# Patient Record
Sex: MALE | Race: WHITE | NOT HISPANIC OR LATINO | Employment: OTHER | URBAN - METROPOLITAN AREA
[De-identification: names, ages, dates, MRNs, and addresses within clinical notes are randomized per-mention and may not be internally consistent; named-entity substitution may affect disease eponyms.]

---

## 2017-01-12 ENCOUNTER — CLINICAL SUPPORT (OUTPATIENT)
Dept: INTERNAL MEDICINE | Facility: CLINIC | Age: 77
End: 2017-01-12
Payer: MEDICARE

## 2017-01-12 DIAGNOSIS — E29.1 HYPOGONADISM IN MALE: Primary | ICD-10-CM

## 2017-01-12 PROCEDURE — 96372 THER/PROPH/DIAG INJ SC/IM: CPT | Mod: S$GLB,,, | Performed by: INTERNAL MEDICINE

## 2017-01-12 PROCEDURE — 99999 PR PBB SHADOW E&M-EST. PATIENT-LVL II: CPT | Mod: PBBFAC,,,

## 2017-01-12 RX ADMIN — TESTOSTERONE CYPIONATE 200 MG: 200 INJECTION, SOLUTION INTRAMUSCULAR at 08:01

## 2017-01-12 NOTE — PROGRESS NOTES
After obtaining consent, and per orders of Dr. Peter Teixeira, injection of Depo Testosterone 200mg given by Guadalupe Lane. Patient instructed to remain in clinic for 20 minutes afterwards, and to report any adverse reaction to me immediately.    Guadalupe Lane, LPN

## 2017-01-18 ENCOUNTER — OFFICE VISIT (OUTPATIENT)
Dept: HEMATOLOGY/ONCOLOGY | Facility: CLINIC | Age: 77
End: 2017-01-18
Payer: MEDICARE

## 2017-01-18 ENCOUNTER — LAB VISIT (OUTPATIENT)
Dept: LAB | Facility: HOSPITAL | Age: 77
End: 2017-01-18
Attending: INTERNAL MEDICINE
Payer: MEDICARE

## 2017-01-18 VITALS
RESPIRATION RATE: 18 BRPM | SYSTOLIC BLOOD PRESSURE: 140 MMHG | OXYGEN SATURATION: 97 % | TEMPERATURE: 98 F | HEART RATE: 61 BPM | HEIGHT: 69 IN | WEIGHT: 177.69 LBS | BODY MASS INDEX: 26.32 KG/M2 | DIASTOLIC BLOOD PRESSURE: 72 MMHG

## 2017-01-18 DIAGNOSIS — C34.12 MALIGNANT NEOPLASM OF UPPER LOBE OF LEFT LUNG: ICD-10-CM

## 2017-01-18 DIAGNOSIS — Z85.118 HISTORY OF CANCER OF UPPER LOBE BRONCHUS OR LUNG: Primary | ICD-10-CM

## 2017-01-18 DIAGNOSIS — C34.10 MALIGNANT NEOPLASM OF UPPER LOBE OF LUNG, UNSPECIFIED LATERALITY: ICD-10-CM

## 2017-01-18 DIAGNOSIS — F51.01 PRIMARY INSOMNIA: ICD-10-CM

## 2017-01-18 LAB
ALBUMIN SERPL BCP-MCNC: 3.4 G/DL
ALP SERPL-CCNC: 62 U/L
ALT SERPL W/O P-5'-P-CCNC: 9 U/L
ANION GAP SERPL CALC-SCNC: 8 MMOL/L
AST SERPL-CCNC: 19 U/L
BASOPHILS # BLD AUTO: 0.05 K/UL
BASOPHILS NFR BLD: 0.9 %
BILIRUB SERPL-MCNC: 0.3 MG/DL
BUN SERPL-MCNC: 14 MG/DL
CALCIUM SERPL-MCNC: 9.3 MG/DL
CHLORIDE SERPL-SCNC: 109 MMOL/L
CO2 SERPL-SCNC: 26 MMOL/L
CREAT SERPL-MCNC: 0.9 MG/DL
DIFFERENTIAL METHOD: ABNORMAL
EOSINOPHIL # BLD AUTO: 0.8 K/UL
EOSINOPHIL NFR BLD: 14.2 %
ERYTHROCYTE [DISTWIDTH] IN BLOOD BY AUTOMATED COUNT: 13.5 %
EST. GFR  (AFRICAN AMERICAN): >60 ML/MIN/1.73 M^2
EST. GFR  (NON AFRICAN AMERICAN): >60 ML/MIN/1.73 M^2
GLUCOSE SERPL-MCNC: 93 MG/DL
HCT VFR BLD AUTO: 38.6 %
HGB BLD-MCNC: 12.5 G/DL
LYMPHOCYTES # BLD AUTO: 0.8 K/UL
LYMPHOCYTES NFR BLD: 15.1 %
MCH RBC QN AUTO: 30.6 PG
MCHC RBC AUTO-ENTMCNC: 32.4 %
MCV RBC AUTO: 94 FL
MONOCYTES # BLD AUTO: 0.5 K/UL
MONOCYTES NFR BLD: 9.9 %
NEUTROPHILS # BLD AUTO: 3.3 K/UL
NEUTROPHILS NFR BLD: 59.9 %
PLATELET # BLD AUTO: 155 K/UL
PMV BLD AUTO: 10.4 FL
POTASSIUM SERPL-SCNC: 4.3 MMOL/L
PROT SERPL-MCNC: 6.3 G/DL
RBC # BLD AUTO: 4.09 M/UL
SODIUM SERPL-SCNC: 143 MMOL/L
WBC # BLD AUTO: 5.48 K/UL

## 2017-01-18 PROCEDURE — 99214 OFFICE O/P EST MOD 30 MIN: CPT | Mod: S$GLB,,, | Performed by: INTERNAL MEDICINE

## 2017-01-18 PROCEDURE — 99999 PR PBB SHADOW E&M-EST. PATIENT-LVL IV: CPT | Mod: PBBFAC,,, | Performed by: INTERNAL MEDICINE

## 2017-01-18 RX ORDER — TEMAZEPAM 15 MG/1
CAPSULE ORAL
Qty: 30 CAPSULE | Refills: 1 | Status: SHIPPED | OUTPATIENT
Start: 2017-01-18 | End: 2017-02-23
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
## 2017-01-23 ENCOUNTER — OFFICE VISIT (OUTPATIENT)
Dept: URGENT CARE | Facility: CLINIC | Age: 77
End: 2017-01-23
Payer: MEDICARE

## 2017-01-23 VITALS
HEART RATE: 72 BPM | WEIGHT: 174.63 LBS | DIASTOLIC BLOOD PRESSURE: 70 MMHG | TEMPERATURE: 99 F | SYSTOLIC BLOOD PRESSURE: 136 MMHG | HEIGHT: 70 IN | BODY MASS INDEX: 25 KG/M2 | OXYGEN SATURATION: 97 %

## 2017-01-23 DIAGNOSIS — T23.202A BURN OF LEFT HAND, SECOND DEGREE, INITIAL ENCOUNTER: Primary | ICD-10-CM

## 2017-01-23 DIAGNOSIS — L03.119 CELLULITIS OF HAND: ICD-10-CM

## 2017-01-23 PROCEDURE — 99999 PR PBB SHADOW E&M-EST. PATIENT-LVL IV: CPT | Mod: PBBFAC,,, | Performed by: NURSE PRACTITIONER

## 2017-01-23 PROCEDURE — 90715 TDAP VACCINE 7 YRS/> IM: CPT | Mod: S$GLB,,, | Performed by: NURSE PRACTITIONER

## 2017-01-23 PROCEDURE — 90471 IMMUNIZATION ADMIN: CPT | Mod: S$GLB,,, | Performed by: NURSE PRACTITIONER

## 2017-01-23 PROCEDURE — 99214 OFFICE O/P EST MOD 30 MIN: CPT | Mod: 25,S$GLB,, | Performed by: NURSE PRACTITIONER

## 2017-01-23 RX ORDER — MUPIROCIN 20 MG/G
OINTMENT TOPICAL 3 TIMES DAILY
Qty: 30 G | Refills: 0 | Status: SHIPPED | OUTPATIENT
Start: 2017-01-23 | End: 2017-02-02

## 2017-01-23 RX ORDER — CEPHALEXIN 500 MG/1
500 CAPSULE ORAL 2 TIMES DAILY
Qty: 20 CAPSULE | Refills: 0 | Status: SHIPPED | OUTPATIENT
Start: 2017-01-23 | End: 2017-02-02
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
## 2017-01-24 NOTE — PATIENT INSTRUCTIONS
Second-Degree Burn  A burn occurs when skin is exposed to too much heat, sun, or harsh chemicals. A second-degree burn (partial-thickness burn) is deeper than a first-degree burn (superficial burn). It usually causes a blister to form. The blister may remain intact and gradually go away on its own. Or it may break open. The goal of treatment is to relieve pain and stop infection while the burn heals.   Home care  Use pain medicine as directed. If no pain medicine was prescribed, you may use acetaminophen or ibuprofen to control pain. If you have chronic liver or kidney disease, talk with your health care provider before using these medicine. Also talk with your provider if you've had a stomach ulcer or GI bleeding.  General care  · On the first day, you may put a cool compress on the wound to ease pain. A cool compress is a small towel soaked in cool water.  · If you were sent home with the blister intact, don't break the blister. The risk for infection is greater if the blister breaks. If a bandage was applied, change it once a day, unless told otherwise. If the bandage becomes wet or soiled, change it as soon as you can.  · Sometimes an infection may occur even with proper treatment. Check the burn daily for the signs of infection listed below.  · Eat more calories and protein until your wound is healed.  · Wear a hat, sunscreen, and long sleeves while in the sun to protect the skin.  · Don't pick or scratch at the wound. Use over-the-counter medicines like diphenhydramine for itching.  · Avoid tight-fitting clothes.  To change a bandage:  · Wash your hands.  · Take off the old bandage. If the bandage sticks, soak it off under warm running water.  · Once the bandage is off, gently wash the burn area with mild soap and warm water to remove any cream, ointment, ooze, or scab. You may do this in a sink, under a tub faucet, or in the shower. Rinse off the soap and gently pat dry with a clean towel.  · Check for  signs of infection listed below.  · Put any prescribed antibiotic cream or ointment on the wound.  · Cover the burn with nonstick gauze. Then wrap it with the bandage material.  Follow-up care  Follow up with your health care provider, or as advised.  When to seek medical advice  Call your health care provider right away if you have any of these signs of infection:  · Fever over 100.4°F (38°C)  · Pain that gets worse  · Redness or swelling that gets worse  · Pus comes from the burn  · Red streaks in your skin coming from the burn  · Wound doesn't appear to be healing  · Nausea or vomiting   © 0178-3700 [employer]. [address] [place] [address], [place] [address]. All rights reserved. This information is not intended as a substitute for professional medical care. Always follow your healthcare professional's instructions.
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
## 2017-02-02 ENCOUNTER — CLINICAL SUPPORT (OUTPATIENT)
Dept: INTERNAL MEDICINE | Facility: CLINIC | Age: 77
End: 2017-02-02
Payer: MEDICARE

## 2017-02-02 DIAGNOSIS — E29.1 HYPOGONADISM IN MALE: Primary | ICD-10-CM

## 2017-02-02 PROCEDURE — 96372 THER/PROPH/DIAG INJ SC/IM: CPT | Mod: S$GLB,,, | Performed by: INTERNAL MEDICINE

## 2017-02-02 PROCEDURE — 99999 PR PBB SHADOW E&M-EST. PATIENT-LVL II: CPT | Mod: PBBFAC,,,

## 2017-02-02 RX ADMIN — TESTOSTERONE CYPIONATE 200 MG: 200 INJECTION, SOLUTION INTRAMUSCULAR at 08:02

## 2017-02-06 ENCOUNTER — PROCEDURE VISIT (OUTPATIENT)
Dept: PULMONOLOGY | Facility: CLINIC | Age: 77
End: 2017-02-06
Payer: MEDICARE

## 2017-02-06 ENCOUNTER — HOSPITAL ENCOUNTER (OUTPATIENT)
Dept: RADIOLOGY | Facility: HOSPITAL | Age: 77
Discharge: HOME | End: 2017-02-06
Attending: INTERNAL MEDICINE
Payer: MEDICARE

## 2017-02-06 ENCOUNTER — OFFICE VISIT (OUTPATIENT)
Dept: PULMONOLOGY | Facility: CLINIC | Age: 77
End: 2017-02-06
Payer: MEDICARE

## 2017-02-06 VITALS
HEART RATE: 67 BPM | SYSTOLIC BLOOD PRESSURE: 110 MMHG | OXYGEN SATURATION: 99 % | HEIGHT: 70 IN | RESPIRATION RATE: 18 BRPM | WEIGHT: 173 LBS | DIASTOLIC BLOOD PRESSURE: 70 MMHG | BODY MASS INDEX: 24.77 KG/M2

## 2017-02-06 DIAGNOSIS — Z90.2 STATUS POST PNEUMONECTOMY: ICD-10-CM

## 2017-02-06 DIAGNOSIS — Z85.118 HX OF CANCER OF LUNG: ICD-10-CM

## 2017-02-06 DIAGNOSIS — J44.89 ASTHMA WITH COPD: Primary | ICD-10-CM

## 2017-02-06 DIAGNOSIS — J92.0 ASBESTOS-INDUCED PLEURAL PLAQUE: ICD-10-CM

## 2017-02-06 DIAGNOSIS — C34.10 MALIGNANT NEOPLASM OF UPPER LOBE OF LUNG, UNSPECIFIED LATERALITY: ICD-10-CM

## 2017-02-06 DIAGNOSIS — J43.1 PANLOBULAR EMPHYSEMA: ICD-10-CM

## 2017-02-06 PROCEDURE — 94726 PLETHYSMOGRAPHY LUNG VOLUMES: CPT | Mod: S$GLB,,, | Performed by: INTERNAL MEDICINE

## 2017-02-06 PROCEDURE — 99214 OFFICE O/P EST MOD 30 MIN: CPT | Mod: 25,S$GLB,, | Performed by: INTERNAL MEDICINE

## 2017-02-06 PROCEDURE — 71030 XR CHEST 4 OR MORE VIEW: CPT | Mod: 26,,, | Performed by: RADIOLOGY

## 2017-02-06 PROCEDURE — 94729 DIFFUSING CAPACITY: CPT | Mod: S$GLB,,, | Performed by: INTERNAL MEDICINE

## 2017-02-06 PROCEDURE — 99999 PR PBB SHADOW E&M-EST. PATIENT-LVL IV: CPT | Mod: PBBFAC,,, | Performed by: INTERNAL MEDICINE

## 2017-02-06 PROCEDURE — 94060 EVALUATION OF WHEEZING: CPT | Mod: S$GLB,,, | Performed by: INTERNAL MEDICINE

## 2017-02-06 RX ORDER — TETANUS TOXOID, REDUCED DIPHTHERIA TOXOID AND ACELLULAR PERTUSSIS VACCINE, ADSORBED 5; 2.5; 8; 8; 2.5 [IU]/.5ML; [IU]/.5ML; UG/.5ML; UG/.5ML; UG/.5ML
SUSPENSION INTRAMUSCULAR
COMMUNITY
Start: 2017-01-23 | End: 2017-03-02 | Stop reason: ALTCHOICE

## 2017-02-06 RX ORDER — LISINOPRIL AND HYDROCHLOROTHIAZIDE 20; 25 MG/1; MG/1
TABLET ORAL
COMMUNITY
Start: 2013-04-01 | End: 2017-03-02

## 2017-02-06 NOTE — PROGRESS NOTES
Subjective:       Patient ID: Chai Murry is a 76 y.o. male.    Chief Complaint: He       COPD (rev cxr pft)    HPI   Dyspnea  Patient complains of shortness of breath. Symptoms occur with more than one block walking. Symptoms began 1 year ago, gradually worsening since. Associated symptoms include  dry cough and dyspnea on exertion. He denies chest pain, located left chest. He does not have had recent travel. Weight has been stable. Symptoms are exacerbated by moderate activity. Symptoms are alleviated by rest.     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 [[id number]] The primary measured 3.8 cm extending to the bronchial resection margin. AP window node was positive for metastatic squamous cell carcinoma. 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  First exposure to asbestos: 1957  Last exposure to asbestos: 1983     [employer]: maintence for 10 years at [facility]  Sandblasting: very little  Toxic Fume Exposure: chlorine cells and mercury cells      Past Medical History   Diagnosis Date    Anemia     Arthritis     Atrial fibrillation     CAD (coronary artery disease)     Cancer      lung cancer-Left    Cataract     ED (erectile dysfunction)     HTN (hypertension)     Hyperlipidemia     Myocardial infarction      Past Surgical History   Procedure Laterality Date    Appendectomy      Cholecystectomy      Cardiac stents      Infected stomach gland excision      Cataract extraction w/  intraocular lens implant Right 9-3-14    Skin biopsy Left      arm    Lung removal, total Left 04/2016     lung cancer left upper lobe     Social History      Social History    Marital status:      Spouse name: N/A    Number of children: 3    Years of education: N/A     Occupational History    retired      Social History Main Topics    Smoking status: Former Smoker     Packs/day: 1.00     Years: 50.00     Quit date: 8/12/2005    Smokeless tobacco: Never Used    Alcohol use No    Drug use: No    Sexual activity: Not Currently     Other Topics Concern    Not on file     Social History Narrative     Review of Systems    Objective:      Physical Exam  Personal Diagnostic Review  Chest x-ray: post op changes and asbestos plerual changes    No flowsheet data found.      Assessment:       1. Asthma with COPD    2. Asbestos-induced pleural plaque    3. Panlobular emphysema    4. Hx of cancer of lung    5. s/p left pnuemonectomy for KAYLI Lunc Ca        Outpatient Encounter Prescriptions as of 2/6/2017   Medication Sig Dispense Refill    albuterol-ipratropium 2.5mg-0.5mg/3mL (DUO-NEB) 0.5 mg-3 mg(2.5 mg base)/3 mL nebulizer solution Take 3 mLs by nebulization every 6 (six) hours as needed for Shortness of Breath. 120 vial 11    amlodipine (NORVASC) 5 MG tablet Take 5 mg by mouth every evening.       aspirin (ECOTRIN) 81 MG EC tablet Take 81 mg by mouth once daily.      BOOSTRIX TDAP 2.5-8-5 Lf-mcg-Lf/0.5mL Syrg injection       fenofibric acid (FIBRICOR) 135 mg CpDR TAKE 1 CAPSULE BY MOUTH EVERY DAY 30 capsule 11    hydrochlorothiazide (HYDRODIURIL) 25 MG tablet Take 25 mg by mouth daily as needed.      lisinopril (PRINIVIL,ZESTRIL) 40 MG tablet Take 40 mg by mouth Daily.       lisinopril-hydrochlorothiazide (PRINZIDE,ZESTORETIC) 20-25 mg Tab       potassium 99 mg Tab Take 99 mg by mouth. Pt takes when he takes his Lasix      simvastatin (ZOCOR) 40 MG tablet TAKE 1 TABLET (40 MG TOTAL) BY MOUTH EVERY EVENING. 30 tablet 11    SOTALOL AF 80 mg tablet TAKE 1 TABLET BY MOUTH TWICE A DAY 60 tablet 11    temazepam (RESTORIL) 15 mg Cap TAKE 1 CAPSULE BY  MOUTH NIGHTLY AS NEEDED 30 capsule 1    WELCHOL 625 mg tablet TAKE 2 TABLETS BY MOUTH TWICE A  tablet 9    cetirizine (ZYRTEC) 10 MG tablet Take 1 tablet (10 mg total) by mouth once daily. 30 tablet 0    ipratropium-albuterol (COMBIVENT RESPIMAT)  mcg/actuation inhaler Inhale 1 puff into the lungs every 6 (six) hours as needed for Shortness of Breath. 4 g 11     Facility-Administered Encounter Medications as of 2/6/2017   Medication Dose Route Frequency Provider Last Rate Last Dose    testosterone cypionate injection 200 mg  200 mg Intramuscular Q21 Days Peter Teixeira MD   200 mg at 02/02/17 0811     Orders Placed This Encounter   Procedures    X-Ray Chest PA And Lateral     Standing Status:   Future     Standing Expiration Date:   2/6/2019     Order Specific Question:   Reason for Exam:     Answer:   SOB    Spirometry with/without bronchodilator     Standing Status:   Future     Standing Expiration Date:   2/6/2018     Plan:       Requested Prescriptions     Signed Prescriptions Disp Refills    ipratropium-albuterol (COMBIVENT RESPIMAT)  mcg/actuation inhaler 4 g 11     Sig: Inhale 1 puff into the lungs every 6 (six) hours as needed for Shortness of Breath.     Asthma with COPD  -     ipratropium-albuterol (COMBIVENT RESPIMAT)  mcg/actuation inhaler; Inhale 1 puff into the lungs every 6 (six) hours as needed for Shortness of Breath.  Dispense: 4 g; Refill: 11  -     Spirometry with/without bronchodilator; Future; Expected date: 8/9/17  -     X-Ray Chest PA And Lateral; Future    Asbestos-induced pleural plaque    Panlobular emphysema    Hx of cancer of lung    s/p left pnuemonectomy for KAYLI [place] Ca           Return in about 1 year (around 2/6/2018) for lisa and cxr.    MEDICAL DECISION MAKING: Moderate to high complexity.  Overall, the multiple problems listed are of moderate to high severity that may impact quality of life and activities of daily living. Side effects of  medications, treatment plan as well as options and alternatives reviewed and discussed with patient. There was counseling of patient concerning these issues.    Total time spent in face to face counseling and coordination of care - 25  minutes over 50% of time was used in discussion of prognosis, risks, benefits of treatment, instructions and compliance with regimen . Discussion with other physicians or health care providers (DME, NP, pharmacy, respiratory therapy) occurred.

## 2017-02-06 NOTE — PATIENT INSTRUCTIONS
Albuterol; Ipratropium respiratory inhalation spray (Combivent Respimat)  What is this medicine?  ALBUTEROL; IPRATROPIUM (al BYOO ter ole; i pra REBEKAH drake um) has two bronchodilators. It helps open up the airways in your lungs to make it easier to breathe. This medicine is used to treat chronic obstructive pulmonary disease (COPD).  How should I use this medicine?  This medicine is for inhalation only. Follow the instructions on your prescription label. Do not use more often than directed. Make sure that you are using your inhaler correctly. Ask you doctor or health care provider if you have any questions.  Talk to your pediatrician regarding the use of this medicine in children. Special care may be needed.  What side effects may I notice from receiving this medicine?  Side effects that you should report to your doctor or health care professional as soon as possible:  · allergic reactions like skin rash, itching or hives, swelling of the face, lips, or tongue  · breathing problems  · feeling faint or lightheaded, falls  · fever  · high blood pressure  · irregular heartbeat or chest pain  · muscle cramps or weakness  · pain, tingling, numbness in the hands or feet  · vomiting  Side effects that usually do not require medical attention (Report these to your doctor or health care professional if they continue or are bothersome.):  · blurred vision  · cough  · difficulty passing urine  · difficulty sleeping  · headache  · nervousness or trembling  · stuffy or runny nose  · unusual taste  · upset stomach  What may interact with this medicine?  Do not take this medicine with any of the following medications:  · MAOIs like Carbex, Eldepryl, Marplan, Nardil, and Parnate  This medicine may also interact with the following medications:  · diuretics  · medicines for depression, anxiety, or psychotic disturbances  · medicines for irregular heartbeat  · medicines for weight loss including some herbal  products  · methadone  · pimozide  · sertindole  · some medicines for blood pressure or the heart  What if I miss a dose?  If you miss a dose, use it as soon as you can. If it is almost time for your next dose, use only that dose. Do not use double or extra doses.  Where should I keep my medicine?  Keep out of the reach of children.  Store at a room temperature between 15 and 30 degrees C (59 and 86 degrees F). Do not freeze. After assembly, the inhaler should be discarded at the latest 3 months after first use or after the inhaler is locked, whichever comes first.  What should I tell my health care provider before I take this medicine?  They need to know if you have any of these conditions:  · heart disease  · high blood pressure  · irregular heartbeat  · an unusual or allergic reaction to albuterol, ipratropium, atropine, other medicines, foods, dyes, or preservatives  · pregnant or trying to get pregnant  · breast-feeding  What should I watch for while using this medicine?  Tell your doctor or health care professional if your symptoms do not improve. If your breathing gets worse while you are using this medicine, call your doctor right away. Do not stop using your medicine unless your doctor tells you to.  Your mouth may get dry. Chewing sugarless gum or sucking hard candy, and drinking plenty of water may help. Contact your doctor if the problem does not go away or is severe.  You may get dizzy or have blurred vision. Do not drive, use machinery, or do anything that needs mental alertness until you know how this medicine affects you. Do not stand or sit up quickly, especially if you are an older patient. This reduces the risk of dizzy or fainting spells.  Date Last Reviewed:   NOTE:This sheet is a summary. It may not cover all possible information. If you have questions about this medicine, talk to your doctor, pharmacist, or health care provider. Copyright© 2016 [employer] Standard

## 2017-02-06 NOTE — MR AVS SNAPSHOT
[facility]  [address] [place] [address]  Phone: [phone]  Fax: [phone]                  Chai Murry   2017 1:40 PM   Office Visit    Description:  Male : 1940   Provider:  Roel Ernandez MD   Department:  [facility]           Reason for Visit     COPD           Diagnoses this Visit        Comments    Asthma with COPD    -  Primary     Asbestos-induced pleural plaque         Panlobular emphysema                To Do List           Future Appointments        Provider Department Dept Phone    2017 9:00 AM [phone]    3/15/2017 8:20 AM LABORATORY, [place] [facility] [place] [phone]    3/21/2017 8:40 AM [facility] [phone]    2017 9:00 AM LABORAT[facility], [facility] [phone]    2017 10:30 AM [facility] CT1 LIMIT [address] [facility] [phone]      Goals (5 Years of Data)     None      Follow-Up and Disposition     Return in about 1 year (around 2018) for lisa and cxr.       These Medications        Disp Refills Start End    ipratropium-albuterol (COMBIVENT RESPIMAT)  mcg/actuation inhaler 4 g 11 2017     Inhale 1 puff into the lungs every 6 (six) hours as needed for Shortness of Breath. - Inhalation    Pharmacy: [facility]/pharmacy #[id number] - [place] - [address] AT [facility] Ph #: [phone]         [facility]     [facility] -  Assistance  Registered nurses in the [facility] provide clinical advisement, health education, appointment booking, and other advisory services.  Call for this free service at [phone].             Medications           Message regarding Medications     Verify the changes and/or additions to your medication regime listed below are the same as discussed with your  clinician today.  If any of these changes or additions are incorrect, please notify your healthcare provider.        START taking these NEW medications        Refills    ipratropium-albuterol (COMBIVENT RESPIMAT)  mcg/actuation inhaler 11    Sig: Inhale 1 puff into the lungs every 6 (six) hours as needed for Shortness of Breath.    Class: Normal    Route: Inhalation           Verify that the below list of medications is an accurate representation of the medications you are currently taking.  If none reported, the list may be blank. If incorrect, please contact your healthcare provider. Carry this list with you in case of emergency.           Current Medications     albuterol-ipratropium 2.5mg-0.5mg/3mL (DUO-NEB) 0.5 mg-3 mg(2.5 mg base)/3 mL nebulizer solution Take 3 mLs by nebulization every 6 (six) hours as needed for Shortness of Breath.    amlodipine (NORVASC) 5 MG tablet Take 5 mg by mouth every evening.     aspirin (ECOTRIN) 81 MG EC tablet Take 81 mg by mouth once daily.    BOOSTRIX TDAP 2.5-8-5 Lf-mcg-Lf/0.5mL Syrg injection     cetirizine (ZYRTEC) 10 MG tablet Take 1 tablet (10 mg total) by mouth once daily.    fenofibric acid (FIBRICOR) 135 mg CpDR TAKE 1 CAPSULE BY MOUTH EVERY DAY    hydrochlorothiazide (HYDRODIURIL) 25 MG tablet Take 25 mg by mouth daily as needed.    ipratropium-albuterol (COMBIVENT RESPIMAT)  mcg/actuation inhaler Inhale 1 puff into the lungs every 6 (six) hours as needed for Shortness of Breath.    lisinopril (PRINIVIL,ZESTRIL) 40 MG tablet Take 40 mg by mouth Daily.     lisinopril-hydrochlorothiazide (PRINZIDE,ZESTORETIC) 20-25 mg Tab     potassium 99 mg Tab Take 99 mg by mouth. Pt takes when he takes his Lasix    simvastatin (ZOCOR) 40 MG tablet TAKE 1 TABLET (40 MG TOTAL) BY MOUTH EVERY EVENING.    SOTALOL AF 80 mg tablet TAKE 1 TABLET BY MOUTH TWICE A DAY    temazepam (RESTORIL) 15 mg Cap TAKE 1 CAPSULE BY MOUTH NIGHTLY AS NEEDED    WELCHOL 625 mg tablet TAKE 2 TABLETS  "BY MOUTH TWICE A DAY           Clinical Reference Information           Your Vitals Were     BP Pulse Resp Height Weight SpO2    110/70 (BP Location: Right arm, Patient Position: Sitting, BP Method: Manual) 67 18 5' 10" (1.778 m) 78.5 kg (173 lb) 99%    BMI                24.82 kg/m2          Blood Pressure          Most Recent Value    BP  110/70      Allergies as of 2/6/2017     Atorvastatin      Immunizations Administered on Date of Encounter - 2/6/2017     None      Orders Placed During Today's Visit     Future Labs/Procedures Expected by Expires    Spirometry with/without bronchodilator  8/9/2017 (Approximate) 2/6/2018    X-Ray Chest PA And Lateral  As directed 2/6/2019      Instructions      Albuterol; Ipratropium respiratory inhalation spray (Combivent Respimat)  What is this medicine?  ALBUTEROL; IPRATROPIUM (al BYOO ter ole; i pra TROE pee um) has two bronchodilators. It helps open up the airways in your lungs to make it easier to breathe. This medicine is used to treat chronic obstructive pulmonary disease (COPD).  How should I use this medicine?  This medicine is for inhalation only. Follow the instructions on your prescription label. Do not use more often than directed. Make sure that you are using your inhaler correctly. Ask you doctor or health care provider if you have any questions.  Talk to your pediatrician regarding the use of this medicine in children. Special care may be needed.  What side effects may I notice from receiving this medicine?  Side effects that you should report to your doctor or health care professional as soon as possible:  · allergic reactions like skin rash, itching or hives, swelling of the face, lips, or tongue  · breathing problems  · feeling faint or lightheaded, falls  · fever  · high blood pressure  · irregular heartbeat or chest pain  · muscle cramps or weakness  · pain, tingling, numbness in the hands or feet  · vomiting  Side effects that usually do not require medical " attention (Report these to your doctor or health care professional if they continue or are bothersome.):  · blurred vision  · cough  · difficulty passing urine  · difficulty sleeping  · headache  · nervousness or trembling  · stuffy or runny nose  · unusual taste  · upset stomach  What may interact with this medicine?  Do not take this medicine with any of the following medications:  · MAOIs like Carbex, Eldepryl, Marplan, Nardil, and Parnate  This medicine may also interact with the following medications:  · diuretics  · medicines for depression, anxiety, or psychotic disturbances  · medicines for irregular heartbeat  · medicines for weight loss including some herbal products  · methadone  · pimozide  · sertindole  · some medicines for blood pressure or the heart  What if I miss a dose?  If you miss a dose, use it as soon as you can. If it is almost time for your next dose, use only that dose. Do not use double or extra doses.  Where should I keep my medicine?  Keep out of the reach of children.  Store at a room temperature between 15 and 30 degrees C (59 and 86 degrees F). Do not freeze. After assembly, the inhaler should be discarded at the latest 3 months after first use or after the inhaler is locked, whichever comes first.  What should I tell my health care provider before I take this medicine?  They need to know if you have any of these conditions:  · heart disease  · high blood pressure  · irregular heartbeat  · an unusual or allergic reaction to albuterol, ipratropium, atropine, other medicines, foods, dyes, or preservatives  · pregnant or trying to get pregnant  · breast-feeding  What should I watch for while using this medicine?  Tell your doctor or health care professional if your symptoms do not improve. If your breathing gets worse while you are using this medicine, call your doctor right away. Do not stop using your medicine unless your doctor tells you to.  Your mouth may get dry. Chewing sugarless  gum or sucking hard candy, and drinking plenty of water may help. Contact your doctor if the problem does not go away or is severe.  You may get dizzy or have blurred vision. Do not drive, use machinery, or do anything that needs mental alertness until you know how this medicine affects you. Do not stand or sit up quickly, especially if you are an older patient. This reduces the risk of dizzy or fainting spells.  Date Last Reviewed:   NOTE:This sheet is a summary. It may not cover all possible information. If you have questions about this medicine, talk to your doctor, pharmacist, or health care provider. Copyright© 2016 [employer] Standard             Language Assistance Services     ATTENTION: Language assistance services are available, free of charge. Please call [phone].      ATENCIÓN: Si michelle jermain, tiene a frost disposición servicios gratuitos de asistencia lingüística. Llame al [phone].     CHÚ Ý: N?u b?n nói Ti?ng Vi?t, có các d?ch v? h? tr? ngôn ng? mi?n phí dành cho b?n. G?i s? [phone].         [facility] complies with applicable Federal civil rights laws and does not discriminate on the basis of race, color, national origin, age, disability, or sex.

## 2017-02-07 LAB
POST FEF 25 75: 0.85 L/S (ref 1.43–3.03)
POST FET 100: 12.09 S
POST FEV1 FVC: 64 %
POST FEV1: 1.78 L (ref 2.68–3.47)
POST FIF 50: 1.58 L/S
POST FVC: 2.76 L (ref 3.78–4.71)
POST PEF: 4.92 L/S (ref 6.69–9.01)
PRE DLCO: 14.69 ML/MMHG/MIN (ref 15.25–23.54)
PRE ERV: 0.46 L
PRE FEF 25 75: 0.64 L/S (ref 1.43–3.03)
PRE FET 100: 12.17 S
PRE FEV1 FVC: 61 %
PRE FEV1: 1.65 L (ref 2.68–3.47)
PRE FIF 50: 1.68 L/S
PRE FRC PL: 3.04 L (ref 3.19–4.4)
PRE FVC: 2.72 L (ref 3.78–4.71)
PRE KROGHS K: 3.39 1/MIN
PRE PEF: 5.39 L/S (ref 6.69–9.01)
PRE RV: 2.58 L (ref 2.23–3.02)
PRE SVC: 2.72 L
PRE TLC: 5.3 L (ref 5.94–6.93)
PREDICTED DLCO: 19.39 ML/MMHG/MIN (ref 15.25–23.54)
PREDICTED FEV1 FVC: 72.57 % (ref 67.73–77.41)
PREDICTED FEV1: 3.08 L (ref 2.68–3.47)
PREDICTED FRC N2: 3.79 L (ref 3.19–4.4)
PREDICTED FRC PL: 3.79 L (ref 3.19–4.4)
PREDICTED FVC: 4.25 L (ref 3.78–4.71)
PREDICTED RV: 2.63 L (ref 2.23–3.02)
PREDICTED SVC: 4.01 L
PREDICTED TLC: 6.43 L (ref 5.94–6.93)
PROVOCATION PROTOCOL: ABNORMAL

## 2017-02-09 PROBLEM — Z85.118 HX OF CANCER OF LUNG: Status: ACTIVE | Noted: 2017-02-09

## 2017-02-23 ENCOUNTER — CLINICAL SUPPORT (OUTPATIENT)
Dept: INTERNAL MEDICINE | Facility: CLINIC | Age: 77
End: 2017-02-23
Payer: MEDICARE

## 2017-02-23 ENCOUNTER — PATIENT MESSAGE (OUTPATIENT)
Dept: HEMATOLOGY/ONCOLOGY | Facility: CLINIC | Age: 77
End: 2017-02-23

## 2017-02-23 ENCOUNTER — TELEPHONE (OUTPATIENT)
Dept: HEMATOLOGY/ONCOLOGY | Facility: CLINIC | Age: 77
End: 2017-02-23

## 2017-02-23 DIAGNOSIS — E29.1 HYPOGONADISM IN MALE: Primary | ICD-10-CM

## 2017-02-23 DIAGNOSIS — F51.01 PRIMARY INSOMNIA: Primary | ICD-10-CM

## 2017-02-23 PROBLEM — G47.00 INSOMNIA: Status: ACTIVE | Noted: 2017-02-23

## 2017-02-23 PROCEDURE — 96372 THER/PROPH/DIAG INJ SC/IM: CPT | Mod: S$GLB,,, | Performed by: INTERNAL MEDICINE

## 2017-02-23 PROCEDURE — 99999 PR PBB SHADOW E&M-EST. PATIENT-LVL II: CPT | Mod: PBBFAC,,,

## 2017-02-23 RX ORDER — CLONAZEPAM 0.5 MG/1
0.5 TABLET ORAL 2 TIMES DAILY
Qty: 60 TABLET | Refills: 0 | Status: SHIPPED | OUTPATIENT
Start: 2017-02-23 | End: 2017-03-02 | Stop reason: SDUPTHER

## 2017-02-23 RX ADMIN — TESTOSTERONE CYPIONATE 200 MG: 200 INJECTION, SOLUTION INTRAMUSCULAR at 08:02

## 2017-02-23 NOTE — TELEPHONE ENCOUNTER
----- Message from Jorge L Patel MD sent at 2/23/2017 10:35 AM CST -----  I will give him a month supply of klonopin but this is an issue he will need to address going forward with his PCP

## 2017-02-23 NOTE — TELEPHONE ENCOUNTER
Patient has a  Insomnia He tried benadryl, restoril, xanax and some over the counter medications. He still wakes after a couple hours.  Benadryl causes restless legs.  One of his relative's gave him Klonopin 1 mg(he thinks) last night and he slept all night.  He is requesting a prescription for Klonopin.

## 2017-03-02 ENCOUNTER — OFFICE VISIT (OUTPATIENT)
Dept: INTERNAL MEDICINE | Facility: CLINIC | Age: 77
End: 2017-03-02
Payer: MEDICARE

## 2017-03-02 VITALS
DIASTOLIC BLOOD PRESSURE: 74 MMHG | HEIGHT: 70 IN | SYSTOLIC BLOOD PRESSURE: 136 MMHG | WEIGHT: 175.25 LBS | HEART RATE: 82 BPM | TEMPERATURE: 97 F | BODY MASS INDEX: 25.09 KG/M2

## 2017-03-02 DIAGNOSIS — F51.01 PRIMARY INSOMNIA: ICD-10-CM

## 2017-03-02 DIAGNOSIS — I10 ESSENTIAL HYPERTENSION: ICD-10-CM

## 2017-03-02 DIAGNOSIS — I25.10 CORONARY ARTERY DISEASE INVOLVING NATIVE CORONARY ARTERY OF NATIVE HEART WITHOUT ANGINA PECTORIS: ICD-10-CM

## 2017-03-02 DIAGNOSIS — I48.20 CHRONIC ATRIAL FIBRILLATION: ICD-10-CM

## 2017-03-02 DIAGNOSIS — C34.12 MALIGNANT NEOPLASM OF UPPER LOBE OF LEFT LUNG: ICD-10-CM

## 2017-03-02 DIAGNOSIS — E78.2 MIXED HYPERLIPIDEMIA: ICD-10-CM

## 2017-03-02 PROCEDURE — 99214 OFFICE O/P EST MOD 30 MIN: CPT | Mod: S$GLB,,, | Performed by: INTERNAL MEDICINE

## 2017-03-02 PROCEDURE — 99999 PR PBB SHADOW E&M-EST. PATIENT-LVL III: CPT | Mod: PBBFAC,,, | Performed by: INTERNAL MEDICINE

## 2017-03-02 RX ORDER — CLONAZEPAM 0.5 MG/1
0.5 TABLET ORAL NIGHTLY
Qty: 30 TABLET | Refills: 0
Start: 2017-03-02 | End: 2017-04-24 | Stop reason: SDUPTHER
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
## 2017-03-02 NOTE — MR AVS SNAPSHOT
[facility]  [address] [place] [address]  Phone: [phone]  Fax: [phone]                  Chai Murry   3/2/2017 10:20 AM   Office Visit    Description:  Male : 1940   Provider:  Peter Teixeira MD   [facility]           Reason for Visit     Follow-up           Diagnoses this Visit        Comments    Coronary artery disease involving native coronary artery of native heart without angina pectoris         Chronic atrial fibrillation         Essential hypertension         Mixed hyperlipidemia         Primary insomnia         Malignant neoplasm of upper lobe of left lung                To Do List           Future Appointments        Provider Department Dept Phone    3/15/2017 8:20 AM LABORATORY, [place] [facility] [phone]    3/16/2017 9:00 AM INTERNAL MEDICINE NURSE, [facility] [phone]    2017 9:00 AM LABORATORY, [place] [facility] [phone]    2017 10:30 AM [facility] CT1 LIMIT 500 LBS [facility] [phone]    2017 11:10 AM [facility] CT1 LIMIT 500 LBS [facility] [phone]      Goals (5 Years of Data)     None      Follow-Up and Disposition     Return in about 6 months (around 2017).       These Medications        Disp Refills Start End    clonazePAM (KLONOPIN) 0.5 MG tablet 30 tablet 0 3/2/2017 3/2/2018    Take 1 tablet (0.5 mg total) by mouth every evening. - Oral    Pharmacy: [facility]/pharmacy #[id number] - [place] - [address] AT [facility] Ph #: [phone]         [facility]     [facility] -  Assistance  Registered nurses in the [facility] provide clinical advisement, health education, appointment booking, and other advisory services.  Call for this free service at [phone].             Medications           Message regarding  Medications     Verify the changes and/or additions to your medication regime listed below are the same as discussed with your clinician today.  If any of these changes or additions are incorrect, please notify your healthcare provider.        CHANGE how you are taking these medications     Start Taking Instead of    clonazePAM (KLONOPIN) 0.5 MG tablet clonazePAM (KLONOPIN) 0.5 MG tablet    Dosage:  Take 1 tablet (0.5 mg total) by mouth every evening. Dosage:  Take 1 tablet (0.5 mg total) by mouth 2 (two) times daily.    Reason for Change:  Reorder       STOP taking these medications     lisinopril-hydrochlorothiazide (PRINZIDE,ZESTORETIC) 20-25 mg Tab     BOOSTRIX TDAP 2.5-8-5 Lf-mcg-Lf/0.5mL Syrg injection            Verify that the below list of medications is an accurate representation of the medications you are currently taking.  If none reported, the list may be blank. If incorrect, please contact your healthcare provider. Carry this list with you in case of emergency.           Current Medications     albuterol-ipratropium 2.5mg-0.5mg/3mL (DUO-NEB) 0.5 mg-3 mg(2.5 mg base)/3 mL nebulizer solution Take 3 mLs by nebulization every 6 (six) hours as needed for Shortness of Breath.    amlodipine (NORVASC) 5 MG tablet Take 5 mg by mouth every evening.     aspirin (ECOTRIN) 81 MG EC tablet Take 81 mg by mouth once daily.    clonazePAM (KLONOPIN) 0.5 MG tablet Take 1 tablet (0.5 mg total) by mouth every evening.    fenofibric acid (FIBRICOR) 135 mg CpDR TAKE 1 CAPSULE BY MOUTH EVERY DAY    ipratropium-albuterol (COMBIVENT RESPIMAT)  mcg/actuation inhaler Inhale 1 puff into the lungs every 6 (six) hours as needed for Shortness of Breath.    lisinopril (PRINIVIL,ZESTRIL) 40 MG tablet Take 40 mg by mouth Daily.     potassium 99 mg Tab Take 99 mg by mouth. Pt takes when he takes his Lasix    simvastatin (ZOCOR) 40 MG tablet TAKE 1 TABLET (40 MG TOTAL) BY MOUTH EVERY EVENING.    SOTALOL AF 80 mg tablet TAKE 1 TABLET  BY MOUTH TWICE A DAY    WELCHOL 625 mg tablet TAKE 2 TABLETS BY MOUTH TWICE A DAY    cetirizine (ZYRTEC) 10 MG tablet Take 1 tablet (10 mg total) by mouth once daily.    hydrochlorothiazide (HYDRODIURIL) 25 MG tablet Take 25 mg by mouth daily as needed.           Clinical Reference Information           Your Vitals Were     BP                   136/74           Blood Pressure          Most Recent Value    BP  136/74      Allergies as of 3/2/2017     Atorvastatin      Immunizations Administered on Date of Encounter - 3/2/2017     None      Orders Placed During Today's Visit     Future Labs/Procedures Expected by Expires    CBC auto differential  8/29/2017 10/31/2017    Comprehensive metabolic panel  8/29/2017 3/2/2018    Lipid panel  8/29/2017 3/2/2018    PSA, Screening  8/29/2017 3/2/2018      Language Assistance Services     ATTENTION: Language assistance services are available, free of charge. Please call [phone].      ATENCIÓN: Si habla español, tiene a frost disposición servicios gratuitos de asistencia lingüística. Llame al [phone].     CHÚ Ý: N?u b?n nói Ti?ng Vi?t, có các d?ch v? h? tr? ngôn ng? mi?n phí dành cho b?n. G?i s? [phone].         [facility] complies with applicable Federal civil rights laws and does not discriminate on the basis of race, color, national origin, age, disability, or sex.

## 2017-03-16 ENCOUNTER — TELEPHONE (OUTPATIENT)
Dept: INTERNAL MEDICINE | Facility: CLINIC | Age: 77
End: 2017-03-16

## 2017-03-16 ENCOUNTER — CLINICAL SUPPORT (OUTPATIENT)
Dept: INTERNAL MEDICINE | Facility: CLINIC | Age: 77
End: 2017-03-16
Payer: MEDICARE

## 2017-03-16 PROCEDURE — 96372 THER/PROPH/DIAG INJ SC/IM: CPT | Mod: S$GLB,,, | Performed by: INTERNAL MEDICINE

## 2017-03-16 PROCEDURE — 99999 PR PBB SHADOW E&M-EST. PATIENT-LVL II: CPT | Mod: PBBFAC,,,

## 2017-03-16 RX ORDER — TESTOSTERONE CYPIONATE 200 MG/ML
50 INJECTION, SOLUTION INTRAMUSCULAR
Status: DISCONTINUED | OUTPATIENT
Start: 2017-03-16 | End: 2017-04-05

## 2017-03-16 RX ADMIN — TESTOSTERONE CYPIONATE 50 MG: 200 INJECTION, SOLUTION INTRAMUSCULAR at 03:03

## 2017-03-21 RX ORDER — SIMVASTATIN 40 MG/1
40 TABLET, FILM COATED ORAL NIGHTLY
Qty: 30 TABLET | Refills: 11 | Status: SHIPPED | OUTPATIENT
Start: 2017-03-21 | End: 2017-03-22 | Stop reason: SDUPTHER

## 2017-03-22 RX ORDER — SIMVASTATIN 40 MG/1
40 TABLET, FILM COATED ORAL NIGHTLY
Qty: 30 TABLET | Refills: 11 | Status: SHIPPED | OUTPATIENT
Start: 2017-03-22 | End: 2019-10-24 | Stop reason: SDUPTHER

## 2017-04-05 ENCOUNTER — TELEPHONE (OUTPATIENT)
Dept: INTERNAL MEDICINE | Facility: CLINIC | Age: 77
End: 2017-04-05

## 2017-04-05 ENCOUNTER — TELEPHONE (OUTPATIENT)
Dept: RADIOLOGY | Facility: HOSPITAL | Age: 77
End: 2017-04-05

## 2017-04-05 RX ORDER — TESTOSTERONE CYPIONATE 200 MG/ML
200 INJECTION, SOLUTION INTRAMUSCULAR
Status: COMPLETED | OUTPATIENT
Start: 2017-04-06 | End: 2017-08-11

## 2017-04-05 NOTE — TELEPHONE ENCOUNTER
Current testosterone order entered was removed due to wrong dose ordered.  Please place new orders.  Patient received 200mg IM every 21 days with start date of April 6th.  Thank you.

## 2017-04-06 ENCOUNTER — HOSPITAL ENCOUNTER (OUTPATIENT)
Dept: RADIOLOGY | Facility: HOSPITAL | Age: 77
Discharge: HOME | End: 2017-04-06
Attending: INTERNAL MEDICINE
Payer: MEDICARE

## 2017-04-06 ENCOUNTER — CLINICAL SUPPORT (OUTPATIENT)
Dept: INTERNAL MEDICINE | Facility: CLINIC | Age: 77
End: 2017-04-06
Payer: MEDICARE

## 2017-04-06 DIAGNOSIS — E29.1 HYPOGONADISM IN MALE: Primary | ICD-10-CM

## 2017-04-06 DIAGNOSIS — Z85.118 HISTORY OF CANCER OF UPPER LOBE BRONCHUS OR LUNG: ICD-10-CM

## 2017-04-06 PROCEDURE — 96372 THER/PROPH/DIAG INJ SC/IM: CPT | Mod: S$GLB,,, | Performed by: INTERNAL MEDICINE

## 2017-04-06 PROCEDURE — 71260 CT THORAX DX C+: CPT | Mod: 26,,, | Performed by: RADIOLOGY

## 2017-04-06 PROCEDURE — 74178 CT ABD&PLV WO CNTR FLWD CNTR: CPT | Mod: 26,,, | Performed by: RADIOLOGY

## 2017-04-06 PROCEDURE — 99999 PR PBB SHADOW E&M-EST. PATIENT-LVL II: CPT | Mod: PBBFAC,,,

## 2017-04-06 RX ADMIN — IOHEXOL 75 ML: 350 INJECTION, SOLUTION INTRAVENOUS at 10:04

## 2017-04-06 RX ADMIN — TESTOSTERONE CYPIONATE 200 MG: 200 INJECTION, SOLUTION INTRAMUSCULAR at 01:04

## 2017-04-06 RX ADMIN — IOHEXOL 30 ML: 350 INJECTION, SOLUTION INTRAVENOUS at 09:04

## 2017-04-07 ENCOUNTER — OFFICE VISIT (OUTPATIENT)
Dept: HEMATOLOGY/ONCOLOGY | Facility: CLINIC | Age: 77
End: 2017-04-07
Payer: MEDICARE

## 2017-04-07 VITALS
HEIGHT: 70 IN | HEART RATE: 60 BPM | SYSTOLIC BLOOD PRESSURE: 122 MMHG | WEIGHT: 172 LBS | RESPIRATION RATE: 18 BRPM | TEMPERATURE: 97 F | DIASTOLIC BLOOD PRESSURE: 62 MMHG | OXYGEN SATURATION: 100 % | BODY MASS INDEX: 24.62 KG/M2

## 2017-04-07 DIAGNOSIS — Z85.118 HISTORY OF CANCER OF UPPER LOBE BRONCHUS OR LUNG: Primary | ICD-10-CM

## 2017-04-07 PROCEDURE — 99214 OFFICE O/P EST MOD 30 MIN: CPT | Mod: S$GLB,,, | Performed by: INTERNAL MEDICINE

## 2017-04-07 PROCEDURE — 99999 PR PBB SHADOW E&M-EST. PATIENT-LVL III: CPT | Mod: PBBFAC,,, | Performed by: INTERNAL MEDICINE

## 2017-04-07 RX ORDER — SODIUM CHLORIDE 0.9 % (FLUSH) 0.9 %
10 SYRINGE (ML) INJECTION
Status: CANCELLED | OUTPATIENT
Start: 2017-04-07

## 2017-04-07 RX ORDER — HEPARIN 100 UNIT/ML
500 SYRINGE INTRAVENOUS
Status: CANCELLED | OUTPATIENT
Start: 2017-04-07
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
## 2017-04-19 ENCOUNTER — PROCEDURE VISIT (OUTPATIENT)
Dept: SURGERY | Facility: CLINIC | Age: 77
End: 2017-04-19
Payer: MEDICARE

## 2017-04-19 DIAGNOSIS — C34.02 MALIGNANT NEOPLASM OF HILUS OF LEFT LUNG: Primary | ICD-10-CM

## 2017-04-19 PROCEDURE — 36590 REMOVAL TUNNELED CV CATH: CPT | Mod: S$GLB,,, | Performed by: SURGERY

## 2017-04-19 NOTE — PROCEDURES
"Exc, Foreign Body mediport removal  Date/Time: 4/19/2017 4:30 PM  Performed by: ARLEEN HANCOCK  Authorized by: ARLEEN HANCOCK     Consent Done?:  Yes (Written)  Time out: Immediately prior to procedure a "time out" was called to verify the correct patient, procedure, equipment, support staff and site/side marked as required.      STAFF:  Assistants?: Yes    List of assistants:  Med student  I was present for the entire procedure.  INDICATIONS:: removal of mediport.  LOCATION:  Body area:  Neck / anterior thoraxleft    PREP:  Patient was prepped and draped in the normal sterile fashion.Position:  Supine    ANESTHESIA:  Anesthesia:  Local infiltration  Local anesthetic:  Lidocaine 1% with epinephrine    PROCEDURE DETAILS:  Excision type:  Foreign body removal  Excision size (cm):  3  Scalpel size:  15  Incision type:  Single straight  Specimens?: No      Hemostasis was obtained.  Estimated blood loss (cc):  5  Wound closure:  Intermediate layered  Wound repair size (cm):  3  Sutures:  3-0 Vicryl (4-0 vicryl for skin)  Sterile dressings:  Gauze, Mastisol and Steri-strips  Post-op diagnosis: Same as pre-op diagnosis.  Needle, instrument, and sponge counts were correct.  Patient tolerated the procedure well with no immediate complications.  Post-operative instructions were provided for the patient.      "

## 2017-04-24 DIAGNOSIS — F51.01 PRIMARY INSOMNIA: ICD-10-CM

## 2017-04-24 RX ORDER — CLONAZEPAM 0.5 MG/1
0.5 TABLET ORAL NIGHTLY
Qty: 30 TABLET | Refills: 0 | Status: SHIPPED | OUTPATIENT
Start: 2017-04-24 | End: 2017-05-26 | Stop reason: SDUPTHER

## 2017-04-27 ENCOUNTER — CLINICAL SUPPORT (OUTPATIENT)
Dept: INTERNAL MEDICINE | Facility: CLINIC | Age: 77
End: 2017-04-27
Payer: MEDICARE

## 2017-04-27 DIAGNOSIS — E29.1 HYPOGONADISM IN MALE: Primary | ICD-10-CM

## 2017-04-27 PROCEDURE — 99999 PR PBB SHADOW E&M-EST. PATIENT-LVL II: CPT | Mod: PBBFAC,,,

## 2017-04-27 PROCEDURE — 96372 THER/PROPH/DIAG INJ SC/IM: CPT | Mod: S$GLB,,, | Performed by: INTERNAL MEDICINE

## 2017-04-27 RX ADMIN — TESTOSTERONE CYPIONATE 200 MG: 200 INJECTION, SOLUTION INTRAMUSCULAR at 09:04

## 2017-05-15 ENCOUNTER — OFFICE VISIT (OUTPATIENT)
Dept: URGENT CARE | Facility: CLINIC | Age: 77
End: 2017-05-15
Payer: MEDICARE

## 2017-05-15 VITALS
SYSTOLIC BLOOD PRESSURE: 122 MMHG | RESPIRATION RATE: 14 BRPM | WEIGHT: 173.75 LBS | DIASTOLIC BLOOD PRESSURE: 70 MMHG | BODY MASS INDEX: 24.87 KG/M2 | TEMPERATURE: 97 F | HEART RATE: 63 BPM | HEIGHT: 70 IN | OXYGEN SATURATION: 96 %

## 2017-05-15 DIAGNOSIS — J01.40 ACUTE NON-RECURRENT PANSINUSITIS: Primary | ICD-10-CM

## 2017-05-15 PROCEDURE — 1159F MED LIST DOCD IN RCRD: CPT | Mod: S$GLB,,, | Performed by: PHYSICIAN ASSISTANT

## 2017-05-15 PROCEDURE — 99214 OFFICE O/P EST MOD 30 MIN: CPT | Mod: S$GLB,,, | Performed by: PHYSICIAN ASSISTANT

## 2017-05-15 PROCEDURE — 99999 PR PBB SHADOW E&M-EST. PATIENT-LVL III: CPT | Mod: PBBFAC,,, | Performed by: PHYSICIAN ASSISTANT

## 2017-05-15 RX ORDER — DOXYCYCLINE 100 MG/1
100 CAPSULE ORAL EVERY 12 HOURS
Qty: 14 CAPSULE | Refills: 0 | Status: SHIPPED | OUTPATIENT
Start: 2017-05-15 | End: 2017-05-22

## 2017-05-15 RX ORDER — FLUTICASONE PROPIONATE 50 MCG
2 SPRAY, SUSPENSION (ML) NASAL DAILY
Qty: 16 G | Refills: 0 | COMMUNITY
Start: 2017-05-15 | End: 2017-05-29

## 2017-05-15 RX ORDER — LEVOCETIRIZINE DIHYDROCHLORIDE 5 MG/1
5 TABLET, FILM COATED ORAL
COMMUNITY

## 2017-05-15 RX ORDER — MONTELUKAST SODIUM 10 MG/1
10 TABLET ORAL NIGHTLY
Qty: 30 TABLET | Refills: 0 | Status: SHIPPED | OUTPATIENT
Start: 2017-05-15 | End: 2017-06-14

## 2017-05-15 NOTE — MR AVS SNAPSHOT
[facility]  [address] [place] [address]  Phone: [phone]  Fax: [phone]                  Chai Murry   5/15/2017 8:20 AM   Office Visit    Description:  Male : 1940   Provider:  Michelle Mayfield PA-C   Department:  [facility]           Reason for Visit     Sinusitis           Diagnoses this Visit        Comments    Acute non-recurrent pansinusitis    -  Primary            To Do List           Future Appointments        Provider Department Dept Phone    2017 9:00 AM [phone]    2017 9:00 AM LABORATORY, [place] [facility] [phone]    2017 9:20 AM Jorge L Patel MD [facility] Hemot[facility] Oncology [phone]    8/3/2017 8:00 AM [place]ATORY, [place] [facility] [place] [phone]    2018 9:45 AM [facility] X[facility]2 [facility] [phone]      Goals (5 Years of Data)     None      Follow-Up and Disposition     Return if symptoms worsen or fail to improve.       These Medications        Disp Refills Start End    doxycycline (VIBRAMYCIN) 100 MG Cap 14 capsule 0 5/15/2017 2017    Take 1 capsule (100 mg total) by mouth every 12 (twelve) hours. Note to Pharmacy: Can substitute for Monodox if needed - Oral    Pharmacy: [facility]/pharmacy #[id number] - [place] - [address] [facility] Ph #: [phone]       montelukast (SINGULAIR) 10 mg tablet 30 tablet 0 5/15/2017 2017    Take 1 tablet (10 mg total) by mouth every evening. - Oral    Pharmacy: [facility]/pharmacy #[id number] [place] - [address] [facility] Ph #: [phone]         PURCHASE these Medications (No prescription required)        Start End    fluticasone (FLONASE) 50 mcg/actuation nasal spray 5/15/2017 2017    Sig - Route: 2 sprays by Each Nare route once daily. - Each Nare    Class: OTC      [facility]     [facility] Nurse Care Line - 24 Assistance  Unless otherwise directed by your provider, please contact [facility], our nurse care line that is available for  assistance.     Registered nurses in the [facility] provide: appointment scheduling, clinical advisement, health education, and other advisory services.  Call: [phone] (toll free)               Medications           Message regarding Medications     Verify the changes and/or additions to your medication regime listed below are the same as discussed with your clinician today.  If any of these changes or additions are incorrect, please notify your healthcare provider.        START taking these NEW medications        Refills    doxycycline (VIBRAMYCIN) 100 MG Cap 0    Sig: Take 1 capsule (100 mg total) by mouth every 12 (twelve) hours. Note to Pharmacy: Can substitute for Monodox if needed    Class: Normal    Route: Oral    fluticasone (FLONASE) 50 mcg/actuation nasal spray 0    Si sprays by Each Nare route once daily.    Class: OTC    Route: Each Nare    montelukast (SINGULAIR) 10 mg tablet 0    Sig: Take 1 tablet (10 mg total) by mouth every evening.    Class: Normal    Route: Oral           Verify that the below list of medications is an accurate representation of the medications you are currently taking.  If none reported, the list may be blank. If incorrect, please contact your healthcare provider. Carry this list with you in case of emergency.           Current Medications     amlodipine (NORVASC) 5 MG tablet Take 5 mg by mouth every evening.     aspirin (ECOTRIN) 81 MG EC tablet Take 81 mg by mouth once daily.    clonazePAM (KLONOPIN) 0.5 MG tablet Take 1 tablet (0.5 mg total) by mouth every evening.    dextromethorphan-guaifenesin  mg (MUCINEX DM)  mg per 12 hr tablet Take 1 tablet by mouth every 12 (twelve) hours.    ipratropium-albuterol (COMBIVENT RESPIMAT)  mcg/actuation inhaler Inhale 1 puff into the lungs  "every 6 (six) hours as needed for Shortness of Breath.    levocetirizine (XYZAL) 5 MG tablet Take 5 mg by mouth every evening.    lisinopril (PRINIVIL,ZESTRIL) 40 MG tablet Take 40 mg by mouth Daily.     potassium 99 mg Tab Take 99 mg by mouth. Pt takes when he takes his Lasix    simvastatin (ZOCOR) 40 MG tablet Take 1 tablet (40 mg total) by mouth every evening.    SOTALOL AF 80 mg tablet TAKE 1 TABLET BY MOUTH TWICE A DAY    WELCHOL 625 mg tablet TAKE 2 TABLETS BY MOUTH TWICE A DAY    doxycycline (VIBRAMYCIN) 100 MG Cap Take 1 capsule (100 mg total) by mouth every 12 (twelve) hours. Note to Pharmacy: Can substitute for Monodox if needed    fenofibric acid (FIBRICOR) 135 mg CpDR TAKE 1 CAPSULE BY MOUTH EVERY DAY    fluticasone (FLONASE) 50 mcg/actuation nasal spray 2 sprays by Each Nare route once daily.    hydrochlorothiazide (HYDRODIURIL) 25 MG tablet Take 25 mg by mouth daily as needed.    montelukast (SINGULAIR) 10 mg tablet Take 1 tablet (10 mg total) by mouth every evening.           Clinical Reference Information           Your Vitals Were     BP Pulse Temp Resp Height Weight    122/70 (BP Location: Left arm, Patient Position: Sitting) 63 97.1 °F (36.2 °C) (Tympanic) 14 5' 10" (1.778 m) 78.8 kg (173 lb 11.6 oz)    SpO2 BMI             96% 24.93 kg/m2         Blood Pressure          Most Recent Value    BP  122/70      Allergies as of 5/15/2017     Atorvastatin      Immunizations Administered on Date of Encounter - 5/15/2017     None      Instructions      Sinusitis (Antibiotic Treatment)    The sinuses are air-filled spaces within the bones of the face. They connect to the inside of the nose. Sinusitis is an inflammation of the tissue lining the sinus cavity. Sinus inflammation can occur during a cold. It can also be due to allergies to pollens and other particles in the air. Sinusitis can cause symptoms of sinus congestion and fullness. A sinus infection causes fever, headache and facial pain. There is " often green or yellow drainage from the nose or into the back of the throat (post-nasal drip). You have been given antibiotics to treat this condition.  Home care:  · Take the full course of antibiotics as instructed. Do not stop taking them, even if you feel better.  · Drink plenty of water, hot tea, and other liquids. This may help thin mucus. It also may promote sinus drainage.  · Heat may help soothe painful areas of the face. Use a towel soaked in hot water. Or,  the shower and direct the hot spray onto your face. Using a vaporizer along with a menthol rub at night may also help.   · An expectorant containing guaifenesin may help thin the mucus and promote drainage from the sinuses.  · Over-the-counter decongestants may be used unless a similar medicine was prescribed. Nasal sprays work the fastest. Use one that contains phenylephrine or oxymetazoline. First blow the nose gently. Then use the spray. Do not use these medicines more often than directed on the label or symptoms may get worse. You may also use tablets containing pseudoephedrine. Avoid products that combine ingredients, because side effects may be increased. Read labels. You can also ask the pharmacist for help. (NOTE: Persons with high blood pressure should not use decongestants. They can raise blood pressure.)  · Over-the-counter antihistamines may help if allergies contributed to your sinusitis.    · Do not use nasal rinses or irrigation during an acute sinus infection, unless told to by your health care provider. Rinsing may spread the infection to other sinuses.  · Use acetaminophen or ibuprofen to control pain, unless another pain medicine was prescribed. (If you have chronic liver or kidney disease or ever had a stomach ulcer, talk with your doctor before using these medicines. Aspirin should never be used in anyone under 18 years of age who is ill with a fever. It may cause severe liver damage.)  · Don't smoke. This can worsen  symptoms.  Follow-up care  Follow up with your healthcare provider or our staff if you are not improving within the next week.  When to seek medical advice  Call your healthcare provider if any of these occur:  · Facial pain or headache becoming more severe  · Stiff neck  · Unusual drowsiness or confusion  · Swelling of the forehead or eyelids  · Vision problems, including blurred or double vision  · Fever of 100.4ºF (38ºC) or higher, or as directed by your healthcare provider  · Seizure  · Breathing problems  · Symptoms not resolving within 10 days  Date Last Reviewed: 4/13/2015  © 1352-5862 [employer]. [address] [place] [address], [place] [address]. All rights reserved. This information is not intended as a substitute for professional medical care. Always follow your healthcare professional's instructions.    Singulair daily for allergy type symptoms.  Take all antibiotics even if you feel better before completing the entire regimen.   -Use normal saline nasal spray during the day every 3 hours for sinus irrigation and congestion.    -Avoid exposure to cigarette smoke.    -Practice good handwashing.  -Use warm compresses to sinuses for relief several times a day  -Increase fluid intake to at least 64 ounces of water per day to keep secretions thin and loose  -Use a humidifier in home to help relieve congestion or steam inhalation three times a day for 20-30 minutes.  -Sleep with head of bed elevated   -Use warm salt water gargles for throat discomfort.  -Avoid caffeine and alcohol    Follow up with PCP in 1 week if no improvement or sooner if worsening.    Go to ER if you develop fever of 103 or higher, chest pain, shortness of breath, upper back pain, stiff neck or severe headache.           Language Assistance Services     ATTENTION: Language assistance services are available, free of charge. Please call [phone].      ATENCIÓN: Si habla español, tiene a frost disposición servicios gratuitos de  asistencia lingüística. Iván al [phone].     LORENZA Ý: N?u b?n nói Ti?ng Vi?t, có các d?ch v? h? tr? ngôn ng? mi?n phí dành cho b?n. G?i s? [phone].         [facility] complies with applicable Federal civil rights laws and does not discriminate on the basis of race, color, national origin, age, disability, or sex.

## 2017-05-15 NOTE — PATIENT INSTRUCTIONS
Sinusitis (Antibiotic Treatment)    The sinuses are air-filled spaces within the bones of the face. They connect to the inside of the nose. Sinusitis is an inflammation of the tissue lining the sinus cavity. Sinus inflammation can occur during a cold. It can also be due to allergies to pollens and other particles in the air. Sinusitis can cause symptoms of sinus congestion and fullness. A sinus infection causes fever, headache and facial pain. There is often green or yellow drainage from the nose or into the back of the throat (post-nasal drip). You have been given antibiotics to treat this condition.  Home care:  · Take the full course of antibiotics as instructed. Do not stop taking them, even if you feel better.  · Drink plenty of water, hot tea, and other liquids. This may help thin mucus. It also may promote sinus drainage.  · Heat may help soothe painful areas of the face. Use a towel soaked in hot water. Or,  the shower and direct the hot spray onto your face. Using a vaporizer along with a menthol rub at night may also help.   · An expectorant containing guaifenesin may help thin the mucus and promote drainage from the sinuses.  · Over-the-counter decongestants may be used unless a similar medicine was prescribed. Nasal sprays work the fastest. Use one that contains phenylephrine or oxymetazoline. First blow the nose gently. Then use the spray. Do not use these medicines more often than directed on the label or symptoms may get worse. You may also use tablets containing pseudoephedrine. Avoid products that combine ingredients, because side effects may be increased. Read labels. You can also ask the pharmacist for help. (NOTE: Persons with high blood pressure should not use decongestants. They can raise blood pressure.)  · Over-the-counter antihistamines may help if allergies contributed to your sinusitis.    · Do not use nasal rinses or irrigation during an acute sinus infection, unless told to by  your health care provider. Rinsing may spread the infection to other sinuses.  · Use acetaminophen or ibuprofen to control pain, unless another pain medicine was prescribed. (If you have chronic liver or kidney disease or ever had a stomach ulcer, talk with your doctor before using these medicines. Aspirin should never be used in anyone under 18 years of age who is ill with a fever. It may cause severe liver damage.)  · Don't smoke. This can worsen symptoms.  Follow-up care  Follow up with your healthcare provider or our staff if you are not improving within the next week.  When to seek medical advice  Call your healthcare provider if any of these occur:  · Facial pain or headache becoming more severe  · Stiff neck  · Unusual drowsiness or confusion  · Swelling of the forehead or eyelids  · Vision problems, including blurred or double vision  · Fever of 100.4ºF (38ºC) or higher, or as directed by your healthcare provider  · Seizure  · Breathing problems  · Symptoms not resolving within 10 days  Date Last Reviewed: 4/13/2015  © 4030-3105 [employer]. [address] [place] [address]. All rights reserved. This information is not intended as a substitute for professional medical care. Always follow your healthcare professional's instructions.    Singulair daily for allergy type symptoms.  Take all antibiotics even if you feel better before completing the entire regimen.   -Use normal saline nasal spray during the day every 3 hours for sinus irrigation and congestion.    -Avoid exposure to cigarette smoke.    -Practice good handwashing.  -Use warm compresses to sinuses for relief several times a day  -Increase fluid intake to at least 64 ounces of water per day to keep secretions thin and loose  -Use a humidifier in home to help relieve congestion or steam inhalation three times a day for 20-30 minutes.  -Sleep with head of bed elevated   -Use warm salt water gargles for throat discomfort.  -Avoid  caffeine and alcohol    Follow up with PCP in 1 week if no improvement or sooner if worsening.    Go to ER if you develop fever of 103 or higher, chest pain, shortness of breath, upper back pain, stiff neck or severe headache.

## 2017-05-15 NOTE — PROGRESS NOTES
"Subjective:       Patient ID: Chai Murry is a 77 y.o. male.    Chief Complaint: Sinusitis (x five weeks)    Sinusitis   This is a new problem. Episode onset: 5-6 weeks ago. The problem is unchanged. There has been no fever. The pain is mild. Associated symptoms include congestion, coughing (states the drainage runs down the back of his throat and stays there, he then has to cough it up (thick yellow nonbloody)), sneezing and a sore throat (just raw from drainage). Pertinent negatives include no chills, ear pain, headaches, neck pain, shortness of breath, sinus pressure or swollen glands. Treatments tried: "everything" over the counter, flonase, mucinex, sudafed. The treatment provided no relief.     Review of Systems   Constitutional: Negative for chills, fatigue, fever and unexpected weight change.   HENT: Positive for congestion, postnasal drip, rhinorrhea, sneezing and sore throat (just raw from drainage). Negative for ear discharge, ear pain, sinus pressure, trouble swallowing and voice change.    Eyes: Negative for pain and discharge.   Respiratory: Positive for cough (states the drainage runs down the back of his throat and stays there, he then has to cough it up (thick yellow nonbloody)). Negative for shortness of breath and wheezing.    Cardiovascular: Negative for chest pain and leg swelling.   Gastrointestinal: Negative for abdominal pain, nausea and vomiting.   Musculoskeletal: Negative for neck pain.   Neurological: Negative for headaches.       Objective:      /70 (BP Location: Left arm, Patient Position: Sitting)  Pulse 63  Temp 97.1 °F (36.2 °C) (Tympanic)   Resp 14  Ht 5' 10" (1.778 m)  Wt 78.8 kg (173 lb 11.6 oz)  SpO2 96%  BMI 24.93 kg/m2  Physical Exam   Constitutional: He is oriented to person, place, and time. He appears well-developed and well-nourished. No distress.   HENT:   Head: Normocephalic and atraumatic.   Right Ear: Tympanic membrane, external ear and ear canal " normal.   Left Ear: Tympanic membrane, external ear and ear canal normal.   Nose: Mucosal edema present. Right sinus exhibits no maxillary sinus tenderness and no frontal sinus tenderness. Left sinus exhibits no maxillary sinus tenderness and no frontal sinus tenderness.   Mouth/Throat: Oropharynx is clear and moist. No oropharyngeal exudate or posterior oropharyngeal erythema. No tonsillar exudate.   Eyes: Conjunctivae and EOM are normal. Pupils are equal, round, and reactive to light.   Neck: Normal range of motion. Neck supple.   Cardiovascular: Normal rate, regular rhythm, normal heart sounds and intact distal pulses.  Exam reveals no gallop and no friction rub.    No murmur heard.  Pulmonary/Chest: Effort normal and breath sounds normal. No stridor. No respiratory distress. He has no wheezes. He has no rales. He exhibits no tenderness.   Absent breath sounds to entire left hemithorax, clear to auscultation on right   Lymphadenopathy:     He has no cervical adenopathy.   Neurological: He is alert and oriented to person, place, and time.   Skin: Skin is warm and dry. No rash noted. He is not diaphoretic.   Nursing note and vitals reviewed.      Assessment:       1. Acute non-recurrent pansinusitis        Plan:       Acute non-recurrent pansinusitis  -     doxycycline (VIBRAMYCIN) 100 MG Cap; Take 1 capsule (100 mg total) by mouth every 12 (twelve) hours. Note to Pharmacy: Can substitute for Monodox if needed  Dispense: 14 capsule; Refill: 0  -     fluticasone (FLONASE) 50 mcg/actuation nasal spray; 2 sprays by Each Nare route once daily.  Dispense: 16 g; Refill: 0  -     montelukast (SINGULAIR) 10 mg tablet; Take 1 tablet (10 mg total) by mouth every evening.  Dispense: 30 tablet; Refill: 0    Appears to be mostly sinus symptoms, has post nasal drip and likely this is causing his productive cough. No dyspnea and R lung exam is normal. Do not suspect COPD exacerbation at this time. Will treat for bacterial  sinusitis and add Singulair as seasonal allergies are contributing to symptoms.      Singulair daily for allergy type symptoms.  Take all antibiotics even if you feel better before completing the entire regimen.   -Use normal saline nasal spray during the day every 3 hours for sinus irrigation and congestion.    -Avoid exposure to cigarette smoke.    -Practice good handwashing.  -Use warm compresses to sinuses for relief several times a day  -Increase fluid intake to at least 64 ounces of water per day to keep secretions thin and loose  -Use a humidifier in home to help relieve congestion or steam inhalation three times a day for 20-30 minutes.  -Sleep with head of bed elevated   -Use warm salt water gargles for throat discomfort.  -Avoid caffeine and alcohol    Follow up with PCP in 1 week if no improvement or sooner if worsening.    Go to ER if you develop fever of 103 or higher, chest pain, shortness of breath, upper back pain, stiff neck or severe headache.        Heather Trant PA-C [facility]

## 2017-05-18 ENCOUNTER — CLINICAL SUPPORT (OUTPATIENT)
Dept: INTERNAL MEDICINE | Facility: CLINIC | Age: 77
End: 2017-05-18
Payer: MEDICARE

## 2017-05-18 PROCEDURE — 99999 PR PBB SHADOW E&M-EST. PATIENT-LVL II: CPT | Mod: PBBFAC,,,

## 2017-05-18 PROCEDURE — 96372 THER/PROPH/DIAG INJ SC/IM: CPT | Mod: S$GLB,,, | Performed by: INTERNAL MEDICINE

## 2017-05-18 RX ADMIN — TESTOSTERONE CYPIONATE 200 MG: 200 INJECTION, SOLUTION INTRAMUSCULAR at 08:05

## 2017-05-18 NOTE — PROGRESS NOTES
Administered Depo Testosterone 200mg per Dr. Kebede orders. Also made an appointment for patients next Depo and printed a letter. Patient waited 20 minutes in the lobby with no reaction.

## 2017-05-26 ENCOUNTER — PATIENT MESSAGE (OUTPATIENT)
Dept: INTERNAL MEDICINE | Facility: CLINIC | Age: 77
End: 2017-05-26

## 2017-05-26 DIAGNOSIS — F51.01 PRIMARY INSOMNIA: ICD-10-CM

## 2017-05-26 RX ORDER — CLONAZEPAM 0.5 MG/1
0.5 TABLET ORAL NIGHTLY
Qty: 30 TABLET | Refills: 2 | Status: SHIPPED | OUTPATIENT
Start: 2017-05-26 | End: 2017-09-08 | Stop reason: SDUPTHER

## 2017-06-08 ENCOUNTER — CLINICAL SUPPORT (OUTPATIENT)
Dept: INTERNAL MEDICINE | Facility: CLINIC | Age: 77
End: 2017-06-08
Payer: MEDICARE

## 2017-06-08 PROCEDURE — 96372 THER/PROPH/DIAG INJ SC/IM: CPT | Mod: S$GLB,,, | Performed by: INTERNAL MEDICINE

## 2017-06-08 PROCEDURE — 99999 PR PBB SHADOW E&M-EST. PATIENT-LVL II: CPT | Mod: PBBFAC,,,

## 2017-06-08 RX ADMIN — TESTOSTERONE CYPIONATE 200 MG: 200 INJECTION, SOLUTION INTRAMUSCULAR at 08:06

## 2017-06-08 NOTE — PROGRESS NOTES
Pt came in today for Depo Testosterone Injection per MD orders. After receiving consent,  injection given. Pt informed to wait in clinic 20 minutes after receiving injection and to report any adverse reaction to me immediately. Pt verbalized understanding.     RADHAMES Valera LPN

## 2017-06-14 ENCOUNTER — TELEPHONE (OUTPATIENT)
Dept: HEMATOLOGY/ONCOLOGY | Facility: CLINIC | Age: 77
End: 2017-06-14

## 2017-06-26 ENCOUNTER — TELEPHONE (OUTPATIENT)
Dept: INTERNAL MEDICINE | Facility: CLINIC | Age: 77
End: 2017-06-26

## 2017-06-26 NOTE — TELEPHONE ENCOUNTER
----- Message from Lluvia Brizuela sent at 6/26/2017  8:26 AM CDT -----  Contact: self [phone]  States that he needs to schedule a nurse visit appt for a testosterone injection on 06/30/17. Please call back at [phone]//thank you acc

## 2017-06-30 ENCOUNTER — CLINICAL SUPPORT (OUTPATIENT)
Dept: INTERNAL MEDICINE | Facility: CLINIC | Age: 77
End: 2017-06-30
Payer: MEDICARE

## 2017-06-30 PROCEDURE — 99999 PR PBB SHADOW E&M-EST. PATIENT-LVL II: CPT | Mod: PBBFAC,,,

## 2017-06-30 PROCEDURE — 96372 THER/PROPH/DIAG INJ SC/IM: CPT | Mod: S$GLB,,, | Performed by: INTERNAL MEDICINE

## 2017-06-30 RX ADMIN — TESTOSTERONE CYPIONATE 200 MG: 200 INJECTION, SOLUTION INTRAMUSCULAR at 08:06

## 2017-07-10 ENCOUNTER — OFFICE VISIT (OUTPATIENT)
Dept: HEMATOLOGY/ONCOLOGY | Facility: CLINIC | Age: 77
End: 2017-07-10
Payer: MEDICARE

## 2017-07-10 ENCOUNTER — LAB VISIT (OUTPATIENT)
Dept: LAB | Facility: HOSPITAL | Age: 77
End: 2017-07-10
Attending: INTERNAL MEDICINE
Payer: MEDICARE

## 2017-07-10 VITALS
WEIGHT: 184.06 LBS | OXYGEN SATURATION: 100 % | RESPIRATION RATE: 18 BRPM | HEIGHT: 68 IN | BODY MASS INDEX: 27.9 KG/M2 | HEART RATE: 55 BPM | TEMPERATURE: 98 F | DIASTOLIC BLOOD PRESSURE: 62 MMHG | SYSTOLIC BLOOD PRESSURE: 130 MMHG

## 2017-07-10 DIAGNOSIS — Z85.118 HISTORY OF CANCER OF UPPER LOBE BRONCHUS OR LUNG: ICD-10-CM

## 2017-07-10 DIAGNOSIS — C34.12 MALIGNANT NEOPLASM OF UPPER LOBE OF LEFT LUNG: Primary | ICD-10-CM

## 2017-07-10 LAB
ALBUMIN SERPL BCP-MCNC: 3.4 G/DL
ALP SERPL-CCNC: 72 U/L
ALT SERPL W/O P-5'-P-CCNC: 9 U/L
ANION GAP SERPL CALC-SCNC: 5 MMOL/L
AST SERPL-CCNC: 14 U/L
BASOPHILS # BLD AUTO: 0.04 K/UL
BASOPHILS NFR BLD: 0.6 %
BILIRUB SERPL-MCNC: 0.3 MG/DL
BUN SERPL-MCNC: 17 MG/DL
CALCIUM SERPL-MCNC: 9.4 MG/DL
CHLORIDE SERPL-SCNC: 106 MMOL/L
CO2 SERPL-SCNC: 30 MMOL/L
CREAT SERPL-MCNC: 1 MG/DL
DIFFERENTIAL METHOD: ABNORMAL
EOSINOPHIL # BLD AUTO: 1 K/UL
EOSINOPHIL NFR BLD: 15.6 %
ERYTHROCYTE [DISTWIDTH] IN BLOOD BY AUTOMATED COUNT: 14.2 %
EST. GFR  (AFRICAN AMERICAN): >60 ML/MIN/1.73 M^2
EST. GFR  (NON AFRICAN AMERICAN): >60 ML/MIN/1.73 M^2
GLUCOSE SERPL-MCNC: 99 MG/DL
HCT VFR BLD AUTO: 41.8 %
HGB BLD-MCNC: 13.4 G/DL
LYMPHOCYTES # BLD AUTO: 0.9 K/UL
LYMPHOCYTES NFR BLD: 13 %
MCH RBC QN AUTO: 30 PG
MCHC RBC AUTO-ENTMCNC: 32.1 %
MCV RBC AUTO: 94 FL
MONOCYTES # BLD AUTO: 0.8 K/UL
MONOCYTES NFR BLD: 11.2 %
NEUTROPHILS # BLD AUTO: 4 K/UL
NEUTROPHILS NFR BLD: 59.6 %
PLATELET # BLD AUTO: 185 K/UL
PMV BLD AUTO: 10.6 FL
POTASSIUM SERPL-SCNC: 4.1 MMOL/L
PROT SERPL-MCNC: 7 G/DL
RBC # BLD AUTO: 4.46 M/UL
SODIUM SERPL-SCNC: 141 MMOL/L
WBC # BLD AUTO: 6.67 K/UL

## 2017-07-10 PROCEDURE — 99999 PR PBB SHADOW E&M-EST. PATIENT-LVL IV: CPT | Mod: PBBFAC,,, | Performed by: INTERNAL MEDICINE

## 2017-07-10 PROCEDURE — 1126F AMNT PAIN NOTED NONE PRSNT: CPT | Mod: S$GLB,,, | Performed by: INTERNAL MEDICINE

## 2017-07-10 PROCEDURE — 99214 OFFICE O/P EST MOD 30 MIN: CPT | Mod: S$GLB,,, | Performed by: INTERNAL MEDICINE

## 2017-07-10 PROCEDURE — 1159F MED LIST DOCD IN RCRD: CPT | Mod: S$GLB,,, | Performed by: INTERNAL MEDICINE
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
## 2017-07-21 ENCOUNTER — CLINICAL SUPPORT (OUTPATIENT)
Dept: INTERNAL MEDICINE | Facility: CLINIC | Age: 77
End: 2017-07-21
Payer: MEDICARE

## 2017-07-21 PROCEDURE — 96372 THER/PROPH/DIAG INJ SC/IM: CPT | Mod: S$GLB,,, | Performed by: FAMILY MEDICINE

## 2017-07-21 RX ADMIN — TESTOSTERONE CYPIONATE 200 MG: 200 INJECTION, SOLUTION INTRAMUSCULAR at 08:07

## 2017-08-03 ENCOUNTER — LAB VISIT (OUTPATIENT)
Dept: LAB | Facility: HOSPITAL | Age: 77
End: 2017-08-03
Attending: INTERNAL MEDICINE
Payer: MEDICARE

## 2017-08-03 DIAGNOSIS — I25.10 CORONARY ARTERY DISEASE INVOLVING NATIVE CORONARY ARTERY OF NATIVE HEART WITHOUT ANGINA PECTORIS: ICD-10-CM

## 2017-08-03 LAB
ALBUMIN SERPL BCP-MCNC: 3.4 G/DL
ALP SERPL-CCNC: 68 U/L
ALT SERPL W/O P-5'-P-CCNC: 9 U/L
ANION GAP SERPL CALC-SCNC: 11 MMOL/L
AST SERPL-CCNC: 16 U/L
BASOPHILS # BLD AUTO: 0.04 K/UL
BASOPHILS NFR BLD: 0.6 %
BILIRUB SERPL-MCNC: 0.4 MG/DL
BUN SERPL-MCNC: 16 MG/DL
CALCIUM SERPL-MCNC: 9.3 MG/DL
CHLORIDE SERPL-SCNC: 106 MMOL/L
CHOLEST/HDLC SERPL: 3.8 {RATIO}
CO2 SERPL-SCNC: 27 MMOL/L
COMPLEXED PSA SERPL-MCNC: 0.34 NG/ML
CREAT SERPL-MCNC: 1.1 MG/DL
DIFFERENTIAL METHOD: ABNORMAL
EOSINOPHIL # BLD AUTO: 1.2 K/UL
EOSINOPHIL NFR BLD: 18.1 %
ERYTHROCYTE [DISTWIDTH] IN BLOOD BY AUTOMATED COUNT: 14.6 %
EST. GFR  (AFRICAN AMERICAN): >60 ML/MIN/1.73 M^2
EST. GFR  (NON AFRICAN AMERICAN): >60 ML/MIN/1.73 M^2
GLUCOSE SERPL-MCNC: 58 MG/DL
HCT VFR BLD AUTO: 42.9 %
HDL/CHOLESTEROL RATIO: 26.6 %
HDLC SERPL-MCNC: 154 MG/DL
HDLC SERPL-MCNC: 41 MG/DL
HGB BLD-MCNC: 13.3 G/DL
LDLC SERPL CALC-MCNC: 92.8 MG/DL
LYMPHOCYTES # BLD AUTO: 0.9 K/UL
LYMPHOCYTES NFR BLD: 14 %
MCH RBC QN AUTO: 29.3 PG
MCHC RBC AUTO-ENTMCNC: 31 G/DL
MCV RBC AUTO: 95 FL
MONOCYTES # BLD AUTO: 0.7 K/UL
MONOCYTES NFR BLD: 10.1 %
NEUTROPHILS # BLD AUTO: 3.6 K/UL
NEUTROPHILS NFR BLD: 56.7 %
NONHDLC SERPL-MCNC: 113 MG/DL
PLATELET # BLD AUTO: 232 K/UL
PMV BLD AUTO: 10.6 FL
POTASSIUM SERPL-SCNC: 4.6 MMOL/L
PROT SERPL-MCNC: 7 G/DL
RBC # BLD AUTO: 4.54 M/UL
SODIUM SERPL-SCNC: 144 MMOL/L
TRIGL SERPL-MCNC: 101 MG/DL
WBC # BLD AUTO: 6.41 K/UL

## 2017-08-03 PROCEDURE — 85025 COMPLETE CBC W/AUTO DIFF WBC: CPT

## 2017-08-03 PROCEDURE — 80053 COMPREHEN METABOLIC PANEL: CPT

## 2017-08-03 PROCEDURE — 80061 LIPID PANEL: CPT

## 2017-08-03 PROCEDURE — 84153 ASSAY OF PSA TOTAL: CPT

## 2017-08-03 PROCEDURE — 36415 COLL VENOUS BLD VENIPUNCTURE: CPT | Mod: PO

## 2017-08-06 ENCOUNTER — OFFICE VISIT (OUTPATIENT)
Dept: URGENT CARE | Facility: CLINIC | Age: 77
End: 2017-08-06
Payer: MEDICARE

## 2017-08-06 VITALS
HEIGHT: 70 IN | SYSTOLIC BLOOD PRESSURE: 130 MMHG | DIASTOLIC BLOOD PRESSURE: 82 MMHG | TEMPERATURE: 98 F | OXYGEN SATURATION: 97 % | HEART RATE: 72 BPM | BODY MASS INDEX: 25.91 KG/M2 | WEIGHT: 181 LBS

## 2017-08-06 DIAGNOSIS — T63.461A YELLOW JACKET STING, ACCIDENTAL OR UNINTENTIONAL, INITIAL ENCOUNTER: Primary | ICD-10-CM

## 2017-08-06 PROCEDURE — 3008F BODY MASS INDEX DOCD: CPT | Mod: S$GLB,,, | Performed by: NURSE PRACTITIONER

## 2017-08-06 PROCEDURE — 1159F MED LIST DOCD IN RCRD: CPT | Mod: S$GLB,,, | Performed by: NURSE PRACTITIONER

## 2017-08-06 PROCEDURE — 99213 OFFICE O/P EST LOW 20 MIN: CPT | Mod: S$GLB,,, | Performed by: NURSE PRACTITIONER

## 2017-08-06 PROCEDURE — 1125F AMNT PAIN NOTED PAIN PRSNT: CPT | Mod: S$GLB,,, | Performed by: NURSE PRACTITIONER

## 2017-08-06 PROCEDURE — 99999 PR PBB SHADOW E&M-EST. PATIENT-LVL V: CPT | Mod: PBBFAC,,, | Performed by: NURSE PRACTITIONER

## 2017-08-06 RX ORDER — PREDNISONE 20 MG/1
20 TABLET ORAL DAILY
Qty: 5 TABLET | Refills: 0 | Status: SHIPPED | OUTPATIENT
Start: 2017-08-06 | End: 2017-08-11

## 2017-08-06 NOTE — PROGRESS NOTES
"Subjective:       Patient ID: Chai Murry is a 77 y.o. male.    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 He presents to Urgent Care with intermittent intense pain to these sites. He tried 2 benadryl with no improvement. He denies shortness of breath, airway involvement. Denies rash/hives.        /82 (BP Location: Left arm, Patient Position: Sitting, BP Method: Manual)   Pulse 72   Temp 98.1 °F (36.7 °C) (Tympanic)   Ht 5' 10" (1.778 m)   Wt 82.1 kg (181 lb)   SpO2 97%   BMI 25.97 kg/m²     Review of Systems   Constitutional: Positive for activity change. Negative for appetite change, chills, diaphoresis, fatigue, fever and unexpected weight change.   HENT: Negative.    Eyes: Negative.    Respiratory: Negative for apnea, chest tightness, shortness of breath and stridor.    Cardiovascular: Negative for chest pain, palpitations and leg swelling.   Gastrointestinal: Negative.    Endocrine: Negative.    Genitourinary: Negative.    Musculoskeletal: Negative for arthralgias and myalgias.   Skin: Positive for wound. Negative for color change, pallor and rash.        + multiple stings   Allergic/Immunologic: Negative.    Neurological: Negative for dizziness, facial asymmetry, light-headedness and headaches.   Hematological: Negative for adenopathy.   Psychiatric/Behavioral: Negative for agitation and behavioral problems.       Objective:      Physical Exam   Constitutional: He is oriented to person, place, and time. He appears well-developed and well-nourished. No distress.   HENT:   Head: Normocephalic and atraumatic.   Nose: No mucosal edema or rhinorrhea.   Mouth/Throat: Uvula is midline, oropharynx is clear and moist and mucous membranes are normal. No oropharyngeal exudate, posterior oropharyngeal edema or posterior oropharyngeal erythema. "   Cardiovascular: Normal rate.    Pulmonary/Chest: Effort normal and breath sounds normal. No respiratory distress.   Neurological: He is alert and oriented to person, place, and time.   Skin: Skin is warm and dry. No rash noted. He is not diaphoretic.   Stings noted to left dorsal foot, left calf, nose, left upper lip, right abdomen, left wrist. No retained stingers noted.   Psychiatric: He has a normal mood and affect. His behavior is normal. Judgment and thought content normal.   Nursing note and vitals reviewed.      Assessment:       1. Yellow jacket sting, accidental or unintentional, initial encounter        Plan:       Chai was seen today for insect bite.    Diagnoses and all orders for this visit:    Yellow jacket sting, accidental or unintentional, initial encounter  -     predniSONE (DELTASONE) 20 MG tablet; Take 1 tablet (20 mg total) by mouth once daily.  -     diphenhydrAMINE (BENADRYL) 2 % cream; Apply topically 3 (three) times daily as needed for Itching.    continue oral benadryl, cool compresses  If symptoms worsen or fail to improve with treatment, see your Primary Care Provider or go to the nearest Emergency Room.

## 2017-08-06 NOTE — PATIENT INSTRUCTIONS
Insect Sting Allergy,Generalized  You are having an allergic reaction to an insect sting. This may occur after a sting by a wasp, honeybee, yellow jacket ,or other insect. This may cause an itchy rash and swelling in the face or other parts of the body. A more severe reaction may cause you to feel dizzy, faint, or have trouble breathing or swallowing. Insect stings may also become infected 1 to 3 days later, so watch for the warning signs below.  Symptoms can include:  · Rash, hives, redness, welts, blisters  · Itching, burning, stinging, pain  · Dry, flaky, cracking, scaly skin  · Swelling of the face, lips or other parts of the body  More severe symptoms include:  · Trouble swallowing, feeling like your throat is closing  · Trouble breathing, wheezing  · Hoarse voice or trouble speaking  · Nausea, vomiting, diarrhea, stomach cramps  · Feeling faint or lightheaded, rapid heart rate  Home care  Medicine:  The doctor may prescribe medicines to relieve swelling, itching, and pain. Follow the doctors instructions when taking these medicines.  · If you had a severe reaction, the doctor may prescribe an injectable epinephrine kit. Epinephrine will stop the progression of an allergic reaction. Before you leave the hospital, be sure that you understand when and how to use this medicine.  · Oral Benadryl (diphenhydramine) is an antihistamine available at drug and grocery stores. Unless a prescription antihistamine was given, Benadryl may be used to reduce itching if large areas of the skin are involved. It may make you sleepy, so be careful using it in the daytime or when going to school, working, or driving. [Note: Do not use Benadryl if you have glaucoma or if you are a man with trouble urinating due to an enlarged prostate.] There are other antihistamine that causes less drowsiness and are good alternatives for daytime use. Ask your pharmacist for suggestions.  · Do not use Benadryl cream on your skin, because in some  people it can cause a further reaction, and make you allergic to Benadryl.  · Calamine lotion or oatmeal baths sometimes help with itching  · You may use acetaminophen or ibuprofen to control pain, unless another pain medicine was prescribed. [Note: If you have chronic liver or kidney disease or ever had a stomach ulcer or gastrointestinal bleeding, talk with your doctor before using these medicines.]    General care:  · Avoid tight clothing and things that heat up your skin (such as hot showers or baths, or direct sunlight). Heat makes the itching worse.  · An ice pack (ice cubes in a plastic bag, wrapped in a thin towel or a bag of frozen peas) will relieve local areas of intense itching and redness.  · Ticks- if you try to remove a tick, ideally use a set of fine tweezers and  the tick as close to the skin as is possible. Pull backwards gently but firmly, using an even, steady pressure. Do not jerk or twist. Do not squeeze, crush, or puncture the body of the tick, since its bodily fluids may contain infection-causing organisms. Do not use a smoldering match or cigarette, nail polish, petroleum jelly, liquid soap, or kerosene because they may irritate the tick. If any mouthparts of the tick remain in the skin, these should be left alone; they will be expelled on their own. Attempts to remove these parts may result in significant skin injury unless they can be removed very easily.  After the tick is removed, wash the sting area with rubbing alcohol, iodine or soap and water.  · If a honeybee stings you, a stinger may remain in your skin. Wasps, yellow jackets, hornets do not leave a stinger behind. The stinger of a honeybee releases a substance that will attract other bees to you, so try to move away from the nest immediately.  · After you are safely away from the nest, remove the stinger as quickly as possible, by scraping it out with the edge of a dull knife or plastic card (credit card). Do not use a tweezer  or your fingers, since that may squeeze more toxin from the stinger. Wash the affected area with soap and warm water 2 to 3 times a day. Then apply a baking soda and water paste. This will neutralize the venom and relieve the pain. Next apply ice (wrapped in a thin towel) for 5 to 10 minutes.  · Try not to scratch any affected areas to prevent causing an infection.  Preventing future reactions:  · Future reactions could be worse than this one, so try to avoid situations where you might be stung again.  · Be aware that honeybees nest in trees. Wasps and yellow jackets nest in the ground, trees or roof eaves.  · If you are at high risk for another sting due to where you work or play, or if your reaction included dizziness, fainting or trouble breathing or swallowing, an Insect Allergy Kit or injectable epinephrine may be prescribed. If not, ask your doctor for one and carry it with you when you are in a risk area. Learn how to use the device. If you begin to feel the symptoms of another reaction in the future, use the injectable epinephrine to inject yourself, and then call 911. Don't wait until symptoms become severe.   Follow-up care  Follow up with your healthcare provider, or as advised if your symptoms do not continue to improve.  Call 911  Call 911 if any of these occur:  · Trouble breathing or swallowing, wheezing  · Hoarse voice or trouble speaking  · Confused  · Very drowsy or trouble awakening  · Fainting or loss of consciousness  · Rapid heart rate  · Low blood pressure  · Feeling of doom  · Nausea, vomiting, abdominal pain, diarrhea  · Vomiting blood, or large amounts of blood in stool  · Seizure  When to seek medical advice  Call your healthcare provider right away if any of the following occur:  · Spreading areas of itching, redness or swelling  · New or worse swelling in the face, eyelids, lips, mouth, throat or tongue  · Dizziness, weakness  · Headache, fever, chills, muscle or joint  aching  · Increased pain or swelling  · Signs of infection (spreading redness, increased pain or swelling)  Date Last Reviewed: 7/30/2015  © 9000-5846 [employer], [facility]. [address] [place] [address], [place] [address]. All rights reserved. This information is not intended as a substitute for professional medical care. Always follow your healthcare professional's instructions.

## 2017-08-11 ENCOUNTER — CLINICAL SUPPORT (OUTPATIENT)
Dept: INTERNAL MEDICINE | Facility: CLINIC | Age: 77
End: 2017-08-11
Payer: MEDICARE

## 2017-08-11 PROCEDURE — 96372 THER/PROPH/DIAG INJ SC/IM: CPT | Mod: S$GLB,,, | Performed by: INTERNAL MEDICINE

## 2017-08-11 PROCEDURE — 99999 PR PBB SHADOW E&M-EST. PATIENT-LVL II: CPT | Mod: PBBFAC,,,

## 2017-08-11 RX ADMIN — TESTOSTERONE CYPIONATE 200 MG: 200 INJECTION, SOLUTION INTRAMUSCULAR at 08:08

## 2017-08-24 RX ORDER — COLESEVELAM HYDROCHLORIDE 625 MG/1
TABLET, FILM COATED ORAL
Qty: 120 TABLET | Refills: 9 | Status: SHIPPED | OUTPATIENT
Start: 2017-08-24 | End: 2018-05-10 | Stop reason: ALTCHOICE

## 2017-09-01 ENCOUNTER — CLINICAL SUPPORT (OUTPATIENT)
Dept: INTERNAL MEDICINE | Facility: CLINIC | Age: 77
End: 2017-09-01
Payer: MEDICARE

## 2017-09-01 ENCOUNTER — TELEPHONE (OUTPATIENT)
Dept: INTERNAL MEDICINE | Facility: CLINIC | Age: 77
End: 2017-09-01

## 2017-09-01 DIAGNOSIS — E29.1 HYPOGONADISM IN MALE: Primary | ICD-10-CM

## 2017-09-01 PROCEDURE — 99999 PR PBB SHADOW E&M-EST. PATIENT-LVL II: CPT | Mod: PBBFAC,,,

## 2017-09-01 PROCEDURE — 96372 THER/PROPH/DIAG INJ SC/IM: CPT | Mod: S$GLB,,, | Performed by: INTERNAL MEDICINE

## 2017-09-01 RX ORDER — TESTOSTERONE CYPIONATE 200 MG/ML
200 INJECTION, SOLUTION INTRAMUSCULAR
Status: COMPLETED | OUTPATIENT
Start: 2017-09-01 | End: 2018-02-16

## 2017-09-01 RX ADMIN — TESTOSTERONE CYPIONATE 200 MG: 200 INJECTION, SOLUTION INTRAMUSCULAR at 08:09

## 2017-09-01 NOTE — TELEPHONE ENCOUNTER
Patient comes in for depo injection today.  Needs new orders.    Depo Testosterone 200mg every 21 days.      Please place new orders.  Thank you.

## 2017-09-08 DIAGNOSIS — F51.01 PRIMARY INSOMNIA: ICD-10-CM

## 2017-09-08 RX ORDER — CLONAZEPAM 0.5 MG/1
0.5 TABLET ORAL NIGHTLY
Qty: 30 TABLET | Refills: 2 | Status: SHIPPED | OUTPATIENT
Start: 2017-09-08 | End: 2017-11-02

## 2017-09-08 NOTE — TELEPHONE ENCOUNTER
----- Message from Hermelinda Alfaro sent at 9/8/2017  8:36 AM CDT -----  Contact: Patient   1. What is the name of the medication you are requesting? Rx Clonazepam  2. What is the dose? .5 mg  3. How do you take the medication? Orally, topically, etc? oral  4. How often do you take this medication? Once at night  5. Do you need a 30 day or 90 day supply? 90 day  6. How many refills are you requesting? 3  7. What is your preferred pharmacy and location of the pharmacy?   [facility]/pharmacy #[id number] - [place] - [address] AT [place] [address] [address]  Phone: [phone] Fax: [phone]  8. Who can we contact with further questions? Patient/[phone]

## 2017-09-22 ENCOUNTER — CLINICAL SUPPORT (OUTPATIENT)
Dept: INTERNAL MEDICINE | Facility: CLINIC | Age: 77
End: 2017-09-22
Payer: MEDICARE

## 2017-09-22 DIAGNOSIS — E29.1 HYPOGONADISM IN MALE: Primary | ICD-10-CM

## 2017-09-22 PROCEDURE — 99999 PR PBB SHADOW E&M-EST. PATIENT-LVL II: CPT | Mod: PBBFAC,,,

## 2017-09-22 PROCEDURE — 96372 THER/PROPH/DIAG INJ SC/IM: CPT | Mod: S$GLB,,, | Performed by: INTERNAL MEDICINE

## 2017-09-22 RX ADMIN — TESTOSTERONE CYPIONATE 200 MG: 200 INJECTION, SOLUTION INTRAMUSCULAR at 08:09

## 2017-10-05 ENCOUNTER — OFFICE VISIT (OUTPATIENT)
Dept: URGENT CARE | Facility: CLINIC | Age: 77
End: 2017-10-05
Payer: MEDICARE

## 2017-10-05 VITALS
TEMPERATURE: 96 F | DIASTOLIC BLOOD PRESSURE: 70 MMHG | HEIGHT: 70 IN | WEIGHT: 177.69 LBS | OXYGEN SATURATION: 98 % | SYSTOLIC BLOOD PRESSURE: 140 MMHG | BODY MASS INDEX: 25.44 KG/M2 | HEART RATE: 74 BPM

## 2017-10-05 DIAGNOSIS — J40 BRONCHITIS: Primary | ICD-10-CM

## 2017-10-05 PROCEDURE — 99213 OFFICE O/P EST LOW 20 MIN: CPT | Mod: S$GLB,,, | Performed by: FAMILY MEDICINE

## 2017-10-05 PROCEDURE — 99999 PR PBB SHADOW E&M-EST. PATIENT-LVL III: CPT | Mod: PBBFAC,,, | Performed by: FAMILY MEDICINE

## 2017-10-05 RX ORDER — AZITHROMYCIN 250 MG/1
TABLET, FILM COATED ORAL
Qty: 6 TABLET | Refills: 0 | Status: SHIPPED | OUTPATIENT
Start: 2017-10-05 | End: 2017-11-02 | Stop reason: ALTCHOICE

## 2017-10-05 RX ORDER — BENZONATATE 100 MG/1
100 CAPSULE ORAL 3 TIMES DAILY PRN
Qty: 30 CAPSULE | Refills: 0 | Status: SHIPPED | OUTPATIENT
Start: 2017-10-05 | End: 2017-10-15

## 2017-10-05 NOTE — PROGRESS NOTES
"Subjective:       Patient ID: Chai Murry is a 77 y.o. male.    Chief Complaint: Cough    HPI     Presented with cough, happened 2-3 days ago, preceded by nasal congestion. (+) wheezing, no fever/chills. S/p left lobectomy one yr ago secondary to lung ca. S/p chemo/XRT. Currently in remission.  Pt is using combivent inhaler prn for wheezes  BP (!) 140/70 (BP Location: Right arm, Patient Position: Sitting, BP Method: Medium (Automatic))   Pulse 74   Temp 96.3 °F (35.7 °C) (Tympanic)   Ht 5' 10" (1.778 m)   Wt 80.6 kg (177 lb 11.1 oz)   SpO2 98%   BMI 25.50 kg/m²     Review of Systems   Constitutional: Negative for activity change, appetite change, chills and fever.   HENT: Positive for congestion, postnasal drip and rhinorrhea. Negative for sinus pain.    Respiratory: Positive for cough and wheezing. Negative for apnea, choking, chest tightness and shortness of breath.    Cardiovascular: Negative for chest pain and palpitations.   Neurological: Positive for light-headedness.   Hematological: Bruises/bleeds easily.       Objective:      Physical Exam   Constitutional: He is oriented to person, place, and time. He appears well-developed and well-nourished. No distress.   HENT:   Head: Normocephalic and atraumatic.   Nose: Nose normal.   Mouth/Throat: Oropharynx is clear and moist. No oropharyngeal exudate.   Tms clear bilateral   Eyes: EOM are normal. Pupils are equal, round, and reactive to light.   Neck: Normal range of motion.   Cardiovascular: Normal rate, regular rhythm and normal heart sounds.    No murmur heard.  Pulmonary/Chest: Effort normal. No respiratory distress. He has decreased breath sounds in the left upper field, the left middle field and the left lower field. He has no wheezes. He has no rales.   Musculoskeletal: Normal range of motion.   Neurological: He is alert and oriented to person, place, and time.   Skin: Skin is warm and dry. He is not diaphoretic.   Nursing note and vitals reviewed.   "    Assessment:       1. Bronchitis        Plan:     Chai was seen today for cough.    Diagnoses and all orders for this visit:    Bronchitis  -     azithromycin (ZITHROMAX Z-PAM) 250 MG tablet; Take 2 tablets today and then 1 tablet daily day 2-5  -     benzonatate (TESSALON) 100 MG capsule; Take 1 capsule (100 mg total) by mouth 3 (three) times daily as needed for Cough.

## 2017-10-05 NOTE — PATIENT INSTRUCTIONS
Bronchitis  -     azithromycin (ZITHROMAX Z-PAM) 250 MG tablet; Take 2 tablets today and then 1 tablet daily day 2-5  -     benzonatate (TESSALON) 100 MG capsule; Take 1 capsule (100 mg total) by mouth 3 (three) times daily as needed for Cough.        What Is Acute Bronchitis?  Acute bronchitis is when the airways in your lungs (bronchial tubes) become red and swollen (inflamed). It is usually caused by a viral infection. But it can also occur because of a bacteria or allergen. Symptoms include a cough that produces yellow or greenish mucus and can last for days or sometimes weeks.  Inside healthy lungs    Air travels in and out of the lungs through the airways. The linings of these airways produce sticky mucus. This mucus traps particles that enter the lungs. Tiny structures called cilia then sweep the particles out of the airways.     Healthy airway: Airways are normally open. Air moves in and out easily.      Healthy cilia: Tiny, hairlike cilia sweep mucus and particles up and out of the airways.   Lungs with bronchitis  Bronchitis often occurs with a cold or the flu virus. The airways become inflamed (red and swollen). There is a deep hacking cough from the extra mucus. Other symptoms may include:  · Wheezing  · Chest discomfort  · Shortness of breath  · Mild fever  A second infection, this time due to bacteria, may then occur. And airways irritated by allergens or smoke are more likely to get infected.        Inflamed airway: Inflammation and extra mucus narrow the airway, causing shortness of breath.      Impaired cilia: Extra mucus impairs cilia, causing congestion and wheezing. Smoking makes the problem worse.   Making a diagnosis  A physical exam, health history, and certain tests help your healthcare provider make the diagnosis.  Health history  Your healthcare provider will ask you about your symptoms.  The exam  Your provider listens to your chest for signs of congestion. He or she may also  check your ears, nose, and throat.  Possible tests  · A sputum test for bacteria. This requires a sample of mucus from your lungs.  · A nasal or throat swab. This tests to see if you have a bacterial infection.  · A chest X-ray. This is done if your healthcare provider thinks you have pneumonia.  · Tests to check for an underlying condition. Other tests may be done to check for things such as allergies, asthma, or COPD (chronic obstructive pulmonary disease). You may need to see a specialist for more lung function testing.  Treating a cough  The main treatment for bronchitis is easing symptoms. Avoiding smoke, allergens, and other things that trigger coughing can often help. If the infection is bacterial, you may be given antibiotics. During the illness, it's important to get plenty of sleep. To ease symptoms:  · Dont smoke. Also avoid secondhand smoke.  · Use a humidifier. Or try breathing in steam from a hot shower. This may help loosen mucus.  · Drink a lot of water and juice. They can soothe the throat and may help thin mucus.  · Sit up or use extra pillows when in bed. This helps to lessen coughing and congestion.  · Ask your provider about using medicine. Ask about using cough medicine, pain and fever medicine, or a decongestant.  Antibiotics  Most cases of bronchitis are caused by cold or flu viruses. They dont need antibiotics to treat them, even if your mucus is thick and green or yellow. Antibiotics dont treat viral illness and antibiotics have not been shown to have any benefit in cases of acute bronchitis. Taking antibiotics when they are not needed increases your risk of getting an infection later that is antibiotic-resistant. Antibiotics can also cause severe cases of diarrhea that require other antibiotics to treat.  It is important that you accept your healthcare provider's opinion to not use antibiotics. Your provider will prescribe antibiotics if the infection is caused by bacteria. If they are  prescribed:  · Take all of the medicine. Take the medicine until it is used up, even if symptoms have improved. If you dont, the bronchitis may come back.  · Take the medicines as directed. For instance, some medicines should be taken with food.  · Ask about side effects. Ask your provider or pharmacist what side effects are common, and what to do about them.  Follow-up care  You should see your provider again in 2 to 3 weeks. By this time, symptoms should have improved. An infection that lasts longer may mean you have a more serious problem.  Prevention  · Avoid tobacco smoke. If you smoke, quit. Stay away from smoky places. Ask friends and family not to smoke around you, or in your home or car.  · Get checked for allergies.  · Ask your provider about getting a yearly flu shot. Also ask about pneumococcal or pneumonia shots.  · Wash your hands often. This helps reduce the chance of picking up viruses that cause colds and flu.  Call your healthcare provider if:  · Symptoms worsen, or you have new symptoms  · Breathing problems worsen or  become severe  · Symptoms dont get better within a week, or within 3 days of taking antibiotics   Date Last Reviewed: 2/1/2017  © 2698-7591 [employer]. [address] [place] [address], [place] [address]. All rights reserved. This information is not intended as a substitute for professional medical care. Always follow your healthcare professional's instructions.

## 2017-10-09 DIAGNOSIS — J44.9 CHRONIC OBSTRUCTIVE PULMONARY DISEASE, UNSPECIFIED COPD TYPE: ICD-10-CM

## 2017-10-09 DIAGNOSIS — J44.89 ASTHMA WITH COPD: ICD-10-CM

## 2017-10-10 ENCOUNTER — PATIENT MESSAGE (OUTPATIENT)
Dept: PULMONOLOGY | Facility: CLINIC | Age: 77
End: 2017-10-10

## 2017-10-10 RX ORDER — IPRATROPIUM BROMIDE AND ALBUTEROL SULFATE 2.5; .5 MG/3ML; MG/3ML
3 SOLUTION RESPIRATORY (INHALATION) EVERY 6 HOURS PRN
Qty: 180 ML | Refills: 0 | OUTPATIENT
Start: 2017-10-10 | End: 2018-10-10

## 2017-10-11 RX ORDER — IPRATROPIUM BROMIDE AND ALBUTEROL SULFATE 2.5; .5 MG/3ML; MG/3ML
3 SOLUTION RESPIRATORY (INHALATION) EVERY 6 HOURS PRN
Qty: 120 VIAL | Refills: 11 | Status: SHIPPED | OUTPATIENT
Start: 2017-10-11 | End: 2018-10-11

## 2017-10-12 ENCOUNTER — PATIENT MESSAGE (OUTPATIENT)
Dept: PULMONOLOGY | Facility: CLINIC | Age: 77
End: 2017-10-12

## 2017-10-13 ENCOUNTER — CLINICAL SUPPORT (OUTPATIENT)
Dept: INTERNAL MEDICINE | Facility: CLINIC | Age: 77
End: 2017-10-13
Payer: MEDICARE

## 2017-10-13 ENCOUNTER — IMMUNIZATION (OUTPATIENT)
Dept: INTERNAL MEDICINE | Facility: CLINIC | Age: 77
End: 2017-10-13
Payer: MEDICARE

## 2017-10-13 DIAGNOSIS — E29.1 HYPOGONADISM IN MALE: Primary | ICD-10-CM

## 2017-10-13 PROCEDURE — 96372 THER/PROPH/DIAG INJ SC/IM: CPT | Mod: S$GLB,,, | Performed by: INTERNAL MEDICINE

## 2017-10-13 PROCEDURE — 99999 PR PBB SHADOW E&M-EST. PATIENT-LVL II: CPT | Mod: PBBFAC,,,

## 2017-10-13 PROCEDURE — 90662 IIV NO PRSV INCREASED AG IM: CPT | Mod: S$GLB,,, | Performed by: FAMILY MEDICINE

## 2017-10-13 PROCEDURE — G0008 ADMIN INFLUENZA VIRUS VAC: HCPCS | Mod: S$GLB,,, | Performed by: FAMILY MEDICINE

## 2017-10-13 RX ADMIN — TESTOSTERONE CYPIONATE 200 MG: 200 INJECTION, SOLUTION INTRAMUSCULAR at 08:10

## 2017-10-22 RX ORDER — SOTALOL HYDROCHLORIDE 80 MG/1
TABLET ORAL
Qty: 60 TABLET | Refills: 11 | Status: SHIPPED | OUTPATIENT
Start: 2017-10-22 | End: 2018-11-25 | Stop reason: SDUPTHER

## 2017-10-24 RX ORDER — FENOFIBRIC ACID 135 MG/1
CAPSULE, DELAYED RELEASE ORAL
Qty: 30 CAPSULE | Refills: 11 | Status: SHIPPED | OUTPATIENT
Start: 2017-10-24 | End: 2018-10-02 | Stop reason: SDUPTHER

## 2017-10-25 ENCOUNTER — TELEPHONE (OUTPATIENT)
Dept: RADIOLOGY | Facility: HOSPITAL | Age: 77
End: 2017-10-25

## 2017-10-26 ENCOUNTER — HOSPITAL ENCOUNTER (OUTPATIENT)
Dept: RADIOLOGY | Facility: HOSPITAL | Age: 77
Discharge: HOME | End: 2017-10-26
Attending: INTERNAL MEDICINE
Payer: MEDICARE

## 2017-10-26 DIAGNOSIS — C34.12 MALIGNANT NEOPLASM OF UPPER LOBE OF LEFT LUNG: ICD-10-CM

## 2017-10-26 PROCEDURE — 74178 CT ABD&PLV WO CNTR FLWD CNTR: CPT | Mod: TC,PO

## 2017-10-26 PROCEDURE — 71260 CT THORAX DX C+: CPT | Mod: 26,,, | Performed by: RADIOLOGY

## 2017-10-26 PROCEDURE — 25500020 PHARM REV CODE 255: Mod: PO | Performed by: INTERNAL MEDICINE

## 2017-10-26 PROCEDURE — 74178 CT ABD&PLV WO CNTR FLWD CNTR: CPT | Mod: 26,,, | Performed by: RADIOLOGY

## 2017-10-26 PROCEDURE — 71260 CT THORAX DX C+: CPT | Mod: TC,PO

## 2017-10-26 RX ADMIN — IOHEXOL 75 ML: 350 INJECTION, SOLUTION INTRAVENOUS at 10:10

## 2017-10-26 RX ADMIN — IOHEXOL 30 ML: 350 INJECTION, SOLUTION INTRAVENOUS at 08:10

## 2017-10-27 ENCOUNTER — OFFICE VISIT (OUTPATIENT)
Dept: HEMATOLOGY/ONCOLOGY | Facility: CLINIC | Age: 77
End: 2017-10-27
Payer: MEDICARE

## 2017-10-27 VITALS
TEMPERATURE: 98 F | BODY MASS INDEX: 25.47 KG/M2 | WEIGHT: 177.94 LBS | HEART RATE: 61 BPM | HEIGHT: 70 IN | OXYGEN SATURATION: 97 % | SYSTOLIC BLOOD PRESSURE: 138 MMHG | RESPIRATION RATE: 18 BRPM | DIASTOLIC BLOOD PRESSURE: 80 MMHG

## 2017-10-27 DIAGNOSIS — C34.12 MALIGNANT NEOPLASM OF UPPER LOBE OF LEFT LUNG: Primary | ICD-10-CM

## 2017-10-27 PROCEDURE — 99999 PR PBB SHADOW E&M-EST. PATIENT-LVL IV: CPT | Mod: PBBFAC,,, | Performed by: INTERNAL MEDICINE

## 2017-10-27 PROCEDURE — 99214 OFFICE O/P EST MOD 30 MIN: CPT | Mod: S$GLB,,, | Performed by: INTERNAL MEDICINE

## 2017-10-27 NOTE — PROGRESS NOTES
Subjective:       Patient ID: Chai Murry is a 77 y.o. male.    Chief Complaint: Follow-up    HPI This is a 77 year odl  gentleman who comes for follow up of his lung cancer.  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 There seemed to be a left   hilar adenopathy as well.  Radiologist felt that he was unable to discriminate between the mass and the   lymphadenopathy. The patient had had a bronchoscopy by Dr. Roel Ernandez on   03/04/2006 that showed a partially obstructing airway in the anterior segment of  the left upper lobe with an endobronchial lesion in the anterior segment of the  left upper lobe. The specimen from the biopsy at the time of bronchoscopy was   reported as being a squamous cell carcinoma.  At the time of the surgery after the left lung was removed, the pathologist   indicated that this was a squamous cell carcinoma. It measured 3.8 cm. The   pathology indicated that this is extending into the bronchial resection margin of the lobe. This lobe was later on removed to complete the pneumonectomy   There was one lymph node positive for metastatic squamous cell carcinoma at the   AP window.  The patient was told that we would prefer to treat him with post-op simultaneous chemo/radiation.  He had Medi-port. He started chemo/radiation therapy andreceived 6 weekly cycles of carbo/Taxol along with radiatioon.  After a month, he had a Ct scan and it showed stability   He then had 2 additional cyles of carbo/Taxol finishing in 9/27/2016.    He comes for follow up. Says he feels well. No significant SOB     Medi-port was removed MEDICATIONS: See list.  SURGERIES: Appendectomy at age 3. Bilateral cataract surgery. Infected gland   removed from the abdomen, cholecystectomy, left pneumonectomy..Medi-port placed and removed  SOCIAL  HISTORY: He is  with three children. He lives in [facility]. He   used to smoke from age 14 To age 67 or so,, averaging a pack a day. Denies significant   drinking. He worked in a chemical plant for 40 years.  FAMILY HISTORY: Sister had cancer of the throat. Mother had cancer of the   lung. Father had prostate cancer. No diabetes. Paternal grandfather had a   heart attack.  PAST MEDICAL HISTORY:  1. Squamous cell carcinoma of the lung.  2. Tobacco use.  3. High blood pressure.  4. Arteriosclerotic cardiovascular disease, status post previous heart attacks.  5. Arthritis.  Review of Systems   Constitutional: Negative.  Negative for appetite change, fatigue and unexpected weight change.   Eyes: Negative.  Negative for visual disturbance.   Respiratory: Negative for cough and shortness of breath.    Cardiovascular: Negative.  Negative for chest pain.   Gastrointestinal: Negative for abdominal pain, diarrhea and nausea.   Genitourinary: Negative.  Negative for frequency and hematuria.   Musculoskeletal: Negative.  Negative for back pain.   Skin: Negative.  Negative for rash.   Neurological: Negative.  Negative for headaches.   Hematological: Negative for adenopathy.   Psychiatric/Behavioral: Negative.  The patient is not nervous/anxious.        Objective:      Physical Exam   Constitutional: He is oriented to person, place, and time. He appears well-developed and well-nourished.   HENT:   Head: Normocephalic.   Mouth/Throat: No oropharyngeal exudate.   Eyes: Pupils are equal, round, and reactive to light.   Neck: No thyromegaly present.   Cardiovascular: Normal rate, regular rhythm and normal heart sounds.  Exam reveals no gallop.    No murmur heard.  Pulmonary/Chest: No respiratory distress. He has no wheezes. He has no rales.   Abdominal: Soft. Bowel sounds are normal. He exhibits no distension and no mass. There is no rebound and no guarding.   Musculoskeletal: Normal range of motion. He exhibits no edema.    Lymphadenopathy:     He has no cervical adenopathy.   Neurological: He is alert and oriented to person, place, and time.   Skin: Skin is warm and dry.   Psychiatric: He has a normal mood and affect. His behavior is normal.     .  Wt Readings from Last 3 Encounters:   10/27/17 80.7 kg (177 lb 14.6 oz)   10/05/17 80.6 kg (177 lb 11.1 oz)   08/06/17 82.1 kg (181 lb)     Temp Readings from Last 3 Encounters:   10/27/17 98.1 °F (36.7 °C) (Oral)   10/05/17 96.3 °F (35.7 °C) (Tympanic)   08/06/17 98.1 °F (36.7 °C) (Tympanic)     BP Readings from Last 3 Encounters:   10/27/17 138/80   10/05/17 (!) 140/70   08/06/17 130/82     Pulse Readings from Last 3 Encounters:   10/27/17 61   10/05/17 74   08/06/17 72         Assessment:       1. Malignant neoplasm of upper lobe of left lung        Lab Results   Component Value Date    WBC 7.09 10/26/2017    HGB 13.4 (L) 10/26/2017    HCT 42.0 10/26/2017    MCV 92 10/26/2017     10/26/2017       Plan:       Lab Results   Component Value Date    WBC 7.09 10/26/2017    HGB 13.4 (L) 10/26/2017    HCT 42.0 10/26/2017    MCV 92 10/26/2017     10/26/2017     Lab Results   Component Value Date    CREATININE 0.9 10/26/2017      Ct scans show some post-op changes and    no obvious evidence of recurrence  There some non specific areas of nodularity  He hunter ee me in 4 months with a cbc and a cmp

## 2017-11-02 ENCOUNTER — OFFICE VISIT (OUTPATIENT)
Dept: INTERNAL MEDICINE | Facility: CLINIC | Age: 77
End: 2017-11-02
Payer: MEDICARE

## 2017-11-02 VITALS
WEIGHT: 177.69 LBS | SYSTOLIC BLOOD PRESSURE: 120 MMHG | HEART RATE: 60 BPM | HEIGHT: 70 IN | DIASTOLIC BLOOD PRESSURE: 68 MMHG | BODY MASS INDEX: 25.44 KG/M2 | TEMPERATURE: 97 F

## 2017-11-02 DIAGNOSIS — Z12.11 COLON CANCER SCREENING: ICD-10-CM

## 2017-11-02 DIAGNOSIS — J43.1 PANLOBULAR EMPHYSEMA: ICD-10-CM

## 2017-11-02 DIAGNOSIS — F51.01 PRIMARY INSOMNIA: ICD-10-CM

## 2017-11-02 DIAGNOSIS — C34.12 MALIGNANT NEOPLASM OF UPPER LOBE OF LEFT LUNG: Primary | ICD-10-CM

## 2017-11-02 DIAGNOSIS — E29.1 HYPOGONADISM IN MALE: ICD-10-CM

## 2017-11-02 PROCEDURE — 99999 PR PBB SHADOW E&M-EST. PATIENT-LVL III: CPT | Mod: PBBFAC,,, | Performed by: INTERNAL MEDICINE

## 2017-11-02 PROCEDURE — 99214 OFFICE O/P EST MOD 30 MIN: CPT | Mod: 25,S$GLB,, | Performed by: INTERNAL MEDICINE

## 2017-11-02 PROCEDURE — 96372 THER/PROPH/DIAG INJ SC/IM: CPT | Mod: S$GLB,,, | Performed by: INTERNAL MEDICINE

## 2017-11-02 RX ADMIN — TESTOSTERONE CYPIONATE 200 MG: 200 INJECTION, SOLUTION INTRAMUSCULAR at 11:11

## 2017-11-02 NOTE — PROGRESS NOTES
"Subjective:       Patient ID: Chai Murry is a 77 y.o. male.    Chief Complaint: Dizziness    HPI  Patient is a 77-year-old male coming in following up on his lung cancer, insomnia, COPD.  He indicates he's been doing well overall.  In regards to his lung cancer he has completed his chemotherapy and has had his Mediport removed.  He had a lobectomy which she tolerated very well.  He continues to follow with hematology oncology.  There is been no evidence of any significant problems associated with cancer at this time.  He is on a routine monitoring program at this point.  He indicates his breathing has improved but is still not perfect lower back to baseline.  He is not having major symptoms however.    His insomnia he is treating with over-the-counter medication at this time.  He states he did not tolerate the clonazepam.  He indicates it made him quite dizzy so he stopped taking that.  He is using an over-the-counter sleep supplement and that seems to be working well form.    In regards to his emphysema evident his peers stable at this time.  He is not reporting any significant wheezing or any significant shortness of breath.  He does note a little bit different breathing situation than prior to the lobectomy but overall he states he's not limited.  He has a little bit of a productive cough in the mornings of some whitish phlegm.        Review of Systems   Constitutional: Negative for fever and unexpected weight change.   Respiratory: Negative for cough, shortness of breath and wheezing.    Cardiovascular: Negative for chest pain and palpitations.   Gastrointestinal: Negative for constipation, diarrhea, nausea and vomiting.   Genitourinary: Negative for dysuria and hematuria.       Objective:   /68   Pulse 60   Temp 97.2 °F (36.2 °C) (Tympanic)   Ht 5' 10" (1.778 m)   Wt 80.6 kg (177 lb 11.1 oz)   BMI 25.50 kg/m²      Physical Exam   Constitutional: He appears well-developed and well-nourished.   HENT: "   Head: Normocephalic and atraumatic.   Eyes: Pupils are equal, round, and reactive to light.   Neck: Neck supple. No thyromegaly present.   Cardiovascular: Normal rate, regular rhythm and normal heart sounds.  Exam reveals no gallop and no friction rub.    No murmur heard.  Pulmonary/Chest: Breath sounds normal. He has no wheezes. He has no rales.   Abdominal: Soft. Bowel sounds are normal. He exhibits no distension. There is no tenderness.   Vitals reviewed.      Lab Visit on 10/26/2017   Component Date Value    Sodium 10/26/2017 142     Potassium 10/26/2017 4.5     Chloride 10/26/2017 105     CO2 10/26/2017 29     Glucose 10/26/2017 96     BUN, Bld 10/26/2017 12     Creatinine 10/26/2017 0.9     Calcium 10/26/2017 9.4     Total Protein 10/26/2017 7.1     Albumin 10/26/2017 3.3*    Total Bilirubin 10/26/2017 0.5     Alkaline Phosphatase 10/26/2017 74     AST 10/26/2017 14     ALT 10/26/2017 11     Anion Gap 10/26/2017 8     eGFR if African American 10/26/2017 >60     eGFR if non African Amer* 10/26/2017 >60     WBC 10/26/2017 7.09     RBC 10/26/2017 4.55*    Hemoglobin 10/26/2017 13.4*    Hematocrit 10/26/2017 42.0     MCV 10/26/2017 92     MCH 10/26/2017 29.5     MCHC 10/26/2017 31.9*    RDW 10/26/2017 14.2     Platelets 10/26/2017 206     MPV 10/26/2017 10.4     Gran # 10/26/2017 4.7     Lymph # 10/26/2017 1.0     Mono # 10/26/2017 0.6     Eos # 10/26/2017 0.8*    Baso # 10/26/2017 0.05     Gran% 10/26/2017 65.7     Lymph% 10/26/2017 14.1*    Mono% 10/26/2017 7.8     Eosinophil% 10/26/2017 11.7*    Basophil% 10/26/2017 0.7     Differential Method 10/26/2017 Automated        Assessment:       1. Malignant neoplasm of upper lobe of left lung    2. Primary insomnia    3. Panlobular emphysema    4. Colon cancer screening    5. Hypogonadism in male        Plan:   Insomnia  Did not tolerate the clonazepam.  Taking otc unisom with excellent results.    Chronic obstructive pulmonary  disease  Stable.  No significant breathing issues.      Chai was seen today for dizziness.    Diagnoses and all orders for this visit:    Malignant neoplasm of upper lobe of left lung  Comments:  Completed surgery and chemo.  Films stable.  Continue as is, follow up with DR. Patel as planned.  Orders:  -     CBC auto differential; Future  -     Comprehensive metabolic panel; Future  -     Lipid panel; Future  -     PSA, Screening; Future    Primary insomnia    Panlobular emphysema  -     CBC auto differential; Future  -     Comprehensive metabolic panel; Future  -     Lipid panel; Future  -     PSA, Screening; Future    Colon cancer screening  -     Case request GI: COLONOSCOPY    Hypogonadism in male        Return in about 6 months (around 5/2/2018).

## 2017-11-24 ENCOUNTER — CLINICAL SUPPORT (OUTPATIENT)
Dept: INTERNAL MEDICINE | Facility: CLINIC | Age: 77
End: 2017-11-24
Payer: MEDICARE

## 2017-11-24 PROCEDURE — 99999 PR PBB SHADOW E&M-EST. PATIENT-LVL II: CPT | Mod: PBBFAC,,,

## 2017-11-24 PROCEDURE — 96372 THER/PROPH/DIAG INJ SC/IM: CPT | Mod: S$GLB,,, | Performed by: INTERNAL MEDICINE

## 2017-11-24 RX ADMIN — TESTOSTERONE CYPIONATE 200 MG: 200 INJECTION, SOLUTION INTRAMUSCULAR at 08:11

## 2017-12-06 DIAGNOSIS — Z90.2 STATUS POST PNEUMONECTOMY: Primary | ICD-10-CM

## 2017-12-15 ENCOUNTER — CLINICAL SUPPORT (OUTPATIENT)
Dept: INTERNAL MEDICINE | Facility: CLINIC | Age: 77
End: 2017-12-15
Payer: MEDICARE

## 2017-12-15 DIAGNOSIS — E29.1 HYPOGONADISM IN MALE: Primary | ICD-10-CM

## 2017-12-15 PROCEDURE — 99999 PR PBB SHADOW E&M-EST. PATIENT-LVL II: CPT | Mod: PBBFAC,,,

## 2017-12-15 PROCEDURE — 96372 THER/PROPH/DIAG INJ SC/IM: CPT | Mod: S$GLB,,, | Performed by: INTERNAL MEDICINE

## 2017-12-15 RX ADMIN — TESTOSTERONE CYPIONATE 200 MG: 200 INJECTION, SOLUTION INTRAMUSCULAR at 08:12

## 2018-01-05 ENCOUNTER — CLINICAL SUPPORT (OUTPATIENT)
Dept: INTERNAL MEDICINE | Facility: CLINIC | Age: 78
End: 2018-01-05
Payer: MEDICARE

## 2018-01-05 DIAGNOSIS — E29.1 HYPOGONADISM IN MALE: Primary | ICD-10-CM

## 2018-01-05 PROCEDURE — 96372 THER/PROPH/DIAG INJ SC/IM: CPT | Mod: S$GLB,,, | Performed by: INTERNAL MEDICINE

## 2018-01-05 RX ADMIN — TESTOSTERONE CYPIONATE 200 MG: 200 INJECTION, SOLUTION INTRAMUSCULAR at 09:01

## 2018-01-10 RX ORDER — TEMAZEPAM 15 MG/1
CAPSULE ORAL
Qty: 30 CAPSULE | OUTPATIENT
Start: 2018-01-10

## 2018-01-10 NOTE — TELEPHONE ENCOUNTER
----- Message from Pantera Milan MD sent at 1/10/2018 10:55 AM CST -----  Got a refill request for temazepam. Denies it. Needs to get it from PCP. Looks like he has finished treatment for his cancer. He is due to see Dr. Weaver for follow up in Feb. thanks

## 2018-01-26 ENCOUNTER — CLINICAL SUPPORT (OUTPATIENT)
Dept: INTERNAL MEDICINE | Facility: CLINIC | Age: 78
End: 2018-01-26
Payer: MEDICARE

## 2018-01-26 DIAGNOSIS — E29.1 HYPOGONADISM IN MALE: Primary | ICD-10-CM

## 2018-01-26 PROCEDURE — 96372 THER/PROPH/DIAG INJ SC/IM: CPT | Mod: S$GLB,,, | Performed by: INTERNAL MEDICINE

## 2018-01-26 PROCEDURE — 99999 PR PBB SHADOW E&M-EST. PATIENT-LVL II: CPT | Mod: PBBFAC,,,

## 2018-01-26 RX ADMIN — TESTOSTERONE CYPIONATE 200 MG: 200 INJECTION, SOLUTION INTRAMUSCULAR at 08:01

## 2018-01-31 ENCOUNTER — OFFICE VISIT (OUTPATIENT)
Dept: PULMONOLOGY | Facility: CLINIC | Age: 78
End: 2018-01-31
Payer: MEDICARE

## 2018-01-31 ENCOUNTER — HOSPITAL ENCOUNTER (OUTPATIENT)
Dept: RADIOLOGY | Facility: HOSPITAL | Age: 78
Discharge: HOME | End: 2018-01-31
Attending: INTERNAL MEDICINE
Payer: MEDICARE

## 2018-01-31 ENCOUNTER — TELEPHONE (OUTPATIENT)
Dept: PULMONOLOGY | Facility: CLINIC | Age: 78
End: 2018-01-31

## 2018-01-31 ENCOUNTER — PROCEDURE VISIT (OUTPATIENT)
Dept: PULMONOLOGY | Facility: CLINIC | Age: 78
End: 2018-01-31
Payer: MEDICARE

## 2018-01-31 VITALS
SYSTOLIC BLOOD PRESSURE: 128 MMHG | BODY MASS INDEX: 26.05 KG/M2 | RESPIRATION RATE: 16 BRPM | OXYGEN SATURATION: 95 % | DIASTOLIC BLOOD PRESSURE: 62 MMHG | HEIGHT: 70 IN | WEIGHT: 182 LBS | HEART RATE: 59 BPM

## 2018-01-31 DIAGNOSIS — J44.89 ASTHMA WITH COPD: ICD-10-CM

## 2018-01-31 DIAGNOSIS — J44.9 CHRONIC OBSTRUCTIVE PULMONARY DISEASE, UNSPECIFIED COPD TYPE: ICD-10-CM

## 2018-01-31 DIAGNOSIS — J30.0 VASOMOTOR RHINITIS, UNSPECIFIED CHRONICITY: ICD-10-CM

## 2018-01-31 DIAGNOSIS — J44.1 COPD EXACERBATION: Primary | ICD-10-CM

## 2018-01-31 DIAGNOSIS — G47.00 INSOMNIA, UNSPECIFIED TYPE: ICD-10-CM

## 2018-01-31 LAB
POST FEF 25 75: 0.86 L/S (ref 1.38–2.98)
POST FET 100: 13.88 S
POST FEV1 FVC: 65 %
POST FEV1: 1.77 L (ref 2.64–3.43)
POST FIF 50: 2.7 L/S
POST FVC: 2.71 L (ref 3.74–4.67)
POST PEF: 4.07 L/S (ref 6.57–8.89)
PRE FEF 25 75: 0.67 L/S (ref 1.38–2.98)
PRE FET 100: 14.37 S
PRE FEV1 FVC: 62 %
PRE FEV1: 1.68 L (ref 2.64–3.43)
PRE FIF 50: 3.32 L/S
PRE FVC: 2.7 L (ref 3.74–4.67)
PRE PEF: 5.26 L/S (ref 6.57–8.89)
PREDICTED FEV1 FVC: 72.36 % (ref 67.53–77.2)
PREDICTED FEV1: 3.04 L (ref 2.64–3.43)
PREDICTED FVC: 4.21 L (ref 3.74–4.67)
PROVOCATION PROTOCOL: ABNORMAL

## 2018-01-31 PROCEDURE — 94060 EVALUATION OF WHEEZING: CPT | Mod: S$GLB,,, | Performed by: INTERNAL MEDICINE

## 2018-01-31 PROCEDURE — 99215 OFFICE O/P EST HI 40 MIN: CPT | Mod: 25,S$GLB,, | Performed by: INTERNAL MEDICINE

## 2018-01-31 PROCEDURE — 71046 X-RAY EXAM CHEST 2 VIEWS: CPT | Mod: TC,FY,PO

## 2018-01-31 PROCEDURE — 71046 X-RAY EXAM CHEST 2 VIEWS: CPT | Mod: 26,,, | Performed by: RADIOLOGY

## 2018-01-31 PROCEDURE — 3008F BODY MASS INDEX DOCD: CPT | Mod: S$GLB,,, | Performed by: INTERNAL MEDICINE

## 2018-01-31 PROCEDURE — 1159F MED LIST DOCD IN RCRD: CPT | Mod: S$GLB,,, | Performed by: INTERNAL MEDICINE

## 2018-01-31 PROCEDURE — 99999 PR PBB SHADOW E&M-EST. PATIENT-LVL IV: CPT | Mod: PBBFAC,,, | Performed by: INTERNAL MEDICINE

## 2018-01-31 RX ORDER — IPRATROPIUM BROMIDE 42 UG/1
2 SPRAY, METERED NASAL 4 TIMES DAILY
Qty: 15 ML | Refills: 11 | Status: SHIPPED | OUTPATIENT
Start: 2018-01-31 | End: 2018-05-24

## 2018-01-31 RX ORDER — METHYLPREDNISOLONE 4 MG/1
TABLET ORAL
Qty: 1 PACKAGE | Refills: 1 | Status: SHIPPED | OUTPATIENT
Start: 2018-01-31 | End: 2018-02-21

## 2018-01-31 RX ORDER — AZITHROMYCIN 250 MG/1
250 TABLET, FILM COATED ORAL DAILY
Qty: 6 TABLET | Refills: 1 | Status: SHIPPED | OUTPATIENT
Start: 2018-01-31 | End: 2018-05-10

## 2018-01-31 RX ORDER — DOXEPIN HYDROCHLORIDE 10 MG/1
10 CAPSULE ORAL NIGHTLY
Qty: 30 CAPSULE | Refills: 11 | Status: SHIPPED | OUTPATIENT
Start: 2018-01-31 | End: 2019-02-27 | Stop reason: SDUPTHER

## 2018-01-31 NOTE — PATIENT INSTRUCTIONS
Doxepin capsules  What is this medicine?  DOXEPIN (DOX e pin) is used to treat depression and anxiety.  How should I use this medicine?  Take this medicine by mouth with a glass of water. Follow the directions on the prescription label. Take your doses at regular intervals. Do not take your medicine more often than directed. Do not stop taking this medicine suddenly except upon the advice of your doctor. Stopping this medicine too quickly may cause serious side effects or your condition may worsen.  A special MedGuide will be given to you by the pharmacist with each prescription and refill. Be sure to read this information carefully each time.  Talk to your pediatrician regarding the use of this medicine in children. While this drug may be prescribed for children as young as 12 years for selected conditions, precautions do apply.  What side effects may I notice from receiving this medicine?  Side effects that you should report to your doctor or health care professional as soon as possible:  · abnormal production of milk in females  · allergic reactions like skin rash, itching or hives, swelling of the face, lips, or tongue  · breast enlargement in both males and females  · breathing problems  · confusion, hallucinations  · excessive thirst and/or hunger  · fast, irregular or pounding heartbeat  · fever with sweating  · muscle stiffness, or spasms  · passing urine more times in a day  · seizures  · suicidal thoughts or other mood changes  · swelling of the testicles  · tingling, pain, or numbness in the feet or hands  · trouble passing urine or change in the amount of urine  · yellowing of the eyes or skin  Side effects that usually do not require medical attention (report to your doctor or health care professional if they continue or are bothersome):  · change in sex drive or performance  · constipation, or diarrhea  · nausea, vomiting  · weight gain or loss  What may interact with this medicine?  Do not take this  medicine with any of the following medications:  · arsenic trioxide  · certain medicines used to regulate abnormal heartbeat or to treat other heart conditions  · cisapride  · halofantrine  · levomethadyl  · linezolid  · MAOIs like Carbex, Eldepryl, Marplan, Nardil, and Parnate  · methylene blue  · other medicines for mental depression  · phenothiazines like perphenazine, thioridazine and chlorpromazine  · pimozide  · procarbazine  · sparfloxacin  · [place]'s Wort  · ziprasidone  This medicine may also interact with the following medications:  · cimetidine  · tolazamide  What if I miss a dose?  If you miss a dose, take it as soon as you can. If it is almost time for your next dose, take only that dose. Do not take double or extra doses.  Where should I keep my medicine?  Keep out of the reach of children.  Store at room temperature between 15 and 30 degrees C (59 and 86 degrees F). Throw away any unused medicine after the expiration date.  What should I tell my health care provider before I take this medicine?  They need to know if you have any of these conditions:  · bipolar disorder  · difficulty passing urine  · glaucoma  · heart disease  · if you frequently drink alcohol containing drinks  · liver disease  · lung or breathing disease, like asthma or sleep apnea  · prostate trouble  · schizophrenia  · seizures  · suicidal thoughts, plans, or attempt; a previous suicide attempt by you or a family member  · an unusual or allergic reaction to doxepin, other medicines, foods, dyes, or preservatives  · pregnant or trying to get pregnant  · breast-feeding  What should I watch for while using this medicine?  Visit your doctor or health care professional for regular checks on your progress. It can take several days before you feel the full effect of this medicine. If you have been taking this medicine regularly for some time, do not suddenly stop taking it. You must gradually reduce the dose or you may get severe side  effects. Ask your doctor or health care professional for advice. Even after you stop taking this medicine it can still affect your body for several days.  Patients and their families should watch out for new or worsening thoughts of suicide or depression. Also watch out for sudden changes in feelings such as feeling anxious, agitated, panicky, irritable, hostile, aggressive, impulsive, severely restless, overly excited and hyperactive, or not being able to sleep. If this happens, especially at the beginning of treatment or after a change in dose, call your health care professional.  You may get drowsy or dizzy. Do not drive, use machinery, or do anything that needs mental alertness until you know how this medicine affects you. Do not stand or sit up quickly, especially if you are an older patient. This reduces the risk of dizzy or fainting spells. Alcohol may increase dizziness and drowsiness. Avoid alcoholic drinks.  Do not treat yourself for coughs, colds, or allergies without asking your doctor or health care professional for advice. Some ingredients can increase possible side effects.  Your mouth may get dry. Chewing sugarless gum or sucking hard candy, and drinking plenty of water may help. Contact your doctor if the problem does not go away or is severe.  This medicine may cause dry eyes and blurred vision. If you wear contact lenses you may feel some discomfort. Lubricating drops may help. See your eye doctor if the problem does not go away or is severe.  This medicine can make you more sensitive to the sun. Keep out of the sun. If you cannot avoid being in the sun, wear protective clothing and use sunscreen. Do not use sun lamps or tanning beds/booths.  NOTE:This sheet is a summary. It may not cover all possible information. If you have questions about this medicine, talk to your doctor, pharmacist, or health care provider. Copyright© 2017 [employer]        Azithromycin tablets  What is this  medicine?  AZITHROMYCIN (az ith laura MYE sin) is a macrolide antibiotic. It is used to treat or prevent certain kinds of bacterial infections. It will not work for colds, flu, or other viral infections.  How should I use this medicine?  Take this medicine by mouth with a full glass of water. Follow the directions on the prescription label. The tablets can be taken with food or on an empty stomach. If the medicine upsets your stomach, take it with food. Take your medicine at regular intervals. Do not take your medicine more often than directed. Take all of your medicine as directed even if you think your are better. Do not skip doses or stop your medicine early. Talk to your pediatrician regarding the use of this medicine in children. Special care may be needed.  What side effects may I notice from receiving this medicine?  Side effects that you should report to your doctor or health care professional as soon as possible:  · allergic reactions like skin rash, itching or hives, swelling of the face, lips, or tongue  · confusion, nightmares or hallucinations  · dark urine  · difficulty breathing  · hearing loss  · irregular heartbeat or chest pain  · pain or difficulty passing urine  · redness, blistering, peeling or loosening of the skin, including inside the mouth  · white patches or sores in the mouth  · yellowing of the eyes or skin  Side effects that usually do not require medical attention (report to your doctor or health care professional if they continue or are bothersome):  · diarrhea  · dizziness, drowsiness  · headache  · stomach upset or vomiting  · tooth discoloration  · vaginal irritation  What may interact with this medicine?  Do not take this medicine with any of the following medications:  · lincomycin  This medicine may also interact with the following medications:  · amiodarone  · antacids  · birth control pills  · cyclosporine  · digoxin  · magnesium  · nelfinavir  · phenytoin  · warfarin  What if I  miss a dose?  If you miss a dose, take it as soon as you can. If it is almost time for your next dose, take only that dose. Do not take double or extra doses.  Where should I keep my medicine?  Keep out of the reach of children.  Store at room temperature between 15 and 30 degrees C (59 and 86 degrees F). Throw away any unused medicine after the expiration date.  What should I tell my health care provider before I take this medicine?  They need to know if you have any of these conditions:  · kidney disease  · liver disease  · irregular heartbeat or heart disease  · an unusual or allergic reaction to azithromycin, erythromycin, other macrolide antibiotics, foods, dyes, or preservatives  · pregnant or trying to get pregnant  · breast-feeding  What should I watch for while using this medicine?  Tell your doctor or health care professional if your symptoms do not improve.  Do not treat diarrhea with over the counter products. Contact your doctor if you have diarrhea that lasts more than 2 days or if it is severe and watery.  This medicine can make you more sensitive to the sun. Keep out of the sun. If you cannot avoid being in the sun, wear protective clothing and use sunscreen. Do not use sun lamps or tanning beds/booths.  NOTE:This sheet is a summary. It may not cover all possible information. If you have questions about this medicine, talk to your doctor, pharmacist, or health care provider. Copyright© 2017 [employer]        Methylprednisolone tablets  What is this medicine?  METHYLPREDNISOLONE (meth ill pred NISS oh lone) is a corticosteroid. It is commonly used to treat inflammation of the skin, joints, lungs, and other organs. Common conditions treated include asthma, allergies, and arthritis. It is also used for other conditions, such as blood disorders and diseases of the adrenal glands.  How should I use this medicine?  Take this medicine by mouth with a drink of water. Follow the directions on the  prescription label. Take it with food or milk to avoid stomach upset. If you are taking this medicine once a day, take it in the morning. Do not take more medicine than you are told to take. Do not suddenly stop taking your medicine because you may develop a severe reaction. Your doctor will tell you how much medicine to take. If your doctor wants you to stop the medicine, the dose may be slowly lowered over time to avoid any side effects.  Talk to your pediatrician regarding the use of this medicine in children. Special care may be needed.  What side effects may I notice from receiving this medicine?  Side effects that you should report to your doctor or health care professional as soon as possible:  · allergic reactions like skin rash, itching or hives, swelling of the face, lips, or tongue  · eye pain, decreased or blurred vision, or bulging eyes  · fever, sore throat, sneezing, cough, or other signs of infection, wounds that will not heal  · increased thirst  · mental depression, mood swings, mistaken feelings of self importance or of being mistreated  · pain in hips, back, ribs, arms, shoulders, or legs  · swelling of the ankles, feet, hands  · trouble passing urine or change in the amount of urine  Side effects that usually do not require medical attention (report to your doctor or health care professional if they continue or are bothersome):  · confusion, excitement, restlessness  · headache  · nausea, vomiting  · skin problems, acne, thin and shiny skin  · weight gain  What may interact with this medicine?  Do not take this medicine with any of the following medications:  · mifepristone  This medicine may also interact with the following medications:  · tacrolimus  · vaccines  · warfarin  What if I miss a dose?  If you miss a dose, take it as soon as you can. If it is almost time for your next dose, talk to your doctor or health care professional. You may need to miss a dose or take an extra dose. Do not  take double or extra doses without advice.  Where should I keep my medicine?  Keep out of the reach of children.  Store at room temperature between 20 and 25 degrees C (68 and 77 degrees F). Throw away any unused medicine after the expiration date.  What should I tell my health care provider before I take this medicine?  They need to know if you have any of these conditions:  · Cushing's syndrome  · diabetes  · glaucoma  · heart problems or disease  · high blood pressure  · infection such as herpes, measles, tuberculosis, or chickenpox  · kidney disease  · liver disease  · mental problems  · myasthenia gravis  · osteoporosis  · seizures  · stomach ulcer or intestine disease including colitis and diverticulitis  · thyroid problem  · an unusual or allergic reaction to lactose, methylprednisolone, other medicines, foods, dyes, or preservatives  · pregnant or trying to get pregnant  · breast-feeding  What should I watch for while using this medicine?  Visit your doctor or health care professional for regular checks on your progress. If you are taking this medicine for a long time, carry an identification card with your name and address, the type and dose of your medicine, and your doctor's name and address.  The medicine may increase your risk of getting an infection. Stay away from people who are sick. Tell your doctor or health care professional if you are around anyone with measles or chickenpox.  If you are going to have surgery, tell your doctor or health care professional that you have taken this medicine within the last twelve months.  Ask your doctor or health care professional about your diet. You may need to lower the amount of salt you eat.  The medicine can increase your blood sugar. If you are a diabetic check with your doctor if you need help adjusting the dose of your diabetic medicine.  NOTE:This sheet is a summary. It may not cover all possible information. If you have questions about this medicine,  talk to your doctor, pharmacist, or health care provider. Copyright© 2017 [employer]

## 2018-01-31 NOTE — PROGRESS NOTES
Subjective:       Patient ID: Chai Murry is a 77 y.o. male.    Chief Complaint: COPD and Asthma    HPI COPD  He presents for evaluation and treatment of COPD. The patient is currently having symptoms / an exacerbation. Current symptoms include acute dyspnea, chronic dyspnea, cough productive of yellow sputum in small amounts and wheezing. Symptoms have been present since several weeks ago and have been unchanged. He denies chills and fever. Associated symptoms include poor exercise tolerance.  This episode appears to have been triggered by pollens. Treatments tried for the current exacerbation: albuterol nebulizer. The patient has been having similar episodes for approximately 10 years. He uses 1 pillows at night. Patient currently is not on home oxygen therapy.. The patient is having no constitutional symptoms, denying fever, chills, anorexia, or weight loss. The patient has been hospitalized for this condition before. He quit smoking approximately 12 years ago. The patient is experiencing exercise intolerance (difficulty climbing 3 flights of stairs).    Insomnia:  Difficulty maintaining sleep > Irregular sleep pattern    Past Medical History:   Diagnosis Date    Anemia     Arthritis     Atrial fibrillation     CAD (coronary artery disease)     Cancer     lung cancer-Left    Cataract     ED (erectile dysfunction)     HTN (hypertension)     Hyperlipidemia     Myocardial infarction      Past Surgical History:   Procedure Laterality Date    APPENDECTOMY      cardiac stents      CATARACT EXTRACTION W/  INTRAOCULAR LENS IMPLANT Right 9-3-14    CHOLECYSTECTOMY      infected stomach gland excision      LUNG REMOVAL, TOTAL Left 04/2016    lung cancer left upper lobe    SKIN BIOPSY Left     arm     Social History     Social History    Marital status:      Spouse name: N/A    Number of children: 3    Years of education: N/A     Occupational History    retired      Social History Main Topics     Smoking status: Former Smoker     Packs/day: 1.00     Years: 50.00     Quit date: 8/12/2005    Smokeless tobacco: Never Used    Alcohol use No    Drug use: No    Sexual activity: Not Currently     Other Topics Concern    Not on file     Social History Narrative    No narrative on file     Review of Systems   Constitutional: Positive for fatigue. Negative for fever.   HENT: Positive for postnasal drip, rhinorrhea and congestion.    Eyes: Negative for redness and itching.   Respiratory: Positive for cough, sputum production, shortness of breath, dyspnea on extertion, use of rescue inhaler and Paroxysmal Nocturnal Dyspnea.    Cardiovascular: Negative for chest pain, palpitations and leg swelling.   Genitourinary: Negative for difficulty urinating and hematuria.   Endocrine: Negative for cold intolerance and heat intolerance.    Skin: Negative for rash.   Gastrointestinal: Negative for nausea and abdominal pain.   Neurological: Negative for dizziness, syncope, weakness and light-headedness.   Hematological: Negative for adenopathy. Does not bruise/bleed easily.   Psychiatric/Behavioral: Negative for sleep disturbance. The patient is not nervous/anxious.        Objective:      Physical Exam   Constitutional: He is oriented to person, place, and time. He appears well-developed and well-nourished.   HENT:   Head: Normocephalic and atraumatic.   Mouth/Throat: Oropharyngeal exudate present.   Eyes: Conjunctivae are normal. Pupils are equal, round, and reactive to light.   Neck: Neck supple. No JVD present. No tracheal deviation present. No thyromegaly present.   Cardiovascular: Normal rate, regular rhythm and normal heart sounds.    No murmur heard.  Pulmonary/Chest: Effort normal. He has decreased breath sounds. He has wheezes in the right lower field and the left lower field. He has no rhonchi. He has no rales.   Abdominal: Soft. Bowel sounds are normal.   Musculoskeletal: Normal range of motion. He exhibits no edema  or tenderness.   Lymphadenopathy:     He has no cervical adenopathy.   Neurological: He is alert and oriented to person, place, and time.   Skin: Skin is warm and dry.   Nursing note and vitals reviewed.    Personal Diagnostic Review  Chest X-Ray: I personally reviewed the films and findings are:, air trapping/emphysema  Pulmonary function tests:  obstrruction    No flowsheet data found.      Assessment:       1. COPD exacerbation    2. Vasomotor rhinitis, unspecified chronicity    3. Insomnia, unspecified type    4. Chronic obstructive pulmonary disease, unspecified COPD type        Outpatient Encounter Prescriptions as of 1/31/2018   Medication Sig Dispense Refill    albuterol-ipratropium 2.5mg-0.5mg/3mL (DUO-NEB) 0.5 mg-3 mg(2.5 mg base)/3 mL nebulizer solution Take 3 mLs by nebulization every 6 (six) hours as needed for Shortness of Breath. 120 vial 11    amlodipine (NORVASC) 5 MG tablet Take 5 mg by mouth every evening.       aspirin (ECOTRIN) 81 MG EC tablet Take 81 mg by mouth once daily.      dextromethorphan-guaifenesin  mg (MUCINEX DM)  mg per 12 hr tablet Take 1 tablet by mouth every 12 (twelve) hours.      fenofibric acid (FIBRICOR) 135 mg CpDR TAKE 1 CAPSULE BY MOUTH EVERY DAY 30 capsule 11    hydrochlorothiazide (HYDRODIURIL) 25 MG tablet Take 25 mg by mouth daily as needed.      ipratropium-albuterol (COMBIVENT RESPIMAT)  mcg/actuation inhaler Inhale 1 puff into the lungs every 6 (six) hours as needed for Shortness of Breath. 4 g 11    levocetirizine (XYZAL) 5 MG tablet Take 5 mg by mouth every evening.      lisinopril (PRINIVIL,ZESTRIL) 40 MG tablet Take 40 mg by mouth Daily.       potassium 99 mg Tab Take 99 mg by mouth. Pt takes when he takes his Lasix      simvastatin (ZOCOR) 40 MG tablet Take 1 tablet (40 mg total) by mouth every evening. 30 tablet 11    SOTALOL AF 80 mg tablet TAKE 1 TABLET BY MOUTH TWICE A DAY 60 tablet 11    WELCHOL 625 mg tablet TAKE 2 TABLETS  BY MOUTH TWICE A  tablet 9    azithromycin (ZITHROMAX Z-PAM) 250 MG tablet Take 1 tablet (250 mg total) by mouth once daily. 500 mg on day 1 (two tablets) followed by 250 mg once daily on days 2-5 6 tablet 1    doxepin (SINEQUAN) 10 MG capsule Take 1 capsule (10 mg total) by mouth every evening. 30 capsule 11    ipratropium (ATROVENT) 42 mcg (0.06 %) nasal spray 2 sprays by Nasal route 4 (four) times daily. As needed for rhinitis 15 mL 11    methylPREDNISolone (MEDROL DOSEPACK) 4 mg tablet use as directed 1 Package 1    [DISCONTINUED] diphenhydrAMINE (BENADRYL) 2 % cream Apply topically 3 (three) times daily as needed for Itching. 30 g 0     Facility-Administered Encounter Medications as of 2018   Medication Dose Route Frequency Provider Last Rate Last Dose    testosterone cypionate injection 200 mg  200 mg Intramuscular Q21 Days Peter Teixeira MD   200 mg at 18 0834     Orders Placed This Encounter   Procedures    X-Ray Chest PA And Lateral     Standing Status:   Future     Standing Expiration Date:   2019     Order Specific Question:   Reason for Exam:     Answer:   SOB    Spirometry with/without bronchodilator     Standing Status:   Future     Standing Expiration Date:   2019     Plan:       Requested Prescriptions     Signed Prescriptions Disp Refills    ipratropium (ATROVENT) 42 mcg (0.06 %) nasal spray 15 mL 11     Si sprays by Nasal route 4 (four) times daily. As needed for rhinitis    azithromycin (ZITHROMAX Z-PAM) 250 MG tablet 6 tablet 1     Sig: Take 1 tablet (250 mg total) by mouth once daily. 500 mg on day 1 (two tablets) followed by 250 mg once daily on days 2-5    methylPREDNISolone (MEDROL DOSEPACK) 4 mg tablet 1 Package 1     Sig: use as directed    doxepin (SINEQUAN) 10 MG capsule 30 capsule 11     Sig: Take 1 capsule (10 mg total) by mouth every evening.     COPD exacerbation  -     azithromycin (ZITHROMAX Z-PAM) 250 MG tablet; Take 1 tablet (250 mg  total) by mouth once daily. 500 mg on day 1 (two tablets) followed by 250 mg once daily on days 2-5  Dispense: 6 tablet; Refill: 1  -     methylPREDNISolone (MEDROL DOSEPACK) 4 mg tablet; use as directed  Dispense: 1 Package; Refill: 1    Vasomotor rhinitis, unspecified chronicity  -     ipratropium (ATROVENT) 42 mcg (0.06 %) nasal spray; 2 sprays by Nasal route 4 (four) times daily. As needed for rhinitis  Dispense: 15 mL; Refill: 11    Insomnia, unspecified type  -     doxepin (SINEQUAN) 10 MG capsule; Take 1 capsule (10 mg total) by mouth every evening.  Dispense: 30 capsule; Refill: 11    Chronic obstructive pulmonary disease, unspecified COPD type  -     X-Ray Chest PA And Lateral; Future; Expected date: 01/31/2018  -     Spirometry with/without bronchodilator; Future; Expected date: 08/03/2018      Follow-up for cxr and lisa.  MEDICAL DECISION MAKING: Moderate to high complexity.  Overall, the multiple problems listed are of moderate to high severity that may impact quality of life and activities of daily living. Side effects of medications, treatment plan as well as options and alternatives reviewed and discussed with patient. There was counseling of patient concerning these issues.    Total time spent in face to face counseling and coordination of care - 40 minutes over 50% of time was used in discussion of prognosis, risks, benefits of treatment, instructions and compliance with regimen . Discussion with other physicians or health care providers (homehealth, durable medical equipment providers).

## 2018-01-31 NOTE — TELEPHONE ENCOUNTER
----- Message from Ebonie Saldaña sent at 1/31/2018 12:35 PM CST -----  Contact: [facility] pharmacy  Would like to consult with nurse regarding drug interaction. Please call back at       Pt uses:  [facility]/pharmacy #[id number] - [place] - [address] [place] [address] [address] [place] [address]  Phone: [phone] Fax: [phone]

## 2018-02-15 ENCOUNTER — OFFICE VISIT (OUTPATIENT)
Dept: HEMATOLOGY/ONCOLOGY | Facility: CLINIC | Age: 78
End: 2018-02-15
Payer: MEDICARE

## 2018-02-15 VITALS
TEMPERATURE: 99 F | HEART RATE: 70 BPM | SYSTOLIC BLOOD PRESSURE: 120 MMHG | BODY MASS INDEX: 26.2 KG/M2 | HEIGHT: 70 IN | OXYGEN SATURATION: 94 % | RESPIRATION RATE: 18 BRPM | WEIGHT: 183 LBS | DIASTOLIC BLOOD PRESSURE: 60 MMHG

## 2018-02-15 DIAGNOSIS — Z85.118 HISTORY OF CANCER OF UPPER LOBE BRONCHUS OR LUNG: ICD-10-CM

## 2018-02-15 DIAGNOSIS — Z90.2 STATUS POST PNEUMONECTOMY: Primary | ICD-10-CM

## 2018-02-15 DIAGNOSIS — C34.12 MALIGNANT NEOPLASM OF UPPER LOBE OF LEFT LUNG: ICD-10-CM

## 2018-02-15 PROCEDURE — 99215 OFFICE O/P EST HI 40 MIN: CPT | Mod: S$GLB,,, | Performed by: INTERNAL MEDICINE

## 2018-02-15 PROCEDURE — 1126F AMNT PAIN NOTED NONE PRSNT: CPT | Mod: S$GLB,,, | Performed by: INTERNAL MEDICINE

## 2018-02-15 PROCEDURE — 99999 PR PBB SHADOW E&M-EST. PATIENT-LVL IV: CPT | Mod: PBBFAC,,, | Performed by: INTERNAL MEDICINE

## 2018-02-15 PROCEDURE — 3008F BODY MASS INDEX DOCD: CPT | Mod: S$GLB,,, | Performed by: INTERNAL MEDICINE

## 2018-02-15 PROCEDURE — 1159F MED LIST DOCD IN RCRD: CPT | Mod: S$GLB,,, | Performed by: INTERNAL MEDICINE

## 2018-02-15 NOTE — PROGRESS NOTES
Hematology/Oncology Office Note    HPI This is a 77 year odl  gentleman who comes for follow up of his lung cancer.  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 There seemed to be a left   hilar adenopathy as well.  Radiologist felt that he was unable to discriminate between the mass and the   lymphadenopathy. The patient had had a bronchoscopy by Dr. Roel Ernandez on   03/04/2006 that showed a partially obstructing airway in the anterior segment of  the left upper lobe with an endobronchial lesion in the anterior segment of the  left upper lobe. The specimen from the biopsy at the time of bronchoscopy was   reported as being a squamous cell carcinoma.  At the time of the surgery after the left lung was removed, the pathologist   indicated that this was a squamous cell carcinoma. It measured 3.8 cm. The   pathology indicated that this is extending into the bronchial resection margin of the lobe. This lobe was later on removed to complete the pneumonectomy   There was one lymph node positive for metastatic squamous cell carcinoma at the   AP window.  The patient was told that we would prefer to treat him with post-op simultaneous chemo/radiation.  He had [employer]i-[employer]. He started chemo/radiation therapy andreceived 6 weekly cycles of carbo/Taxol along with radiatioon.  After a month, he had a Ct scan and it showed stability   He then had 2 additional cyles of carbo/Taxol finishing in 9/27/2016.    He comes for follow up. Says he feels well. No significant SOB       Today's visit:  77-year-old gentleman followed by Dr. Eugenio Patel in the hematology/oncology clinic for squamous cell carcinoma the lung status post pneumonectomy followed by adjuvant chemotherapy and radiation.  Chemotherapy was completed on 9/27/2016 and he has no complaints today  and specifically denies shortness of breath, hemoptysis, weight loss, or chest pain.        Past Medical History:   Diagnosis Date    Anemia     Arthritis     Atrial fibrillation     CAD (coronary artery disease)     Cancer     lung cancer-Left    Cataract     ED (erectile dysfunction)     HTN (hypertension)     Hyperlipidemia     Myocardial infarction          Social History:      Family History: family history includes Cancer in his brother, mother, and sister; Cataracts in his father and mother; Esophageal cancer in his sister; Hypertension in his brother, father, and mother; Lung cancer in his mother; Pneumonia in his father.          HPI  Review of Systems   Constitutional: Negative for activity change, appetite change, fatigue, fever and unexpected weight change.   HENT: Negative for congestion, ear discharge, facial swelling, nosebleeds, rhinorrhea, sinus pressure, sneezing, sore throat, trouble swallowing and voice change.    Eyes: Negative for photophobia, pain, discharge, itching and visual disturbance.   Respiratory: Negative for apnea, cough and shortness of breath.    Cardiovascular: Negative for chest pain, palpitations and leg swelling.   Gastrointestinal: Negative for abdominal distention, abdominal pain, anal bleeding, blood in stool, constipation, diarrhea, nausea and vomiting.   Endocrine: Negative for cold intolerance, polyphagia and polyuria.   Genitourinary: Negative for decreased urine volume, difficulty urinating, dysuria, enuresis, frequency, genital sores, hematuria, testicular pain and urgency.   Musculoskeletal: Negative for arthralgias, back pain, gait problem, joint swelling, myalgias, neck pain and neck stiffness.   Skin: Negative for color change, pallor, rash and wound.   Allergic/Immunologic: Negative for environmental allergies.   Neurological: Negative for dizziness, syncope, facial asymmetry, speech difficulty, weakness, light-headedness, numbness and headaches.  "  Hematological: Negative for adenopathy. Does not bruise/bleed easily.   Psychiatric/Behavioral: Negative for agitation, confusion and hallucinations. The patient is not nervous/anxious and is not hyperactive.        Objective:       Vitals:    02/15/18 1338   BP: 120/60   BP Location: Right arm   Patient Position: Sitting   BP Method: Large (Manual)   Pulse: 70   Resp: 18   Temp: 98.5 °F (36.9 °C)   TempSrc: Oral   SpO2: (!) 94%   Weight: 83 kg (182 lb 15.7 oz)   Height: 5' 10" (1.778 m)     Physical Exam   Constitutional: He is oriented to person, place, and time. He appears well-developed and well-nourished. No distress.   HENT:   Head: Normocephalic and atraumatic.   Nose: Nose normal.   Mouth/Throat: Oropharynx is clear and moist. No oropharyngeal exudate.   Eyes: Conjunctivae and EOM are normal. Pupils are equal, round, and reactive to light. No scleral icterus.   Neck: Normal range of motion. Neck supple. No JVD present. No tracheal deviation present. No thyromegaly present.   Cardiovascular: Normal rate and regular rhythm.  Exam reveals no gallop and no friction rub.    No murmur heard.  Pulmonary/Chest: Effort normal and breath sounds normal. No stridor. No respiratory distress. He has no wheezes. He has no rales. He exhibits no tenderness.   Abdominal: Soft. Bowel sounds are normal. He exhibits no distension and no mass. There is no rebound.   Musculoskeletal: Normal range of motion. He exhibits no edema.   Lymphadenopathy:     He has no cervical adenopathy.   Neurological: He is alert and oriented to person, place, and time. No cranial nerve deficit. Coordination normal.   Skin: Skin is warm, dry and intact. Capillary refill takes less than 2 seconds. No abrasion, no bruising and no rash noted. He is not diaphoretic. No cyanosis or erythema. No pallor. Nails show no clubbing.   Psychiatric: He has a normal mood and affect. Thought content normal.   Vitals reviewed.      Lab Results   Component Value Date "    WBC 7.09 10/26/2017    HGB 13.4 (L) 10/26/2017    HCT 42.0 10/26/2017    MCV 92 10/26/2017     10/26/2017     Lab Results   Component Value Date    CREATININE 0.9 10/26/2017    BUN 12 10/26/2017     10/26/2017    K 4.5 10/26/2017     10/26/2017    CO2 29 10/26/2017     Lab Results   Component Value Date    ALT 11 10/26/2017    AST 14 10/26/2017    ALKPHOS 74 10/26/2017    BILITOT 0.5 10/26/2017           Assessment:       77-year-old gentleman followed by Dr. Eugenio Patel in the hematology/oncology clinic for squamous cell carcinoma the lung status post pneumonectomy followed by adjuvant chemotherapy and radiation.  The patient presents today for routine follow-up and has no complaints.  We will arrange for CT scans of chest abdomen pelvis to be performed in 3 months and he will follow-up with Dr. Patel thereafter to discuss results we will not make any changes to his medications today.    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

## 2018-02-16 ENCOUNTER — CLINICAL SUPPORT (OUTPATIENT)
Dept: INTERNAL MEDICINE | Facility: CLINIC | Age: 78
End: 2018-02-16
Payer: MEDICARE

## 2018-02-16 DIAGNOSIS — E29.1 HYPOGONADISM IN MALE: Primary | ICD-10-CM

## 2018-02-16 PROCEDURE — 99999 PR PBB SHADOW E&M-EST. PATIENT-LVL II: CPT | Mod: PBBFAC,,,

## 2018-02-16 PROCEDURE — 96372 THER/PROPH/DIAG INJ SC/IM: CPT | Mod: S$GLB,,, | Performed by: INTERNAL MEDICINE

## 2018-02-16 RX ADMIN — TESTOSTERONE CYPIONATE 200 MG: 200 INJECTION, SOLUTION INTRAMUSCULAR at 08:02

## 2018-02-16 NOTE — PROGRESS NOTES
After obtaining consent, and per orders of Dr. Peter Teixeira, injection of Depo Testosterone 200mg given by Guadalupe Lane. Patient instructed to remain in clinic for 20 minutes afterwards, and to report any adverse reaction to me immediately.    Guadalupe Lnae, LPN

## 2018-03-02 ENCOUNTER — OFFICE VISIT (OUTPATIENT)
Dept: URGENT CARE | Facility: CLINIC | Age: 78
End: 2018-03-02
Payer: MEDICARE

## 2018-03-02 VITALS
TEMPERATURE: 98 F | BODY MASS INDEX: 26.51 KG/M2 | WEIGHT: 185.19 LBS | OXYGEN SATURATION: 96 % | HEIGHT: 70 IN | DIASTOLIC BLOOD PRESSURE: 80 MMHG | SYSTOLIC BLOOD PRESSURE: 140 MMHG | HEART RATE: 76 BPM | RESPIRATION RATE: 18 BRPM

## 2018-03-02 DIAGNOSIS — S61.412A LACERATION OF LEFT HAND WITHOUT FOREIGN BODY, INITIAL ENCOUNTER: Primary | ICD-10-CM

## 2018-03-02 PROCEDURE — 99999 PR PBB SHADOW E&M-EST. PATIENT-LVL IV: CPT | Mod: PBBFAC,,, | Performed by: NURSE PRACTITIONER

## 2018-03-02 PROCEDURE — 3079F DIAST BP 80-89 MM HG: CPT | Mod: S$GLB,,, | Performed by: NURSE PRACTITIONER

## 2018-03-02 PROCEDURE — 12041 INTMD RPR N-HF/GENIT 2.5CM/<: CPT | Mod: S$GLB,,, | Performed by: NURSE PRACTITIONER

## 2018-03-02 PROCEDURE — 3077F SYST BP >= 140 MM HG: CPT | Mod: S$GLB,,, | Performed by: NURSE PRACTITIONER

## 2018-03-02 PROCEDURE — 99213 OFFICE O/P EST LOW 20 MIN: CPT | Mod: 25,S$GLB,, | Performed by: NURSE PRACTITIONER

## 2018-03-02 RX ORDER — LIDOCAINE HYDROCHLORIDE 10 MG/ML
1 INJECTION, SOLUTION EPIDURAL; INFILTRATION; INTRACAUDAL; PERINEURAL
Status: DISCONTINUED | OUTPATIENT
Start: 2018-03-02 | End: 2018-03-02

## 2018-03-02 RX ORDER — LIDOCAINE HYDROCHLORIDE 10 MG/ML
5 INJECTION, SOLUTION EPIDURAL; INFILTRATION; INTRACAUDAL; PERINEURAL ONCE
Status: DISCONTINUED | OUTPATIENT
Start: 2018-03-02 | End: 2018-03-02

## 2018-03-02 RX ORDER — LIDOCAINE HYDROCHLORIDE 10 MG/ML
5 INJECTION INFILTRATION; PERINEURAL
Status: COMPLETED | OUTPATIENT
Start: 2018-03-02 | End: 2018-03-02

## 2018-03-02 RX ADMIN — LIDOCAINE HYDROCHLORIDE 5 ML: 10 INJECTION INFILTRATION; PERINEURAL at 05:03

## 2018-03-02 NOTE — PATIENT INSTRUCTIONS
Extremity Laceration: Sutures, Staples, or Tape  A laceration is a cut through the skin. If it is deep, it may require stitches (sutures) or staples to close so it can heal. Minor cuts may be treated with surgical tape closures.   X-rays may be done if something may have entered the skin through the cut. You may also need a tetanus shot if you are not up to date on this vaccination.  Home care  · Follow the health care providers instructions on how to care for the cut.  · Wash your hands with soap and warm water before and after caring for your wound. This is to help prevent infection.  · Keep the wound clean and dry. If a bandage was applied and it becomes wet or dirty, replace it. Otherwise, leave it in place for the first 24 hours, then change it once a day or as directed.  · If sutures or staples were used, clean the wound daily:  · After removing the bandage, wash the area with soap and water. Use a wet cotton swab to loosen and remove any blood or crust that forms.  · After cleaning, keep the wound clean and dry. Talk with your doctor before applying any antibiotic ointment to the wound. Reapply the bandage.  · You may remove the bandage to shower as usual after the first 24 hours, but do not soak the area in water (no swimming) until the stitches or staples are removed.  · If surgical tape closures were used, keep the area clean and dry. If it becomes wet, blot it dry with a towel.  · The doctor may prescribe an antibiotic cream or ointment to prevent infection. Do not stop taking this medication until you have finished the prescribed course or the doctor tells you to stop. The doctor may also prescribe medications for pain. Follow the doctors instructions for taking these medications.  · Avoid activities that may reopen your wound.  Follow-up care  Follow up with your health care provider. Most skin wounds heal within ten days. However, an infection may sometimes occur despite proper treatment.  Therefore, check the wound daily for the signs of infection listed below. Stitches and staples should be removed within 7-14 days. If surgical tape closures were used, you may remove them after 10 days if they have not fallen off by then.   When to seek medical advice  Call your health care provider right away if any of these occur:  · Wound bleeding not controlled by direct pressure  · Signs of infection, including increasing pain in the wound, increasing wound redness or swelling, or pus or bad odor coming from the wound  · Fever of 100.4°F (38ºC) or higher or as directed by your healthcare provider  · Stitches or staples come apart or fall out or surgical tape falls off before 7 days  · Wound edges re-open  · Wound changes colors  · Numbness around the wound   · Decreased movement around the injured area  Date Last Reviewed: 6/14/2015  © 8717-7397 [employer]. [address] [place] [address], [place] [address]. All rights reserved. This information is not intended as a substitute for professional medical care. Always follow your healthcare professional's instructions.

## 2018-03-02 NOTE — PROGRESS NOTES
"Subjective:       Patient ID: Chai Murry is a 77 y.o. male.    Chief Complaint: Laceration    Patient presents to Urgent Care with concern of laceration to the left hand. The laceration occurred when attempting to cut a cabbage. A kitchen knife was used. Location is between the 1st and 2nd interdigital web. Pain is moderate. Site is actively bleeding in clinic. Last tetanus 2017      Laceration    The incident occurred less than 1 hour ago. The laceration is located on the left hand. The laceration is 3 cm in size. The laceration mechanism was a clean knife. The pain is moderate. The pain has been constant since onset. He reports no foreign bodies present. His tetanus status is UTD.       BP (!) 140/80   Pulse 76   Temp 98.1 °F (36.7 °C) (Tympanic)   Resp 18   Ht 5' 10" (1.778 m)   Wt 84 kg (185 lb 3 oz)   SpO2 96%   BMI 26.57 kg/m²     Review of Systems   Constitutional: Positive for activity change. Negative for appetite change, chills, diaphoresis, fatigue, fever and unexpected weight change.   HENT: Negative.    Eyes: Negative.    Respiratory: Negative for apnea, chest tightness, shortness of breath and stridor.    Cardiovascular: Negative for chest pain, palpitations and leg swelling.   Gastrointestinal: Negative.    Endocrine: Negative.    Genitourinary: Negative.    Musculoskeletal: Negative for arthralgias and myalgias.   Skin: Positive for color change and wound. Negative for pallor and rash.   Allergic/Immunologic: Negative.    Neurological: Negative for dizziness, facial asymmetry, light-headedness and headaches.   Hematological: Negative for adenopathy.   Psychiatric/Behavioral: Negative for agitation and behavioral problems.       Objective:      Physical Exam   Constitutional: He is oriented to person, place, and time. He appears well-developed and well-nourished. No distress.   HENT:   Head: Normocephalic and atraumatic.   Cardiovascular: Normal rate.    Pulmonary/Chest: Effort normal. No " respiratory distress.   Musculoskeletal: He exhibits tenderness.        Left hand: He exhibits tenderness and laceration.        Hands:  Linear laceration to left hand between 1 and 2nd interdigital web. Approximately 3cm   Neurological: He is alert and oriented to person, place, and time.   Skin: Skin is warm and dry. No rash noted. He is not diaphoretic.   Psychiatric: He has a normal mood and affect. His behavior is normal. Judgment and thought content normal.   Nursing note and vitals reviewed.      Assessment:       1. Laceration of left hand without foreign body, initial encounter        Plan:       Chai was seen today for laceration.    Diagnoses and all orders for this visit:    Laceration of left hand without foreign body, initial encounter  Other orders  -     lidocaine HCL 10 mg/ml (1%) injection 5 mL; Inject 5 mLs into the skin one time.  5 sutures placed, no complications, see procedure note  Follow up in 7 days for removal  Ok for neosporin at home  Home care guidelines given, keep wound clean.  If symptoms worsen or fail to improve with treatment, see your Primary Care Provider or go to the nearest Emergency Room.

## 2018-03-02 NOTE — PROCEDURES
"Laceration Repair  Date/Time: 3/2/2018 5:28 PM  Performed by: ELMIRA YANCEY  Authorized by: ELMIRA YANCEY   Consent Done: Yes  Consent: Verbal consent obtained. Written consent obtained.  Risks and benefits: risks, benefits and alternatives were discussed  Consent given by: patient  Patient understanding: patient states understanding of the procedure being performed  Patient consent: the patient's understanding of the procedure matches consent given  Procedure consent: procedure consent matches procedure scheduled  Relevant documents: relevant documents present and verified  Patient identity confirmed: , name and verbally with patient  Time out: Immediately prior to procedure a "time out" was called to verify the correct patient, procedure, equipment, support staff and site/side marked as required.  Body area: upper extremity  Location details: left hand  Laceration length: 2.5 cm  Foreign bodies: no foreign bodies  Tendon involvement: none  Nerve involvement: none  Vascular damage: no  Anesthesia: local infiltration    Anesthesia:  Local Anesthetic: lidocaine 1% without epinephrine  Anesthetic total: 5 mL  Patient sedated: no  Preparation: Patient was prepped and draped in the usual sterile fashion (wound cleaned w betadine and irrigated).  Irrigation solution: saline  Irrigation method: syringe  Amount of cleaning: extensive  Debridement: none  Degree of undermining: none  Skin closure: 5-0 nylon  Technique: simple  Approximation: close  Approximation difficulty: simple  Dressing: dressing applied and non-stick sterile dressing  Patient tolerance: Patient tolerated the procedure well with no immediate complications        "

## 2018-03-09 ENCOUNTER — CLINICAL SUPPORT (OUTPATIENT)
Dept: INTERNAL MEDICINE | Facility: CLINIC | Age: 78
End: 2018-03-09
Payer: MEDICARE

## 2018-03-09 ENCOUNTER — TELEPHONE (OUTPATIENT)
Dept: INTERNAL MEDICINE | Facility: CLINIC | Age: 78
End: 2018-03-09

## 2018-03-09 DIAGNOSIS — E29.1 HYPOGONADISM IN MALE: Primary | ICD-10-CM

## 2018-03-09 PROCEDURE — 99999 PR PBB SHADOW E&M-EST. PATIENT-LVL II: CPT | Mod: PBBFAC,,,

## 2018-03-09 PROCEDURE — 96372 THER/PROPH/DIAG INJ SC/IM: CPT | Mod: S$GLB,,, | Performed by: INTERNAL MEDICINE

## 2018-03-09 RX ORDER — TESTOSTERONE CYPIONATE 200 MG/ML
200 INJECTION, SOLUTION INTRAMUSCULAR
Status: COMPLETED | OUTPATIENT
Start: 2018-03-09 | End: 2018-08-23

## 2018-03-09 RX ADMIN — TESTOSTERONE CYPIONATE 200 MG: 200 INJECTION, SOLUTION INTRAMUSCULAR at 08:03

## 2018-03-21 ENCOUNTER — PATIENT MESSAGE (OUTPATIENT)
Dept: INTERNAL MEDICINE | Facility: CLINIC | Age: 78
End: 2018-03-21

## 2018-03-21 DIAGNOSIS — Z12.11 COLON CANCER SCREENING: Primary | ICD-10-CM

## 2018-03-23 RX ORDER — SODIUM, POTASSIUM,MAG SULFATES 17.5-3.13G
SOLUTION, RECONSTITUTED, ORAL ORAL
Qty: 354 ML | Refills: 0 | Status: ON HOLD | OUTPATIENT
Start: 2018-03-23 | End: 2018-04-17 | Stop reason: HOSPADM

## 2018-03-29 ENCOUNTER — CLINICAL SUPPORT (OUTPATIENT)
Dept: INTERNAL MEDICINE | Facility: CLINIC | Age: 78
End: 2018-03-29
Payer: MEDICARE

## 2018-03-29 DIAGNOSIS — E29.1 HYPOGONADISM IN MALE: Primary | ICD-10-CM

## 2018-03-29 PROCEDURE — 96372 THER/PROPH/DIAG INJ SC/IM: CPT | Mod: S$GLB,,, | Performed by: INTERNAL MEDICINE

## 2018-03-29 PROCEDURE — 99999 PR PBB SHADOW E&M-EST. PATIENT-LVL III: CPT | Mod: PBBFAC,,,

## 2018-03-29 RX ADMIN — TESTOSTERONE CYPIONATE 200 MG: 200 INJECTION, SOLUTION INTRAMUSCULAR at 08:03

## 2018-04-06 ENCOUNTER — PATIENT OUTREACH (OUTPATIENT)
Dept: ADMINISTRATIVE | Facility: HOSPITAL | Age: 78
End: 2018-04-06

## 2018-04-17 ENCOUNTER — ANESTHESIA EVENT (OUTPATIENT)
Dept: ENDOSCOPY | Facility: HOSPITAL | Age: 78
End: 2018-04-17
Payer: MEDICARE

## 2018-04-17 ENCOUNTER — SURGERY (OUTPATIENT)
Age: 78
End: 2018-04-17

## 2018-04-17 ENCOUNTER — ANESTHESIA (OUTPATIENT)
Dept: ENDOSCOPY | Facility: HOSPITAL | Age: 78
End: 2018-04-17
Payer: MEDICARE

## 2018-04-17 ENCOUNTER — HOSPITAL ENCOUNTER (OUTPATIENT)
Facility: HOSPITAL | Age: 78
Discharge: HOME | End: 2018-04-17
Attending: INTERNAL MEDICINE | Admitting: INTERNAL MEDICINE
Payer: MEDICARE

## 2018-04-17 VITALS
HEIGHT: 70 IN | OXYGEN SATURATION: 100 % | TEMPERATURE: 98 F | BODY MASS INDEX: 25.62 KG/M2 | DIASTOLIC BLOOD PRESSURE: 80 MMHG | RESPIRATION RATE: 17 BRPM | WEIGHT: 179 LBS | HEART RATE: 66 BPM | SYSTOLIC BLOOD PRESSURE: 127 MMHG

## 2018-04-17 DIAGNOSIS — Z12.11 SCREEN FOR COLON CANCER: ICD-10-CM

## 2018-04-17 PROCEDURE — G0105 COLORECTAL SCRN; HI RISK IND: HCPCS | Performed by: INTERNAL MEDICINE

## 2018-04-17 PROCEDURE — 37000009 HC ANESTHESIA EA ADD 15 MINS: Performed by: INTERNAL MEDICINE

## 2018-04-17 PROCEDURE — 25000003 PHARM REV CODE 250: Performed by: INTERNAL MEDICINE

## 2018-04-17 PROCEDURE — 63600175 PHARM REV CODE 636 W HCPCS: Performed by: NURSE ANESTHETIST, CERTIFIED REGISTERED

## 2018-04-17 PROCEDURE — G0105 COLORECTAL SCRN; HI RISK IND: HCPCS | Mod: ,,, | Performed by: INTERNAL MEDICINE

## 2018-04-17 PROCEDURE — 25000003 PHARM REV CODE 250: Performed by: NURSE ANESTHETIST, CERTIFIED REGISTERED

## 2018-04-17 PROCEDURE — 37000008 HC ANESTHESIA 1ST 15 MINUTES: Performed by: INTERNAL MEDICINE

## 2018-04-17 RX ORDER — SODIUM CHLORIDE, SODIUM LACTATE, POTASSIUM CHLORIDE, CALCIUM CHLORIDE 600; 310; 30; 20 MG/100ML; MG/100ML; MG/100ML; MG/100ML
INJECTION, SOLUTION INTRAVENOUS CONTINUOUS PRN
Status: DISCONTINUED | OUTPATIENT
Start: 2018-04-17 | End: 2018-04-17

## 2018-04-17 RX ORDER — PROPOFOL 10 MG/ML
INJECTION, EMULSION INTRAVENOUS
Status: DISCONTINUED | OUTPATIENT
Start: 2018-04-17 | End: 2018-04-17

## 2018-04-17 RX ORDER — SODIUM CHLORIDE, SODIUM LACTATE, POTASSIUM CHLORIDE, CALCIUM CHLORIDE 600; 310; 30; 20 MG/100ML; MG/100ML; MG/100ML; MG/100ML
INJECTION, SOLUTION INTRAVENOUS CONTINUOUS
Status: DISCONTINUED | OUTPATIENT
Start: 2018-04-17 | End: 2018-04-17 | Stop reason: HOSPADM

## 2018-04-17 RX ORDER — LIDOCAINE HCL/PF 100 MG/5ML
SYRINGE (ML) INTRAVENOUS
Status: DISCONTINUED | OUTPATIENT
Start: 2018-04-17 | End: 2018-04-17

## 2018-04-17 RX ADMIN — PROPOFOL 140 MG: 10 INJECTION, EMULSION INTRAVENOUS at 12:04

## 2018-04-17 RX ADMIN — PROPOFOL 30 MG: 10 INJECTION, EMULSION INTRAVENOUS at 12:04

## 2018-04-17 RX ADMIN — SODIUM CHLORIDE, SODIUM LACTATE, POTASSIUM CHLORIDE, AND CALCIUM CHLORIDE: 600; 310; 30; 20 INJECTION, SOLUTION INTRAVENOUS at 12:04

## 2018-04-17 RX ADMIN — SODIUM CHLORIDE, SODIUM LACTATE, POTASSIUM CHLORIDE, AND CALCIUM CHLORIDE: .6; .31; .03; .02 INJECTION, SOLUTION INTRAVENOUS at 10:04

## 2018-04-17 RX ADMIN — LIDOCAINE HYDROCHLORIDE 100 MG: 20 INJECTION, SOLUTION INTRAVENOUS at 12:04

## 2018-04-17 NOTE — ANESTHESIA RELEASE NOTE
"Anesthesia Release from PACU Note    Patient: Chai Murry    Procedure(s) Performed: Procedure(s) (LRB):  COLONOSCOPY (N/A)    Anesthesia type: MAC    Post pain: Adequate analgesia    Post assessment: no apparent anesthetic complications, tolerated procedure well and no evidence of recall    Last Vitals:   Visit Vitals  /65 (BP Location: Left arm, Patient Position: Lying)   Pulse (!) 9   Temp 36.6 °C (97.9 °F) (Oral)   Resp 18   Ht 5' 10" (1.778 m)   Wt 81.2 kg (179 lb)   SpO2 97%   BMI 25.68 kg/m²       Post vital signs: stable    Level of consciousness: awake and alert     Nausea/Vomiting: no nausea/no vomiting    Complications: none    Airway Patency: patent    Respiratory: unassisted, spontaneous ventilation, room air    Cardiovascular: stable    Hydration: euvolemic  "

## 2018-04-17 NOTE — TRANSFER OF CARE
"Anesthesia Transfer of Care Note    Patient: Chai Murry    Procedure(s) Performed: Procedure(s) (LRB):  COLONOSCOPY (N/A)    Patient location: PACU    Anesthesia Type: MAC    Transport from OR: Transported from OR on room air with adequate spontaneous ventilation    Post pain: adequate analgesia    Post assessment: no apparent anesthetic complications    Post vital signs: stable    Level of consciousness: sedated    Nausea/Vomiting: no nausea/vomiting    Complications: none    Transfer of care protocol was followed      Last vitals:   Visit Vitals  /65 (BP Location: Left arm, Patient Position: Lying)   Pulse (!) 9   Temp 36.6 °C (97.9 °F) (Oral)   Resp 18   Ht 5' 10" (1.778 m)   Wt 81.2 kg (179 lb)   SpO2 97%   BMI 25.68 kg/m²     "

## 2018-04-17 NOTE — ANESTHESIA PREPROCEDURE EVALUATION
04/17/2018  Chai Murry is a 78 y.o., male.    Anesthesia Evaluation    I have reviewed the Patient Summary Reports.    I have reviewed the Nursing Notes.   I have reviewed the Medications.     Review of Systems  Anesthesia Hx:  No problems with previous Anesthesia    Social:  Former Smoker, Social Alcohol Use    Hematology/Oncology:         -- Anemia: --  Cancer in past history:  left Oncology Comments: 2016 lung cancer removed.     EENT/Dental:EENT/Dental Normal   Cardiovascular:   Hypertension, well controlled Past MI CAD asymptomatic CABG/stent  hyperlipidemia 2007 MI  x3 stents 2007  Normal sinus rhythm  Normal ECG  No previous ECGs available  Confirmed by MICHELLE MURILLO MD ([id number]) on 3/2/2016 4:25:50 PM   Pulmonary:   COPD Left lung removed.   Hepatic/GI:   Bowel Prep. 0600 last drink of fluid.   Neurological:  Neurology Normal    Endocrine:  Endocrine Normal    Dermatological:  Skin Normal    Psych:   Psychiatric History          Physical Exam  General:  Well nourished    Airway/Jaw/Neck:  Airway Findings: Mallampati: III                Anesthesia Plan  Type of Anesthesia, risks & benefits discussed:  Anesthesia Type:  MAC  Patient's Preference:   Intra-op Monitoring Plan:   Intra-op Monitoring Plan Comments:   Post Op Pain Control Plan:   Post Op Pain Control Plan Comments:   Induction:   IV  Beta Blocker:  Patient is not currently on a Beta-Blocker (No further documentation required).       Informed Consent: Patient understands risks and agrees with Anesthesia plan.  Questions answered. Anesthesia consent signed with patient.  ASA Score: 2     Day of Surgery Review of History & Physical: I have interviewed and examined the patient. I have reviewed the patient's H&P dated: 04/17/18. There are no significant changes.  H&P update referred to the provider.         Ready For Surgery From Anesthesia  Perspective.

## 2018-04-17 NOTE — H&P
Short Stay Endoscopy History and Physical    PCP - Peter Teixeira MD    Procedure - Colonoscopy  ASA - II  Mallampati - per anesthesia  History of Anesthesia problems - no  Family history Anesthesia problems -  no     HPI:  This is a 78 y.o. male here for evaluation of :  Screening for colon cancer    Average Risk Screening:no  Family history of colon cancer: No  History of polyps: yes  Anemia: No  Blood in stools: No  Diarrhea: No  Abdominal Pain: No    Review of Systems:  CONSTITUTIONAL: Denies weight change,  fatigue, fevers, chills, night sweats.  CARDIOVASCULAR: Denies chest pain, shortness of breath, orthopnea and edema.  RESPIRATORY: Denies cough, hemoptysis, dyspnea, and wheezing.  GI: See HPI.    Medical History:  Past Medical History:   Diagnosis Date    Anemia     Arthritis     Atrial fibrillation     CAD (coronary artery disease)     Cancer     lung cancer-Left    Cataract     ED (erectile dysfunction)     HTN (hypertension)     Hyperlipidemia     Myocardial infarction        Surgical History:   Past Surgical History:   Procedure Laterality Date    APPENDECTOMY      cardiac stents      CATARACT EXTRACTION W/  INTRAOCULAR LENS IMPLANT Right 9-3-14    CHOLECYSTECTOMY      infected stomach gland excision      LUNG REMOVAL, TOTAL Left 04/2016    lung cancer left upper lobe    SKIN BIOPSY Left     arm       Family History:   Family History   Problem Relation Age of Onset    Esophageal cancer Sister     Cancer Sister     Lung cancer Mother     Cancer Mother     Cataracts Mother     Hypertension Mother     Pneumonia Father     Cataracts Father     Hypertension Father     Cancer Brother     Hypertension Brother     Blindness Neg Hx     Diabetes Neg Hx     Glaucoma Neg Hx     Macular degeneration Neg Hx     Retinal detachment Neg Hx     Strabismus Neg Hx     Stroke Neg Hx     Thyroid disease Neg Hx        Social History:   Social History   Substance Use Topics    Smoking  status: Former Smoker     Packs/day: 1.00     Years: 50.00     Quit date: 8/12/2005    Smokeless tobacco: Never Used    Alcohol use No       Allergies: Reviewed.    Medications:  No current facility-administered medications on file prior to encounter.      Current Outpatient Prescriptions on File Prior to Encounter   Medication Sig Dispense Refill    amlodipine (NORVASC) 5 MG tablet Take 5 mg by mouth every evening.       aspirin (ECOTRIN) 81 MG EC tablet Take 81 mg by mouth once daily.      fenofibric acid (FIBRICOR) 135 mg CpDR TAKE 1 CAPSULE BY MOUTH EVERY DAY 30 capsule 11    levocetirizine (XYZAL) 5 MG tablet Take 5 mg by mouth every evening.      lisinopril (PRINIVIL,ZESTRIL) 40 MG tablet Take 40 mg by mouth Daily.       simvastatin (ZOCOR) 40 MG tablet Take 1 tablet (40 mg total) by mouth every evening. 30 tablet 11    SOTALOL AF 80 mg tablet TAKE 1 TABLET BY MOUTH TWICE A DAY 60 tablet 11    WELCHOL 625 mg tablet TAKE 2 TABLETS BY MOUTH TWICE A  tablet 9    albuterol-ipratropium 2.5mg-0.5mg/3mL (DUO-NEB) 0.5 mg-3 mg(2.5 mg base)/3 mL nebulizer solution Take 3 mLs by nebulization every 6 (six) hours as needed for Shortness of Breath. 120 vial 11    hydrochlorothiazide (HYDRODIURIL) 25 MG tablet Take 25 mg by mouth daily as needed.      ipratropium-albuterol (COMBIVENT RESPIMAT)  mcg/actuation inhaler Inhale 1 puff into the lungs every 6 (six) hours as needed for Shortness of Breath. 4 g 11    potassium 99 mg Tab Take 99 mg by mouth. Pt takes when he takes his Lasix         Physical Exam:  Vital Signs:   Vitals:    04/17/18 1036   BP: (!) 174/71   Pulse: 78   Resp: 18   Temp: 97.9 °F (36.6 °C)     General Appearance: Well appearing in no acute distress  ENT: OP clear  Chest: CTA B  CV: RRR, no m/r/g  Abd: s/nt/nd/nabs  Ext: no edema    Labs:  Lab Results   Component Value Date    WBC 7.09 10/26/2017    HGB 13.4 (L) 10/26/2017    HCT 42.0 10/26/2017    MCV 92 10/26/2017      10/26/2017     Lab Results   Component Value Date    INR 1.1 03/04/2016     No results found for: IRON, TIBC, FERRITIN, SATURATEDIRO      IMPRESSION:  Patient Active Problem List   Diagnosis    CAD (coronary artery disease)    Hyperlipidemia    Essential hypertension    Anemia    ED (erectile dysfunction)    Cataract    Amblyopia    History of cancer of upper lobe bronchus or lung    Chronic obstructive pulmonary disease    Malignant neoplasm of upper lobe of left lung    s/p left pnuemonectomy for KAYLI [facility]    Asbestos-induced pleural plaque    Reactive depression    Hx of cancer of lung    Insomnia    Chronic atrial fibrillation    Screen for colon cancer       Colon  polyps  Plan:  I have explained the risks and benefits of colonoscopy to the patient including but not limited to bleeding, perforation, infection, and death. The patient wishes to proceed with colonoscopy.

## 2018-04-17 NOTE — ANESTHESIA POSTPROCEDURE EVALUATION
"Anesthesia Post Evaluation    Patient: Chai Murry    Procedure(s) Performed: Procedure(s) (LRB):  COLONOSCOPY (N/A)    Final Anesthesia Type: MAC  Patient location during evaluation: PACU  Patient participation: Yes- Able to Participate  Level of consciousness: awake and alert and oriented  Post-procedure vital signs: reviewed and stable  Pain management: adequate  Airway patency: patent  PONV status at discharge: No PONV  Anesthetic complications: no      Cardiovascular status: blood pressure returned to baseline  Respiratory status: unassisted, spontaneous ventilation and room air  Hydration status: euvolemic  Follow-up not needed.        Visit Vitals  /65 (BP Location: Left arm, Patient Position: Lying)   Pulse (!) 9   Temp 36.6 °C (97.9 °F) (Oral)   Resp 18   Ht 5' 10" (1.778 m)   Wt 81.2 kg (179 lb)   SpO2 97%   BMI 25.68 kg/m²       Pain/Brendan Score: Pain Assessment Performed: Yes (4/17/2018 10:35 AM)  Presence of Pain: denies (4/17/2018 10:35 AM)      "

## 2018-04-17 NOTE — PROVATION PATIENT INSTRUCTIONS
Discharge Summary/Instructions after an Endoscopic Procedure  Patient Name: Chai Murry  Patient MRN: [id number]  Patient YOB: 1940 Tuesday, April 17, 2018 Dionne Doan MD  RESTRICTIONS:  During your procedure today, you received medications for sedation.  These   medications may affect your judgment, balance and coordination.  Therefore,   for 24 hours, you have the following restrictions:   - DO NOT drive a car, operate machinery, make legal/financial decisions,   sign important papers or drink alcohol.    ACTIVITY:  The following day: return to full activity including work, except no heavy   lifting, straining or running for 3 days if polyps were removed.  DIET:  Eat and drink normally unless instructed otherwise.     TREATMENT FOR COMMON SIDE EFFECTS:  - Mild abdominal pain, nausea, belching, bloating or excessive gas:  rest,   eat lightly and use a heating pad.  - Sore Throat: treat with throat lozenges and/or gargle with warm salt   water.  - Because air was used during the procedure, expelling large amounts of air   from your rectum or belching is normal.  - If a bowel prep was taken, you may not have a bowel movement for 1-3 days.    This is normal.  SYMPTOMS TO WATCH FOR AND REPORT TO YOUR PHYSICIAN:  1. Abdominal pain or bloating, other than gas cramps.  2. Chest pain.  3. Back pain.  4. Signs of infection such as: chills or fever occurring within 24 hours   after the procedure.  5. Rectal bleeding, which would show as bright red, maroon, or black stools.   (A tablespoon of blood from the rectum is not serious, especially if   hemorrhoids are present.)  6. Vomiting.  7. Weakness or dizziness.  GO DIRECTLY TO THE NEAREST EMERGENCY ROOM IF YOU HAVE ANY OF THE FOLLOWING:      Difficulty breathing              Chills and/or fever over 101 F   Persistent vomiting and/or vomiting blood   Severe abdominal pain   Severe chest pain   Black, tarry stools   Bleeding- more than one tablespoon   Any  other symptom or condition that you feel may need urgent attention  Your doctor recommends these additional instructions:  If any biopsies were taken, your doctors clinic will contact you in 1 to 2   weeks with any results.  - Patient has a contact number available for emergencies.  The signs and   symptoms of potential delayed complications were discussed with the   patient.  Return to normal activities tomorrow.  Written discharge   instructions were provided to the patient.   - Resume previous diet.   - Continue present medications.   - Repeat colonoscopy in 1 year because the bowel preparation was suboptimal.     - Return to primary care physician as previously scheduled.   - Discharge patient to home (with escort).  For questions, problems or results please call your physician Dionne Doan MD at Work:  [phone]  If you have any questions about the above instructions, call the GI   department at [phone] or call the endoscopy unit at [phone]   from 7am until 3 pm.  [facility] [place], EMERGENCY ROOM PHONE NUMBER:   [phone]  IF A COMPLICATION OR EMERGENCY SITUATION ARISES AND YOU ARE UNABLE TO REACH   YOUR PHYSICIAN - GO DIRECTLY TO THE EMERGENCY ROOM.  I have read or have had read to me these discharge instructions for my   procedure and have received a written copy.  I understand these   instructions and will follow-up with my physician if I have any questions.     __________________________________       _____________________________________  Nurse Signature                                          Patient/Designated   Responsible Party Signature  Dionne Doan MD  4/17/2018 12:33:10 PM  This report has been verified and signed electronically.

## 2018-04-17 NOTE — DISCHARGE INSTRUCTIONS

## 2018-04-19 ENCOUNTER — CLINICAL SUPPORT (OUTPATIENT)
Dept: INTERNAL MEDICINE | Facility: CLINIC | Age: 78
End: 2018-04-19
Payer: MEDICARE

## 2018-04-19 DIAGNOSIS — E29.1 HYPOGONADISM IN MALE: Primary | ICD-10-CM

## 2018-04-19 PROCEDURE — 96372 THER/PROPH/DIAG INJ SC/IM: CPT | Mod: S$GLB,,, | Performed by: INTERNAL MEDICINE

## 2018-04-19 PROCEDURE — 99999 PR PBB SHADOW E&M-EST. PATIENT-LVL II: CPT | Mod: PBBFAC,,,

## 2018-04-19 RX ADMIN — TESTOSTERONE CYPIONATE 200 MG: 200 INJECTION, SOLUTION INTRAMUSCULAR at 08:04

## 2018-04-24 ENCOUNTER — PATIENT OUTREACH (OUTPATIENT)
Dept: ADMINISTRATIVE | Facility: HOSPITAL | Age: 78
End: 2018-04-24

## 2018-05-02 ENCOUNTER — TELEPHONE (OUTPATIENT)
Dept: HEMATOLOGY/ONCOLOGY | Facility: CLINIC | Age: 78
End: 2018-05-02

## 2018-05-02 DIAGNOSIS — Z12.5 SCREENING FOR PROSTATE CANCER: ICD-10-CM

## 2018-05-02 DIAGNOSIS — Z90.2 STATUS POST PNEUMONECTOMY: Primary | ICD-10-CM

## 2018-05-02 DIAGNOSIS — E78.49 OTHER HYPERLIPIDEMIA: ICD-10-CM

## 2018-05-02 NOTE — TELEPHONE ENCOUNTER
----- Message from Beatrice Galaviz sent at 5/2/2018  2:00 PM CDT -----  Patient missed his lab appt on yesterday and is requesting a new appt for tomorrow. Please adv/call [phone].[email or web address]

## 2018-05-02 NOTE — TELEPHONE ENCOUNTER
I spoke with patient.  Patient has lab booked for the day of his CT Scan no need to reschedule lab for Dr Patel. I advised the patient that some of the lab was for Dr Teixeira.  I told him I would send a message for his office to get orders and reschedule.

## 2018-05-03 ENCOUNTER — LAB VISIT (OUTPATIENT)
Dept: LAB | Facility: HOSPITAL | Age: 78
End: 2018-05-03
Attending: INTERNAL MEDICINE
Payer: MEDICARE

## 2018-05-03 DIAGNOSIS — C34.12 MALIGNANT NEOPLASM OF UPPER LOBE OF LEFT LUNG: ICD-10-CM

## 2018-05-03 DIAGNOSIS — J43.1 PANLOBULAR EMPHYSEMA: ICD-10-CM

## 2018-05-03 LAB
ALBUMIN SERPL BCP-MCNC: 3.5 G/DL
ALP SERPL-CCNC: 65 U/L
ALT SERPL W/O P-5'-P-CCNC: 11 U/L
ANION GAP SERPL CALC-SCNC: 8 MMOL/L
AST SERPL-CCNC: 17 U/L
BASOPHILS # BLD AUTO: 0.07 K/UL
BASOPHILS NFR BLD: 1.1 %
BILIRUB SERPL-MCNC: 0.4 MG/DL
BUN SERPL-MCNC: 21 MG/DL
CALCIUM SERPL-MCNC: 9.7 MG/DL
CHLORIDE SERPL-SCNC: 106 MMOL/L
CHOLEST SERPL-MCNC: 169 MG/DL
CHOLEST/HDLC SERPL: 3.9 {RATIO}
CO2 SERPL-SCNC: 28 MMOL/L
COMPLEXED PSA SERPL-MCNC: 0.27 NG/ML
CREAT SERPL-MCNC: 1.1 MG/DL
DIFFERENTIAL METHOD: ABNORMAL
EOSINOPHIL # BLD AUTO: 0.7 K/UL
EOSINOPHIL NFR BLD: 11.1 %
ERYTHROCYTE [DISTWIDTH] IN BLOOD BY AUTOMATED COUNT: 15.5 %
EST. GFR  (AFRICAN AMERICAN): >60 ML/MIN/1.73 M^2
EST. GFR  (NON AFRICAN AMERICAN): >60 ML/MIN/1.73 M^2
GLUCOSE SERPL-MCNC: 90 MG/DL
HCT VFR BLD AUTO: 45.2 %
HDLC SERPL-MCNC: 43 MG/DL
HDLC SERPL: 25.4 %
HGB BLD-MCNC: 13 G/DL
IMM GRANULOCYTES # BLD AUTO: 0.02 K/UL
IMM GRANULOCYTES NFR BLD AUTO: 0.3 %
LDLC SERPL CALC-MCNC: 107.2 MG/DL
LYMPHOCYTES # BLD AUTO: 1 K/UL
LYMPHOCYTES NFR BLD: 15.1 %
MCH RBC QN AUTO: 28.3 PG
MCHC RBC AUTO-ENTMCNC: 28.8 G/DL
MCV RBC AUTO: 99 FL
MONOCYTES # BLD AUTO: 0.6 K/UL
MONOCYTES NFR BLD: 8.9 %
NEUTROPHILS # BLD AUTO: 4.2 K/UL
NEUTROPHILS NFR BLD: 63.5 %
NONHDLC SERPL-MCNC: 126 MG/DL
NRBC BLD-RTO: 0 /100 WBC
PLATELET # BLD AUTO: 223 K/UL
PMV BLD AUTO: 11.4 FL
POTASSIUM SERPL-SCNC: 4.8 MMOL/L
PROT SERPL-MCNC: 7.1 G/DL
RBC # BLD AUTO: 4.59 M/UL
SODIUM SERPL-SCNC: 142 MMOL/L
TRIGL SERPL-MCNC: 94 MG/DL
WBC # BLD AUTO: 6.55 K/UL

## 2018-05-03 PROCEDURE — 36415 COLL VENOUS BLD VENIPUNCTURE: CPT | Mod: PO

## 2018-05-03 PROCEDURE — 84153 ASSAY OF PSA TOTAL: CPT

## 2018-05-03 PROCEDURE — 80061 LIPID PANEL: CPT

## 2018-05-03 PROCEDURE — 85025 COMPLETE CBC W/AUTO DIFF WBC: CPT

## 2018-05-03 PROCEDURE — 80053 COMPREHEN METABOLIC PANEL: CPT

## 2018-05-04 NOTE — PROGRESS NOTES
Here are the results of your recent labs.  We will review them in detail at your follow up visit.    Sincerely,    Peter Teixeira M.D.        If you would like to review your experience with Dr. Teixeira or [facility], please follow the link below:    [email or web address]

## 2018-05-10 ENCOUNTER — CLINICAL SUPPORT (OUTPATIENT)
Dept: INTERNAL MEDICINE | Facility: CLINIC | Age: 78
End: 2018-05-10
Payer: MEDICARE

## 2018-05-10 ENCOUNTER — OFFICE VISIT (OUTPATIENT)
Dept: INTERNAL MEDICINE | Facility: CLINIC | Age: 78
End: 2018-05-10
Payer: MEDICARE

## 2018-05-10 VITALS
BODY MASS INDEX: 26.35 KG/M2 | WEIGHT: 184.06 LBS | HEIGHT: 70 IN | SYSTOLIC BLOOD PRESSURE: 122 MMHG | HEART RATE: 72 BPM | DIASTOLIC BLOOD PRESSURE: 74 MMHG | TEMPERATURE: 98 F

## 2018-05-10 DIAGNOSIS — I10 ESSENTIAL HYPERTENSION: ICD-10-CM

## 2018-05-10 DIAGNOSIS — E29.1 HYPOGONADISM IN MALE: Primary | ICD-10-CM

## 2018-05-10 DIAGNOSIS — E78.49 OTHER HYPERLIPIDEMIA: ICD-10-CM

## 2018-05-10 DIAGNOSIS — I25.10 CORONARY ARTERY DISEASE INVOLVING NATIVE CORONARY ARTERY OF NATIVE HEART WITHOUT ANGINA PECTORIS: ICD-10-CM

## 2018-05-10 DIAGNOSIS — Z00.00 ROUTINE GENERAL MEDICAL EXAMINATION AT HEALTH CARE FACILITY: Primary | ICD-10-CM

## 2018-05-10 DIAGNOSIS — C34.12 MALIGNANT NEOPLASM OF UPPER LOBE OF LEFT LUNG: ICD-10-CM

## 2018-05-10 DIAGNOSIS — J43.1 PANLOBULAR EMPHYSEMA: ICD-10-CM

## 2018-05-10 PROBLEM — Z12.11 SCREEN FOR COLON CANCER: Status: RESOLVED | Noted: 2018-04-17 | Resolved: 2018-05-10

## 2018-05-10 PROCEDURE — 3074F SYST BP LT 130 MM HG: CPT | Mod: CPTII,S$GLB,, | Performed by: INTERNAL MEDICINE

## 2018-05-10 PROCEDURE — 99999 PR PBB SHADOW E&M-EST. PATIENT-LVL III: CPT | Mod: PBBFAC,,, | Performed by: INTERNAL MEDICINE

## 2018-05-10 PROCEDURE — 96372 THER/PROPH/DIAG INJ SC/IM: CPT | Mod: S$GLB,,, | Performed by: INTERNAL MEDICINE

## 2018-05-10 PROCEDURE — 3078F DIAST BP <80 MM HG: CPT | Mod: CPTII,S$GLB,, | Performed by: INTERNAL MEDICINE

## 2018-05-10 PROCEDURE — 99999 PR PBB SHADOW E&M-EST. PATIENT-LVL II: CPT | Mod: PBBFAC,,,

## 2018-05-10 PROCEDURE — 99397 PER PM REEVAL EST PAT 65+ YR: CPT | Mod: S$GLB,,, | Performed by: INTERNAL MEDICINE

## 2018-05-10 RX ADMIN — TESTOSTERONE CYPIONATE 200 MG: 200 INJECTION, SOLUTION INTRAMUSCULAR at 09:05

## 2018-05-10 NOTE — PROGRESS NOTES
"Subjective:       Patient ID: Chai Murry is a 78 y.o. male.    Chief Complaint: Follow-up    HPI  Patient is a 78-year-old male presented for updated physical exam review chronic health issues.  History of CAD, hypertension, hyperlipidemia, emphysema and a malignancy in his left upper lobe.  He indicates he's been doing very well.  He is taking his medications as prescribed.  We discussed his WelChol today.  He was wondering if he needs to continue on the WelChol having been on the simvastatin.  We talked about our options there and we will will probably discontinue the WelChol at this time.  Otherwise everything else seems be going well.  He is had his pneumonectomy and is continuing to follow with hematology oncology for his lung cancer.  Having seems be going well.  He has a CT scan scheduled for later this week and follow-up with oncology in about a week or so.  Hopefully we'll continue seeing good response and no further treatment be necessary on that.  Labs were reviewed.  Health maintenance needs were reviewed.    Review of Systems   Constitutional: Negative for fever and unexpected weight change.   HENT: Negative for hearing loss, postnasal drip and rhinorrhea.    Eyes: Negative for pain and visual disturbance.   Respiratory: Negative for cough, shortness of breath and wheezing.    Cardiovascular: Negative for chest pain and palpitations.   Gastrointestinal: Negative for constipation, diarrhea, nausea and vomiting.   Genitourinary: Negative for dysuria and hematuria.   Musculoskeletal: Negative for arthralgias, back pain, myalgias and neck stiffness.   Skin: Negative for pallor and rash.   Neurological: Negative for seizures, syncope and headaches.   Hematological: Negative for adenopathy.   Psychiatric/Behavioral: Negative for dysphoric mood. The patient is not nervous/anxious.        Objective:   /74   Pulse 72   Temp 97.8 °F (36.6 °C) (Tympanic)   Ht 5' 10" (1.778 m)   Wt 83.5 kg (184 lb 1.4 " oz)   BMI 26.41 kg/m²      Physical Exam   Constitutional: He is oriented to person, place, and time. He appears well-developed and well-nourished. No distress.   HENT:   Head: Normocephalic and atraumatic.   Mouth/Throat: Oropharynx is clear and moist.   Eyes: EOM are normal. Pupils are equal, round, and reactive to light.   Neck: Normal range of motion. Neck supple. No JVD present. No thyromegaly present.   Cardiovascular: Normal rate, regular rhythm, normal heart sounds and intact distal pulses.  Exam reveals no gallop and no friction rub.    No murmur heard.  Pulmonary/Chest: Effort normal and breath sounds normal. He has no wheezes. He has no rales.   Abdominal: Soft. Bowel sounds are normal. He exhibits no distension. There is no tenderness. There is no rebound and no guarding.   Musculoskeletal: Normal range of motion. He exhibits no edema.   Lymphadenopathy:     He has no cervical adenopathy.   Neurological: He is alert and oriented to person, place, and time. He has normal reflexes. No cranial nerve deficit.   Skin: Skin is warm and dry. No rash noted.   Psychiatric: He has a normal mood and affect. Judgment normal.   Vitals reviewed.      Lab Visit on 05/03/2018   Component Date Value    Sodium 05/03/2018 142     Potassium 05/03/2018 4.8     Chloride 05/03/2018 106     CO2 05/03/2018 28     Glucose 05/03/2018 90     BUN, Bld 05/03/2018 21     Creatinine 05/03/2018 1.1     Calcium 05/03/2018 9.7     Total Protein 05/03/2018 7.1     Albumin 05/03/2018 3.5     Total Bilirubin 05/03/2018 0.4     Alkaline Phosphatase 05/03/2018 65     AST 05/03/2018 17     ALT 05/03/2018 11     Anion Gap 05/03/2018 8     eGFR if African American 05/03/2018 >60.0     eGFR if non African Amer* 05/03/2018 >60.0     WBC 05/03/2018 6.55     RBC 05/03/2018 4.59*    Hemoglobin 05/03/2018 13.0*    Hematocrit 05/03/2018 45.2     MCV 05/03/2018 99*    MCH 05/03/2018 28.3     MCHC 05/03/2018 28.8*    RDW  05/03/2018 15.5*    Platelets 05/03/2018 223     MPV 05/03/2018 11.4     Immature Granulocytes 05/03/2018 0.3     Gran # (ANC) 05/03/2018 4.2     Immature Grans (Abs) 05/03/2018 0.02     Lymph # 05/03/2018 1.0     Mono # 05/03/2018 0.6     Eos # 05/03/2018 0.7*    Baso # 05/03/2018 0.07     nRBC 05/03/2018 0     Gran% 05/03/2018 63.5     Lymph% 05/03/2018 15.1*    Mono% 05/03/2018 8.9     Eosinophil% 05/03/2018 11.1*    Basophil% 05/03/2018 1.1     Differential Method 05/03/2018 Automated     Cholesterol 05/03/2018 169     Triglycerides 05/03/2018 94     HDL 05/03/2018 43     LDL Cholesterol 05/03/2018 107.2     HDL/Chol Ratio 05/03/2018 25.4     Total Cholesterol/HDL Ra* 05/03/2018 3.9     Non-HDL Cholesterol 05/03/2018 126     PSA, SCREEN 05/03/2018 0.27        Assessment:       1. Routine general medical examination at health care facility    2. Coronary artery disease involving native coronary artery of native heart without angina pectoris    3. Essential hypertension    4. Other hyperlipidemia    5. Panlobular emphysema    6. Malignant neoplasm of upper lobe of left lung        Plan:   Malignant neoplasm of upper lobe of left lung  Doing well at this time. Has follow up with Dr. Patel this month.  No changes at this time.    Essential hypertension  Blood pressure is under good control.  We will continue the current regimen.  Will work on regular aerobic exercise and a low salt diet.        Chronic obstructive pulmonary disease  Breathing is doing better. No issues at this time.  Stable.    CAD (coronary artery disease)  No chest pain, no limited exercise.  Stable.    Chai was seen today for follow-up.    Diagnoses and all orders for this visit:    Routine general medical examination at health care facility  Comments:  Focus on good health habits, low fat diet, regular exercise, seatbelt use, sunscreen use    Coronary artery disease involving native coronary artery of native heart  without angina pectoris  -     Lipid panel; Future    Essential hypertension    Other hyperlipidemia  -     Lipid panel; Future    Panlobular emphysema    Malignant neoplasm of upper lobe of left lung        Follow-up in about 4 months (around 9/10/2018).

## 2018-05-21 ENCOUNTER — TELEPHONE (OUTPATIENT)
Dept: RADIOLOGY | Facility: HOSPITAL | Age: 78
End: 2018-05-21

## 2018-05-23 ENCOUNTER — HOSPITAL ENCOUNTER (OUTPATIENT)
Dept: RADIOLOGY | Facility: HOSPITAL | Age: 78
Discharge: HOME | End: 2018-05-23
Attending: INTERNAL MEDICINE
Payer: MEDICARE

## 2018-05-23 DIAGNOSIS — C34.12 MALIGNANT NEOPLASM OF UPPER LOBE OF LEFT LUNG: ICD-10-CM

## 2018-05-23 DIAGNOSIS — Z90.2 STATUS POST PNEUMONECTOMY: ICD-10-CM

## 2018-05-23 DIAGNOSIS — Z85.118 HISTORY OF CANCER OF UPPER LOBE BRONCHUS OR LUNG: ICD-10-CM

## 2018-05-23 PROCEDURE — 74178 CT ABD&PLV WO CNTR FLWD CNTR: CPT | Mod: TC,PO

## 2018-05-23 PROCEDURE — 71260 CT THORAX DX C+: CPT | Mod: 26,,, | Performed by: RADIOLOGY

## 2018-05-23 PROCEDURE — 74178 CT ABD&PLV WO CNTR FLWD CNTR: CPT | Mod: 26,,, | Performed by: RADIOLOGY

## 2018-05-23 PROCEDURE — 25500020 PHARM REV CODE 255: Mod: PO | Performed by: INTERNAL MEDICINE

## 2018-05-23 PROCEDURE — 71260 CT THORAX DX C+: CPT | Mod: TC,PO

## 2018-05-23 RX ADMIN — IOHEXOL 100 ML: 350 INJECTION, SOLUTION INTRAVENOUS at 11:05

## 2018-05-23 RX ADMIN — IOHEXOL 30 ML: 350 INJECTION, SOLUTION INTRAVENOUS at 09:05

## 2018-05-24 ENCOUNTER — OFFICE VISIT (OUTPATIENT)
Dept: HEMATOLOGY/ONCOLOGY | Facility: CLINIC | Age: 78
End: 2018-05-24
Payer: MEDICARE

## 2018-05-24 VITALS
SYSTOLIC BLOOD PRESSURE: 140 MMHG | WEIGHT: 186.5 LBS | TEMPERATURE: 98 F | BODY MASS INDEX: 26.7 KG/M2 | OXYGEN SATURATION: 97 % | HEIGHT: 70 IN | DIASTOLIC BLOOD PRESSURE: 70 MMHG | HEART RATE: 69 BPM

## 2018-05-24 DIAGNOSIS — Z85.118 HISTORY OF CANCER OF UPPER LOBE BRONCHUS OR LUNG: Primary | ICD-10-CM

## 2018-05-24 DIAGNOSIS — J40 BRONCHITIS: ICD-10-CM

## 2018-05-24 PROCEDURE — 99999 PR PBB SHADOW E&M-EST. PATIENT-LVL III: CPT | Mod: PBBFAC,,, | Performed by: INTERNAL MEDICINE

## 2018-05-24 PROCEDURE — 3078F DIAST BP <80 MM HG: CPT | Mod: CPTII,S$GLB,, | Performed by: INTERNAL MEDICINE

## 2018-05-24 PROCEDURE — 99214 OFFICE O/P EST MOD 30 MIN: CPT | Mod: S$GLB,,, | Performed by: INTERNAL MEDICINE

## 2018-05-24 PROCEDURE — 3077F SYST BP >= 140 MM HG: CPT | Mod: CPTII,S$GLB,, | Performed by: INTERNAL MEDICINE

## 2018-05-24 RX ORDER — DOXYCYCLINE 100 MG/1
100 TABLET ORAL 2 TIMES DAILY
Qty: 20 TABLET | Refills: 0 | Status: ON HOLD | OUTPATIENT
Start: 2018-05-24 | End: 2018-06-14 | Stop reason: HOSPADM

## 2018-05-24 RX ORDER — FLUTICASONE PROPIONATE 50 MCG
1 SPRAY, SUSPENSION (ML) NASAL DAILY
COMMUNITY
End: 2019-01-24

## 2018-05-24 NOTE — PROGRESS NOTES
Subjective:       Patient ID: Chai Murry is a 78 y.o. male.    Chief Complaint: Follow-up    HPI This is a 78 year odl  gentleman who comes for follow up of his lung cancer.  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 There seemed to be a left   hilar adenopathy as well.  Radiologist felt that he was unable to discriminate between the mass and the   lymphadenopathy. The patient had had a bronchoscopy by Dr. Roel Ernandze on   03/04/2006 that showed a partially obstructing airway in the anterior segment of  the left upper lobe with an endobronchial lesion in the anterior segment of the  left upper lobe. The specimen from the biopsy at the time of bronchoscopy was   reported as being a squamous cell carcinoma.  At the time of the surgery after the left lung was removed, the pathologist   indicated that this was a squamous cell carcinoma. It measured 3.8 cm. The   pathology indicated that this is extending into the bronchial resection margin of the lobe. This lobe was later on removed to complete the pneumonectomy   There was one lymph node positive for metastatic squamous cell carcinoma at the   AP window.  The patient was told that we would prefer to treat him with post-op simultaneous chemo/radiation.  He had Medi-port. He started chemo/radiation therapy andreceived 6 weekly cycles of carbo/Taxol along with radiatioon.  After a month, he had a Ct scan and it showed stability   He then had 2 additional cyles of carbo/Taxol finishing in 9/27/2016.    He comes for follow up. Says he feels well except for the fact he has chronic production of greenish phleg   Medi-port was removed MEDICATIONS: See list.  SURGERIES: Appendectomy at age 3. Bilateral cataract surgery. Infected gland   removed from the abdomen, cholecystectomy, left  pneumonectomy..Medi-port placed and removed  SOCIAL HISTORY: He is  with three children. He lives in [facility]. He   used to smoke from age 14 To age 67 or so,, averaging a pack a day. Denies significant   drinking. He worked in a chemical plant for 40 years.  FAMILY HISTORY: Sister had cancer of the throat. Mother had cancer of the   lung. Father had prostate cancer. No diabetes. Paternal grandfather had a   heart attack.  PAST MEDICAL HISTORY:  1. Squamous cell carcinoma of the lung.  2. Tobacco use.  3. High blood pressure.  4. Arteriosclerotic cardiovascular disease, status post previous heart attacks.  5. Arthritis.  Review of Systems   Constitutional: Negative.  Negative for appetite change, fatigue and unexpected weight change.   Eyes: Negative.  Negative for visual disturbance.   Respiratory: Positive for cough. Negative for shortness of breath.    Cardiovascular: Negative.  Negative for chest pain.   Gastrointestinal: Negative for abdominal pain, diarrhea and nausea.   Genitourinary: Positive for frequency. Negative for hematuria.   Musculoskeletal: Negative.  Negative for back pain.   Skin: Negative.  Negative for rash.   Neurological: Negative.  Negative for headaches.   Hematological: Negative for adenopathy.   Psychiatric/Behavioral: Negative.  The patient is not nervous/anxious.        Objective:      Physical Exam   Constitutional: He is oriented to person, place, and time. He appears well-developed and well-nourished.   HENT:   Head: Normocephalic.   Mouth/Throat: No oropharyngeal exudate.   Eyes: Pupils are equal, round, and reactive to light.   Neck: No thyromegaly present.   Cardiovascular: Normal rate, regular rhythm and normal heart sounds.  Exam reveals no gallop.    No murmur heard.  Pulmonary/Chest: No respiratory distress. He has no wheezes. He has no rales.   Abdominal: Soft. Bowel sounds are normal. He exhibits no distension and no mass. There is no rebound and no guarding.    Musculoskeletal: Normal range of motion. He exhibits no edema.   Lymphadenopathy:     He has no cervical adenopathy.   Neurological: He is alert and oriented to person, place, and time.   Skin: Skin is warm and dry.   Psychiatric: He has a normal mood and affect. His behavior is normal.       Wt Readings from Last 3 Encounters:   05/24/18 84.6 kg (186 lb 8.2 oz)   05/10/18 83.5 kg (184 lb 1.4 oz)   04/17/18 81.2 kg (179 lb)     Temp Readings from Last 3 Encounters:   05/24/18 98.4 °F (36.9 °C) (Oral)   05/10/18 97.8 °F (36.6 °C) (Tympanic)   04/17/18 97.9 °F (36.6 °C) (Oral)     BP Readings from Last 3 Encounters:   05/24/18 (!) 140/70   05/10/18 122/74   04/17/18 127/80     Pulse Readings from Last 3 Encounters:   05/24/18 69   05/10/18 72   04/17/18 66       Assessment:       1. History of cancer of upper lobe bronchus or lung    2. Bronchitis        Plan:       Lab Results   Component Value Date    WBC 7.94 05/23/2018    HGB 13.5 (L) 05/23/2018    HCT 43.1 05/23/2018    MCV 92 05/23/2018     05/23/2018       Lab Results   Component Value Date    CREATININE 1.0 05/23/2018       CT scans shows no obvious evidence of recurrence  Will have him take Mucinex and doxycycline  100 mg po bid x 10 days  Let me know if no better, otherwise see me in six months with a cbc and a cmp

## 2018-05-31 ENCOUNTER — CLINICAL SUPPORT (OUTPATIENT)
Dept: INTERNAL MEDICINE | Facility: CLINIC | Age: 78
End: 2018-05-31
Payer: MEDICARE

## 2018-05-31 DIAGNOSIS — E29.1 HYPOGONADISM IN MALE: Primary | ICD-10-CM

## 2018-05-31 PROCEDURE — 99999 PR PBB SHADOW E&M-EST. PATIENT-LVL II: CPT | Mod: PBBFAC,,,

## 2018-05-31 PROCEDURE — 96372 THER/PROPH/DIAG INJ SC/IM: CPT | Mod: S$GLB,,, | Performed by: INTERNAL MEDICINE

## 2018-05-31 RX ADMIN — TESTOSTERONE CYPIONATE 200 MG: 200 INJECTION, SOLUTION INTRAMUSCULAR at 08:05

## 2018-06-13 ENCOUNTER — OFFICE VISIT (OUTPATIENT)
Dept: URGENT CARE | Facility: CLINIC | Age: 78
End: 2018-06-13
Payer: MEDICARE

## 2018-06-13 ENCOUNTER — HOSPITAL ENCOUNTER (OUTPATIENT)
Facility: HOSPITAL | Age: 78
Discharge: HOME | End: 2018-06-14
Attending: EMERGENCY MEDICINE | Admitting: INTERNAL MEDICINE
Payer: MEDICARE

## 2018-06-13 VITALS
HEART RATE: 64 BPM | WEIGHT: 182.56 LBS | SYSTOLIC BLOOD PRESSURE: 118 MMHG | DIASTOLIC BLOOD PRESSURE: 64 MMHG | BODY MASS INDEX: 26.14 KG/M2 | TEMPERATURE: 99 F | HEIGHT: 70 IN | RESPIRATION RATE: 18 BRPM | OXYGEN SATURATION: 100 %

## 2018-06-13 DIAGNOSIS — K52.9 ACUTE GASTROENTERITIS: ICD-10-CM

## 2018-06-13 DIAGNOSIS — N17.9 ARF (ACUTE RENAL FAILURE): ICD-10-CM

## 2018-06-13 DIAGNOSIS — R10.10 PAIN OF UPPER ABDOMEN: Primary | ICD-10-CM

## 2018-06-13 DIAGNOSIS — R11.2 NON-INTRACTABLE VOMITING WITH NAUSEA, UNSPECIFIED VOMITING TYPE: ICD-10-CM

## 2018-06-13 DIAGNOSIS — N17.9 ACUTE KIDNEY INJURY (NONTRAUMATIC): ICD-10-CM

## 2018-06-13 DIAGNOSIS — R10.13 EPIGASTRIC PAIN: Primary | ICD-10-CM

## 2018-06-13 LAB
ALBUMIN SERPL BCP-MCNC: 4.2 G/DL
ALP SERPL-CCNC: 67 U/L
ALT SERPL W/O P-5'-P-CCNC: 14 U/L
AMORPH CRY URNS QL MICRO: ABNORMAL
AMYLASE SERPL-CCNC: 62 U/L
ANION GAP SERPL CALC-SCNC: 12 MMOL/L
AST SERPL-CCNC: 24 U/L
BACTERIA #/AREA URNS HPF: ABNORMAL /HPF
BASOPHILS # BLD AUTO: 0.02 K/UL
BASOPHILS NFR BLD: 0.2 %
BILIRUB SERPL-MCNC: 0.6 MG/DL
BILIRUB UR QL STRIP: ABNORMAL
BUN SERPL-MCNC: 32 MG/DL
CALCIUM SERPL-MCNC: 10.2 MG/DL
CHLORIDE SERPL-SCNC: 103 MMOL/L
CLARITY UR: CLEAR
CO2 SERPL-SCNC: 28 MMOL/L
COLOR UR: YELLOW
CREAT SERPL-MCNC: 2.8 MG/DL
DIFFERENTIAL METHOD: ABNORMAL
EOSINOPHIL # BLD AUTO: 0.5 K/UL
EOSINOPHIL NFR BLD: 5 %
ERYTHROCYTE [DISTWIDTH] IN BLOOD BY AUTOMATED COUNT: 15.9 %
EST. GFR  (AFRICAN AMERICAN): 24 ML/MIN/1.73 M^2
EST. GFR  (NON AFRICAN AMERICAN): 21 ML/MIN/1.73 M^2
GLUCOSE SERPL-MCNC: 97 MG/DL
GLUCOSE UR QL STRIP: NEGATIVE
HCT VFR BLD AUTO: 46.5 %
HGB BLD-MCNC: 15.1 G/DL
HGB UR QL STRIP: ABNORMAL
HYALINE CASTS #/AREA URNS LPF: 7 /LPF
KETONES UR QL STRIP: ABNORMAL
LEUKOCYTE ESTERASE UR QL STRIP: NEGATIVE
LIPASE SERPL-CCNC: 8 U/L
LYMPHOCYTES # BLD AUTO: 1 K/UL
LYMPHOCYTES NFR BLD: 9.7 %
MCH RBC QN AUTO: 29.7 PG
MCHC RBC AUTO-ENTMCNC: 32.5 G/DL
MCV RBC AUTO: 91 FL
MICROSCOPIC COMMENT: ABNORMAL
MONOCYTES # BLD AUTO: 0.9 K/UL
MONOCYTES NFR BLD: 9.3 %
NEUTROPHILS # BLD AUTO: 7.4 K/UL
NEUTROPHILS NFR BLD: 75.8 %
NITRITE UR QL STRIP: NEGATIVE
PH UR STRIP: 6 [PH] (ref 5–8)
PLATELET # BLD AUTO: 237 K/UL
PMV BLD AUTO: 10.6 FL
POTASSIUM SERPL-SCNC: 4.8 MMOL/L
PROT SERPL-MCNC: 8 G/DL
PROT UR QL STRIP: ABNORMAL
RBC # BLD AUTO: 5.09 M/UL
RBC #/AREA URNS HPF: 5 /HPF (ref 0–4)
SODIUM SERPL-SCNC: 143 MMOL/L
SP GR UR STRIP: >=1.03 (ref 1–1.03)
SQUAMOUS #/AREA URNS HPF: 0 /HPF
URN SPEC COLLECT METH UR: ABNORMAL
UROBILINOGEN UR STRIP-ACNC: NEGATIVE EU/DL
WBC # BLD AUTO: 9.76 K/UL
WBC #/AREA URNS HPF: 3 /HPF (ref 0–5)

## 2018-06-13 PROCEDURE — 3078F DIAST BP <80 MM HG: CPT | Mod: CPTII,S$GLB,, | Performed by: NURSE PRACTITIONER

## 2018-06-13 PROCEDURE — 25000003 PHARM REV CODE 250: Performed by: EMERGENCY MEDICINE

## 2018-06-13 PROCEDURE — 82150 ASSAY OF AMYLASE: CPT

## 2018-06-13 PROCEDURE — 3074F SYST BP LT 130 MM HG: CPT | Mod: CPTII,S$GLB,, | Performed by: NURSE PRACTITIONER

## 2018-06-13 PROCEDURE — 36415 COLL VENOUS BLD VENIPUNCTURE: CPT

## 2018-06-13 PROCEDURE — 81000 URINALYSIS NONAUTO W/SCOPE: CPT

## 2018-06-13 PROCEDURE — 99213 OFFICE O/P EST LOW 20 MIN: CPT | Mod: S$GLB,,, | Performed by: NURSE PRACTITIONER

## 2018-06-13 PROCEDURE — 96361 HYDRATE IV INFUSION ADD-ON: CPT

## 2018-06-13 PROCEDURE — 80053 COMPREHEN METABOLIC PANEL: CPT

## 2018-06-13 PROCEDURE — G0378 HOSPITAL OBSERVATION PER HR: HCPCS

## 2018-06-13 PROCEDURE — C9113 INJ PANTOPRAZOLE SODIUM, VIA: HCPCS | Performed by: EMERGENCY MEDICINE

## 2018-06-13 PROCEDURE — 83690 ASSAY OF LIPASE: CPT

## 2018-06-13 PROCEDURE — 96375 TX/PRO/DX INJ NEW DRUG ADDON: CPT

## 2018-06-13 PROCEDURE — 96374 THER/PROPH/DIAG INJ IV PUSH: CPT

## 2018-06-13 PROCEDURE — 63600175 PHARM REV CODE 636 W HCPCS: Performed by: EMERGENCY MEDICINE

## 2018-06-13 PROCEDURE — 85025 COMPLETE CBC W/AUTO DIFF WBC: CPT

## 2018-06-13 PROCEDURE — 99285 EMERGENCY DEPT VISIT HI MDM: CPT | Mod: 25

## 2018-06-13 PROCEDURE — 99999 PR PBB SHADOW E&M-EST. PATIENT-LVL IV: CPT | Mod: PBBFAC,,, | Performed by: NURSE PRACTITIONER

## 2018-06-13 RX ORDER — MORPHINE SULFATE 4 MG/ML
4 INJECTION, SOLUTION INTRAMUSCULAR; INTRAVENOUS EVERY 4 HOURS PRN
Status: DISCONTINUED | OUTPATIENT
Start: 2018-06-14 | End: 2018-06-14 | Stop reason: HOSPADM

## 2018-06-13 RX ORDER — ONDANSETRON 2 MG/ML
4 INJECTION INTRAMUSCULAR; INTRAVENOUS EVERY 8 HOURS PRN
Status: DISCONTINUED | OUTPATIENT
Start: 2018-06-14 | End: 2018-06-14 | Stop reason: HOSPADM

## 2018-06-13 RX ORDER — SOTALOL HYDROCHLORIDE 80 MG/1
80 TABLET ORAL 2 TIMES DAILY
Status: DISCONTINUED | OUTPATIENT
Start: 2018-06-14 | End: 2018-06-14 | Stop reason: HOSPADM

## 2018-06-13 RX ORDER — ONDANSETRON 2 MG/ML
4 INJECTION INTRAMUSCULAR; INTRAVENOUS
Status: COMPLETED | OUTPATIENT
Start: 2018-06-13 | End: 2018-06-13

## 2018-06-13 RX ORDER — MORPHINE SULFATE 4 MG/ML
4 INJECTION, SOLUTION INTRAMUSCULAR; INTRAVENOUS
Status: COMPLETED | OUTPATIENT
Start: 2018-06-13 | End: 2018-06-13

## 2018-06-13 RX ORDER — SODIUM CHLORIDE 9 MG/ML
1000 INJECTION, SOLUTION INTRAVENOUS
Status: DISCONTINUED | OUTPATIENT
Start: 2018-06-13 | End: 2018-06-14

## 2018-06-13 RX ORDER — HYDRALAZINE HYDROCHLORIDE 20 MG/ML
10 INJECTION INTRAMUSCULAR; INTRAVENOUS EVERY 8 HOURS PRN
Status: DISCONTINUED | OUTPATIENT
Start: 2018-06-14 | End: 2018-06-14 | Stop reason: HOSPADM

## 2018-06-13 RX ORDER — IPRATROPIUM BROMIDE AND ALBUTEROL SULFATE 2.5; .5 MG/3ML; MG/3ML
3 SOLUTION RESPIRATORY (INHALATION) EVERY 4 HOURS PRN
Status: DISCONTINUED | OUTPATIENT
Start: 2018-06-14 | End: 2018-06-14 | Stop reason: HOSPADM

## 2018-06-13 RX ORDER — AMLODIPINE BESYLATE 5 MG/1
5 TABLET ORAL DAILY
Status: DISCONTINUED | OUTPATIENT
Start: 2018-06-14 | End: 2018-06-14

## 2018-06-13 RX ORDER — ACETAMINOPHEN 325 MG/1
650 TABLET ORAL EVERY 6 HOURS PRN
Status: DISCONTINUED | OUTPATIENT
Start: 2018-06-14 | End: 2018-06-14 | Stop reason: HOSPADM

## 2018-06-13 RX ORDER — MAG HYDROX/ALUMINUM HYD/SIMETH 200-200-20
30 SUSPENSION, ORAL (FINAL DOSE FORM) ORAL EVERY 6 HOURS PRN
Status: DISCONTINUED | OUTPATIENT
Start: 2018-06-14 | End: 2018-06-14 | Stop reason: HOSPADM

## 2018-06-13 RX ORDER — DIPHENHYDRAMINE HCL 25 MG
25 CAPSULE ORAL EVERY 6 HOURS PRN
Status: DISCONTINUED | OUTPATIENT
Start: 2018-06-14 | End: 2018-06-14 | Stop reason: HOSPADM

## 2018-06-13 RX ORDER — FLUTICASONE PROPIONATE 50 MCG
1 SPRAY, SUSPENSION (ML) NASAL DAILY
Status: DISCONTINUED | OUTPATIENT
Start: 2018-06-14 | End: 2018-06-14 | Stop reason: HOSPADM

## 2018-06-13 RX ORDER — MORPHINE SULFATE 4 MG/ML
2 INJECTION, SOLUTION INTRAMUSCULAR; INTRAVENOUS EVERY 4 HOURS PRN
Status: DISCONTINUED | OUTPATIENT
Start: 2018-06-14 | End: 2018-06-14 | Stop reason: HOSPADM

## 2018-06-13 RX ORDER — FAMOTIDINE 20 MG/1
20 TABLET, FILM COATED ORAL 2 TIMES DAILY
Status: DISCONTINUED | OUTPATIENT
Start: 2018-06-14 | End: 2018-06-14

## 2018-06-13 RX ORDER — SODIUM CHLORIDE 9 MG/ML
INJECTION, SOLUTION INTRAVENOUS CONTINUOUS
Status: DISCONTINUED | OUTPATIENT
Start: 2018-06-14 | End: 2018-06-14 | Stop reason: HOSPADM

## 2018-06-13 RX ADMIN — SODIUM CHLORIDE 1000 ML: 0.9 INJECTION, SOLUTION INTRAVENOUS at 10:06

## 2018-06-13 RX ADMIN — ONDANSETRON 4 MG: 2 INJECTION INTRAMUSCULAR; INTRAVENOUS at 10:06

## 2018-06-13 RX ADMIN — PANTOPRAZOLE SODIUM 40 MG: 40 INJECTION, POWDER, LYOPHILIZED, FOR SOLUTION INTRAVENOUS at 10:06

## 2018-06-13 RX ADMIN — MORPHINE SULFATE 4 MG: 4 INJECTION INTRAVENOUS at 10:06

## 2018-06-13 NOTE — PROGRESS NOTES
"Subjective:       Patient ID: Chai Murry is a 78 y.o. male.    Chief Complaint: Abdominal Pain and Emesis (x1)    Patient is presenting with severe abdominal pain , vomiting and feeling weak for 24 hours. No fever. Patient reports that he has had vomiting x 1 and sever upper abdominal pain for one day.       Review of Systems   Constitutional: Positive for activity change, appetite change, chills and fever.   HENT: Negative.    Respiratory: Negative.    Cardiovascular: Negative.    Gastrointestinal: Positive for abdominal distention, abdominal pain, nausea and vomiting.   Genitourinary: Negative.    Psychiatric/Behavioral: Negative.        Objective:      /64 (BP Location: Right arm, Patient Position: Sitting, BP Method: Large (Manual))   Pulse 64   Temp 98.6 °F (37 °C) (Tympanic)   Resp 18   Ht 5' 10" (1.778 m)   Wt 82.8 kg (182 lb 8.7 oz)   SpO2 100%   BMI 26.19 kg/m²   Physical Exam   Constitutional: He appears distressed.   Cardiovascular: Normal rate, regular rhythm, normal heart sounds and intact distal pulses.    Pulmonary/Chest: Effort normal and breath sounds normal.   Abdominal: He exhibits distension. There is tenderness. There is rebound and guarding.   Nursing note and vitals reviewed.      Assessment:     Upper Abd pain     Plan:     Patient was sent to ED for additional evaluation and possible  CT scan  "

## 2018-06-14 VITALS
TEMPERATURE: 97 F | WEIGHT: 182.31 LBS | RESPIRATION RATE: 18 BRPM | OXYGEN SATURATION: 97 % | BODY MASS INDEX: 26.1 KG/M2 | SYSTOLIC BLOOD PRESSURE: 141 MMHG | DIASTOLIC BLOOD PRESSURE: 68 MMHG | HEIGHT: 70 IN | HEART RATE: 62 BPM

## 2018-06-14 LAB
ANION GAP SERPL CALC-SCNC: 7 MMOL/L
ANION GAP SERPL CALC-SCNC: 8 MMOL/L
BASOPHILS # BLD AUTO: 0.03 K/UL
BASOPHILS NFR BLD: 0.5 %
BUN SERPL-MCNC: 31 MG/DL
BUN SERPL-MCNC: 31 MG/DL
CALCIUM SERPL-MCNC: 8.1 MG/DL
CALCIUM SERPL-MCNC: 8.3 MG/DL
CHLORIDE SERPL-SCNC: 108 MMOL/L
CHLORIDE SERPL-SCNC: 110 MMOL/L
CK SERPL-CCNC: 138 U/L
CO2 SERPL-SCNC: 23 MMOL/L
CO2 SERPL-SCNC: 24 MMOL/L
CREAT SERPL-MCNC: 1.9 MG/DL
CREAT SERPL-MCNC: 2.4 MG/DL
DIFFERENTIAL METHOD: ABNORMAL
EOSINOPHIL # BLD AUTO: 0.3 K/UL
EOSINOPHIL NFR BLD: 6 %
ERYTHROCYTE [DISTWIDTH] IN BLOOD BY AUTOMATED COUNT: 16.2 %
EST. GFR  (AFRICAN AMERICAN): 29 ML/MIN/1.73 M^2
EST. GFR  (AFRICAN AMERICAN): 38 ML/MIN/1.73 M^2
EST. GFR  (NON AFRICAN AMERICAN): 25 ML/MIN/1.73 M^2
EST. GFR  (NON AFRICAN AMERICAN): 33 ML/MIN/1.73 M^2
GLUCOSE SERPL-MCNC: 109 MG/DL
GLUCOSE SERPL-MCNC: 117 MG/DL
HCT VFR BLD AUTO: 39.3 %
HGB BLD-MCNC: 12 G/DL
LYMPHOCYTES # BLD AUTO: 0.9 K/UL
LYMPHOCYTES NFR BLD: 16.1 %
MAGNESIUM SERPL-MCNC: 1.9 MG/DL
MCH RBC QN AUTO: 28.3 PG
MCHC RBC AUTO-ENTMCNC: 30.5 G/DL
MCV RBC AUTO: 93 FL
MONOCYTES # BLD AUTO: 0.7 K/UL
MONOCYTES NFR BLD: 13 %
NEUTROPHILS # BLD AUTO: 3.7 K/UL
NEUTROPHILS NFR BLD: 64.4 %
PHOSPHATE SERPL-MCNC: 4.2 MG/DL
PLATELET # BLD AUTO: 195 K/UL
PMV BLD AUTO: 10.8 FL
POTASSIUM SERPL-SCNC: 4.4 MMOL/L
POTASSIUM SERPL-SCNC: 4.5 MMOL/L
RBC # BLD AUTO: 4.24 M/UL
SODIUM SERPL-SCNC: 139 MMOL/L
SODIUM SERPL-SCNC: 141 MMOL/L
TROPONIN I SERPL DL<=0.01 NG/ML-MCNC: <0.006 NG/ML
WBC # BLD AUTO: 5.7 K/UL

## 2018-06-14 PROCEDURE — 36415 COLL VENOUS BLD VENIPUNCTURE: CPT

## 2018-06-14 PROCEDURE — 82550 ASSAY OF CK (CPK): CPT

## 2018-06-14 PROCEDURE — 84484 ASSAY OF TROPONIN QUANT: CPT

## 2018-06-14 PROCEDURE — 25000242 PHARM REV CODE 250 ALT 637 W/ HCPCS: Performed by: INTERNAL MEDICINE

## 2018-06-14 PROCEDURE — 25000003 PHARM REV CODE 250: Performed by: NURSE PRACTITIONER

## 2018-06-14 PROCEDURE — 80048 BASIC METABOLIC PNL TOTAL CA: CPT

## 2018-06-14 PROCEDURE — 84100 ASSAY OF PHOSPHORUS: CPT

## 2018-06-14 PROCEDURE — 83735 ASSAY OF MAGNESIUM: CPT

## 2018-06-14 PROCEDURE — 25000003 PHARM REV CODE 250: Performed by: INTERNAL MEDICINE

## 2018-06-14 PROCEDURE — 93005 ELECTROCARDIOGRAM TRACING: CPT

## 2018-06-14 PROCEDURE — 93010 ELECTROCARDIOGRAM REPORT: CPT | Mod: ,,, | Performed by: INTERNAL MEDICINE

## 2018-06-14 PROCEDURE — 80048 BASIC METABOLIC PNL TOTAL CA: CPT | Mod: 91

## 2018-06-14 PROCEDURE — 25000003 PHARM REV CODE 250: Performed by: EMERGENCY MEDICINE

## 2018-06-14 PROCEDURE — G0378 HOSPITAL OBSERVATION PER HR: HCPCS

## 2018-06-14 PROCEDURE — 85025 COMPLETE CBC W/AUTO DIFF WBC: CPT

## 2018-06-14 RX ORDER — AMLODIPINE BESYLATE 5 MG/1
10 TABLET ORAL NIGHTLY
Qty: 60 TABLET | Refills: 0 | Status: ON HOLD | OUTPATIENT
Start: 2018-06-14 | End: 2019-08-24 | Stop reason: SDUPTHER

## 2018-06-14 RX ORDER — AMLODIPINE BESYLATE 5 MG/1
5 TABLET ORAL DAILY
Status: DISCONTINUED | OUTPATIENT
Start: 2018-06-14 | End: 2018-06-14 | Stop reason: HOSPADM

## 2018-06-14 RX ORDER — FAMOTIDINE 20 MG/1
20 TABLET, FILM COATED ORAL DAILY
Status: DISCONTINUED | OUTPATIENT
Start: 2018-06-15 | End: 2018-06-14 | Stop reason: HOSPADM

## 2018-06-14 RX ADMIN — FAMOTIDINE 20 MG: 20 TABLET ORAL at 09:06

## 2018-06-14 RX ADMIN — FAMOTIDINE 20 MG: 20 TABLET ORAL at 12:06

## 2018-06-14 RX ADMIN — FLUTICASONE PROPIONATE 50 MCG: 50 SPRAY, METERED NASAL at 09:06

## 2018-06-14 RX ADMIN — SODIUM CHLORIDE 1000 ML: 0.9 INJECTION, SOLUTION INTRAVENOUS at 12:06

## 2018-06-14 RX ADMIN — SODIUM CHLORIDE: 0.9 INJECTION, SOLUTION INTRAVENOUS at 11:06

## 2018-06-14 RX ADMIN — SODIUM CHLORIDE: 0.9 INJECTION, SOLUTION INTRAVENOUS at 01:06

## 2018-06-14 RX ADMIN — SOTALOL HYDROCHLORIDE 80 MG: 80 TABLET ORAL at 09:06

## 2018-06-14 RX ADMIN — AMLODIPINE BESYLATE 5 MG: 5 TABLET ORAL at 01:06

## 2018-06-14 NOTE — H&P
"[facility] Hospital Medicine  History & Physical    Patient Name: Chai Murry  MRN: [id number]  Admission Date: 6/13/2018  Attending Physician: Ramiro Barrios MD  Primary Care Provider: Peter Teixeira MD         Patient information was obtained from patient, spouse/SO, past medical records and ER records.     Subjective:     Principal Problem:Acute gastroenteritis    Chief Complaint:   Chief Complaint   Patient presents with    Abdominal Pain     sent from [facility] for further eval.  States lower abdominal pain.        HPI: Mr. Murry is a 77 y/o  male with h/o HTN, HLP, A.Fib on aspirin, presents to the ED c/o 1-2 days of abdominal pain associated with nausa/vomiting, without diarrhea. He feels "cramping" in the abdomen. CT abdomen reveals mildly distended central small bowel loops with the air-fluid levels and mesenteric edema.  Primary differential is a low-grade enteritis.  No evidence of abscess or free air. He denies any sick contacts or travle or outside food recently. Labs revela JOHANNA with creatinine 2.8 (baseline 1.0). Received 2 L NS in ED. Admitted for JOHANNA and suspected acute viral gastroenteritis.    Past Medical History:   Diagnosis Date    Anemia     Arthritis     Atrial fibrillation     CAD (coronary artery disease)     Cancer     lung cancer-Left    Cataract     Diverticulosis     ED (erectile dysfunction)     HTN (hypertension)     Hyperlipidemia     Myocardial infarction        Past Surgical History:   Procedure Laterality Date    APPENDECTOMY      cardiac stents      CATARACT EXTRACTION W/  INTRAOCULAR LENS IMPLANT Right 9-3-14    CHOLECYSTECTOMY      COLONOSCOPY N/A 4/17/2018    Procedure: COLONOSCOPY;  Surgeon: Dionne Doan MD;  Location: [facility];  Service: Endoscopy;  Laterality: N/A;    infected stomach gland excision      LUNG REMOVAL, TOTAL Left 04/2016    lung cancer left upper lobe    SKIN BIOPSY Left     arm       Review of " patient's allergies indicates:   Allergen Reactions    Atorvastatin      Other reaction(s): Muscle pain       Current Facility-Administered Medications on File Prior to Encounter   Medication    testosterone cypionate injection 200 mg     Current Outpatient Prescriptions on File Prior to Encounter   Medication Sig    albuterol-ipratropium 2.5mg-0.5mg/3mL (DUO-NEB) 0.5 mg-3 mg(2.5 mg base)/3 mL nebulizer solution Take 3 mLs by nebulization every 6 (six) hours as needed for Shortness of Breath.    amlodipine (NORVASC) 5 MG tablet Take 5 mg by mouth every evening.     aspirin (ECOTRIN) 81 MG EC tablet Take 81 mg by mouth once daily.    doxepin (SINEQUAN) 10 MG capsule Take 1 capsule (10 mg total) by mouth every evening.    doxycycline monohydrate 100 mg Tab Take 1 tablet (100 mg total) by mouth 2 (two) times daily.    fenofibric acid (FIBRICOR) 135 mg CpDR TAKE 1 CAPSULE BY MOUTH EVERY DAY    fluticasone (FLONASE) 50 mcg/actuation nasal spray 1 spray by Each Nare route once daily.    hydrochlorothiazide (HYDRODIURIL) 25 MG tablet Take 25 mg by mouth daily as needed.    ipratropium-albuterol (COMBIVENT RESPIMAT)  mcg/actuation inhaler Inhale 1 puff into the lungs every 6 (six) hours as needed for Shortness of Breath.    levocetirizine (XYZAL) 5 MG tablet Take 5 mg by mouth every evening.    lisinopril (PRINIVIL,ZESTRIL) 40 MG tablet Take 40 mg by mouth Daily.     potassium 99 mg Tab Take 99 mg by mouth. Pt takes when he takes his Lasix    simvastatin (ZOCOR) 40 MG tablet Take 1 tablet (40 mg total) by mouth every evening.    SOTALOL AF 80 mg tablet TAKE 1 TABLET BY MOUTH TWICE A DAY     Family History     Problem Relation (Age of Onset)    Cancer Sister, Mother, Brother    Cataracts Mother, Father    Esophageal cancer Sister    Hypertension Mother, Father, Brother    Lung cancer Mother    Pneumonia Father        Social History Main Topics    Smoking status: Former Smoker     Packs/day: 1.00      Years: 50.00     Quit date: 8/12/2005    Smokeless tobacco: Never Used    Alcohol use No    Drug use: No    Sexual activity: Not Currently     Review of Systems   Constitutional: Positive for fatigue. Negative for appetite change and fever.   HENT: Negative.  Negative for congestion, nosebleeds and sore throat.    Eyes: Negative.  Negative for photophobia, redness and visual disturbance.   Respiratory: Negative.  Negative for cough, shortness of breath and wheezing.    Cardiovascular: Negative.  Negative for chest pain, palpitations and leg swelling.   Gastrointestinal: Positive for abdominal pain, nausea and vomiting. Negative for constipation and diarrhea.   Endocrine: Negative.  Negative for polydipsia, polyphagia and polyuria.   Genitourinary: Negative.  Negative for dysuria, flank pain, frequency and urgency.   Musculoskeletal: Negative.  Negative for arthralgias, back pain and joint swelling.   Skin: Negative.  Negative for color change, pallor and rash.   Allergic/Immunologic: Negative.  Negative for environmental allergies, food allergies and immunocompromised state.   Neurological: Negative.  Negative for dizziness, syncope, weakness, light-headedness, numbness and headaches.   Hematological: Negative.    Psychiatric/Behavioral: Negative.  Negative for confusion and hallucinations. The patient is not nervous/anxious.    All other systems reviewed and are negative.    Objective:     Vital Signs (Most Recent):  Temp: 98.1 °F (36.7 °C) (06/13/18 1810)  Pulse: 83 (06/13/18 2301)  Resp: 18 (06/13/18 1810)  BP: 121/69 (06/13/18 2301)  SpO2: 97 % (06/13/18 2301) Vital Signs (24h Range):  Temp:  [98.1 °F (36.7 °C)-98.6 °F (37 °C)] 98.1 °F (36.7 °C)  Pulse:  [64-83] 83  Resp:  [18] 18  SpO2:  [97 %-100 %] 97 %  BP: (118-159)/(64-70) 121/69     Weight: 82.7 kg (182 lb 5.1 oz)  Body mass index is 26.16 kg/m².    Physical Exam   Constitutional: He is oriented to person, place, and time. He appears well-developed  and well-nourished. No distress.   HENT:   Head: Normocephalic and atraumatic.   Eyes: Conjunctivae and EOM are normal. No scleral icterus.   Neck: Normal range of motion. Neck supple. No thyromegaly present.   Cardiovascular: Normal rate, normal heart sounds and intact distal pulses.  An irregularly irregular rhythm present.   No murmur heard.  Pulmonary/Chest: Effort normal and breath sounds normal. No respiratory distress. He has no wheezes. He exhibits no tenderness.   Abdominal: Soft. He exhibits distension. Bowel sounds are increased. There is no tenderness. There is no rigidity and no guarding. No hernia.   Musculoskeletal: Normal range of motion. He exhibits no edema or tenderness.   Lymphadenopathy:     He has no cervical adenopathy.   Neurological: He is alert and oriented to person, place, and time. No cranial nerve deficit. He exhibits normal muscle tone. Coordination normal.   Skin: Skin is warm and dry. Ecchymosis noted. He is not diaphoretic.   Psychiatric: He has a normal mood and affect. His behavior is normal. Thought content normal.   Nursing note and vitals reviewed.        CRANIAL NERVES     CN III, IV, VI   Extraocular motions are normal.        Significant Labs:   Blood Culture: No results for input(s): LABBLOO in the last 48 hours.  BMP:   Recent Labs  Lab 06/13/18  2141   GLU 97      K 4.8      CO2 28   BUN 32*   CREATININE 2.8*   CALCIUM 10.2     CBC:   Recent Labs  Lab 06/13/18  2141   WBC 9.76   HGB 15.1   HCT 46.5        CMP:   Recent Labs  Lab 06/13/18  2141      K 4.8      CO2 28   GLU 97   BUN 32*   CREATININE 2.8*   CALCIUM 10.2   PROT 8.0   ALBUMIN 4.2   BILITOT 0.6   ALKPHOS 67   AST 24   ALT 14   ANIONGAP 12   EGFRNONAA 21*     Lactic Acid: No results for input(s): LACTATE in the last 48 hours.  Lipase:   Recent Labs  Lab 06/13/18  2141   LIPASE 8     Troponin: No results for input(s): TROPONINI in the last 48 hours.  Urine Studies:   Recent  Labs  Lab 06/13/18  2141   COLORU Yellow   APPEARANCEUA Clear   PHUR 6.0   SPECGRAV >=1.030*   PROTEINUA 2+*   GLUCUA Negative   KETONESU Trace*   BILIRUBINUA 1+*   OCCULTUA 2+*   NITRITE Negative   UROBILINOGEN Negative   LEUKOCYTESUR Negative   RBCUA 5*   WBCUA 3   BACTERIA Rare   SQUAMEPITHEL 0   HYALINECASTS 7*     All pertinent labs within the past 24 hours have been reviewed.    Significant Imaging: I have reviewed and interpreted all pertinent imaging results/findings within the past 24 hours.     Imaging Results          CT Abdomen Pelvis  Without Contrast (Final result)  Result time 06/13/18 22:48:59   Procedure changed from CT Abdomen Pelvis With Contrast     Final result by Dhruv Villar MD (06/13/18 22:48:59)                 Impression:      Mildly distended central small bowel loops with the air-fluid levels and mesenteric edema.  Primary differential is a low-grade enteritis.  No evidence of abscess or free air.    Colonic diverticulosis.    All CT scans at this facility use dose modulation, iterative reconstruction and/or weight based dosing when appropriate to reduce radiation dose to as low as reasonably achievable.      Electronically signed by: Dhruv Villar MD  Date:    06/13/2018  Time:    22:48             Narrative:    EXAMINATION:  CT ABDOMEN PELVIS WITHOUT CONTRAST    CLINICAL HISTORY:  Abdominal distension; history of lung cancer status post pneumonectomy.    TECHNIQUE:  Axial CT images performed through the abdomen and pelvis without intravenous contrast. Multiplanar reformats were performed and interpreted.    COMPARISON:  05/23/2018    FINDINGS:  Left pneumonectomy with associated chronic left lower lobe pleural fluid and pleural thickening.  Volume loss results in shift of the heart and mediastinum into the left hemithorax.  The visualized portions of the right lung are clear.    No hydronephrosis.  There is a 2 mm nonobstructive calculus at the interpolar region of the left kidney.   Bilateral benign renal cysts measuring up to 1.9 cm at the upper pole of the left kidney and 4.7 cm at the lower pole of the right kidney.  No ureteral calculi identified.    The liver, spleen, adrenal glands, and pancreas have a normal noncontrasted appearance.    The gallbladder has been removed.    Mild distention of multiple loops of central small bowel measuring up to 3.4 cm with mild adjacent mesenteric edema.  There is gas seen throughout the distal small bowel and colon.  No transition point identified.  Colonic diverticulosis.  No abscess or evidence of perforation.  No ascites.    Aortic atherosclerosis without evidence of aneurysm.    The urinary bladder is unremarkable. The prostate is within normal limits.    No significant osseous abnormality is identified.                                I have independently reviewed and interpreted the EKG.     I have independently reviewed all pertinent labs within the past 24 hours.    I have independently reviewed, visualized and interpreted all pertinent imaging results within the past 24 hours and discussed the findings with the ED physician, Dr. Nicholas.            Assessment/Plan:     * Acute gastroenteritis    CT abdomen - mildly distended central small bowel loops with the air-fluid levels and mesenteric edema.  Primary differential is a low-grade enteritis.  No evidence of abscess or free air.  Continue IV fluids.  Supportive care.  Clear liquid diet.  Monitor closely.          JOHANNA (acute kidney injury)    Creatinine 2.8, baseline 1.0.  Likely prerenal.  NS 2 liter sin ED, continue NS at 125 cc/hr for next 24 hours.  Labs in AM.          Chronic atrial fibrillation    Rate controlled.  Continue home dose sotalol.   Aspirin for secondary stroke prophylaxis.          Essential hypertension    Continue amlodipine.  Hold ACEI and HCTZ.            VTE Risk Mitigation         Ordered     Place sequential compression device  Until discontinued      06/13/18 8940              Ramiro Barrios MD  Department of Hospital Medicine   [facility]

## 2018-06-14 NOTE — SUBJECTIVE & OBJECTIVE
Review of Systems   Constitutional: Positive for fatigue. Negative for appetite change, chills, diaphoresis and fever.   HENT: Negative for congestion, nosebleeds, sore throat and trouble swallowing.    Eyes: Negative for pain, discharge and visual disturbance.   Respiratory: Negative for apnea, cough, chest tightness, shortness of breath, wheezing and stridor.    Cardiovascular: Negative for chest pain, palpitations and leg swelling.   Gastrointestinal: Negative for abdominal distention, abdominal pain, blood in stool, constipation, diarrhea, nausea and vomiting.   Endocrine: Negative for cold intolerance and heat intolerance.   Genitourinary: Negative for difficulty urinating, dysuria, flank pain, frequency and urgency.   Musculoskeletal: Negative for arthralgias, back pain, joint swelling, myalgias, neck pain and neck stiffness.   Skin: Negative for rash and wound.   Allergic/Immunologic: Negative for food allergies and immunocompromised state.   Neurological: Negative for dizziness, seizures, syncope, facial asymmetry, weakness, light-headedness and headaches.   Hematological: Negative for adenopathy.   Psychiatric/Behavioral: Negative for agitation, behavioral problems and confusion. The patient is not nervous/anxious.      Objective:     Vital Signs (Most Recent):  Temp: 97.6 °F (36.4 °C) (06/14/18 1144)  Pulse: 71 (06/14/18 1144)  Resp: 18 (06/14/18 1144)  BP: (!) 140/64 (06/14/18 1144)  SpO2: 98 % (06/14/18 1144) Vital Signs (24h Range):  Temp:  [96.8 °F (36 °C)-98.6 °F (37 °C)] 97.6 °F (36.4 °C)  Pulse:  [64-83] 71  Resp:  [18] 18  SpO2:  [93 %-100 %] 98 %  BP: (100-188)/(50-83) 140/64     Weight: 82.7 kg (182 lb 5.1 oz)  Body mass index is 26.16 kg/m².    Intake/Output Summary (Last 24 hours) at 06/14/18 1234  Last data filed at 06/14/18 1105   Gross per 24 hour   Intake             2320 ml   Output              650 ml   Net             1670 ml      Physical Exam   Constitutional: He is oriented to  person, place, and time. He appears well-developed and well-nourished. No distress.   HENT:   Head: Normocephalic and atraumatic.   Nose: Nose normal.   Mouth/Throat: Oropharynx is clear and moist.   Eyes: Conjunctivae and EOM are normal. No scleral icterus.   Neck: Normal range of motion. Neck supple.   Cardiovascular: Normal rate, regular rhythm, normal heart sounds and intact distal pulses.  Exam reveals no gallop and no friction rub.    No murmur heard.  Pulmonary/Chest: Effort normal and breath sounds normal. No stridor. No respiratory distress. He has no wheezes. He has no rales. He exhibits no tenderness.   Abdominal: Soft. Bowel sounds are normal. He exhibits no distension. There is tenderness (mild TTP across lower abd ). There is no rebound and no guarding.   Musculoskeletal: Normal range of motion. He exhibits no edema, tenderness or deformity.   Neurological: He is alert and oriented to person, place, and time. He has normal reflexes. No cranial nerve deficit. He exhibits normal muscle tone. Coordination normal.   Skin: Skin is warm and dry. No rash noted. He is not diaphoretic. No erythema. No pallor.   Psychiatric: He has a normal mood and affect. His behavior is normal. Thought content normal.       Significant Labs:   BMP:   Recent Labs  Lab 06/14/18  0438   *      K 4.5      CO2 23   BUN 31*   CREATININE 2.4*   CALCIUM 8.3*   MG 1.9     CBC:   Recent Labs  Lab 06/13/18 2141 06/14/18  0438   WBC 9.76 5.70   HGB 15.1 12.0*   HCT 46.5 39.3*    195     CMP:   Recent Labs  Lab 06/13/18 2141 06/14/18  0438    139   K 4.8 4.5    108   CO2 28 23   GLU 97 117*   BUN 32* 31*   CREATININE 2.8* 2.4*   CALCIUM 10.2 8.3*   PROT 8.0  --    ALBUMIN 4.2  --    BILITOT 0.6  --    ALKPHOS 67  --    AST 24  --    ALT 14  --    ANIONGAP 12 8   EGFRNONAA 21* 25*     All pertinent labs within the past 24 hours have been reviewed.    Significant Imaging:   Imaging Results          CT  Abdomen Pelvis  Without Contrast (Final result)  Result time 06/13/18 22:48:59   Procedure changed from CT Abdomen Pelvis With Contrast     Final result by Dhruv Villar MD (06/13/18 22:48:59)                 Impression:      Mildly distended central small bowel loops with the air-fluid levels and mesenteric edema.  Primary differential is a low-grade enteritis.  No evidence of abscess or free air.    Colonic diverticulosis.    All CT scans at this facility use dose modulation, iterative reconstruction and/or weight based dosing when appropriate to reduce radiation dose to as low as reasonably achievable.      Electronically signed by: Dhruv Villar MD  Date:    06/13/2018  Time:    22:48             Narrative:    EXAMINATION:  CT ABDOMEN PELVIS WITHOUT CONTRAST    CLINICAL HISTORY:  Abdominal distension; history of lung cancer status post pneumonectomy.    TECHNIQUE:  Axial CT images performed through the abdomen and pelvis without intravenous contrast. Multiplanar reformats were performed and interpreted.    COMPARISON:  05/23/2018    FINDINGS:  Left pneumonectomy with associated chronic left lower lobe pleural fluid and pleural thickening.  Volume loss results in shift of the heart and mediastinum into the left hemithorax.  The visualized portions of the right lung are clear.    No hydronephrosis.  There is a 2 mm nonobstructive calculus at the interpolar region of the left kidney.  Bilateral benign renal cysts measuring up to 1.9 cm at the upper pole of the left kidney and 4.7 cm at the lower pole of the right kidney.  No ureteral calculi identified.    The liver, spleen, adrenal glands, and pancreas have a normal noncontrasted appearance.    The gallbladder has been removed.    Mild distention of multiple loops of central small bowel measuring up to 3.4 cm with mild adjacent mesenteric edema.  There is gas seen throughout the distal small bowel and colon.  No transition point identified.  Colonic diverticulosis.   No abscess or evidence of perforation.  No ascites.    Aortic atherosclerosis without evidence of aneurysm.    The urinary bladder is unremarkable. The prostate is within normal limits.    No significant osseous abnormality is identified.

## 2018-06-14 NOTE — CLINICAL REVIEW
Pharmacist Renal Dose Adjustment Note    Chai Murry is a 78 y.o. male being treated with the medication famotidine    Patient Data:    Vital Signs (Most Recent):  Temp: 97.6 °F (36.4 °C) (06/14/18 1144)  Pulse: 71 (06/14/18 1144)  Resp: 18 (06/14/18 1144)  BP: (!) 140/64 (06/14/18 1144)  SpO2: 98 % (06/14/18 1144)   Vital Signs (72h Range):  Temp:  [96.8 °F (36 °C)-98.6 °F (37 °C)]   Pulse:  [64-83]   Resp:  [18]   BP: (100-188)/(50-83)   SpO2:  [93 %-100 %]        Recent Labs     Lab 06/13/18 2141 06/14/18  0438   CREATININE 2.8* 2.4*       Serum creatinine: 2.4 mg/dL (H) 06/14/18 0438  Estimated creatinine clearance: 26.6 mL/min (A)    Medication: Famotidine 20 mg BID will be changed to Famotidine 20 mg QD, due to CrCl of 26 ml/min    Pharmacist's Name: Manjula Tejada

## 2018-06-14 NOTE — PROGRESS NOTES
"[facility] Hospital Medicine  Progress Note    Patient Name: Chai Murry  MRN: [id number]  Patient Class: OP- Observation   Admission Date: 6/13/2018  Length of Stay: 0 days  Attending Physician: Reuben Moscoso MD  Primary Care Provider: Peter Teixeira MD        Subjective:     Principal Problem:Acute gastroenteritis    HPI:  Mr. Murry is a 77 y/o  male with h/o HTN, HLP, A.Fib on aspirin, presents to the ED c/o 1-2 days of abdominal pain associated with nausa/vomiting, without diarrhea. He feels "cramping" in the abdomen. CT abdomen reveals mildly distended central small bowel loops with the air-fluid levels and mesenteric edema.  Primary differential is a low-grade enteritis.  No evidence of abscess or free air. He denies any sick contacts or travle or outside food recently. Labs revela JOHANNA with creatinine 2.8 (baseline 1.0). Received 2 L NS in ED. Admitted for JOHANNA and suspected acute viral gastroenteritis.    Hospital Course:  The pt was placed in observation with Acute gastroenteritis and ARF on IVFs. Pt reported he had several episodes of diaphoresis while working outside for the last 2 days. He then had acute onset of lower abdominal pian. He made himself vomit once. No diarrhea. Abdominal pain prompted him to go to ED. In the ED, Serum, Cr 2.8. wbc normal. Afebrile. CT abd showed low-grade enteritis  Today, abdominal pain and nausea resolved. EKG showed no acute changes. Troponin normal. Serum Cr improved to 2.4. Will continue IV hydration         Review of Systems   Constitutional: Positive for fatigue. Negative for appetite change, chills, diaphoresis and fever.   HENT: Negative for congestion, nosebleeds, sore throat and trouble swallowing.    Eyes: Negative for pain, discharge and visual disturbance.   Respiratory: Negative for apnea, cough, chest tightness, shortness of breath, wheezing and stridor.    Cardiovascular: Negative for chest pain, palpitations and leg swelling. "   Gastrointestinal: Negative for abdominal distention, abdominal pain, blood in stool, constipation, diarrhea, nausea and vomiting.   Endocrine: Negative for cold intolerance and heat intolerance.   Genitourinary: Negative for difficulty urinating, dysuria, flank pain, frequency and urgency.   Musculoskeletal: Negative for arthralgias, back pain, joint swelling, myalgias, neck pain and neck stiffness.   Skin: Negative for rash and wound.   Allergic/Immunologic: Negative for food allergies and immunocompromised state.   Neurological: Negative for dizziness, seizures, syncope, facial asymmetry, weakness, light-headedness and headaches.   Hematological: Negative for adenopathy.   Psychiatric/Behavioral: Negative for agitation, behavioral problems and confusion. The patient is not nervous/anxious.      Objective:     Vital Signs (Most Recent):  Temp: 97.6 °F (36.4 °C) (06/14/18 1144)  Pulse: 71 (06/14/18 1144)  Resp: 18 (06/14/18 1144)  BP: (!) 140/64 (06/14/18 1144)  SpO2: 98 % (06/14/18 1144) Vital Signs (24h Range):  Temp:  [96.8 °F (36 °C)-98.6 °F (37 °C)] 97.6 °F (36.4 °C)  Pulse:  [64-83] 71  Resp:  [18] 18  SpO2:  [93 %-100 %] 98 %  BP: (100-188)/(50-83) 140/64     Weight: 82.7 kg (182 lb 5.1 oz)  Body mass index is 26.16 kg/m².    Intake/Output Summary (Last 24 hours) at 06/14/18 1234  Last data filed at 06/14/18 1105   Gross per 24 hour   Intake             2320 ml   Output              650 ml   Net             1670 ml      Physical Exam   Constitutional: He is oriented to person, place, and time. He appears well-developed and well-nourished. No distress.   HENT:   Head: Normocephalic and atraumatic.   Nose: Nose normal.   Mouth/Throat: Oropharynx is clear and moist.   Eyes: Conjunctivae and EOM are normal. No scleral icterus.   Neck: Normal range of motion. Neck supple.   Cardiovascular: Normal rate, regular rhythm, normal heart sounds and intact distal pulses.  Exam reveals no gallop and no friction rub.     No murmur heard.  Pulmonary/Chest: Effort normal and breath sounds normal. No stridor. No respiratory distress. He has no wheezes. He has no rales. He exhibits no tenderness.   Abdominal: Soft. Bowel sounds are normal. He exhibits no distension. There is tenderness (mild TTP across lower abd ). There is no rebound and no guarding.   Musculoskeletal: Normal range of motion. He exhibits no edema, tenderness or deformity.   Neurological: He is alert and oriented to person, place, and time. He has normal reflexes. No cranial nerve deficit. He exhibits normal muscle tone. Coordination normal.   Skin: Skin is warm and dry. No rash noted. He is not diaphoretic. No erythema. No pallor.   Psychiatric: He has a normal mood and affect. His behavior is normal. Thought content normal.       Significant Labs:   BMP:   Recent Labs  Lab 06/14/18 0438   *      K 4.5      CO2 23   BUN 31*   CREATININE 2.4*   CALCIUM 8.3*   MG 1.9     CBC:   Recent Labs  Lab 06/13/18 2141 06/14/18 0438   WBC 9.76 5.70   HGB 15.1 12.0*   HCT 46.5 39.3*    195     CMP:   Recent Labs  Lab 06/13/18 2141 06/14/18 0438    139   K 4.8 4.5    108   CO2 28 23   GLU 97 117*   BUN 32* 31*   CREATININE 2.8* 2.4*   CALCIUM 10.2 8.3*   PROT 8.0  --    ALBUMIN 4.2  --    BILITOT 0.6  --    ALKPHOS 67  --    AST 24  --    ALT 14  --    ANIONGAP 12 8   EGFRNONAA 21* 25*     All pertinent labs within the past 24 hours have been reviewed.    Significant Imaging:   Imaging Results          CT Abdomen Pelvis  Without Contrast (Final result)  Result time 06/13/18 22:48:59   Procedure changed from CT Abdomen Pelvis With Contrast     Final result by Dhruv Villar MD (06/13/18 22:48:59)                 Impression:      Mildly distended central small bowel loops with the air-fluid levels and mesenteric edema.  Primary differential is a low-grade enteritis.  No evidence of abscess or free air.    Colonic diverticulosis.    All CT  scans at this facility use dose modulation, iterative reconstruction and/or weight based dosing when appropriate to reduce radiation dose to as low as reasonably achievable.      Electronically signed by: Dhruv Villar MD  Date:    06/13/2018  Time:    22:48             Narrative:    EXAMINATION:  CT ABDOMEN PELVIS WITHOUT CONTRAST    CLINICAL HISTORY:  Abdominal distension; history of lung cancer status post pneumonectomy.    TECHNIQUE:  Axial CT images performed through the abdomen and pelvis without intravenous contrast. Multiplanar reformats were performed and interpreted.    COMPARISON:  05/23/2018    FINDINGS:  Left pneumonectomy with associated chronic left lower lobe pleural fluid and pleural thickening.  Volume loss results in shift of the heart and mediastinum into the left hemithorax.  The visualized portions of the right lung are clear.    No hydronephrosis.  There is a 2 mm nonobstructive calculus at the interpolar region of the left kidney.  Bilateral benign renal cysts measuring up to 1.9 cm at the upper pole of the left kidney and 4.7 cm at the lower pole of the right kidney.  No ureteral calculi identified.    The liver, spleen, adrenal glands, and pancreas have a normal noncontrasted appearance.    The gallbladder has been removed.    Mild distention of multiple loops of central small bowel measuring up to 3.4 cm with mild adjacent mesenteric edema.  There is gas seen throughout the distal small bowel and colon.  No transition point identified.  Colonic diverticulosis.  No abscess or evidence of perforation.  No ascites.    Aortic atherosclerosis without evidence of aneurysm.    The urinary bladder is unremarkable. The prostate is within normal limits.    No significant osseous abnormality is identified.                              Assessment/Plan:      * Acute gastroenteritis    CT abdomen - Primary differential is a low-grade enteritis.   Supportive care.  Advance diet   Monitor closely.           ARF (acute renal failure)    Creatinine remains elevated, baseline 1.0.  Likely prerenal.  Cont IV hydration          Chronic atrial fibrillation    Rate controlled.  Continue home dose sotalol.   Aspirin for secondary stroke prophylaxis.  EKG showed NSR -no acute changes. Troponin normal           Essential hypertension    Continue amlodipine.  Hold ACEI and HCTZ.            VTE Risk Mitigation         Ordered     Place sequential compression device  Until discontinued      06/13/18 2350              Debbie Bunch NP  Department of Hospital Medicine   [facility]

## 2018-06-14 NOTE — HPI
"Mr. Murry is a 77 y/o  male with h/o HTN, HLP, A.Fib on aspirin, presents to the ED c/o 1-2 days of abdominal pain associated with nausa/vomiting, without diarrhea. He feels "cramping" in the abdomen. CT abdomen reveals mildly distended central small bowel loops with the air-fluid levels and mesenteric edema.  Primary differential is a low-grade enteritis.  No evidence of abscess or free air. He denies any sick contacts or travle or outside food recently. Labs revela JOHANNA with creatinine 2.8 (baseline 1.0). Received 2 L NS in ED. Admitted for JOHANNA and suspected acute viral gastroenteritis.  "

## 2018-06-14 NOTE — ASSESSMENT & PLAN NOTE
CT abdomen - mildly distended central small bowel loops with the air-fluid levels and mesenteric edema.  Primary differential is a low-grade enteritis.  No evidence of abscess or free air.  Continue IV fluids.  Supportive care.  Clear liquid diet.  Monitor closely.

## 2018-06-14 NOTE — ASSESSMENT & PLAN NOTE
CT abdomen - Primary differential is a low-grade enteritis.   Supportive care.  Advance diet   Monitor closely.

## 2018-06-14 NOTE — SUBJECTIVE & OBJECTIVE
Past Medical History:   Diagnosis Date    Anemia     Arthritis     Atrial fibrillation     CAD (coronary artery disease)     Cancer     lung cancer-Left    Cataract     Diverticulosis     ED (erectile dysfunction)     HTN (hypertension)     Hyperlipidemia     Myocardial infarction        Past Surgical History:   Procedure Laterality Date    APPENDECTOMY      cardiac stents      CATARACT EXTRACTION W/  INTRAOCULAR LENS IMPLANT Right 9-3-14    CHOLECYSTECTOMY      COLONOSCOPY N/A 4/17/2018    Procedure: COLONOSCOPY;  Surgeon: Dionne Doan MD;  Location: [facility];  Service: Endoscopy;  Laterality: N/A;    infected stomach gland excision      LUNG REMOVAL, TOTAL Left 04/2016    lung cancer left upper lobe    SKIN BIOPSY Left     arm       Review of patient's allergies indicates:   Allergen Reactions    Atorvastatin      Other reaction(s): Muscle pain       Current Facility-Administered Medications on File Prior to Encounter   Medication    testosterone cypionate injection 200 mg     Current Outpatient Prescriptions on File Prior to Encounter   Medication Sig    albuterol-ipratropium 2.5mg-0.5mg/3mL (DUO-NEB) 0.5 mg-3 mg(2.5 mg base)/3 mL nebulizer solution Take 3 mLs by nebulization every 6 (six) hours as needed for Shortness of Breath.    amlodipine (NORVASC) 5 MG tablet Take 5 mg by mouth every evening.     aspirin (ECOTRIN) 81 MG EC tablet Take 81 mg by mouth once daily.    doxepin (SINEQUAN) 10 MG capsule Take 1 capsule (10 mg total) by mouth every evening.    doxycycline monohydrate 100 mg Tab Take 1 tablet (100 mg total) by mouth 2 (two) times daily.    fenofibric acid (FIBRICOR) 135 mg CpDR TAKE 1 CAPSULE BY MOUTH EVERY DAY    fluticasone (FLONASE) 50 mcg/actuation nasal spray 1 spray by Each Nare route once daily.    hydrochlorothiazide (HYDRODIURIL) 25 MG tablet Take 25 mg by mouth daily as needed.    ipratropium-albuterol (COMBIVENT RESPIMAT)  mcg/actuation inhaler  Inhale 1 puff into the lungs every 6 (six) hours as needed for Shortness of Breath.    levocetirizine (XYZAL) 5 MG tablet Take 5 mg by mouth every evening.    lisinopril (PRINIVIL,ZESTRIL) 40 MG tablet Take 40 mg by mouth Daily.     potassium 99 mg Tab Take 99 mg by mouth. Pt takes when he takes his Lasix    simvastatin (ZOCOR) 40 MG tablet Take 1 tablet (40 mg total) by mouth every evening.    SOTALOL AF 80 mg tablet TAKE 1 TABLET BY MOUTH TWICE A DAY     Family History     Problem Relation (Age of Onset)    Cancer Sister, Mother, Brother    Cataracts Mother, Father    Esophageal cancer Sister    Hypertension Mother, Father, Brother    Lung cancer Mother    Pneumonia Father        Social History Main Topics    Smoking status: Former Smoker     Packs/day: 1.00     Years: 50.00     Quit date: 8/12/2005    Smokeless tobacco: Never Used    Alcohol use No    Drug use: No    Sexual activity: Not Currently     Review of Systems   Constitutional: Positive for fatigue. Negative for appetite change and fever.   HENT: Negative.  Negative for congestion, nosebleeds and sore throat.    Eyes: Negative.  Negative for photophobia, redness and visual disturbance.   Respiratory: Negative.  Negative for cough, shortness of breath and wheezing.    Cardiovascular: Negative.  Negative for chest pain, palpitations and leg swelling.   Gastrointestinal: Positive for abdominal pain, nausea and vomiting. Negative for constipation and diarrhea.   Endocrine: Negative.  Negative for polydipsia, polyphagia and polyuria.   Genitourinary: Negative.  Negative for dysuria, flank pain, frequency and urgency.   Musculoskeletal: Negative.  Negative for arthralgias, back pain and joint swelling.   Skin: Negative.  Negative for color change, pallor and rash.   Allergic/Immunologic: Negative.  Negative for environmental allergies, food allergies and immunocompromised state.   Neurological: Negative.  Negative for dizziness, syncope, weakness,  light-headedness, numbness and headaches.   Hematological: Negative.    Psychiatric/Behavioral: Negative.  Negative for confusion and hallucinations. The patient is not nervous/anxious.    All other systems reviewed and are negative.    Objective:     Vital Signs (Most Recent):  Temp: 98.1 °F (36.7 °C) (06/13/18 1810)  Pulse: 83 (06/13/18 2301)  Resp: 18 (06/13/18 1810)  BP: 121/69 (06/13/18 2301)  SpO2: 97 % (06/13/18 2301) Vital Signs (24h Range):  Temp:  [98.1 °F (36.7 °C)-98.6 °F (37 °C)] 98.1 °F (36.7 °C)  Pulse:  [64-83] 83  Resp:  [18] 18  SpO2:  [97 %-100 %] 97 %  BP: (118-159)/(64-70) 121/69     Weight: 82.7 kg (182 lb 5.1 oz)  Body mass index is 26.16 kg/m².    Physical Exam   Constitutional: He is oriented to person, place, and time. He appears well-developed and well-nourished. No distress.   HENT:   Head: Normocephalic and atraumatic.   Eyes: Conjunctivae and EOM are normal. No scleral icterus.   Neck: Normal range of motion. Neck supple. No thyromegaly present.   Cardiovascular: Normal rate, normal heart sounds and intact distal pulses.  An irregularly irregular rhythm present.   No murmur heard.  Pulmonary/Chest: Effort normal and breath sounds normal. No respiratory distress. He has no wheezes. He exhibits no tenderness.   Abdominal: Soft. He exhibits distension. Bowel sounds are increased. There is no tenderness. There is no rigidity and no guarding. No hernia.   Musculoskeletal: Normal range of motion. He exhibits no edema or tenderness.   Lymphadenopathy:     He has no cervical adenopathy.   Neurological: He is alert and oriented to person, place, and time. No cranial nerve deficit. He exhibits normal muscle tone. Coordination normal.   Skin: Skin is warm and dry. Ecchymosis noted. He is not diaphoretic.   Psychiatric: He has a normal mood and affect. His behavior is normal. Thought content normal.   Nursing note and vitals reviewed.        CRANIAL NERVES     CN III, IV, VI   Extraocular motions  are normal.        Significant Labs:   Blood Culture: No results for input(s): LABBLOO in the last 48 hours.  BMP:   Recent Labs  Lab 06/13/18 2141   GLU 97      K 4.8      CO2 28   BUN 32*   CREATININE 2.8*   CALCIUM 10.2     CBC:   Recent Labs  Lab 06/13/18 2141   WBC 9.76   HGB 15.1   HCT 46.5        CMP:   Recent Labs  Lab 06/13/18 2141      K 4.8      CO2 28   GLU 97   BUN 32*   CREATININE 2.8*   CALCIUM 10.2   PROT 8.0   ALBUMIN 4.2   BILITOT 0.6   ALKPHOS 67   AST 24   ALT 14   ANIONGAP 12   EGFRNONAA 21*     Lactic Acid: No results for input(s): LACTATE in the last 48 hours.  Lipase:   Recent Labs  Lab 06/13/18 2141   LIPASE 8     Troponin: No results for input(s): TROPONINI in the last 48 hours.  Urine Studies:   Recent Labs  Lab 06/13/18 2141   COLORU Yellow   APPEARANCEUA Clear   PHUR 6.0   SPECGRAV >=1.030*   PROTEINUA 2+*   GLUCUA Negative   KETONESU Trace*   BILIRUBINUA 1+*   OCCULTUA 2+*   NITRITE Negative   UROBILINOGEN Negative   LEUKOCYTESUR Negative   RBCUA 5*   WBCUA 3   BACTERIA Rare   SQUAMEPITHEL 0   HYALINECASTS 7*     All pertinent labs within the past 24 hours have been reviewed.    Significant Imaging: I have reviewed and interpreted all pertinent imaging results/findings within the past 24 hours.     Imaging Results          CT Abdomen Pelvis  Without Contrast (Final result)  Result time 06/13/18 22:48:59   Procedure changed from CT Abdomen Pelvis With Contrast     Final result by Dhruv Villar MD (06/13/18 22:48:59)                 Impression:      Mildly distended central small bowel loops with the air-fluid levels and mesenteric edema.  Primary differential is a low-grade enteritis.  No evidence of abscess or free air.    Colonic diverticulosis.    All CT scans at this facility use dose modulation, iterative reconstruction and/or weight based dosing when appropriate to reduce radiation dose to as low as reasonably achievable.      Electronically  signed by: Dhruv Villar MD  Date:    06/13/2018  Time:    22:48             Narrative:    EXAMINATION:  CT ABDOMEN PELVIS WITHOUT CONTRAST    CLINICAL HISTORY:  Abdominal distension; history of lung cancer status post pneumonectomy.    TECHNIQUE:  Axial CT images performed through the abdomen and pelvis without intravenous contrast. Multiplanar reformats were performed and interpreted.    COMPARISON:  05/23/2018    FINDINGS:  Left pneumonectomy with associated chronic left lower lobe pleural fluid and pleural thickening.  Volume loss results in shift of the heart and mediastinum into the left hemithorax.  The visualized portions of the right lung are clear.    No hydronephrosis.  There is a 2 mm nonobstructive calculus at the interpolar region of the left kidney.  Bilateral benign renal cysts measuring up to 1.9 cm at the upper pole of the left kidney and 4.7 cm at the lower pole of the right kidney.  No ureteral calculi identified.    The liver, spleen, adrenal glands, and pancreas have a normal noncontrasted appearance.    The gallbladder has been removed.    Mild distention of multiple loops of central small bowel measuring up to 3.4 cm with mild adjacent mesenteric edema.  There is gas seen throughout the distal small bowel and colon.  No transition point identified.  Colonic diverticulosis.  No abscess or evidence of perforation.  No ascites.    Aortic atherosclerosis without evidence of aneurysm.    The urinary bladder is unremarkable. The prostate is within normal limits.    No significant osseous abnormality is identified.                                I have independently reviewed and interpreted the EKG.     I have independently reviewed all pertinent labs within the past 24 hours.    I have independently reviewed, visualized and interpreted all pertinent imaging results within the past 24 hours and discussed the findings with the ED physician, Dr. Nicholas.

## 2018-06-14 NOTE — PROGRESS NOTES
Discharge orders received and reviewed with family and patient. Pt instructed when to take each medication next. Verified pharmacy with patient. IV removed, telemetry monitor removed. Pt assisted with dressing by staff. Pt transported to front lobby via wheelchair by staff or family to transport home.

## 2018-06-14 NOTE — PLAN OF CARE
Patient arrived from ER and ambulated from stretcher to tele bed. Received report from Eugenia. Ns @ 125 infusing into right ac. No tele monitor. Patient oriented to room. Call light within reach. Urinal at bedside. Plan of care reviewed with patient and all questions answered. Will continue to monitor.

## 2018-06-14 NOTE — ASSESSMENT & PLAN NOTE
Rate controlled.  Continue home dose sotalol.   Aspirin for secondary stroke prophylaxis.  EKG showed NSR -no acute changes. Troponin normal

## 2018-06-14 NOTE — HOSPITAL COURSE
The pt was placed in observation with Acute gastroenteritis and ARF on IVFs. Pt reported he had  worked outside for the last 2 days. He had a few episodes of diaphoresis. He then had acute onset of lower abdominal pian. He made himself vomit once. No diarrhea. Abdominal pain prompted him to go to ED. In the ED, Serum, Cr 2.8. wbc normal. Afebrile. CT abd showed low-grade enteritis. Pt had episode of hypotension-100/50 which resolved with IV hydration.   Today, abdominal pain and nausea resolved. EKG showed no acute changes. Troponin normal. Serum Cr improved to 1.9 on IV hydration. -180s. Instructed pt to continue to hold Lisinopril and HCTZ- increase amlodipine to 10mg daily. He was instructed to stay hydrated and f/u with PCP in 4 days for repeat BMP.

## 2018-06-14 NOTE — ED PROVIDER NOTES
SCRIBE #1 NOTE: I, Corinne Mack, am scribing for, and in the presence of, Liban Nicholas DO. I have scribed the entire note.      History      Chief Complaint   Patient presents with    Abdominal Pain     sent from [facility] for further eval.  States lower abdominal pain.       Review of patient's allergies indicates:   Allergen Reactions    Atorvastatin      Other reaction(s): Muscle pain        HPI   HPI    6/13/2018, 9:29 PM   History obtained from the patient      History of Present Illness: Chai Murry is a 78 y.o. male patient with PMHx of Afib, CAD, HTN, and MI who presents to the Emergency Department for abd pain which onset gradually yesterday. Symptoms are episodic and moderate in severity. Pt reports that the first episode onset yesterday and resolved on its own. The second episode onset today. Both episodes occurred after the pt ate. Pt was evaluated at a walk-in clinic and was sent to the ED for further evaluation. No mitigating or exacerbating factors reported. Associated sxs include nausea and emesis x1. Patient denies any fever, chills, diarrhea, back pain, HA, dizziness, and all other sxs at this time. No prior Tx reported. No further complaints or concerns at this time.         Arrival mode: Personal vehicle    PCP: Peter Teixeira MD       Past Medical History:  Past Medical History:   Diagnosis Date    Anemia     Arthritis     Atrial fibrillation     CAD (coronary artery disease)     Cancer     lung cancer-Left    Cataract     Diverticulosis     ED (erectile dysfunction)     HTN (hypertension)     Hyperlipidemia     Myocardial infarction        Past Surgical History:  Past Surgical History:   Procedure Laterality Date    APPENDECTOMY      cardiac stents      CATARACT EXTRACTION W/  INTRAOCULAR LENS IMPLANT Right 9-3-14    CHOLECYSTECTOMY      COLONOSCOPY N/A 4/17/2018    Procedure: COLONOSCOPY;  Surgeon: Dionne Doan MD;  Location: [facility];  Service:  Endoscopy;  Laterality: N/A;    infected stomach gland excision      LUNG REMOVAL, TOTAL Left 04/2016    lung cancer left upper lobe    SKIN BIOPSY Left     arm         Family History:  Family History   Problem Relation Age of Onset    Esophageal cancer Sister     Cancer Sister     Lung cancer Mother     Cancer Mother     Cataracts Mother     Hypertension Mother     Pneumonia Father     Cataracts Father     Hypertension Father     Cancer Brother     Hypertension Brother     Blindness Neg Hx     Diabetes Neg Hx     Glaucoma Neg Hx     Macular degeneration Neg Hx     Retinal detachment Neg Hx     Strabismus Neg Hx     Stroke Neg Hx     Thyroid disease Neg Hx        Social History:  Social History     Social History Main Topics    Smoking status: Former Smoker     Packs/day: 1.00     Years: 50.00     Quit date: 8/12/2005    Smokeless tobacco: Never Used    Alcohol use No    Drug use: No    Sexual activity: Not Currently       ROS   Review of Systems   Constitutional: Negative for chills and fever.   Respiratory: Negative for cough and shortness of breath.    Cardiovascular: Negative for chest pain and leg swelling.   Gastrointestinal: Positive for abdominal pain, nausea and vomiting (x1). Negative for diarrhea.   Musculoskeletal: Negative for back pain, neck pain and neck stiffness.   Skin: Negative for rash and wound.   Neurological: Negative for dizziness, light-headedness, numbness and headaches.   All other systems reviewed and are negative.    Physical Exam      Initial Vitals [06/13/18 1810]   BP Pulse Resp Temp SpO2   (!) 159/70 69 18 98.1 °F (36.7 °C) 97 %      MAP       --          Physical Exam  Nursing Notes and Vital Signs Reviewed.  Constitutional: Patient is in no apparent distress. Well-developed and well-nourished.  Head: Atraumatic. Normocephalic.  Eyes: PERRL. EOM intact. Conjunctivae are not pale. No scleral icterus.  ENT: Mucous membranes are moist. Oropharynx is clear and  "symmetric.    Neck: Supple. Full ROM. No lymphadenopathy.  Cardiovascular: Regular rate. Regular rhythm. No murmurs, rubs, or gallops. Distal pulses are 2+ and symmetric.  Pulmonary/Chest: No respiratory distress. Diminished breath sounds on the L. No wheezing or rales.  Abdominal: Soft and non-distended.  There is epigastric tenderness.  No rebound, guarding, or rigidity.   Musculoskeletal: Moves all extremities. No obvious deformities.   Skin: Warm and dry.  Neurological:  Alert, awake, and appropriate.  Normal speech.  No acute focal neurological deficits are appreciated.  Psychiatric: Normal affect. Good eye contact. Appropriate in content.    ED Course    Procedures  ED Vital Signs:  Vitals:    06/13/18 1810   BP: (!) 159/70   Pulse: 69   Resp: 18   Temp: 98.1 °F (36.7 °C)   TempSrc: Oral   SpO2: 97%   Weight: 82.7 kg (182 lb 5.1 oz)   Height: 5' 10" (1.778 m)       Abnormal Lab Results:  Labs Reviewed   CBC W/ AUTO DIFFERENTIAL - Abnormal; Notable for the following:        Result Value    RDW 15.9 (*)     Gran% 75.8 (*)     Lymph% 9.7 (*)     All other components within normal limits   COMPREHENSIVE METABOLIC PANEL - Abnormal; Notable for the following:     BUN, Bld 32 (*)     Creatinine 2.8 (*)     eGFR if  24 (*)     eGFR if non  21 (*)     All other components within normal limits   URINALYSIS - Abnormal; Notable for the following:     Specific Gravity, UA >=1.030 (*)     Protein, UA 2+ (*)     Ketones, UA Trace (*)     Bilirubin (UA) 1+ (*)     Occult Blood UA 2+ (*)     All other components within normal limits   URINALYSIS MICROSCOPIC - Abnormal; Notable for the following:     RBC, UA 5 (*)     Hyaline Casts, UA 7 (*)     All other components within normal limits   LIPASE   AMYLASE        All Lab Results:  Results for orders placed or performed during the hospital encounter of 06/13/18   CBC auto differential   Result Value Ref Range    WBC 9.76 3.90 - 12.70 K/uL    RBC " 5.09 4.60 - 6.20 M/uL    Hemoglobin 15.1 14.0 - 18.0 g/dL    Hematocrit 46.5 40.0 - 54.0 %    MCV 91 82 - 98 fL    MCH 29.7 27.0 - 31.0 pg    MCHC 32.5 32.0 - 36.0 g/dL    RDW 15.9 (H) 11.5 - 14.5 %    Platelets 237 150 - 350 K/uL    MPV 10.6 9.2 - 12.9 fL    Gran # (ANC) 7.4 1.8 - 7.7 K/uL    Lymph # 1.0 1.0 - 4.8 K/uL    Mono # 0.9 0.3 - 1.0 K/uL    Eos # 0.5 0.0 - 0.5 K/uL    Baso # 0.02 0.00 - 0.20 K/uL    Gran% 75.8 (H) 38.0 - 73.0 %    Lymph% 9.7 (L) 18.0 - 48.0 %    Mono% 9.3 4.0 - 15.0 %    Eosinophil% 5.0 0.0 - 8.0 %    Basophil% 0.2 0.0 - 1.9 %    Differential Method Automated    Comprehensive metabolic panel   Result Value Ref Range    Sodium 143 136 - 145 mmol/L    Potassium 4.8 3.5 - 5.1 mmol/L    Chloride 103 95 - 110 mmol/L    CO2 28 23 - 29 mmol/L    Glucose 97 70 - 110 mg/dL    BUN, Bld 32 (H) 8 - 23 mg/dL    Creatinine 2.8 (H) 0.5 - 1.4 mg/dL    Calcium 10.2 8.7 - 10.5 mg/dL    Total Protein 8.0 6.0 - 8.4 g/dL    Albumin 4.2 3.5 - 5.2 g/dL    Total Bilirubin 0.6 0.1 - 1.0 mg/dL    Alkaline Phosphatase 67 55 - 135 U/L    AST 24 10 - 40 U/L    ALT 14 10 - 44 U/L    Anion Gap 12 8 - 16 mmol/L    eGFR if African American 24 (A) >60 mL/min/1.73 m^2    eGFR if non African American 21 (A) >60 mL/min/1.73 m^2   Lipase   Result Value Ref Range    Lipase 8 4 - 60 U/L   Urinalysis   Result Value Ref Range    Specimen UA Urine, Clean Catch     Color, UA Yellow Yellow, Straw, Karen    Appearance, UA Clear Clear    pH, UA 6.0 5.0 - 8.0    Specific Gravity, UA >=1.030 (A) 1.005 - 1.030    Protein, UA 2+ (A) Negative    Glucose, UA Negative Negative    Ketones, UA Trace (A) Negative    Bilirubin (UA) 1+ (A) Negative    Occult Blood UA 2+ (A) Negative    Nitrite, UA Negative Negative    Urobilinogen, UA Negative <2.0 EU/dL    Leukocytes, UA Negative Negative   Amylase   Result Value Ref Range    Amylase 62 20 - 110 U/L   Urinalysis Microscopic   Result Value Ref Range    RBC, UA 5 (H) 0 - 4 /hpf    WBC, UA 3 0 - 5  /hpf    Bacteria, UA Rare None-Occ /hpf    Squam Epithel, UA 0 /hpf    Hyaline Casts, UA 7 (A) 0-1/lpf /lpf    Amorphous, UA Moderate None-Moderate    Microscopic Comment SEE COMMENT        Imaging Results:  Imaging Results          CT Abdomen Pelvis  Without Contrast (Final result)  Result time 06/13/18 22:48:59   Procedure changed from CT Abdomen Pelvis With Contrast     Final result by Dhruv Villar MD (06/13/18 22:48:59)                 Impression:      Mildly distended central small bowel loops with the air-fluid levels and mesenteric edema.  Primary differential is a low-grade enteritis.  No evidence of abscess or free air.    Colonic diverticulosis.    All CT scans at this facility use dose modulation, iterative reconstruction and/or weight based dosing when appropriate to reduce radiation dose to as low as reasonably achievable.      Electronically signed by: Dhruv Villar MD  Date:    06/13/2018  Time:    22:48             Narrative:    EXAMINATION:  CT ABDOMEN PELVIS WITHOUT CONTRAST    CLINICAL HISTORY:  Abdominal distension; history of lung cancer status post pneumonectomy.    TECHNIQUE:  Axial CT images performed through the abdomen and pelvis without intravenous contrast. Multiplanar reformats were performed and interpreted.    COMPARISON:  05/23/2018    FINDINGS:  Left pneumonectomy with associated chronic left lower lobe pleural fluid and pleural thickening.  Volume loss results in shift of the heart and mediastinum into the left hemithorax.  The visualized portions of the right lung are clear.    No hydronephrosis.  There is a 2 mm nonobstructive calculus at the interpolar region of the left kidney.  Bilateral benign renal cysts measuring up to 1.9 cm at the upper pole of the left kidney and 4.7 cm at the lower pole of the right kidney.  No ureteral calculi identified.    The liver, spleen, adrenal glands, and pancreas have a normal noncontrasted appearance.    The gallbladder has been removed.    Mild  distention of multiple loops of central small bowel measuring up to 3.4 cm with mild adjacent mesenteric edema.  There is gas seen throughout the distal small bowel and colon.  No transition point identified.  Colonic diverticulosis.  No abscess or evidence of perforation.  No ascites.    Aortic atherosclerosis without evidence of aneurysm.    The urinary bladder is unremarkable. The prostate is within normal limits.    No significant osseous abnormality is identified.                                        The Emergency Provider reviewed the vital signs and test results, which are outlined above.    ED Discussion     10:58 PM: Re-evaluated pt. Pt is resting comfortably and in no acute distress. 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 Pt reports that he has noticed a recent drop in his weight, stating that he went from 180 lbs to 173 lbs. I have discussed test results, shared treatment plan, and the need for admission with patient and family at bedside. Pt and family express understanding at this time and agree with all information. All questions answered. Pt and family have no further questions or concerns at this time. Pt is ready for admit.    11:08 PM: Discussed case with Dr. Barrios ([facility]). Dr. Barrios agrees with current care and management of pt and accepts admission.   Admitting Service: Hospital medicine   Admitting Physician: Dr. Barrios  Admit to: Obs Tele        ED Medication(s):  Medications   sodium chloride 0.9% bolus 1,000 mL (not administered)   sodium chloride 0.9% bolus 1,000 mL (1,000 mLs Intravenous New Bag 6/13/18 2218)   ondansetron injection 4 mg (4 mg Intravenous Given 6/13/18 2217)   morphine injection 4 mg (4 mg Intravenous Given 6/13/18 2218)   pantoprazole 40 mg in dextrose 5 % 100 mL infusion (ready to mix system) (0 mg Intravenous Stopped 6/13/18 2227)       New Prescriptions    No medications on file              Medical Decision Making    Medical Decision Making:   Clinical Tests:   Lab Tests: Ordered and Reviewed  Radiological Study: Ordered and Reviewed           Scribe Attestation:   Scribe #1: I performed the above scribed service and the documentation accurately describes the services I performed. I attest to the accuracy of the note.    Attending:   Physician Attestation Statement for Scribe #1: I, Liban Nicholas DO, personally performed the services described in this documentation, as scribed by Corinne Mack, in my presence, and it is both accurate and complete.          Clinical Impression       ICD-10-CM ICD-9-CM   1. Epigastric pain R10.13 789.06   2. Non-intractable vomiting with nausea, unspecified vomiting type R11.2 787.01   3. Acute kidney injury (nontraumatic) N17.9 584.9       Disposition:   Disposition: Placed in Observation  Condition: Fair           Liban Nicholas DO  06/13/18 0493       Liban Nicholas DO  06/13/18 2177

## 2018-06-14 NOTE — ASSESSMENT & PLAN NOTE
Creatinine 2.8, baseline 1.0.  Likely prerenal.  NS 2 liter sin ED, continue NS at 125 cc/hr for next 24 hours.  Labs in AM.

## 2018-06-14 NOTE — ED NOTES
Patient resting in bed in no acute distress.  POC discussed with patient who verbalizes understanding.  Vitals stable.  No further needs voiced at this time.  Bed is low and locked with side rails up x 2 and call light and urinal within reach.  Awaiting placement to hospital floor.

## 2018-06-14 NOTE — DISCHARGE INSTRUCTIONS
Hold lisinopril, Hold HCTZ  Take 2 amlodipine 5mg oral nightly.   Recheck with PCP in 4 days- repeat BMP

## 2018-06-14 NOTE — DISCHARGE SUMMARY
"[facility] Hospital Medicine  Discharge Summary      Patient Name: Chai Murry  MRN: [id number]  Admission Date: 6/13/2018  Hospital Length of Stay: 0 days  Discharge Date and Time:  06/14/2018 6:22 PM  Attending Physician:Dr. Moscoso  Discharging Provider: Debbie Bunch NP  Primary Care Provider: Peter Teixeira MD      HPI:   Mr. Murry is a 77 y/o  male with h/o HTN, HLP, A.Fib on aspirin, presents to the ED c/o 1-2 days of abdominal pain associated with nausa/vomiting, without diarrhea. He feels "cramping" in the abdomen. CT abdomen reveals mildly distended central small bowel loops with the air-fluid levels and mesenteric edema.  Primary differential is a low-grade enteritis.  No evidence of abscess or free air. He denies any sick contacts or travle or outside food recently. Labs revela JOHANNA with creatinine 2.8 (baseline 1.0). Received 2 L NS in ED. Admitted for JOHANNA and suspected acute viral gastroenteritis.    * No surgery found *      Hospital Course:   The pt was placed in observation with Acute gastroenteritis and ARF on IVFs. Pt reported he had  worked outside for the last 2 days. He had a few episodes of diaphoresis. He then had acute onset of lower abdominal pian. He made himself vomit once. No diarrhea. Abdominal pain prompted him to go to ED. In the ED, Serum, Cr 2.8. wbc normal. Afebrile. CT abd showed low-grade enteritis. Pt had episode of hypotension-100/50 which resolved with IV hydration.   Today, abdominal pain and nausea resolved. EKG showed no acute changes. Troponin normal. Serum Cr improved to 1.9 on IV hydration. -180s. Instructed pt to continue to hold Lisinopril and HCTZ- increase amlodipine to 10mg daily. He was instructed to stay hydrated and f/u with PCP in 4 days for repeat BMP.        Final Active Diagnoses:    Diagnosis Date Noted POA    PRINCIPAL PROBLEM:  Acute gastroenteritis [K52.9] 06/13/2018 Yes    ARF (acute renal failure) [N17.9] 06/13/2018 Yes "    Chronic atrial fibrillation [I48.2] 03/02/2017 Yes    Hx of cancer of lung [Z85.118] 02/09/2017 Not Applicable    s/p left pnuemonectomy for KAYLI Lunc Ca [Z90.2] 04/28/2016 Not Applicable    Essential hypertension [I10]  Yes      Problems Resolved During this Admission:    Diagnosis Date Noted Date Resolved POA       Discharged Condition: stable    Disposition: Home or Self Care    Follow Up:  Follow-up Information     Peter Teixeira MD In 4 days.    Specialty:  Internal Medicine  Why:  repeat BMP in 4 days   Contact information:  [id number]  [address] [place] [id number] [phone]                 Patient Instructions:     Diet Cardiac     Activity as tolerated         Significant Diagnostic Studies:   Imaging Results          CT Abdomen Pelvis  Without Contrast (Final result)  Result time 06/13/18 22:48:59   Procedure changed from CT Abdomen Pelvis With Contrast     Final result by Dhruv Villar MD (06/13/18 22:48:59)                 Impression:      Mildly distended central small bowel loops with the air-fluid levels and mesenteric edema.  Primary differential is a low-grade enteritis.  No evidence of abscess or free air.    Colonic diverticulosis.    All CT scans at this facility use dose modulation, iterative reconstruction and/or weight based dosing when appropriate to reduce radiation dose to as low as reasonably achievable.      Electronically signed by: Dhruv Villar MD  Date:    06/13/2018  Time:    22:48             Narrative:    EXAMINATION:  CT ABDOMEN PELVIS WITHOUT CONTRAST    CLINICAL HISTORY:  Abdominal distension; history of lung cancer status post pneumonectomy.    TECHNIQUE:  Axial CT images performed through the abdomen and pelvis without intravenous contrast. Multiplanar reformats were performed and interpreted.    COMPARISON:  05/23/2018    FINDINGS:  Left pneumonectomy with associated chronic left lower lobe pleural fluid and pleural thickening.  Volume loss results in  shift of the heart and mediastinum into the left hemithorax.  The visualized portions of the right lung are clear.    No hydronephrosis.  There is a 2 mm nonobstructive calculus at the interpolar region of the left kidney.  Bilateral benign renal cysts measuring up to 1.9 cm at the upper pole of the left kidney and 4.7 cm at the lower pole of the right kidney.  No ureteral calculi identified.    The liver, spleen, adrenal glands, and pancreas have a normal noncontrasted appearance.    The gallbladder has been removed.    Mild distention of multiple loops of central small bowel measuring up to 3.4 cm with mild adjacent mesenteric edema.  There is gas seen throughout the distal small bowel and colon.  No transition point identified.  Colonic diverticulosis.  No abscess or evidence of perforation.  No ascites.    Aortic atherosclerosis without evidence of aneurysm.    The urinary bladder is unremarkable. The prostate is within normal limits.    No significant osseous abnormality is identified.                                Pending Diagnostic Studies:     None         Medications:  Reconciled Home Medications:      Medication List      CHANGE how you take these medications    amLODIPine 5 MG tablet  Commonly known as:  NORVASC  Take 2 tablets (10 mg total) by mouth every evening.  What changed:  how much to take        CONTINUE taking these medications    aspirin 81 MG EC tablet  Commonly known as:  ECOTRIN  Take 81 mg by mouth once daily.     doxepin 10 MG capsule  Commonly known as:  SINEQUAN  Take 1 capsule (10 mg total) by mouth every evening.     fenofibric acid 135 mg Cpdr  Commonly known as:  FIBRICOR  TAKE 1 CAPSULE BY MOUTH EVERY DAY     fluticasone 50 mcg/actuation nasal spray  Commonly known as:  FLONASE  1 spray by Each Nare route once daily.     * ipratropium-albuterol  mcg/actuation inhaler  Commonly known as:  COMBIVENT RESPIMAT  Inhale 1 puff into the lungs every 6 (six) hours as needed for  Shortness of Breath.     * albuterol-ipratropium 2.5 mg-0.5 mg/3 mL nebulizer solution  Commonly known as:  DUO-NEB  Take 3 mLs by nebulization every 6 (six) hours as needed for Shortness of Breath.     levocetirizine 5 MG tablet  Commonly known as:  XYZAL  Take 5 mg by mouth every evening.     simvastatin 40 MG tablet  Commonly known as:  ZOCOR  Take 1 tablet (40 mg total) by mouth every evening.     SOTALOL AF 80 MG tablet  Generic drug:  sotalol  TAKE 1 TABLET BY MOUTH TWICE A DAY        * This list has 2 medication(s) that are the same as other medications prescribed for you. Read the directions carefully, and ask your doctor or other care provider to review them with you.            STOP taking these medications    doxycycline monohydrate 100 mg Tab     hydroCHLOROthiazide 25 MG tablet  Commonly known as:  HYDRODIURIL     lisinopril 40 MG tablet  Commonly known as:  PRINIVIL,ZESTRIL     potassium 99 mg Tab            Indwelling Lines/Drains at time of discharge:   Lines/Drains/Airways          No matching active lines, drains, or airways          Time spent on the discharge of patient: 42 minutes  Patient was seen and examined on the date of discharge and determined to be suitable for discharge.         Debbie Bunch NP  Department of Hospital Medicine  [facility]

## 2018-06-18 ENCOUNTER — LAB VISIT (OUTPATIENT)
Dept: LAB | Facility: HOSPITAL | Age: 78
End: 2018-06-18
Attending: PHYSICIAN ASSISTANT
Payer: MEDICARE

## 2018-06-18 ENCOUNTER — OFFICE VISIT (OUTPATIENT)
Dept: INTERNAL MEDICINE | Facility: CLINIC | Age: 78
End: 2018-06-18
Payer: MEDICARE

## 2018-06-18 VITALS
TEMPERATURE: 97 F | WEIGHT: 181.44 LBS | DIASTOLIC BLOOD PRESSURE: 74 MMHG | HEIGHT: 70 IN | HEART RATE: 68 BPM | BODY MASS INDEX: 25.98 KG/M2 | SYSTOLIC BLOOD PRESSURE: 142 MMHG

## 2018-06-18 DIAGNOSIS — I10 ESSENTIAL HYPERTENSION: ICD-10-CM

## 2018-06-18 DIAGNOSIS — E86.0 DEHYDRATION: Primary | ICD-10-CM

## 2018-06-18 DIAGNOSIS — E86.0 DEHYDRATION: ICD-10-CM

## 2018-06-18 DIAGNOSIS — K52.9 GASTROENTERITIS: ICD-10-CM

## 2018-06-18 LAB
ANION GAP SERPL CALC-SCNC: 6 MMOL/L
BUN SERPL-MCNC: 18 MG/DL
CALCIUM SERPL-MCNC: 9.5 MG/DL
CHLORIDE SERPL-SCNC: 105 MMOL/L
CO2 SERPL-SCNC: 30 MMOL/L
CREAT SERPL-MCNC: 1.2 MG/DL
EST. GFR  (AFRICAN AMERICAN): >60 ML/MIN/1.73 M^2
EST. GFR  (NON AFRICAN AMERICAN): 58 ML/MIN/1.73 M^2
GLUCOSE SERPL-MCNC: 94 MG/DL
POTASSIUM SERPL-SCNC: 4.6 MMOL/L
SODIUM SERPL-SCNC: 141 MMOL/L

## 2018-06-18 PROCEDURE — 99214 OFFICE O/P EST MOD 30 MIN: CPT | Mod: S$GLB,,, | Performed by: PHYSICIAN ASSISTANT

## 2018-06-18 PROCEDURE — 36415 COLL VENOUS BLD VENIPUNCTURE: CPT | Mod: PO

## 2018-06-18 PROCEDURE — 99999 PR PBB SHADOW E&M-EST. PATIENT-LVL IV: CPT | Mod: PBBFAC,,, | Performed by: PHYSICIAN ASSISTANT

## 2018-06-18 PROCEDURE — 80048 BASIC METABOLIC PNL TOTAL CA: CPT

## 2018-06-18 NOTE — PROGRESS NOTES
Subjective:       Patient ID: Chai Murry is a 78 y.o. male.    Chief Complaint: Hospital Follow Up    HPI  Patient comes in today for follow up Hosp stay  1 night hospital stay due to JOHANNA from viral gastroenteritis.   Was hydrated and discharged.  Needing to follow up on labs due to rise in creatinine.   Patient feels much better. Had some significant abdominal pain that is now resolved.   Normal urination, normal bowel movements.       There are no preventive care reminders to display for this patient.    Past Medical History:   Diagnosis Date    Anemia     Arthritis     Atrial fibrillation     CAD (coronary artery disease)     Cancer     lung cancer-Left    Cataract     Diverticulosis     ED (erectile dysfunction)     HTN (hypertension)     Hyperlipidemia     Myocardial infarction        Current Outpatient Prescriptions   Medication Sig Dispense Refill    albuterol-ipratropium 2.5mg-0.5mg/3mL (DUO-NEB) 0.5 mg-3 mg(2.5 mg base)/3 mL nebulizer solution Take 3 mLs by nebulization every 6 (six) hours as needed for Shortness of Breath. 120 vial 11    amLODIPine (NORVASC) 5 MG tablet Take 2 tablets (10 mg total) by mouth every evening. 60 tablet 0    aspirin (ECOTRIN) 81 MG EC tablet Take 81 mg by mouth once daily.      doxepin (SINEQUAN) 10 MG capsule Take 1 capsule (10 mg total) by mouth every evening. 30 capsule 11    fenofibric acid (FIBRICOR) 135 mg CpDR TAKE 1 CAPSULE BY MOUTH EVERY DAY 30 capsule 11    fluticasone (FLONASE) 50 mcg/actuation nasal spray 1 spray by Each Nare route once daily.      ipratropium-albuterol (COMBIVENT RESPIMAT)  mcg/actuation inhaler Inhale 1 puff into the lungs every 6 (six) hours as needed for Shortness of Breath. 4 g 11    levocetirizine (XYZAL) 5 MG tablet Take 5 mg by mouth every evening.      simvastatin (ZOCOR) 40 MG tablet Take 1 tablet (40 mg total) by mouth every evening. 30 tablet 11    SOTALOL AF 80 mg tablet TAKE 1 TABLET BY MOUTH TWICE A DAY 60  "tablet 11     Current Facility-Administered Medications   Medication Dose Route Frequency Provider Last Rate Last Dose    testosterone cypionate injection 200 mg  200 mg Intramuscular Q21 Days Peter Teixeira MD   200 mg at 05/31/18 0844       Review of Systems   Constitutional: Positive for activity change. Negative for fatigue, fever and unexpected weight change.   HENT: Negative for facial swelling, trouble swallowing and voice change.    Eyes: Negative for visual disturbance.   Respiratory: Negative for cough, chest tightness and shortness of breath.    Cardiovascular: Negative for chest pain and leg swelling.   Gastrointestinal: Negative for abdominal distention, abdominal pain and blood in stool.   Endocrine: Negative for polydipsia and polyuria.   Genitourinary: Negative for difficulty urinating, flank pain and penile pain.   Musculoskeletal: Negative for arthralgias and back pain.   Skin: Negative for color change and rash.   Allergic/Immunologic: Negative.  Negative for immunocompromised state.   Neurological: Negative for dizziness, facial asymmetry and speech difficulty.   Hematological: Negative for adenopathy. Does not bruise/bleed easily.   Psychiatric/Behavioral: Negative for agitation and suicidal ideas.       Objective:   BP (!) 142/74   Pulse 68   Temp 96.5 °F (35.8 °C) (Tympanic)   Ht 5' 10" (1.778 m)   Wt 82.3 kg (181 lb 7 oz)   BMI 26.03 kg/m²      Physical Exam   Constitutional: He is oriented to person, place, and time. He appears well-developed and well-nourished. No distress.   HENT:   Head: Normocephalic and atraumatic.   Right Ear: Hearing, tympanic membrane, external ear and ear canal normal.   Left Ear: Hearing, tympanic membrane, external ear and ear canal normal.   Nose: Nose normal.   Mouth/Throat: Oropharynx is clear and moist.   Eyes: Conjunctivae and EOM are normal. Pupils are equal, round, and reactive to light.   Neck: Normal range of motion. No thyromegaly present. "   Cardiovascular: Normal rate, regular rhythm, normal heart sounds and intact distal pulses.    Pulmonary/Chest: Effort normal and breath sounds normal.   Lymphadenopathy:     He has no cervical adenopathy.   Neurological: He is alert and oriented to person, place, and time.         Lab Results   Component Value Date    WBC 5.70 06/14/2018    HGB 12.0 (L) 06/14/2018    HCT 39.3 (L) 06/14/2018     06/14/2018    CHOL 169 05/03/2018    TRIG 94 05/03/2018    HDL 43 05/03/2018    ALT 14 06/13/2018    AST 24 06/13/2018     06/14/2018    K 4.4 06/14/2018     06/14/2018    CREATININE 1.9 (H) 06/14/2018    BUN 31 (H) 06/14/2018    CO2 24 06/14/2018    TSH 3.422 02/20/2012    PSA 0.27 05/03/2018    INR 1.1 03/04/2016       Assessment:       1. Dehydration    2. Gastroenteritis    3. Essential hypertension        Plan:   Dehydration  -     Basic metabolic panel; Future; Expected date: 06/18/2018  IV fluids in hosp   Feeling much better as long as he is not overdoing it on sugary drinks   Rest, continue hydration and BRAT diet   Gastroenteritis  -     Basic metabolic panel; Future; Expected date: 06/18/2018    Essential  hypertension-ok to resume home medication       Transitional Care Note    Family and/or Caretaker present at visit?  No.  Diagnostic tests reviewed/disposition: No diagnosic tests pending after this hospitalization.  Disease/illness education: yes  Home health/community services discussion/referrals: Patient does not have home health established from hospital visit.  They do not need home health.  If needed, we will set up home health for the patient.   Establishment or re-establishment of referral orders for community resources: No other necessary community resources.   Discussion with other health care providers: waiting on BMP repeat .         No Follow-up on file.

## 2018-06-21 ENCOUNTER — CLINICAL SUPPORT (OUTPATIENT)
Dept: INTERNAL MEDICINE | Facility: CLINIC | Age: 78
End: 2018-06-21
Payer: MEDICARE

## 2018-06-21 DIAGNOSIS — E29.1 HYPOGONADISM IN MALE: Primary | ICD-10-CM

## 2018-06-21 PROCEDURE — 99999 PR PBB SHADOW E&M-EST. PATIENT-LVL II: CPT | Mod: PBBFAC,,,

## 2018-06-21 PROCEDURE — 96372 THER/PROPH/DIAG INJ SC/IM: CPT | Mod: S$GLB,,, | Performed by: INTERNAL MEDICINE

## 2018-06-21 RX ADMIN — TESTOSTERONE CYPIONATE 200 MG: 200 INJECTION, SOLUTION INTRAMUSCULAR at 08:06

## 2018-07-12 ENCOUNTER — CLINICAL SUPPORT (OUTPATIENT)
Dept: INTERNAL MEDICINE | Facility: CLINIC | Age: 78
End: 2018-07-12
Payer: MEDICARE

## 2018-07-12 PROCEDURE — 96372 THER/PROPH/DIAG INJ SC/IM: CPT | Mod: S$GLB,,, | Performed by: INTERNAL MEDICINE

## 2018-07-12 PROCEDURE — 99999 PR PBB SHADOW E&M-EST. PATIENT-LVL II: CPT | Mod: PBBFAC,,,

## 2018-07-12 RX ADMIN — TESTOSTERONE CYPIONATE 200 MG: 200 INJECTION, SOLUTION INTRAMUSCULAR at 03:07

## 2018-07-12 NOTE — PROGRESS NOTES
Pt reported to clinic for depo testosterone injection. Identified pt using two pt identifiers. Allergies and medications verified with pt. Depo Testosterone 200mg given to pt in left ventrogluteal. Pt tolerated well and was advised of 15 minute wait time. Pt scheduled for next injection.

## 2018-08-02 ENCOUNTER — CLINICAL SUPPORT (OUTPATIENT)
Dept: INTERNAL MEDICINE | Facility: CLINIC | Age: 78
End: 2018-08-02
Payer: MEDICARE

## 2018-08-02 DIAGNOSIS — E29.1 HYPOGONADISM IN MALE: Primary | ICD-10-CM

## 2018-08-02 PROCEDURE — 96372 THER/PROPH/DIAG INJ SC/IM: CPT | Mod: S$GLB,,, | Performed by: INTERNAL MEDICINE

## 2018-08-02 PROCEDURE — 99999 PR PBB SHADOW E&M-EST. PATIENT-LVL II: CPT | Mod: PBBFAC,,,

## 2018-08-02 RX ADMIN — TESTOSTERONE CYPIONATE 200 MG: 200 INJECTION, SOLUTION INTRAMUSCULAR at 08:08

## 2018-08-06 ENCOUNTER — OFFICE VISIT (OUTPATIENT)
Dept: URGENT CARE | Facility: CLINIC | Age: 78
End: 2018-08-06
Payer: MEDICARE

## 2018-08-06 VITALS
HEIGHT: 70 IN | WEIGHT: 187.63 LBS | SYSTOLIC BLOOD PRESSURE: 118 MMHG | RESPIRATION RATE: 16 BRPM | TEMPERATURE: 97 F | OXYGEN SATURATION: 99 % | DIASTOLIC BLOOD PRESSURE: 64 MMHG | BODY MASS INDEX: 26.86 KG/M2 | HEART RATE: 64 BPM

## 2018-08-06 DIAGNOSIS — J44.1 COPD WITH ACUTE EXACERBATION: Primary | ICD-10-CM

## 2018-08-06 PROCEDURE — 3078F DIAST BP <80 MM HG: CPT | Mod: CPTII,S$GLB,, | Performed by: PHYSICIAN ASSISTANT

## 2018-08-06 PROCEDURE — 99214 OFFICE O/P EST MOD 30 MIN: CPT | Mod: S$GLB,,, | Performed by: PHYSICIAN ASSISTANT

## 2018-08-06 PROCEDURE — 3074F SYST BP LT 130 MM HG: CPT | Mod: CPTII,S$GLB,, | Performed by: PHYSICIAN ASSISTANT

## 2018-08-06 PROCEDURE — 99999 PR PBB SHADOW E&M-EST. PATIENT-LVL III: CPT | Mod: PBBFAC,,, | Performed by: PHYSICIAN ASSISTANT

## 2018-08-06 RX ORDER — LISINOPRIL 40 MG/1
20 TABLET ORAL DAILY
Refills: 11 | COMMUNITY
Start: 2018-07-09 | End: 2019-06-05 | Stop reason: SDUPTHER

## 2018-08-06 RX ORDER — BENZONATATE 200 MG/1
200 CAPSULE ORAL 3 TIMES DAILY PRN
Qty: 30 CAPSULE | Refills: 0 | Status: SHIPPED | OUTPATIENT
Start: 2018-08-06 | End: 2018-08-16

## 2018-08-06 RX ORDER — DOXYCYCLINE 100 MG/1
100 CAPSULE ORAL EVERY 12 HOURS
Qty: 14 CAPSULE | Refills: 0 | Status: SHIPPED | OUTPATIENT
Start: 2018-08-06 | End: 2018-08-13

## 2018-08-06 NOTE — PATIENT INSTRUCTIONS
COPD Flare    You have had a flare-up of your COPD.  COPD, or chronic obstructive pulmonary disease, is a common lung disease. It causes your airways to become irritated and narrower. This makes it harder for you to breathe. Emphysema and chronic bronchitis are both types of COPD. This is a chronic condition, which means you always have it. Sometimes it gets worse. When this happens, it is called a flare-up.  Symptoms of COPD  People with COPD may have symptoms most of the time. In a flare-up, your symptoms get worse. These symptoms may mean you are having a flare-up:  · Shortness of breath, shallow or rapid breathing, or wheezing that gets worse  · Lung infection  · Cough that gets worse  · More mucus, thicker mucus or mucus of a different color  · Tiredness, decreased energy, or trouble doing your usual activities  · Fever  · Chest tightness  · Your symptoms dont get better even when you use your usual medicines, inhalers, and nebulizer  · Trouble talking  · You feel confused  Causes of flare-ups  Unfortunately, a flare-up can happen even though you did everything right, and you followed your doctors instructions. Some causes of flare-ups are:  · Smoking or secondhand smoke  · Colds, the flu, or respiratory infections  · Air pollution  · Sudden change in the weather  · Dust, irritating chemicals, or strong fumes  · Not taking your medicines as prescribed  Home care  Here are some things you can do at home to treat a flare-up:  · Try not to panic. This makes it harder to breathe, and keeps you from doing the right things.  · Dont smoke or be around others who are smoking.  · Try to drink more fluids than usual during a flare-up, unless your doctor has told you not to because of heart and kidney problems. More fluids can help loosen the mucus.  · Use your inhalers and nebulizer, if you have one, as you have been told to.  · If you were given antibiotics, take them until they are used up or your doctor tells you  to stop. Its important to finish the antibiotics, even though you feel better. This will make sure the infection has cleared.  · If you were given prednisone or another steroid, finish it even if you feel better.  Preventing a flare-up  Even though flare-ups happen, the best way to treat one is to prevent it before it starts. Here are some pointers:  · Dont smoke or be around others who are smoking.  · Take your medicines as you have been told.  · Talk with your doctor about getting a flu shot every year. Also find out if you need a pneumonia shot.  · If there is a weather advisory warning to stay indoors, try to stay inside when possible.  · Try to eat healthy and get plenty of sleep.  · Try to avoid things that usually set you off, like dust, chemical fumes, hairsprays, or strong perfumes.  Follow-up care  Follow up with your healthcare provider, or as advised.  If a culture was done, you will be told if your treatment needs to be changed. You can call as directed for the results.  If X-rays were done, you will be notified of any new findings that may affect your care.  Call 911  Call 911 if any of these occur:  · You have trouble breathing  · You feel confused or its difficult to wake you up  · You faint or lose consciousness  · You have a rapid heart rate  · You have new pain in your chest, arm, shoulder, neck or upper back  When to seek medical advice  Call your healthcare provider right away if any of these occur:  · Wheezing or shortness of breath gets worse  · You need to use your inhalers more often than usual without relief  · Fever of 100.4°F (38ºC) or higher, or as directed by your healthcare provider  · Coughing up lots of dark-colored or bloody mucus (sputum)  · Chest pain with each breath  · You do not start to get better within 24 hours  · Swelling of your ankles gets worse  · Dizziness or weakness  Date Last Reviewed: 9/1/2016  © 3641-2191 [employer]. [address] [facility],  [place] [address]. All rights reserved. This information is not intended as a substitute for professional medical care. Always follow your healthcare professional's instructions.

## 2018-08-23 ENCOUNTER — CLINICAL SUPPORT (OUTPATIENT)
Dept: INTERNAL MEDICINE | Facility: CLINIC | Age: 78
End: 2018-08-23
Payer: MEDICARE

## 2018-08-23 DIAGNOSIS — E29.1 HYPOGONADISM IN MALE: Primary | ICD-10-CM

## 2018-08-23 PROCEDURE — 96372 THER/PROPH/DIAG INJ SC/IM: CPT | Mod: S$GLB,,, | Performed by: INTERNAL MEDICINE

## 2018-08-23 PROCEDURE — 99999 PR PBB SHADOW E&M-EST. PATIENT-LVL II: CPT | Mod: PBBFAC,,,

## 2018-08-23 RX ADMIN — TESTOSTERONE CYPIONATE 200 MG: 200 INJECTION, SOLUTION INTRAMUSCULAR at 08:08

## 2018-08-28 ENCOUNTER — OFFICE VISIT (OUTPATIENT)
Dept: CARDIOLOGY | Facility: CLINIC | Age: 78
End: 2018-08-28
Payer: MEDICARE

## 2018-08-28 ENCOUNTER — CLINICAL SUPPORT (OUTPATIENT)
Dept: CARDIOLOGY | Facility: CLINIC | Age: 78
End: 2018-08-28
Payer: MEDICARE

## 2018-08-28 VITALS
SYSTOLIC BLOOD PRESSURE: 134 MMHG | HEART RATE: 58 BPM | BODY MASS INDEX: 26.57 KG/M2 | WEIGHT: 185.63 LBS | DIASTOLIC BLOOD PRESSURE: 68 MMHG | HEIGHT: 70 IN

## 2018-08-28 DIAGNOSIS — I48.91 ATRIAL FIBRILLATION, UNSPECIFIED TYPE: Primary | ICD-10-CM

## 2018-08-28 DIAGNOSIS — I48.91 ATRIAL FIBRILLATION, UNSPECIFIED TYPE: ICD-10-CM

## 2018-08-28 DIAGNOSIS — I10 ESSENTIAL HYPERTENSION: ICD-10-CM

## 2018-08-28 DIAGNOSIS — Z76.89 ENCOUNTER TO ESTABLISH CARE: Primary | ICD-10-CM

## 2018-08-28 DIAGNOSIS — E78.00 PURE HYPERCHOLESTEROLEMIA: ICD-10-CM

## 2018-08-28 DIAGNOSIS — I25.10 CORONARY ARTERY DISEASE INVOLVING NATIVE CORONARY ARTERY OF NATIVE HEART WITHOUT ANGINA PECTORIS: Primary | ICD-10-CM

## 2018-08-28 DIAGNOSIS — I48.20 CHRONIC ATRIAL FIBRILLATION: ICD-10-CM

## 2018-08-28 PROCEDURE — 93010 ELECTROCARDIOGRAM REPORT: CPT | Mod: ,,, | Performed by: INTERNAL MEDICINE

## 2018-08-28 PROCEDURE — 3078F DIAST BP <80 MM HG: CPT | Mod: CPTII,S$GLB,, | Performed by: INTERNAL MEDICINE

## 2018-08-28 PROCEDURE — 3075F SYST BP GE 130 - 139MM HG: CPT | Mod: CPTII,S$GLB,, | Performed by: INTERNAL MEDICINE

## 2018-08-28 PROCEDURE — 99999 PR PBB SHADOW E&M-EST. PATIENT-LVL III: CPT | Mod: PBBFAC,,, | Performed by: INTERNAL MEDICINE

## 2018-08-28 PROCEDURE — 99204 OFFICE O/P NEW MOD 45 MIN: CPT | Mod: S$GLB,,, | Performed by: INTERNAL MEDICINE

## 2018-08-28 PROCEDURE — 93005 ELECTROCARDIOGRAM TRACING: CPT | Mod: S$GLB,,, | Performed by: INTERNAL MEDICINE

## 2018-08-28 NOTE — PROGRESS NOTES
Subjective:   Patient ID:  Chai Murry is a 78 y.o. male who presents for follow-up to [facility] care.  Pt with CAD hx of PCI,afib- now NSR, HTN,HLP.Patient denies CP, angina or anginal equivalent.  Pt with left lobectomy 2 years ago.  Hypertension   This is a chronic problem. The current episode started more than 1 year ago. The problem has been gradually improving since onset. The problem is controlled. Pertinent negatives include no chest pain, palpitations or shortness of breath. Past treatments include ACE inhibitors and calcium channel blockers. The current treatment provides moderate improvement. There are no compliance problems.    Atrial Fibrillation   Presents for follow-up visit. Symptoms are negative for bradycardia, chest pain, dizziness, hypertension, hypotension, palpitations, shortness of breath, syncope, tachycardia and weakness. The symptoms have been stable. Past medical history includes atrial fibrillation and hyperlipidemia. There are no medication compliance problems.   Hyperlipidemia   This is a chronic problem. The current episode started more than 1 year ago. The problem is controlled. Pertinent negatives include no chest pain or shortness of breath. Current antihyperlipidemic treatment includes fibric acid derivatives and statins. The current treatment provides moderate improvement of lipids. There are no compliance problems.        Review of Systems   Constitution: Negative. Negative for weakness and weight gain.   HENT: Negative.    Eyes: Negative.    Cardiovascular: Negative.  Negative for chest pain, leg swelling, palpitations and syncope.   Respiratory: Negative.  Negative for shortness of breath.    Endocrine: Negative.    Hematologic/Lymphatic: Negative.    Skin: Negative.    Musculoskeletal: Negative for muscle weakness.   Gastrointestinal: Negative.    Genitourinary: Negative.    Neurological: Negative.  Negative for dizziness.   Psychiatric/Behavioral: Negative.     Allergic/Immunologic: Negative.      Family History   Problem Relation Age of Onset    Esophageal cancer Sister     Cancer Sister     Lung cancer Mother     Cancer Mother     Cataracts Mother     Hypertension Mother     Pneumonia Father     Cataracts Father     Hypertension Father     Cancer Brother     Hypertension Brother     Blindness Neg Hx     Diabetes Neg Hx     Glaucoma Neg Hx     Macular degeneration Neg Hx     Retinal detachment Neg Hx     Strabismus Neg Hx     Stroke Neg Hx     Thyroid disease Neg Hx      Past Medical History:   Diagnosis Date    Anemia     Arthritis     Atrial fibrillation     CAD (coronary artery disease)     Cancer     lung cancer-Left    Cataract     Diverticulosis     ED (erectile dysfunction)     HTN (hypertension)     Hyperlipidemia     Myocardial infarction      Current Outpatient Medications on File Prior to Visit   Medication Sig Dispense Refill    albuterol-ipratropium 2.5mg-0.5mg/3mL (DUO-NEB) 0.5 mg-3 mg(2.5 mg base)/3 mL nebulizer solution Take 3 mLs by nebulization every 6 (six) hours as needed for Shortness of Breath. 120 vial 11    amLODIPine (NORVASC) 5 MG tablet Take 2 tablets (10 mg total) by mouth every evening. 60 tablet 0    aspirin (ECOTRIN) 81 MG EC tablet Take 81 mg by mouth once daily.      doxepin (SINEQUAN) 10 MG capsule Take 1 capsule (10 mg total) by mouth every evening. 30 capsule 11    fenofibric acid (FIBRICOR) 135 mg CpDR TAKE 1 CAPSULE BY MOUTH EVERY DAY 30 capsule 11    fluticasone (FLONASE) 50 mcg/actuation nasal spray 1 spray by Each Nare route once daily.      ipratropium-albuterol (COMBIVENT RESPIMAT)  mcg/actuation inhaler Inhale 1 puff into the lungs every 6 (six) hours as needed for Shortness of Breath. 4 g 11    levocetirizine (XYZAL) 5 MG tablet Take 5 mg by mouth every evening.      lisinopril (PRINIVIL,ZESTRIL) 40 MG tablet Take 40 mg by mouth once daily.  11    simvastatin (ZOCOR) 40 MG tablet  Take 1 tablet (40 mg total) by mouth every evening. 30 tablet 11    SOTALOL AF 80 mg tablet TAKE 1 TABLET BY MOUTH TWICE A DAY 60 tablet 11     No current facility-administered medications on file prior to visit.      Review of patient's allergies indicates:   Allergen Reactions    Atorvastatin      Other reaction(s): Muscle pain       Objective:     Physical Exam   Constitutional: He is oriented to person, place, and time. He appears well-developed and well-nourished.   HENT:   Head: Normocephalic and atraumatic.   Eyes: Conjunctivae are normal. Pupils are equal, round, and reactive to light.   Neck: Normal range of motion. Neck supple.   Cardiovascular: Normal rate, regular rhythm, normal heart sounds and intact distal pulses.   Pulmonary/Chest: Effort normal and breath sounds normal.   Abdominal: Soft. Bowel sounds are normal.   Neurological: He is alert and oriented to person, place, and time.   Skin: Skin is warm and dry.   Psychiatric: He has a normal mood and affect.   Nursing note and vitals reviewed.      Assessment:     1. Coronary artery disease involving native coronary artery of native heart without angina pectoris    2. Pure hypercholesterolemia    3. Essential hypertension    4. Chronic atrial fibrillation        Plan:     Coronary artery disease involving native coronary artery of native heart without angina pectoris    Pure hypercholesterolemia    Essential hypertension    Chronic atrial fibrillation      Continue sotatolol,asa-afib now NSR  continue statin-hlp  Continue lisinopril, norvasc-htn

## 2018-09-10 ENCOUNTER — LAB VISIT (OUTPATIENT)
Dept: LAB | Facility: HOSPITAL | Age: 78
End: 2018-09-10
Attending: INTERNAL MEDICINE
Payer: MEDICARE

## 2018-09-10 DIAGNOSIS — I25.10 CORONARY ARTERY DISEASE INVOLVING NATIVE CORONARY ARTERY OF NATIVE HEART WITHOUT ANGINA PECTORIS: ICD-10-CM

## 2018-09-10 DIAGNOSIS — E78.49 OTHER HYPERLIPIDEMIA: ICD-10-CM

## 2018-09-10 PROCEDURE — 36415 COLL VENOUS BLD VENIPUNCTURE: CPT | Mod: PO

## 2018-09-10 PROCEDURE — 80061 LIPID PANEL: CPT

## 2018-09-11 LAB
CHOLEST SERPL-MCNC: 165 MG/DL
CHOLEST/HDLC SERPL: 3.4 {RATIO}
HDLC SERPL-MCNC: 48 MG/DL
HDLC SERPL: 29.1 %
LDLC SERPL CALC-MCNC: 103.2 MG/DL
NONHDLC SERPL-MCNC: 117 MG/DL
TRIGL SERPL-MCNC: 69 MG/DL

## 2018-09-14 ENCOUNTER — TELEPHONE (OUTPATIENT)
Dept: INTERNAL MEDICINE | Facility: CLINIC | Age: 78
End: 2018-09-14

## 2018-09-14 RX ORDER — TESTOSTERONE CYPIONATE 200 MG/ML
200 INJECTION, SOLUTION INTRAMUSCULAR
Status: DISCONTINUED | OUTPATIENT
Start: 2018-09-17 | End: 2018-11-06

## 2018-09-14 NOTE — TELEPHONE ENCOUNTER
Patient needs new depo test inj orders.  Please advise.     Depo test inj 200mg every 21 days.  Will get it Monday.

## 2018-09-14 NOTE — TELEPHONE ENCOUNTER
----- Message from Lauri Hess sent at 9/14/2018  1:00 PM CDT -----  Contact: Pt  Please give pt a call at [phone] regarding if the injection came in.

## 2018-09-17 ENCOUNTER — CLINICAL SUPPORT (OUTPATIENT)
Dept: INTERNAL MEDICINE | Facility: CLINIC | Age: 78
End: 2018-09-17
Payer: MEDICARE

## 2018-09-17 DIAGNOSIS — E29.1 HYPOGONADISM IN MALE: Primary | ICD-10-CM

## 2018-09-17 PROCEDURE — 96372 THER/PROPH/DIAG INJ SC/IM: CPT | Mod: PBBFAC,PO

## 2018-09-17 PROCEDURE — 99999 PR PBB SHADOW E&M-EST. PATIENT-LVL II: CPT | Mod: PBBFAC,,,

## 2018-09-17 PROCEDURE — 99212 OFFICE O/P EST SF 10 MIN: CPT | Mod: PBBFAC,PO

## 2018-09-17 RX ADMIN — TESTOSTERONE CYPIONATE 200 MG: 200 INJECTION, SOLUTION INTRAMUSCULAR at 01:09

## 2018-09-20 ENCOUNTER — PATIENT MESSAGE (OUTPATIENT)
Dept: INTERNAL MEDICINE | Facility: CLINIC | Age: 78
End: 2018-09-20

## 2018-09-20 DIAGNOSIS — Z12.11 COLON CANCER SCREENING: Primary | ICD-10-CM

## 2018-09-21 ENCOUNTER — OFFICE VISIT (OUTPATIENT)
Dept: URGENT CARE | Facility: CLINIC | Age: 78
End: 2018-09-21
Payer: MEDICARE

## 2018-09-21 ENCOUNTER — TELEPHONE (OUTPATIENT)
Dept: INTERNAL MEDICINE | Facility: CLINIC | Age: 78
End: 2018-09-21

## 2018-09-21 VITALS
OXYGEN SATURATION: 96 % | DIASTOLIC BLOOD PRESSURE: 60 MMHG | SYSTOLIC BLOOD PRESSURE: 118 MMHG | RESPIRATION RATE: 17 BRPM | TEMPERATURE: 97 F | BODY MASS INDEX: 26.57 KG/M2 | HEIGHT: 70 IN | HEART RATE: 64 BPM | WEIGHT: 185.63 LBS

## 2018-09-21 DIAGNOSIS — T78.40XA ALLERGIC REACTION TO DRUG, INITIAL ENCOUNTER: ICD-10-CM

## 2018-09-21 DIAGNOSIS — L02.91 ABSCESS: Primary | ICD-10-CM

## 2018-09-21 PROCEDURE — 3077F SYST BP >= 140 MM HG: CPT | Mod: CPTII,,, | Performed by: PHYSICIAN ASSISTANT

## 2018-09-21 PROCEDURE — 99214 OFFICE O/P EST MOD 30 MIN: CPT | Mod: S$PBB,,, | Performed by: PHYSICIAN ASSISTANT

## 2018-09-21 PROCEDURE — 1101F PT FALLS ASSESS-DOCD LE1/YR: CPT | Mod: CPTII,,, | Performed by: PHYSICIAN ASSISTANT

## 2018-09-21 PROCEDURE — 99999 PR PBB SHADOW E&M-EST. PATIENT-LVL III: CPT | Mod: PBBFAC,,, | Performed by: PHYSICIAN ASSISTANT

## 2018-09-21 PROCEDURE — 99213 OFFICE O/P EST LOW 20 MIN: CPT | Mod: PBBFAC,PO,25 | Performed by: PHYSICIAN ASSISTANT

## 2018-09-21 PROCEDURE — 3078F DIAST BP <80 MM HG: CPT | Mod: CPTII,,, | Performed by: PHYSICIAN ASSISTANT

## 2018-09-21 PROCEDURE — 96372 THER/PROPH/DIAG INJ SC/IM: CPT | Mod: PBBFAC,PO

## 2018-09-21 RX ORDER — CLINDAMYCIN HYDROCHLORIDE 300 MG/1
300 CAPSULE ORAL EVERY 8 HOURS
Qty: 21 CAPSULE | Refills: 0 | Status: SHIPPED | OUTPATIENT
Start: 2018-09-21 | End: 2018-09-28

## 2018-09-21 RX ORDER — METHYLPREDNISOLONE ACETATE 40 MG/ML
60 INJECTION, SUSPENSION INTRA-ARTICULAR; INTRALESIONAL; INTRAMUSCULAR; SOFT TISSUE
Status: COMPLETED | OUTPATIENT
Start: 2018-09-21 | End: 2018-09-21

## 2018-09-21 RX ORDER — MUPIROCIN 20 MG/G
OINTMENT TOPICAL 3 TIMES DAILY
Qty: 22 G | Refills: 0 | Status: SHIPPED | OUTPATIENT
Start: 2018-09-21 | End: 2018-11-21

## 2018-09-21 RX ORDER — SULFAMETHOXAZOLE AND TRIMETHOPRIM 800; 160 MG/1; MG/1
1 TABLET ORAL 2 TIMES DAILY
Qty: 20 TABLET | Refills: 0 | Status: SHIPPED | OUTPATIENT
Start: 2018-09-21 | End: 2018-09-21

## 2018-09-21 RX ADMIN — METHYLPREDNISOLONE ACETATE 60 MG: 40 INJECTION, SUSPENSION INTRA-ARTICULAR; INTRALESIONAL; INTRAMUSCULAR; SOFT TISSUE at 11:09

## 2018-09-21 NOTE — PROGRESS NOTES
"Subjective:      Patient ID: Chai Murry is a 78 y.o. male.    Chief Complaint: Abrasion    H/o skin cancer, it looked similar to this lesion      Abscess   Chronicity:  New  Onset:  3-5 days ago    Associated Symptoms: no fever, no chills  Characteristics: draining, painful and redness    Ineffective treatments: otc salve.    Review of Systems   Constitutional: Negative for chills, diaphoresis and fever.   Gastrointestinal: Negative for abdominal pain, diarrhea, nausea and vomiting.   Musculoskeletal: Negative for arthralgias (R elbow pain).   Skin: Positive for color change (erythema) and wound (R arm).   Neurological: Negative for dizziness, light-headedness and headaches.       Objective:   BP (!) 150/86   Pulse 86   Temp 96.6 °F (35.9 °C)   Resp 20   Ht 5' 10" (1.778 m)   Wt 84.2 kg (185 lb 10 oz)   SpO2 98%   BMI 26.63 kg/m²   Physical Exam   Constitutional: He appears well-developed and well-nourished. He does not appear ill. No distress.   HENT:   Head: Normocephalic and atraumatic.   Cardiovascular: Normal rate.   Pulmonary/Chest: Effort normal. No respiratory distress.   Musculoskeletal:        Right elbow: Normal.  Skin: Skin is warm and dry. Lesion noted. There is erythema.        Psychiatric: He has a normal mood and affect. His speech is normal and behavior is normal. Thought content normal.     Assessment:      1. Abscess       Plan:   Abscess  -     sulfamethoxazole-trimethoprim 800-160mg (BACTRIM DS) 800-160 mg Tab; Take 1 tablet by mouth 2 (two) times daily. for 10 days  Dispense: 20 tablet; Refill: 0  -     mupirocin (BACTROBAN) 2 % ointment; Apply topically 3 (three) times daily.  Dispense: 22 g; Refill: 0    Begin antibiotics to clear infection  Recommend follow up with dermatology to ensure no underlying skin lesion (h/o skin cancer with similar appearance per patient)  Lesion bandaged in clinic    Gave handout on abscess.  Printed AVS and reviewed treatment plan in detail.    Discussed " worsening signs/symptoms and when to return to clinic or go to ED.   Patient expresses understanding and agrees with treatment plan.

## 2018-09-21 NOTE — TELEPHONE ENCOUNTER
Spoke to patient. Pt stated that he was currently in route to clinic, about 5 minutes away to get evaluated by UC. No other sxs but swollen lip.

## 2018-09-21 NOTE — TELEPHONE ENCOUNTER
----- Message from Kyleigh Alofnso sent at 9/21/2018 10:55 AM CDT -----  Contact: Pt  Please return call to pt regard a swollen lip, due to medication prescribe by [facility] in [place]. Pt not sure what to do. Please call pt at [phone]

## 2018-09-21 NOTE — PROGRESS NOTES
Patient returns to clinic and states took medication ~45 minutes ago, as soon as he took it (within 5 minutes) he had swelling of his lip, patient denies any respiratory symptoms or oral swelling at this time.  Patient denies known allergy to Bactrim/sulfa but states he had a similar reaction to a medication in the past (unsure of what the medication was) which he attributed to the red dye in the Gatorade he took the medication with at that time.  Patient denies drinking anything red while taking medication today.      Lungs clear to auscultation, no wheezing present, no signs of respiratory distress, no pharyngeal swelling, moderate swelling to L lower lip, patient in teresa condition in clinic under no acute distress    Discussed avoiding medication in the future, discussed concerning signs/symptoms of allergic reaction and when to seek emergent care  Recommend taking anti-histamine to aid in alleviating symptoms  Patient expresses understanding

## 2018-09-21 NOTE — PATIENT INSTRUCTIONS
Local Allergic Reaction, Other  You are having an allergic reaction. Almost anything can cause one. Different people are allergic to different things. It is usually something that you ate or swallowed, came into contact with by getting or putting it on your skin or clothes, or something you breathed in the air. This can be very annoying and sometimes scary.  Symptoms of an allergic reaction can include:  · Rash, hives, redness, welts, blisters  · Itching, burning, stinging, pain  · Dry, flaky, cracking, scaly skin  · Swelling of the face, lips or other parts of the body  Sometimes the cause of an allergic reaction may be obvious. To help identify your allergen, remember:  · When it started  · What you were doing at the time or just before that  · Any activities you were involved in  · Any new products or contacts  Here are some common causes, but remember almost anything can cause a reaction, and you may not even be aware that you came into contact with one of these things.  · Dust, mold, pollen  · Plants such as poison ivy and poison oak are common ones, but there are many others  · Animals  · Foods such as shrimp, shellfish, peanuts, milk products, gluten, eggs; also colorings, flavorings, additives  · Insect bites or stings such as bees, mosquitos, flees, ticks  · Medicines such as penicillin, sulfa drugs, amoxicillin, aspirin, ibuprofen; any medicine can cause a reaction  · Jewelry such as nickel, gold  (new, or something youve worn for a while including zippers, and  buttons)  · Latex such as in gloves, clothes, toys, balloons, or some tapes (some people allergic to latex may also have problems with foods like bananas, avocados, kiwi, papaya, or chestnuts)  · Lotions, perfumes, cosmetics, soaps, shampoos, skincare products, nail products  · Chemicals or dyes in clothing, linen, , hair dyes, soaps, iodine  Home care    The goal of our treatment is to help relieve the symptoms, and get you feeling  better. The rash will usually fade over several days, but can sometimes last a couple of weeks. Over the next couple of days, there may be times when it is gets a little worse, and then better again. Here are some things to do:  · If you know what you are allergic to, avoid it because future reactions could be worse than this one.  · Avoid tight clothing and anything that heats up your skin (hot showers/baths, direct sunlight) since heat will make itching worse.  · An ice pack will relieve local areas of intense itching and redness. Dont put the ice directly on the skin, because it can damage the skin. You can also ice put it in a plastic bag. Wrap it in something like a towel, emmett shirt, or cloth.  · Oral Benadryl (diphenhydramine) is an antihistamine available at drug and grocery stores. Unless a prescription antihistamine was given, Benadryl may be used to reduce itching if large areas of the skin are involved. It may make you sleepy, so be careful using it in the daytime or when going to school, working, or driving. [NOTE: Do not use Benadryl if you have glaucoma or if you are a man with trouble urinating due to an enlarged prostate.] There are antihistamines that causes less drowsiness and is a good alternatives for daytime use. Ask your pharmacist for suggestions.  · Do not use Benadryl cream on your skin, because in some people it can cause a further reaction, and make you allergic to Benadryl.  · Try not to scratch. This can tear the skin and cause an infection.  · Using heat-steam to clean your home, using high-efficiency particulate (HEPA) vacuums and filters, avoiding food and pet triggers, exterminating cockroaches, and frequent house cleaning are a few of the strategies used to decrease allergic reactions.  Follow-up care  Follow up with your healthcare provider, or as advised if your symptoms do not continue to improve or get worse.  Call 911  Call 911 if any of these occur:  · Trouble breathing or  swallowing, wheezing  · New or worsening swelling in the mouth, throat, or tongue  · Hoarse voice or trouble speaking  · Confused   · Very drowsy or trouble awakening  · Fainting or loss of consciousness  · Rapid heart rate  · Low blood pressure  · Feeling of doom  · Nausea, vomiting, abdominal pain, diarrhea  · Vomiting blood, or large amounts of blood in stool  · Seizure  When to seek medical advice  Call your healthcare provider right away if any of the following occur:  · Spreading areas of itching, redness or swelling  · New or worse swelling in the face, eyelids, or  lips  · Dizziness, weakness  · Signs of infection:  ¨ Spreading redness  ¨ Increased pain or swelling  ¨ Fever of 100.4ºF (38ºC) or higher, or as directed by your healthcare provider  ¨ Colored fluid draining from the inflamed areas  Date Last Reviewed: 7/30/2015  © 7963-3770 [employer]. [address] [place] [id number]. All rights reserved. This information is not intended as a substitute for professional medical care. Always follow your healthcare professional's instructions.

## 2018-09-24 ENCOUNTER — TELEPHONE (OUTPATIENT)
Dept: CARDIOLOGY | Facility: CLINIC | Age: 78
End: 2018-09-24

## 2018-09-24 ENCOUNTER — DOCUMENTATION ONLY (OUTPATIENT)
Dept: ENDOSCOPY | Facility: HOSPITAL | Age: 78
End: 2018-09-24

## 2018-09-24 RX ORDER — SODIUM, POTASSIUM,MAG SULFATES 17.5-3.13G
SOLUTION, RECONSTITUTED, ORAL ORAL
Qty: 354 ML | Refills: 0 | Status: ON HOLD | OUTPATIENT
Start: 2018-09-24 | End: 2018-10-25 | Stop reason: HOSPADM

## 2018-09-24 NOTE — TELEPHONE ENCOUNTER
Rec'd records from them and gave to Dr Palencia to review. Send all to scan. Gave to Ginny GONZALEZ to send for scanning. [email or web address]

## 2018-09-24 NOTE — PROGRESS NOTES
9.24.2018 Per [employer], Person pressed touch tone key to speak with an endoscopy .  Scheduled. Instructions via mail per pt request.

## 2018-10-02 RX ORDER — FENOFIBRIC ACID 135 MG/1
CAPSULE, DELAYED RELEASE ORAL
Qty: 30 CAPSULE | Refills: 11 | Status: SHIPPED | OUTPATIENT
Start: 2018-10-02 | End: 2019-12-10 | Stop reason: SDUPTHER

## 2018-10-09 ENCOUNTER — CLINICAL SUPPORT (OUTPATIENT)
Dept: INTERNAL MEDICINE | Facility: CLINIC | Age: 78
End: 2018-10-09
Payer: MEDICARE

## 2018-10-09 DIAGNOSIS — E29.1 HYPOGONADISM IN MALE: Primary | ICD-10-CM

## 2018-10-09 PROCEDURE — 99212 OFFICE O/P EST SF 10 MIN: CPT | Mod: PBBFAC,PO

## 2018-10-09 PROCEDURE — 96372 THER/PROPH/DIAG INJ SC/IM: CPT | Mod: PBBFAC,PO

## 2018-10-09 PROCEDURE — 99999 PR PBB SHADOW E&M-EST. PATIENT-LVL II: CPT | Mod: PBBFAC,,,

## 2018-10-09 RX ADMIN — TESTOSTERONE CYPIONATE 200 MG: 200 INJECTION, SOLUTION INTRAMUSCULAR at 08:10

## 2018-10-21 DIAGNOSIS — G47.00 INSOMNIA, UNSPECIFIED TYPE: ICD-10-CM

## 2018-10-22 RX ORDER — DOXEPIN HYDROCHLORIDE 10 MG/1
10 CAPSULE ORAL NIGHTLY
Qty: 30 CAPSULE | Refills: 7 | OUTPATIENT
Start: 2018-10-22 | End: 2019-10-22

## 2018-10-25 ENCOUNTER — ANESTHESIA (OUTPATIENT)
Dept: ENDOSCOPY | Facility: HOSPITAL | Age: 78
End: 2018-10-25
Payer: MEDICARE

## 2018-10-25 ENCOUNTER — HOSPITAL ENCOUNTER (OUTPATIENT)
Facility: HOSPITAL | Age: 78
Discharge: HOME | End: 2018-10-25
Attending: COLON & RECTAL SURGERY | Admitting: COLON & RECTAL SURGERY
Payer: MEDICARE

## 2018-10-25 ENCOUNTER — ANESTHESIA EVENT (OUTPATIENT)
Dept: ENDOSCOPY | Facility: HOSPITAL | Age: 78
End: 2018-10-25
Payer: MEDICARE

## 2018-10-25 DIAGNOSIS — Z12.11 SCREENING FOR COLON CANCER: ICD-10-CM

## 2018-10-25 PROCEDURE — G0105 COLORECTAL SCRN; HI RISK IND: HCPCS | Performed by: COLON & RECTAL SURGERY

## 2018-10-25 PROCEDURE — 37000008 HC ANESTHESIA 1ST 15 MINUTES: Performed by: COLON & RECTAL SURGERY

## 2018-10-25 PROCEDURE — 25000003 PHARM REV CODE 250: Performed by: COLON & RECTAL SURGERY

## 2018-10-25 PROCEDURE — 63600175 PHARM REV CODE 636 W HCPCS: Performed by: NURSE ANESTHETIST, CERTIFIED REGISTERED

## 2018-10-25 PROCEDURE — G0105 COLORECTAL SCRN; HI RISK IND: HCPCS | Mod: ,,, | Performed by: COLON & RECTAL SURGERY

## 2018-10-25 PROCEDURE — 37000009 HC ANESTHESIA EA ADD 15 MINS: Performed by: COLON & RECTAL SURGERY

## 2018-10-25 RX ORDER — SODIUM CHLORIDE, SODIUM LACTATE, POTASSIUM CHLORIDE, CALCIUM CHLORIDE 600; 310; 30; 20 MG/100ML; MG/100ML; MG/100ML; MG/100ML
INJECTION, SOLUTION INTRAVENOUS CONTINUOUS
Status: DISCONTINUED | OUTPATIENT
Start: 2018-10-25 | End: 2021-06-08

## 2018-10-25 RX ORDER — PROPOFOL 10 MG/ML
INJECTION, EMULSION INTRAVENOUS
Status: DISCONTINUED | OUTPATIENT
Start: 2018-10-25 | End: 2018-10-25

## 2018-10-25 RX ORDER — LIDOCAINE HCL/PF 100 MG/5ML
SYRINGE (ML) INTRAVENOUS
Status: DISCONTINUED | OUTPATIENT
Start: 2018-10-25 | End: 2018-10-25

## 2018-10-25 RX ADMIN — LIDOCAINE HYDROCHLORIDE 80 MG: 20 INJECTION, SOLUTION INTRAVENOUS at 09:10

## 2018-10-25 RX ADMIN — SODIUM CHLORIDE, SODIUM LACTATE, POTASSIUM CHLORIDE, AND CALCIUM CHLORIDE: 600; 310; 30; 20 INJECTION, SOLUTION INTRAVENOUS at 08:10

## 2018-10-25 RX ADMIN — PROPOFOL 30 MG: 10 INJECTION, EMULSION INTRAVENOUS at 09:10

## 2018-10-25 RX ADMIN — PROPOFOL 120 MG: 10 INJECTION, EMULSION INTRAVENOUS at 09:10

## 2018-10-25 RX ADMIN — SODIUM CHLORIDE, SODIUM LACTATE, POTASSIUM CHLORIDE, AND CALCIUM CHLORIDE: 600; 310; 30; 20 INJECTION, SOLUTION INTRAVENOUS at 09:10

## 2018-10-25 NOTE — PLAN OF CARE
Dr Hameed came to bedside and discussed findings. NO N/V,  no abdominal pain, no GI bleeding, and vitals stable.  Pt discharged from unit.

## 2018-10-25 NOTE — TRANSFER OF CARE
"Anesthesia Transfer of Care Note    Patient: Chai Murry    Procedure(s) Performed: Procedure(s) (LRB):  COLONOSCOPY (N/A)    Patient location: PACU    Anesthesia Type: MAC    Transport from OR: Transported from OR on room air with adequate spontaneous ventilation    Post pain: adequate analgesia    Post assessment: no apparent anesthetic complications    Post vital signs: stable    Level of consciousness: sedated    Nausea/Vomiting: no nausea/vomiting    Complications: none    Transfer of care protocol was followed      Last vitals:   Visit Vitals  BP (!) 98/54 (BP Location: Left arm, Patient Position: Lying)   Pulse (!) 57   Temp 36.4 °C (97.5 °F) (Oral)   Resp 17   Ht 5' 10" (1.778 m)   Wt 80.7 kg (178 lb)   SpO2 97%   BMI 25.54 kg/m²     "

## 2018-10-25 NOTE — ANESTHESIA POSTPROCEDURE EVALUATION
"Anesthesia Post Evaluation    Patient: Chai Murry    Procedure(s) Performed: Procedure(s) (LRB):  COLONOSCOPY (N/A)    Final Anesthesia Type: MAC  Patient location during evaluation: PACU  Patient participation: Yes- Able to Participate  Level of consciousness: awake and alert and oriented  Post-procedure vital signs: reviewed and stable  Pain management: adequate  Airway patency: patent  PONV status at discharge: No PONV  Anesthetic complications: no      Cardiovascular status: blood pressure returned to baseline  Respiratory status: unassisted, room air and spontaneous ventilation  Hydration status: euvolemic  Follow-up not needed.        Visit Vitals  BP (!) 98/54 (BP Location: Left arm, Patient Position: Lying)   Pulse (!) 57   Temp 36.4 °C (97.5 °F) (Oral)   Resp 17   Ht 5' 10" (1.778 m)   Wt 80.7 kg (178 lb)   SpO2 97%   BMI 25.54 kg/m²       Pain/Brendan Score: Pain Assessment Performed: Yes (10/25/2018  8:35 AM)  Presence of Pain: denies (10/25/2018  8:35 AM)        "

## 2018-10-25 NOTE — OR NURSING
Family update on patient status. Final time out, anesthesia agrees, patient adequately sedated, view anesthesia for vital signs, medication/fluid documentation

## 2018-10-25 NOTE — ANESTHESIA RELEASE NOTE
"Anesthesia Release from PACU Note    Patient: Chai Murry    Procedure(s) Performed: Procedure(s) (LRB):  COLONOSCOPY (N/A)    Anesthesia type: MAC    Post pain: Adequate analgesia    Post assessment: no apparent anesthetic complications, tolerated procedure well and no evidence of recall    Last Vitals:   Visit Vitals  BP (!) 98/54 (BP Location: Left arm, Patient Position: Lying)   Pulse (!) 57   Temp 36.4 °C (97.5 °F) (Oral)   Resp 17   Ht 5' 10" (1.778 m)   Wt 80.7 kg (178 lb)   SpO2 97%   BMI 25.54 kg/m²       Post vital signs: stable    Level of consciousness: awake and alert     Nausea/Vomiting: no nausea/no vomiting    Complications: none    Airway Patency: patent    Respiratory: unassisted, spontaneous ventilation, room air    Cardiovascular: stable    Hydration: euvolemic  "

## 2018-10-25 NOTE — H&P
[facility]  Colon and Rectal Surgery  History & Physical    Patient Name: Chai Murry  MRN: [id number]  Admission Date: 10/25/2018  Attending Physician: Michael Hameed MD  Primary Care Provider: Peter Teixeira MD    Patient information was obtained from patient and medical records.    Subjective:     Chief Complaint/Reason for Admission: Here for Colonoscopy    History of Present Illness:  Patient is a 78 y.o. male presents for colonoscopy. Patient previously had a colonoscopy in 2014 with TAs removed from sigmoid, transverse and hepatic flexure. Repeat attempted earlier this year but poor prep so was aborted. Denies current BRBPR, hematochezia, melena, change in stool caliber or any other worrisome signs/symptoms. Denies fam hx of CRC, polyps or IBD.    No current facility-administered medications on file prior to encounter.      Current Outpatient Medications on File Prior to Encounter   Medication Sig    amLODIPine (NORVASC) 5 MG tablet Take 2 tablets (10 mg total) by mouth every evening.    aspirin (ECOTRIN) 81 MG EC tablet Take 81 mg by mouth once daily.    doxepin (SINEQUAN) 10 MG capsule Take 1 capsule (10 mg total) by mouth every evening.    fluticasone (FLONASE) 50 mcg/actuation nasal spray 1 spray by Each Nare route once daily.    ipratropium-albuterol (COMBIVENT RESPIMAT)  mcg/actuation inhaler Inhale 1 puff into the lungs every 6 (six) hours as needed for Shortness of Breath.    levocetirizine (XYZAL) 5 MG tablet Take 5 mg by mouth every evening.    lisinopril (PRINIVIL,ZESTRIL) 40 MG tablet Take 40 mg by mouth once daily.    simvastatin (ZOCOR) 40 MG tablet Take 1 tablet (40 mg total) by mouth every evening.    SOTALOL AF 80 mg tablet TAKE 1 TABLET BY MOUTH TWICE A DAY       Review of patient's allergies indicates:   Allergen Reactions    Atorvastatin      Other reaction(s): Muscle pain    Bactrim [sulfamethoxazole-trimethoprim]      Lip swelling       Past Medical  History:   Diagnosis Date    Anemia     Arthritis     Atrial fibrillation     CAD (coronary artery disease)     Cancer     lung cancer-Left    Cataract     COPD (chronic obstructive pulmonary disease)     Diverticulosis     ED (erectile dysfunction)     HTN (hypertension)     Hyperlipidemia     Myocardial infarction      Past Surgical History:   Procedure Laterality Date    APPENDECTOMY      BRONCHOSCOPY- insert lighted tube into airway to obtain biopsy of lung Bilateral 3/4/2016    Performed by [facility]el [facility]andez [facility] at [facility]    cardiac stents      CATARACT EXTRACTION W/  INTRAOCULAR LENS IMPLANT Right 9-3-14    CHOLECYSTECTOMY      COLONOSCOPY N/A 2018    Procedure: COLONOSCOPY;  Surgeon: Dionne Doan MD;  Location: [facility] ENDO;  Service: Endoscopy;  Laterality: N/A;    COLONOSCOPY N/A 2018    Performed by [facility]ne [facility]an MD at [facility]    Colonoscopy N/A 2014    Performed by Eriberto Simpson MD at [facility]    infected stomach gland excision      INSERTION-PORT Left 2016    Performed by Nem[facility]io Wall MD at [facility] OR    LOBECTOMY-LUNG Left 2016    Per[facility] at [facility] OR    LUNG REMOVAL, TOTAL Left 2016    lung cancer left upper lobe    PNEUMONECTOMY Left 2016    Performed [facility] Wall [facility] at [facility] OR    SKIN BIOPSY Left     arm    THORACOTOMY Left 2016    Performed by [facility]io Wall MD at [facility] OR     Family History     Problem Relation (Age of Onset)    Cancer Sister, Mother, Brother    Cataracts Mother, Father    Esophageal cancer Sister    Hypertension Mother, Father, Brother    Lung cancer Mother    Pneumonia Father        Tobacco Use    Smoking status: Former Smoker     Packs/day: 1.00     Years: 50.00     Pack years: 50.00     Last attempt to quit: 2005     Years since quittin.2    Smokeless tobacco: Never Used   Substance and Sexual Activity    Alcohol use: No    Drug use: No     Sexual activity: Not Currently     Review of Systems   Constitutional: Negative for activity change, appetite change, chills, fatigue, fever and unexpected weight change.   HENT: Negative for congestion, ear pain, sore throat and trouble swallowing.    Eyes: Negative for pain, redness and itching.   Respiratory: Negative for cough, shortness of breath and wheezing.    Cardiovascular: Negative for chest pain, palpitations and leg swelling.   Gastrointestinal: Negative for abdominal distention, abdominal pain, anal bleeding, blood in stool, constipation, diarrhea and nausea.   Endocrine: Negative for cold intolerance, heat intolerance and polyuria.   Genitourinary: Negative for dysuria, flank pain, frequency and hematuria.   Musculoskeletal: Negative for gait problem, joint swelling and neck pain.   Skin: Negative for color change, rash and wound.   Allergic/Immunologic: Negative for environmental allergies and immunocompromised state.   Neurological: Negative for dizziness, speech difficulty, weakness and numbness.   Psychiatric/Behavioral: Negative for agitation, confusion and hallucinations.     Objective:     Vital Signs (Most Recent):  Temp: 97.6 °F (36.4 °C) (10/25/18 0837)  Pulse: 77 (10/25/18 0837)  Resp: 18 (10/25/18 0837)  BP: 137/73 (10/25/18 0837)  SpO2: 97 % (10/25/18 0837) Vital Signs (24h Range):  Temp:  [97.6 °F (36.4 °C)] 97.6 °F (36.4 °C)  Pulse:  [77] 77  Resp:  [18] 18  SpO2:  [97 %] 97 %  BP: (137)/(73) 137/73     Weight: 80.7 kg (178 lb)  Body mass index is 25.54 kg/m².    Physical Exam   Constitutional: He is oriented to person, place, and time. He appears well-developed.   HENT:   Head: Normocephalic and atraumatic.   Eyes: Conjunctivae and EOM are normal.   Neck: Normal range of motion. No thyromegaly present.   Cardiovascular: Normal rate and regular rhythm.   Pulmonary/Chest: Effort normal. No respiratory distress.   Abdominal: Soft. He exhibits no distension and no mass. There is no  tenderness.   Musculoskeletal: Normal range of motion. He exhibits no edema or tenderness.   Neurological: He is alert and oriented to person, place, and time.   Skin: Skin is warm and dry. Capillary refill takes less than 2 seconds. No rash noted.   Psychiatric: He has a normal mood and affect.         Assessment/Plan:     Patient is a 78 y.o. male who presents for colonoscopy     - Ok to proceed to endoscopy suite for colonoscopy  - Consent obtained. All risks, benefits and alternatives fully explained to patient, including but not limited to bleeding, infection, perforation, and missed polyps. All questions appropriately answered to patient's satisfaction. Consent signed and placed on chart.    Active Diagnoses:    Diagnosis Date Noted POA    Screening for colon cancer [Z12.11] 10/25/2018 Not Applicable      Problems Resolved During this Admission:     VTE Risk Mitigation (From admission, onward)    None          Michael Hameed MD  Colon and Rectal Surgery  [facility]

## 2018-10-25 NOTE — DISCHARGE INSTRUCTIONS
Colonoscopy     A camera attached to a flexible tube with a viewing lens is used to take video pictures.     Colonoscopy is a test to view the inside of your lower digestive tract (colon and rectum). Sometimes it can show the last part of the small intestine (ileum). During the test, small pieces of tissue may be removed for testing. This is called a biopsy. Small growths, such as polyps, may also be removed.   Why is colonoscopy done?  The test is done to help look for colon cancer. And it can help find the source of abdominal pain, bleeding, and changes in bowel habits. It may be needed once a year, depending on factors such as your:  · Age  · Health history  · Family health history  · Symptoms  · Results from any prior colonoscopy  Risks and possible complications  These include:  · Bleeding               · A puncture or tear in the colon   · Risks of anesthesia  · A cancer lesion not being seen  Getting ready   To prepare for the test:  · Talk with your healthcare provider about the risks of the test (see below). Also ask your healthcare provider about alternatives to the test.  · Tell your healthcare provider about any medicines you take. Also tell him or her about any health conditions you may have.  · Make sure your rectum and colon are empty for the test. Follow the diet and bowel prep instructions exactly. If you dont, the test may need to be rescheduled.  · Plan for a friend or family member to drive you home after the test.     Colonoscopy provides an inside view of the entire colon.     You may discuss the results with your doctor right away or at a future visit.  During the test   The test is usually done in the hospital on an outpatient basis. This means you go home the same day. The procedure takes about 30 minutes. During that time:  · You are given relaxing (sedating) medicine through an IV line. You may be drowsy, or fully asleep.  · The healthcare provider will first give you a physical exam to  check for anal and rectal problems.  · Then the anus is lubricated and the scope inserted.  · If you are awake, you may have a feeling similar to needing to have a bowel movement. You may also feel pressure as air is pumped into the colon. Its OK to pass gas during the procedure.  · Biopsy, polyp removal, or other treatments may be done during the test.  After the test   You may have gas right after the test. It can help to try to pass it to help prevent later bloating. Your healthcare provider may discuss the results with you right away. Or you may need to schedule a follow-up visit to talk about the results. After the test, you can go back to your normal eating and other activities. You may be tired from the sedation and need to rest for a few hours.  Date Last Reviewed: 11/1/2016 © 2000-2017 [employer]. [address] [place] [address]. All rights reserved. This information is not intended as a substitute for professional medical care. Always follow your healthcare professional's instructions.        Diverticulosis    Diverticulosis means that small pouches have formed in the wall of your large intestine (colon). Most often, this problem causes no symptoms and is common as people age. But the pouches in the colon are at risk of becoming infected. When this happens, the condition is called diverticulitis. Although most people with diverticulosis never develop diverticulitis, it is still not uncommon. Rectal bleeding can also occur and in less common situations, a type of colon inflammation called colitis.  While most people do not have symptoms, some people with diverticulosis may have:  · Abdominal cramps and pain  · Bloating  · Constipation  · Change in bowel habits  Causes  The exact cause of diverticulosis (and diverticulitis) has not been proved, but a few things are associated with the condition:  · Low-fiber diet  · Constipation  · Lack of exercise  Your healthcare provider will  talk with you about how to manage your condition. Diet changes may be all that are needed to help control diverticulosis and prevent progression to diverticulitis. If you develop diverticulitis, you will likely need other treatments.  Home care  You may be told to take fiber supplements daily. Fiber adds bulk to the stool so that it passes through the colon more easily. Stool softeners may be recommended. You may also be given medications for pain relief. Be sure to take all medications as directed.  In the past, people were told to avoid corn, nuts, and seeds. This is no longer necessary.  Follow these guidelines when caring for yourself at home:  · Eat unprocessed foods that are high in fiber. Whole grains, fruits, and vegetables are good choices.  · Drink 6 to 8 glasses of water every day unless your healthcare provider has you limit how much fluid you should have.  · Watch for changes in your bowel movements. Tell your provider if you notice any changes.  · Begin an exercise program. Ask your provider how to get started. Generally, walking is the best.  · Get plenty of rest and sleep.  Follow-up care  Follow up with your healthcare provider, or as advised. Regular visits may be needed to check on your health. Sometimes special procedures such as colonoscopy, are needed after an episode of diverticulitis or blooding. Be sure to keep all your appointments.  If a stool sample was taken, or cultures were done, you should be told if they are positive, or if your treatment needs to be changed. You can call as directed for the results.  If X-rays were done, a radiologist will look at them. You will be told if there is a change in your treatment.  If antibiotics were prescribed, be sure to finish them all.  When to seek medical advice  Call your healthcare provider right away if any of these occur:  · Fever of 100.4°F (38°C) or higher, or as directed by your healthcare provider  · Severe cramps in the lower left side of  the abdomen or pain that is getting worse  · Tenderness in the lower left side of the abdomen or worsening pain throughout the abdomen  · Diarrhea or constipation that doesn't get better within 24 hours  · Nausea and vomiting  · Bleeding from the rectum  Call 911  Call emergency services if any of the following occur:  · Trouble breathing  · Confusion  · Very drowsy or trouble awakening  · Fainting or loss of consciousness  · Rapid heart rate  · Chest pain  Date Last Reviewed: 12/30/2015 © 2000-2017 [employer]. [address] [place] [address]. All rights reserved. This information is not intended as a substitute for professional medical care. Always follow your healthcare professional's instructions.

## 2018-10-25 NOTE — BRIEF OP NOTE
[facility] -   Brief Operative Note     SUMMARY     Surgery Date: 10/25/2018     Surgeon(s) and Role:     * Michael Hameed MD - Primary    Assisting Surgeon: None    Pre-op Diagnosis:  Colon cancer screening [Z12.11]    Post-op Diagnosis:  Post-Op Diagnosis Codes:     * Colon cancer screening [Z12.11]    Procedure(s) (LRB):  COLONOSCOPY (N/A)    Anesthesia: Choice    Description of the findings of the procedure: See Op Note    Findings/Key Components: See Op Note    Estimated Blood Loss: * No values recorded between 10/25/2018 12:00 AM and 10/25/2018  9:24 AM *         Specimens:   Specimen (12h ago, onward)    None          Discharge Note    SUMMARY     Admit Date: 10/25/2018    Discharge Date and Time: 10/25/2018 9:30 AM    Hospital Course Patient was seen in the preoperative area by both myself and anesthesia. All consents were verified and all questions appropriately answered. All risks, benefits and alternatives explained to patient. Patient proceeded to endoscopy suite for colonoscopy and was discharged home postoperative once cleared by anesthesia.    Final Diagnosis: Post-Op Diagnosis Codes:     * Colon cancer screening [Z12.11]    Disposition: Home or Self Care    Follow Up/Patient Instructions: See Provation report    Medications:  Reconciled Home Medications:      Medication List      CONTINUE taking these medications    amLODIPine 5 MG tablet  Commonly known as:  NORVASC  Take 2 tablets (10 mg total) by mouth every evening.     aspirin 81 MG EC tablet  Commonly known as:  ECOTRIN  Take 81 mg by mouth once daily.     doxepin 10 MG capsule  Commonly known as:  SINEQUAN  Take 1 capsule (10 mg total) by mouth every evening.     fenofibric acid 135 mg Cpdr  Commonly known as:  FIBRICOR  TAKE 1 CAPSULE BY MOUTH EVERY DAY     fluticasone 50 mcg/actuation nasal spray  Commonly known as:  FLONASE  1 spray by Each Nare route once daily.     ipratropium-albuterol  mcg/actuation  inhaler  Commonly known as:  COMBIVENT RESPIMAT  Inhale 1 puff into the lungs every 6 (six) hours as needed for Shortness of Breath.     levocetirizine 5 MG tablet  Commonly known as:  XYZAL  Take 5 mg by mouth every evening.     lisinopril 40 MG tablet  Commonly known as:  PRINIVIL,ZESTRIL  Take 40 mg by mouth once daily.     mupirocin 2 % ointment  Commonly known as:  BACTROBAN  Apply topically 3 (three) times daily.     simvastatin 40 MG tablet  Commonly known as:  ZOCOR  Take 1 tablet (40 mg total) by mouth every evening.     SOTALOL AF 80 MG tablet  Generic drug:  sotalol  TAKE 1 TABLET BY MOUTH TWICE A DAY        STOP taking these medications    sodium,potassium,mag sulfates 17.5-3.13-1.6 gram Solr  Commonly known as:  SUPREP BOWEL PREP KIT          Discharge Procedure Orders   Diet general     Call MD for:  temperature >100.4     Call MD for:  persistent nausea and vomiting     Call MD for:  severe uncontrolled pain     Call MD for:  difficulty breathing, headache or visual disturbances     Call MD for:  redness, tenderness, or signs of infection (pain, swelling, redness, odor or green/yellow discharge around incision site)     Call MD for:  hives     Call MD for:  persistent dizziness or light-headedness     Call MD for:  extreme fatigue     Activity as tolerated     Follow-up Information     Peter Teixeira MD.    Specialty:  Internal Medicine  Why:  As needed  Contact information:  [address] [facility]  [address] [place] [address] [phone]

## 2018-10-25 NOTE — [employer] PATIENT INSTRUCTIONS
Discharge Summary/Instructions after an Endoscopic Procedure  Patient Name: Chai Murry  Patient MRN: [id number]  Patient YOB: 1940 Thursday, October 25, 2018 Michael Hameed MD  RESTRICTIONS:  During your procedure today, you received medications for sedation.  These   medications may affect your judgment, balance and coordination.  Therefore,   for 24 hours, you have the following restrictions:   - DO NOT drive a car, operate machinery, make legal/financial decisions,   sign important papers or drink alcohol.    ACTIVITY:  Today: no heavy lifting, straining or running due to procedural   sedation/anesthesia.  The following day: return to full activity including work.  DIET:  Eat and drink normally unless instructed otherwise.     TREATMENT FOR COMMON SIDE EFFECTS:  - Mild abdominal pain, nausea, belching, bloating or excessive gas:  rest,   eat lightly and use a heating pad.  - Sore Throat: treat with throat lozenges and/or gargle with warm salt   water.  - Because air was used during the procedure, expelling large amounts of air   from your rectum or belching is normal.  - If a bowel prep was taken, you may not have a bowel movement for 1-3 days.    This is normal.  SYMPTOMS TO WATCH FOR AND REPORT TO YOUR PHYSICIAN:  1. Abdominal pain or bloating, other than gas cramps.  2. Chest pain.  3. Back pain.  4. Signs of infection such as: chills or fever occurring within 24 hours   after the procedure.  5. Rectal bleeding, which would show as bright red, maroon, or black stools.   (A tablespoon of blood from the rectum is not serious, especially if   hemorrhoids are present.)  6. Vomiting.  7. Weakness or dizziness.  GO DIRECTLY TO THE NEAREST EMERGENCY ROOM IF YOU HAVE ANY OF THE FOLLOWING:      Difficulty breathing              Chills and/or fever over 101 F   Persistent vomiting and/or vomiting blood   Severe abdominal pain   Severe chest pain   Black, tarry stools   Bleeding- more than one  tablespoon   Any other symptom or condition that you feel may need urgent attention  Your doctor recommends these additional instructions:  If any biopsies were taken, your doctors clinic will contact you in 1 to 2   weeks with any results.  - Discharge patient to home.   - High fiber diet.   - Continue present medications.   - Repeat colonoscopy in 5 years for surveillance.   - Return to primary care physician as previously scheduled.  For questions, problems or results please call your physician Michael Hameed MD at Work:  [phone]  If you have any questions about the above instructions, call the GI   department at [phone] or call the endoscopy unit at [phone]   from 7am until 3 pm.  [facility], EMERGENCY ROOM PHONE NUMBER:   [phone]  IF A COMPLICATION OR EMERGENCY SITUATION ARISES AND YOU ARE UNABLE TO REACH   YOUR PHYSICIAN - GO DIRECTLY TO THE EMERGENCY ROOM.  I have read or have had read to me these discharge instructions for my   procedure and have received a written copy.  I understand these   instructions and will follow-up with my physician if I have any questions.     __________________________________       _____________________________________  Nurse Signature                                          Patient/Designated   Responsible Party Signature  MD Michael Borjas MD  10/25/2018 9:28:19 AM  This report has been verified and signed electronically.  [employer]

## 2018-10-25 NOTE — ANESTHESIA PREPROCEDURE EVALUATION
10/25/2018  Chai Murry is a 78 y.o., male.    Anesthesia Evaluation    I have reviewed the Patient Summary Reports.    I have reviewed the Nursing Notes.   I have reviewed the Medications.     Review of Systems  Anesthesia Hx:  No problems with previous Anesthesia    Social:  Social Alcohol Use, Former Smoker    Hematology/Oncology:  Hematology Normal       -- Cancer in past history:  surgery  Oncology Comments: Left lung surgery with chemo/radiation  Skin cancer     EENT/Dental:   chronic allergic rhinitis Sinus bradycardia  Voltage criteria for left ventricular hypertrophy  Abnormal ECG  When compared with ECG of 14-JUN-2018 09:49,  No significant change was found   Cardiovascular:   Exercise tolerance: good Hypertension, well controlled Past MI CAD   hyperlipidemia ECG has been reviewed. 2003  Sinus bradycardia  Voltage criteria for left ventricular hypertrophy  Abnormal ECG  When compared with ECG of 14-JUN-2018 09:49,  No significant change was found  Confirmed by VIOLET DE DIOS, SHAYLEE JACOBS ([id number]) on 8/30/2018 8:47:58 PM   Pulmonary:   COPD Shortness of breath Snore   Renal/:   Chronic Renal Disease    Hepatic/GI:   Bowel Prep. 0600 last drink of fluid.   Musculoskeletal:   Arthritis     Neurological:  Neurology Normal    Endocrine:  Endocrine Normal    Dermatological:  Skin Normal    Psych:   Psychiatric History          Physical Exam  General:  Well nourished    Airway/Jaw/Neck:  Airway Findings: Mallampati: II                Anesthesia Plan  Type of Anesthesia, risks & benefits discussed:  Anesthesia Type:  MAC  Patient's Preference:   Intra-op Monitoring Plan:   Intra-op Monitoring Plan Comments:   Post Op Pain Control Plan:   Post Op Pain Control Plan Comments:   Induction:   IV  Beta Blocker:  Patient is not currently on a Beta-Blocker (No further documentation required).       Informed Consent:  Patient understands risks and agrees with Anesthesia plan.  Questions answered. Anesthesia consent signed with patient.  ASA Score: 3     Day of Surgery Review of History & Physical: I have interviewed and examined the patient. I have reviewed the patient's H&P dated: 10/25/18. There are no significant changes.  H&P update referred to the surgeon.         Ready For Surgery From Anesthesia Perspective.

## 2018-10-26 VITALS
SYSTOLIC BLOOD PRESSURE: 118 MMHG | RESPIRATION RATE: 18 BRPM | TEMPERATURE: 98 F | HEIGHT: 70 IN | OXYGEN SATURATION: 99 % | WEIGHT: 178 LBS | HEART RATE: 60 BPM | DIASTOLIC BLOOD PRESSURE: 74 MMHG | BODY MASS INDEX: 25.48 KG/M2

## 2018-11-06 ENCOUNTER — TELEPHONE (OUTPATIENT)
Dept: INTERNAL MEDICINE | Facility: CLINIC | Age: 78
End: 2018-11-06

## 2018-11-06 ENCOUNTER — CLINICAL SUPPORT (OUTPATIENT)
Dept: INTERNAL MEDICINE | Facility: CLINIC | Age: 78
End: 2018-11-06
Payer: MEDICARE

## 2018-11-06 DIAGNOSIS — E29.1 HYPOGONADISM MALE: Primary | ICD-10-CM

## 2018-11-06 PROCEDURE — 99999 PR PBB SHADOW E&M-EST. PATIENT-LVL II: CPT | Mod: PBBFAC,,,

## 2018-11-06 PROCEDURE — 96372 THER/PROPH/DIAG INJ SC/IM: CPT | Mod: S$GLB,,, | Performed by: INTERNAL MEDICINE

## 2018-11-06 RX ORDER — TESTOSTERONE CYPIONATE 200 MG/ML
200 INJECTION, SOLUTION INTRAMUSCULAR
Status: DISCONTINUED | OUTPATIENT
Start: 2018-11-06 | End: 2019-04-08

## 2018-11-06 RX ADMIN — TESTOSTERONE CYPIONATE 200 MG: 200 INJECTION, SOLUTION INTRAMUSCULAR at 08:11

## 2018-11-06 NOTE — TELEPHONE ENCOUNTER
Patient missed his last depo test appt.  NEEDs new orders.  Please place with start date of now today.    Depo testosterone 200mg every 21 days.

## 2018-11-10 ENCOUNTER — OFFICE VISIT (OUTPATIENT)
Dept: URGENT CARE | Facility: CLINIC | Age: 78
End: 2018-11-10
Payer: MEDICARE

## 2018-11-10 VITALS
DIASTOLIC BLOOD PRESSURE: 88 MMHG | HEART RATE: 72 BPM | OXYGEN SATURATION: 95 % | HEIGHT: 70 IN | BODY MASS INDEX: 27.24 KG/M2 | TEMPERATURE: 97 F | SYSTOLIC BLOOD PRESSURE: 138 MMHG | WEIGHT: 190.25 LBS

## 2018-11-10 DIAGNOSIS — J32.9 SINUSITIS, UNSPECIFIED CHRONICITY, UNSPECIFIED LOCATION: Primary | ICD-10-CM

## 2018-11-10 PROCEDURE — 3075F SYST BP GE 130 - 139MM HG: CPT | Mod: CPTII,S$GLB,, | Performed by: NURSE PRACTITIONER

## 2018-11-10 PROCEDURE — 99214 OFFICE O/P EST MOD 30 MIN: CPT | Mod: S$GLB,,, | Performed by: NURSE PRACTITIONER

## 2018-11-10 PROCEDURE — 3079F DIAST BP 80-89 MM HG: CPT | Mod: CPTII,S$GLB,, | Performed by: NURSE PRACTITIONER

## 2018-11-10 PROCEDURE — 1101F PT FALLS ASSESS-DOCD LE1/YR: CPT | Mod: CPTII,S$GLB,, | Performed by: NURSE PRACTITIONER

## 2018-11-10 PROCEDURE — 99999 PR PBB SHADOW E&M-EST. PATIENT-LVL IV: CPT | Mod: PBBFAC,,, | Performed by: NURSE PRACTITIONER

## 2018-11-10 RX ORDER — DOXYCYCLINE 100 MG/1
100 CAPSULE ORAL EVERY 12 HOURS
Qty: 14 CAPSULE | Refills: 0 | Status: SHIPPED | OUTPATIENT
Start: 2018-11-10 | End: 2018-11-17

## 2018-11-10 NOTE — PATIENT INSTRUCTIONS
Sinusitis (Antibiotic Treatment)    The sinuses are air-filled spaces within the bones of the face. They connect to the inside of the nose. Sinusitis is an inflammation of the tissue lining the sinus cavity. Sinus inflammation can occur during a cold. It can also be due to allergies to pollens and other particles in the air. Sinusitis can cause symptoms of sinus congestion and fullness. A sinus infection causes fever, headache and facial pain. There is often green or yellow drainage from the nose or into the back of the throat (post-nasal drip). You have been given antibiotics to treat this condition.  Home care:  · Take the full course of antibiotics as instructed. Do not stop taking them, even if you feel better.  · Drink plenty of water, hot tea, and other liquids. This may help thin mucus. It also may promote sinus drainage.  · Heat may help soothe painful areas of the face. Use a towel soaked in hot water. Or,  the shower and direct the hot spray onto your face. Using a vaporizer along with a menthol rub at night may also help.   · An expectorant containing guaifenesin may help thin the mucus and promote drainage from the sinuses.  · Over-the-counter decongestants may be used unless a similar medicine was prescribed. Nasal sprays work the fastest. Use one that contains phenylephrine or oxymetazoline. First blow the nose gently. Then use the spray. Do not use these medicines more often than directed on the label or symptoms may get worse. You may also use tablets containing pseudoephedrine. Avoid products that combine ingredients, because side effects may be increased. Read labels. You can also ask the pharmacist for help. (NOTE: Persons with high blood pressure should not use decongestants. They can raise blood pressure.)  · Over-the-counter antihistamines may help if allergies contributed to your sinusitis.    · Do not use nasal rinses or irrigation during an acute sinus infection, unless told to by  your health care provider. Rinsing may spread the infection to other sinuses.  · Use acetaminophen or ibuprofen to control pain, unless another pain medicine was prescribed. (If you have chronic liver or kidney disease or ever had a stomach ulcer, talk with your doctor before using these medicines. Aspirin should never be used in anyone under 18 years of age who is ill with a fever. It may cause severe liver damage.)  · Don't smoke. This can worsen symptoms.  Follow-up care  Follow up with your healthcare provider or our staff if you are not improving within the next week.  When to seek medical advice  Call your healthcare provider if any of these occur:  · Facial pain or headache becoming more severe  · Stiff neck  · Unusual drowsiness or confusion  · Swelling of the forehead or eyelids  · Vision problems, including blurred or double vision  · Fever of 100.4ºF (38ºC) or higher, or as directed by your healthcare provider  · Seizure  · Breathing problems  · Symptoms not resolving within 10 days  Date Last Reviewed: 4/13/2015  © 1734-6117 [employer]. [address] [place] [address], [place] [address]. All rights reserved. This information is not intended as a substitute for professional medical care. Always follow your healthcare professional's instructions.

## 2018-11-10 NOTE — PROGRESS NOTES
Subjective:       Patient ID: Chia Murry is a 78 y.o. male.    Chief Complaint: Cough (blood in mucus)    Pt is a 78 year old male to clinic today with complaints of cough (green/red sputum), PND, and rhinorrhea that began an undetermined amount of time ago. Pt states he only has one lung and wants to be checked out.       Sinus Problem   This is a new problem. The current episode started 1 to 4 weeks ago. The problem is unchanged. There has been no fever. He is experiencing no pain. Associated symptoms include coughing and sinus pressure. Pertinent negatives include no chills, congestion, diaphoresis, ear pain, headaches, hoarse voice, neck pain, shortness of breath, sneezing, sore throat or swollen glands. Past treatments include nothing.     Review of Systems   Constitutional: Negative for chills, diaphoresis, fatigue and fever.   HENT: Positive for postnasal drip, rhinorrhea and sinus pressure. Negative for congestion, ear discharge, ear pain, hoarse voice, sinus pain, sneezing, sore throat and trouble swallowing.    Respiratory: Positive for cough. Negative for chest tightness, shortness of breath and wheezing.    Cardiovascular: Negative for chest pain and palpitations.   Gastrointestinal: Negative for abdominal pain, diarrhea, nausea and vomiting.   Musculoskeletal: Negative for back pain, myalgias and neck pain.   Skin: Negative for rash.   Neurological: Negative for dizziness, light-headedness and headaches.       Objective:      Physical Exam   Constitutional: He is oriented to person, place, and time. He appears well-developed and well-nourished. No distress.   HENT:   Head: Normocephalic.   Right Ear: Tympanic membrane, external ear and ear canal normal. No tenderness. Tympanic membrane is not bulging.   Left Ear: Tympanic membrane, external ear and ear canal normal. No tenderness. Tympanic membrane is not bulging.   Nose: Mucosal edema and rhinorrhea present. Right sinus exhibits maxillary sinus  tenderness. Right sinus exhibits no frontal sinus tenderness. Left sinus exhibits maxillary sinus tenderness. Left sinus exhibits no frontal sinus tenderness.   Mouth/Throat: Uvula is midline, oropharynx is clear and moist and mucous membranes are normal. No oropharyngeal exudate, posterior oropharyngeal edema or posterior oropharyngeal erythema.   PND noted     Eyes: Conjunctivae and EOM are normal. Pupils are equal, round, and reactive to light.   Neck: Normal range of motion. Neck supple.   Cardiovascular: Normal rate, regular rhythm and normal heart sounds. Exam reveals no gallop and no friction rub.   No murmur heard.  Pulmonary/Chest: Effort normal and breath sounds normal. No accessory muscle usage or stridor. No apnea, no tachypnea and no bradypnea. No respiratory distress. He has no decreased breath sounds. He has no wheezes. He has no rhonchi. He has no rales.   Left lung removed   Lymphadenopathy:        Head (right side): No submental, no submandibular and no tonsillar adenopathy present.        Head (left side): No submental, no submandibular and no tonsillar adenopathy present.     He has no cervical adenopathy.   Neurological: He is alert and oriented to person, place, and time.   Skin: Skin is warm and dry. No rash noted. He is not diaphoretic.   Psychiatric: He has a normal mood and affect. His speech is normal and behavior is normal. Thought content normal.   Nursing note and vitals reviewed.      Assessment:       1. Sinusitis, unspecified chronicity, unspecified location        Plan:   Sinusitis, unspecified chronicity, unspecified location  -     doxycycline (VIBRAMYCIN) 100 MG Cap; Take 1 capsule (100 mg total) by mouth every 12 (twelve) hours. for 7 days  Dispense: 14 capsule; Refill: 0      · Rest and increase fluids.   · May apply warm compresses as needed.   · Saline nasal spray or saline irrigation (Neti pot) to loosen nasal congestion.  · Flonase or Nasacort to reduce inflammation in the  sinus cavities.  · Take antibiotics exactly as prescribed. Make sure to complete the entire course of antibiotics even if you start feeling better. This will prevent recurrence of your infection and bacterial resistance.   · Do not drive, drink alcohol, or take any other sedating medications or substances while taking cough syrup.   · Follow up with your primary care provider or with ENT if not improved within a few days or sooner for any new or worsening symptoms.   · Go to the ER for any fever that does not improve with Tylenol/Ibuprofen, neck stiffness, rash, severe headache, vision changes, shortness of breath, chest pain, severe facial pain or swelling, or for any other new and concerning symptoms.

## 2018-11-16 ENCOUNTER — LAB VISIT (OUTPATIENT)
Dept: LAB | Facility: HOSPITAL | Age: 78
End: 2018-11-16
Attending: INTERNAL MEDICINE
Payer: MEDICARE

## 2018-11-16 DIAGNOSIS — Z85.118 HISTORY OF CANCER OF UPPER LOBE BRONCHUS OR LUNG: ICD-10-CM

## 2018-11-16 DIAGNOSIS — Z12.5 SCREENING FOR PROSTATE CANCER: ICD-10-CM

## 2018-11-16 LAB
ALBUMIN SERPL BCP-MCNC: 3.3 G/DL
ALP SERPL-CCNC: 65 U/L
ALT SERPL W/O P-5'-P-CCNC: 17 U/L
ANION GAP SERPL CALC-SCNC: 8 MMOL/L
AST SERPL-CCNC: 19 U/L
BASOPHILS # BLD AUTO: 0.1 K/UL
BASOPHILS NFR BLD: 1.2 %
BILIRUB SERPL-MCNC: 0.4 MG/DL
BUN SERPL-MCNC: 14 MG/DL
CALCIUM SERPL-MCNC: 9.4 MG/DL
CHLORIDE SERPL-SCNC: 104 MMOL/L
CO2 SERPL-SCNC: 30 MMOL/L
COMPLEXED PSA SERPL-MCNC: 0.16 NG/ML
CREAT SERPL-MCNC: 1.1 MG/DL
DIFFERENTIAL METHOD: ABNORMAL
EOSINOPHIL # BLD AUTO: 0.9 K/UL
EOSINOPHIL NFR BLD: 11.4 %
ERYTHROCYTE [DISTWIDTH] IN BLOOD BY AUTOMATED COUNT: 14.8 %
EST. GFR  (AFRICAN AMERICAN): >60 ML/MIN/1.73 M^2
EST. GFR  (NON AFRICAN AMERICAN): >60 ML/MIN/1.73 M^2
GLUCOSE SERPL-MCNC: 139 MG/DL
HCT VFR BLD AUTO: 46.2 %
HGB BLD-MCNC: 13.9 G/DL
IMM GRANULOCYTES # BLD AUTO: 0.08 K/UL
IMM GRANULOCYTES NFR BLD AUTO: 1 %
LYMPHOCYTES # BLD AUTO: 1.1 K/UL
LYMPHOCYTES NFR BLD: 13.8 %
MCH RBC QN AUTO: 28.5 PG
MCHC RBC AUTO-ENTMCNC: 30.1 G/DL
MCV RBC AUTO: 95 FL
MONOCYTES # BLD AUTO: 0.6 K/UL
MONOCYTES NFR BLD: 7.1 %
NEUTROPHILS # BLD AUTO: 5.4 K/UL
NEUTROPHILS NFR BLD: 65.5 %
NRBC BLD-RTO: 0 /100 WBC
PLATELET # BLD AUTO: 236 K/UL
PMV BLD AUTO: 11.8 FL
POTASSIUM SERPL-SCNC: 4.4 MMOL/L
PROT SERPL-MCNC: 7.2 G/DL
RBC # BLD AUTO: 4.88 M/UL
SODIUM SERPL-SCNC: 142 MMOL/L
WBC # BLD AUTO: 8.19 K/UL

## 2018-11-16 PROCEDURE — 85025 COMPLETE CBC W/AUTO DIFF WBC: CPT

## 2018-11-16 PROCEDURE — 36415 COLL VENOUS BLD VENIPUNCTURE: CPT | Mod: PO

## 2018-11-16 PROCEDURE — 80053 COMPREHEN METABOLIC PANEL: CPT

## 2018-11-16 PROCEDURE — 84153 ASSAY OF PSA TOTAL: CPT

## 2018-11-21 ENCOUNTER — OFFICE VISIT (OUTPATIENT)
Dept: HEMATOLOGY/ONCOLOGY | Facility: CLINIC | Age: 78
End: 2018-11-21
Payer: MEDICARE

## 2018-11-21 VITALS
HEART RATE: 64 BPM | SYSTOLIC BLOOD PRESSURE: 128 MMHG | DIASTOLIC BLOOD PRESSURE: 72 MMHG | BODY MASS INDEX: 27.4 KG/M2 | TEMPERATURE: 98 F | WEIGHT: 191.38 LBS | RESPIRATION RATE: 16 BRPM | HEIGHT: 70 IN | OXYGEN SATURATION: 98 %

## 2018-11-21 DIAGNOSIS — Z90.2 STATUS POST PNEUMONECTOMY: ICD-10-CM

## 2018-11-21 DIAGNOSIS — Z85.118 HX OF CANCER OF LUNG: Primary | ICD-10-CM

## 2018-11-21 PROCEDURE — 1101F PT FALLS ASSESS-DOCD LE1/YR: CPT | Mod: CPTII,S$GLB,, | Performed by: INTERNAL MEDICINE

## 2018-11-21 PROCEDURE — 99999 PR PBB SHADOW E&M-EST. PATIENT-LVL IV: CPT | Mod: PBBFAC,,, | Performed by: INTERNAL MEDICINE

## 2018-11-21 PROCEDURE — 3074F SYST BP LT 130 MM HG: CPT | Mod: CPTII,S$GLB,, | Performed by: INTERNAL MEDICINE

## 2018-11-21 PROCEDURE — 3078F DIAST BP <80 MM HG: CPT | Mod: CPTII,S$GLB,, | Performed by: INTERNAL MEDICINE

## 2018-11-21 PROCEDURE — 99214 OFFICE O/P EST MOD 30 MIN: CPT | Mod: S$GLB,,, | Performed by: INTERNAL MEDICINE

## 2018-11-21 NOTE — PROGRESS NOTES
Subjective:       Patient ID: Chai Murry is a 78 y.o. male.    Chief Complaint: No chief complaint on file.    HPI This is a 78 year odl  gentleman who comes for follow up of his lung cancer.  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 There seemed to be a left   hilar adenopathy as well.  Radiologist felt that he was unable to discriminate between the mass and the   lymphadenopathy. The patient had had a bronchoscopy by Dr. Roel Ernandez on   03/04/2006 that showed a partially obstructing airway in the anterior segment of  the left upper lobe with an endobronchial lesion in the anterior segment of the  left upper lobe. The specimen from the biopsy at the time of bronchoscopy was   reported as being a squamous cell carcinoma.  At the time of the surgery after the left lung was removed, the pathologist   indicated that this was a squamous cell carcinoma. It measured 3.8 cm. The   pathology indicated that this is extending into the bronchial resection margin of the lobe. This lobe was later on removed to complete the pneumonectomy   There was one lymph node positive for metastatic squamous cell carcinoma at the   AP window.  The patient was told that we would prefer to treat him with post-op simultaneous chemo/radiation.  He had Medi-port. He started chemo/radiation therapy andreceived 6 weekly cycles of carbo/Taxol along with radiatioon.  After a month, he had a Ct scan and it showed stability   He then had 2 additional cyles of carbo/Taxol finishing in 9/27/2016.    He comes for follow up. Says he feels well except for the fact he has chronic production of   phlegm   Medi-port was removed   MEDICATIONS: See list.  SURGERIES: Appendectomy at age 3. Bilateral cataract surgery. Infected gland   removed from the abdomen, cholecystectomy, left  pneumonectomy..Medi-port placed and removed  SOCIAL HISTORY: He is  with three children. He lives in [facility]. He   used to smoke from age 14 To age 67 or so,, averaging a pack a day. Denies significant   drinking. He worked in a chemical plant for 40 years.  FAMILY HISTORY: Sister had cancer of the throat. Mother had cancer of the   lung. Father had prostate cancer. No diabetes. Paternal grandfather had a   heart attack.  PAST MEDICAL HISTORY:  1. Squamous cell carcinoma of the lung.  2. Tobacco use.  3. High blood pressure.  4. Arteriosclerotic cardiovascular disease, status post previous heart attacks.  5. Arthritis.      Review of Systems   Constitutional: Negative.  Negative for appetite change, fatigue and unexpected weight change.   Eyes: Negative.  Negative for visual disturbance.   Respiratory: Negative for cough and shortness of breath.    Cardiovascular: Negative.  Negative for chest pain.   Gastrointestinal: Negative for abdominal pain, diarrhea and nausea.   Genitourinary: Negative.  Negative for frequency and hematuria.   Musculoskeletal: Negative.  Negative for back pain.   Skin: Negative.  Negative for rash.   Neurological: Negative.  Negative for headaches.   Hematological: Negative for adenopathy.   Psychiatric/Behavioral: Negative.  The patient is not nervous/anxious.        Objective:      Physical Exam   Constitutional: He is oriented to person, place, and time. He appears well-developed and well-nourished.   HENT:   Head: Normocephalic.   Mouth/Throat: No oropharyngeal exudate.   Eyes: Pupils are equal, round, and reactive to light.   Neck: No thyromegaly present.   Cardiovascular: Normal rate, regular rhythm and normal heart sounds. Exam reveals no gallop.   No murmur heard.  Pulmonary/Chest: No respiratory distress. He has no wheezes. He has no rales.   Abdominal: Soft. Bowel sounds are normal. He exhibits no distension and no mass. There is no rebound and no guarding.    Musculoskeletal: Normal range of motion. He exhibits no edema.   Lymphadenopathy:     He has no cervical adenopathy.   Neurological: He is alert and oriented to person, place, and time.   Skin: Skin is warm and dry.   Psychiatric: He has a normal mood and affect. His behavior is normal.       Wt Readings from Last 3 Encounters:   11/21/18 86.8 kg (191 lb 5.8 oz)   11/10/18 86.3 kg (190 lb 4.1 oz)   10/25/18 80.7 kg (178 lb)     Temp Readings from Last 3 Encounters:   11/21/18 98.2 °F (36.8 °C) (Oral)   11/10/18 96.8 °F (36 °C) (Tympanic)   10/25/18 97.5 °F (36.4 °C) (Oral)     BP Readings from Last 3 Encounters:   11/21/18 128/72   11/10/18 138/88   10/25/18 118/74     Pulse Readings from Last 3 Encounters:   11/21/18 64   11/10/18 72   10/25/18 60       Assessment:       1. Hx of cancer of lung    2. s/p left pnuemonectomy for KAYLI [place]        Plan:       Lab Results   Component Value Date    WBC 8.19 11/16/2018    HGB 13.9 (L) 11/16/2018    HCT 46.2 11/16/2018    MCV 95 11/16/2018     11/16/2018     Lab Results   Component Value Date    CREATININE 1.1 11/16/2018     Lab Results   Component Value Date    ALT 17 11/16/2018    AST 19 11/16/2018    ALKPHOS 65 11/16/2018    BILITOT 0.4 11/16/2018       He seems stable from the lung cancer point of ierw. We wi;; have him see us back in 6 months with a  cbc/cmp and Ct scans of c/a/p

## 2018-11-25 RX ORDER — SOTALOL HYDROCHLORIDE 80 MG/1
TABLET ORAL
Qty: 60 TABLET | Refills: 10 | Status: SHIPPED | OUTPATIENT
Start: 2018-11-25 | End: 2019-09-17 | Stop reason: SDUPTHER

## 2018-11-28 ENCOUNTER — CLINICAL SUPPORT (OUTPATIENT)
Dept: INTERNAL MEDICINE | Facility: CLINIC | Age: 78
End: 2018-11-28
Payer: MEDICARE

## 2018-11-28 DIAGNOSIS — E29.1 HYPOGONADISM IN MALE: Primary | ICD-10-CM

## 2018-11-28 PROCEDURE — 96372 THER/PROPH/DIAG INJ SC/IM: CPT | Mod: S$GLB,,, | Performed by: INTERNAL MEDICINE

## 2018-11-28 PROCEDURE — 99999 PR PBB SHADOW E&M-EST. PATIENT-LVL II: CPT | Mod: PBBFAC,,,

## 2018-11-28 RX ADMIN — TESTOSTERONE CYPIONATE 200 MG: 200 INJECTION, SOLUTION INTRAMUSCULAR at 08:11

## 2018-12-04 ENCOUNTER — OFFICE VISIT (OUTPATIENT)
Dept: URGENT CARE | Facility: CLINIC | Age: 78
End: 2018-12-04
Payer: MEDICARE

## 2018-12-04 VITALS
OXYGEN SATURATION: 96 % | RESPIRATION RATE: 16 BRPM | DIASTOLIC BLOOD PRESSURE: 72 MMHG | HEART RATE: 79 BPM | TEMPERATURE: 98 F | WEIGHT: 193.13 LBS | HEIGHT: 70 IN | SYSTOLIC BLOOD PRESSURE: 140 MMHG | BODY MASS INDEX: 27.65 KG/M2

## 2018-12-04 DIAGNOSIS — R05.9 COUGH: Primary | ICD-10-CM

## 2018-12-04 PROCEDURE — 1101F PT FALLS ASSESS-DOCD LE1/YR: CPT | Mod: CPTII,S$GLB,, | Performed by: NURSE PRACTITIONER

## 2018-12-04 PROCEDURE — 3077F SYST BP >= 140 MM HG: CPT | Mod: CPTII,S$GLB,, | Performed by: NURSE PRACTITIONER

## 2018-12-04 PROCEDURE — 3078F DIAST BP <80 MM HG: CPT | Mod: CPTII,S$GLB,, | Performed by: NURSE PRACTITIONER

## 2018-12-04 PROCEDURE — 99999 PR PBB SHADOW E&M-EST. PATIENT-LVL IV: CPT | Mod: PBBFAC,,, | Performed by: NURSE PRACTITIONER

## 2018-12-04 PROCEDURE — 99214 OFFICE O/P EST MOD 30 MIN: CPT | Mod: S$GLB,,, | Performed by: NURSE PRACTITIONER

## 2018-12-04 RX ORDER — BENZONATATE 200 MG/1
200 CAPSULE ORAL 3 TIMES DAILY PRN
Qty: 30 CAPSULE | Refills: 1 | Status: SHIPPED | OUTPATIENT
Start: 2018-12-04 | End: 2018-12-14

## 2018-12-04 NOTE — PATIENT INSTRUCTIONS

## 2018-12-14 ENCOUNTER — HOSPITAL ENCOUNTER (OUTPATIENT)
Dept: RADIOLOGY | Facility: HOSPITAL | Age: 78
Discharge: HOME | End: 2018-12-14
Attending: PHYSICIAN ASSISTANT
Payer: MEDICARE

## 2018-12-14 ENCOUNTER — OFFICE VISIT (OUTPATIENT)
Dept: URGENT CARE | Facility: CLINIC | Age: 78
End: 2018-12-14
Payer: MEDICARE

## 2018-12-14 VITALS
HEIGHT: 70 IN | HEART RATE: 73 BPM | SYSTOLIC BLOOD PRESSURE: 140 MMHG | WEIGHT: 194.44 LBS | OXYGEN SATURATION: 97 % | TEMPERATURE: 99 F | BODY MASS INDEX: 27.84 KG/M2 | DIASTOLIC BLOOD PRESSURE: 68 MMHG

## 2018-12-14 DIAGNOSIS — B96.89 BACTERIAL LOWER RESPIRATORY INFECTION: Primary | ICD-10-CM

## 2018-12-14 DIAGNOSIS — J22 BACTERIAL LOWER RESPIRATORY INFECTION: Primary | ICD-10-CM

## 2018-12-14 DIAGNOSIS — R05.9 COUGH: ICD-10-CM

## 2018-12-14 LAB
CTP QC/QA: YES
POC MOLECULAR INFLUENZA A AGN: NEGATIVE
POC MOLECULAR INFLUENZA B AGN: NEGATIVE

## 2018-12-14 PROCEDURE — 3078F DIAST BP <80 MM HG: CPT | Mod: CPTII,S$GLB,, | Performed by: PHYSICIAN ASSISTANT

## 2018-12-14 PROCEDURE — 1101F PT FALLS ASSESS-DOCD LE1/YR: CPT | Mod: CPTII,S$GLB,, | Performed by: PHYSICIAN ASSISTANT

## 2018-12-14 PROCEDURE — 87502 INFLUENZA DNA AMP PROBE: CPT | Mod: QW,S$GLB,, | Performed by: PHYSICIAN ASSISTANT

## 2018-12-14 PROCEDURE — 71046 X-RAY EXAM CHEST 2 VIEWS: CPT | Mod: TC,FY,PO

## 2018-12-14 PROCEDURE — 3077F SYST BP >= 140 MM HG: CPT | Mod: CPTII,S$GLB,, | Performed by: PHYSICIAN ASSISTANT

## 2018-12-14 PROCEDURE — 99214 OFFICE O/P EST MOD 30 MIN: CPT | Mod: S$GLB,,, | Performed by: PHYSICIAN ASSISTANT

## 2018-12-14 PROCEDURE — 99999 PR PBB SHADOW E&M-EST. PATIENT-LVL V: CPT | Mod: PBBFAC,,, | Performed by: PHYSICIAN ASSISTANT

## 2018-12-14 PROCEDURE — 71046 X-RAY EXAM CHEST 2 VIEWS: CPT | Mod: 26,,, | Performed by: RADIOLOGY

## 2018-12-14 RX ORDER — AMOXICILLIN AND CLAVULANATE POTASSIUM 875; 125 MG/1; MG/1
1 TABLET, FILM COATED ORAL 2 TIMES DAILY
Qty: 20 TABLET | Refills: 0 | Status: SHIPPED | OUTPATIENT
Start: 2018-12-14 | End: 2018-12-24

## 2018-12-14 NOTE — LETTER
December 16, 2018        Jorge L Patel MD  [address]  [place] [address]             [facility]  [address] [place] [address]  Phone: [phone]  Fax: [phone]   Patient: Chai Murry   MR Number: [id number]   YOB: 1940   Date of Visit: 12/14/2018     Dear Dr. Patel,     Chai Murry was seen in urgent care 12/14 for a cough for the past 4 weeks with thick blood tinged sputum.  He is followed by your team, and there is a care note to notify your team of any issues in order to coordinate care management.  I wanted to make you aware.  Please let me know if you have any questions or if I can be of any assistance.      Sincerely,      Daphne Nicole PA-C            CC  No Recipients    Enclosure

## 2018-12-14 NOTE — PROGRESS NOTES
"Subjective:      Patient ID: Chai Murry is a 78 y.o. male.    Chief Complaint: Tiara Paula is a 78 year old male who presents to urgent care with a cough x 4 weeks with thick, blood tinged sputum.  He was seen 11/10 for the cough, and was treated with doxycycline at that time.  He reports other upper respiratory symptoms have improved since that visit, but the cough never did go away.  He is concerned, because he is s/p left pneumonectomy.  Per Chai, his sputum is blood tinged, but he is not coughing up any measurable amount of blood.  He denies admits rhinorrhea, but he denies fever, chills, weakness, shortness of breath.        Cough   This is a recurrent problem. Episode onset: over a month  The problem has been waxing and waning. The cough is productive of blood-tinged sputum (green mucous ). Associated symptoms include rhinorrhea. Pertinent negatives include no chills, ear congestion, ear pain, fever, nasal congestion, postnasal drip, sore throat, shortness of breath or wheezing. Treatments tried: completed a course of doxycycline, albuterol inhaler and nebs, xyzal  The treatment provided no relief. hx of lung cancer s/p left lung removal       Review of Systems   Constitutional: Negative for chills and fever.   HENT: Positive for rhinorrhea. Negative for ear pain, postnasal drip and sore throat.    Eyes: Negative for pain and discharge.   Respiratory: Positive for cough (blood tinged thick sputum ). Negative for shortness of breath, wheezing and stridor.    Gastrointestinal: Negative for abdominal pain, nausea and vomiting.   Neurological: Negative for dizziness and weakness.       Objective:   BP (!) 140/68 (BP Location: Right arm, Patient Position: Sitting, BP Method: Small (Manual))   Pulse 73   Temp 99 °F (37.2 °C) (Tympanic)   Ht 5' 10" (1.778 m)   Wt 88.2 kg (194 lb 7.1 oz)   SpO2 97%   BMI 27.90 kg/m²   Physical Exam   Constitutional: He is oriented to person, place, and time. He appears " well-developed and well-nourished. No distress.   HENT:   Head: Normocephalic.   Right Ear: Tympanic membrane, external ear and ear canal normal.   Left Ear: Tympanic membrane, external ear and ear canal normal.   Nose: Nose normal. No mucosal edema or rhinorrhea. Right sinus exhibits no maxillary sinus tenderness and no frontal sinus tenderness. Left sinus exhibits no maxillary sinus tenderness and no frontal sinus tenderness.   Mouth/Throat: Uvula is midline, oropharynx is clear and moist and mucous membranes are normal. No oropharyngeal exudate, posterior oropharyngeal edema or posterior oropharyngeal erythema.   Eyes: Conjunctivae and EOM are normal.   Neck: Normal range of motion. Neck supple.   Cardiovascular: Normal rate, regular rhythm and normal heart sounds.   Pulmonary/Chest: Effort normal and breath sounds normal. No accessory muscle usage. No apnea, no tachypnea and no bradypnea. No respiratory distress. He has no decreased breath sounds. He has no wheezes. He has no rhonchi. He has no rales.   Lymphadenopathy:        Head (right side): No submental, no submandibular and no tonsillar adenopathy present.        Head (left side): No submental, no submandibular and no tonsillar adenopathy present.     He has no cervical adenopathy.   Neurological: He is alert and oriented to person, place, and time.   Skin: Skin is warm and dry. He is not diaphoretic.   Vitals reviewed.    Assessment:      1. Bacterial lower respiratory infection    2. Cough       Plan:   Bacterial lower respiratory infection  -     amoxicillin-clavulanate 875-125mg (AUGMENTIN) 875-125 mg per tablet; Take 1 tablet by mouth 2 (two) times daily. for 10 days  Dispense: 20 tablet; Refill: 0    Cough  -     X-Ray Chest PA And Lateral; Future; Expected date: 12/14/2018  -     POCT Influenza A/B Molecular  -     Ambulatory consult to Pulmonology     Cough x 4 weeks, blood tinged, s/p left pneumonectomy    -  Flu negative    -  cxr - no evidence  of consolidation.  With thick green, blood tinged sputum per patient x 4 weeks, will start Augmentin    -  Referral to pulmonology for further eval     AVS provided and instructions reviewed with patient. Patient was counseled on supportive care and instructed to return or contact primary care provider if condition does not improve or for any new or worsening symptoms.    Daphne Nicole PA-C   Physician Assistant   [facility]

## 2018-12-15 NOTE — PATIENT INSTRUCTIONS
What Is Acute Bronchitis?  Acute bronchitis is when the airways in your lungs (bronchial tubes) become red and swollen (inflamed). It is usually caused by a viral infection. But it can also occur because of a bacteria or allergen. Symptoms include a cough that produces yellow or greenish mucus and can last for days or sometimes weeks.  Inside healthy lungs    Air travels in and out of the lungs through the airways. The linings of these airways produce sticky mucus. This mucus traps particles that enter the lungs. Tiny structures called cilia then sweep the particles out of the airways.     Healthy airway: Airways are normally open. Air moves in and out easily.      Healthy cilia: Tiny, hairlike cilia sweep mucus and particles up and out of the airways.   Lungs with bronchitis  Bronchitis often occurs with a cold or the flu virus. The airways become inflamed (red and swollen). There is a deep hacking cough from the extra mucus. Other symptoms may include:  · Wheezing  · Chest discomfort  · Shortness of breath  · Mild fever  A second infection, this time due to bacteria, may then occur. And airways irritated by allergens or smoke are more likely to get infected.        Inflamed airway: Inflammation and extra mucus narrow the airway, causing shortness of breath.      Impaired cilia: Extra mucus impairs cilia, causing congestion and wheezing. Smoking makes the problem worse.   Making a diagnosis  A physical exam, health history, and certain tests help your healthcare provider make the diagnosis.  Health history  Your healthcare provider will ask you about your symptoms.  The exam  Your provider listens to your chest for signs of congestion. He or she may also check your ears, nose, and throat.  Possible tests  · A sputum test for bacteria. This requires a sample of mucus from your lungs.  · A nasal or throat swab. This tests to see if you have a bacterial infection.  · A chest X-ray. This is done if your healthcare  provider thinks you have pneumonia.  · Tests to check for an underlying condition. Other tests may be done to check for things such as allergies, asthma, or COPD (chronic obstructive pulmonary disease). You may need to see a specialist for more lung function testing.  Treating a cough  The main treatment for bronchitis is easing symptoms. Avoiding smoke, allergens, and other things that trigger coughing can often help. If the infection is bacterial, you may be given antibiotics. During the illness, it's important to get plenty of sleep. To ease symptoms:  · Dont smoke. Also avoid secondhand smoke.  · Use a humidifier. Or try breathing in steam from a hot shower. This may help loosen mucus.  · Drink a lot of water and juice. They can soothe the throat and may help thin mucus.  · Sit up or use extra pillows when in bed. This helps to lessen coughing and congestion.  · Ask your provider about using medicine. Ask about using cough medicine, pain and fever medicine, or a decongestant.  Antibiotics  Most cases of bronchitis are caused by cold or flu viruses. They dont need antibiotics to treat them, even if your mucus is thick and green or yellow. Antibiotics dont treat viral illness and antibiotics have not been shown to have any benefit in cases of acute bronchitis. Taking antibiotics when they are not needed increases your risk of getting an infection later that is antibiotic-resistant. Antibiotics can also cause severe cases of diarrhea that require other antibiotics to treat.  It is important that you accept your healthcare provider's opinion to not use antibiotics. Your provider will prescribe antibiotics if the infection is caused by bacteria. If they are prescribed:  · Take all of the medicine. Take the medicine until it is used up, even if symptoms have improved. If you dont, the bronchitis may come back.  · Take the medicines as directed. For instance, some medicines should be taken with food.  · Ask about  side effects. Ask your provider or pharmacist what side effects are common, and what to do about them.  Follow-up care  You should see your provider again in 2 to 3 weeks. By this time, symptoms should have improved. An infection that lasts longer may mean you have a more serious problem.  Prevention  · Avoid tobacco smoke. If you smoke, quit. Stay away from smoky places. Ask friends and family not to smoke around you, or in your home or car.  · Get checked for allergies.  · Ask your provider about getting a yearly flu shot. Also ask about pneumococcal or pneumonia shots.  · Wash your hands often. This helps reduce the chance of picking up viruses that cause colds and flu.  Call your healthcare provider if:  · Symptoms worsen, or you have new symptoms  · Breathing problems worsen or  become severe  · Symptoms dont get better within a week, or within 3 days of taking antibiotics   Date Last Reviewed: 2/1/2017  © 6992-2360 [employer]. [address] [place] [address], [place] [address]. All rights reserved. This information is not intended as a substitute for professional medical care. Always follow your healthcare professional's instructions.

## 2018-12-18 ENCOUNTER — TELEPHONE (OUTPATIENT)
Dept: INFUSION THERAPY | Facility: HOSPITAL | Age: 78
End: 2018-12-18

## 2018-12-18 NOTE — TELEPHONE ENCOUNTER
----- Message from Jorge L Patel MD sent at 12/18/2018  9:24 AM CST -----  Please let him know that I am aware he was seen because of coughing with blood in the sputum. I agree with the Pulmonary referral and I need to make sure he keeps that appointment  DR Patel

## 2018-12-19 ENCOUNTER — OFFICE VISIT (OUTPATIENT)
Dept: PULMONOLOGY | Facility: CLINIC | Age: 78
End: 2018-12-19
Payer: MEDICARE

## 2018-12-19 ENCOUNTER — CLINICAL SUPPORT (OUTPATIENT)
Dept: INTERNAL MEDICINE | Facility: CLINIC | Age: 78
End: 2018-12-19
Payer: MEDICARE

## 2018-12-19 VITALS
OXYGEN SATURATION: 98 % | BODY MASS INDEX: 27.84 KG/M2 | HEART RATE: 69 BPM | SYSTOLIC BLOOD PRESSURE: 126 MMHG | WEIGHT: 194.44 LBS | DIASTOLIC BLOOD PRESSURE: 76 MMHG | HEIGHT: 70 IN | RESPIRATION RATE: 16 BRPM

## 2018-12-19 DIAGNOSIS — E29.1 HYPOGONADISM IN MALE: Primary | ICD-10-CM

## 2018-12-19 DIAGNOSIS — Z90.2 STATUS POST PNEUMONECTOMY: ICD-10-CM

## 2018-12-19 DIAGNOSIS — J44.1 COPD EXACERBATION: Primary | ICD-10-CM

## 2018-12-19 DIAGNOSIS — J44.9 COPD, MODERATE: ICD-10-CM

## 2018-12-19 PROCEDURE — 99999 PR PBB SHADOW E&M-EST. PATIENT-LVL II: CPT | Mod: PBBFAC,,,

## 2018-12-19 PROCEDURE — 3078F DIAST BP <80 MM HG: CPT | Mod: CPTII,S$GLB,, | Performed by: INTERNAL MEDICINE

## 2018-12-19 PROCEDURE — 1101F PT FALLS ASSESS-DOCD LE1/YR: CPT | Mod: CPTII,S$GLB,, | Performed by: INTERNAL MEDICINE

## 2018-12-19 PROCEDURE — 3074F SYST BP LT 130 MM HG: CPT | Mod: CPTII,S$GLB,, | Performed by: INTERNAL MEDICINE

## 2018-12-19 PROCEDURE — 96372 THER/PROPH/DIAG INJ SC/IM: CPT | Mod: S$GLB,,, | Performed by: INTERNAL MEDICINE

## 2018-12-19 PROCEDURE — 99999 PR PBB SHADOW E&M-EST. PATIENT-LVL III: CPT | Mod: PBBFAC,,, | Performed by: INTERNAL MEDICINE

## 2018-12-19 PROCEDURE — 99214 OFFICE O/P EST MOD 30 MIN: CPT | Mod: S$GLB,,, | Performed by: INTERNAL MEDICINE

## 2018-12-19 RX ORDER — PREDNISONE 10 MG/1
TABLET ORAL
Qty: 21 TABLET | Refills: 0 | Status: SHIPPED | OUTPATIENT
Start: 2018-12-19 | End: 2019-03-21 | Stop reason: ALTCHOICE

## 2018-12-19 RX ORDER — FLUTICASONE FUROATE AND VILANTEROL 100; 25 UG/1; UG/1
1 POWDER RESPIRATORY (INHALATION) DAILY
Qty: 60 EACH | Refills: 5 | Status: SHIPPED | OUTPATIENT
Start: 2018-12-19 | End: 2019-01-24 | Stop reason: ALTCHOICE

## 2018-12-19 RX ADMIN — TESTOSTERONE CYPIONATE 200 MG: 200 INJECTION, SOLUTION INTRAMUSCULAR at 09:12

## 2018-12-19 NOTE — LETTER
December 19, 2018      Daphne Nicole PA-C  [address] [facility] [place] [address]           [facility]  [address] [facility]  [place] [address]  Phone: [phone]  Fax: [phone]          Patient: Chai Murry   MR Number: [id number]   YOB: 1940   Date of Visit: 12/19/2018       Dear Daphne Nicole:    Thank you for referring Chai Murry to me for evaluation. Attached you will find relevant portions of my assessment and plan of care.    If you have questions, please do not hesitate to call me. I look forward to following Chai Murry along with you.    Sincerely,    Desiree Lowery MD    Enclosure  CC:  No Recipients    If you would like to receive this communication electronically, please contact [email or web address] or [phone] to request more information on [employer] access.    For providers and/or their staff who would like to refer a patient to [facility], please contact us through our one-stop-shop provider referral line, [facility] , at [phone].    If you feel you have received this communication in error or would no longer like to receive these types of communications, please e-mail [email or web address]

## 2018-12-19 NOTE — PROGRESS NOTES
Pulmonary Outpatient Follow Up Visit     Subjective:      Patient ID: Chai Murry is a 78 y.o. male.    Chief Complaint: Cough and COPD    HPI    78-year-old male presenting for evaluation for COPD exacerbation.  Patient was referred from urgent care after was evaluated there couple of days ago for chronic cough with worsening sputum production greenish color with bloody sputum.    Patient complain laying today of chest congestion.    He is on short-acting bronchodilator albuterol and Atrovent p.r.n..    Has history of lung cancer status post left-sided pneumonectomy.    Denies fever and chills.      Former smoker.  Reports remote occupational exposure at work to asbestos and other risk factors.      Review of Systems   Constitutional: Negative for fever and chills.   HENT: Positive for postnasal drip and congestion. Negative for nosebleeds and hearing loss.    Eyes: Negative for redness.   Respiratory: Positive for cough, sputum production, dyspnea on extertion and use of rescue inhaler. Negative for wheezing.    Cardiovascular: Negative for chest pain.        History of AFib   Genitourinary: Negative for hematuria.        Erectile dysfunction   Endocrine: Negative for polyphagia.    Musculoskeletal: Positive for arthralgias. Negative for gait problem.   Skin: Negative for rash.   Gastrointestinal: Negative for vomiting.   Neurological: Negative for syncope.   Hematological: Negative for adenopathy.   Psychiatric/Behavioral: The patient is not nervous/anxious.          Outpatient Encounter Medications as of 12/19/2018   Medication Sig Dispense Refill    amLODIPine (NORVASC) 5 MG tablet Take 2 tablets (10 mg total) by mouth every evening. (Patient taking differently: Take 5 mg by mouth once daily. ) 60 tablet 0    amoxicillin-clavulanate 875-125mg (AUGMENTIN) 875-125 mg per tablet Take 1 tablet by mouth 2 (two) times daily. for 10 days 20 tablet 0    aspirin (ECOTRIN) 81  "MG EC tablet Take 81 mg by mouth once daily.      doxepin (SINEQUAN) 10 MG capsule Take 1 capsule (10 mg total) by mouth every evening. 30 capsule 11    fenofibric acid (FIBRICOR) 135 mg CpDR TAKE 1 CAPSULE BY MOUTH EVERY DAY 30 capsule 11    fluticasone (FLONASE) 50 mcg/actuation nasal spray 1 spray by Each Nare route once daily.      ipratropium-albuterol (COMBIVENT RESPIMAT)  mcg/actuation inhaler Inhale 1 puff into the lungs every 6 (six) hours as needed for Shortness of Breath. 4 g 11    lisinopril (PRINIVIL,ZESTRIL) 40 MG tablet Take 40 mg by mouth once daily.  11    simvastatin (ZOCOR) 40 MG tablet Take 1 tablet (40 mg total) by mouth every evening. 30 tablet 11    SOTALOL AF 80 mg tablet TAKE 1 TABLET BY MOUTH TWICE A DAY 60 tablet 10    fluticasone-vilanterol (BREO ELLIPTA) 100-25 mcg/dose diskus inhaler Inhale 1 puff into the lungs once daily. Controller 60 each 5    levocetirizine (XYZAL) 5 MG tablet Take 5 mg by mouth every evening.      predniSONE (DELTASONE) 10 MG tablet Prednisone 40 mg daily for 3 days then 20 mg daily for 3 days then 10 mg daily for 3 days 21 tablet 0     Facility-Administered Encounter Medications as of 12/19/2018   Medication Dose Route Frequency Provider Last Rate Last Dose    lactated ringers infusion   Intravenous Continuous Michael Hameed MD   Stopped at 10/25/18 0924    testosterone cypionate injection 200 mg  200 mg Intramuscular Q21 Days Peter Teixeira MD   200 mg at 12/19/18 0910       Objective:     Vital Signs (Most Recent)  Vital Signs  Pulse: 69  Resp: 16  SpO2: 98 %  BP: 126/76  Height and Weight  Height: 5' 10" (177.8 cm)  Weight: 88.2 kg (194 lb 7.1 oz)  BSA (Calculated - sq m): 2.09 sq meters  BMI (Calculated): 28  Weight in (lb) to have BMI = 25: 173.9]  Wt Readings from Last 3 Encounters:   12/19/18 88.2 kg (194 lb 7.1 oz)   12/14/18 88.2 kg (194 lb 7.1 oz)   12/04/18 87.6 kg (193 lb 2 oz)     Temp Readings from Last 3 Encounters: " "  12/14/18 99 °F (37.2 °C) (Tympanic)   12/04/18 98.4 °F (36.9 °C) (Oral)   11/21/18 98.2 °F (36.8 °C) (Oral)     Height: 5' 10" (177.8 cm)          Physical Exam   Constitutional: He is oriented to person, place, and time. He appears well-developed and well-nourished.   HENT:   Head: Normocephalic.   Neck: Neck supple.   Cardiovascular: Normal rate and regular rhythm.   Pulmonary/Chest: Normal expansion and effort normal. No respiratory distress. He has decreased breath sounds.   Abdominal: Soft. There is no tenderness.   Musculoskeletal: He exhibits no tenderness.   Lymphadenopathy:     He has no cervical adenopathy.   Neurological: He is alert and oriented to person, place, and time. Gait normal.   Skin: Skin is warm. Nails show no clubbing.   Psychiatric: He has a normal mood and affect. His behavior is normal. Judgment and thought content normal.   Nursing note and vitals reviewed.    Laboratory    Lab Results   Component Value Date    WBC 8.19 11/16/2018    HGB 13.9 (L) 11/16/2018    HCT 46.2 11/16/2018    MCV 95 11/16/2018     11/16/2018     BMP  Lab Results   Component Value Date     11/16/2018    K 4.4 11/16/2018     11/16/2018    CO2 30 (H) 11/16/2018    BUN 14 11/16/2018    CREATININE 1.1 11/16/2018    CALCIUM 9.4 11/16/2018    ANIONGAP 8 11/16/2018    ESTGFRAFRICA >60.0 11/16/2018    EGFRNONAA >60.0 11/16/2018     BNP  @LABRCNTIP(BNP,BNPTRIAGEBLO)@  Lab Results   Component Value Date    TSH 3.422 02/20/2012     Diagnostic Results:    Chest x-ray 12/14 personally reviewed    Postsurgical changes left pneumonectomy with complete opacification again noted throughout the left hemithorax.  Compensatory hyperinflation and shift mediastinal structures to the left noted    Spirometry January 2018 personally reviewed, moderate obstruction.     Assessment/Plan:   COPD exacerbation  -     predniSONE (DELTASONE) 10 MG tablet; Prednisone 40 mg daily for 3 days then 20 mg daily for 3 days then 10 mg " daily for 3 days  Dispense: 21 tablet; Refill: 0    COPD, moderate  -     fluticasone-vilanterol (BREO ELLIPTA) 100-25 mcg/dose diskus inhaler; Inhale 1 puff into the lungs once daily. Controller  Dispense: 60 each; Refill: 5    s/p left pnuemonectomy for KAY[facility] [place]    Continue albuterol Atrovent p.r.n.    Start Breo 100 mcg 1 puff daily    Prednisone taper and continue Augmentin course.    Up-to-date on influenza vaccination, P PSV 23 and Prevnar 13.    Follow-up in about 3 weeks (around 1/9/2019).    This note was prepared using voice recognition system and is likely to have sound alike errors that may have been overlooked even after proof reading.  Please call me with any questions    Discussed diagnosis, its evaluation, treatment and usual course. All questions answered.    Thank you for the courtesy of participating in the care of this patient    Desiree Lowery MD

## 2019-01-04 ENCOUNTER — TELEPHONE (OUTPATIENT)
Dept: INTERNAL MEDICINE | Facility: CLINIC | Age: 79
End: 2019-01-04

## 2019-01-04 NOTE — TELEPHONE ENCOUNTER
----- Message from Corry Russell sent at 1/4/2019  9:53 AM CST -----  Contact: self  Pt is calling in regards to nurse visit appt to receive shot pt gets every 3 weeks.  Please call pt back at [phone].      Thanks,   Corry Russell

## 2019-01-09 ENCOUNTER — CLINICAL SUPPORT (OUTPATIENT)
Dept: INTERNAL MEDICINE | Facility: CLINIC | Age: 79
End: 2019-01-09
Payer: MEDICARE

## 2019-01-09 DIAGNOSIS — E29.1 HYPOGONADISM IN MALE: Primary | ICD-10-CM

## 2019-01-09 PROCEDURE — 99999 PR PBB SHADOW E&M-EST. PATIENT-LVL II: CPT | Mod: PBBFAC,,,

## 2019-01-09 PROCEDURE — 99999 PR PBB SHADOW E&M-EST. PATIENT-LVL II: ICD-10-PCS | Mod: PBBFAC,,,

## 2019-01-09 PROCEDURE — 96372 THER/PROPH/DIAG INJ SC/IM: CPT | Mod: S$GLB,,, | Performed by: INTERNAL MEDICINE

## 2019-01-09 PROCEDURE — 96372 PR INJECTION,THERAP/PROPH/DIAG2ST, IM OR SUBCUT: ICD-10-PCS | Mod: S$GLB,,, | Performed by: INTERNAL MEDICINE

## 2019-01-09 RX ADMIN — TESTOSTERONE CYPIONATE 200 MG: 200 INJECTION, SOLUTION INTRAMUSCULAR at 08:01

## 2019-01-15 ENCOUNTER — TELEPHONE (OUTPATIENT)
Dept: PULMONOLOGY | Facility: CLINIC | Age: 79
End: 2019-01-15

## 2019-01-15 NOTE — TELEPHONE ENCOUNTER
Pt accepted [facility] appointment on 1/24/2019 at 1340 with Dr Ernandez.  Pt provided times for all testing appointments.  Pt verbalized understanding.

## 2019-01-24 ENCOUNTER — CLINICAL SUPPORT (OUTPATIENT)
Dept: PULMONOLOGY | Facility: CLINIC | Age: 79
End: 2019-01-24
Payer: MEDICARE

## 2019-01-24 ENCOUNTER — HOSPITAL ENCOUNTER (OUTPATIENT)
Dept: RADIOLOGY | Facility: HOSPITAL | Age: 79
Discharge: HOME | End: 2019-01-24
Attending: INTERNAL MEDICINE
Payer: MEDICARE

## 2019-01-24 ENCOUNTER — OFFICE VISIT (OUTPATIENT)
Dept: PULMONOLOGY | Facility: CLINIC | Age: 79
End: 2019-01-24
Payer: MEDICARE

## 2019-01-24 VITALS
HEART RATE: 69 BPM | DIASTOLIC BLOOD PRESSURE: 74 MMHG | WEIGHT: 195 LBS | HEIGHT: 70 IN | BODY MASS INDEX: 27.92 KG/M2 | OXYGEN SATURATION: 97 % | RESPIRATION RATE: 18 BRPM | SYSTOLIC BLOOD PRESSURE: 128 MMHG

## 2019-01-24 DIAGNOSIS — C34.12 MALIGNANT NEOPLASM OF UPPER LOBE OF LEFT LUNG: ICD-10-CM

## 2019-01-24 DIAGNOSIS — Z85.118 HISTORY OF CANCER OF UPPER LOBE BRONCHUS OR LUNG: ICD-10-CM

## 2019-01-24 DIAGNOSIS — J44.9 CHRONIC OBSTRUCTIVE PULMONARY DISEASE, UNSPECIFIED COPD TYPE: ICD-10-CM

## 2019-01-24 DIAGNOSIS — J92.0 ASBESTOS-INDUCED PLEURAL PLAQUE: ICD-10-CM

## 2019-01-24 DIAGNOSIS — J30.89 SEASONAL ALLERGIC RHINITIS DUE TO OTHER ALLERGIC TRIGGER: ICD-10-CM

## 2019-01-24 DIAGNOSIS — J44.9 CHRONIC OBSTRUCTIVE PULMONARY DISEASE, UNSPECIFIED COPD TYPE: Primary | ICD-10-CM

## 2019-01-24 DIAGNOSIS — Z90.2 STATUS POST PNEUMONECTOMY: ICD-10-CM

## 2019-01-24 LAB
BRPFT: ABNORMAL
FEF 25 75 CHG: -25.1 %
FEF 25 75 LLN: 0.83
FEF 25 75 POST REF: 28.1 %
FEF 25 75 PRE REF: 37.6 %
FEF 25 75 REF: 2.11
FET100 CHG: 24.7 %
FEV1 CHG: -3.7 %
FEV1 FVC CHG: -6.6 %
FEV1 FVC LLN: 60
FEV1 FVC POST REF: 82.6 %
FEV1 FVC PRE REF: 88.4 %
FEV1 FVC REF: 75
FEV1 LLN: 2.06
FEV1 POST REF: 50.4 %
FEV1 PRE REF: 52.3 %
FEV1 REF: 2.95
FEV6 CHG: -1.6 %
FEV6 LLN: 2.98
FEV6 POST REF: 55.6 %
FEV6 POST: 2.16 L (ref 2.98–4.8)
FEV6 PRE REF: 56.5 %
FEV6 PRE: 2.2 L (ref 2.98–4.8)
FEV6 REF: 3.89
FVC CHG: 3 %
FVC LLN: 2.88
FVC POST REF: 60.5 %
FVC PRE REF: 58.7 %
FVC REF: 3.97
MVV LLN: 99
MVV REF: 117
PEF CHG: 17.3 %
PEF LLN: 5.28
PEF POST REF: 52.8 %
PEF PRE REF: 45 %
PEF REF: 7.6
POST FEF 25 75: 0.59 L/S (ref 0.83–3.99)
POST FET 100: 10.45 SEC
POST FEV1 FVC: 61.9 % (ref 60.46–87.94)
POST FEV1: 1.48 L (ref 2.06–3.77)
POST FVC: 2.4 L (ref 2.88–5.07)
POST PEF: 4.01 L/S (ref 5.28–9.92)
PRE FEF 25 75: 0.79 L/S (ref 0.83–3.99)
PRE FET 100: 8.38 SEC
PRE FEV1 FVC: 66.24 % (ref 60.46–87.94)
PRE FEV1: 1.54 L (ref 2.06–3.77)
PRE FVC: 2.33 L (ref 2.88–5.07)
PRE PEF: 3.42 L/S (ref 5.28–9.92)

## 2019-01-24 PROCEDURE — 3078F DIAST BP <80 MM HG: CPT | Mod: CPTII,S$GLB,, | Performed by: INTERNAL MEDICINE

## 2019-01-24 PROCEDURE — 99215 PR OFFICE/OUTPT VISIT, EST, LEVL V, 40-54 MIN: ICD-10-PCS | Mod: 25,S$GLB,, | Performed by: INTERNAL MEDICINE

## 2019-01-24 PROCEDURE — 1101F PT FALLS ASSESS-DOCD LE1/YR: CPT | Mod: CPTII,S$GLB,, | Performed by: INTERNAL MEDICINE

## 2019-01-24 PROCEDURE — 94060 EVALUATION OF WHEEZING: CPT | Mod: S$GLB,,, | Performed by: INTERNAL MEDICINE

## 2019-01-24 PROCEDURE — 71046 X-RAY EXAM CHEST 2 VIEWS: CPT | Mod: TC

## 2019-01-24 PROCEDURE — 71046 X-RAY EXAM CHEST 2 VIEWS: CPT | Mod: 26,,, | Performed by: RADIOLOGY

## 2019-01-24 PROCEDURE — 99215 OFFICE O/P EST HI 40 MIN: CPT | Mod: 25,S$GLB,, | Performed by: INTERNAL MEDICINE

## 2019-01-24 PROCEDURE — 99999 PR PBB SHADOW E&M-EST. PATIENT-LVL IV: ICD-10-PCS | Mod: PBBFAC,,, | Performed by: INTERNAL MEDICINE

## 2019-01-24 PROCEDURE — 3078F PR MOST RECENT DIASTOLIC BLOOD PRESSURE < 80 MM HG: ICD-10-PCS | Mod: CPTII,S$GLB,, | Performed by: INTERNAL MEDICINE

## 2019-01-24 PROCEDURE — 94060 PR EVAL OF BRONCHOSPASM: ICD-10-PCS | Mod: S$GLB,,, | Performed by: INTERNAL MEDICINE

## 2019-01-24 PROCEDURE — 1101F PR PT FALLS ASSESS DOC 0-1 FALLS W/OUT INJ PAST YR: ICD-10-PCS | Mod: CPTII,S$GLB,, | Performed by: INTERNAL MEDICINE

## 2019-01-24 PROCEDURE — 71046 XR CHEST PA AND LATERAL: ICD-10-PCS | Mod: 26,,, | Performed by: RADIOLOGY

## 2019-01-24 PROCEDURE — 3074F SYST BP LT 130 MM HG: CPT | Mod: CPTII,S$GLB,, | Performed by: INTERNAL MEDICINE

## 2019-01-24 PROCEDURE — 99999 PR PBB SHADOW E&M-EST. PATIENT-LVL IV: CPT | Mod: PBBFAC,,, | Performed by: INTERNAL MEDICINE

## 2019-01-24 PROCEDURE — 3074F PR MOST RECENT SYSTOLIC BLOOD PRESSURE < 130 MM HG: ICD-10-PCS | Mod: CPTII,S$GLB,, | Performed by: INTERNAL MEDICINE

## 2019-01-24 RX ORDER — ALBUTEROL SULFATE 0.83 MG/ML
2.5 SOLUTION RESPIRATORY (INHALATION)
Qty: 270 ML | Refills: 11 | Status: SHIPPED | OUTPATIENT
Start: 2019-01-24 | End: 2019-08-21 | Stop reason: SDUPTHER

## 2019-01-24 RX ORDER — FLUTICASONE PROPIONATE 50 MCG
2 SPRAY, SUSPENSION (ML) NASAL DAILY
Qty: 3 BOTTLE | Refills: 3 | Status: SHIPPED | OUTPATIENT
Start: 2019-01-24 | End: 2022-03-24

## 2019-01-24 RX ORDER — GUAIFENESIN 600 MG/1
1200 TABLET, EXTENDED RELEASE ORAL 2 TIMES DAILY
Qty: 60 TABLET | Refills: 11 | Status: SHIPPED | OUTPATIENT
Start: 2019-01-24 | End: 2019-02-23

## 2019-01-24 NOTE — PROGRESS NOTES
Subjective:       Patient ID: Chai Murry is a 78 y.o. male.    Chief Complaint: He       COPD    HPI COPD  He presents for evaluation and treatment of COPD. The patient is currently having symptoms / an exacerbation. Current symptoms include acute dyspnea, chronic dyspnea, cough productive of white sputum in small amounts and wheezing. Symptoms have been present since several weeks ago and have been gradually worsening. He denies chills and fever. Associated symptoms include poor exercise tolerance and shortness of breath.  This episode appears to have been triggered by no identifiable factor. Treatments tried for the current exacerbation: albuterol inhaler. The patient has been having similar episodes for approximately 5 years. He uses 1 pillows at night. Patient currently is not on home oxygen therapy.. The patient is having no constitutional symptoms, denying fever, chills, anorexia, or weight loss. The patient has not been hospitalized for this condition before. He quit smoking approximately a few years ago years ago. The patient is experiencing exercise intolerance (difficulty walking 3 blocks on flat ground).  Has history of lung cancer status post left-sided pneumonectomy.Denies fever and chills.   Former smoker.  Reports remote occupational exposure at work to asbestos and other risk factors    Past Medical History:   Diagnosis Date    Anemia     Arthritis     Atrial fibrillation     CAD (coronary artery disease)     Cancer     lung cancer-Left    Cataract     COPD (chronic obstructive pulmonary disease)     Diverticulosis     ED (erectile dysfunction)     HTN (hypertension)     Hyperlipidemia     Myocardial infarction      Past Surgical History:   Procedure Laterality Date    APPENDECTOMY      BRONCHOSCOPY- insert lighted tube into airway to obtain biopsy of lung Bilateral 3/4/2016    Per[facility]el [facility]andez [facility] at [facility]    cardiac stents      CATARACT EXTRACTION W/  INTRAOCULAR  LENS IMPLANT Right 9-3-14    CHOLECYSTECTOMY      COLONOSCOPY N/A 10/25/2018    [facility]formed [facility]ed [facility] at [facility]    COLONOSCOPY N/A 2018    Performed [facility] Dionne Doan [facility] at [facility]    Colonoscopy N/A 2014    Performed by Eriberto Simpson MD at [facility]    infected stomach gland excision      INSERTION-PORT Left 2016    [facility]io Wall [facility] at [facility]    LOBECTOMY-LUNG Left 2016    [facility]    LUNG REMOVAL, TOTAL Left 2016    lung cancer left upper lobe    PNEUMONECTOMY Left 2016    [facility]    SKIN BIOPSY Left     arm    THORACOTOMY Left 2016    [facility] at [facility]     Social History     Socioeconomic History    Marital status:      Spouse name: Not on file    Number of children: 3    Years of education: Not on file    Highest education level: Not on file   Social Needs    Financial resource strain: Not on file    Food insecurity - worry: Not on file    Food insecurity - inability: Not on file    Transportation needs - medical: Not on file    Transportation needs - non-medical: Not on file   Occupational History    Occupation: retired   Tobacco Use    Smoking status: Former Smoker     Packs/day: 1.00     Years: 50.00     Pack years: 50.00     Last attempt to quit: 2005     Years since quittin.4    Smokeless tobacco: Never Used   Substance and Sexual Activity    Alcohol use: No    Drug use: No    Sexual activity: Not Currently   Other Topics Concern    Not on file   Social History Narrative    Not on file     Review of Systems   Constitutional: Positive for fatigue. Negative for fever.   HENT: Positive for postnasal drip, rhinorrhea and congestion.    Eyes: Negative for redness and itching.   Respiratory: Positive for cough, sputum production, shortness of breath, dyspnea on extertion, use of rescue  inhaler and Paroxysmal Nocturnal Dyspnea.    Cardiovascular: Negative for chest pain, palpitations and leg swelling.   Genitourinary: Negative for difficulty urinating and hematuria.   Endocrine: Negative for cold intolerance and heat intolerance.    Skin: Negative for rash.   Gastrointestinal: Negative for nausea and abdominal pain.   Neurological: Negative for dizziness, syncope, weakness and light-headedness.   Hematological: Negative for adenopathy. Does not bruise/bleed easily.   Psychiatric/Behavioral: Negative for sleep disturbance. The patient is not nervous/anxious.        Objective:      Physical Exam   Constitutional: He is oriented to person, place, and time. He appears well-developed and well-nourished.   HENT:   Head: Normocephalic and atraumatic.   Mouth/Throat: Oropharyngeal exudate present.   Eyes: Conjunctivae are normal. Pupils are equal, round, and reactive to light.   Neck: Neck supple. No JVD present. No tracheal deviation present. No thyromegaly present.   Cardiovascular: Normal rate, regular rhythm and normal heart sounds.   No murmur heard.  Pulmonary/Chest: Effort normal. He has decreased breath sounds. He has wheezes in the right lower field and the left lower field. He has no rhonchi. He has no rales.   Abdominal: Soft. Bowel sounds are normal.   Musculoskeletal: Normal range of motion. He exhibits no edema or tenderness.   Lymphadenopathy:     He has no cervical adenopathy.   Neurological: He is alert and oriented to person, place, and time.   Skin: Skin is warm and dry.   Nursing note and vitals reviewed.    Personal Diagnostic Review  Chest x-ray: Post op changes  Office Spirometry Results:     No flowsheet data found.  Pulmonary Studies Review 1/24/2019   SpO2 97   Height 70.000   Weight 3120   BMI (Calculated) 28   Predicted Distance 295.54   Predicted Distance Meters (Calculated) 494.73         Assessment:       1. Chronic obstructive pulmonary disease, unspecified COPD type    2.  Seasonal allergic rhinitis due to other allergic trigger    3. History of cancer of upper lobe bronchus or lung    4. Malignant neoplasm of upper lobe of left lung    5. s/p left pnuemonectomy for KAYLI [place] [place]    6. Asbestos-induced pleural plaque        Outpatient Encounter Medications as of 1/24/2019   Medication Sig Dispense Refill    amLODIPine (NORVASC) 5 MG tablet Take 2 tablets (10 mg total) by mouth every evening. (Patient taking differently: Take 5 mg by mouth once daily. ) 60 tablet 0    aspirin (ECOTRIN) 81 MG EC tablet Take 81 mg by mouth once daily.      doxepin (SINEQUAN) 10 MG capsule Take 1 capsule (10 mg total) by mouth every evening. 30 capsule 11    fenofibric acid (FIBRICOR) 135 mg CpDR TAKE 1 CAPSULE BY MOUTH EVERY DAY 30 capsule 11    fluticasone (FLONASE) 50 mcg/actuation nasal spray 2 sprays (100 mcg total) by Each Nare route once daily. 3 Bottle 3    ipratropium-albuterol (COMBIVENT RESPIMAT)  mcg/actuation inhaler Inhale 1 puff into the lungs every 6 (six) hours as needed for Shortness of Breath. 4 g 11    levocetirizine (XYZAL) 5 MG tablet Take 5 mg by mouth every evening.      lisinopril (PRINIVIL,ZESTRIL) 40 MG tablet Take 40 mg by mouth once daily.  11    simvastatin (ZOCOR) 40 MG tablet Take 1 tablet (40 mg total) by mouth every evening. 30 tablet 11    SOTALOL AF 80 mg tablet TAKE 1 TABLET BY MOUTH TWICE A DAY 60 tablet 10    [DISCONTINUED] fluticasone (FLONASE) 50 mcg/actuation nasal spray 1 spray by Each Nare route once daily.      [DISCONTINUED] fluticasone-vilanterol (BREO ELLIPTA) 100-25 mcg/dose diskus inhaler Inhale 1 puff into the lungs once daily. Controller 60 each 5    albuterol (PROVENTIL) 2.5 mg /3 mL (0.083 %) nebulizer solution Take 3 mLs (2.5 mg total) by nebulization every 6 (six) hours while awake. 270 mL 11    glycopyrrolate-formoterol (BEVESPI AEROSPHERE) 9-4.8 mcg HFAA Inhale 2 puffs into the lungs 2 (two) times daily. Controller 10.9 g 11     guaiFENesin (MUCINEX) 600 mg 12 hr tablet Take 2 tablets (1,200 mg total) by mouth 2 (two) times daily. 60 tablet 11    predniSONE (DELTASONE) 10 MG tablet Prednisone 40 mg daily for 3 days then 20 mg daily for 3 days then 10 mg daily for 3 days 21 tablet 0     Facility-Administered Encounter Medications as of 2019   Medication Dose Route Frequency Provider Last Rate Last Dose    lactated ringers infusion   Intravenous Continuous Michael Hameed MD   Stopped at 10/25/18 0924    testosterone cypionate injection 200 mg  200 mg Intramuscular Q21 Days Peter Teixeira MD   200 mg at 19 0850     Orders Placed This Encounter   Procedures    X-Ray Chest PA And Lateral     Standing Status:   Future     Standing Expiration Date:   2020     Order Specific Question:   Reason for Exam:     Answer:   SOB    Ambulatory Referral to Allergy     Referral Priority:   Routine     Referral Type:   Allergy Testing     Referral Reason:   Specialty Services Required     Requested Specialty:   Allergy     Number of Visits Requested:   1    Six Minute Walk Test to qualify for Home Oxygen     Standing Status:   Future     Standing Expiration Date:   2020     Plan:       Requested Prescriptions     Signed Prescriptions Disp Refills    glycopyrrolate-formoterol (BEVESPI AEROSPHERE) 9-4.8 mcg HFAA 10.9 g 11     Sig: Inhale 2 puffs into the lungs 2 (two) times daily. Controller    fluticasone (FLONASE) 50 mcg/actuation nasal spray 3 Bottle 3     Si sprays (100 mcg total) by Each Nare route once daily.    albuterol (PROVENTIL) 2.5 mg /3 mL (0.083 %) nebulizer solution 270 mL 11     Sig: Take 3 mLs (2.5 mg total) by nebulization every 6 (six) hours while awake.    guaiFENesin (MUCINEX) 600 mg 12 hr tablet 60 tablet 11     Sig: Take 2 tablets (1,200 mg total) by mouth 2 (two) times daily.     Chronic obstructive pulmonary disease, unspecified COPD type  -     glycopyrrolate-formoterol (BEVESPI AEROSPHERE)  9-4.8 mcg HFAA; Inhale 2 puffs into the lungs 2 (two) times daily. Controller  Dispense: 10.9 g; Refill: 11  -     albuterol (PROVENTIL) 2.5 mg /3 mL (0.083 %) nebulizer solution; Take 3 mLs (2.5 mg total) by nebulization every 6 (six) hours while awake.  Dispense: 270 mL; Refill: 11  -     Six Minute Walk Test to qualify for Home Oxygen; Future  -     X-Ray Chest PA And Lateral; Future; Expected date: 01/24/2019  -     guaiFENesin (MUCINEX) 600 mg 12 hr tablet; Take 2 tablets (1,200 mg total) by mouth 2 (two) times daily.  Dispense: 60 tablet; Refill: 11    Seasonal allergic rhinitis due to other allergic trigger  -     Ambulatory Referral to Allergy  -     fluticasone (FLONASE) 50 mcg/actuation nasal spray; 2 sprays (100 mcg total) by Each Nare route once daily.  Dispense: 3 Bottle; Refill: 3    History of cancer of upper lobe bronchus or lung    Malignant neoplasm of upper lobe of left lung    s/p left pnuemonectomy for KAYLI [place] [place]    Asbestos-induced pleural plaque           Follow-up in about 1 year (around 1/24/2020) for Review CXR, 6 min walk.    MEDICAL DECISION MAKING: Moderate to high complexity.  Overall, the multiple problems listed are of moderate to high severity that may impact quality of life and activities of daily living. Side effects of medications, treatment plan as well as options and alternatives reviewed and discussed with patient. There was counseling of patient concerning these issues.    Total time spent in face to face counseling and coordination of care - 40  minutes over 50% of time was used in discussion of prognosis, risks, benefits of treatment, instructions and compliance with regimen . Discussion with other physicians or health care providers (DME, NP, pharmacy, respiratory therapy) occurred.

## 2019-01-30 ENCOUNTER — CLINICAL SUPPORT (OUTPATIENT)
Dept: INTERNAL MEDICINE | Facility: CLINIC | Age: 79
End: 2019-01-30
Payer: MEDICARE

## 2019-01-30 DIAGNOSIS — E29.1 HYPOGONADISM IN MALE: Primary | ICD-10-CM

## 2019-01-30 PROCEDURE — 96372 THER/PROPH/DIAG INJ SC/IM: CPT | Mod: S$GLB,,, | Performed by: INTERNAL MEDICINE

## 2019-01-30 PROCEDURE — 99999 PR PBB SHADOW E&M-EST. PATIENT-LVL II: ICD-10-PCS | Mod: PBBFAC,,,

## 2019-01-30 PROCEDURE — 96372 PR INJECTION,THERAP/PROPH/DIAG2ST, IM OR SUBCUT: ICD-10-PCS | Mod: S$GLB,,, | Performed by: INTERNAL MEDICINE

## 2019-01-30 PROCEDURE — 99999 PR PBB SHADOW E&M-EST. PATIENT-LVL II: CPT | Mod: PBBFAC,,,

## 2019-01-30 RX ADMIN — TESTOSTERONE CYPIONATE 200 MG: 200 INJECTION, SOLUTION INTRAMUSCULAR at 08:01

## 2019-02-04 ENCOUNTER — OFFICE VISIT (OUTPATIENT)
Dept: URGENT CARE | Facility: CLINIC | Age: 79
End: 2019-02-04
Payer: MEDICARE

## 2019-02-04 VITALS
RESPIRATION RATE: 19 BRPM | WEIGHT: 199.94 LBS | TEMPERATURE: 100 F | SYSTOLIC BLOOD PRESSURE: 140 MMHG | OXYGEN SATURATION: 95 % | BODY MASS INDEX: 28.69 KG/M2 | HEART RATE: 94 BPM | DIASTOLIC BLOOD PRESSURE: 72 MMHG

## 2019-02-04 DIAGNOSIS — J06.9 UPPER RESPIRATORY TRACT INFECTION, UNSPECIFIED TYPE: Primary | ICD-10-CM

## 2019-02-04 DIAGNOSIS — R05.9 COUGH: ICD-10-CM

## 2019-02-04 DIAGNOSIS — M79.10 MYALGIA: ICD-10-CM

## 2019-02-04 LAB
CTP QC/QA: YES
POC MOLECULAR INFLUENZA A AGN: NEGATIVE
POC MOLECULAR INFLUENZA B AGN: NEGATIVE

## 2019-02-04 PROCEDURE — 99214 OFFICE O/P EST MOD 30 MIN: CPT | Mod: S$GLB,,, | Performed by: NURSE PRACTITIONER

## 2019-02-04 PROCEDURE — 87502 INFLUENZA DNA AMP PROBE: CPT | Mod: QW,S$GLB,, | Performed by: NURSE PRACTITIONER

## 2019-02-04 PROCEDURE — 3078F PR MOST RECENT DIASTOLIC BLOOD PRESSURE < 80 MM HG: ICD-10-PCS | Mod: CPTII,S$GLB,, | Performed by: NURSE PRACTITIONER

## 2019-02-04 PROCEDURE — 1101F PR PT FALLS ASSESS DOC 0-1 FALLS W/OUT INJ PAST YR: ICD-10-PCS | Mod: CPTII,S$GLB,, | Performed by: NURSE PRACTITIONER

## 2019-02-04 PROCEDURE — 1101F PT FALLS ASSESS-DOCD LE1/YR: CPT | Mod: CPTII,S$GLB,, | Performed by: NURSE PRACTITIONER

## 2019-02-04 PROCEDURE — 99999 PR PBB SHADOW E&M-EST. PATIENT-LVL IV: ICD-10-PCS | Mod: PBBFAC,,, | Performed by: NURSE PRACTITIONER

## 2019-02-04 PROCEDURE — 3077F PR MOST RECENT SYSTOLIC BLOOD PRESSURE >= 140 MM HG: ICD-10-PCS | Mod: CPTII,S$GLB,, | Performed by: NURSE PRACTITIONER

## 2019-02-04 PROCEDURE — 99214 PR OFFICE/OUTPT VISIT, EST, LEVL IV, 30-39 MIN: ICD-10-PCS | Mod: S$GLB,,, | Performed by: NURSE PRACTITIONER

## 2019-02-04 PROCEDURE — 3077F SYST BP >= 140 MM HG: CPT | Mod: CPTII,S$GLB,, | Performed by: NURSE PRACTITIONER

## 2019-02-04 PROCEDURE — 87502 POCT INFLUENZA A/B MOLECULAR: ICD-10-PCS | Mod: QW,S$GLB,, | Performed by: NURSE PRACTITIONER

## 2019-02-04 PROCEDURE — 3078F DIAST BP <80 MM HG: CPT | Mod: CPTII,S$GLB,, | Performed by: NURSE PRACTITIONER

## 2019-02-04 PROCEDURE — 99999 PR PBB SHADOW E&M-EST. PATIENT-LVL IV: CPT | Mod: PBBFAC,,, | Performed by: NURSE PRACTITIONER

## 2019-02-04 RX ORDER — PROMETHAZINE HYDROCHLORIDE AND DEXTROMETHORPHAN HYDROBROMIDE 6.25; 15 MG/5ML; MG/5ML
5 SYRUP ORAL NIGHTLY PRN
Qty: 118 ML | Refills: 0 | Status: SHIPPED | OUTPATIENT
Start: 2019-02-04 | End: 2019-02-14

## 2019-02-04 NOTE — PATIENT INSTRUCTIONS
Viral Upper Respiratory Illness (Adult)  You have a viral upper respiratory illness (URI), which is another term for the common cold. This illness is contagious during the first few days. It is spread through the air by coughing and sneezing. It may also be spread by direct contact (touching the sick person and then touching your own eyes, nose, or mouth). Frequent handwashing will decrease risk of spread. Most viral illnesses go away within 7 to 10 days with rest and simple home remedies. Sometimes the illness may last for several weeks. Antibiotics will not kill a virus, and they are generally not prescribed for this condition.    Home care  · If symptoms are severe, rest at home for the first 2 to 3 days. When you resume activity, don't let yourself get too tired.  · Avoid being exposed to cigarette smoke (yours or others).  · You may use acetaminophen or ibuprofen to control pain and fever, unless another medicine was prescribed. (Note: If you have chronic liver or kidney disease, have ever had a stomach ulcer or gastrointestinal bleeding, or are taking blood-thinning medicines, talk with your healthcare provider before using these medicines.) Aspirin should never be given to anyone under 18 years of age who is ill with a viral infection or fever. It may cause severe liver or brain damage.  · Your appetite may be poor, so a light diet is fine. Avoid dehydration by drinking 6 to 8 glasses of fluids per day (water, soft drinks, juices, tea, or soup). Extra fluids will help loosen secretions in the nose and lungs.  · Over-the-counter cold medicines will not shorten the length of time youre sick, but they may be helpful for the following symptoms: cough, sore throat, and nasal and sinus congestion. (Note: Do not use decongestants if you have high blood pressure.)  Follow-up care  Follow up with your healthcare provider, or as advised.  When to seek medical advice  Call your healthcare provider right away if any  of these occur:  · Cough with lots of colored sputum (mucus)  · Severe headache; face, neck, or ear pain  · Difficulty swallowing due to throat pain  · Fever of 100.4°F (38°C)  Call 911, or get immediate medical care  Call emergency services right away if any of these occur:  · Chest pain, shortness of breath, wheezing, or difficulty breathing  · Coughing up blood  · Inability to swallow due to throat pain  Date Last Reviewed: 9/13/2015  © 7777-2554 [employer]. [address] [place] [id number]. All rights reserved. This information is not intended as a substitute for professional medical care. Always follow your healthcare professional's instructions.

## 2019-02-04 NOTE — PROGRESS NOTES
Subjective:       Patient ID: Chai Murry is a 78 y.o. male.    Chief Complaint: Hypertension; Chills; and Fever    Pt is a 78 year old male to clinic today with complaints of fever (99.8), chills, cough and congestion that began yesterday. Pt states BP also was 170s systolic yesterday. Pt states sx have improved but he feels weak today.       Sinus Problem   This is a new problem. The current episode started yesterday. The problem has been gradually improving since onset. Maximum temperature: 99.8. His pain is at a severity of 2/10. The pain is mild. Associated symptoms include chills, congestion and coughing. Pertinent negatives include no diaphoresis, ear pain, headaches, hoarse voice, neck pain, shortness of breath, sinus pressure, sneezing, sore throat or swollen glands. Past treatments include nothing.     Review of Systems   Constitutional: Positive for chills, fatigue and fever. Negative for diaphoresis.   HENT: Positive for congestion, postnasal drip and rhinorrhea. Negative for ear discharge, ear pain, hoarse voice, sinus pressure, sinus pain, sneezing, sore throat and trouble swallowing.    Respiratory: Positive for cough. Negative for chest tightness, shortness of breath and wheezing.    Cardiovascular: Negative for chest pain and palpitations.   Gastrointestinal: Negative for abdominal pain, diarrhea, nausea and vomiting.   Musculoskeletal: Positive for myalgias. Negative for back pain and neck pain.   Skin: Negative for rash.   Neurological: Negative for dizziness, light-headedness and headaches.       Objective:      Physical Exam   Constitutional: He is oriented to person, place, and time. He appears well-developed and well-nourished. No distress.   HENT:   Head: Normocephalic.   Right Ear: External ear and ear canal normal. No tenderness. Tympanic membrane is not bulging. A middle ear effusion is present.   Left Ear: External ear and ear canal normal. No tenderness. Tympanic membrane is not  bulging. A middle ear effusion is present.   Nose: Mucosal edema and rhinorrhea present. Right sinus exhibits no maxillary sinus tenderness and no frontal sinus tenderness. Left sinus exhibits no maxillary sinus tenderness and no frontal sinus tenderness.   Mouth/Throat: Uvula is midline, oropharynx is clear and moist and mucous membranes are normal. No oropharyngeal exudate, posterior oropharyngeal edema or posterior oropharyngeal erythema.   PND noted   Eyes: Conjunctivae and EOM are normal. Pupils are equal, round, and reactive to light.   Neck: Normal range of motion. Neck supple.   Cardiovascular: Normal rate, regular rhythm and normal heart sounds. Exam reveals no gallop and no friction rub.   No murmur heard.  Pulmonary/Chest: Effort normal and breath sounds normal. No accessory muscle usage or stridor. No apnea, no tachypnea and no bradypnea. No respiratory distress. He has no decreased breath sounds. He has no wheezes. He has no rhonchi. He has no rales.   Pt had left lung removed   Lymphadenopathy:        Head (right side): No submental, no submandibular and no tonsillar adenopathy present.        Head (left side): No submental, no submandibular and no tonsillar adenopathy present.     He has no cervical adenopathy.   Neurological: He is alert and oriented to person, place, and time.   Skin: Skin is warm and dry. No rash noted. He is not diaphoretic.   Psychiatric: He has a normal mood and affect. His speech is normal and behavior is normal. Thought content normal.   Nursing note and vitals reviewed.      Assessment:       1. Upper respiratory tract infection, unspecified type    2. Myalgia    3. Cough        Plan:   Upper respiratory tract infection, unspecified type  -     promethazine-dextromethorphan (PROMETHAZINE-DM) 6.25-15 mg/5 mL Syrp; Take 5 mLs by mouth nightly as needed.  Dispense: 118 mL; Refill: 0    Myalgia  -     POCT Influenza A/B Molecular    Cough  -     promethazine-dextromethorphan  (PROMETHAZINE-DM) 6.25-15 mg/5 mL Syrp; Take 5 mLs by mouth nightly as needed.  Dispense: 118 mL; Refill: 0      · Your symptoms are likely due to a viral infection. These infections can last up to 14 days, but you should notice some improvement of your symptoms within the first 7-10 days. Viral infections will not improve with antibiotics. If your symptoms persist >10 days without improvement or if you have any new or worsening symptoms, this is an indication that you may have developed a bacterial infection and should return to your primary care provider or to Urgent Care.   · Getting plenty of rest is very important to fighting infections.  · Increase fluids.   · May apply warm compresses as needed for facial pain and congestion.   · Saline nasal spray to loosen nasal congestion.  · Flonase or Nasacort to reduce inflammation in the sinus cavities.  · You may take an over the counter antihistamine for allergy symptoms such as sneezing, itchy/watery eyes, scratchy throat, or congestion.  · Take Tylenol or Ibuprofen as needed for sore throat, body aches, or fever.  · Don't drive, drink alcohol, or take any other medication or substance that causes sedation while taking cough syrup.   · Follow up with your primary care provider if symptoms persist >10 days or sooner for any new or worsening symptoms.   · Go to the ER for any fever that does not improve with Tylenol/Ibuprofen, neck stiffness, rash, severe headache, vision changes, shortness of breath, chest pain, facial swelling, severe facial pain, or any other new and concerning symptoms.

## 2019-02-20 ENCOUNTER — CLINICAL SUPPORT (OUTPATIENT)
Dept: INTERNAL MEDICINE | Facility: CLINIC | Age: 79
End: 2019-02-20
Payer: MEDICARE

## 2019-02-20 DIAGNOSIS — E29.1 HYPOGONADISM IN MALE: Primary | ICD-10-CM

## 2019-02-20 PROCEDURE — 96372 THER/PROPH/DIAG INJ SC/IM: CPT | Mod: S$GLB,,, | Performed by: INTERNAL MEDICINE

## 2019-02-20 PROCEDURE — 99999 PR PBB SHADOW E&M-EST. PATIENT-LVL II: ICD-10-PCS | Mod: PBBFAC,,,

## 2019-02-20 PROCEDURE — 99999 PR PBB SHADOW E&M-EST. PATIENT-LVL II: CPT | Mod: PBBFAC,,,

## 2019-02-20 PROCEDURE — 96372 PR INJECTION,THERAP/PROPH/DIAG2ST, IM OR SUBCUT: ICD-10-PCS | Mod: S$GLB,,, | Performed by: INTERNAL MEDICINE

## 2019-02-20 RX ADMIN — TESTOSTERONE CYPIONATE 200 MG: 200 INJECTION, SOLUTION INTRAMUSCULAR at 08:02

## 2019-02-20 NOTE — PROGRESS NOTES
After obtaining consent, and per orders of Dr. Peter Teixeira, injection of 1000mcg of B12 given by Guadalupe Lane. Patient instructed to remain in clinic for 20 minutes afterwards, and to report any adverse reaction to me immediately.    Guadalupe Lane, EVN

## 2019-02-27 DIAGNOSIS — G47.00 INSOMNIA, UNSPECIFIED TYPE: ICD-10-CM

## 2019-02-27 RX ORDER — DOXEPIN HYDROCHLORIDE 10 MG/1
10 CAPSULE ORAL NIGHTLY
Qty: 30 CAPSULE | Refills: 10 | Status: SHIPPED | OUTPATIENT
Start: 2019-02-27 | End: 2019-11-20

## 2019-03-10 ENCOUNTER — OFFICE VISIT (OUTPATIENT)
Dept: URGENT CARE | Facility: CLINIC | Age: 79
End: 2019-03-10
Payer: MEDICARE

## 2019-03-10 VITALS
HEART RATE: 67 BPM | HEIGHT: 70 IN | OXYGEN SATURATION: 97 % | SYSTOLIC BLOOD PRESSURE: 150 MMHG | TEMPERATURE: 99 F | BODY MASS INDEX: 28.06 KG/M2 | DIASTOLIC BLOOD PRESSURE: 80 MMHG | WEIGHT: 196 LBS

## 2019-03-10 DIAGNOSIS — M79.672 PAIN OF LEFT HEEL: Primary | ICD-10-CM

## 2019-03-10 PROCEDURE — 99214 OFFICE O/P EST MOD 30 MIN: CPT | Mod: S$GLB,,, | Performed by: FAMILY MEDICINE

## 2019-03-10 PROCEDURE — 3077F SYST BP >= 140 MM HG: CPT | Mod: CPTII,S$GLB,, | Performed by: FAMILY MEDICINE

## 2019-03-10 PROCEDURE — 99999 PR PBB SHADOW E&M-EST. PATIENT-LVL IV: CPT | Mod: PBBFAC,,,

## 2019-03-10 PROCEDURE — 1101F PR PT FALLS ASSESS DOC 0-1 FALLS W/OUT INJ PAST YR: ICD-10-PCS | Mod: CPTII,S$GLB,, | Performed by: FAMILY MEDICINE

## 2019-03-10 PROCEDURE — 1101F PT FALLS ASSESS-DOCD LE1/YR: CPT | Mod: CPTII,S$GLB,, | Performed by: FAMILY MEDICINE

## 2019-03-10 PROCEDURE — 3079F PR MOST RECENT DIASTOLIC BLOOD PRESSURE 80-89 MM HG: ICD-10-PCS | Mod: CPTII,S$GLB,, | Performed by: FAMILY MEDICINE

## 2019-03-10 PROCEDURE — 3079F DIAST BP 80-89 MM HG: CPT | Mod: CPTII,S$GLB,, | Performed by: FAMILY MEDICINE

## 2019-03-10 PROCEDURE — 3077F PR MOST RECENT SYSTOLIC BLOOD PRESSURE >= 140 MM HG: ICD-10-PCS | Mod: CPTII,S$GLB,, | Performed by: FAMILY MEDICINE

## 2019-03-10 PROCEDURE — 99214 PR OFFICE/OUTPT VISIT, EST, LEVL IV, 30-39 MIN: ICD-10-PCS | Mod: S$GLB,,, | Performed by: FAMILY MEDICINE

## 2019-03-10 PROCEDURE — 99999 PR PBB SHADOW E&M-EST. PATIENT-LVL IV: ICD-10-PCS | Mod: PBBFAC,,,

## 2019-03-10 RX ORDER — NAPROXEN SODIUM 550 MG/1
550 TABLET ORAL 2 TIMES DAILY WITH MEALS
Qty: 20 TABLET | Refills: 0 | Status: SHIPPED | OUTPATIENT
Start: 2019-03-10 | End: 2019-08-12

## 2019-03-10 NOTE — PROGRESS NOTES
Subjective:       Patient ID: Chai Murry is a 78 y.o. male.    Chief Complaint: Heel Pain    Pt is a 78 year old male to clinic today with complaints of left heel pain that began 4 days ago. Pt states pain gets worse throughout the day.       Foot Injury    The incident occurred 3 to 5 days ago. Incident location: no injury. There was no injury mechanism. The pain is present in the left heel. The quality of the pain is described as aching. The pain is at a severity of 7/10. The pain is moderate. The pain has been intermittent since onset. Pertinent negatives include no inability to bear weight, loss of motion, loss of sensation, muscle weakness, numbness or tingling. He reports no foreign bodies present. The symptoms are aggravated by movement, palpation and weight bearing. He has tried nothing for the symptoms.     Review of Systems   Constitutional: Negative for chills, diaphoresis, fatigue and fever.   Musculoskeletal: Positive for arthralgias (left heel). Negative for back pain, gait problem, joint swelling and neck pain.   Skin: Negative for rash and wound.   Neurological: Negative for dizziness, tingling, light-headedness, numbness and headaches.       Objective:      Physical Exam   Constitutional: He is oriented to person, place, and time. He appears well-developed and well-nourished. No distress.   HENT:   Head: Normocephalic.   Right Ear: External ear normal.   Left Ear: External ear normal.   Nose: Nose normal.   Eyes: Pupils are equal, round, and reactive to light.   Musculoskeletal:        Left foot: There is tenderness. There is normal range of motion, no bony tenderness, no swelling, normal capillary refill, no crepitus, no deformity and no laceration.        Feet:    Neurological: He is alert and oriented to person, place, and time.   Skin: Skin is warm and dry. No rash noted. He is not diaphoretic.   Psychiatric: He has a normal mood and affect. His speech is normal and behavior is normal. Thought  content normal.   Nursing note and vitals reviewed.      Assessment:       1. Pain of left heel        Plan:   Pain of left heel  -     naproxen sodium (ANAPROX) 550 MG tablet; Take 1 tablet (550 mg total) by mouth 2 (two) times daily with meals.  Dispense: 20 tablet; Refill: 0      Recommend RICE method.  NSAIDs PRN.  Podiatry f/u in 5-7 days if worsens or fails to improve.    Follow prescribed treatment plan as directed.  Stay hydrated and rest.  Report to ER if symptoms worsen.  Follow up with PCP in 2-3 days or sooner if symptoms do not improve.

## 2019-03-10 NOTE — PATIENT INSTRUCTIONS

## 2019-03-13 ENCOUNTER — CLINICAL SUPPORT (OUTPATIENT)
Dept: INTERNAL MEDICINE | Facility: CLINIC | Age: 79
End: 2019-03-13
Payer: MEDICARE

## 2019-03-13 DIAGNOSIS — E29.1 HYPOGONADISM IN MALE: Primary | ICD-10-CM

## 2019-03-13 PROCEDURE — 96372 THER/PROPH/DIAG INJ SC/IM: CPT | Mod: S$GLB,,, | Performed by: INTERNAL MEDICINE

## 2019-03-13 PROCEDURE — 99999 PR PBB SHADOW E&M-EST. PATIENT-LVL II: ICD-10-PCS | Mod: PBBFAC,,,

## 2019-03-13 PROCEDURE — 96372 PR INJECTION,THERAP/PROPH/DIAG2ST, IM OR SUBCUT: ICD-10-PCS | Mod: S$GLB,,, | Performed by: INTERNAL MEDICINE

## 2019-03-13 PROCEDURE — 99999 PR PBB SHADOW E&M-EST. PATIENT-LVL II: CPT | Mod: PBBFAC,,,

## 2019-03-13 RX ADMIN — TESTOSTERONE CYPIONATE 200 MG: 200 INJECTION, SOLUTION INTRAMUSCULAR at 08:03

## 2019-03-21 ENCOUNTER — OFFICE VISIT (OUTPATIENT)
Dept: CARDIOLOGY | Facility: CLINIC | Age: 79
End: 2019-03-21
Payer: MEDICARE

## 2019-03-21 VITALS
HEIGHT: 70 IN | BODY MASS INDEX: 28.66 KG/M2 | DIASTOLIC BLOOD PRESSURE: 68 MMHG | SYSTOLIC BLOOD PRESSURE: 120 MMHG | WEIGHT: 200.19 LBS | HEART RATE: 80 BPM

## 2019-03-21 DIAGNOSIS — I10 ESSENTIAL HYPERTENSION: ICD-10-CM

## 2019-03-21 DIAGNOSIS — I25.10 CORONARY ARTERY DISEASE INVOLVING NATIVE CORONARY ARTERY OF NATIVE HEART WITHOUT ANGINA PECTORIS: Primary | ICD-10-CM

## 2019-03-21 DIAGNOSIS — E78.00 PURE HYPERCHOLESTEROLEMIA: ICD-10-CM

## 2019-03-21 DIAGNOSIS — R07.9 ACUTE CHEST PAIN: ICD-10-CM

## 2019-03-21 DIAGNOSIS — I48.20 CHRONIC ATRIAL FIBRILLATION: ICD-10-CM

## 2019-03-21 PROCEDURE — 3074F SYST BP LT 130 MM HG: CPT | Mod: CPTII,S$GLB,, | Performed by: INTERNAL MEDICINE

## 2019-03-21 PROCEDURE — 3078F PR MOST RECENT DIASTOLIC BLOOD PRESSURE < 80 MM HG: ICD-10-PCS | Mod: CPTII,S$GLB,, | Performed by: INTERNAL MEDICINE

## 2019-03-21 PROCEDURE — 99215 PR OFFICE/OUTPT VISIT, EST, LEVL V, 40-54 MIN: ICD-10-PCS | Mod: S$GLB,,, | Performed by: INTERNAL MEDICINE

## 2019-03-21 PROCEDURE — 3078F DIAST BP <80 MM HG: CPT | Mod: CPTII,S$GLB,, | Performed by: INTERNAL MEDICINE

## 2019-03-21 PROCEDURE — 99999 PR PBB SHADOW E&M-EST. PATIENT-LVL III: CPT | Mod: PBBFAC,,, | Performed by: INTERNAL MEDICINE

## 2019-03-21 PROCEDURE — 1101F PR PT FALLS ASSESS DOC 0-1 FALLS W/OUT INJ PAST YR: ICD-10-PCS | Mod: CPTII,S$GLB,, | Performed by: INTERNAL MEDICINE

## 2019-03-21 PROCEDURE — 99215 OFFICE O/P EST HI 40 MIN: CPT | Mod: S$GLB,,, | Performed by: INTERNAL MEDICINE

## 2019-03-21 PROCEDURE — 3074F PR MOST RECENT SYSTOLIC BLOOD PRESSURE < 130 MM HG: ICD-10-PCS | Mod: CPTII,S$GLB,, | Performed by: INTERNAL MEDICINE

## 2019-03-21 PROCEDURE — 1101F PT FALLS ASSESS-DOCD LE1/YR: CPT | Mod: CPTII,S$GLB,, | Performed by: INTERNAL MEDICINE

## 2019-03-21 PROCEDURE — 99999 PR PBB SHADOW E&M-EST. PATIENT-LVL III: ICD-10-PCS | Mod: PBBFAC,,, | Performed by: INTERNAL MEDICINE

## 2019-03-21 NOTE — PROGRESS NOTES
Subjective:   Patient ID:  Chai Murry is a 78 y.o. male who presents for follow-up of Atrial Fibrillation; Hypertension; Hyperlipidemia; and Coronary Artery Disease  Pt with dizziness and at times orthostatic sx. BP variable but not low. Pt states has been on sotalol x 7 years. Pt did see [facility]    Hyperlipidemia   This is a chronic problem. The current episode started more than 1 year ago. The problem is controlled. Recent lipid tests were reviewed and are variable. Associated symptoms include chest pain and shortness of breath. Current antihyperlipidemic treatment includes statins. The current treatment provides moderate improvement of lipids. Compliance problems include medication side effects.    Coronary Artery Disease   Presents for follow-up visit. Symptoms include chest pain and shortness of breath. Pertinent negatives include no dizziness, leg swelling, muscle weakness, palpitations or weight gain. Risk factors include hyperlipidemia. The symptoms have been stable. Compliance with diet is variable. Compliance with exercise is variable. Compliance with medications is good.       Review of Systems   Constitution: Negative. Negative for weight gain.   HENT: Negative.    Eyes: Negative.    Cardiovascular: Positive for chest pain. Negative for leg swelling and palpitations.   Respiratory: Positive for shortness of breath.    Endocrine: Negative.    Hematologic/Lymphatic: Negative.    Skin: Negative.    Musculoskeletal: Negative for muscle weakness.   Gastrointestinal: Negative.    Genitourinary: Negative.    Neurological: Negative.  Negative for dizziness.   Psychiatric/Behavioral: Negative.    Allergic/Immunologic: Negative.      Family History   Problem Relation Age of Onset    Esophageal cancer Sister     Cancer Sister     Lung cancer Mother     Cancer Mother     Cataracts Mother     Hypertension Mother     Pneumonia Father     Cataracts Father     Hypertension Father     Cancer Brother      Hypertension Brother     Blindness Neg Hx     Diabetes Neg Hx     Glaucoma Neg Hx     Macular degeneration Neg Hx     Retinal detachment Neg Hx     Strabismus Neg Hx     Stroke Neg Hx     Thyroid disease Neg Hx      Past Medical History:   Diagnosis Date    Anemia     Arthritis     Atrial fibrillation     CAD (coronary artery disease)     Cancer     lung cancer-Left    Cataract     COPD (chronic obstructive pulmonary disease)     Diverticulosis     ED (erectile dysfunction)     HTN (hypertension)     Hyperlipidemia     Myocardial infarction     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 Courtney dwyer bx     Social History     Socioeconomic History    Marital status:      Spouse name: Not on file    Number of children: 3    Years of education: Not on file    Highest education level: Not on file   Social Needs    Financial resource strain: Not on file    Food insecurity - worry: Not on file    Food insecurity - inability: Not on file    Transportation needs - medical: Not on file    Transportation needs - non-medical: Not on file   Occupational History    Occupation: retired   Tobacco Use    Smoking status: Former Smoker     Packs/day: 1.00     Years: 50.00     Pack years: 50.00     Last attempt to quit: 2005     Years since quittin.6    Smokeless tobacco: Never Used   Substance and Sexual Activity    Alcohol use: No    Drug use: No    Sexual activity: Not Currently   Other Topics Concern    Not on file   Social History Narrative    Not on file     Current Outpatient Medications on File Prior to Visit   Medication Sig Dispense Refill    albuterol (PROVENTIL) 2.5 mg /3 mL (0.083 %) nebulizer solution Take 3 mLs (2.5 mg total) by nebulization every 6 (six) hours while awake. 270 mL 11    amLODIPine (NORVASC) 5 MG tablet Take 2 tablets (10 mg total) by mouth every evening. (Patient taking differently: Take 5 mg by mouth once  daily. ) 60 tablet 0    aspirin (ECOTRIN) 81 MG EC tablet Take 81 mg by mouth once daily.      doxepin (SINEQUAN) 10 MG capsule TAKE 1 CAPSULE (10 MG TOTAL) BY MOUTH EVERY EVENING. 30 capsule 10    fenofibric acid (FIBRICOR) 135 mg CpDR TAKE 1 CAPSULE BY MOUTH EVERY DAY 30 capsule 11    fluticasone (FLONASE) 50 mcg/actuation nasal spray 2 sprays (100 mcg total) by Each Nare route once daily. 3 Bottle 3    glycopyrrolate-formoterol (BEVESPI AEROSPHERE) 9-4.8 mcg HFAA Inhale 2 puffs into the lungs 2 (two) times daily. Controller 10.9 g 11    ipratropium-albuterol (COMBIVENT RESPIMAT)  mcg/actuation inhaler Inhale 1 puff into the lungs every 6 (six) hours as needed for Shortness of Breath. 4 g 11    levocetirizine (XYZAL) 5 MG tablet Take 5 mg by mouth every evening.      lisinopril (PRINIVIL,ZESTRIL) 40 MG tablet Take 40 mg by mouth once daily.  11    naproxen sodium (ANAPROX) 550 MG tablet Take 1 tablet (550 mg total) by mouth 2 (two) times daily with meals. 20 tablet 0    simvastatin (ZOCOR) 40 MG tablet Take 1 tablet (40 mg total) by mouth every evening. 30 tablet 11    SOTALOL AF 80 mg tablet TAKE 1 TABLET BY MOUTH TWICE A DAY 60 tablet 10    [DISCONTINUED] predniSONE (DELTASONE) 10 MG tablet Prednisone 40 mg daily for 3 days then 20 mg daily for 3 days then 10 mg daily for 3 days 21 tablet 0     Current Facility-Administered Medications on File Prior to Visit   Medication Dose Route Frequency Provider Last Rate Last Dose    lactated ringers infusion   Intravenous Continuous Michael Hameed MD   Stopped at 10/25/18 0924    testosterone cypionate injection 200 mg  200 mg Intramuscular Q21 Days Peter Teixeira MD   200 mg at 03/13/19 0833     Review of patient's allergies indicates:   Allergen Reactions    Atorvastatin      Other reaction(s): Muscle pain    Bactrim [sulfamethoxazole-trimethoprim]      Lip swelling       Objective:     Physical Exam   Constitutional: He is oriented to  person, place, and time. He appears well-developed and well-nourished.   HENT:   Head: Normocephalic and atraumatic.   Eyes: Pupils are equal, round, and reactive to light. Conjunctivae are normal.   Neck: Normal range of motion. Neck supple.   Cardiovascular: Normal rate, regular rhythm, normal heart sounds and intact distal pulses.   Pulmonary/Chest: Effort normal and breath sounds normal.   Abdominal: Soft. Bowel sounds are normal.   Neurological: He is alert and oriented to person, place, and time.   Skin: Skin is warm and dry.   Psychiatric: He has a normal mood and affect.   Nursing note and vitals reviewed.      Assessment:     1. Coronary artery disease involving native coronary artery of native heart without angina pectoris    2. Pure hypercholesterolemia    3. Essential hypertension    4. Chronic atrial fibrillation      NOW NSR  Plan:     Coronary artery disease involving native coronary artery of native heart without angina pectoris    Pure hypercholesterolemia    Essential hypertension    Chronic atrial fibrillation    Holter, echo, stress test  Continue asa- cad  Continue statin-hlp  Continue sotalol-afib nw NSR

## 2019-03-25 ENCOUNTER — OFFICE VISIT (OUTPATIENT)
Dept: URGENT CARE | Facility: CLINIC | Age: 79
End: 2019-03-25
Payer: MEDICARE

## 2019-03-25 VITALS
BODY MASS INDEX: 28.56 KG/M2 | SYSTOLIC BLOOD PRESSURE: 130 MMHG | DIASTOLIC BLOOD PRESSURE: 70 MMHG | WEIGHT: 199.06 LBS | TEMPERATURE: 99 F

## 2019-03-25 DIAGNOSIS — J06.9 UPPER RESPIRATORY TRACT INFECTION, UNSPECIFIED TYPE: Primary | ICD-10-CM

## 2019-03-25 PROCEDURE — 99214 OFFICE O/P EST MOD 30 MIN: CPT | Mod: S$GLB,,, | Performed by: NURSE PRACTITIONER

## 2019-03-25 PROCEDURE — 1101F PT FALLS ASSESS-DOCD LE1/YR: CPT | Mod: CPTII,S$GLB,, | Performed by: NURSE PRACTITIONER

## 2019-03-25 PROCEDURE — 3075F SYST BP GE 130 - 139MM HG: CPT | Mod: CPTII,S$GLB,, | Performed by: NURSE PRACTITIONER

## 2019-03-25 PROCEDURE — 3075F PR MOST RECENT SYSTOLIC BLOOD PRESS GE 130-139MM HG: ICD-10-PCS | Mod: CPTII,S$GLB,, | Performed by: NURSE PRACTITIONER

## 2019-03-25 PROCEDURE — 3078F DIAST BP <80 MM HG: CPT | Mod: CPTII,S$GLB,, | Performed by: NURSE PRACTITIONER

## 2019-03-25 PROCEDURE — 99999 PR PBB SHADOW E&M-EST. PATIENT-LVL III: CPT | Mod: PBBFAC,,, | Performed by: NURSE PRACTITIONER

## 2019-03-25 PROCEDURE — 1101F PR PT FALLS ASSESS DOC 0-1 FALLS W/OUT INJ PAST YR: ICD-10-PCS | Mod: CPTII,S$GLB,, | Performed by: NURSE PRACTITIONER

## 2019-03-25 PROCEDURE — 99999 PR PBB SHADOW E&M-EST. PATIENT-LVL III: ICD-10-PCS | Mod: PBBFAC,,, | Performed by: NURSE PRACTITIONER

## 2019-03-25 PROCEDURE — 99214 PR OFFICE/OUTPT VISIT, EST, LEVL IV, 30-39 MIN: ICD-10-PCS | Mod: S$GLB,,, | Performed by: NURSE PRACTITIONER

## 2019-03-25 PROCEDURE — 3078F PR MOST RECENT DIASTOLIC BLOOD PRESSURE < 80 MM HG: ICD-10-PCS | Mod: CPTII,S$GLB,, | Performed by: NURSE PRACTITIONER

## 2019-03-25 RX ORDER — BENZONATATE 200 MG/1
200 CAPSULE ORAL 3 TIMES DAILY PRN
Qty: 30 CAPSULE | Refills: 0 | Status: SHIPPED | OUTPATIENT
Start: 2019-03-25 | End: 2019-04-04

## 2019-03-25 NOTE — PROGRESS NOTES
Subjective:      Patient ID: Chai Murry is a 78 y.o. male.    Chief Complaint: Sinus Problem      Sinus Problem   This is a new problem. The current episode started yesterday. The problem is unchanged. There has been no fever. His pain is at a severity of 0/10. Associated symptoms include congestion, coughing and sneezing. Pertinent negatives include no chills, diaphoresis, ear pain, headaches, hoarse voice, neck pain, shortness of breath, sinus pressure, sore throat or swollen glands. Treatments tried: flonase, xyzal. The treatment provided no relief.     Review of Systems   Constitutional: Negative for chills, diaphoresis and fever.   HENT: Positive for congestion, postnasal drip and sneezing. Negative for ear pain, hoarse voice, sinus pressure and sore throat.    Eyes: Positive for discharge and itching. Negative for pain and redness.   Respiratory: Positive for cough. Negative for chest tightness and shortness of breath.    Cardiovascular: Negative for chest pain and palpitations.   Endocrine: Negative for cold intolerance and heat intolerance.   Musculoskeletal: Negative for neck pain.   Allergic/Immunologic: Negative for environmental allergies and food allergies.   Neurological: Negative for dizziness, weakness, light-headedness and headaches.   Hematological: Negative for adenopathy.   Psychiatric/Behavioral: Negative for agitation.        Objective:     Vitals:    03/25/19 1220   BP: 130/70   Temp: 98.5 °F (36.9 °C)     Physical Exam   Constitutional: He is oriented to person, place, and time. Vital signs are normal. He appears well-developed and well-nourished. He is cooperative. No distress.   HENT:   Head: Normocephalic.   Right Ear: Hearing, external ear and ear canal normal.   Left Ear: Hearing, external ear and ear canal normal.   Nose: Mucosal edema present. Right sinus exhibits no maxillary sinus tenderness and no frontal sinus tenderness. Left sinus exhibits no maxillary sinus tenderness and no  frontal sinus tenderness.   Mouth/Throat: Uvula is midline and mucous membranes are normal. No oral lesions. No uvula swelling. Posterior oropharyngeal erythema present. No oropharyngeal exudate, posterior oropharyngeal edema or tonsillar abscesses.   Bilateral TM slight dull in appearance   Eyes: Pupils are equal, round, and reactive to light. Conjunctivae, EOM and lids are normal. Right eye exhibits no discharge. Left eye exhibits no discharge.   Neck: Normal range of motion and full passive range of motion without pain. Neck supple. No tracheal tenderness present.   Cardiovascular: Normal rate, regular rhythm and normal heart sounds.   Pulmonary/Chest: Effort normal and breath sounds normal. No accessory muscle usage. No tachypnea and no bradypnea. No respiratory distress.   Abdominal: Soft. Bowel sounds are normal.   Musculoskeletal: Normal range of motion.   Lymphadenopathy:        Head (right side): No submandibular and no tonsillar adenopathy present.        Head (left side): No submandibular and no tonsillar adenopathy present.     He has no cervical adenopathy.   Neurological: He is alert and oriented to person, place, and time.   Skin: Skin is warm, dry and intact. Capillary refill takes less than 2 seconds. No bruising, no ecchymosis, no lesion, no petechiae and no rash noted. He is not diaphoretic. No erythema. No pallor.   Nursing note and vitals reviewed.      Assessment:     1. Upper respiratory tract infection, unspecified type        Plan:     Chai was seen today for sinus problem.    Diagnoses and all orders for this visit:    Upper respiratory tract infection, unspecified type    Other orders  -     dextromethorphan-guaifenesin (CORICIDIN HBP)  mg Cap; Take 1 capsule by mouth 2 (two) times daily. for 10 days  -     benzonatate (TESSALON) 200 MG capsule; Take 1 capsule (200 mg total) by mouth 3 (three) times daily as needed.    Sinus/Allergy Symptoms    - Continue Flonase as advised and  Saline nasal spray  - Attempt to avoid allergens.  - Use Normal saline nasal spray to wash offending allergens from airways.  - Continue antihistamine as prescribed such as Xyzal.   - Take OTC Coricidin HBP as directed.   - Drink plenty of fluids.  - Follow up with Primary Care Provider in 2-3 days or sooner if needed.              Go to ER immediately if severe allergic response experienced such as difficulty breathing, facial swelling, throat swelling.      BETSY Hawthorne, FNP-C

## 2019-03-25 NOTE — PATIENT INSTRUCTIONS
Viral Upper Respiratory Illness (Adult)  You have a viral upper respiratory illness (URI), which is another term for the common cold. This illness is contagious during the first few days. It is spread through the air by coughing and sneezing. It may also be spread by direct contact (touching the sick person and then touching your own eyes, nose, or mouth). Frequent handwashing will decrease risk of spread. Most viral illnesses go away within 7 to 10 days with rest and simple home remedies. Sometimes the illness may last for several weeks. Antibiotics will not kill a virus, and they are generally not prescribed for this condition.    Home care  · If symptoms are severe, rest at home for the first 2 to 3 days. When you resume activity, don't let yourself get too tired.  · Avoid being exposed to cigarette smoke (yours or others).  · You may use acetaminophen or ibuprofen to control pain and fever, unless another medicine was prescribed. (Note: If you have chronic liver or kidney disease, have ever had a stomach ulcer or gastrointestinal bleeding, or are taking blood-thinning medicines, talk with your healthcare provider before using these medicines.) Aspirin should never be given to anyone under 18 years of age who is ill with a viral infection or fever. It may cause severe liver or brain damage.  · Your appetite may be poor, so a light diet is fine. Avoid dehydration by drinking 6 to 8 glasses of fluids per day (water, soft drinks, juices, tea, or soup). Extra fluids will help loosen secretions in the nose and lungs.  · Over-the-counter cold medicines will not shorten the length of time youre sick, but they may be helpful for the following symptoms: cough, sore throat, and nasal and sinus congestion. (Note: Do not use decongestants if you have high blood pressure.)  Follow-up care  Follow up with your healthcare provider, or as advised.  When to seek medical advice  Call your healthcare provider right away if any  of these occur:  · Cough with lots of colored sputum (mucus)  · Severe headache; face, neck, or ear pain  · Difficulty swallowing due to throat pain  · Fever of 100.4°F (38°C)  Call 911, or get immediate medical care  Call emergency services right away if any of these occur:  · Chest pain, shortness of breath, wheezing, or difficulty breathing  · Coughing up blood  · Inability to swallow due to throat pain  Date Last Reviewed: 9/13/2015  © 3704-7560 [employer]. [address] [place] [address]. All rights reserved. This information is not intended as a substitute for professional medical care. Always follow your healthcare professional's instructions.

## 2019-03-29 ENCOUNTER — OFFICE VISIT (OUTPATIENT)
Dept: PODIATRY | Facility: CLINIC | Age: 79
End: 2019-03-29
Payer: MEDICARE

## 2019-03-29 VITALS — BODY MASS INDEX: 28.5 KG/M2 | RESPIRATION RATE: 17 BRPM | WEIGHT: 199.06 LBS | HEIGHT: 70 IN

## 2019-03-29 DIAGNOSIS — M24.572 CONTRACTURE, LEFT ANKLE: ICD-10-CM

## 2019-03-29 DIAGNOSIS — L85.3 XEROSIS CUTIS: ICD-10-CM

## 2019-03-29 DIAGNOSIS — L85.9 HYPERKERATOSIS OF SOLE: ICD-10-CM

## 2019-03-29 DIAGNOSIS — M79.672 PAIN IN LEFT FOOT: ICD-10-CM

## 2019-03-29 DIAGNOSIS — M72.2 PLANTAR FASCIITIS, LEFT: Primary | ICD-10-CM

## 2019-03-29 PROCEDURE — 20550 NJX 1 TENDON SHEATH/LIGAMENT: CPT | Mod: LT,S$GLB,, | Performed by: PODIATRIST

## 2019-03-29 PROCEDURE — 1101F PT FALLS ASSESS-DOCD LE1/YR: CPT | Mod: CPTII,S$GLB,, | Performed by: PODIATRIST

## 2019-03-29 PROCEDURE — 99203 OFFICE O/P NEW LOW 30 MIN: CPT | Mod: 25,S$GLB,, | Performed by: PODIATRIST

## 2019-03-29 PROCEDURE — 99203 PR OFFICE/OUTPT VISIT, NEW, LEVL III, 30-44 MIN: ICD-10-PCS | Mod: 25,S$GLB,, | Performed by: PODIATRIST

## 2019-03-29 PROCEDURE — 20550 PR INJECT TENDON SHEATH/LIGAMENT: ICD-10-PCS | Mod: LT,S$GLB,, | Performed by: PODIATRIST

## 2019-03-29 PROCEDURE — 99999 PR PBB SHADOW E&M-EST. PATIENT-LVL III: ICD-10-PCS | Mod: PBBFAC,,, | Performed by: PODIATRIST

## 2019-03-29 PROCEDURE — 99999 PR PBB SHADOW E&M-EST. PATIENT-LVL III: CPT | Mod: PBBFAC,,, | Performed by: PODIATRIST

## 2019-03-29 PROCEDURE — 1101F PR PT FALLS ASSESS DOC 0-1 FALLS W/OUT INJ PAST YR: ICD-10-PCS | Mod: CPTII,S$GLB,, | Performed by: PODIATRIST

## 2019-03-29 RX ORDER — METHYLPREDNISOLONE ACETATE 40 MG/ML
40 INJECTION, SUSPENSION INTRA-ARTICULAR; INTRALESIONAL; INTRAMUSCULAR; SOFT TISSUE
Status: COMPLETED | OUTPATIENT
Start: 2019-03-29 | End: 2019-03-29

## 2019-03-29 RX ORDER — DEXAMETHASONE SODIUM PHOSPHATE 4 MG/ML
4 INJECTION, SOLUTION INTRA-ARTICULAR; INTRALESIONAL; INTRAMUSCULAR; INTRAVENOUS; SOFT TISSUE ONCE
Status: COMPLETED | OUTPATIENT
Start: 2019-03-29 | End: 2019-03-29

## 2019-03-29 RX ADMIN — DEXAMETHASONE SODIUM PHOSPHATE 4 MG: 4 INJECTION, SOLUTION INTRA-ARTICULAR; INTRALESIONAL; INTRAMUSCULAR; INTRAVENOUS; SOFT TISSUE at 08:03

## 2019-03-29 RX ADMIN — METHYLPREDNISOLONE ACETATE 40 MG: 40 INJECTION, SUSPENSION INTRA-ARTICULAR; INTRALESIONAL; INTRAMUSCULAR; SOFT TISSUE at 08:03

## 2019-03-29 NOTE — LETTER
March 29, 2019      [place]  [address] [facility]  [place] [address]           [facility]  [address] [place] [address]  Phone: [phone]          Patient: Chai Murry   MR Number: [id number]   YOB: 1940   Date of Visit: 3/29/2019       Dear Michael Munoz:    Thank you for referring Chai Murry to me for evaluation. Attached you will find relevant portions of my assessment and plan of care.    If you have questions, please do not hesitate to call me. I look forward to following Chai Murry along with you.    Sincerely,    Andrea Monroe, ANASTACIA    Enclosure  CC:  No Recipients    If you would like to receive this communication electronically, please contact [email or web address] or [phone] to request more information on [employer] Link access.    For providers and/or their staff who would like to refer a patient to [facility], please contact us through our one-stop-shop provider referral line, [facility], at [phone].    If you feel you have received this communication in error or would no longer like to receive these types of communications, please e-mail [email or web address]

## 2019-03-29 NOTE — PROGRESS NOTES
Subjective:       Patient ID: Chai Murry is a 78 y.o. male.    Chief Complaint: Foot Pain (L-heel pain 4/10, footwear tennis,ambulation without wayne, non diabetic, PCP )      HPI: Chai Murry complains of moderate pains to the left foot. States pains are sharp and stabbing-like in nature. Pains are to the plantar foot, mostly with walking and standing. Rates the pains at approx. 5/10. States post-static dyskinesia. Denies any recent identifiable trauma. Does limp with gait. No NSAID medications thus far. Pains have been present for the past several weeks to months and the pains have worsened over the past couple of weeks.  He does not have a history of plantar fasciitis. States walking and standing causes and/or exacerbates the symptoms. Patient has had no corticosteroid injection(s) to the left foot, prior. Patient's Primary Care Provider is Peter Teixeira MD.     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 Courtney dwyer bx       Family History   Problem Relation Age of Onset    Esophageal cancer Sister     Cancer Sister     Lung cancer Mother     Cancer Mother     Cataracts Mother     Hypertension Mother     Pneumonia Father     Cataracts Father     Hypertension Father     Cancer Brother     Hypertension Brother     Blindness Neg Hx     Diabetes Neg Hx     Glaucoma Neg Hx     Macular degeneration Neg Hx     Retinal detachment Neg  Hx     Strabismus Neg Hx     Stroke Neg Hx     Thyroid disease Neg Hx        Social History     Socioeconomic History    Marital status:      Spouse name: Not on file    Number of children: 3    Years of education: Not on file    Highest education level: Not on file   Occupational History    Occupation: retired   Social Needs    Financial resource strain: Not on file    Food insecurity:     Worry: Not on file     Inability: Not on file    Transportation needs:     Medical: Not on file     Non-medical: Not on file   Tobacco Use    Smoking status: Former Smoker     Packs/day: 1.00     Years: 50.00     Pack years: 50.00     Last attempt to quit: 2005     Years since quittin.6    Smokeless tobacco: Never Used   Substance and Sexual Activity    Alcohol use: No    Drug use: No    Sexual activity: Not Currently   Lifestyle    Physical activity:     Days per week: Not on file     Minutes per session: Not on file    Stress: Not on file   Relationships    Social connections:     Talks on phone: Not on file     Gets together: Not on file     Attends [employer] service: Not on file     Active member of club or organization: Not on file     Attends meetings of clubs or organizations: Not on file     Relationship status: Not on file    Intimate partner violence:     Fear of current or ex partner: Not on file     Emotionally abused: Not on file     Physically abused: Not on file     Forced sexual activity: Not on file   Other Topics Concern    Not on file   Social History Narrative    Not on file       Past Surgical History:   Procedure Laterality Date    APPENDECTOMY      BRONCHOSCOPY- insert lighted tube into airway to obtain biopsy of lung Bilateral 3/4/2016    Performed by [facility]el Ernandez [facility] at [facility] ENDO    CARDIAC CATHETERIZATION      cardiac stents      CATARACT EXTRACTION W/  INTRAOCULAR LENS IMPLANT Right 9-3-14    CHOLECYSTECTOMY      COLONOSCOPY N/A 10/25/2018    Performed by  "[facility]Mchael Hameed MD at [facility] ENDO    COLONOSCOPY N/A 4/17/2018    Performed by Dionne Doan MD at [facility] ENDO    Colonoscopy N/A 8/12/2014    Performed by Eriberto Simpson MD at [facility] ENDO    infected stomach gland excision      INSERTION-PORT Left 5/26/2016    Performed by Nemesio Wall MD at [facility] OR    LOBECTOMY-LUNG Left 4/12/2016    Performed by [facility] Wall [facility] at [facility] OR    LUNG REMOVAL, TOTAL Left 04/2016    lung cancer left upper lobe    PNEUMONECTOMY Left 4/12/2016    Performed by [facility] at [facility] OR    SKIN BIOPSY Left     arm    THORACOTOMY Left 4/12/2016    Performed by [facility]io Wall [facility] at [facility] OR       Review of Systems   Constitutional: Negative for chills, fatigue and fever.   HENT: Negative for hearing loss.    Eyes: Negative for photophobia and visual disturbance.   Respiratory: Negative for cough, chest tightness, shortness of breath and wheezing.    Cardiovascular: Negative for chest pain and palpitations.   Gastrointestinal: Negative for constipation, diarrhea, nausea and vomiting.   Endocrine: Negative for cold intolerance and heat intolerance.   Genitourinary: Negative for flank pain.   Musculoskeletal: Positive for gait problem. Negative for neck pain and neck stiffness.   Skin: Positive for wound.   Neurological: Negative for light-headedness, numbness and headaches.   Psychiatric/Behavioral: Negative for sleep disturbance.         Objective:   Resp 17   Ht 5' 10" (1.778 m)   Wt 90.3 kg (199 lb 1.2 oz)   BMI 28.56 kg/m²        LOWER EXTREMITY PHYSICAL EXAMINATION    VASCULAR: The right DP pulse is 2/4 and the left DP is 2/4. The right PT pulse is 2/4 and the left PT pulse is 2/4. Proximal to distal, warm to warm. No dependent rubor or elevation palor is noted. Capillary refill time is less than 3 seconds. Hair growth is appreciated to the dorsal foot and digits. Substantial varicosities, spider veins and telangiectasias are noted.    NEUROLOGY: " Proprioception is intact, bilateral. Sensation to light touch is intact. Negative Tinel's Sign and negative Valleix Sign. No neurological sensations with compression of the area of Zapata's Nerve in the area of the Abductor Hallucis muscle belly.    ORTHOPEDIC:  Moderate to severe tenderness to palpation of the area around the plantar medial calcaneal tubercle on the left foot. Pains are characterized as sharp and stabbing-like with direct palpation of the area. There is also mild pain to palpation of the immediate plantar aspect of the heel, and no pains to the lateral band of the fascia. No edema is noted. No fullness is noted. No pains or defects are noted within the plantar fascia at the arch. No plantar fibromas are noted. No defects are noted within the ligament. Dorsiflexion of the MTPJs with simultaneous palpation of the fascia at the arch, does worsen and exacerbate the pains. No pains with medial to lateral compression of the heel. Equinus contracture is noted. Antalgic gait pattern is noted.     DERMATOLOGY: No ecchymosis is noted.  Skin is supple, dry and intact. Skin is supple. No calluses.  No open wounds or ulcerations are noted.  No palpable plantar fibromas noted. Substantial xerosis and hyperkeratosis noted to the lower extremity.    Assessment:     1. Plantar fasciitis, left    2. Pain in left foot    3. Contracture, left ankle    4. Xerosis cutis    5. Hyperkeratosis of sole          Plan:     Plantar fasciitis, left  Pain in left foot  -     methylPREDNISolone acetate injection 40 mg  -     dexamethasone injection 4 mg    Thorough discussion is had with the patient today, concerning the diagnosis, its etiology, and the treatment algorithm at present.     Following sterile preparation with alcohol swab and/or betadine paint, injection was given into and around the area of the left plantar medial calcaneal tubercle, using 25-gauge needle. Injection consisted of approximately 0.5cc of Dexamethasone  Sodium Phosphate, 0.5cc of Depo-Medrol (40mg/dL) and 0.5cc of 1% Lidocaine w/ or w/o Epinephrine or 0.25% or 0.50% Marcaine plain. Bandage application thereafter. Patient tolerated procedure well, and without complications or complaints. Patient educated that the area of pathology, might actually be slightly more painful, due to the injection, over the course of the next one to two days.    Did discuss proper and supportive shoe gear in detail and at length with the patient.  These are shoes with firm and robust arch support; medial counter.  Shoes which only bend at the metatarsophalangeal joint and which are rigid in the midfoot and hindfoot. Patient urged to purchase running type or cross training type shoes gear which are designed for pronation control.        Contracture, left ankle  Stretching exercises are discussed, taught, and are demonstrates to the patient this afternoon due to concomitant diagnosis of equinus contracture. The relationship between equinus contracture and the other aforementioned pathologies are detailed and outlined to the patient. The patient does acknowledge understanding, and we will embark on a vigorous stretching algorithm for the lower extremity.    Xerosis cutis  Hyperkeratosis of sole  Patient will purchase OTC Urea 40% and use BID or TID.          Future Appointments   Date Time Provider Department Center   4/3/2019  9:00 AM INTERNAL MEDICINE NURSE, [facility]   4/12/2019  8:30 AM HOLTER, [facility]   4/12/2019 11:30 AM TREADMILL, NUCLEAR MEDICINE [facility]   4/12/2019 11:45 AM [facility]   4/12/2019  1:00 PM ECHOCARDIOGRAM, [facility] SPECARD [facility]   4/12/2019  1:45 PM [facility] NM2 CAM[facility]AC [facility]   5/1/2019  9:30 AM [facility] CT1 LIMIT 500 LBS HGV CT SCAN [facility]   5/1/2019  9:50 AM [facility] CT1 LIMIT 500 LBS HGV[facility] CT SCAN [facility]   5/6/2019  9:40 AM [facility]  LAB [place]   5/8/2019  8:20 AM Jorge L Patel MD [facility] HEM ONC [facility]   5/17/2019 11:40 AM Andrea Monroe DPM [facility] POD [place]   5/23/2019 11:40 AM Romain Palencia MD [facility] CARDIO [place]   12/2/2019  2:45 PM [facility] XR2 [facility] XRAY [facility]   12/2/2019  3:00 PM SPIROMETRY, [facility] PULMFUN [facility]   12/2/2019  3:20 PM Roel Ernandez MD [facility] PULMSVC [place]

## 2019-04-08 ENCOUNTER — TELEPHONE (OUTPATIENT)
Dept: INTERNAL MEDICINE | Facility: CLINIC | Age: 79
End: 2019-04-08

## 2019-04-08 ENCOUNTER — CLINICAL SUPPORT (OUTPATIENT)
Dept: INTERNAL MEDICINE | Facility: CLINIC | Age: 79
End: 2019-04-08
Payer: MEDICARE

## 2019-04-08 DIAGNOSIS — E29.1 HYPOGONADISM IN MALE: Primary | ICD-10-CM

## 2019-04-08 PROCEDURE — 99999 PR PBB SHADOW E&M-EST. PATIENT-LVL II: CPT | Mod: PBBFAC,,,

## 2019-04-08 PROCEDURE — 96372 PR INJECTION,THERAP/PROPH/DIAG2ST, IM OR SUBCUT: ICD-10-PCS | Mod: S$GLB,,, | Performed by: INTERNAL MEDICINE

## 2019-04-08 PROCEDURE — 99999 PR PBB SHADOW E&M-EST. PATIENT-LVL II: ICD-10-PCS | Mod: PBBFAC,,,

## 2019-04-08 PROCEDURE — 96372 THER/PROPH/DIAG INJ SC/IM: CPT | Mod: S$GLB,,, | Performed by: INTERNAL MEDICINE

## 2019-04-08 RX ORDER — TESTOSTERONE CYPIONATE 200 MG/ML
200 INJECTION, SOLUTION INTRAMUSCULAR
Status: DISCONTINUED | OUTPATIENT
Start: 2019-04-08 | End: 2019-07-11

## 2019-04-08 RX ADMIN — TESTOSTERONE CYPIONATE 200 MG: 200 INJECTION, SOLUTION INTRAMUSCULAR at 08:04

## 2019-04-08 NOTE — TELEPHONE ENCOUNTER
Need new orders for depo.  Patient missed his last shot.    Start today.  Depo testosterone 200mg every 21 days.

## 2019-04-20 ENCOUNTER — OFFICE VISIT (OUTPATIENT)
Dept: URGENT CARE | Facility: CLINIC | Age: 79
End: 2019-04-20
Payer: MEDICARE

## 2019-04-20 VITALS
BODY MASS INDEX: 27.9 KG/M2 | WEIGHT: 194.88 LBS | HEIGHT: 70 IN | TEMPERATURE: 99 F | SYSTOLIC BLOOD PRESSURE: 136 MMHG | RESPIRATION RATE: 18 BRPM | HEART RATE: 66 BPM | DIASTOLIC BLOOD PRESSURE: 72 MMHG | OXYGEN SATURATION: 95 %

## 2019-04-20 DIAGNOSIS — R05.9 COUGH: ICD-10-CM

## 2019-04-20 DIAGNOSIS — J32.9 SINUSITIS, UNSPECIFIED CHRONICITY, UNSPECIFIED LOCATION: Primary | ICD-10-CM

## 2019-04-20 DIAGNOSIS — R09.82 PND (POST-NASAL DRIP): ICD-10-CM

## 2019-04-20 PROCEDURE — 99999 PR PBB SHADOW E&M-EST. PATIENT-LVL V: CPT | Mod: PBBFAC,,, | Performed by: NURSE PRACTITIONER

## 2019-04-20 PROCEDURE — 3078F DIAST BP <80 MM HG: CPT | Mod: CPTII,S$GLB,, | Performed by: NURSE PRACTITIONER

## 2019-04-20 PROCEDURE — 99214 PR OFFICE/OUTPT VISIT, EST, LEVL IV, 30-39 MIN: ICD-10-PCS | Mod: S$GLB,,, | Performed by: NURSE PRACTITIONER

## 2019-04-20 PROCEDURE — 3075F SYST BP GE 130 - 139MM HG: CPT | Mod: CPTII,S$GLB,, | Performed by: NURSE PRACTITIONER

## 2019-04-20 PROCEDURE — 99999 PR PBB SHADOW E&M-EST. PATIENT-LVL V: ICD-10-PCS | Mod: PBBFAC,,, | Performed by: NURSE PRACTITIONER

## 2019-04-20 PROCEDURE — 1101F PR PT FALLS ASSESS DOC 0-1 FALLS W/OUT INJ PAST YR: ICD-10-PCS | Mod: CPTII,S$GLB,, | Performed by: NURSE PRACTITIONER

## 2019-04-20 PROCEDURE — 99214 OFFICE O/P EST MOD 30 MIN: CPT | Mod: S$GLB,,, | Performed by: NURSE PRACTITIONER

## 2019-04-20 PROCEDURE — 3078F PR MOST RECENT DIASTOLIC BLOOD PRESSURE < 80 MM HG: ICD-10-PCS | Mod: CPTII,S$GLB,, | Performed by: NURSE PRACTITIONER

## 2019-04-20 PROCEDURE — 1101F PT FALLS ASSESS-DOCD LE1/YR: CPT | Mod: CPTII,S$GLB,, | Performed by: NURSE PRACTITIONER

## 2019-04-20 PROCEDURE — 3075F PR MOST RECENT SYSTOLIC BLOOD PRESS GE 130-139MM HG: ICD-10-PCS | Mod: CPTII,S$GLB,, | Performed by: NURSE PRACTITIONER

## 2019-04-20 RX ORDER — FLUTICASONE FUROATE AND VILANTEROL TRIFENATATE 100; 25 UG/1; UG/1
POWDER RESPIRATORY (INHALATION)
Refills: 5 | COMMUNITY
Start: 2019-04-15 | End: 2020-06-24 | Stop reason: ALTCHOICE

## 2019-04-20 RX ORDER — AMOXICILLIN AND CLAVULANATE POTASSIUM 875; 125 MG/1; MG/1
1 TABLET, FILM COATED ORAL 2 TIMES DAILY
Qty: 20 TABLET | Refills: 0 | Status: SHIPPED | OUTPATIENT
Start: 2019-04-20 | End: 2019-04-30

## 2019-04-20 RX ORDER — METHYLPREDNISOLONE 4 MG/1
TABLET ORAL
Qty: 1 PACKAGE | Refills: 0 | Status: SHIPPED | OUTPATIENT
Start: 2019-04-20 | End: 2019-05-03

## 2019-04-20 NOTE — PATIENT INSTRUCTIONS
Sinusitis (Antibiotic Treatment)    The sinuses are air-filled spaces within the bones of the face. They connect to the inside of the nose. Sinusitis is an inflammation of the tissue lining the sinus cavity. Sinus inflammation can occur during a cold. It can also be due to allergies to pollens and other particles in the air. Sinusitis can cause symptoms of sinus congestion and fullness. A sinus infection causes fever, headache and facial pain. There is often green or yellow drainage from the nose or into the back of the throat (post-nasal drip). You have been given antibiotics to treat this condition.  Home care:  · Take the full course of antibiotics as instructed. Do not stop taking them, even if you feel better.  · Drink plenty of water, hot tea, and other liquids. This may help thin mucus. It also may promote sinus drainage.  · Heat may help soothe painful areas of the face. Use a towel soaked in hot water. Or,  the shower and direct the hot spray onto your face. Using a vaporizer along with a menthol rub at night may also help.   · An expectorant containing guaifenesin may help thin the mucus and promote drainage from the sinuses.  · Over-the-counter decongestants may be used unless a similar medicine was prescribed. Nasal sprays work the fastest. Use one that contains phenylephrine or oxymetazoline. First blow the nose gently. Then use the spray. Do not use these medicines more often than directed on the label or symptoms may get worse. You may also use tablets containing pseudoephedrine. Avoid products that combine ingredients, because side effects may be increased. Read labels. You can also ask the pharmacist for help. (NOTE: Persons with high blood pressure should not use decongestants. They can raise blood pressure.)  · Over-the-counter antihistamines may help if allergies contributed to your sinusitis.    · Do not use nasal rinses or irrigation during an acute sinus infection, unless told to by  your health care provider. Rinsing may spread the infection to other sinuses.  · Use acetaminophen or ibuprofen to control pain, unless another pain medicine was prescribed. (If you have chronic liver or kidney disease or ever had a stomach ulcer, talk with your doctor before using these medicines. Aspirin should never be used in anyone under 18 years of age who is ill with a fever. It may cause severe liver damage.)  · Don't smoke. This can worsen symptoms.  Follow-up care  Follow up with your healthcare provider or our staff if you are not improving within the next week.  When to seek medical advice  Call your healthcare provider if any of these occur:  · Facial pain or headache becoming more severe  · Stiff neck  · Unusual drowsiness or confusion  · Swelling of the forehead or eyelids  · Vision problems, including blurred or double vision  · Fever of 100.4ºF (38ºC) or higher, or as directed by your healthcare provider  · Seizure  · Breathing problems  · Symptoms not resolving within 10 days  Date Last Reviewed: 4/13/2015  © 6305-7386 [email or web address]. [address] [place] [address], [place] [address]. All rights reserved. This information is not intended as a substitute for professional medical care. Always follow your healthcare professional's instructions.

## 2019-04-20 NOTE — PROGRESS NOTES
Subjective:       Patient ID: Chai Murry is a 79 y.o. male.    Chief Complaint: Hoarse (Pt c/o PND and productive cough for the last month or two, began getting hoarse over the last 2-3 days. )    Pt is a 79 year old male to clinic today with complaints of cough, congestion, rhinorrhea, and PND that began 2 months ago.     Sinus Problem   This is a recurrent problem. The current episode started more than 1 month ago. The problem has been waxing and waning since onset. There has been no fever. His pain is at a severity of 0/10. He is experiencing no pain. Associated symptoms include congestion, coughing and sinus pressure. Pertinent negatives include no chills, diaphoresis, ear pain, headaches, hoarse voice, neck pain, shortness of breath, sneezing, sore throat or swollen glands. Treatments tried: xyzal, flonase. The treatment provided no relief.     Review of Systems   Constitutional: Negative for chills, diaphoresis, fatigue and fever.   HENT: Positive for congestion, postnasal drip, rhinorrhea and sinus pressure. Negative for ear discharge, ear pain, hoarse voice, sinus pain, sneezing, sore throat and trouble swallowing.    Respiratory: Positive for cough. Negative for chest tightness, shortness of breath and wheezing.    Cardiovascular: Negative for chest pain and palpitations.   Gastrointestinal: Negative for abdominal pain, diarrhea, nausea and vomiting.   Musculoskeletal: Negative for back pain, myalgias and neck pain.   Skin: Negative for rash.   Neurological: Negative for dizziness, light-headedness and headaches.       Objective:      Physical Exam   Constitutional: He is oriented to person, place, and time. He appears well-developed and well-nourished. No distress.   HENT:   Head: Normocephalic.   Right Ear: Tympanic membrane, external ear and ear canal normal. No tenderness. Tympanic membrane is not bulging.   Left Ear: Tympanic membrane, external ear and ear canal normal. No tenderness. Tympanic  membrane is not bulging.   Nose: Mucosal edema and rhinorrhea present. Right sinus exhibits maxillary sinus tenderness. Right sinus exhibits no frontal sinus tenderness. Left sinus exhibits maxillary sinus tenderness. Left sinus exhibits no frontal sinus tenderness.   Mouth/Throat: Uvula is midline, oropharynx is clear and moist and mucous membranes are normal. No oropharyngeal exudate, posterior oropharyngeal edema or posterior oropharyngeal erythema.   PND noted   Eyes: Pupils are equal, round, and reactive to light. Conjunctivae and EOM are normal.   Neck: Normal range of motion. Neck supple.   Cardiovascular: Normal rate, regular rhythm and normal heart sounds. Exam reveals no gallop and no friction rub.   No murmur heard.  Pulmonary/Chest: Effort normal and breath sounds normal. No accessory muscle usage or stridor. No apnea, no tachypnea and no bradypnea. No respiratory distress. He has no decreased breath sounds. He has no wheezes. He has no rhonchi. He has no rales.   Lymphadenopathy:        Head (right side): No submental, no submandibular and no tonsillar adenopathy present.        Head (left side): No submental, no submandibular and no tonsillar adenopathy present.     He has no cervical adenopathy.   Neurological: He is alert and oriented to person, place, and time.   Skin: Skin is warm and dry. No rash noted. He is not diaphoretic.   Psychiatric: He has a normal mood and affect. His speech is normal and behavior is normal. Thought content normal.   Nursing note and vitals reviewed.      Assessment:       1. Sinusitis, unspecified chronicity, unspecified location    2. Cough    3. PND (post-nasal drip)        Plan:   Sinusitis, unspecified chronicity, unspecified location  -     amoxicillin-clavulanate 875-125mg (AUGMENTIN) 875-125 mg per tablet; Take 1 tablet by mouth 2 (two) times daily. for 10 days  Dispense: 20 tablet; Refill: 0  -     methylPREDNISolone (MEDROL DOSEPACK) 4 mg tablet; use as directed   Dispense: 1 Package; Refill: 0    Cough    PND (post-nasal drip)      · Rest and increase fluids.   · May apply warm compresses as needed.   · Saline nasal spray or saline irrigation (Neti pot) to loosen nasal congestion.  · Flonase or Nasacort to reduce inflammation in the sinus cavities.  · Take antibiotics exactly as prescribed. Make sure to complete the entire course of antibiotics even if you start feeling better. This will prevent recurrence of your infection and bacterial resistance.   · Do not drive, drink alcohol, or take any other sedating medications or substances while taking cough syrup.   · Follow up with your primary care provider or with ENT if not improved within a few days or sooner for any new or worsening symptoms.   · Go to the ER for any fever that does not improve with Tylenol/Ibuprofen, neck stiffness, rash, severe headache, vision changes, shortness of breath, chest pain, severe facial pain or swelling, or for any other new and concerning symptoms.

## 2019-04-26 ENCOUNTER — OFFICE VISIT (OUTPATIENT)
Dept: URGENT CARE | Facility: CLINIC | Age: 79
End: 2019-04-26
Payer: MEDICARE

## 2019-04-26 VITALS
TEMPERATURE: 99 F | WEIGHT: 193.13 LBS | DIASTOLIC BLOOD PRESSURE: 68 MMHG | BODY MASS INDEX: 27.65 KG/M2 | HEIGHT: 70 IN | SYSTOLIC BLOOD PRESSURE: 132 MMHG | OXYGEN SATURATION: 96 % | HEART RATE: 58 BPM

## 2019-04-26 DIAGNOSIS — R49.0 HOARSENESS: Primary | ICD-10-CM

## 2019-04-26 PROCEDURE — 99999 PR PBB SHADOW E&M-EST. PATIENT-LVL IV: CPT | Mod: PBBFAC,,, | Performed by: FAMILY MEDICINE

## 2019-04-26 PROCEDURE — 3075F PR MOST RECENT SYSTOLIC BLOOD PRESS GE 130-139MM HG: ICD-10-PCS | Mod: CPTII,S$GLB,, | Performed by: FAMILY MEDICINE

## 2019-04-26 PROCEDURE — 3075F SYST BP GE 130 - 139MM HG: CPT | Mod: CPTII,S$GLB,, | Performed by: FAMILY MEDICINE

## 2019-04-26 PROCEDURE — 3078F DIAST BP <80 MM HG: CPT | Mod: CPTII,S$GLB,, | Performed by: FAMILY MEDICINE

## 2019-04-26 PROCEDURE — 1101F PT FALLS ASSESS-DOCD LE1/YR: CPT | Mod: CPTII,S$GLB,, | Performed by: FAMILY MEDICINE

## 2019-04-26 PROCEDURE — 3078F PR MOST RECENT DIASTOLIC BLOOD PRESSURE < 80 MM HG: ICD-10-PCS | Mod: CPTII,S$GLB,, | Performed by: FAMILY MEDICINE

## 2019-04-26 PROCEDURE — 1101F PR PT FALLS ASSESS DOC 0-1 FALLS W/OUT INJ PAST YR: ICD-10-PCS | Mod: CPTII,S$GLB,, | Performed by: FAMILY MEDICINE

## 2019-04-26 PROCEDURE — 99999 PR PBB SHADOW E&M-EST. PATIENT-LVL IV: ICD-10-PCS | Mod: PBBFAC,,, | Performed by: FAMILY MEDICINE

## 2019-04-26 PROCEDURE — 99213 OFFICE O/P EST LOW 20 MIN: CPT | Mod: S$GLB,,, | Performed by: FAMILY MEDICINE

## 2019-04-26 PROCEDURE — 99213 PR OFFICE/OUTPT VISIT, EST, LEVL III, 20-29 MIN: ICD-10-PCS | Mod: S$GLB,,, | Performed by: FAMILY MEDICINE

## 2019-04-26 NOTE — PROGRESS NOTES
"Subjective:       Patient ID: Chai Murry is a 79 y.o. male.    Chief Complaint: Hoarse    /68 (BP Location: Right arm, Patient Position: Sitting, BP Method: Medium (Manual))   Pulse (!) 58   Temp 98.5 °F (36.9 °C) (Oral)   Ht 5' 10" (1.778 m)   Wt 87.6 kg (193 lb 2 oz)   SpO2 96%   BMI 27.71 kg/m²     HPI  Sinus drip, congestion and cough for the past 1-2 months since pollen season started. Got hoarse over a week ago. Seen here at   for these sx, received augmentin and medrol dose pack. Just finished last dose. He also takes xyzol, albuterol inhaler, tessalon perle, Sx persists. Hx of left lung resection  Ca. Sister  of laryngeal hemorrhage     Review of Systems   Constitutional: Negative.  Negative for chills and fever.   HENT: Positive for congestion, postnasal drip, sinus pressure and voice change. Negative for ear pain and trouble swallowing.    Respiratory: Positive for cough. Negative for chest tightness, wheezing and stridor.    Cardiovascular: Negative.    Gastrointestinal: Negative.    Musculoskeletal: Negative.    Allergic/Immunologic: Positive for immunocompromised state.   Neurological: Negative.        Objective:      Physical Exam   Constitutional: He is oriented to person, place, and time. He appears well-developed and well-nourished. No distress.   HENT:   Head: Normocephalic and atraumatic.   Mouth/Throat: Oropharynx is clear and moist. No oropharyngeal exudate.   hoarse   Eyes: Pupils are equal, round, and reactive to light. EOM are normal. Right eye exhibits no discharge. Left eye exhibits no discharge.   Neck: Normal range of motion. Neck supple.   Cardiovascular: Regular rhythm and normal heart sounds.   Pulmonary/Chest: Effort normal. He has no wheezes. He has no rales.   BS - nl right. Silent- left   Musculoskeletal: Normal range of motion.   Lymphadenopathy:     He has no cervical adenopathy.   Neurological: He is alert and oriented to person, place, and time. "   Skin: Skin is warm and dry. He is not diaphoretic.   Nursing note and vitals reviewed.      Assessment:       1. Hoarseness        Plan:     Chai was seen today for hoarse.    Diagnoses and all orders for this visit:    Hoarseness  -     Ambulatory referral to ENT

## 2019-04-28 ENCOUNTER — HOSPITAL ENCOUNTER (EMERGENCY)
Facility: HOSPITAL | Age: 79
Discharge: HOME | End: 2019-04-28
Attending: EMERGENCY MEDICINE
Payer: MEDICARE

## 2019-04-28 VITALS
SYSTOLIC BLOOD PRESSURE: 115 MMHG | RESPIRATION RATE: 16 BRPM | HEART RATE: 72 BPM | DIASTOLIC BLOOD PRESSURE: 68 MMHG | WEIGHT: 190 LBS | BODY MASS INDEX: 27.26 KG/M2 | TEMPERATURE: 98 F | OXYGEN SATURATION: 95 %

## 2019-04-28 DIAGNOSIS — M79.673 FOOT PAIN: ICD-10-CM

## 2019-04-28 DIAGNOSIS — R49.0 HOARSENESS OF VOICE: ICD-10-CM

## 2019-04-28 DIAGNOSIS — M77.32 CALCANEAL SPUR OF LEFT FOOT: Primary | ICD-10-CM

## 2019-04-28 PROCEDURE — 99283 EMERGENCY DEPT VISIT LOW MDM: CPT

## 2019-04-28 RX ORDER — HYDROCODONE BITARTRATE AND ACETAMINOPHEN 5; 325 MG/1; MG/1
1 TABLET ORAL EVERY 6 HOURS PRN
Qty: 12 TABLET | Refills: 0 | Status: SHIPPED | OUTPATIENT
Start: 2019-04-28 | End: 2019-07-11

## 2019-04-28 NOTE — ED PROVIDER NOTES
SCRIBE #1 NOTE: I, Catalina Butcher, am scribing for, and in the presence of, YOEL Sharma. I have scribed the entire note.      History      Chief Complaint   Patient presents with    Foot Pain     x several months after jumping a ditch; received steroid shot approx one month ago with no relief    Hoarse     voice is hoarse x 10 days but denies sore throat       Review of patient's allergies indicates:   Allergen Reactions    Atorvastatin      Other reaction(s): Muscle pain    Bactrim [sulfamethoxazole-trimethoprim]      Lip swelling        HPI   HPI    4/28/2019, 4:15 PM   History obtained from the patient      History of Present Illness: Chai Murry is a 79 y.o. male patient who presents to the Emergency Department for L foot pain which onset gradually a few months ago after jumping in a ditch. Pt also c/o of hoarseness that began about 10 days ago.  Symptoms are constant and moderate in severity. Pt reports he jumped in a ditch several months ago and injured his L foot; was given steroid shot a month ago with no relief. No mitigating or exacerbating factors reported. Associated sxs include difficulty walking secondary to heel pain. Patient denies any fever, chills, sore throat, n/v/d, and all other sxs at this time. No prior Tx. Pt reports that he has an appointment with an ENT on Wednesday 05/01/2019. No further complaints or concerns at this time.         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 Courtney dwyer bx       Past Surgical  History:  Past Surgical History:   Procedure Laterality Date    APPENDECTOMY      BRONCHOSCOPY- insert lighted tube into airway to obtain biopsy of lung Bilateral 3/4/2016    Performed by [facility]el [facility]andez [facility] at [facility] END[facility]    CARDIAC CATHETERIZATION      cardiac stents      CATARACT EXTRACTION W/  INTRAOCULAR LENS IMPLANT Right 9-3-14    CHOLECYSTECTOMY      COLONOSCOPY N/A 10/25/2018    Performed by [facility]ed [facility] at [facility] END[facility]    COLONOSCOPY N/A 2018    Performed by Dionne Doan MD at [facility] ENDO    Colonoscopy N/A 2014    Performed by Eriberto Simpson MD at [facility] ENDO    infected stomach gland excision      INSERTION-PORT Left 2016    Performed by Nemesio Wall [facility] at [facility] OR    LOBECTOMY-LUNG Left 2016    Performed by [facility] at [facility] OR    LUNG REMOVAL, TOTAL Left 2016    lung cancer left upper lobe    PNEUMONECTOMY Left 2016    [facility] at [facility] OR    SKIN BIOPSY Left     arm    THORACOTOMY Left 2016    Performed by [facility]io Wall MD at [facility] OR         Family History:  Family History   Problem Relation Age of Onset    Esophageal cancer Sister     Cancer Sister     Lung cancer Mother     Cancer Mother     Cataracts Mother     Hypertension Mother     Pneumonia Father     Cataracts Father     Hypertension Father     Cancer Brother     Hypertension Brother     Blindness Neg Hx     Diabetes Neg Hx     Glaucoma Neg Hx     Macular degeneration Neg Hx     Retinal detachment Neg Hx     Strabismus Neg Hx     Stroke Neg Hx     Thyroid disease Neg Hx        Social History:  Social History     Tobacco Use    Smoking status: Former Smoker     Packs/day: 1.00     Years: 50.00     Pack years: 50.00     Last attempt to quit: 2005     Years since quittin.7    Smokeless tobacco: Never Used   Substance and Sexual Activity    Alcohol use: No    Drug use: No    Sexual activity: Not Currently        ROS   Review of Systems   Constitutional: Negative for chills and fever.   HENT: Negative for sore throat.         (+) Hoarseness    Respiratory: Negative for shortness of breath.    Cardiovascular: Negative for chest pain.   Gastrointestinal: Negative for diarrhea, nausea and vomiting.   Genitourinary: Negative for dysuria.   Musculoskeletal: Positive for gait problem. Negative for back pain.        (+) L heel pain   Skin: Negative for rash.   Neurological: Negative for weakness.   Hematological: Does not bruise/bleed easily.   All other systems reviewed and are negative.    Physical Exam      Initial Vitals [04/28/19 1605]   BP Pulse Resp Temp SpO2   115/68 72 16 98.3 °F (36.8 °C) 95 %      MAP       --          Physical Exam  Nursing Notes and Vital Signs Reviewed.  Constitutional: Patient is in no acute distress. Well-developed and well-nourished.  Head: Atraumatic. Normocephalic.  Eyes: PERRL. EOM intact. Conjunctivae are not pale. No scleral icterus.  ENT: Mucous membranes are moist. Oropharynx is clear and symmetric.    Neck: Supple. Full ROM. No lymphadenopathy.  Cardiovascular: Regular rate. Regular rhythm. No murmurs, rubs, or gallops. Distal pulses are 2+ and symmetric.  Pulmonary/Chest: No respiratory distress. Clear to auscultation bilaterally. No wheezing or rales.  Abdominal: Soft and non-distended.  There is no tenderness.  No rebound, guarding, or rigidity. Good bowel sounds.  Genitourinary: No CVA tenderness  Musculoskeletal: Moves all extremities. No obvious deformities. No edema. No calf tenderness.  LLE: no evident deformity. negative for swelling. Positive for tenderness to L heel. ROM is normal. Cap refill distally is <2 seconds. DP and PT pulses are equal and 2+ bilaterally. No motor deficit. No distal sensory deficit. No erythema.   Skin: Warm and dry.  Neurological:  Alert, awake, and appropriate.  Normal speech.  No acute focal neurological deficits are appreciated.  Psychiatric:  Normal affect. Good eye contact. Appropriate in content.    ED Course    Procedures  ED Vital Signs:  Vitals:    04/28/19 1605   BP: 115/68   Pulse: 72   Resp: 16   Temp: 98.3 °F (36.8 °C)   TempSrc: Oral   SpO2: 95%   Weight: 86.2 kg (190 lb)       Abnormal Lab Results:  Labs Reviewed - No data to display     All Lab Results:  None    Imaging Results:  Imaging Results          X-Ray Foot Complete Left (Final result)  Result time 04/28/19 16:45:46    Final result by Prasanna Ortiz MD (Timothy) (04/28/19 16:45:46)                 Impression:      Negative exam.      Electronically signed by: Prasanna Ortiz MD  Date:    04/28/2019  Time:    16:45             Narrative:    EXAMINATION:  XR FOOT COMPLETE 3 VIEW LEFT    CLINICAL HISTORY:  Pain in unspecified foot    TECHNIQUE:  Standard radiography performed.    COMPARISON:  None    FINDINGS:  Bone density and architecture are normal.  No acute findings.                                        The Emergency Provider reviewed the vital signs and test results, which are outlined above.    ED Discussion     4:43 PM: Reassessed pt at this time.  Pt states his condition has improved at this time. Discussed with pt all pertinent ED information and results. Discussed pt dx and plan of tx. Gave pt all f/u and return to the ED instructions. All questions and concerns were addressed at this time. Pt expresses understanding of information and instructions, and is comfortable with plan to discharge. Pt is stable for discharge.    I discussed with patient and/or family/caretaker that evaluation in the ED does not suggest any emergent or life threatening medical conditions requiring immediate intervention beyond what was provided in the ED, and I believe patient is safe for discharge.  Regardless, an unremarkable evaluation in the ED does not preclude the development or presence of a serious of life threatening condition. As such, patient was instructed to return immediately for any  worsening or change in current symptoms.    ED Medication(s):  Medications - No data to display  Current Discharge Medication List      START taking these medications    Details   HYDROcodone-acetaminophen (NORCO) 5-325 mg per tablet Take 1 tablet by mouth every 6 (six) hours as needed.  Qty: 12 tablet, Refills: 0             Follow-up Information     Andrea Monroe DPM. Go in 2 days.    Specialty:  Podiatry  Contact information:  [address] [place] [address] [address] [place] [address] [phone]                     Medical Decision Making    Medical Decision Making:   Clinical Tests:   Radiological Study: Ordered and Reviewed           Scribe Attestation:   Scribe #1: I performed the above scribed service and the documentation accurately describes the services I performed. I attest to the accuracy of the note.    Attending:   Physician Attestation Statement for Scribe #1: I,YOEL Sharma, personally performed the services described in this documentation, as scribed by Catalina Butcher, in my presence, and it is both accurate and complete.          Clinical Impression       ICD-10-CM ICD-9-CM   1. Calcaneal spur of left foot M77.32 726.73   2. Foot pain M79.673 729.5   3. Hoarseness of voice R49.0 784.42       Disposition:   Disposition: Discharged  Condition: Stable         YOEL Sharma  05/01/19 1109

## 2019-04-29 ENCOUNTER — TELEPHONE (OUTPATIENT)
Dept: INTERNAL MEDICINE | Facility: CLINIC | Age: 79
End: 2019-04-29

## 2019-04-29 ENCOUNTER — CLINICAL SUPPORT (OUTPATIENT)
Dept: INTERNAL MEDICINE | Facility: CLINIC | Age: 79
End: 2019-04-29
Payer: MEDICARE

## 2019-04-29 DIAGNOSIS — E29.1 HYPOGONADISM IN MALE: Primary | ICD-10-CM

## 2019-04-29 PROCEDURE — 96372 PR INJECTION,THERAP/PROPH/DIAG2ST, IM OR SUBCUT: ICD-10-PCS | Mod: S$GLB,,, | Performed by: INTERNAL MEDICINE

## 2019-04-29 PROCEDURE — 99999 PR PBB SHADOW E&M-EST. PATIENT-LVL II: CPT | Mod: PBBFAC,,,

## 2019-04-29 PROCEDURE — 99999 PR PBB SHADOW E&M-EST. PATIENT-LVL II: ICD-10-PCS | Mod: PBBFAC,,,

## 2019-04-29 PROCEDURE — 96372 THER/PROPH/DIAG INJ SC/IM: CPT | Mod: S$GLB,,, | Performed by: INTERNAL MEDICINE

## 2019-04-29 RX ADMIN — TESTOSTERONE CYPIONATE 200 MG: 200 INJECTION, SOLUTION INTRAMUSCULAR at 09:04

## 2019-04-29 NOTE — TELEPHONE ENCOUNTER
Patient came into the clinic today for his depo test inj.  He is in pain, and can barely walk.  Patient stated he only wants to see Dr. Monroe.  He is already an established patient, he went to the ER this weekend and was informed he had a spur.  Would like to see Dr. Monroe as soon as possible.  Please. If you will book him I will contact the patient and let him know.     Thank you.

## 2019-04-30 ENCOUNTER — OFFICE VISIT (OUTPATIENT)
Dept: OTOLARYNGOLOGY | Facility: CLINIC | Age: 79
End: 2019-04-30
Payer: MEDICARE

## 2019-04-30 ENCOUNTER — TELEPHONE (OUTPATIENT)
Dept: RADIOLOGY | Facility: HOSPITAL | Age: 79
End: 2019-04-30

## 2019-04-30 DIAGNOSIS — Z90.2 STATUS POST PNEUMONECTOMY: Primary | ICD-10-CM

## 2019-04-30 DIAGNOSIS — J38.00 VOCAL CORD PARALYSIS: Primary | ICD-10-CM

## 2019-04-30 DIAGNOSIS — Z85.118 HISTORY OF CANCER OF UPPER LOBE BRONCHUS OR LUNG: ICD-10-CM

## 2019-04-30 PROCEDURE — 99204 PR OFFICE/OUTPT VISIT, NEW, LEVL IV, 45-59 MIN: ICD-10-PCS | Mod: 25,S$GLB,, | Performed by: ORTHOPAEDIC SURGERY

## 2019-04-30 PROCEDURE — 99999 PR PBB SHADOW E&M-EST. PATIENT-LVL II: CPT | Mod: PBBFAC,,, | Performed by: ORTHOPAEDIC SURGERY

## 2019-04-30 PROCEDURE — 31575 DIAGNOSTIC LARYNGOSCOPY: CPT | Mod: S$GLB,,, | Performed by: ORTHOPAEDIC SURGERY

## 2019-04-30 PROCEDURE — 31575 PR LARYNGOSCOPY, FLEXIBLE; DIAGNOSTIC: ICD-10-PCS | Mod: S$GLB,,, | Performed by: ORTHOPAEDIC SURGERY

## 2019-04-30 PROCEDURE — 1101F PT FALLS ASSESS-DOCD LE1/YR: CPT | Mod: CPTII,S$GLB,, | Performed by: ORTHOPAEDIC SURGERY

## 2019-04-30 PROCEDURE — 99999 PR PBB SHADOW E&M-EST. PATIENT-LVL II: ICD-10-PCS | Mod: PBBFAC,,, | Performed by: ORTHOPAEDIC SURGERY

## 2019-04-30 PROCEDURE — 99204 OFFICE O/P NEW MOD 45 MIN: CPT | Mod: 25,S$GLB,, | Performed by: ORTHOPAEDIC SURGERY

## 2019-04-30 PROCEDURE — 1101F PR PT FALLS ASSESS DOC 0-1 FALLS W/OUT INJ PAST YR: ICD-10-PCS | Mod: CPTII,S$GLB,, | Performed by: ORTHOPAEDIC SURGERY

## 2019-04-30 NOTE — PROGRESS NOTES
"Subjective:       Patient ID: Chai Murry is a 79 y.o. male.    Chief Complaint: Other (c/o hoarseness since good Friday)    Patient is a very pleasant 79 year old male here to see me today for the first time for evaluation of hoarseness.  He reports that symptoms first started about 10 days ago, and are not intermittent in nature.  He describes their voice as raspy, breathy, weak.  He has a sensation of "running out of air" when he talks.  He also denies odynophagia, but he has had coughing and choking with swallowing liquids.  He has a history of smoking one pack daily for 50-60 years, quit 10 years ago.  He does not have issues with reflux.  He does not have unusual vocal demands, such as needing to project the voice or singing frequently.  He has a history of left upper lung carcinoma, treated with a left pneumonectomy followed by chemotherapy and radiation therapy.  He follows with Dr. Patel, has a scheduled CT chest tomorrow.      Review of Systems   Constitutional: Negative for fatigue, fever and unexpected weight change.   HENT: Positive for trouble swallowing and voice change. Negative for congestion, ear discharge, ear pain, facial swelling, hearing loss, nosebleeds, postnasal drip, rhinorrhea, sinus pressure, sneezing, sore throat and tinnitus.    Eyes: Negative for discharge, redness and itching.   Respiratory: Positive for cough and choking. Negative for shortness of breath and wheezing.    Cardiovascular: Negative for chest pain and palpitations.   Gastrointestinal: Negative for abdominal pain.        No reflux.   Musculoskeletal: Negative for neck pain.   Neurological: Negative for dizziness, facial asymmetry, light-headedness and headaches.   Hematological: Negative for adenopathy. Does not bruise/bleed easily.   Psychiatric/Behavioral: Negative for agitation, behavioral problems, confusion and decreased concentration.       Objective:      Physical Exam   Constitutional: He is oriented to person, " place, and time. Vital signs are normal. He appears well-developed and well-nourished. No distress.   HENT:   Head: Normocephalic and atraumatic.   Right Ear: Hearing, tympanic membrane, external ear and ear canal normal.   Left Ear: Hearing, tympanic membrane, external ear and ear canal normal.   Nose: Nose normal. No mucosal edema, rhinorrhea, nasal deformity or septal deviation.   Mouth/Throat: Uvula is midline, oropharynx is clear and moist and mucous membranes are normal. No trismus in the jaw. Normal dentition. No uvula swelling. No oropharyngeal exudate or posterior oropharyngeal edema.   Weak and breathy voice   Eyes: Pupils are equal, round, and reactive to light. Conjunctivae and EOM are normal. Right eye exhibits no chemosis. Left eye exhibits no chemosis. Right conjunctiva is not injected. Left conjunctiva is not injected. No scleral icterus.   Neck: Trachea normal and phonation normal. No tracheal tenderness present. No tracheal deviation present. No thyroid mass and no thyromegaly present.   Cardiovascular: Intact distal pulses.   Pulmonary/Chest: Effort normal. No accessory muscle usage or stridor. No respiratory distress.   Lymphadenopathy:        Head (right side): No submental, no submandibular, no preauricular and no posterior auricular adenopathy present.        Head (left side): No submental, no submandibular, no preauricular and no posterior auricular adenopathy present.     He has no cervical adenopathy.        Right cervical: No superficial cervical and no deep cervical adenopathy present.       Left cervical: No superficial cervical and no deep cervical adenopathy present.   Neurological: He is alert and oriented to person, place, and time. No cranial nerve deficit.   Skin: Skin is warm and dry. No rash noted. No erythema.   Psychiatric: He has a normal mood and affect. His behavior is normal. Thought content normal.       Due to indication in patient's history, presentation or risk factors,   a fiber optic exam was performed.    SEPARATE PROCEDURE NOTE:    ANESTHESIA:  Topical xylocaine with sheba-synephrine  FINDINGS:  Left vocal cord paralysis      PROCEDURE:  After verbal consent was obtained, the flexible scope was passed through the patient's nasal cavity without difficulty.  The nasopharynx (adenoid pad) and eustachian tube orifices were first visualized and were found to be normal, without masses or irregularity.  The posterior pharyngeal wall and base of tongue were then examined and no mass or irregular tissue was seen.  The scope was then advanced to the larynx, and the epiglottis, valleculae, and piriform sinuses were normal, without masses or mucosal irregularity.  The false vocal folds and true vocal folds were then examined and no masses or mucosal irregularity was seen.  He was found to have a complete paralysis of the left vocal cord.  The arytenoids and the interarytenoid area were normal.          Assessment:       1. Vocal cord paralysis    2. History of cancer of upper lobe bronchus or lung        Plan:       1.  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  We reviewed the relevant anatomy of the recurrent laryngeal nerve.  We discussed that certainly this could be a late effect of radiation therapy, but also could be a sign of recurrence.  He does have a CT scan of the chest as well as follow-up with oncology scheduled for tomorrow.  I will follow with the patient pending those results.  We did discuss the possibility of vocal cord augmentation injection, could consider once CT is complete.  May also require MRI of the brain.  Will review with Dr. Patel.  2.  History of cancer of upper lobe of lung:  As above.

## 2019-04-30 NOTE — LETTER
April 30, 2019      Giacomo Calhoun MD  [address] [place] [address]           [facility]  [address] [facility]  [place] [address]  Phone: [phone]  Fax: [phone]          Patient: Chai Murry   MR Number: [id number]   YOB: 1940   Date of Visit: 4/30/2019       Dear Dr. Giacomo Calhoun:    Thank you for referring Chai Murry to me for evaluation. Attached you will find relevant portions of my assessment and plan of care.    If you have questions, please do not hesitate to call me. I look forward to following Chai Murry along with you.    Sincerely,    Lluvia Jackson MD    Enclosure  CC:  No Recipients    If you would like to receive this communication electronically, please contact [email or web address] or [phone] to request more information on [facility] access.    For providers and/or their staff who would like to refer a patient to [facility], please contact us through our one-stop-shop provider referral line, [facility] , at [phone].    If you feel you have received this communication in error or would no longer like to receive these types of communications, please e-mail [email or web address]

## 2019-04-30 NOTE — Clinical Note
New onset left vocal cord paralysis- concerning.  If CT chest is clear, would consider PET or MRI brain.

## 2019-05-01 ENCOUNTER — HOSPITAL ENCOUNTER (OUTPATIENT)
Dept: RADIOLOGY | Facility: HOSPITAL | Age: 79
Discharge: HOME | End: 2019-05-01
Attending: INTERNAL MEDICINE
Payer: MEDICARE

## 2019-05-01 DIAGNOSIS — Z85.118 HX OF CANCER OF LUNG: ICD-10-CM

## 2019-05-02 ENCOUNTER — HOSPITAL ENCOUNTER (OUTPATIENT)
Dept: RADIOLOGY | Facility: HOSPITAL | Age: 79
Discharge: HOME | End: 2019-05-02
Attending: ORTHOPAEDIC SURGERY
Payer: MEDICARE

## 2019-05-02 DIAGNOSIS — J38.00 VOCAL CORD PARALYSIS: ICD-10-CM

## 2019-05-02 PROCEDURE — 92611 MOTION FLUOROSCOPY/SWALLOW: CPT

## 2019-05-02 PROCEDURE — 74230 X-RAY XM SWLNG FUNCJ C+: CPT | Mod: TC

## 2019-05-02 NOTE — PROCEDURES
Modified Barium Swallow    Patient Name:  Chai Murry   MRN:  [id number]      Recommendations:     Recommendations:                General Recommendations:  Voice therapy  Diet recommendations: Regular with thin liquids   Aspiration Precautions: Head turned to the left, HOB to 90 degrees and Remain upright 30 minutes post meal   General Precautions: Standard,    Communication strategies:  none    Referral     Reason for Referral  Patient was referred for a Modified Barium Swallow Study to assess the efficiency of his/her swallow function, rule out aspiration and make recommendations regarding safe dietary consistencies, effective compensatory strategies, and safe eating environment.     Diagnosis:    The encounter diagnosis was Vocal cord paralysis.  Pt reports waking up on Good Friday, April 19, and experiencing vocal hoarseness.      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 Courtney dwyer bx       Objective:     Current Respiratory Status:  Pt required no O2 support.    Alert: yes    Cooperative: yes    Follows Directions: yes    Visualization  · Patient was seen in the lateral view   · Cervical osteophyte at level of C5-C6 that did not impede the bolus flow    Oral Peripheral Examination  ·  Unremarkable     Consistencies Assessed  · Thin liquids, puree, and solids with barium     Oral Preparation/Oral Phase  · WFL- Pt with adequate bolus acceptance, containment, control and timely A-P transfer across consistencies     Pharyngeal Phase   · Pt with adequate laryngeal elevation, bolus propulsion, and bolus clearance  · No  aspiration or penetration visualized, however with known vocal fold paralysis aspiration will likely occur  · Pt was educated on utilizing a head turn to the LEFT to help with airway integrity and preventing aspiration    Cervical Esophageal Phase  · UES appeared to accommodate all bolus types without stasis or retrograde movement observed     Assessment:     Impressions  ·  Pt demonstrates a functional swallow across consistencies with no aspiration visualized.  Pt is at high risk for aspiration secondary to vocal fold paralysis, however pt was educated on utilizing a head turn to left when swallowing liquids to help with airway integrity and aspiration prevention.  Pt will benefit from outpatient Speech Therapy for Voice therapy.     Education  Results were discussed with patient.      Time Tracking:   SLP Treatment Date:    5/2/2019  Speech Start Time:   11:30  Speech Stop Time:      12:00  Speech Total Time (min):   30 minutes    Grazyna Hodge CCC-SLP  05/02/2019

## 2019-05-03 ENCOUNTER — TELEPHONE (OUTPATIENT)
Dept: OTOLARYNGOLOGY | Facility: CLINIC | Age: 79
End: 2019-05-03

## 2019-05-03 ENCOUNTER — OFFICE VISIT (OUTPATIENT)
Dept: URGENT CARE | Facility: CLINIC | Age: 79
End: 2019-05-03
Payer: MEDICARE

## 2019-05-03 ENCOUNTER — HOSPITAL ENCOUNTER (OUTPATIENT)
Dept: RADIOLOGY | Facility: HOSPITAL | Age: 79
Discharge: HOME | End: 2019-05-03
Attending: FAMILY MEDICINE
Payer: MEDICARE

## 2019-05-03 VITALS
DIASTOLIC BLOOD PRESSURE: 56 MMHG | HEART RATE: 88 BPM | RESPIRATION RATE: 14 BRPM | SYSTOLIC BLOOD PRESSURE: 98 MMHG | HEIGHT: 70 IN | TEMPERATURE: 97 F | BODY MASS INDEX: 27.26 KG/M2 | OXYGEN SATURATION: 96 %

## 2019-05-03 DIAGNOSIS — R49.0 HOARSENESS: ICD-10-CM

## 2019-05-03 DIAGNOSIS — Z85.118 HX OF CANCER OF LUNG: ICD-10-CM

## 2019-05-03 DIAGNOSIS — B96.89 PROTRACTED BACTERIAL BRONCHITIS: ICD-10-CM

## 2019-05-03 DIAGNOSIS — J42 PROTRACTED BACTERIAL BRONCHITIS: ICD-10-CM

## 2019-05-03 DIAGNOSIS — R06.02 SOB (SHORTNESS OF BREATH): ICD-10-CM

## 2019-05-03 DIAGNOSIS — R06.02 SOB (SHORTNESS OF BREATH): Primary | ICD-10-CM

## 2019-05-03 PROCEDURE — 71046 X-RAY EXAM CHEST 2 VIEWS: CPT | Mod: TC,FY,PO

## 2019-05-03 PROCEDURE — 94640 AIRWAY INHALATION TREATMENT: CPT | Mod: S$GLB,,, | Performed by: FAMILY MEDICINE

## 2019-05-03 PROCEDURE — 1101F PT FALLS ASSESS-DOCD LE1/YR: CPT | Mod: CPTII,S$GLB,, | Performed by: FAMILY MEDICINE

## 2019-05-03 PROCEDURE — 71046 XR CHEST PA AND LATERAL: ICD-10-PCS | Mod: 26,,, | Performed by: RADIOLOGY

## 2019-05-03 PROCEDURE — 3074F PR MOST RECENT SYSTOLIC BLOOD PRESSURE < 130 MM HG: ICD-10-PCS | Mod: CPTII,S$GLB,, | Performed by: FAMILY MEDICINE

## 2019-05-03 PROCEDURE — 71046 X-RAY EXAM CHEST 2 VIEWS: CPT | Mod: 26,,, | Performed by: RADIOLOGY

## 2019-05-03 PROCEDURE — 1101F PR PT FALLS ASSESS DOC 0-1 FALLS W/OUT INJ PAST YR: ICD-10-PCS | Mod: CPTII,S$GLB,, | Performed by: FAMILY MEDICINE

## 2019-05-03 PROCEDURE — 99999 PR PBB SHADOW E&M-EST. PATIENT-LVL III: CPT | Mod: PBBFAC,,, | Performed by: FAMILY MEDICINE

## 2019-05-03 PROCEDURE — 3078F PR MOST RECENT DIASTOLIC BLOOD PRESSURE < 80 MM HG: ICD-10-PCS | Mod: CPTII,S$GLB,, | Performed by: FAMILY MEDICINE

## 2019-05-03 PROCEDURE — 99999 PR PBB SHADOW E&M-EST. PATIENT-LVL III: ICD-10-PCS | Mod: PBBFAC,,, | Performed by: FAMILY MEDICINE

## 2019-05-03 PROCEDURE — 3078F DIAST BP <80 MM HG: CPT | Mod: CPTII,S$GLB,, | Performed by: FAMILY MEDICINE

## 2019-05-03 PROCEDURE — 94640 PR INHAL RX, AIRWAY OBST/DX SPUTUM INDUCT: ICD-10-PCS | Mod: S$GLB,,, | Performed by: FAMILY MEDICINE

## 2019-05-03 PROCEDURE — 99214 PR OFFICE/OUTPT VISIT, EST, LEVL IV, 30-39 MIN: ICD-10-PCS | Mod: 25,S$GLB,, | Performed by: FAMILY MEDICINE

## 2019-05-03 PROCEDURE — 3074F SYST BP LT 130 MM HG: CPT | Mod: CPTII,S$GLB,, | Performed by: FAMILY MEDICINE

## 2019-05-03 PROCEDURE — 99214 OFFICE O/P EST MOD 30 MIN: CPT | Mod: 25,S$GLB,, | Performed by: FAMILY MEDICINE

## 2019-05-03 RX ORDER — DOXYCYCLINE 100 MG/1
100 CAPSULE ORAL EVERY 12 HOURS
Qty: 14 CAPSULE | Refills: 0 | Status: SHIPPED | OUTPATIENT
Start: 2019-05-03 | End: 2019-05-10

## 2019-05-03 RX ORDER — ALBUTEROL SULFATE 0.83 MG/ML
2.5 SOLUTION RESPIRATORY (INHALATION) ONCE
Status: COMPLETED | OUTPATIENT
Start: 2019-05-03 | End: 2019-05-03

## 2019-05-03 RX ADMIN — ALBUTEROL SULFATE 2.5 MG: 0.83 SOLUTION RESPIRATORY (INHALATION) at 01:05

## 2019-05-03 NOTE — PATIENT INSTRUCTIONS
1. Please help pt reschedule his CT scans originally ordered by his oncologist for 5/1  2. Rx doxycycline for bronchitis  3. Follow up with PCP

## 2019-05-03 NOTE — TELEPHONE ENCOUNTER
I called and spoke to the patient, and he said that his CT was cancelled on 5/1 as the scanner was down.  His CT chest and abdomen/pelvis needs to be rescheduled ASAP.  Please call and assist him with rescheduling.    Also reviewed results from MBSS with patient.  His swallow is safe.  He is not interested in ST at this time.

## 2019-05-03 NOTE — PROGRESS NOTES
"Subjective:       Patient ID: Chai Murry is a 79 y.o. male.    Chief Complaint: Shortness of Breath (3 times ); Chest Congestion; and Hoarse    BP (!) 98/56 (BP Location: Right arm, Patient Position: Sitting)   Pulse 88   Temp 96.6 °F (35.9 °C) (Tympanic)   Resp 14   Ht 5' 10" (1.778 m)   SpO2 96%   BMI 27.26 kg/m²     HPI  Cc as above. Productive cough of ward colored thick sputum, worsening from when here a week ago. Received a course of augmentin and steroids 4/20. Using cough pills and albuterol inhaler at home.   Hoarseness worsening. Saw ENT dx vocal cord paralysis, worrisome for recurrence of previous lung condition. hemonc follow up 5/8. Scans ordered on 5/1 was cancelled due to equipment mishap, no reschedule made. Has not been able to access PCP for some time.     Review of Systems   HENT: Positive for voice change.    Respiratory: Positive for cough and shortness of breath.    Allergic/Immunologic: Positive for immunocompromised state.       Objective:      Physical Exam   Constitutional: He is oriented to person, place, and time. He appears well-developed and well-nourished. No distress.   HENT:   Head: Normocephalic and atraumatic.   Mouth/Throat: Oropharynx is clear and moist.   Eyes: Pupils are equal, round, and reactive to light. EOM are normal.   Pulmonary/Chest: He has no wheezes. He has no rales. He exhibits tenderness (left posterior chest wall on deep palpation).   BS normal on right. Silent on left   Neurological: He is alert and oriented to person, place, and time. No cranial nerve deficit.   Skin: Skin is warm and dry. He is not diaphoretic.   Nursing note and vitals reviewed.      Assessment:       1. SOB (shortness of breath)    2. Protracted bacterial bronchitis    3. Hoarseness    4. Hx of cancer of lung        Plan:     Chai was seen today for shortness of breath, chest congestion and hoarse.    Diagnoses and all orders for this visit:    SOB (shortness of breath)  -     X-Ray " Chest PA And Lateral; Future  -     albuterol nebulizer solution 2.5 mg    Protracted bacterial bronchitis  -     albuterol nebulizer solution 2.5 mg    Hoarseness    Hx of cancer of lung    Other orders  -     doxycycline (VIBRAMYCIN) 100 MG Cap; Take 1 capsule (100 mg total) by mouth every 12 (twelve) hours. for 7 days      1. Please help pt reschedule his CT scans originally ordered by his oncologist for 5/1  2. Rx doxycycline for bronchitis  3. Follow up with PCP

## 2019-05-06 ENCOUNTER — HOSPITAL ENCOUNTER (OUTPATIENT)
Dept: RADIOLOGY | Facility: HOSPITAL | Age: 79
Discharge: HOME | End: 2019-05-06
Attending: INTERNAL MEDICINE
Payer: MEDICARE

## 2019-05-06 PROCEDURE — 71260 CT THORAX DX C+: CPT | Mod: TC

## 2019-05-06 PROCEDURE — 74178 CT ABD&PLV WO CNTR FLWD CNTR: CPT | Mod: TC

## 2019-05-06 PROCEDURE — 71260 CT CHEST WITH CONTRAST: ICD-10-PCS | Mod: 26,,, | Performed by: RADIOLOGY

## 2019-05-06 PROCEDURE — 74178 CT ABDOMEN PELVIS W WO CONTRAST: ICD-10-PCS | Mod: 26,,, | Performed by: RADIOLOGY

## 2019-05-06 PROCEDURE — 71260 CT THORAX DX C+: CPT | Mod: 26,,, | Performed by: RADIOLOGY

## 2019-05-06 PROCEDURE — 25500020 PHARM REV CODE 255: Performed by: INTERNAL MEDICINE

## 2019-05-06 PROCEDURE — 74178 CT ABD&PLV WO CNTR FLWD CNTR: CPT | Mod: 26,,, | Performed by: RADIOLOGY

## 2019-05-06 RX ADMIN — IOHEXOL 30 ML: 350 INJECTION, SOLUTION INTRAVENOUS at 07:05

## 2019-05-06 RX ADMIN — IOHEXOL 100 ML: 350 INJECTION, SOLUTION INTRAVENOUS at 08:05

## 2019-05-07 ENCOUNTER — PATIENT MESSAGE (OUTPATIENT)
Dept: OTOLARYNGOLOGY | Facility: CLINIC | Age: 79
End: 2019-05-07

## 2019-05-07 ENCOUNTER — TELEPHONE (OUTPATIENT)
Dept: OTOLARYNGOLOGY | Facility: CLINIC | Age: 79
End: 2019-05-07

## 2019-05-07 DIAGNOSIS — J38.00 VOCAL CORD PARALYSIS: Primary | ICD-10-CM

## 2019-05-07 NOTE — TELEPHONE ENCOUNTER
Recent CT scan does not show any change.  I would recommend an MRI of the brain with contrast, ordering computer.  Please call patient to schedule.

## 2019-05-08 ENCOUNTER — OFFICE VISIT (OUTPATIENT)
Dept: INTERNAL MEDICINE | Facility: CLINIC | Age: 79
End: 2019-05-08
Payer: MEDICARE

## 2019-05-08 ENCOUNTER — LAB VISIT (OUTPATIENT)
Dept: LAB | Facility: HOSPITAL | Age: 79
End: 2019-05-08
Attending: INTERNAL MEDICINE
Payer: MEDICARE

## 2019-05-08 ENCOUNTER — OFFICE VISIT (OUTPATIENT)
Dept: HEMATOLOGY/ONCOLOGY | Facility: CLINIC | Age: 79
End: 2019-05-08
Payer: MEDICARE

## 2019-05-08 VITALS
OXYGEN SATURATION: 96 % | RESPIRATION RATE: 18 BRPM | SYSTOLIC BLOOD PRESSURE: 152 MMHG | BODY MASS INDEX: 27.9 KG/M2 | TEMPERATURE: 98 F | HEIGHT: 70 IN | WEIGHT: 194.88 LBS | HEART RATE: 66 BPM | DIASTOLIC BLOOD PRESSURE: 77 MMHG

## 2019-05-08 VITALS
SYSTOLIC BLOOD PRESSURE: 138 MMHG | WEIGHT: 194.69 LBS | HEIGHT: 70 IN | HEART RATE: 74 BPM | BODY MASS INDEX: 27.87 KG/M2 | TEMPERATURE: 97 F | DIASTOLIC BLOOD PRESSURE: 80 MMHG

## 2019-05-08 DIAGNOSIS — Z85.118 HISTORY OF CANCER OF UPPER LOBE BRONCHUS OR LUNG: Primary | ICD-10-CM

## 2019-05-08 DIAGNOSIS — J38.01 PARALYSIS OF VOCAL CORDS AND LARYNX, UNILATERAL: ICD-10-CM

## 2019-05-08 DIAGNOSIS — Z90.2 STATUS POST PNEUMONECTOMY: ICD-10-CM

## 2019-05-08 DIAGNOSIS — I25.10 CORONARY ARTERY DISEASE INVOLVING NATIVE CORONARY ARTERY OF NATIVE HEART WITHOUT ANGINA PECTORIS: ICD-10-CM

## 2019-05-08 PROBLEM — J40 BRONCHITIS: Status: RESOLVED | Noted: 2018-05-24 | Resolved: 2019-05-08

## 2019-05-08 PROBLEM — N17.9 ARF (ACUTE RENAL FAILURE): Status: RESOLVED | Noted: 2018-06-13 | Resolved: 2019-05-08

## 2019-05-08 LAB
ALBUMIN SERPL BCP-MCNC: 3.5 G/DL (ref 3.5–5.2)
ALP SERPL-CCNC: 63 U/L (ref 55–135)
ALT SERPL W/O P-5'-P-CCNC: 41 U/L (ref 10–44)
ANION GAP SERPL CALC-SCNC: 6 MMOL/L (ref 8–16)
AST SERPL-CCNC: 40 U/L (ref 10–40)
BASOPHILS # BLD AUTO: 0.06 K/UL (ref 0–0.2)
BASOPHILS NFR BLD: 0.8 % (ref 0–1.9)
BILIRUB SERPL-MCNC: 0.4 MG/DL (ref 0.1–1)
BUN SERPL-MCNC: 14 MG/DL (ref 8–23)
CALCIUM SERPL-MCNC: 9.6 MG/DL (ref 8.7–10.5)
CHLORIDE SERPL-SCNC: 105 MMOL/L (ref 95–110)
CO2 SERPL-SCNC: 31 MMOL/L (ref 23–29)
CREAT SERPL-MCNC: 1 MG/DL (ref 0.5–1.4)
DIFFERENTIAL METHOD: ABNORMAL
EOSINOPHIL # BLD AUTO: 0.7 K/UL (ref 0–0.5)
EOSINOPHIL NFR BLD: 8.7 % (ref 0–8)
ERYTHROCYTE [DISTWIDTH] IN BLOOD BY AUTOMATED COUNT: 15.9 % (ref 11.5–14.5)
EST. GFR  (AFRICAN AMERICAN): >60 ML/MIN/1.73 M^2
EST. GFR  (NON AFRICAN AMERICAN): >60 ML/MIN/1.73 M^2
GLUCOSE SERPL-MCNC: 91 MG/DL (ref 70–110)
HCT VFR BLD AUTO: 46.5 % (ref 40–54)
HGB BLD-MCNC: 14.4 G/DL (ref 14–18)
IMM GRANULOCYTES # BLD AUTO: 0.03 K/UL (ref 0–0.04)
IMM GRANULOCYTES NFR BLD AUTO: 0.4 % (ref 0–0.5)
LYMPHOCYTES # BLD AUTO: 1.1 K/UL (ref 1–4.8)
LYMPHOCYTES NFR BLD: 13.4 % (ref 18–48)
MCH RBC QN AUTO: 28.5 PG (ref 27–31)
MCHC RBC AUTO-ENTMCNC: 31 G/DL (ref 32–36)
MCV RBC AUTO: 92 FL (ref 82–98)
MONOCYTES # BLD AUTO: 0.7 K/UL (ref 0.3–1)
MONOCYTES NFR BLD: 8.4 % (ref 4–15)
NEUTROPHILS # BLD AUTO: 5.5 K/UL (ref 1.8–7.7)
NEUTROPHILS NFR BLD: 68.7 % (ref 38–73)
NRBC BLD-RTO: 0 /100 WBC
PLATELET # BLD AUTO: 212 K/UL (ref 150–350)
PMV BLD AUTO: 10.4 FL (ref 9.2–12.9)
POTASSIUM SERPL-SCNC: 4.8 MMOL/L (ref 3.5–5.1)
PROT SERPL-MCNC: 7.2 G/DL (ref 6–8.4)
RBC # BLD AUTO: 5.05 M/UL (ref 4.6–6.2)
SODIUM SERPL-SCNC: 142 MMOL/L (ref 136–145)
WBC # BLD AUTO: 7.93 K/UL (ref 3.9–12.7)

## 2019-05-08 PROCEDURE — 99999 PR PBB SHADOW E&M-EST. PATIENT-LVL III: ICD-10-PCS | Mod: PBBFAC,,, | Performed by: INTERNAL MEDICINE

## 2019-05-08 PROCEDURE — 3077F SYST BP >= 140 MM HG: CPT | Mod: CPTII,S$GLB,, | Performed by: INTERNAL MEDICINE

## 2019-05-08 PROCEDURE — 3075F SYST BP GE 130 - 139MM HG: CPT | Mod: CPTII,S$GLB,, | Performed by: INTERNAL MEDICINE

## 2019-05-08 PROCEDURE — 99214 OFFICE O/P EST MOD 30 MIN: CPT | Mod: S$GLB,,, | Performed by: INTERNAL MEDICINE

## 2019-05-08 PROCEDURE — 1101F PR PT FALLS ASSESS DOC 0-1 FALLS W/OUT INJ PAST YR: ICD-10-PCS | Mod: CPTII,S$GLB,, | Performed by: INTERNAL MEDICINE

## 2019-05-08 PROCEDURE — 80053 COMPREHEN METABOLIC PANEL: CPT

## 2019-05-08 PROCEDURE — 99214 PR OFFICE/OUTPT VISIT, EST, LEVL IV, 30-39 MIN: ICD-10-PCS | Mod: S$GLB,,, | Performed by: INTERNAL MEDICINE

## 2019-05-08 PROCEDURE — 3079F DIAST BP 80-89 MM HG: CPT | Mod: CPTII,S$GLB,, | Performed by: INTERNAL MEDICINE

## 2019-05-08 PROCEDURE — 3075F PR MOST RECENT SYSTOLIC BLOOD PRESS GE 130-139MM HG: ICD-10-PCS | Mod: CPTII,S$GLB,, | Performed by: INTERNAL MEDICINE

## 2019-05-08 PROCEDURE — 1101F PT FALLS ASSESS-DOCD LE1/YR: CPT | Mod: CPTII,S$GLB,, | Performed by: INTERNAL MEDICINE

## 2019-05-08 PROCEDURE — 3077F PR MOST RECENT SYSTOLIC BLOOD PRESSURE >= 140 MM HG: ICD-10-PCS | Mod: CPTII,S$GLB,, | Performed by: INTERNAL MEDICINE

## 2019-05-08 PROCEDURE — 3078F PR MOST RECENT DIASTOLIC BLOOD PRESSURE < 80 MM HG: ICD-10-PCS | Mod: CPTII,S$GLB,, | Performed by: INTERNAL MEDICINE

## 2019-05-08 PROCEDURE — 3079F PR MOST RECENT DIASTOLIC BLOOD PRESSURE 80-89 MM HG: ICD-10-PCS | Mod: CPTII,S$GLB,, | Performed by: INTERNAL MEDICINE

## 2019-05-08 PROCEDURE — 99999 PR PBB SHADOW E&M-EST. PATIENT-LVL V: CPT | Mod: PBBFAC,,, | Performed by: INTERNAL MEDICINE

## 2019-05-08 PROCEDURE — 99999 PR PBB SHADOW E&M-EST. PATIENT-LVL III: CPT | Mod: PBBFAC,,, | Performed by: INTERNAL MEDICINE

## 2019-05-08 PROCEDURE — 99999 PR PBB SHADOW E&M-EST. PATIENT-LVL V: ICD-10-PCS | Mod: PBBFAC,,, | Performed by: INTERNAL MEDICINE

## 2019-05-08 PROCEDURE — 3078F DIAST BP <80 MM HG: CPT | Mod: CPTII,S$GLB,, | Performed by: INTERNAL MEDICINE

## 2019-05-08 PROCEDURE — 85025 COMPLETE CBC W/AUTO DIFF WBC: CPT

## 2019-05-08 PROCEDURE — 36415 COLL VENOUS BLD VENIPUNCTURE: CPT

## 2019-05-08 NOTE — PROGRESS NOTES
Subjective:       Patient ID: Chai Murry is a 79 y.o. male.    Chief Complaint: Annual Exam    HPI Patient is a 79-year-old male presenting today following up on his lung cancer as well as recent issues with his voice.  The patient has had a known history of lung cancer which was treated with combination of treatment.  He had radiation therapy as well.  He has done well on min and has been apparently cancer-free.  A few weeks ago he developed some hoarseness that did not resolve.  He went to ENT for evaluation and was cover the has a paralyzed left vocal cord.  CT of the chest and abdomen was planned for staging of his cancer issues and there is no obvious causation for the paralysis.  There is a relatively large lymph node in the left superior mediastinum but it does not seem to be a causative agent for the problem.  Radiology recommended PET-CT and follow-up.    He has seen Dr. David shafer in follow-up of his cancer and there are comfortable that his there is no obvious recurrence at this time.  They are continuing agreed and a routine monitoring at this point.  He does have an MRI of the brain ordered to look for any issues related to the cancer and the paralysis of the vocal cord from that standpoint.    His history of coronary artery disease which is stable.  He has no chest pain or palpitations.  He is not limited by cardiac issues.    He has advanced stage lung disease associated with COPD.  His wife continues to smoke if not in the household but around the household a does complain of postnasal drainage which has been hard to resolve.    Review of Systems   Constitutional: Negative for fever and unexpected weight change.   HENT: Positive for voice change.    Respiratory: Negative for cough, shortness of breath and wheezing.    Cardiovascular: Negative for chest pain and palpitations.   Gastrointestinal: Negative for constipation, diarrhea, nausea and vomiting.   Genitourinary: Negative for dysuria and  "hematuria.       Objective:   /80   Pulse 74   Temp 97.3 °F (36.3 °C)   Ht 5' 10" (1.778 m)   Wt 88.3 kg (194 lb 10.7 oz)   BMI 27.93 kg/m²      Physical Exam   Constitutional: He appears well-developed and well-nourished.   HENT:   Head: Normocephalic and atraumatic.   Eyes: Pupils are equal, round, and reactive to light.   Neck: Neck supple. No thyromegaly present.   Cardiovascular: Normal rate, regular rhythm and normal heart sounds. Exam reveals no gallop and no friction rub.   No murmur heard.  Pulmonary/Chest: Breath sounds normal. He has no wheezes. He has no rales.   Abdominal: Soft. Bowel sounds are normal. He exhibits no distension. There is no tenderness.   Vitals reviewed.      Lab Visit on 05/08/2019   Component Date Value    WBC 05/08/2019 7.93     RBC 05/08/2019 5.05     Hemoglobin 05/08/2019 14.4     Hematocrit 05/08/2019 46.5     Mean Corpuscular Volume 05/08/2019 92     Mean Corpuscular Hemoglo* 05/08/2019 28.5     Mean Corpuscular Hemoglo* 05/08/2019 31.0*    RDW 05/08/2019 15.9*    Platelets 05/08/2019 212     MPV 05/08/2019 10.4     Immature Granulocytes 05/08/2019 0.4     Gran # (ANC) 05/08/2019 5.5     Immature Grans (Abs) 05/08/2019 0.03     Lymph # 05/08/2019 1.1     Mono # 05/08/2019 0.7     Eos # 05/08/2019 0.7*    Baso # 05/08/2019 0.06     nRBC 05/08/2019 0     Gran% 05/08/2019 68.7     Lymph% 05/08/2019 13.4*    Mono% 05/08/2019 8.4     Eosinophil% 05/08/2019 8.7*    Basophil% 05/08/2019 0.8     Differential Method 05/08/2019 Automated    Lab Visit on 05/01/2019   Component Date Value    Creatinine 05/01/2019 0.9     eGFR if African American 05/01/2019 >60     eGFR if non  Amer* 05/01/2019 >60        Assessment:       1. History of cancer of upper lobe bronchus or lung    2. Paralysis of vocal cords and larynx, unilateral    3. Coronary artery disease involving native coronary artery of native heart without angina pectoris        Plan:   No " problem-specific Assessment & Plan notes found for this encounter.    History of cancer of upper lobe bronchus or lung  Comments:  No current therapy.  recent ct shows no evidence of recurrence. Getting further imaging due to vocal cord paralyssi    Paralysis of vocal cords and larynx, unilateral  Comments:  PET CT ordered today    Coronary artery disease involving native coronary artery of native heart without angina pectoris  Comments:  Stable.           Follow up in about 2 months (around 7/8/2019).

## 2019-05-08 NOTE — PROGRESS NOTES
Subjective:       Patient ID: Chai Murry is a 79 y.o. male.    Chief Complaint: Follow-up and Results (CT Scan)    HPI  This is a 79 year odl  gentleman who comes for follow up of his lung cancer.  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 There seemed to be a left   hilar adenopathy as well.  Radiologist felt that he was unable to discriminate between the mass and the   lymphadenopathy. The patient had had a bronchoscopy by Dr. Roel Ernandez on   03/04/2006 that showed a partially obstructing airway in the anterior segment of  the left upper lobe with an endobronchial lesion in the anterior segment of the  left upper lobe. The specimen from the biopsy at the time of bronchoscopy was   reported as being a squamous cell carcinoma.  At the time of the surgery after the left lung was removed, the pathologist   indicated that this was a squamous cell carcinoma. It measured 3.8 cm. The   pathology indicated that this is extending into the bronchial resection margin of the lobe. This lobe was later on removed to complete the pneumonectomy   There was one lymph node positive for metastatic squamous cell carcinoma at the   AP window.  The patient was told that we would prefer to treat him with post-op simultaneous chemo/radiation.  He had Medi-port. He started chemo/radiation therapy andreceived 6 weekly cycles of carbo/Taxol along with radiatioon.  After a month, he had a Ct scan and it showed stability   He then had 2 additional cyles of carbo/Taxol finishing in 9/27/2016.    He comes for follow up.   He developed hoarseness on good Friday 2019 and has been found to have a left vocal cord paralysis by EENT exam. CT of the chest was non contributory, shows changes from surgery  Medi-port was removed   MEDICATIONS: See list.  SURGERIES:  Appendectomy at age 3. Bilateral cataract surgery. Infected gland   removed from the abdomen, cholecystectomy, left pneumonectomy..Medi-port placed and removed  SOCIAL HISTORY: He is  with three children. He lives in [facility]. He   used to smoke from age 14 To age 67 or so,, averaging a pack a day. Denies significant   drinking. He worked in a chemical plant for 40 years.  FAMILY HISTORY: Sister had cancer of the throat. Mother had cancer of the   lung. Father had prostate cancer. No diabetes. Paternal grandfather had a   heart attack.  PAST MEDICAL HISTORY:  1. Squamous cell carcinoma of the lung s/p left penumonectomy.  2. Tobacco use.  3. High blood pressure.  4. Arteriosclerotic cardiovascular disease, status post previous heart attacks.  5. Arthritis.   6-left vocal cord paralysis      Review of Systems   Constitutional: Negative.  Negative for fatigue.   HENT:        Recenrt onset of hoarseness   Eyes: Negative.    Cardiovascular: Negative.  Negative for chest pain.   Gastrointestinal: Negative for abdominal pain and nausea.   Genitourinary: Negative.  Negative for hematuria.   Musculoskeletal: Negative.    Skin: Negative.    Neurological: Negative.    Psychiatric/Behavioral: Negative.        Objective:      Physical Exam   Constitutional: He is oriented to person, place, and time. He appears well-developed and well-nourished.   HENT:   Head: Normocephalic.   Mouth/Throat: No oropharyngeal exudate.   Eyes: Pupils are equal, round, and reactive to light.   Neck: No thyromegaly present.   Cardiovascular: Normal rate, regular rhythm and normal heart sounds. Exam reveals no gallop.   No murmur heard.  Pulmonary/Chest: No respiratory distress. He has no wheezes. He has no rales.   Abdominal: Soft. Bowel sounds are normal. He exhibits no distension and no mass. There is no rebound and no guarding.   Musculoskeletal: Normal range of motion. He exhibits no edema.   Lymphadenopathy:     He has no cervical  adenopathy.   Neurological: He is alert and oriented to person, place, and time.   Skin: Skin is warm and dry.   Psychiatric: He has a normal mood and affect. His behavior is normal.       Wt Readings from Last 3 Encounters:   05/08/19 88.4 kg (194 lb 14.2 oz)   04/28/19 86.2 kg (190 lb)   04/26/19 87.6 kg (193 lb 2 oz)     Temp Readings from Last 3 Encounters:   05/08/19 97.9 °F (36.6 °C)   05/03/19 96.6 °F (35.9 °C) (Tympanic)   04/28/19 98.3 °F (36.8 °C) (Oral)     BP Readings from Last 3 Encounters:   05/08/19 (!) 152/77   05/03/19 (!) 98/56   04/28/19 115/68     Pulse Readings from Last 3 Encounters:   05/08/19 66   05/03/19 88   04/28/19 72       Assessment:       1. History of cancer of upper lobe bronchus or lung    2. s/p left pnuemonectomy for KAYLI [place]    3. Paralysis of vocal cords and larynx, unilateral        Plan:       Lab Results   Component Value Date    WBC 8.19 11/16/2018    HGB 13.9 (L) 11/16/2018    HCT 46.2 11/16/2018    MCV 95 11/16/2018     11/16/2018     Lab Results   Component Value Date    CREATININE 0.9 05/01/2019     Lab Results   Component Value Date    ALT 17 11/16/2018    AST 19 11/16/2018    ALKPHOS 65 11/16/2018    BILITOT 0.4 11/16/2018     Patient informed that the development of hoarseness in somewhat with a previous hx of lung cancer, raises the possibility of phrenic nerve involvement in the mediastinum,.  Will order a Ct/PET and he hunter ee me in a week.  May need bronchoscopy and EBUS

## 2019-05-09 ENCOUNTER — TELEPHONE (OUTPATIENT)
Dept: PULMONOLOGY | Facility: CLINIC | Age: 79
End: 2019-05-09

## 2019-05-09 ENCOUNTER — OFFICE VISIT (OUTPATIENT)
Dept: PODIATRY | Facility: CLINIC | Age: 79
End: 2019-05-09
Payer: MEDICARE

## 2019-05-09 VITALS
DIASTOLIC BLOOD PRESSURE: 84 MMHG | HEART RATE: 87 BPM | WEIGHT: 194 LBS | SYSTOLIC BLOOD PRESSURE: 149 MMHG | HEIGHT: 70 IN | RESPIRATION RATE: 18 BRPM | BODY MASS INDEX: 27.77 KG/M2

## 2019-05-09 DIAGNOSIS — M24.572 CONTRACTURE, LEFT ANKLE: ICD-10-CM

## 2019-05-09 DIAGNOSIS — M72.2 PLANTAR FASCIITIS, LEFT: Primary | ICD-10-CM

## 2019-05-09 DIAGNOSIS — M79.672 PAIN IN LEFT FOOT: ICD-10-CM

## 2019-05-09 PROCEDURE — 3077F SYST BP >= 140 MM HG: CPT | Mod: CPTII,S$GLB,, | Performed by: PODIATRIST

## 2019-05-09 PROCEDURE — 3077F PR MOST RECENT SYSTOLIC BLOOD PRESSURE >= 140 MM HG: ICD-10-PCS | Mod: CPTII,S$GLB,, | Performed by: PODIATRIST

## 2019-05-09 PROCEDURE — 20550 PR INJECT TENDON SHEATH/LIGAMENT: ICD-10-PCS | Mod: LT,S$GLB,, | Performed by: PODIATRIST

## 2019-05-09 PROCEDURE — 1101F PT FALLS ASSESS-DOCD LE1/YR: CPT | Mod: CPTII,S$GLB,, | Performed by: PODIATRIST

## 2019-05-09 PROCEDURE — 3079F PR MOST RECENT DIASTOLIC BLOOD PRESSURE 80-89 MM HG: ICD-10-PCS | Mod: CPTII,S$GLB,, | Performed by: PODIATRIST

## 2019-05-09 PROCEDURE — 99214 PR OFFICE/OUTPT VISIT, EST, LEVL IV, 30-39 MIN: ICD-10-PCS | Mod: 25,S$GLB,, | Performed by: PODIATRIST

## 2019-05-09 PROCEDURE — 1101F PR PT FALLS ASSESS DOC 0-1 FALLS W/OUT INJ PAST YR: ICD-10-PCS | Mod: CPTII,S$GLB,, | Performed by: PODIATRIST

## 2019-05-09 PROCEDURE — 3079F DIAST BP 80-89 MM HG: CPT | Mod: CPTII,S$GLB,, | Performed by: PODIATRIST

## 2019-05-09 PROCEDURE — 20550 NJX 1 TENDON SHEATH/LIGAMENT: CPT | Mod: LT,S$GLB,, | Performed by: PODIATRIST

## 2019-05-09 PROCEDURE — 99999 PR PBB SHADOW E&M-EST. PATIENT-LVL III: CPT | Mod: PBBFAC,,, | Performed by: PODIATRIST

## 2019-05-09 PROCEDURE — 99214 OFFICE O/P EST MOD 30 MIN: CPT | Mod: 25,S$GLB,, | Performed by: PODIATRIST

## 2019-05-09 PROCEDURE — 99999 PR PBB SHADOW E&M-EST. PATIENT-LVL III: ICD-10-PCS | Mod: PBBFAC,,, | Performed by: PODIATRIST

## 2019-05-09 RX ORDER — DEXAMETHASONE SODIUM PHOSPHATE 4 MG/ML
4 INJECTION, SOLUTION INTRA-ARTICULAR; INTRALESIONAL; INTRAMUSCULAR; INTRAVENOUS; SOFT TISSUE ONCE
Status: COMPLETED | OUTPATIENT
Start: 2019-05-09 | End: 2019-05-09

## 2019-05-09 RX ORDER — METHYLPREDNISOLONE ACETATE 40 MG/ML
40 INJECTION, SUSPENSION INTRA-ARTICULAR; INTRALESIONAL; INTRAMUSCULAR; SOFT TISSUE
Status: COMPLETED | OUTPATIENT
Start: 2019-05-09 | End: 2019-05-09

## 2019-05-09 RX ADMIN — METHYLPREDNISOLONE ACETATE 40 MG: 40 INJECTION, SUSPENSION INTRA-ARTICULAR; INTRALESIONAL; INTRAMUSCULAR; SOFT TISSUE at 04:05

## 2019-05-09 RX ADMIN — DEXAMETHASONE SODIUM PHOSPHATE 4 MG: 4 INJECTION, SOLUTION INTRA-ARTICULAR; INTRALESIONAL; INTRAMUSCULAR; INTRAVENOUS; SOFT TISSUE at 04:05

## 2019-05-09 NOTE — PROGRESS NOTES
Subjective:       Patient ID: Chai Murry is a 79 y.o. male.    Chief Complaint: Foot Pain (Left foot pain, Pain 5/10, ambulation without wayne, non diabetic, wear tennis, PCP Dr. Teixeira )      HPI: Chai Murry complains of severe pains to the left foot. States pains are sharp and stabbing-like in nature. Pains are to the plantar foot, mostly with walking and standing. Rates the pains at approx. 8/10. States post-static dyskinesia. Denies any recent identifiable trauma. Does limp with gait. No NSAID medications thus far. Pains have been present for the past several weeks to months and the pains have worsened over the past couple of weeks.  He does have a history of plantar fasciitis. States walking and standing causes and/or exacerbates the symptoms. Patient has had 2 corticosteroid injection(s) to the left foot, prior by an Urgent Care Provider. Patient's Primary Care Provider is Peter Teixeira MD.     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 Courtney dwyer bx       Family History   Problem Relation Age of Onset    Esophageal cancer Sister     Cancer Sister     Lung cancer Mother     Cancer Mother     Cataracts Mother     Hypertension Mother     Pneumonia Father     Cataracts Father     Hypertension Father     Cancer Brother     Hypertension Brother     Blindness Neg Hx     Diabetes Neg Hx     Glaucoma Neg Hx     Macular degeneration Neg Hx      Retinal detachment Neg Hx     Strabismus Neg Hx     Stroke Neg Hx     Thyroid disease Neg Hx        Social History     Socioeconomic History    Marital status:      Spouse name: Not on file    Number of children: 3    Years of education: Not on file    Highest education level: Not on file   Occupational History    Occupation: retired   Social Needs    Financial resource strain: Not on file    Food insecurity:     Worry: Not on file     Inability: Not on file    Transportation needs:     Medical: Not on file     Non-medical: Not on file   Tobacco Use    Smoking status: Former Smoker     Packs/day: 1.00     Years: 50.00     Pack years: 50.00     Last attempt to quit: 2005     Years since quittin.7    Smokeless tobacco: Never Used   Substance and Sexual Activity    Alcohol use: No    Drug use: No    Sexual activity: Not Currently   Lifestyle    Physical activity:     Days per week: Not on file     Minutes per session: Not on file    Stress: Not on file   Relationships    Social connections:     Talks on phone: Not on file     Gets together: Not on file     Attends Latter-day service: Not on file     Active member of club or organization: Not on file     Attends meetings of clubs or organizations: Not on file     Relationship status: Not on file   Other Topics Concern    Not on file   Social History Narrative    Not on file       Past Surgical History:   Procedure Laterality Date    APPENDECTOMY      BRONCHOSCOPY- insert lighted tube into airway to obtain biopsy of lung Bilateral 3/4/2016    Performed by [facility]el [facility]andez [facility] at [facility] ENDO    CARDIAC CATHETERIZATION      cardiac stents      CATARACT EXTRACTION W/  INTRAOCULAR LENS IMPLANT Right 9-3-14    CHOLECYSTECTOMY      COLONOSCOPY N/A 10/25/2018    Performed by Michael Hameed MD at [facility] ENDO    COLONOSCOPY N/A 2018    Performed by Dionne Doan MD at [facility] ENDO    Colonoscopy N/A 2014    Performed  "by Eriberto Simpson MD at [facility] ENDO    infected stomach gland excision      INSERTION-PORT Left 5/26/2016    [facility] by Nemesio Wall [facility] at [facility] OR    LOBECTOMY-LUNG Left 4/12/2016    [facility] by [facility] at [facility] OR    LUNG REMOVAL, TOTAL Left 04/2016    lung cancer left upper lobe    PNEUMONECTOMY Left 4/12/2016    Performed by [facility] at [facility] OR    SKIN BIOPSY Left     arm    THORACOTOMY Left 4/12/2016    Performed by Nem[facility]io Wall MD at [facility] OR       Review of Systems   Constitutional: Negative for chills, fatigue and fever.   HENT: Negative for hearing loss.    Eyes: Negative for photophobia and visual disturbance.   Respiratory: Negative for cough, chest tightness, shortness of breath and wheezing.    Cardiovascular: Negative for chest pain and palpitations.   Gastrointestinal: Negative for constipation, diarrhea, nausea and vomiting.   Endocrine: Negative for cold intolerance and heat intolerance.   Genitourinary: Negative for flank pain.   Musculoskeletal: Positive for gait problem. Negative for neck pain and neck stiffness.   Skin: Negative for wound.   Neurological: Negative for light-headedness, numbness and headaches.   Psychiatric/Behavioral: Negative for sleep disturbance.         Objective:   BP (!) 149/84 (BP Location: Right arm, Patient Position: Sitting, BP Method: Medium (Automatic))   Pulse 87   Resp 18   Ht 5' 10" (1.778 m)   Wt 88 kg (194 lb 0.1 oz)   BMI 27.84 kg/m²       LOWER EXTREMITY PHYSICAL EXAMINATION  VASCULAR: Hair growth is sparse on the left and right dorsal foot and at the digits. The left dorsalis pedis pulse is 1/4 and on the right is 1/4, and the left posterior tibial pulse is 1/4 and is 1/4 on the right. Varicosities are noted. Spider veins are noted to the bilateral lower extremity. Warm to warm, proximal to distal. Capillary refill time is within normal limits and less  than 3 seconds.     NEUROLOGY: Proprioception is intact, " bilateral. Sensation to light touch is intact. Negative Tinel's Sign and negative Valleix Sign. No neurological sensations with compression of the area of Zapata's Nerve in the area of the Abductor Hallucis muscle belly.    ORTHOPEDIC:  Moderate to severe tenderness to palpation of the area around the plantar medial calcaneal tubercle on the left foot. Pains are characterized as sharp and stabbing-like with direct palpation of the area. There is also moderate pain to palpation of the immediate plantar aspect of the heel, and mild pains to the lateral band of the fascia. No edema is noted. No fullness is noted. No pains or defects are noted within the plantar fascia at the arch. No plantar fibromas are noted. No defects are noted within the ligament. Dorsiflexion of the MTPJs with simultaneous palpation of the fascia at the arch, does worsen and exacerbate the pains. No pains with medial to lateral compression of the heel. Equinus contracture is noted. Antalgic gait pattern is noted.     DERMATOLOGY: No ecchymosis is noted.  Skin is supple, dry and intact. Skin is supple.  No hyperkeratosis noted. No calluses.  No open wounds or ulcerations are noted.  No palpable plantar fibromas noted.    Assessment:     1. Plantar fasciitis, left    2. Pain in left foot    3. Contracture, left ankle          Plan:     Plantar fasciitis, left  Pain in left foot  -     methylPREDNISolone acetate injection 40 mg  -     dexamethasone injection 4 mg    Thorough discussion is had with the patient today, concerning the diagnosis, its etiology, and the treatment algorithm at present.  XRAYS are reviewed in detail with the patient. All questions and concerns regarding findings and its/their implications are outlined and discussed.    Following sterile preparation with alcohol swab and/or betadine paint, injection was given into and around the area of the left plantar medial calcaneal tubercle, using 25-gauge needle. Injection consisted of  approximately 0.5cc of Dexamethasone Sodium Phosphate, 0.5cc of Depo-Medrol (40mg/dL) and 0.5cc of 1% Lidocaine w/ or w/o Epinephrine or 0.25% or 0.50% Marcaine plain. Bandage application thereafter. Patient tolerated procedure well, and without complications or complaints. Patient educated that the area of pathology, might actually be slightly more painful, due to the injection, over the course of the next one to two days.         Contracture, left ankle  Stretching exercises are discussed, taught, and are demonstrates to the patient this afternoon due to concomitant diagnosis of equinus contracture. The relationship between equinus contracture and the other aforementioned pathologies are detailed and outlined to the patient. The patient does acknowledge understanding, and we will embark on a vigorous stretching algorithm for the lower extremity.        Future Appointments   Date Time Provider Department Center   5/15/2019  1:30 PM [facility] PET CT1 [facility] PET CT [facility]   5/20/2019  9:00 AM INTERNAL MEDICINE/PEDIATRIC NURSE, [facility] IM [place]   5/22/2019  1:00 PM Jorge L Patel MD [facility] HEM ONC [facility]   5/23/2019 11:40 AM Romain Palencia MD [facility] CARDIO [place]   5/29/2019  3:15 PM Andrea Monroe DPM ONLC POD [facility] C   7/8/2019 11:40 AM Peter Teixeira MD [facility] IM [place]   12/2/2019  2:45 PM [facility] XR2 [facility] XRAY [facility]   12/2/2019  3:00 PM SPIROMETRY, [facility] PULMFUN [facility]   12/2/2019  3:20 PM Roel Ernandez MD [facility] PULMSVC [place]

## 2019-05-09 NOTE — TELEPHONE ENCOUNTER
----- Message from Paresh Goldman MD sent at 5/9/2019 12:33 PM CDT -----  Appt with me after PET SCAN  ? EBUS  Paratracheal node is barely appreciated on Study.  Superior mediastinal mass may require Mediastinoscopy    Paresh Goldman MD    ----- Message -----  From: Jorge L Patel MD  Sent: 5/8/2019   9:07 AM  To: Paresh Goldman MD    This is the patient we talked about earlier today  Thanks  Jorge L

## 2019-05-14 ENCOUNTER — TELEPHONE (OUTPATIENT)
Dept: RADIOLOGY | Facility: HOSPITAL | Age: 79
End: 2019-05-14

## 2019-05-15 ENCOUNTER — HOSPITAL ENCOUNTER (OUTPATIENT)
Dept: RADIOLOGY | Facility: HOSPITAL | Age: 79
Discharge: HOME | End: 2019-05-15
Attending: INTERNAL MEDICINE
Payer: MEDICARE

## 2019-05-15 DIAGNOSIS — Z85.118 HISTORY OF CANCER OF UPPER LOBE BRONCHUS OR LUNG: ICD-10-CM

## 2019-05-15 DIAGNOSIS — J38.01 PARALYSIS OF VOCAL CORDS AND LARYNX, UNILATERAL: ICD-10-CM

## 2019-05-15 PROCEDURE — A9552 F18 FDG: HCPCS

## 2019-05-15 PROCEDURE — 78815 PET IMAGE W/CT SKULL-THIGH: CPT | Mod: 26,PS,, | Performed by: RADIOLOGY

## 2019-05-15 PROCEDURE — 78815 NM PET CT ROUTINE: ICD-10-PCS | Mod: 26,PS,, | Performed by: RADIOLOGY

## 2019-05-15 PROCEDURE — 78815 PET IMAGE W/CT SKULL-THIGH: CPT | Mod: TC,PS

## 2019-05-16 ENCOUNTER — TELEPHONE (OUTPATIENT)
Dept: ENDOSCOPY | Facility: HOSPITAL | Age: 79
End: 2019-05-16

## 2019-05-16 ENCOUNTER — OFFICE VISIT (OUTPATIENT)
Dept: SLEEP MEDICINE | Facility: CLINIC | Age: 79
End: 2019-05-16
Payer: MEDICARE

## 2019-05-16 VITALS
DIASTOLIC BLOOD PRESSURE: 66 MMHG | WEIGHT: 193.81 LBS | HEART RATE: 70 BPM | HEIGHT: 70 IN | SYSTOLIC BLOOD PRESSURE: 138 MMHG | RESPIRATION RATE: 18 BRPM | BODY MASS INDEX: 27.75 KG/M2 | OXYGEN SATURATION: 99 %

## 2019-05-16 DIAGNOSIS — C34.12 MALIGNANT NEOPLASM OF UPPER LOBE OF LEFT LUNG: ICD-10-CM

## 2019-05-16 DIAGNOSIS — Z85.118 HISTORY OF CANCER OF UPPER LOBE BRONCHUS OR LUNG: ICD-10-CM

## 2019-05-16 DIAGNOSIS — I10 ESSENTIAL HYPERTENSION: ICD-10-CM

## 2019-05-16 DIAGNOSIS — E78.00 PURE HYPERCHOLESTEROLEMIA: ICD-10-CM

## 2019-05-16 DIAGNOSIS — R94.8 ABNORMAL PET SCAN, MEDIASTINUM: ICD-10-CM

## 2019-05-16 DIAGNOSIS — J92.0 ASBESTOS-INDUCED PLEURAL PLAQUE: ICD-10-CM

## 2019-05-16 DIAGNOSIS — C34.12 MALIGNANT NEOPLASM OF UPPER LOBE OF LEFT LUNG: Primary | ICD-10-CM

## 2019-05-16 DIAGNOSIS — J38.01 PARALYSIS OF VOCAL CORDS AND LARYNX, UNILATERAL: Primary | ICD-10-CM

## 2019-05-16 PROCEDURE — 99215 OFFICE O/P EST HI 40 MIN: CPT | Mod: S$GLB,,, | Performed by: INTERNAL MEDICINE

## 2019-05-16 PROCEDURE — 3078F DIAST BP <80 MM HG: CPT | Mod: CPTII,S$GLB,, | Performed by: INTERNAL MEDICINE

## 2019-05-16 PROCEDURE — 1101F PT FALLS ASSESS-DOCD LE1/YR: CPT | Mod: CPTII,S$GLB,, | Performed by: INTERNAL MEDICINE

## 2019-05-16 PROCEDURE — 99215 PR OFFICE/OUTPT VISIT, EST, LEVL V, 40-54 MIN: ICD-10-PCS | Mod: S$GLB,,, | Performed by: INTERNAL MEDICINE

## 2019-05-16 PROCEDURE — 3075F PR MOST RECENT SYSTOLIC BLOOD PRESS GE 130-139MM HG: ICD-10-PCS | Mod: CPTII,S$GLB,, | Performed by: INTERNAL MEDICINE

## 2019-05-16 PROCEDURE — 99999 PR PBB SHADOW E&M-EST. PATIENT-LVL IV: CPT | Mod: PBBFAC,,, | Performed by: INTERNAL MEDICINE

## 2019-05-16 PROCEDURE — 99999 PR PBB SHADOW E&M-EST. PATIENT-LVL IV: ICD-10-PCS | Mod: PBBFAC,,, | Performed by: INTERNAL MEDICINE

## 2019-05-16 PROCEDURE — 3075F SYST BP GE 130 - 139MM HG: CPT | Mod: CPTII,S$GLB,, | Performed by: INTERNAL MEDICINE

## 2019-05-16 PROCEDURE — 1101F PR PT FALLS ASSESS DOC 0-1 FALLS W/OUT INJ PAST YR: ICD-10-PCS | Mod: CPTII,S$GLB,, | Performed by: INTERNAL MEDICINE

## 2019-05-16 PROCEDURE — 3078F PR MOST RECENT DIASTOLIC BLOOD PRESSURE < 80 MM HG: ICD-10-PCS | Mod: CPTII,S$GLB,, | Performed by: INTERNAL MEDICINE

## 2019-05-16 NOTE — TELEPHONE ENCOUNTER
----- Message from Lc Paez MD sent at 5/16/2019  9:40 AM CDT -----  Kelly- Can you take care of arranging EGD/EUS-FNA on this pt. Looks like he gets his care in the [place], so OK to schedule there with any AES MD in the next few weeks. Would book it as a 60min case.

## 2019-05-16 NOTE — PROGRESS NOTES
Subjective:       Patient ID: Chai Murry is a 79 y.o. male.    Chief Complaint:  Mr Torres is 79 years old'  New to me by Dr Patel  : Seen Dr Ernandez  Asked to see me for possible EBUS  PET CT reviewed  Hx Squamous lung cancer SP pneumonectomy 2016  AP window was +ve  Recent vocal cord paralysis since Good Friday: seen ENT  Dr Jackson  PET CT was reviewed and also discussed on secure chat with GI and thoracic  Report suggest esophageal involvement  FEV1 1.54( 52.3%)  Regime: BREO: Stable  Dry cough  DW with other Providers: Arrange for EUS      The following portions of the patient's history were reviewed and updated as appropriate:   He  has a past medical history of Anemia, Arthritis, Atrial fibrillation, CAD (coronary artery disease), Cancer, Cataract, COPD (chronic obstructive pulmonary disease), Diverticulosis, ED (erectile dysfunction), HTN (hypertension), Hyperlipidemia, Myocardial infarction, and 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  He does not have any pertinent problems on file.  He  has a past surgical history that includes Appendectomy; Cholecystectomy; cardiac stents; infected stomach gland excision; Cataract extraction w/  intraocular lens implant (Right, 9-3-14); Skin biopsy (Left); Lung removal, total (Left, 04/2016); Colonoscopy (N/A, 4/17/2018); Colonoscopy (N/A, 10/25/2018); and Cardiac catheterization.  His family history includes Cancer in his brother, mother, and sister; Cataracts in his father and mother; Esophageal cancer in his sister; Hypertension in his brother, father, and mother; Lung cancer in his mother; Pneumonia in his father.  He  reports that he quit smoking about 13 years ago. He has a 50.00 pack-year smoking history. He has never used smokeless tobacco. He reports that he does not drink alcohol or use drugs.  He has a current medication list which includes the following prescription(s): albuterol, amlodipine, aspirin, breo ellipta,  doxepin, fenofibric acid, fluticasone propionate, glycopyrrolate-formoterol, hydrocodone-acetaminophen, ipratropium-albuterol, levocetirizine, lisinopril, naproxen sodium, simvastatin, and sotalol af, and the following Facility-Administered Medications: lactated ringers and testosterone cypionate.  Current Outpatient Medications on File Prior to Visit   Medication Sig Dispense Refill    albuterol (PROVENTIL) 2.5 mg /3 mL (0.083 %) nebulizer solution Take 3 mLs (2.5 mg total) by nebulization every 6 (six) hours while awake. 270 mL 11    amLODIPine (NORVASC) 5 MG tablet Take 2 tablets (10 mg total) by mouth every evening. (Patient taking differently: Take 5 mg by mouth once daily. ) 60 tablet 0    aspirin (ECOTRIN) 81 MG EC tablet Take 81 mg by mouth once daily.      BREO ELLIPTA 100-25 mcg/dose diskus inhaler INHALE 1 PUFF INTO THE LUNGS ONCE DAILY  5    doxepin (SINEQUAN) 10 MG capsule TAKE 1 CAPSULE (10 MG TOTAL) BY MOUTH EVERY EVENING. 30 capsule 10    fenofibric acid (FIBRICOR) 135 mg CpDR TAKE 1 CAPSULE BY MOUTH EVERY DAY 30 capsule 11    fluticasone (FLONASE) 50 mcg/actuation nasal spray 2 sprays (100 mcg total) by Each Nare route once daily. 3 Bottle 3    glycopyrrolate-formoterol (BEVESPI AEROSPHERE) 9-4.8 mcg HFAA Inhale 2 puffs into the lungs 2 (two) times daily. Controller 10.9 g 11    HYDROcodone-acetaminophen (NORCO) 5-325 mg per tablet Take 1 tablet by mouth every 6 (six) hours as needed. 12 tablet 0    ipratropium-albuterol (COMBIVENT RESPIMAT)  mcg/actuation inhaler Inhale 1 puff into the lungs every 6 (six) hours as needed for Shortness of Breath. 4 g 11    levocetirizine (XYZAL) 5 MG tablet Take 5 mg by mouth every evening.      lisinopril (PRINIVIL,ZESTRIL) 40 MG tablet Take 40 mg by mouth once daily.  11    naproxen sodium (ANAPROX) 550 MG tablet Take 1 tablet (550 mg total) by mouth 2 (two) times daily with meals. 20 tablet 0    simvastatin (ZOCOR) 40 MG tablet Take 1 tablet  "(40 mg total) by mouth every evening. 30 tablet 11    SOTALOL AF 80 mg tablet TAKE 1 TABLET BY MOUTH TWICE A DAY 60 tablet 10     Current Facility-Administered Medications on File Prior to Visit   Medication Dose Route Frequency Provider Last Rate Last Dose    lactated ringers infusion   Intravenous Continuous Michael Hameed MD   Stopped at 10/25/18 0924    testosterone cypionate injection 200 mg  200 mg Intramuscular Q21 Days Peter Teixeira MD   200 mg at 04/29/19 0908     He is allergic to atorvastatin and bactrim [sulfamethoxazole-trimethoprim]..     Review of Systems   Constitutional: Negative.    HENT: Positive for sore throat.         Hoarse voice   Eyes: Negative.    Respiratory: Positive for cough (dry).    Gastrointestinal: Negative.    Genitourinary: Negative.    Musculoskeletal: Negative.    Skin: Negative.    Neurological: Negative.    Endo/Heme/Allergies: Negative.    Psychiatric/Behavioral: Negative.         Objective:     Vitals:    05/16/19 1528   BP: 138/66   Pulse: 70   Resp: 18   SpO2: 99%   Weight: 87.9 kg (193 lb 12.6 oz)   Height: 5' 10" (1.778 m)       Physical Exam   Constitutional: He is oriented to person, place, and time. He appears well-developed and well-nourished. No distress.   HENT:   Head: Normocephalic and atraumatic.   Eyes: Pupils are equal, round, and reactive to light.   Neck: Normal range of motion.   Cardiovascular: Normal rate, regular rhythm and normal heart sounds.   Pulmonary/Chest: Effort normal and breath sounds normal.   Abdominal: Soft. Bowel sounds are normal.   Musculoskeletal: Normal range of motion. He exhibits no edema.   Neurological: He is alert and oriented to person, place, and time. No cranial nerve deficit.   Skin: Skin is warm and dry. He is not diaphoretic.   Multiple tatoos   Nursing note and vitals reviewed.        Data Review:   CBC:   Lab Results   Component Value Date    WBC 7.93 05/08/2019    RBC 5.05 05/08/2019    HGB 14.4 05/08/2019    " HCT 46.5 05/08/2019    HCT 34 (L) 04/12/2016     05/08/2019     BMP:   Lab Results   Component Value Date    GLU 91 05/08/2019     05/08/2019    K 4.8 05/08/2019     05/08/2019    CO2 31 (H) 05/08/2019    BUN 14 05/08/2019    CREATININE 1.0 05/08/2019    CALCIUM 9.6 05/08/2019     Diagnostics: Pulmonary function tests: FEV1: 1.54  (52.3 % predicted), FVC:  2.33 (58.7 % predicted), FEV1/FVC:  66  restriction      PET SCAN  FINDINGS:  Head/neck: There is normal physiologic uptake noted within the visualized brain parenchyma. No FDG avid adenopathy within the neck. There is deviation of the left vocal cord medially consistent with the given history of left-sided vocal cord paralysis.    Chest: Fluid-filled post-pneumonectomy space seen within the left hemithorax with shift of the cardiomediastinal silhouette to the left. No FDG avid pulmonary nodules noted within the right lung. Along the left and posterior aspect of the trachea is a   focal area of intense FDG uptake that demonstrates an SUV max of 12.0 with what appears to represent soft tissue invasion of the left side of the trachea just above the level of the sternum at approximately the level of T1. It is unclear if this   represents a lymph node extrinsic to the trachea that involves the trachea versus a mass originating from the trachea or esophagus.There is also an FDG avid left paratracheal lymph node inferior to this area that demonstrates a SUV max of 6.4.    Abdomen/Pelvis: Normal physiologic uptake noted within the liver, spleen, urinary tract, and bowel.The adrenals are unremarkable in appearance. Bilateral renal cysts are noted. The gallbladder is surgically absent. There is diverticulosis noted   throughout the colon. No diverticulitis.    Skeletal: No FDG avid osseus lesions identified.   Report Conclusions  IMPRESSION:      1. Significantly FDG avid mass seen along the posterior and left side of the trachea at approximately the T1  level just above the level of the sternum. It is unclear if this mass originates from the trachea, represents a soft tissue mass/lymph node   extrinsic to the trachea or a mass arising from the esophagus itself. Just inferior to this area is an FDG avid left paratracheal lymph node. The combination of these 2 FDG avid lesions is the likely cause of patient's left vocal cord paralysis.  2. Remaining findings as discussed above.  Assessment:      Problem List Items Addressed This Visit     Hyperlipidemia    Current Assessment & Plan     Stable per PCP         Essential hypertension    Current Assessment & Plan     Stable per PCP         History of cancer of upper lobe bronchus or lung    Malignant neoplasm of upper lobe of left lung    Overview     He had   Resection of a left upper lobe mass in 4/2016. .It was a squamous cell carcinoma.   It measured 3.8 cm. The   pathology indicated that this was extending into the bronchial resection margin of the lobe. This lobe was later on removed to complete the pneumonectomy   There was one lymph node positive for metastatic squamous cell carcinoma at the   AP window.  He had post op   chemo/radiation. ( 4 cycles of carboTaxol).  Last chemo at the end of 9/2016         Asbestos-induced pleural plaque    Current Assessment & Plan     A documented history of exposures.  Worked at [employer].         Paralysis of vocal cords and larynx, unilateral - Primary    Current Assessment & Plan     T1 level lesion suspected involving laryngeal nerve         Abnormal PET scan, mediastinum         Plan:      for EGD and EUS: DR Paez        Follow up in about 3 months (around 8/16/2019), or with DR Ernandez cxr and sspiro, EUS by DR Paez, will disucss Multidisclipnary conference.    This note was prepared using voice recognition system and is likely to have sound alike errors that may have been overlooked even after proof reading.  Please call me with any questions    Discussed diagnosis, its  evaluation, treatment and usual course. All questions answered.    Thank you for the courtesy of participating in the care of this patient    Paresh Goldman MD

## 2019-05-20 ENCOUNTER — CLINICAL SUPPORT (OUTPATIENT)
Dept: INTERNAL MEDICINE | Facility: CLINIC | Age: 79
End: 2019-05-20
Payer: MEDICARE

## 2019-05-20 ENCOUNTER — TELEPHONE (OUTPATIENT)
Dept: ENDOSCOPY | Facility: HOSPITAL | Age: 79
End: 2019-05-20

## 2019-05-20 DIAGNOSIS — E29.1 HYPOGONADISM IN MALE: Primary | ICD-10-CM

## 2019-05-20 PROCEDURE — 96372 THER/PROPH/DIAG INJ SC/IM: CPT | Mod: S$GLB,,, | Performed by: INTERNAL MEDICINE

## 2019-05-20 PROCEDURE — 99999 PR PBB SHADOW E&M-EST. PATIENT-LVL II: ICD-10-PCS | Mod: PBBFAC,,,

## 2019-05-20 PROCEDURE — 96372 PR INJECTION,THERAP/PROPH/DIAG2ST, IM OR SUBCUT: ICD-10-PCS | Mod: S$GLB,,, | Performed by: INTERNAL MEDICINE

## 2019-05-20 PROCEDURE — 99999 PR PBB SHADOW E&M-EST. PATIENT-LVL II: CPT | Mod: PBBFAC,,,

## 2019-05-20 RX ADMIN — TESTOSTERONE CYPIONATE 200 MG: 200 INJECTION, SOLUTION INTRAMUSCULAR at 08:05

## 2019-05-22 ENCOUNTER — OFFICE VISIT (OUTPATIENT)
Dept: HEMATOLOGY/ONCOLOGY | Facility: CLINIC | Age: 79
End: 2019-05-22
Payer: MEDICARE

## 2019-05-22 VITALS
SYSTOLIC BLOOD PRESSURE: 148 MMHG | BODY MASS INDEX: 27.96 KG/M2 | RESPIRATION RATE: 18 BRPM | DIASTOLIC BLOOD PRESSURE: 80 MMHG | TEMPERATURE: 98 F | OXYGEN SATURATION: 95 % | HEART RATE: 69 BPM | HEIGHT: 70 IN | WEIGHT: 195.31 LBS

## 2019-05-22 DIAGNOSIS — Z90.2 STATUS POST PNEUMONECTOMY: Primary | ICD-10-CM

## 2019-05-22 DIAGNOSIS — J38.01 PARALYSIS OF VOCAL CORDS AND LARYNX, UNILATERAL: ICD-10-CM

## 2019-05-22 DIAGNOSIS — C34.12 MALIGNANT NEOPLASM OF UPPER LOBE OF LEFT LUNG: ICD-10-CM

## 2019-05-22 PROCEDURE — 3077F PR MOST RECENT SYSTOLIC BLOOD PRESSURE >= 140 MM HG: ICD-10-PCS | Mod: CPTII,S$GLB,, | Performed by: INTERNAL MEDICINE

## 2019-05-22 PROCEDURE — 1101F PT FALLS ASSESS-DOCD LE1/YR: CPT | Mod: CPTII,S$GLB,, | Performed by: INTERNAL MEDICINE

## 2019-05-22 PROCEDURE — 3079F DIAST BP 80-89 MM HG: CPT | Mod: CPTII,S$GLB,, | Performed by: INTERNAL MEDICINE

## 2019-05-22 PROCEDURE — 1101F PR PT FALLS ASSESS DOC 0-1 FALLS W/OUT INJ PAST YR: ICD-10-PCS | Mod: CPTII,S$GLB,, | Performed by: INTERNAL MEDICINE

## 2019-05-22 PROCEDURE — 99999 PR PBB SHADOW E&M-EST. PATIENT-LVL IV: ICD-10-PCS | Mod: PBBFAC,,, | Performed by: INTERNAL MEDICINE

## 2019-05-22 PROCEDURE — 3077F SYST BP >= 140 MM HG: CPT | Mod: CPTII,S$GLB,, | Performed by: INTERNAL MEDICINE

## 2019-05-22 PROCEDURE — 99999 PR PBB SHADOW E&M-EST. PATIENT-LVL IV: CPT | Mod: PBBFAC,,, | Performed by: INTERNAL MEDICINE

## 2019-05-22 PROCEDURE — 99215 OFFICE O/P EST HI 40 MIN: CPT | Mod: S$GLB,,, | Performed by: INTERNAL MEDICINE

## 2019-05-22 PROCEDURE — 3079F PR MOST RECENT DIASTOLIC BLOOD PRESSURE 80-89 MM HG: ICD-10-PCS | Mod: CPTII,S$GLB,, | Performed by: INTERNAL MEDICINE

## 2019-05-22 PROCEDURE — 99215 PR OFFICE/OUTPT VISIT, EST, LEVL V, 40-54 MIN: ICD-10-PCS | Mod: S$GLB,,, | Performed by: INTERNAL MEDICINE

## 2019-05-22 NOTE — PROGRESS NOTES
Subjective:       Patient ID: Chai Murry is a 79 y.o. male.    Chief Complaint: Follow-up    HPI This is a 79 year old  gentleman who comes for follow up of his lung cancer.  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 There seemed to be a left   hilar adenopathy as well.  Radiologist felt that he was unable to discriminate between the mass and the   lymphadenopathy. The patient had had a bronchoscopy by Dr. Roel Ernandez on   03/04/2006 that showed a partially obstructing airway in the anterior segment of  the left upper lobe with an endobronchial lesion in the anterior segment of the  left upper lobe. The specimen from the biopsy at the time of bronchoscopy was   reported as being a squamous cell carcinoma.  At the time of the surgery after the left lung was removed, the pathologist   indicated that this was a squamous cell carcinoma. It measured 3.8 cm. The   pathology indicated that this is extending into the bronchial resection margin of the lobe. This lobe was later on removed to complete the pneumonectomy   There was one lymph node positive for metastatic squamous cell carcinoma at the   AP window.  The patient was told that we would prefer to treat him with post-op simultaneous chemo/radiation.  He had Medi-port. He started chemo/radiation therapy andreceived 6 weekly cycles of carbo/Taxol along with radiatioon.  After a month, he had a Ct scan and it showed stability   He then had 2 additional cyles of carbo/Taxol finishing in 9/27/2016.    He comes for follow up.   He developed hoarseness on good Friday 2019 and has been found to have a left vocal cord paralysis by EENT exam. CT of the chest was non contributory, shows changes from surgery  PET showed a PET avid mass to the left of the trachea.  The case was discussed with   annika Goldman and GI.  We discussed the possibility of doing a EUS or EBUs, and finally, because of the localization, it was decided to do a EUS with biopsy if possible.  He is scheduled to have the procedure on 6/11/2019      MEDICATIONS: See list.  SURGERIES: Appendectomy at age 3. Bilateral cataract surgery. Infected gland   removed from the abdomen, cholecystectomy, left pneumonectomy..Medi-port placed and removed  SOCIAL HISTORY: He is  with three children. He lives in [facility]. He   used to smoke from age 14 To age 67 or so,, averaging a pack a day. Denies significant   drinking. He worked in a chemical plant for 40 years.  FAMILY HISTORY: Sister had cancer of the throat. Mother had cancer of the   lung. Father had prostate cancer. No diabetes. Paternal grandfather had a   heart attack.  PAST MEDICAL HISTORY:  1. Squamous cell carcinoma of the lung s/p left penumonectomy.  2. Tobacco use.  3. High blood pressure.  4. Arteriosclerotic cardiovascular disease, status post previous heart attacks.  5. Arthritis.   6-left vocal cord paralysis  Review of Systems   Constitutional: Negative.  Negative for fatigue.   HENT:        Hoarseness   Eyes: Negative.    Cardiovascular: Negative.  Negative for chest pain.   Gastrointestinal: Negative for abdominal pain and nausea.   Genitourinary: Negative.  Negative for hematuria.   Musculoskeletal: Negative.    Skin: Negative.    Neurological: Negative.    Psychiatric/Behavioral: Negative.        Objective:      Physical Exam   Constitutional: He is oriented to person, place, and time. He appears well-developed and well-nourished.   HENT:   Head: Normocephalic.   Mouth/Throat: No oropharyngeal exudate.   Eyes: Pupils are equal, round, and reactive to light.   Neck: No thyromegaly present.   Cardiovascular: Normal rate, regular rhythm and normal heart sounds. Exam reveals no gallop.   No murmur heard.  Pulmonary/Chest: No respiratory distress. He has no wheezes. He has  no rales.   Abdominal: Soft. Bowel sounds are normal. He exhibits no distension and no mass. There is no rebound and no guarding.   Musculoskeletal: Normal range of motion. He exhibits no edema.   Lymphadenopathy:     He has no cervical adenopathy.   Neurological: He is alert and oriented to person, place, and time.   Skin: Skin is warm and dry.   Psychiatric: He has a normal mood and affect. His behavior is normal.       Wt Readings from Last 3 Encounters:   05/22/19 88.6 kg (195 lb 5.2 oz)   05/16/19 87.9 kg (193 lb 12.6 oz)   05/09/19 88 kg (194 lb 0.1 oz)     Temp Readings from Last 3 Encounters:   05/22/19 97.9 °F (36.6 °C) (Oral)   05/08/19 97.3 °F (36.3 °C)   05/08/19 97.9 °F (36.6 °C)     BP Readings from Last 3 Encounters:   05/22/19 (!) 148/80   05/16/19 138/66   05/09/19 (!) 149/84     Pulse Readings from Last 3 Encounters:   05/22/19 69   05/16/19 70   05/09/19 87       Assessment:       1. s/p left pnuemonectomy for KAYLI [place]    2. Paralysis of vocal cords and larynx, unilateral    3. Malignant neoplasm of upper lobe of left lung        Plan:       Recurrence is higly possible, patient aware.If so, he would be a candidate for chemotherapy.  As detailed above case was discussed among several physicians. Due for EUS on 6/11/2019. Will ask him to see me  Wed June 19

## 2019-05-23 ENCOUNTER — OFFICE VISIT (OUTPATIENT)
Dept: CARDIOLOGY | Facility: CLINIC | Age: 79
End: 2019-05-23
Payer: MEDICARE

## 2019-05-23 ENCOUNTER — TELEPHONE (OUTPATIENT)
Dept: SPEECH THERAPY | Facility: HOSPITAL | Age: 79
End: 2019-05-23

## 2019-05-23 ENCOUNTER — CLINICAL SUPPORT (OUTPATIENT)
Dept: CARDIOLOGY | Facility: CLINIC | Age: 79
End: 2019-05-23
Payer: MEDICARE

## 2019-05-23 VITALS
HEART RATE: 64 BPM | HEIGHT: 70 IN | SYSTOLIC BLOOD PRESSURE: 114 MMHG | WEIGHT: 194.69 LBS | DIASTOLIC BLOOD PRESSURE: 64 MMHG | BODY MASS INDEX: 27.87 KG/M2

## 2019-05-23 DIAGNOSIS — I10 ESSENTIAL HYPERTENSION: ICD-10-CM

## 2019-05-23 DIAGNOSIS — I25.10 CORONARY ARTERY DISEASE INVOLVING NATIVE CORONARY ARTERY OF NATIVE HEART WITHOUT ANGINA PECTORIS: Primary | ICD-10-CM

## 2019-05-23 DIAGNOSIS — I25.10 CORONARY ARTERY DISEASE INVOLVING NATIVE CORONARY ARTERY OF NATIVE HEART WITHOUT ANGINA PECTORIS: ICD-10-CM

## 2019-05-23 DIAGNOSIS — E78.00 PURE HYPERCHOLESTEROLEMIA: ICD-10-CM

## 2019-05-23 PROCEDURE — 3074F PR MOST RECENT SYSTOLIC BLOOD PRESSURE < 130 MM HG: ICD-10-PCS | Mod: CPTII,S$GLB,, | Performed by: INTERNAL MEDICINE

## 2019-05-23 PROCEDURE — 99999 PR PBB SHADOW E&M-EST. PATIENT-LVL III: ICD-10-PCS | Mod: PBBFAC,,, | Performed by: INTERNAL MEDICINE

## 2019-05-23 PROCEDURE — 1101F PR PT FALLS ASSESS DOC 0-1 FALLS W/OUT INJ PAST YR: ICD-10-PCS | Mod: CPTII,S$GLB,, | Performed by: INTERNAL MEDICINE

## 2019-05-23 PROCEDURE — 1101F PT FALLS ASSESS-DOCD LE1/YR: CPT | Mod: CPTII,S$GLB,, | Performed by: INTERNAL MEDICINE

## 2019-05-23 PROCEDURE — 93000 EKG 12-LEAD: ICD-10-PCS | Mod: S$GLB,,, | Performed by: INTERNAL MEDICINE

## 2019-05-23 PROCEDURE — 93000 ELECTROCARDIOGRAM COMPLETE: CPT | Mod: S$GLB,,, | Performed by: INTERNAL MEDICINE

## 2019-05-23 PROCEDURE — 99214 OFFICE O/P EST MOD 30 MIN: CPT | Mod: S$GLB,,, | Performed by: INTERNAL MEDICINE

## 2019-05-23 PROCEDURE — 3074F SYST BP LT 130 MM HG: CPT | Mod: CPTII,S$GLB,, | Performed by: INTERNAL MEDICINE

## 2019-05-23 PROCEDURE — 99999 PR PBB SHADOW E&M-EST. PATIENT-LVL III: CPT | Mod: PBBFAC,,, | Performed by: INTERNAL MEDICINE

## 2019-05-23 PROCEDURE — 3078F PR MOST RECENT DIASTOLIC BLOOD PRESSURE < 80 MM HG: ICD-10-PCS | Mod: CPTII,S$GLB,, | Performed by: INTERNAL MEDICINE

## 2019-05-23 PROCEDURE — 3078F DIAST BP <80 MM HG: CPT | Mod: CPTII,S$GLB,, | Performed by: INTERNAL MEDICINE

## 2019-05-23 PROCEDURE — 99214 PR OFFICE/OUTPT VISIT, EST, LEVL IV, 30-39 MIN: ICD-10-PCS | Mod: S$GLB,,, | Performed by: INTERNAL MEDICINE

## 2019-05-23 NOTE — PROGRESS NOTES
Subjective:   Patient ID:  Chai Murry is a 79 y.o. male who presents for follow-up of Follow-up and Coronary Artery Disease  Pt still orthostatic but also dizziness when closes eyes.    Hypertension   This is a chronic problem. The current episode started more than 1 year ago. The problem has been gradually improving since onset. The problem is controlled. Pertinent negatives include no chest pain, palpitations or shortness of breath. Past treatments include ACE inhibitors and calcium channel blockers. The current treatment provides moderate improvement. Compliance problems include medication side effects.    Hyperlipidemia   This is a chronic problem. The current episode started more than 1 year ago. The problem is controlled. Recent lipid tests were reviewed and are variable. Pertinent negatives include no chest pain or shortness of breath. Current antihyperlipidemic treatment includes fibric acid derivatives. The current treatment provides moderate improvement of lipids. There are no compliance problems.        Review of Systems   Constitution: Negative. Negative for weight gain.   HENT: Negative.    Eyes: Negative.    Cardiovascular: Negative.  Negative for chest pain, leg swelling and palpitations.   Respiratory: Negative.  Negative for shortness of breath.    Endocrine: Negative.    Hematologic/Lymphatic: Negative.    Skin: Negative.    Musculoskeletal: Negative for muscle weakness.   Gastrointestinal: Negative.    Genitourinary: Negative.    Neurological: Negative.  Negative for dizziness.   Psychiatric/Behavioral: Negative.    Allergic/Immunologic: Negative.      Family History   Problem Relation Age of Onset    Esophageal cancer Sister     Cancer Sister     Lung cancer Mother     Cancer Mother     Cataracts Mother     Hypertension Mother     Pneumonia Father     Cataracts Father     Hypertension Father     Cancer Brother     Hypertension Brother     Blindness Neg Hx     Diabetes Neg Hx      Glaucoma Neg Hx     Macular degeneration Neg Hx     Retinal detachment Neg Hx     Strabismus Neg Hx     Stroke Neg Hx     Thyroid disease Neg Hx      Past Medical History:   Diagnosis Date    Anemia     Arthritis     Atrial fibrillation     CAD (coronary artery disease)     Cancer     lung cancer-Left    Cataract     COPD (chronic obstructive pulmonary disease)     Diverticulosis     ED (erectile dysfunction)     HTN (hypertension)     Hyperlipidemia     Myocardial infarction     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 Courtney dwyer bx     Social History     Socioeconomic History    Marital status:      Spouse name: Not on file    Number of children: 3    Years of education: Not on file    Highest education level: Not on file   Occupational History    Occupation: retired   Social Needs    Financial resource strain: Not on file    Food insecurity:     Worry: Not on file     Inability: Not on file    Transportation needs:     Medical: Not on file     Non-medical: Not on file   Tobacco Use    Smoking status: Former Smoker     Packs/day: 1.00     Years: 50.00     Pack years: 50.00     Last attempt to quit: 2005     Years since quittin.7    Smokeless tobacco: Never Used   Substance and Sexual Activity    Alcohol use: No    Drug use: No    Sexual activity: Not Currently   Lifestyle    Physical activity:     Days per week: Not on file     Minutes per session: Not on file    Stress: Not on file   Relationships    Social connections:     Talks on phone: Not on file     Gets together: Not on file     Attends Holiness service: Not on file     Active member of club or organization: Not on file     Attends meetings of clubs or organizations: Not on file     Relationship status: Not on file   Other Topics Concern    Not on file   Social History Narrative    Not on file     Current Outpatient Medications on File Prior to Visit   Medication  Sig Dispense Refill    albuterol (PROVENTIL) 2.5 mg /3 mL (0.083 %) nebulizer solution Take 3 mLs (2.5 mg total) by nebulization every 6 (six) hours while awake. 270 mL 11    amLODIPine (NORVASC) 5 MG tablet Take 2 tablets (10 mg total) by mouth every evening. (Patient taking differently: Take 5 mg by mouth once daily. ) 60 tablet 0    aspirin (ECOTRIN) 81 MG EC tablet Take 81 mg by mouth once daily.      BREO ELLIPTA 100-25 mcg/dose diskus inhaler INHALE 1 PUFF INTO THE LUNGS ONCE DAILY  5    doxepin (SINEQUAN) 10 MG capsule TAKE 1 CAPSULE (10 MG TOTAL) BY MOUTH EVERY EVENING. 30 capsule 10    fenofibric acid (FIBRICOR) 135 mg CpDR TAKE 1 CAPSULE BY MOUTH EVERY DAY 30 capsule 11    fluticasone (FLONASE) 50 mcg/actuation nasal spray 2 sprays (100 mcg total) by Each Nare route once daily. 3 Bottle 3    glycopyrrolate-formoterol (BEVESPI AEROSPHERE) 9-4.8 mcg HFAA Inhale 2 puffs into the lungs 2 (two) times daily. Controller 10.9 g 11    HYDROcodone-acetaminophen (NORCO) 5-325 mg per tablet Take 1 tablet by mouth every 6 (six) hours as needed. 12 tablet 0    ipratropium-albuterol (COMBIVENT RESPIMAT)  mcg/actuation inhaler Inhale 1 puff into the lungs every 6 (six) hours as needed for Shortness of Breath. 4 g 11    lisinopril (PRINIVIL,ZESTRIL) 40 MG tablet Take 40 mg by mouth once daily.  11    simvastatin (ZOCOR) 40 MG tablet Take 1 tablet (40 mg total) by mouth every evening. 30 tablet 11    levocetirizine (XYZAL) 5 MG tablet Take 5 mg by mouth every evening.      naproxen sodium (ANAPROX) 550 MG tablet Take 1 tablet (550 mg total) by mouth 2 (two) times daily with meals. 20 tablet 0    SOTALOL AF 80 mg tablet TAKE 1 TABLET BY MOUTH TWICE A DAY 60 tablet 10     Current Facility-Administered Medications on File Prior to Visit   Medication Dose Route Frequency Provider Last Rate Last Dose    lactated ringers infusion   Intravenous Continuous Michael Hameed MD   Stopped at 10/25/18 0999     testosterone cypionate injection 200 mg  200 mg Intramuscular Q21 Days Peter Teixeira MD   200 mg at 05/20/19 0881     Review of patient's allergies indicates:   Allergen Reactions    Atorvastatin      Other reaction(s): Muscle pain    Bactrim [sulfamethoxazole-trimethoprim]      Lip swelling       Objective:     Physical Exam   Constitutional: He is oriented to person, place, and time. He appears well-developed and well-nourished.   HENT:   Head: Normocephalic and atraumatic.   Eyes: Pupils are equal, round, and reactive to light. Conjunctivae are normal.   Neck: Normal range of motion. Neck supple.   Cardiovascular: Normal rate, regular rhythm, normal heart sounds and intact distal pulses.   Pulmonary/Chest: Effort normal and breath sounds normal.   Abdominal: Soft. Bowel sounds are normal.   Neurological: He is alert and oriented to person, place, and time.   Skin: Skin is warm and dry.   Psychiatric: He has a normal mood and affect.   Nursing note and vitals reviewed.      Assessment:     1. Coronary artery disease involving native coronary artery of native heart without angina pectoris    2. Pure hypercholesterolemia    3. Essential hypertension        Plan:     Coronary artery disease involving native coronary artery of native heart without angina pectoris    Pure hypercholesterolemia    Essential hypertension      Decrease lisinopril-orthostatic sx  Continue norvasc-htn  Continue asa- cad  Continue sotalol- afib now NSR

## 2019-05-27 DIAGNOSIS — I25.10 CORONARY ARTERY DISEASE INVOLVING NATIVE CORONARY ARTERY OF NATIVE HEART WITHOUT ANGINA PECTORIS: Primary | ICD-10-CM

## 2019-05-29 ENCOUNTER — OFFICE VISIT (OUTPATIENT)
Dept: PODIATRY | Facility: CLINIC | Age: 79
End: 2019-05-29
Payer: MEDICARE

## 2019-05-29 VITALS
DIASTOLIC BLOOD PRESSURE: 66 MMHG | BODY MASS INDEX: 28.18 KG/M2 | HEIGHT: 70 IN | SYSTOLIC BLOOD PRESSURE: 119 MMHG | HEART RATE: 69 BPM | WEIGHT: 196.88 LBS

## 2019-05-29 DIAGNOSIS — M72.2 PLANTAR FASCIITIS, LEFT: Primary | ICD-10-CM

## 2019-05-29 DIAGNOSIS — M24.572 CONTRACTURE, LEFT ANKLE: ICD-10-CM

## 2019-05-29 DIAGNOSIS — M79.672 PAIN IN LEFT FOOT: ICD-10-CM

## 2019-05-29 PROCEDURE — 99499 NO LOS: ICD-10-PCS | Mod: S$GLB,,, | Performed by: PODIATRIST

## 2019-05-29 PROCEDURE — 99999 PR PBB SHADOW E&M-EST. PATIENT-LVL III: CPT | Mod: PBBFAC,,, | Performed by: PODIATRIST

## 2019-05-29 PROCEDURE — 99499 UNLISTED E&M SERVICE: CPT | Mod: S$GLB,,, | Performed by: PODIATRIST

## 2019-05-29 PROCEDURE — 99999 PR PBB SHADOW E&M-EST. PATIENT-LVL III: ICD-10-PCS | Mod: PBBFAC,,, | Performed by: PODIATRIST

## 2019-05-29 PROCEDURE — 20550 PR INJECT TENDON SHEATH/LIGAMENT: ICD-10-PCS | Mod: LT,S$GLB,, | Performed by: PODIATRIST

## 2019-05-29 PROCEDURE — 20550 NJX 1 TENDON SHEATH/LIGAMENT: CPT | Mod: LT,S$GLB,, | Performed by: PODIATRIST

## 2019-05-29 RX ORDER — TRIAMCINOLONE ACETONIDE 40 MG/ML
40 INJECTION, SUSPENSION INTRA-ARTICULAR; INTRAMUSCULAR ONCE
Status: COMPLETED | OUTPATIENT
Start: 2019-05-29 | End: 2019-05-29

## 2019-05-29 RX ORDER — DEXAMETHASONE SODIUM PHOSPHATE 4 MG/ML
4 INJECTION, SOLUTION INTRA-ARTICULAR; INTRALESIONAL; INTRAMUSCULAR; INTRAVENOUS; SOFT TISSUE ONCE
Status: COMPLETED | OUTPATIENT
Start: 2019-05-29 | End: 2019-05-29

## 2019-05-29 RX ADMIN — DEXAMETHASONE SODIUM PHOSPHATE 4 MG: 4 INJECTION, SOLUTION INTRA-ARTICULAR; INTRALESIONAL; INTRAMUSCULAR; INTRAVENOUS; SOFT TISSUE at 04:05

## 2019-05-29 RX ADMIN — TRIAMCINOLONE ACETONIDE 40 MG: 40 INJECTION, SUSPENSION INTRA-ARTICULAR; INTRAMUSCULAR at 04:05

## 2019-05-29 NOTE — PROGRESS NOTES
Subjective:       Patient ID: Chai Murry is a 79 y.o. male.    Chief Complaint: Heel Pain (left heel inj f/u; pt rates pain at 5/10; pt reports injection did not help at all. )      HPI: Chai Murry presents to the office today, for follow-up concerning plantar fasciitis of the left foot. At this time, pains are 10/10 and are described as severe/sharp in nature. States analgic gait pattern. States minimal to no improvement concerning post static dyskinesia. States walking and standing is not as painful as prior, but persists. To this juncture, patient has had 2 cortisone injections to the left foot. States no NSAIDs. Patient's Primary Care Provider is Peter Teixeira MD.  Patient presents this afternoon with his wife.    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 Courtney dwyer bx       Family History   Problem Relation Age of Onset    Esophageal cancer Sister     Cancer Sister     Lung cancer Mother     Cancer Mother     Cataracts Mother     Hypertension Mother     Pneumonia Father     Cataracts Father     Hypertension Father     Cancer Brother     Hypertension Brother     Blindness Neg Hx     Diabetes Neg Hx     Glaucoma Neg Hx     Macular degeneration Neg Hx     Retinal detachment Neg Hx     Strabismus Neg Hx     Stroke Neg Hx     Thyroid disease Neg Hx        Social History     Socioeconomic History    Marital status:       Spouse name: Not on file    Number of children: 3    Years of education: Not on file    Highest education level: Not on file   Occupational History    Occupation: retired   Social Needs    Financial resource strain: Not on file    Food insecurity:     Worry: Not on file     Inability: Not on file    Transportation needs:     Medical: Not on file     Non-medical: Not on file   Tobacco Use    Smoking status: Former Smoker     Packs/day: 1.00     Years: 50.00     Pack years: 50.00     Last attempt to quit: 2005     Years since quittin.8    Smokeless tobacco: Never Used   Substance and Sexual Activity    Alcohol use: No    Drug use: No    Sexual activity: Not Currently   Lifestyle    Physical activity:     Days per week: Not on file     Minutes per session: Not on file    Stress: Not on file   Relationships    Social connections:     Talks on phone: Not on file     Gets together: Not on file     Attends [employer] service: Not on file     Active member of club or organization: Not on file     Attends meetings of clubs or organizations: Not on file     Relationship status: Not on file   Other Topics Concern    Not on file   Social History Narrative    Not on file       Past Surgical History:   Procedure Laterality Date    APPENDECTOMY      BRONCHOSCOPY- insert lighted tube into airway to obtain biopsy of lung Bilateral 3/4/2016    Performed by [facility]el Ernandez MD at [facility] ENDO    CARDIAC CATHETERIZATION      cardiac stents      CATARACT EXTRACTION W/  INTRAOCULAR LENS IMPLANT Right 9-3-14    CHOLECYSTECTOMY      COLONOSCOPY N/A 10/25/2018    Performed by Michael Hameed MD at [facility] ENDO    COLONOSCOPY N/A 2018    Performed by Dionne Doan MD at [facility] ENDO    Colonoscopy N/A 2014    Performed by Eriberto Simpson MD at [facility] ENDO    infected stomach gland excision      INSERTION-PORT Left 2016    Performed by Nemesio Wall MD at [facility] OR    LOBECTOMY-LUNG  "Left 4/12/2016    [facility]    LUNG REMOVAL, TOTAL Left 04/2016    lung cancer left upper lobe    PNEUMONECTOMY Left 4/12/2016    [facility]    SKIN BIOPSY Left     arm    THORACOTOMY Left 4/12/2016    Performed by [facility]       Review of Systems   Constitutional: Negative for chills, fatigue and fever.   HENT: Negative for hearing loss.    Eyes: Negative for photophobia and visual disturbance.   Respiratory: Negative for cough, chest tightness, shortness of breath and wheezing.    Cardiovascular: Negative for chest pain and palpitations.   Gastrointestinal: Negative for constipation, diarrhea, nausea and vomiting.   Endocrine: Negative for cold intolerance and heat intolerance.   Genitourinary: Negative for flank pain.   Musculoskeletal: Positive for gait problem. Negative for neck pain and neck stiffness.   Skin: Negative for wound.   Neurological: Negative for light-headedness and headaches.   Psychiatric/Behavioral: Negative for sleep disturbance.         Objective:   /66 (BP Location: Right arm, Patient Position: Sitting)   Pulse 69   Ht 5' 10" (1.778 m)   Wt 89.3 kg (196 lb 13.9 oz)   BMI 28.25 kg/m²     LOWER EXTREMITY PHYSICAL EXAMINATION    NEUROLOGY: Negative Tinel's Sign and negative Valleix Sign. No neurological sensations with compression of the area of Zapata's Nerve in the area of the Abductor Hallucis muscle belly.    ORTHOPEDIC:  There is severe tenderness to palpation of the area around the plantar medial calcaneal tubercle on the left foot. Pains are characterized as sharp and stabbing-like with direct palpation of the area. There is also moderate pain to palpation of the immediate plantar aspect of the heel, and moderate pains to the lateral band of the fascia. No edema is noted. No fullness is noted. No pains or defects are noted within the plantar fascia at the arch. No plantar fibromas " are noted. No defects are noted within the ligament. No pains with medial to lateral compression of the heel. Equinus contracture is noted. Antalgic gait pattern is noted.     DERMATOLOGY: No ecchymosis is noted.  Skin is supple, dry and intact. Skin is supple.  No hyperkeratosis noted. No calluses.  No open wounds or ulcerations are noted.  No palpable plantar fibromas noted.    VASCULAR: On the left foot, the dorsalis pedis pulse is 1/4 and the posterior tibial pulse is 1/4. Capillary refill time is less than 3 seconds. Hair growth is present on the dorsum of the foot and at the digits. No rubor is present. Proximal to distal temperature is warm to warm.  Varicosities and telangiectasias are noted to the lower extremity.    Assessment:     1. Plantar fasciitis, left    2. Pain in left foot    3. Contracture, left ankle          Plan:     Plantar fasciitis, left  -     dexamethasone injection 4 mg  -     triamcinolone acetonide injection 40 mg    Pain in left foot    Contracture, left ankle        Thorough discussion is had with the patient today, concerning the diagnosis, its etiology, and the treatment algorithm at present.    Following sterile preparation with alcohol swab and/or betadine paint, injection was given into and around the area of the left plantar medial calcaneal tubercle, using 25-gauge needle. Injection consisted of approximately 0.5cc of Dexamethasone Sodium Phosphate, 0.5cc of Kenalog-40 and 0.5cc of 1% Lidocaine w/ or w/o Epinephrine or 0.25% or 0.50% Marcaine plain. Bandage application thereafter. Patient tolerated procedure well, and without complications or complaints. Patient educated that the area of pathology, might actually be slightly more painful, due to the injection, over the course of the next one to two days.    Patient does have recurrent symptoms despite multiple cortisone injections.  At this time, I did advise possible initiation of outpatient physical therapy.               Future Appointments   Date Time Provider Department Center   6/19/2019  1:00 PM Jorge L Patel MD [facility] HEM ONC [facility]   6/21/2019 10:20 AM Andrea Monroe DPM [facility] POD [place]   7/8/2019 11:40 AM Peter Teixeira MD [facility] IM [place]   8/21/2019  1:00 PM Roel Ernandez MD [facility] PULMSVC [facility]   8/29/2019  1:40 PM Romain Palencia MD [facility] CARDIO [place]   12/2/2019  2:45 PM [facility] XR2 [facility] XRAY [facility]   12/2/2019  3:00 PM PULMONARY LAB 2, [facility] PULMFUN [facility]   12/2/2019  3:20 PM Roel Ernandez MD [facility] [place]

## 2019-06-05 RX ORDER — LISINOPRIL 40 MG/1
TABLET ORAL
Qty: 30 TABLET | Refills: 11 | Status: SHIPPED | OUTPATIENT
Start: 2019-06-05 | End: 2020-04-02 | Stop reason: SDUPTHER

## 2019-06-05 NOTE — TELEPHONE ENCOUNTER
----- Message from Nubia Holloway sent at 6/5/2019  3:21 PM CDT -----  [facility] in [address]needs his prescription (lisynopril).  Cell: [phone]

## 2019-06-09 ENCOUNTER — OFFICE VISIT (OUTPATIENT)
Dept: URGENT CARE | Facility: CLINIC | Age: 79
End: 2019-06-09
Payer: MEDICARE

## 2019-06-09 ENCOUNTER — HOSPITAL ENCOUNTER (OUTPATIENT)
Dept: RADIOLOGY | Facility: HOSPITAL | Age: 79
Discharge: HOME | End: 2019-06-09
Attending: NURSE PRACTITIONER
Payer: MEDICARE

## 2019-06-09 VITALS
SYSTOLIC BLOOD PRESSURE: 120 MMHG | DIASTOLIC BLOOD PRESSURE: 80 MMHG | TEMPERATURE: 96 F | BODY MASS INDEX: 27.96 KG/M2 | WEIGHT: 194.88 LBS

## 2019-06-09 DIAGNOSIS — L03.115 CELLULITIS OF RIGHT KNEE: ICD-10-CM

## 2019-06-09 DIAGNOSIS — M25.561 ACUTE PAIN OF RIGHT KNEE: ICD-10-CM

## 2019-06-09 DIAGNOSIS — M25.561 ACUTE PAIN OF RIGHT KNEE: Primary | ICD-10-CM

## 2019-06-09 PROCEDURE — 73562 X-RAY EXAM OF KNEE 3: CPT | Mod: 26,RT,, | Performed by: RADIOLOGY

## 2019-06-09 PROCEDURE — 73562 X-RAY EXAM OF KNEE 3: CPT | Mod: TC,FY,PO,RT

## 2019-06-09 PROCEDURE — 3079F DIAST BP 80-89 MM HG: CPT | Mod: CPTII,S$GLB,, | Performed by: NURSE PRACTITIONER

## 2019-06-09 PROCEDURE — 96372 THER/PROPH/DIAG INJ SC/IM: CPT | Mod: S$GLB,,, | Performed by: NURSE PRACTITIONER

## 2019-06-09 PROCEDURE — 99999 PR PBB SHADOW E&M-EST. PATIENT-LVL IV: ICD-10-PCS | Mod: PBBFAC,,, | Performed by: NURSE PRACTITIONER

## 2019-06-09 PROCEDURE — 96372 PR INJECTION,THERAP/PROPH/DIAG2ST, IM OR SUBCUT: ICD-10-PCS | Mod: S$GLB,,, | Performed by: NURSE PRACTITIONER

## 2019-06-09 PROCEDURE — 3079F PR MOST RECENT DIASTOLIC BLOOD PRESSURE 80-89 MM HG: ICD-10-PCS | Mod: CPTII,S$GLB,, | Performed by: NURSE PRACTITIONER

## 2019-06-09 PROCEDURE — 1101F PR PT FALLS ASSESS DOC 0-1 FALLS W/OUT INJ PAST YR: ICD-10-PCS | Mod: CPTII,S$GLB,, | Performed by: NURSE PRACTITIONER

## 2019-06-09 PROCEDURE — 99214 OFFICE O/P EST MOD 30 MIN: CPT | Mod: 25,S$GLB,, | Performed by: NURSE PRACTITIONER

## 2019-06-09 PROCEDURE — 99999 PR PBB SHADOW E&M-EST. PATIENT-LVL IV: CPT | Mod: PBBFAC,,, | Performed by: NURSE PRACTITIONER

## 2019-06-09 PROCEDURE — 1101F PT FALLS ASSESS-DOCD LE1/YR: CPT | Mod: CPTII,S$GLB,, | Performed by: NURSE PRACTITIONER

## 2019-06-09 PROCEDURE — 99214 PR OFFICE/OUTPT VISIT, EST, LEVL IV, 30-39 MIN: ICD-10-PCS | Mod: 25,S$GLB,, | Performed by: NURSE PRACTITIONER

## 2019-06-09 PROCEDURE — 73560 XR KNEE ORTHO RIGHT: ICD-10-PCS | Mod: 26,59,RT, | Performed by: RADIOLOGY

## 2019-06-09 PROCEDURE — 73562 XR KNEE ORTHO RIGHT: ICD-10-PCS | Mod: 26,RT,, | Performed by: RADIOLOGY

## 2019-06-09 PROCEDURE — 73560 X-RAY EXAM OF KNEE 1 OR 2: CPT | Mod: 59,TC,FY,PO,LT

## 2019-06-09 PROCEDURE — 3074F PR MOST RECENT SYSTOLIC BLOOD PRESSURE < 130 MM HG: ICD-10-PCS | Mod: CPTII,S$GLB,, | Performed by: NURSE PRACTITIONER

## 2019-06-09 PROCEDURE — 3074F SYST BP LT 130 MM HG: CPT | Mod: CPTII,S$GLB,, | Performed by: NURSE PRACTITIONER

## 2019-06-09 PROCEDURE — 73560 X-RAY EXAM OF KNEE 1 OR 2: CPT | Mod: 26,59,RT, | Performed by: RADIOLOGY

## 2019-06-09 RX ORDER — CLINDAMYCIN HYDROCHLORIDE 300 MG/1
300 CAPSULE ORAL 3 TIMES DAILY
Qty: 24 CAPSULE | Refills: 0 | Status: SHIPPED | OUTPATIENT
Start: 2019-06-09 | End: 2019-06-12 | Stop reason: ALTCHOICE

## 2019-06-09 RX ORDER — MUPIROCIN 20 MG/G
OINTMENT TOPICAL 3 TIMES DAILY
Qty: 22 G | Refills: 0 | Status: SHIPPED | OUTPATIENT
Start: 2019-06-09 | End: 2019-06-19

## 2019-06-09 RX ORDER — CEFTRIAXONE 1 G/1
1 INJECTION, POWDER, FOR SOLUTION INTRAMUSCULAR; INTRAVENOUS ONCE
Qty: 1 G | Refills: 0 | Status: SHIPPED | OUTPATIENT
Start: 2019-06-09 | End: 2019-06-09

## 2019-06-09 RX ORDER — CEFTRIAXONE 1 G/1
1 INJECTION, POWDER, FOR SOLUTION INTRAMUSCULAR; INTRAVENOUS ONCE
Status: COMPLETED | OUTPATIENT
Start: 2019-06-09 | End: 2019-06-09

## 2019-06-09 RX ADMIN — CEFTRIAXONE 1 G: 1 INJECTION, POWDER, FOR SOLUTION INTRAMUSCULAR; INTRAVENOUS at 10:06

## 2019-06-09 NOTE — PROGRESS NOTES
"Subjective:       Patient ID: Chai Murry is a 79 y.o. male.    Chief Complaint: Knee Pain    79 year old male presents to clinic with reports of right knee pain resulting from him "sterilizing a needle to bust an ant bite about 2 days ago and now its infected". Denies any other problems or concerns at this time.     Abscess   Chronicity:  NewProgression Since Onset: rapidly worsening  Abscess location: right knee.  Associated Symptoms: no fever, no chills  Characteristics: draining, painful, redness and swelling    Pain Scale:  5/10  Treatments Tried:  Draining/squeezing and topical antibiotics  Relieved by:  Nothing  Worsened by:  Draining/squeezing    Review of Systems   Constitutional: Negative for appetite change, chills and fever.   HENT: Negative for ear pain, sinus pressure, sore throat and trouble swallowing.    Eyes: Negative for visual disturbance.   Respiratory: Negative for shortness of breath and wheezing.    Cardiovascular: Negative for chest pain.   Gastrointestinal: Negative for abdominal pain, diarrhea, nausea and vomiting.   Endocrine: Negative for heat intolerance, polydipsia, polyphagia and polyuria.   Genitourinary: Negative for decreased urine volume and dysuria.   Musculoskeletal: Negative for back pain.   Skin: Positive for wound (to right knee). Negative for rash.   Neurological: Negative for dizziness, weakness and numbness.   Hematological: Does not bruise/bleed easily.   Psychiatric/Behavioral: Negative for agitation, behavioral problems, confusion and dysphoric mood.   All other systems reviewed and are negative.      Objective:     Physical Exam   Constitutional: He is oriented to person, place, and time. He appears well-developed and well-nourished.   HENT:   Head: Normocephalic.   Right Ear: External ear normal.   Left Ear: External ear normal.   Nose: Nose normal.   Mouth/Throat: Oropharynx is clear and moist.   Eyes: Pupils are equal, round, and reactive to light. Conjunctivae and " EOM are normal.   Neck: Normal range of motion. Neck supple. No JVD present.   Cardiovascular: Normal rate, regular rhythm, normal heart sounds and intact distal pulses.   Pulmonary/Chest: Effort normal and breath sounds normal. He has no wheezes.   Abdominal: Soft. Bowel sounds are normal. There is no tenderness.   Lymphadenopathy:     He has no cervical adenopathy.   Neurological: He is alert and oriented to person, place, and time.   Skin: Skin is warm and dry.        Psychiatric: He has a normal mood and affect. His behavior is normal. Judgment and thought content normal.   Nursing note and vitals reviewed.    Assessment:     No diagnosis found.  Plan:   There are no diagnoses linked to this encounter.

## 2019-06-09 NOTE — PATIENT INSTRUCTIONS
Discharge Instructions for Cellulitis  You have been diagnosed with cellulitis. This is an infection in the deepest layer of the skin. In some cases, the infection also affects the muscle. Cellulitis is caused by bacteria. The bacteria can enter the body through broken skin. This can happen with a cut, scratch, animal bite, or an insect bite that has been scratched. You may have been treated in the hospital with antibiotics and fluids. You will likely be given a prescription for antibiotics to take at home. This sheet will help you take care of yourself at home.  Home care  When you are home:  · Take the prescribed antibiotic medicine you are given as directed until it is gone. Take it even if you feel better. It treats the infection and stops it from returning. Not taking all the medicine can make future infections hard to treat.  · Keep the infected area clean.  · When possible, raise the infected area above the level of your heart. This helps keep swelling down.  · Talk with your healthcare provider if you are in pain. Ask what kind of over-the-counter medicine you can take for pain.  · Apply clean bandages as advised.  · Take your temperature once a day for a week.  · Wash your hands often to prevent spreading the infection.  In the future, wash your hands before and after you touch cuts, scratches, or bandages. This will help prevent infection.   When to call your healthcare provider  Call your healthcare provider immediately if you have any of the following:  · Difficulty or pain when moving the joints above or below the infected area  · Discharge or pus draining from the area  · Fever of 100.4°F (38°C) or higher, or as directed by your healthcare provider  · Pain that gets worse in or around the infected   · Redness that gets worse in or around the infected area, particularly if the area of redness expands to a wider area  · Shaking chills  · Swelling of the infected area  · Vomiting   Date Last Reviewed:  8/1/2016  © 1760-6190 [employer], [employer]. [address] [place] [address], [place] [address]. All rights reserved. This information is not intended as a substitute for professional medical care. Always follow your healthcare professional's instructions.

## 2019-06-09 NOTE — PROGRESS NOTES
Pt tolerated inj. Well. Ceftriaxone 1 gm mixed with lidocaine 1%- Ndc 0715-4221-05 Lot -DK exp 1 Sept2020 pT Agreed to wait 15 mins in the lobby after inj.

## 2019-06-10 RX ORDER — SODIUM CHLORIDE 9 MG/ML
INJECTION, SOLUTION INTRAVENOUS CONTINUOUS
Status: CANCELLED | OUTPATIENT
Start: 2019-06-10

## 2019-06-11 ENCOUNTER — HOSPITAL ENCOUNTER (OUTPATIENT)
Facility: HOSPITAL | Age: 79
Discharge: HOME | End: 2019-06-11
Attending: INTERNAL MEDICINE | Admitting: INTERNAL MEDICINE
Payer: MEDICARE

## 2019-06-11 ENCOUNTER — ANESTHESIA (OUTPATIENT)
Dept: ENDOSCOPY | Facility: HOSPITAL | Age: 79
End: 2019-06-11
Payer: MEDICARE

## 2019-06-11 ENCOUNTER — ANESTHESIA EVENT (OUTPATIENT)
Dept: ENDOSCOPY | Facility: HOSPITAL | Age: 79
End: 2019-06-11
Payer: MEDICARE

## 2019-06-11 VITALS
DIASTOLIC BLOOD PRESSURE: 80 MMHG | RESPIRATION RATE: 18 BRPM | SYSTOLIC BLOOD PRESSURE: 124 MMHG | TEMPERATURE: 98 F | WEIGHT: 190.69 LBS | BODY MASS INDEX: 27.3 KG/M2 | OXYGEN SATURATION: 98 % | HEIGHT: 70 IN | HEART RATE: 66 BPM

## 2019-06-11 DIAGNOSIS — C34.90 LUNG CANCER: ICD-10-CM

## 2019-06-11 PROCEDURE — 88173 CYTOLOGY SPECIMEN- FNA RADIOLOGY GUIDED, BRONCH/EBUS, EUS/GI: ICD-10-PCS | Mod: 26,,, | Performed by: PATHOLOGY

## 2019-06-11 PROCEDURE — 88172 CYTP DX EVAL FNA 1ST EA SITE: CPT | Performed by: PATHOLOGY

## 2019-06-11 PROCEDURE — 88172 CYTOLOGY SPECIMEN- FNA RADIOLOGY GUIDED, BRONCH/EBUS, EUS/GI: ICD-10-PCS | Mod: 26,,, | Performed by: PATHOLOGY

## 2019-06-11 PROCEDURE — 63600175 PHARM REV CODE 636 W HCPCS: Performed by: NURSE ANESTHETIST, CERTIFIED REGISTERED

## 2019-06-11 PROCEDURE — 25000003 PHARM REV CODE 250: Performed by: NURSE ANESTHETIST, CERTIFIED REGISTERED

## 2019-06-11 PROCEDURE — 43238 EGD US FINE NEEDLE BX/ASPIR: CPT | Mod: 59 | Performed by: INTERNAL MEDICINE

## 2019-06-11 PROCEDURE — 37000008 HC ANESTHESIA 1ST 15 MINUTES: Performed by: INTERNAL MEDICINE

## 2019-06-11 PROCEDURE — 88172 CYTP DX EVAL FNA 1ST EA SITE: CPT | Mod: 26,,, | Performed by: PATHOLOGY

## 2019-06-11 PROCEDURE — 25000003 PHARM REV CODE 250: Performed by: INTERNAL MEDICINE

## 2019-06-11 PROCEDURE — 37000009 HC ANESTHESIA EA ADD 15 MINS: Performed by: INTERNAL MEDICINE

## 2019-06-11 PROCEDURE — 27202059 HC NEEDLE, FNA (ANY): Performed by: INTERNAL MEDICINE

## 2019-06-11 PROCEDURE — 88173 CYTOPATH EVAL FNA REPORT: CPT | Mod: 26,,, | Performed by: PATHOLOGY

## 2019-06-11 PROCEDURE — 43238 PR UPGI ENDOSCOPY W/US FN BX: ICD-10-PCS | Mod: ,,, | Performed by: INTERNAL MEDICINE

## 2019-06-11 PROCEDURE — 25000003 PHARM REV CODE 250: Performed by: COLON & RECTAL SURGERY

## 2019-06-11 PROCEDURE — 43238 EGD US FINE NEEDLE BX/ASPIR: CPT | Mod: ,,, | Performed by: INTERNAL MEDICINE

## 2019-06-11 RX ORDER — LIDOCAINE HYDROCHLORIDE 10 MG/ML
INJECTION, SOLUTION EPIDURAL; INFILTRATION; INTRACAUDAL; PERINEURAL
Status: DISCONTINUED | OUTPATIENT
Start: 2019-06-11 | End: 2019-06-11

## 2019-06-11 RX ORDER — PROPOFOL 10 MG/ML
VIAL (ML) INTRAVENOUS
Status: DISCONTINUED | OUTPATIENT
Start: 2019-06-11 | End: 2019-06-11

## 2019-06-11 RX ORDER — SODIUM CHLORIDE 0.9 % (FLUSH) 0.9 %
10 SYRINGE (ML) INJECTION
Status: DISCONTINUED | OUTPATIENT
Start: 2019-06-11 | End: 2019-06-11 | Stop reason: HOSPADM

## 2019-06-11 RX ORDER — SODIUM CHLORIDE, SODIUM LACTATE, POTASSIUM CHLORIDE, CALCIUM CHLORIDE 600; 310; 30; 20 MG/100ML; MG/100ML; MG/100ML; MG/100ML
INJECTION, SOLUTION INTRAVENOUS ONCE
Status: COMPLETED | OUTPATIENT
Start: 2019-06-11 | End: 2019-06-11

## 2019-06-11 RX ADMIN — PROPOFOL 30 MG: 10 INJECTION, EMULSION INTRAVENOUS at 12:06

## 2019-06-11 RX ADMIN — LIDOCAINE HYDROCHLORIDE 50 MG: 10 INJECTION, SOLUTION EPIDURAL; INFILTRATION; INTRACAUDAL; PERINEURAL at 11:06

## 2019-06-11 RX ADMIN — SODIUM CHLORIDE, SODIUM LACTATE, POTASSIUM CHLORIDE, AND CALCIUM CHLORIDE: .6; .31; .03; .02 INJECTION, SOLUTION INTRAVENOUS at 11:06

## 2019-06-11 RX ADMIN — PROPOFOL 70 MG: 10 INJECTION, EMULSION INTRAVENOUS at 11:06

## 2019-06-11 RX ADMIN — SODIUM CHLORIDE, SODIUM LACTATE, POTASSIUM CHLORIDE, AND CALCIUM CHLORIDE: 600; 310; 30; 20 INJECTION, SOLUTION INTRAVENOUS at 11:06

## 2019-06-11 RX ADMIN — PROPOFOL 20 MG: 10 INJECTION, EMULSION INTRAVENOUS at 12:06

## 2019-06-11 NOTE — TRANSFER OF CARE
"Anesthesia Transfer of Care Note    Patient: Chai Murry    Procedure(s) Performed: Procedure(s) (LRB):  EGD (ESOPHAGOGASTRODUODENOSCOPY) (N/A)  ULTRASOUND, UPPER GI TRACT, ENDOSCOPIC (N/A)    Patient location: PACU    Anesthesia Type: MAC    Transport from OR: Transported from OR on room air with adequate spontaneous ventilation    Post pain: adequate analgesia    Post assessment: tolerated procedure well and no apparent anesthetic complications    Post vital signs: stable    Level of consciousness: awake, alert and oriented    Nausea/Vomiting: no nausea/vomiting    Complications: none    Transfer of care protocol was followed      Last vitals:   Visit Vitals  BP (!) 142/67 (BP Location: Left arm, Patient Position: Lying)   Pulse 62   Temp 37 °C (98.6 °F) (Skin)   Resp 20   Ht 5' 10" (1.778 m)   Wt 86.5 kg (190 lb 11.2 oz)   SpO2 98%   BMI 27.36 kg/m²     "

## 2019-06-11 NOTE — ANESTHESIA PREPROCEDURE EVALUATION
06/11/2019  Chai Murry is a 79 y.o., male.    Anesthesia Evaluation    I have reviewed the Patient Summary Reports.    I have reviewed the Nursing Notes.   I have reviewed the Medications.     Review of Systems  Anesthesia Hx:  No problems with previous Anesthesia    Social:  Former Smoker    Cardiovascular:   Hypertension, well controlled Past MI CAD      Pulmonary:   COPD, severe Shortness of breath    Psych:   Psychiatric History depression          Physical Exam  General:  Well nourished       Chest/Lungs:  Chest/Lungs Findings: Decreased Breathe Sounds Left              Anesthesia Plan  Type of Anesthesia, risks & benefits discussed:  Anesthesia Type:  MAC  Patient's Preference:   Intra-op Monitoring Plan:   Intra-op Monitoring Plan Comments:   Post Op Pain Control Plan:   Post Op Pain Control Plan Comments:   Induction:   IV  Beta Blocker:  Patient is not currently on a Beta-Blocker (No further documentation required).       Informed Consent: Patient understands risks and agrees with Anesthesia plan.  Questions answered. Anesthesia consent signed with patient.  ASA Score: 4     Day of Surgery Review of History & Physical: I have interviewed and examined the patient. I have reviewed the patient's H&P dated:  There are no significant changes.          Ready For Surgery From Anesthesia Perspective.

## 2019-06-11 NOTE — PLAN OF CARE
Dr Knox came to bedside and discussed findings. NO N/V,  no abdominal pain, no GI bleeding, and vitals stable.  Pt discharged from unit.

## 2019-06-11 NOTE — H&P
Short Stay Endoscopy History and Physical    PCP - Peter Teixeira MD  Referring Physician - Lc Paez MD  [address] [place] [address]    Procedure - eus  ASA - per anesthesia  Mallampati - per anesthesia  History of Anesthesia problems - no  Family history Anesthesia problems -  no   Plan of anesthesia - General    HPI:  This is a 79 y.o. 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    Surgical History:  has a past surgical history that includes Appendectomy; Cholecystectomy; cardiac stents; infected stomach gland excision; Cataract extraction w/  intraocular lens implant (Right, 9-3-14); Skin biopsy (Left); Lung removal, total (Left, 04/2016); Colonoscopy (N/A, 4/17/2018); Colonoscopy (N/A, 10/25/2018); and Cardiac catheterization.    Family History: family history includes Cancer in his brother, mother, and sister; Cataracts in his father and mother; Esophageal cancer in his sister; Hypertension in his brother, father, and mother; Lung cancer in his mother; Pneumonia in his father..    Social History:  reports that he quit smoking about 13 years ago. He has a 50.00 pack-year smoking history. He has never used smokeless tobacco. He reports that he does not drink alcohol or use drugs.    Review of patient's allergies indicates:   Allergen Reactions    Atorvastatin      Other reaction(s): Muscle  pain    Bactrim [sulfamethoxazole-trimethoprim]      Lip swelling       Medications:   Facility-Administered Medications Prior to Admission   Medication Dose Route Frequency Provider Last Rate Last Dose    testosterone cypionate injection 200 mg  200 mg Intramuscular Q21 Days Peter Teixeira MD   200 mg at 05/20/19 0824     Medications Prior to Admission   Medication Sig Dispense Refill Last Dose    albuterol (PROVENTIL) 2.5 mg /3 mL (0.083 %) nebulizer solution Take 3 mLs (2.5 mg total) by nebulization every 6 (six) hours while awake. 270 mL 11 6/10/2019 at Unknown time    amLODIPine (NORVASC) 5 MG tablet Take 2 tablets (10 mg total) by mouth every evening. (Patient taking differently: Take 5 mg by mouth once daily. ) 60 tablet 0 6/10/2019 at Unknown time    aspirin (ECOTRIN) 81 MG EC tablet Take 81 mg by mouth once daily.   6/10/2019 at Unknown time    BREO ELLIPTA 100-25 mcg/dose diskus inhaler INHALE 1 PUFF INTO THE LUNGS ONCE DAILY  5 Past Week at Unknown time    clindamycin (CLEOCIN) 300 MG capsule Take 1 capsule (300 mg total) by mouth 3 (three) times daily. for 7 days 24 capsule 0 6/11/2019 at Unknown time    doxepin (SINEQUAN) 10 MG capsule TAKE 1 CAPSULE (10 MG TOTAL) BY MOUTH EVERY EVENING. 30 capsule 10 6/10/2019 at Unknown time    fenofibric acid (FIBRICOR) 135 mg CpDR TAKE 1 CAPSULE BY MOUTH EVERY DAY 30 capsule 11 6/10/2019 at Unknown time    fluticasone (FLONASE) 50 mcg/actuation nasal spray 2 sprays (100 mcg total) by Each Nare route once daily. 3 Bottle 3 Past Week at Unknown time    HYDROcodone-acetaminophen (NORCO) 5-325 mg per tablet Take 1 tablet by mouth every 6 (six) hours as needed. 12 tablet 0 6/10/2019 at Unknown time    levocetirizine (XYZAL) 5 MG tablet Take 5 mg by mouth every evening.   Past Month at Unknown time    lisinopril (PRINIVIL,ZESTRIL) 40 MG tablet TAKE 1 TABLET BY MOUTH DAILY 30 tablet 11 6/11/2019 at Unknown time    mupirocin (BACTROBAN) 2 % ointment Apply  topically 3 (three) times daily. for 10 days 22 g 0 6/10/2019 at Unknown time    simvastatin (ZOCOR) 40 MG tablet Take 1 tablet (40 mg total) by mouth every evening. 30 tablet 11 6/10/2019 at Unknown time    SOTALOL AF 80 mg tablet TAKE 1 TABLET BY MOUTH TWICE A DAY 60 tablet 10 6/11/2019 at Unknown time    glycopyrrolate-formoterol (BEVESPI AEROSPHERE) 9-4.8 mcg HFAA Inhale 2 puffs into the lungs 2 (two) times daily. Controller 10.9 g 11 Unknown at Unknown time    ipratropium-albuterol (COMBIVENT RESPIMAT)  mcg/actuation inhaler Inhale 1 puff into the lungs every 6 (six) hours as needed for Shortness of Breath. 4 g 11 Unknown at Unknown time    naproxen sodium (ANAPROX) 550 MG tablet Take 1 tablet (550 mg total) by mouth 2 (two) times daily with meals. 20 tablet 0 More than a month at Unknown time       Physical Exam:    Vital Signs:   Vitals:    06/11/19 1112   BP: (!) 142/67   Pulse: 62   Resp: 20   Temp: 98.6 °F (37 °C)       General Appearance: Well appearing in no acute distress    Labs:  Lab Results   Component Value Date    WBC 7.93 05/08/2019    HGB 14.4 05/08/2019    HCT 46.5 05/08/2019     05/08/2019    CHOL 165 09/10/2018    TRIG 69 09/10/2018    HDL 48 09/10/2018    ALT 41 05/08/2019    AST 40 05/08/2019     05/08/2019    K 4.8 05/08/2019     05/08/2019    CREATININE 1.0 05/08/2019    BUN 14 05/08/2019    CO2 31 (H) 05/08/2019    TSH 3.422 02/20/2012    PSA 0.16 11/16/2018    INR 1.1 03/04/2016       I have explained the risks and benefits of this endoscopic procedure to the patient including but not limited to bleeding, inflammation, infection, perforation, and death.      Abhishek Knox MD

## 2019-06-11 NOTE — ANESTHESIA RELEASE NOTE
"Anesthesia Release from PACU Note    Patient: Chai Murry    Procedure(s) Performed: Procedure(s) (LRB):  EGD (ESOPHAGOGASTRODUODENOSCOPY) (N/A)  ULTRASOUND, UPPER GI TRACT, ENDOSCOPIC (N/A)    Anesthesia type: MAC    Post pain: Adequate analgesia    Post assessment: no apparent anesthetic complications, tolerated procedure well and no evidence of recall    Last Vitals:   Visit Vitals  BP (!) 142/67 (BP Location: Left arm, Patient Position: Lying)   Pulse 62   Temp 37 °C (98.6 °F) (Skin)   Resp 20   Ht 5' 10" (1.778 m)   Wt 86.5 kg (190 lb 11.2 oz)   SpO2 98%   BMI 27.36 kg/m²       Post vital signs: stable    Level of consciousness: awake, alert  and oriented    Nausea/Vomiting: no nausea/no vomiting    Complications: none    Airway Patency: patent    Respiratory: unassisted, spontaneous ventilation, room air    Cardiovascular: stable and blood pressure at baseline    Hydration: euvolemic  "

## 2019-06-11 NOTE — [employer] PATIENT INSTRUCTIONS
Discharge Summary/Instructions after an Endoscopic Procedure  Patient Name: Chai Murry  Patient MRN: [id number]  Patient YOB: 1940 Tuesday, June 11, 2019 Abhishek Knox MD  RESTRICTIONS:  During your procedure today, you received medications for sedation.  These   medications may affect your judgment, balance and coordination.  Therefore,   for 24 hours, you have the following restrictions:   - DO NOT drive a car, operate machinery, make legal/financial decisions,   sign important papers or drink alcohol.    ACTIVITY:  Today: no heavy lifting, straining or running due to procedural   sedation/anesthesia.  The following day: return to full activity including work.  DIET:  Eat and drink normally unless instructed otherwise.     TREATMENT FOR COMMON SIDE EFFECTS:  - Mild abdominal pain, nausea, belching, bloating or excessive gas:  rest,   eat lightly and use a heating pad.  - Sore Throat: treat with throat lozenges and/or gargle with warm salt   water.  - Because air was used during the procedure, expelling large amounts of air   from your rectum or belching is normal.  - If a bowel prep was taken, you may not have a bowel movement for 1-3 days.    This is normal.  SYMPTOMS TO WATCH FOR AND REPORT TO YOUR PHYSICIAN:  1. Abdominal pain or bloating, other than gas cramps.  2. Chest pain.  3. Back pain.  4. Signs of infection such as: chills or fever occurring within 24 hours   after the procedure.  5. Rectal bleeding, which would show as bright red, maroon, or black stools.   (A tablespoon of blood from the rectum is not serious, especially if   hemorrhoids are present.)  6. Vomiting.  7. Weakness or dizziness.  GO DIRECTLY TO THE NEAREST EMERGENCY ROOM IF YOU HAVE ANY OF THE FOLLOWING:      Difficulty breathing              Chills and/or fever over 101 F   Persistent vomiting and/or vomiting blood   Severe abdominal pain   Severe chest pain   Black, tarry stools   Bleeding- more than one tablespoon   Any  other symptom or condition that you feel may need urgent attention  Your doctor recommends these additional instructions:  If any biopsies were taken, your doctors clinic will contact you in 1 to 2   weeks with any results.  - Discharge patient to home.   - Resume previous diet.   - Continue present medications.   - Await cytology results.  For questions, problems or results please call your physician Abhishek Knox MD at Work:  [phone]  If you have any questions about the above instructions, call the GI   department at [phone] or call the endoscopy unit at [phone]   from 7am until 3 pm.  [facility] [place], EMERGENCY ROOM PHONE NUMBER:   [phone]  IF A COMPLICATION OR EMERGENCY SITUATION ARISES AND YOU ARE UNABLE TO REACH   YOUR PHYSICIAN - GO DIRECTLY TO THE EMERGENCY ROOM.  I have read or have had read to me these discharge instructions for my   procedure and have received a written copy.  I understand these   instructions and will follow-up with my physician if I have any questions.     __________________________________       _____________________________________  Nurse Signature                                          Patient/Designated   Responsible Party Signature  Abhishek Knox MD  6/11/2019 12:30:36 PM  This report has been verified and signed electronically.  [employer]

## 2019-06-11 NOTE — ANESTHESIA POSTPROCEDURE EVALUATION
Anesthesia Post Evaluation    Patient: Chia Murry    Procedure(s) Performed: Procedure(s) (LRB):  EGD (ESOPHAGOGASTRODUODENOSCOPY) (N/A)  ULTRASOUND, UPPER GI TRACT, ENDOSCOPIC (N/A)    Final Anesthesia Type: MAC  Patient location during evaluation: PACU  Patient participation: Yes- Able to Participate  Level of consciousness: awake and alert and oriented  Post-procedure vital signs: reviewed and stable  Airway patency: patent  PONV status at discharge: No PONV  Anesthetic complications: no      Cardiovascular status: hemodynamically stable and blood pressure returned to baseline  Respiratory status: unassisted, spontaneous ventilation and room air  Hydration status: euvolemic  Follow-up not needed.          Vitals Value Taken Time   /67 6/11/2019 11:12 AM   Temp 37 °C (98.6 °F) 6/11/2019 11:12 AM   Pulse 62 6/11/2019 11:12 AM   Resp 20 6/11/2019 11:12 AM   SpO2 98 % 6/11/2019 11:12 AM         No case tracking events are documented in the log.      Pain/Brendan Score: Brendan Score: 9 (6/11/2019 12:30 PM)

## 2019-06-11 NOTE — DISCHARGE INSTRUCTIONS
Esophagitis     With esophagitis, the lining of the esophagus is inflamed.   Do you often have burning pain in your chest? You may have esophagitis. This is when the lining of the esophagus becomes red and swollen (inflamed). The esophagus is the tube that connects your throat to your stomach. This sheet tells you more about esophagitis. It also explains your treatment options.  Main types of esophagitis  Reflux esophagitis. This is the more common type. It is caused by GERD (gastroesophageal reflux disease). Stomach contents with stomach acid flow back up into the esophagus. This happens over and over. It leads to inflammation. Risk factors can include:  · Being overweight  · Asthma  · Smoking  · Pregnancy  · Frequent vomiting  · Certain medicines (such as aspirin and other anti-inflammatories)  · Hiatal hernia  Infectious esophagitis. This is caused by an infection. You are more at risk for this if you have a weakened immune system and poor nutrition. Antibiotic use can also be a factor. The infection is often due to the following:  · A type of fungus (typically candida)  · A virus, such as herpes simplex virus 1 (HSV-1) or cytomegalovirus (CMV)  Eosinophilic esophagitis. Foods or other things around you can give you an allergic reaction. This triggers an immune response and leads to esophagitis.  Pill-induced esophagitis. Certain types of medicines can cause inflammation and ulcers in the esophagus. These include doxycycline, aspirin, NSAIDs, alendronate, potassium, quinidine, iron.  Symptoms of esophagitis  The following symptoms can occur with esophagitis:  · Pain when swallowing, or trouble swallowing  · Pain behind your breastbone (heartburn)  · Acid regurgitation  · Chronic sore throat  · Gum Inflammation  · Cavities  · Bad breath  · Nausea  · Pain in your upper belly (abdomen)  · Bleeding (indicated by bright red vomit or black, tarry stool)  These symptoms occur more often with reflux  esophagitis:  · Coughing, wheezing, or asthma  · Hoarseness  Diagnosis of esophagitis  Your healthcare provider will ask about your health history and symptoms. Youll also be examined. Sometimes certain tests are needed. These may include:  · Upper endoscopy. A thin, flexible tube with a tiny light and camera is used. It is inserted through the mouth down into the esophagus. This lets the provider look for damage. A small sample of tissue (biopsy) may also be removed. The sample is sent to a lab for testing.  · Upper GI X-ray with barium. An X-ray is done after you drink a substance called barium. Barium may make problems in the esophagus easier to see on an x-ray.  · Esophageal pH. A soft, thin tube is passed into the esophagus through the nose or mouth for 24 hours. It measures the acid level in the esophagus.  · Esophageal manometry. A soft, thin tube is passed into the esophagus through the nose or mouth. It measures muscle contractions in the esophagus.  Treatment of esophagitis  Medicines. Different medicines can help treat esophagitis. The medicine used will depend on the type of esophagitis you have. Talk with your healthcare provider.  Lifestyle changes. Making the following changes can help reduce irritation and ease your symptoms:  · Avoid spicy foods (pepper, chili powder, alcantara). Also avoid hard foods (nuts, crackers, raw vegetables) and acidic foods and drinks (tomatoes, citrus fruits and juices). Other problem foods include chocolate, peppermint, nutmeg, and foods high in fat.  · Until you can swallow without pain, follow a combined liquid and soft diet. Try foods such as cooked cereals, mashed potatoes, and soups.  · Take small bites and chew your food thoroughly.  · Avoid large meals and heavy evening meals. Don't lie down within 2 to 3 hours of eating.  · Get to or stay at a healthy weight.  · Avoid alcohol, caffeine, and smoking or tobacco products.  · Brush and floss your teeth  · Raise your  upper body by 4 to 6 inches when lying in bed. This can be done using a foam wedge. Or put blocks under the legs at the head of your bed.  Surgery. This may be needed for severe reflux esophagitis. Other noninvasive procedures to treat GERD and esophagitis are being studied. Your provider can tell you more.  Why treatment Is important  Without treatment, esophagitis can get worse. This is especially true with severe reflux esophagitis. For instance, continued symptoms can cause scarring of the esophagus. Over time, this can cause a narrowing the esophagus (stricture). This can make it hard to pass food down to the stomach. As symptoms go on they can also cause changes in the lining of the esophagus. These changes can put you at a slightly higher risk of cancer of the esophagus.   Date Last Reviewed: 7/1/2016  © 2505-3569 [employer]. [address] [place] [address], [place] [address]. All rights reserved. This information is not intended as a substitute for professional medical care. Always follow your healthcare professional's instructions.

## 2019-06-12 ENCOUNTER — OFFICE VISIT (OUTPATIENT)
Dept: URGENT CARE | Facility: CLINIC | Age: 79
End: 2019-06-12
Payer: MEDICARE

## 2019-06-12 VITALS
DIASTOLIC BLOOD PRESSURE: 67 MMHG | TEMPERATURE: 99 F | HEART RATE: 85 BPM | BODY MASS INDEX: 27.58 KG/M2 | WEIGHT: 192.25 LBS | RESPIRATION RATE: 18 BRPM | OXYGEN SATURATION: 96 % | SYSTOLIC BLOOD PRESSURE: 140 MMHG

## 2019-06-12 DIAGNOSIS — L02.91 ABSCESS: Primary | ICD-10-CM

## 2019-06-12 PROCEDURE — 3078F DIAST BP <80 MM HG: CPT | Mod: CPTII,S$GLB,, | Performed by: NURSE PRACTITIONER

## 2019-06-12 PROCEDURE — 99999 PR PBB SHADOW E&M-EST. PATIENT-LVL IV: ICD-10-PCS | Mod: PBBFAC,,, | Performed by: NURSE PRACTITIONER

## 2019-06-12 PROCEDURE — 96372 PR INJECTION,THERAP/PROPH/DIAG2ST, IM OR SUBCUT: ICD-10-PCS | Mod: S$GLB,,, | Performed by: NURSE PRACTITIONER

## 2019-06-12 PROCEDURE — 3077F PR MOST RECENT SYSTOLIC BLOOD PRESSURE >= 140 MM HG: ICD-10-PCS | Mod: CPTII,S$GLB,, | Performed by: NURSE PRACTITIONER

## 2019-06-12 PROCEDURE — 99214 OFFICE O/P EST MOD 30 MIN: CPT | Mod: 25,S$GLB,, | Performed by: NURSE PRACTITIONER

## 2019-06-12 PROCEDURE — 1101F PT FALLS ASSESS-DOCD LE1/YR: CPT | Mod: CPTII,S$GLB,, | Performed by: NURSE PRACTITIONER

## 2019-06-12 PROCEDURE — 3078F PR MOST RECENT DIASTOLIC BLOOD PRESSURE < 80 MM HG: ICD-10-PCS | Mod: CPTII,S$GLB,, | Performed by: NURSE PRACTITIONER

## 2019-06-12 PROCEDURE — 99999 PR PBB SHADOW E&M-EST. PATIENT-LVL IV: CPT | Mod: PBBFAC,,, | Performed by: NURSE PRACTITIONER

## 2019-06-12 PROCEDURE — 3077F SYST BP >= 140 MM HG: CPT | Mod: CPTII,S$GLB,, | Performed by: NURSE PRACTITIONER

## 2019-06-12 PROCEDURE — 1101F PR PT FALLS ASSESS DOC 0-1 FALLS W/OUT INJ PAST YR: ICD-10-PCS | Mod: CPTII,S$GLB,, | Performed by: NURSE PRACTITIONER

## 2019-06-12 PROCEDURE — 96372 THER/PROPH/DIAG INJ SC/IM: CPT | Mod: S$GLB,,, | Performed by: NURSE PRACTITIONER

## 2019-06-12 PROCEDURE — 99214 PR OFFICE/OUTPT VISIT, EST, LEVL IV, 30-39 MIN: ICD-10-PCS | Mod: 25,S$GLB,, | Performed by: NURSE PRACTITIONER

## 2019-06-12 RX ORDER — DOXYCYCLINE 100 MG/1
100 CAPSULE ORAL EVERY 12 HOURS
Qty: 20 CAPSULE | Refills: 0 | Status: SHIPPED | OUTPATIENT
Start: 2019-06-12 | End: 2019-06-22

## 2019-06-12 RX ORDER — CEFTRIAXONE 1 G/1
1 INJECTION, POWDER, FOR SOLUTION INTRAMUSCULAR; INTRAVENOUS
Status: COMPLETED | OUTPATIENT
Start: 2019-06-12 | End: 2019-06-12

## 2019-06-12 RX ADMIN — CEFTRIAXONE 1 G: 1 INJECTION, POWDER, FOR SOLUTION INTRAMUSCULAR; INTRAVENOUS at 04:06

## 2019-06-12 NOTE — PATIENT INSTRUCTIONS
Abscess (Antibiotic Treatment Only)  An abscess (sometimes called a boil) happens when bacteria get trapped under the skin and start to grow. Pus forms inside the abscess as the body responds to the bacteria. An abscess can happen with an insect bite, ingrown hair, blocked oil gland, pimple, cyst, or puncture wound.  In the early stages, your wound may be red and tender. For this stage, you may get antibiotics. If the abscess does not get better with antibiotics, it will need to be drained with a small cut.  Home care  These tips will help you care for your abscess at home:  · Soak the wound in hot water or apply hot packs (small towel soaked in hot water) to the area for 20 minutes at a time. Do this 3 to 4 times a day.  · Do not cut, squeeze, or pop the boil yourself.  · Apply antibiotic cream or ointment to the skin 3 to 4 times a day, unless something else was prescribed. Some ointments include an antibiotic plus a pain reliever.  · If your doctor prescribed antibiotics, do not stop taking them until you have finished the medicine or the doctor tells you to stop.  · You may use an over-the-counter pain medicine to control pain, unless another pain medicine was prescribed. If you have chronic liver or kidney disease or ever had a stomach ulcer or gastrointestinal bleeding, talk with your doctor before using these any of these.  Follow-up care  Follow up with your healthcare provider, or as advised. Check your wound each day for the signs of worsening infection listed below.  When to seek medical advice  Get prompt medical attention if any of these occur:  · An increase in redness or swelling  · Red streaks in the skin leading away from the abscess  · An increase in local pain or swelling  · Fever of 100.4ºF (38ºC) or higher, or as directed by your healthcare provider  · Pus or fluid coming from the abscess  · Boil returns after getting better  Date Last Reviewed: 9/1/2016  © 7702-6895 [employer] [address] [place] [address]. All rights reserved. This information is not intended as a substitute for professional medical care. Always follow your healthcare professional's instructions.

## 2019-06-12 NOTE — PROGRESS NOTES
Pt ordered rocephin 1g by provider. Educated pt on medication and procedure. Reviewed allergies and medication.  Advised pt to remain in building for 15 mins after medication in case of an allergic reaction. Pt verbalized understanding.  Administered medication. Pt tolerated well.

## 2019-06-12 NOTE — PROGRESS NOTES
Subjective:       Patient ID: Chai Murry is a 79 y.o. male.    Chief Complaint: No chief complaint on file.    Pt is a 79 year old male to clinic today with continued complaints of right knee erythema, pain and draining wound that he was tx'ed for in  on 06/09. Pt was t'ed with rocephin and clinda. States mild improvement but states clinda is causing him to have reflux and is refusing to take. Pt is allergic to bactrim.     Abscess   Chronicity:  NewProgression Since Onset: unchanged  Abscess location: right knee.  Associated Symptoms: no fever, no chills, no sweats  Characteristics: draining, painful, redness and swelling    Pain Scale:  8/10  Ineffective treatments: clindamycin, rocephin.  Relieved by:  Nothing  Worsened by:  Nothing    Review of Systems   Constitutional: Negative for chills, diaphoresis, fatigue and fever.   Skin: Positive for color change (erythema) and wound (abscess right knee). Negative for rash.   Neurological: Negative for dizziness, light-headedness and headaches.       Objective:      Physical Exam   Constitutional: He is oriented to person, place, and time. He appears well-developed and well-nourished. No distress.   HENT:   Head: Normocephalic.   Right Ear: External ear normal.   Left Ear: External ear normal.   Nose: Nose normal.   Eyes: Pupils are equal, round, and reactive to light.   Musculoskeletal: Normal range of motion. He exhibits tenderness. He exhibits no edema or deformity.        Right knee: He exhibits swelling. He exhibits normal range of motion, no effusion, no ecchymosis, no deformity, no laceration, no erythema, normal alignment, no LCL laxity, normal patellar mobility, no bony tenderness, normal meniscus and no MCL laxity. Tenderness found.        Legs:  Neurological: He is alert and oriented to person, place, and time.   Skin: Skin is warm and dry. No rash noted. He is not diaphoretic. There is erythema.        Psychiatric: He has a normal mood and affect. His  speech is normal and behavior is normal. Thought content normal.   Nursing note and vitals reviewed.      Assessment:       1. Abscess        Plan:   Abscess  -     doxycycline (VIBRAMYCIN) 100 MG Cap; Take 1 capsule (100 mg total) by mouth every 12 (twelve) hours. for 10 days  Dispense: 20 capsule; Refill: 0  -     cefTRIAXone injection 1 g      D/D doxy.    Use Dial antibacterial Soap daily.  Bactroban ointment to affected area with Q-tip twice daily x 7 days.  Apply heat to area every 2 hours to promote drainage and healing.  Take all antibiotics for full course.  To prevent spread of infection to others in household, cleanse tub/shower with bleach solution.  If the area of redness spreads, you develop fever 100.4 or greater, swelling or pain worsens, contact PCP or go to ER immediately.

## 2019-06-13 ENCOUNTER — TELEPHONE (OUTPATIENT)
Dept: URGENT CARE | Facility: CLINIC | Age: 79
End: 2019-06-13

## 2019-06-13 NOTE — TELEPHONE ENCOUNTER
----- Message from Kailyn Shaffer sent at 6/12/2019  1:39 PM CDT -----  Contact: Patient   Patient states overtime he takes his Clindamycin 3X/day he gets really bad heart burn. He would like a call back.... [phone]

## 2019-06-14 ENCOUNTER — PATIENT MESSAGE (OUTPATIENT)
Dept: PODIATRY | Facility: CLINIC | Age: 79
End: 2019-06-14

## 2019-06-14 ENCOUNTER — TELEPHONE (OUTPATIENT)
Dept: GASTROENTEROLOGY | Facility: HOSPITAL | Age: 79
End: 2019-06-14

## 2019-06-19 ENCOUNTER — OFFICE VISIT (OUTPATIENT)
Dept: HEMATOLOGY/ONCOLOGY | Facility: CLINIC | Age: 79
End: 2019-06-19
Payer: MEDICARE

## 2019-06-19 ENCOUNTER — TELEPHONE (OUTPATIENT)
Dept: PULMONOLOGY | Facility: CLINIC | Age: 79
End: 2019-06-19

## 2019-06-19 ENCOUNTER — INITIAL CONSULT (OUTPATIENT)
Dept: RADIATION ONCOLOGY | Facility: CLINIC | Age: 79
End: 2019-06-19
Payer: MEDICARE

## 2019-06-19 VITALS
OXYGEN SATURATION: 97 % | HEART RATE: 61 BPM | BODY MASS INDEX: 27.34 KG/M2 | HEIGHT: 70 IN | WEIGHT: 190.94 LBS | TEMPERATURE: 98 F | DIASTOLIC BLOOD PRESSURE: 78 MMHG | SYSTOLIC BLOOD PRESSURE: 138 MMHG

## 2019-06-19 VITALS
HEIGHT: 70 IN | TEMPERATURE: 98 F | WEIGHT: 190.94 LBS | HEART RATE: 61 BPM | BODY MASS INDEX: 27.34 KG/M2 | RESPIRATION RATE: 18 BRPM | OXYGEN SATURATION: 97 % | SYSTOLIC BLOOD PRESSURE: 138 MMHG | DIASTOLIC BLOOD PRESSURE: 78 MMHG

## 2019-06-19 DIAGNOSIS — J38.01 PARALYSIS OF VOCAL CORDS AND LARYNX, UNILATERAL: ICD-10-CM

## 2019-06-19 DIAGNOSIS — C34.12 MALIGNANT NEOPLASM OF UPPER LOBE OF LEFT LUNG: Primary | ICD-10-CM

## 2019-06-19 DIAGNOSIS — C34.90 MALIGNANT NEOPLASM OF LUNG, UNSPECIFIED LATERALITY, UNSPECIFIED PART OF LUNG: ICD-10-CM

## 2019-06-19 DIAGNOSIS — Z90.2 STATUS POST PNEUMONECTOMY: ICD-10-CM

## 2019-06-19 DIAGNOSIS — Z90.2 STATUS POST PNEUMONECTOMY: Primary | ICD-10-CM

## 2019-06-19 PROCEDURE — 1101F PT FALLS ASSESS-DOCD LE1/YR: CPT | Mod: CPTII,S$GLB,, | Performed by: INTERNAL MEDICINE

## 2019-06-19 PROCEDURE — 99999 PR PBB SHADOW E&M-EST. PATIENT-LVL III: CPT | Mod: PBBFAC,,, | Performed by: RADIOLOGY

## 2019-06-19 PROCEDURE — 3075F PR MOST RECENT SYSTOLIC BLOOD PRESS GE 130-139MM HG: ICD-10-PCS | Mod: CPTII,S$GLB,, | Performed by: RADIOLOGY

## 2019-06-19 PROCEDURE — 3075F SYST BP GE 130 - 139MM HG: CPT | Mod: CPTII,S$GLB,, | Performed by: RADIOLOGY

## 2019-06-19 PROCEDURE — 3078F PR MOST RECENT DIASTOLIC BLOOD PRESSURE < 80 MM HG: ICD-10-PCS | Mod: CPTII,S$GLB,, | Performed by: RADIOLOGY

## 2019-06-19 PROCEDURE — 99999 PR PBB SHADOW E&M-EST. PATIENT-LVL III: ICD-10-PCS | Mod: PBBFAC,,, | Performed by: RADIOLOGY

## 2019-06-19 PROCEDURE — 1101F PR PT FALLS ASSESS DOC 0-1 FALLS W/OUT INJ PAST YR: ICD-10-PCS | Mod: CPTII,S$GLB,, | Performed by: RADIOLOGY

## 2019-06-19 PROCEDURE — 3075F SYST BP GE 130 - 139MM HG: CPT | Mod: CPTII,S$GLB,, | Performed by: INTERNAL MEDICINE

## 2019-06-19 PROCEDURE — 3078F DIAST BP <80 MM HG: CPT | Mod: CPTII,S$GLB,, | Performed by: RADIOLOGY

## 2019-06-19 PROCEDURE — 99215 OFFICE O/P EST HI 40 MIN: CPT | Mod: S$GLB,,, | Performed by: INTERNAL MEDICINE

## 2019-06-19 PROCEDURE — 99215 PR OFFICE/OUTPT VISIT, EST, LEVL V, 40-54 MIN: ICD-10-PCS | Mod: S$GLB,,, | Performed by: INTERNAL MEDICINE

## 2019-06-19 PROCEDURE — 99205 PR OFFICE/OUTPT VISIT, NEW, LEVL V, 60-74 MIN: ICD-10-PCS | Mod: S$GLB,,, | Performed by: RADIOLOGY

## 2019-06-19 PROCEDURE — 1101F PR PT FALLS ASSESS DOC 0-1 FALLS W/OUT INJ PAST YR: ICD-10-PCS | Mod: CPTII,S$GLB,, | Performed by: INTERNAL MEDICINE

## 2019-06-19 PROCEDURE — 99999 PR PBB SHADOW E&M-EST. PATIENT-LVL IV: CPT | Mod: PBBFAC,,, | Performed by: INTERNAL MEDICINE

## 2019-06-19 PROCEDURE — 1101F PT FALLS ASSESS-DOCD LE1/YR: CPT | Mod: CPTII,S$GLB,, | Performed by: RADIOLOGY

## 2019-06-19 PROCEDURE — 3075F PR MOST RECENT SYSTOLIC BLOOD PRESS GE 130-139MM HG: ICD-10-PCS | Mod: CPTII,S$GLB,, | Performed by: INTERNAL MEDICINE

## 2019-06-19 PROCEDURE — 99999 PR PBB SHADOW E&M-EST. PATIENT-LVL IV: ICD-10-PCS | Mod: PBBFAC,,, | Performed by: INTERNAL MEDICINE

## 2019-06-19 PROCEDURE — 3078F PR MOST RECENT DIASTOLIC BLOOD PRESSURE < 80 MM HG: ICD-10-PCS | Mod: CPTII,S$GLB,, | Performed by: INTERNAL MEDICINE

## 2019-06-19 PROCEDURE — 99205 OFFICE O/P NEW HI 60 MIN: CPT | Mod: S$GLB,,, | Performed by: RADIOLOGY

## 2019-06-19 PROCEDURE — 3078F DIAST BP <80 MM HG: CPT | Mod: CPTII,S$GLB,, | Performed by: INTERNAL MEDICINE

## 2019-06-19 RX ORDER — LIDOCAINE HYDROCHLORIDE 40 MG/ML
4 SOLUTION TOPICAL ONCE
Status: CANCELLED | OUTPATIENT
Start: 2019-06-19 | End: 2019-06-19

## 2019-06-19 RX ORDER — SODIUM CHLORIDE, SODIUM LACTATE, POTASSIUM CHLORIDE, CALCIUM CHLORIDE 600; 310; 30; 20 MG/100ML; MG/100ML; MG/100ML; MG/100ML
INJECTION, SOLUTION INTRAVENOUS CONTINUOUS
Status: CANCELLED | OUTPATIENT
Start: 2019-06-19

## 2019-06-19 NOTE — PROGRESS NOTES
[facility]  RADIATION ONCOLOGY CONSULTATION    Name: Chai Murry  : 1940      Patient Referred To Radiation Oncology By:  Dr. 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 16 left pneumonectomy Dr. Wall returned moderately differentiated 3.8 cm squamous cell carcinoma with a positive AP window lymph node.  A total of 3 nodes were sampled.  Indeterminate lymph vascular invasion was seen There was a positive bronchial resection margin but in my discussion with Dr. Garza, the margin was apparently cleared in discussion with the surgeon.    2. He completed adjuvant radiation at [facility] and weekly carboplatin paclitaxel x 6.    3. 19 CT chest, abdomen and pelvis showed slight growth in a soft tissue nodule in the superior mediastinum since , and the nodule was not present in 2016.  An additional subcentimeter short axis lymph node in the right paratracheal space was also slightly enlarged.  There was also nodular mucosal thickening along the posterior lateral left intrathoracic tracheal airway adherent secretions versus a polypoid lesion.    4. 5/15/19 PET showed 12 SUV along the left and posterior trachea with soft tissue invasion of the left side of the trachea just above T1.  It was unclear if this was a lymph node extrinsic to the trachea versus a mass in the trachea or esophagus.  There was also a left paratracheal lymph node 6.4 SUV.    5. 19 EUS showed esophagitis in the upper 3rd esophagus.  There was a moderate intrinsic stenosis in the upper 3rd of the esophagus.  There was an irregular mediastinal mass 25 cm from the incisors measuring 2.5 x 3 cm cross-sectional.  Pathology returned poorly differentiated carcinoma, favor  squamous cell carcinoma.     HISTORY OF PRESENT ILLNESS:  Chai Murry is a pleasant 79 y.o. male who presented 2016 with a left hilar squamous cell carcinoma.  4/12/16 left pneumonectomy Dr. Wall returned moderately differentiated 3.8 cm squamous cell carcinoma with a positive AP window lymph node.  A total of 3 nodes were sampled.  Indeterminate lymph vascular invasion was seen There was a positive bronchial resection margin but in my discussion with Dr. Garza, the margin was apparently cleared in discussion with the surgeon.  He completed adjuvant radiation at [facility] and weekly carboplatin paclitaxel x 6.  He had serial CT scans.  He presented recently with hoarseness.  5/6/19 CT chest, abdomen and pelvis showed slight growth in a soft tissue nodule in the superior mediastinum since 2017, and the nodule was not present in 2016.  An additional subcentimeter short axis lymph node in the right paratracheal space was also slightly enlarged.  There was also nodular mucosal thickening along the posterior lateral left intrathoracic tracheal airway adherent secretions versus a polypoid lesion.  5/15/19 PET showed 12 SUV along the left and posterior trachea with soft tissue invasion of the left side of the trachea just above T1.  It was unclear if this was a lymph node extrinsic to the trachea versus a mass in the trachea or esophagus.  There was also a left paratracheal lymph node 6.4 SUV.  6/11/19 EUS showed esophagitis in the upper 3rd esophagus.  There was a moderate intrinsic stenosis in the upper 3rd of the esophagus.  There was an irregular mediastinal mass 25 cm from the incisors measuring 2.5 x 3 cm cross-sectional.  Pathology returned poorly differentiated carcinoma, favor squamous cell carcinoma.     He has some coughing with liquids.  This is decreased when he turns his head to the left.  He has hoarseness of voice for a couple of weeks.  The coughing with liquids started when the hoarseness  began.  He has food sticking in the middle of swallowing mid chest region for the past couple of weeks.    REVIEW OF SYSTEMS: (Positive findings bold, otherwise negative)   Constitutional: fever, fatigue, weight change  Eyes: blurred vision in the past 3 months, double vision   ENT: ear pain, new mouth lesions, jaw pain, difficulty swallowing, sore throat  Cardiovascular: chest pain on exertion, reflux, extremity swelling  Respiratory: shortness of breath, dyspnea, cough, hemoptysis.   GI: abdominal pain, diarrhea, constipation, blood in stool, painful bowel movements  : painful or burning urination, denies blood in urine  Musculoskeletal: new bone or joint pains  Neurologic: headache, seizure, focal numbness or tingling, balance changes, speech changes  Lymph: new or enlarged lymph nodes  Psychiatric: depression, anxiety    PRIOR RADIATION HISTORY:  [facility] radiation 2016    PAST MEDICAL HISTORY:  Past Medical History:   Diagnosis Date    Anemia     Arthritis     Atrial fibrillation     CAD (coronary artery disease)     Cancer     lung cancer-Left    Cataract     COPD (chronic obstructive pulmonary disease)     Diverticulosis     ED (erectile dysfunction)     HTN (hypertension)     Hyperlipidemia     Myocardial infarction     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 Courtney dwyer bx       PAST SURGICAL HISTORY:  Past Surgical History:   Procedure Laterality Date    APPENDECTOMY      BRONCHOSCOPY- insert lighted tube into airway to obtain biopsy of lung Bilateral 3/4/2016    Per[facility] at [facility] ENDO    CARDIAC CATHETERIZATION      cardiac stents      CATARACT EXTRACTION W/  INTRAOCULAR LENS IMPLANT Right 9-3-14    CHOLECYSTECTOMY      COLONOSCOPY N/A 10/25/2018    [facility]ed [facility] at [facility] ENDO    COLONOSCOPY N/A 4/17/2018    [facility]an [facility] at [facility] ENDO    Colonoscopy N/A 8/12/2014     [facility]    EGD (ESOPHAGOGASTRODUODENOSCOPY) N/A 6/11/2019    Performed by [facility]hek Knox [facility] at [facility] END[facility]    infected stomach gland excision      INSERTION-PORT Left 5/26/2016    [facility]io Wall [facility] OR    LOBECTOMY-LUNG Left 4/12/2016    [facility] OR    LUNG REMOVAL, TOTAL Left 04/2016    lung cancer left upper lobe    PNEUMONECTOMY Left 4/12/2016    Performed [facility]io [facility] OR    SKIN BIOPSY Left     arm    THORACOTOMY Left 4/12/2016    [facility] OR    ULTRASOUND, UPPER GI TRACT, ENDOSCOPIC N/A 6/11/2019    Performed by [facility]k Knox [facility] at [facility] ENDO       ALLERGIES:   Review of patient's allergies indicates:   Allergen Reactions    Atorvastatin      Other reaction(s): Muscle pain    Bactrim [sulfamethoxazole-trimethoprim]      Lip swelling       MEDICATIONS:    Current Outpatient Medications:     albuterol (PROVENTIL) 2.5 mg /3 mL (0.083 %) nebulizer solution, Take 3 mLs (2.5 mg total) by nebulization every 6 (six) hours while awake., Disp: 270 mL, Rfl: 11    amLODIPine (NORVASC) 5 MG tablet, Take 2 tablets (10 mg total) by mouth every evening. (Patient taking differently: Take 5 mg by mouth once daily. ), Disp: 60 tablet, Rfl: 0    aspirin (ECOTRIN) 81 MG EC tablet, Take 81 mg by mouth once daily., Disp: , Rfl:     BREO ELLIPTA 100-25 mcg/dose diskus inhaler, INHALE 1 PUFF INTO THE LUNGS ONCE DAILY, Disp: , Rfl: 5    doxepin (SINEQUAN) 10 MG capsule, TAKE 1 CAPSULE (10 MG TOTAL) BY MOUTH EVERY EVENING., Disp: 30 capsule, Rfl: 10    doxycycline (VIBRAMYCIN) 100 MG Cap, Take 1 capsule (100 mg total) by mouth every 12 (twelve) hours. for 10 days, Disp: 20 capsule, Rfl: 0    fenofibric acid (FIBRICOR) 135 mg CpDR, TAKE 1 CAPSULE BY MOUTH EVERY DAY, Disp: 30 capsule, Rfl: 11    fluticasone (FLONASE) 50 mcg/actuation nasal spray, 2 sprays (100 mcg total) by  Each Nare route once daily., Disp: 3 Bottle, Rfl: 3    glycopyrrolate-formoterol (BEVESPI AEROSPHERE) 9-4.8 mcg HFAA, Inhale 2 puffs into the lungs 2 (two) times daily. Controller, Disp: 10.9 g, Rfl: 11    HYDROcodone-acetaminophen (NORCO) 5-325 mg per tablet, Take 1 tablet by mouth every 6 (six) hours as needed., Disp: 12 tablet, Rfl: 0    ipratropium-albuterol (COMBIVENT RESPIMAT)  mcg/actuation inhaler, Inhale 1 puff into the lungs every 6 (six) hours as needed for Shortness of Breath., Disp: 4 g, Rfl: 11    levocetirizine (XYZAL) 5 MG tablet, Take 5 mg by mouth every evening., Disp: , Rfl:     lisinopril (PRINIVIL,ZESTRIL) 40 MG tablet, TAKE 1 TABLET BY MOUTH DAILY, Disp: 30 tablet, Rfl: 11    mupirocin (BACTROBAN) 2 % ointment, Apply topically 3 (three) times daily. for 10 days, Disp: 22 g, Rfl: 0    naproxen sodium (ANAPROX) 550 MG tablet, Take 1 tablet (550 mg total) by mouth 2 (two) times daily with meals., Disp: 20 tablet, Rfl: 0    simvastatin (ZOCOR) 40 MG tablet, Take 1 tablet (40 mg total) by mouth every evening., Disp: 30 tablet, Rfl: 11    SOTALOL AF 80 mg tablet, TAKE 1 TABLET BY MOUTH TWICE A DAY, Disp: 60 tablet, Rfl: 10    Current Facility-Administered Medications:     testosterone cypionate injection 200 mg, 200 mg, Intramuscular, Q21 Days, Peter Teixeira MD, 200 mg at 05/20/19 0824    Facility-Administered Medications Ordered in Other Visits:     lactated ringers infusion, , Intravenous, Continuous, Michael Hameed MD, Stopped at 06/11/19 1224    SOCIAL HISTORY:  Social History     Socioeconomic History    Marital status:      Spouse name: Not on file    Number of children: 3    Years of education: Not on file    Highest education level: Not on file   Occupational History    Occupation: retired   Social Needs    Financial resource strain: Not on file    Food insecurity:     Worry: Not on file     Inability: Not on file    Transportation needs:     Medical:  "Not on file     Non-medical: Not on file   Tobacco Use    Smoking status: Former Smoker     Packs/day: 1.00     Years: 50.00     Pack years: 50.00     Last attempt to quit: 2005     Years since quittin.8    Smokeless tobacco: Never Used   Substance and Sexual Activity    Alcohol use: No    Drug use: No    Sexual activity: Not Currently   Lifestyle    Physical activity:     Days per week: Not on file     Minutes per session: Not on file    Stress: Not on file   Relationships    Social connections:     Talks on phone: Not on file     Gets together: Not on file     Attends Zoroastrianism service: Not on file     Active member of club or organization: Not on file     Attends meetings of clubs or organizations: Not on file     Relationship status: Not on file   Other Topics Concern    Not on file   Social History Narrative    Not on file       FAMILY HISTORY:  Family History   Problem Relation Age of Onset    Esophageal cancer Sister     Cancer Sister     Lung cancer Mother     Cancer Mother     Cataracts Mother     Hypertension Mother     Pneumonia Father     Cataracts Father     Hypertension Father     Cancer Brother     Hypertension Brother     Blindness Neg Hx     Diabetes Neg Hx     Glaucoma Neg Hx     Macular degeneration Neg Hx     Retinal detachment Neg Hx     Strabismus Neg Hx     Stroke Neg Hx     Thyroid disease Neg Hx        PHYSICAL EXAMINATION:  Constitutional: well appearing, no acute distress, ECOG 1 - Ambulates, capable of light work  Vitals:    /78   Pulse 61   Temp 98.4 °F (36.9 °C)   Resp 18   Ht 5' 10" (1.778 m)   Wt 86.6 kg (190 lb 14.7 oz)   SpO2 97%   BMI 27.39 kg/m²   Neck: trachea midline, neck supple  ENT:  Hoarseness  Lymphatic: no cervical, supraclavicular adenopathy  Cardiovascular: regular rate, no murmurs, no edema of the upper or lower extremities, radial pulse 2+  Respiratory: unlabored effort, clear to auscultation, no wheezes, slightly " decreased left-sided breath sounds  Abdomen: soft, non-tender, no rigidity, no masses, no hepatomegaly  Spine: non-tender to percussion cervical, thoracic and lumbosacral spine    IMAGING AND LABORATORY FINDINGS: As per HPI; images reviewed personally.    ASSESSMENT:  Mediastinal recurrence was possible tracheal invasion and separate mediastinal lymph node    PLAN:  We discussed the role of radiation in the salvage setting the mediastinal recurrence.  He has a history of radiation to the mediastinum 2016 and I will get these old records.  I discussed the case with Dr. Garza.  We can attempt salvage radiation therapy.  Dr. Garza would like to add chemotherapy possibly sequentially.  He may also be a candidate for immunotherapy.  His risk of damage to the surrounding structures such as the esophagus, trachea, bronchi, blood vessels, heart, lungs, spinal cord, nerves etc is increased due to the 2nd course of radiation.  He already has intrinsic stricture on the recent EUS possibly from radiation.  He would have a risk of fistula formation.  There is possibly tracheal invasion on the imaging and if this resolves and there is a hole in the trachea he could develop a tracheal esophageal or tracheal mediastinal fistula which could be fatal.  I have message pulmonology and CT surgery to the reviewed the case and determine any need for bronchoscopy, stenting, surgery etc.  I will have his case presented at our tumor board in 2 days.  I will tentatively schedule him for radiation planning since with tracheal invasion we do not want to delay.    Another option is upfront systemic therapy to assess response followed by radiation if he does not respond.  Given the tracheal invasion which seems symptomatic, if he does progress on chemo alone without radiation he could progress to a casandra fistula with mediastinitis, etc which could be fatal.  We will discuss this in tumor board.     We discussed the techniques, toxicities and  indications of radiation and I answered the patient's questions to their apparent satisfaction.    HERRERA Castro M.D.  Radiation Oncology  [facility]  [id number] [place] [address] [place] [address]  Ph: [phone]  [email or web address]

## 2019-06-19 NOTE — PROGRESS NOTES
Subjective:       Patient ID: Chai Murry is a 79 y.o. male.    Chief Complaint: No chief complaint on file.    HPI This is a 79 year old  gentleman who comes for follow up of his lung cancer.  He is s/p left pneumonectomy for a squamous cell carcinoma of the left lung.0n 4/12/2016  Prior to the surgery, the PET scan showed an FDG-avid mass in the left upper   lobe abutting the left hilum with an SUV of 11.6. There seemed to be a left   hilar adenopathy as well.  Radiologist felt that he was unable to discriminate between the mass and the   lymphadenopathy. The patient had had a bronchoscopy by Dr. Roel Ernandez on   03/04/2006 that showed a partially obstructing airway in the anterior segment of  the left upper lobe with an endobronchial lesion in the anterior segment of the  left upper lobe. The specimen from the biopsy at the time of bronchoscopy was   reported as being a squamous cell carcinoma.  At the time of the surgery after the left lung was removed, the pathologist   indicated that this was a squamous cell carcinoma. It measured 3.8 cm. The   pathology indicated that this is extending into the bronchial resection margin of the lobe. This lobe was later on removed to complete the pneumonectomy   There was one lymph node positive for metastatic squamous cell carcinoma at the   AP window.  The patient was told that we would prefer to treat him with post-op simultaneous chemo/radiation.  He had Medi-port. He started chemo/radiation therapy andreceived 6 weekly cycles of carbo/Taxol along with radiatioon.  After a month, he had a Ct scan and it showed stability   He then had 2 additional cyles of carbo/Taxol finishing in 9/27/2016.    He comes for follow up.   He developed hoarseness on good Friday 2019 and has been found to have a left vocal cord paralysis by EENT exam. CT of the chest was non contributory, shows changes from surgery  PET showed a PET avid mass to the left of the trachea.  The case was  discussed with dr annika Goldman and GI.  We discussed the possibility of doing a EUS or EBUs, and finally, because of the localization, it was decided to do a EUS with biopsy if possible.  Cytology shows recurrent squamous cell cancer.  I have asked Pathology to run a PD-L1 from his original pathology from 2016.  He comes accompanied by his wife   MEDICATIONS: See list.  SURGERIES: Appendectomy at age 3. Bilateral cataract surgery. Infected gland   removed from the abdomen, cholecystectomy, left pneumonectomy..Medi-port placed and removed  SOCIAL HISTORY: He is  with three children. He lives in [facility]. He   used to smoke from age 14 To age 67 or so,, averaging a pack a day. Denies significant   drinking. He worked in a chemical plant for 40 years.  FAMILY HISTORY: Sister had cancer of the throat. Mother had cancer of the   lung. Father had prostate cancer. No diabetes. Paternal grandfather had a   heart attack.  PAST MEDICAL HISTORY:  1. Squamous cell carcinoma of the lung s/p left penumonectomy.  2. Tobacco use.  3. High blood pressure.  4. Arteriosclerotic cardiovascular disease, status post previous heart attacks.  5. Arthritis.   6-left vocal cord paralysis  Review of Systems   Constitutional: Negative.  Negative for fatigue.   Eyes: Negative.    Cardiovascular: Negative.  Negative for chest pain.   Gastrointestinal: Negative for abdominal pain and nausea.   Genitourinary: Negative.  Negative for hematuria.   Musculoskeletal: Negative.    Skin: Negative.    Neurological: Negative.    Psychiatric/Behavioral: Negative.        Objective:      Physical Exam   Constitutional: He is oriented to person, place, and time. He appears well-developed and well-nourished.   HENT:   Head: Normocephalic.   Mouth/Throat: No oropharyngeal exudate.   Eyes: Pupils are equal, round, and reactive to light.   Neck: No thyromegaly present.   Cardiovascular: Normal rate, regular rhythm and normal heart sounds. Exam reveals no  gallop.   No murmur heard.  Pulmonary/Chest: No respiratory distress. He has no wheezes. He has no rales.   Abdominal: Soft. Bowel sounds are normal. He exhibits no distension and no mass. There is no rebound and no guarding.   Musculoskeletal: Normal range of motion. He exhibits no edema.   Lymphadenopathy:     He has no cervical adenopathy.   Neurological: He is alert and oriented to person, place, and time.   Skin: Skin is warm and dry.   Psychiatric: He has a normal mood and affect. His behavior is normal.       Wt Readings from Last 3 Encounters:   06/19/19 86.6 kg (190 lb 14.7 oz)   06/12/19 87.2 kg (192 lb 3.9 oz)   06/11/19 86.5 kg (190 lb 11.2 oz)     Temp Readings from Last 3 Encounters:   06/19/19 98.4 °F (36.9 °C) (Oral)   06/12/19 98.8 °F (37.1 °C) (Oral)   06/11/19 98.4 °F (36.9 °C) (Temporal)     BP Readings from Last 3 Encounters:   06/19/19 138/78   06/12/19 (!) 140/67   06/11/19 124/80     Pulse Readings from Last 3 Encounters:   06/19/19 61   06/12/19 85   06/11/19 66       Assessment:       1. s/p left pnuemonectomy for KAYLI [place]        Plan:       The case [facility] to [facility]     Dr Castro will request the records from the  General ( he was treated by Dr Nelli Sanches). He feels the area can be treated.  I will srt up an appointment with dr Castro.  Once he finished radiation, we will consider chemotherapy probably with Carbo/Abraxane/Keytruda. I have requested PD-L1 Testing from the original biopsy in 2016.

## 2019-06-19 NOTE — LETTER
June 19, 2019      Jorge L Patel MD  [address]  [place] [address]            [facility] - Radiation Oncology  [address] [facility] [address]  Phone: [phone]  Fax: [phone]          Patient: Chai Murry   MR Number: [id number]   YOB: 1940   Date of Visit: 6/19/2019       Dear Dr. Jorge L Patel:    Thank you for referring Chai Murry to me for evaluation. Attached you will find relevant portions of my assessment and plan of care.    If you have questions, please do not hesitate to call me. I look forward to following Chai Murry along with you.    Sincerely,    Alejandro Castro II, MD    Enclosure  CC:  No Recipients    If you would like to receive this communication electronically, please contact [email or web address] or [phone] to request more information on [employer] Link access.    For providers and/or their staff who would like to refer a patient to [facility], please contact us through our one-stop-shop provider referral line, [facility], at [phone].    If you feel you have received this communication in error or would no longer like to receive these types of communications, please e-mail [email or web address]

## 2019-06-19 NOTE — TELEPHONE ENCOUNTER
----- Message from Paresh Goldman MD sent at 6/19/2019  3:06 PM CDT -----   Call patient,  Could do bronch this Friday  Let me know    Paresh Goldman MD    ----- Message -----  From: Alejandro Castro II, MD  Sent: 6/19/2019   2:37 PM  To: Jorge L Patel MD, Paresh Goldman MD    Yes please arrange bronch.  My nurse Marbella will let the patient know to expect your office call.  Marbella, please call the patient and let him know.  ----- Message -----  From: Paresh Goldman MD  Sent: 6/19/2019   2:29 PM  To: Jorge L Patel MD, Alejandro Castro II, MD    We can arrange diagnositic bronch for visualization  Location may be too high for stent is fistula concern but could ask Dr Ruiz/Mark after bronch  Let me know    Paresh Goldman MD      ----- Message -----  From: Alejandro Castro II, MD  Sent: 6/19/2019   1:38 PM  To: Jorge L Patel MD, Paresh Goldman MD, #    This is a man with a history of new left pneumonectomy followed by mediastinal radiation and chemotherapy 2016 for squamous cell lung.  He has a biopsy proven squamous cell failure.  He has EUS establishing a diagnosis.  We are considering chemoradiation salvage.    I would like to know if there is a role for bronchoscopy to determine tracheal invasion.  Based on the PET scan he may have tracheal invasion.  Also there a role for stenting since the tumor may regress and leave a hole in the trach?  Is there a role for surgery?

## 2019-06-19 NOTE — PROGRESS NOTES
Spoke with patient  Agrees to bronchoscopy  Schedule for Friday  Risks complications benefits alternatives discussed

## 2019-06-21 ENCOUNTER — ANESTHESIA (OUTPATIENT)
Dept: ENDOSCOPY | Facility: HOSPITAL | Age: 79
End: 2019-06-21
Payer: MEDICARE

## 2019-06-21 ENCOUNTER — TUMOR BOARD CONFERENCE (OUTPATIENT)
Dept: INFUSION THERAPY | Facility: HOSPITAL | Age: 79
End: 2019-06-21

## 2019-06-21 ENCOUNTER — ANESTHESIA EVENT (OUTPATIENT)
Dept: ENDOSCOPY | Facility: HOSPITAL | Age: 79
End: 2019-06-21
Payer: MEDICARE

## 2019-06-21 ENCOUNTER — HOSPITAL ENCOUNTER (OUTPATIENT)
Facility: HOSPITAL | Age: 79
Discharge: HOME | End: 2019-06-21
Attending: INTERNAL MEDICINE | Admitting: INTERNAL MEDICINE
Payer: MEDICARE

## 2019-06-21 VITALS
WEIGHT: 191.13 LBS | OXYGEN SATURATION: 97 % | SYSTOLIC BLOOD PRESSURE: 102 MMHG | TEMPERATURE: 98 F | RESPIRATION RATE: 16 BRPM | DIASTOLIC BLOOD PRESSURE: 57 MMHG | BODY MASS INDEX: 27.36 KG/M2 | HEART RATE: 53 BPM | HEIGHT: 70 IN

## 2019-06-21 DIAGNOSIS — C34.12 MALIGNANT NEOPLASM OF UPPER LOBE OF LEFT LUNG: Primary | ICD-10-CM

## 2019-06-21 DIAGNOSIS — J39.8 TRACHEAL MASS: ICD-10-CM

## 2019-06-21 DIAGNOSIS — C34.12 MALIGNANT NEOPLASM OF UPPER LOBE OF LEFT LUNG: ICD-10-CM

## 2019-06-21 DIAGNOSIS — J38.7 SUPRAGLOTTIC MASS: ICD-10-CM

## 2019-06-21 PROCEDURE — 25000003 PHARM REV CODE 250: Performed by: NURSE ANESTHETIST, CERTIFIED REGISTERED

## 2019-06-21 PROCEDURE — 63600175 PHARM REV CODE 636 W HCPCS: Performed by: NURSE ANESTHETIST, CERTIFIED REGISTERED

## 2019-06-21 PROCEDURE — 88104 CYTOLOGY SPECIMEN- PULMONARY MEDICAL CYTOLOGY: ICD-10-PCS | Mod: 26,59,, | Performed by: PATHOLOGY

## 2019-06-21 PROCEDURE — 31628 BRONCHOSCOPY/LUNG BX EACH: CPT | Performed by: INTERNAL MEDICINE

## 2019-06-21 PROCEDURE — 63600175 PHARM REV CODE 636 W HCPCS

## 2019-06-21 PROCEDURE — 63600175 PHARM REV CODE 636 W HCPCS: Performed by: INTERNAL MEDICINE

## 2019-06-21 PROCEDURE — 27200946 HC BRUSH, CYTOLOGY: Performed by: INTERNAL MEDICINE

## 2019-06-21 PROCEDURE — 88305 TISSUE SPECIMEN TO PATHOLOGY - SURGERY: ICD-10-PCS | Mod: 26,,, | Performed by: PATHOLOGY

## 2019-06-21 PROCEDURE — 88112 CYTOLOGY SPECIMEN- PULMONARY MEDICAL CYTOLOGY: ICD-10-PCS | Mod: 26,,, | Performed by: PATHOLOGY

## 2019-06-21 PROCEDURE — 88305 TISSUE EXAM BY PATHOLOGIST: CPT | Mod: 26,,, | Performed by: PATHOLOGY

## 2019-06-21 PROCEDURE — 37000008 HC ANESTHESIA 1ST 15 MINUTES: Performed by: INTERNAL MEDICINE

## 2019-06-21 PROCEDURE — 88305 TISSUE EXAM BY PATHOLOGIST: CPT | Performed by: PATHOLOGY

## 2019-06-21 PROCEDURE — 37000009 HC ANESTHESIA EA ADD 15 MINS: Performed by: INTERNAL MEDICINE

## 2019-06-21 PROCEDURE — 31623 DX BRONCHOSCOPE/BRUSH: CPT | Mod: 51,,, | Performed by: INTERNAL MEDICINE

## 2019-06-21 PROCEDURE — 31628 PR BRONCHOSCOPY,TRANSBRONCH BIOPSY: ICD-10-PCS | Mod: ,,, | Performed by: INTERNAL MEDICINE

## 2019-06-21 PROCEDURE — 88305 CYTOLOGY SPECIMEN- PULMONARY MEDICAL CYTOLOGY: ICD-10-PCS | Mod: 26,,, | Performed by: PATHOLOGY

## 2019-06-21 PROCEDURE — 88112 CYTOPATH CELL ENHANCE TECH: CPT | Mod: 26,,, | Performed by: PATHOLOGY

## 2019-06-21 PROCEDURE — 88104 CYTOPATH FL NONGYN SMEARS: CPT | Mod: 26,59,, | Performed by: PATHOLOGY

## 2019-06-21 PROCEDURE — 27200944 HC BRONCH FORCEPS DISPOSABLE: Performed by: INTERNAL MEDICINE

## 2019-06-21 PROCEDURE — 25000003 PHARM REV CODE 250: Performed by: INTERNAL MEDICINE

## 2019-06-21 PROCEDURE — 31628 BRONCHOSCOPY/LUNG BX EACH: CPT | Mod: ,,, | Performed by: INTERNAL MEDICINE

## 2019-06-21 PROCEDURE — 31623 PR BRONCHOSCOPY,DIAGNOSTIC W BRUSH: ICD-10-PCS | Mod: 51,,, | Performed by: INTERNAL MEDICINE

## 2019-06-21 PROCEDURE — 31623 DX BRONCHOSCOPE/BRUSH: CPT | Mod: 51 | Performed by: INTERNAL MEDICINE

## 2019-06-21 RX ORDER — PHENYLEPHRINE HYDROCHLORIDE 10 MG/ML
INJECTION INTRAVENOUS
Status: DISCONTINUED | OUTPATIENT
Start: 2019-06-21 | End: 2019-06-21

## 2019-06-21 RX ORDER — LIDOCAINE HYDROCHLORIDE 40 MG/ML
4 SOLUTION TOPICAL ONCE
Status: COMPLETED | OUTPATIENT
Start: 2019-06-21 | End: 2019-06-21

## 2019-06-21 RX ORDER — PROPOFOL 10 MG/ML
VIAL (ML) INTRAVENOUS
Status: DISCONTINUED | OUTPATIENT
Start: 2019-06-21 | End: 2019-06-21

## 2019-06-21 RX ORDER — ONDANSETRON 2 MG/ML
INJECTION INTRAMUSCULAR; INTRAVENOUS
Status: DISCONTINUED | OUTPATIENT
Start: 2019-06-21 | End: 2019-06-21

## 2019-06-21 RX ORDER — EPINEPHRINE 0.1 MG/ML
INJECTION INTRAVENOUS
Status: COMPLETED | OUTPATIENT
Start: 2019-06-21 | End: 2019-06-21

## 2019-06-21 RX ORDER — SODIUM CHLORIDE, SODIUM LACTATE, POTASSIUM CHLORIDE, CALCIUM CHLORIDE 600; 310; 30; 20 MG/100ML; MG/100ML; MG/100ML; MG/100ML
INJECTION, SOLUTION INTRAVENOUS CONTINUOUS
Status: DISCONTINUED | OUTPATIENT
Start: 2019-06-21 | End: 2019-06-21 | Stop reason: HOSPADM

## 2019-06-21 RX ORDER — GLYCOPYRROLATE 0.2 MG/ML
INJECTION INTRAMUSCULAR; INTRAVENOUS
Status: DISCONTINUED | OUTPATIENT
Start: 2019-06-21 | End: 2019-06-21

## 2019-06-21 RX ORDER — LIDOCAINE HYDROCHLORIDE 10 MG/ML
INJECTION, SOLUTION EPIDURAL; INFILTRATION; INTRACAUDAL; PERINEURAL
Status: DISCONTINUED | OUTPATIENT
Start: 2019-06-21 | End: 2019-06-21

## 2019-06-21 RX ADMIN — LIDOCAINE HYDROCHLORIDE 50 MG: 10 INJECTION, SOLUTION EPIDURAL; INFILTRATION; INTRACAUDAL; PERINEURAL at 08:06

## 2019-06-21 RX ADMIN — LIDOCAINE HYDROCHLORIDE 4 ML: 40 SOLUTION TOPICAL at 07:06

## 2019-06-21 RX ADMIN — SODIUM CHLORIDE, SODIUM LACTATE, POTASSIUM CHLORIDE, AND CALCIUM CHLORIDE: .6; .31; .03; .02 INJECTION, SOLUTION INTRAVENOUS at 07:06

## 2019-06-21 RX ADMIN — GLYCOPYRROLATE 0.2 MG: 0.2 INJECTION INTRAMUSCULAR; INTRAVENOUS at 08:06

## 2019-06-21 RX ADMIN — EPINEPHRINE 0.5 MG: 0.1 INJECTION, SOLUTION ENDOTRACHEAL; INTRACARDIAC; INTRAVENOUS at 08:06

## 2019-06-21 RX ADMIN — PROPOFOL 150 MG: 10 INJECTION, EMULSION INTRAVENOUS at 08:06

## 2019-06-21 RX ADMIN — PHENYLEPHRINE HYDROCHLORIDE 100 MCG: 10 INJECTION INTRAVENOUS at 08:06

## 2019-06-21 RX ADMIN — ONDANSETRON 4 MG: 2 INJECTION, SOLUTION INTRAMUSCULAR; INTRAVENOUS at 08:06

## 2019-06-21 NOTE — ANESTHESIA PREPROCEDURE EVALUATION
06/21/2019  Chai Murry is a 79 y.o., male.    Anesthesia Evaluation    I have reviewed the Patient Summary Reports.    I have reviewed the Nursing Notes.   I have reviewed the Medications.     Review of Systems  Anesthesia Hx:  No problems with previous Anesthesia  Denies Family Hx of Anesthesia complications.   Denies Personal Hx of Anesthesia complications.   Social:  Former Smoker, No Alcohol Use    Hematology/Oncology:         -- Anemia: Current/Recent Cancer. 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 CHAI MURRY ID:[id number] 23-MAY-2019 09:59:42 EKG  System-TopOPPS ROUTINE RETRIEVAL  Normal sinus rhythm  Voltage criteria for left ventricular hypertrophy  ST elevation, consider early repolarization, pericarditis, or injury  Abnormal ECG  When compared with ECG of 28-AUG-2018 12:02,  No significant change was found  Confirmed by VIOELT DE DIOS, SHAYLEE JACOBS ([id number]) on 5/28/2019 9:48:18 PM  25mm/s 10mm/mV 150Hz 9.0.7 12SL 239 DENISA: 1  Referred by: SHAYLEE LAZO Electronically signed by: SHAYLEE LAZO MD   Pulmonary:   COPD History of cancer of upper lobe bronchus or lung  Chronic obstructive pulmonary disease  Malignant neoplasm of upper lobe of left lung  s/p left pnuemonectomy for [facility]  Asbestos-induced pleural plaque  Paralysis of vocal cords and larynx, unilateral   Renal/:  Renal/ Normal     Hepatic/GI:  Hepatic/GI Normal    Neurological:  Neurology Normal    Psych:   Psychiatric History depression          Physical Exam  General:  Well nourished    Airway/Jaw/Neck:  Airway Findings: (Possible tracheal infiltration of lung CA) Mouth Opening: Normal Tongue: Normal  General Airway  Assessment: Adult  Mallampati: II  Improves to II with phonation.  TM Distance: Normal, at least 6 cm      Dental:  Dental Findings: Upper Dentures, Lower Dentures        Mental Status:  Mental Status Findings:  Cooperative, Alert and Oriented         Anesthesia Plan  Type of Anesthesia, risks & benefits discussed:  Anesthesia Type:  general  Patient's Preference:   Intra-op Monitoring Plan:   Intra-op Monitoring Plan Comments:   Post Op Pain Control Plan: multimodal analgesia  Post Op Pain Control Plan Comments:   Induction:   IV  Beta Blocker:  Patient is on a Beta-Blocker and has received one dose within the past 24 hours (No further documentation required).       Informed Consent: Patient understands risks and agrees with Anesthesia plan.  Questions answered. Anesthesia consent signed with patient.  ASA Score: 3     Day of Surgery Review of History & Physical: I have interviewed and examined the patient. I have reviewed the patient's H&P dated:  There are no significant changes.          Ready For Surgery From Anesthesia Perspective.

## 2019-06-21 NOTE — PLAN OF CARE
Per pt, MD can speak to son. Dr. Goldman went out to waiting room to speak with pt's son regarding bronch findings.

## 2019-06-21 NOTE — PROGRESS NOTES
Tumor Board Documentation      Chai Murry was presented by Chance Castro MD at our Tumor Board on 6/21/2019, which included representatives from Medical Oncology, Radiation Oncology, Surgical Oncology, Pathology, Navigation, Plastic Surgery, Radiology, Gastrointestinal, Pulmonology.    Chai currently presents as a current patient, for progression with Lung cancer, with history of the following treatments: Adjuvant Chemotherapy, Surgical Intervention(s).    Additionally, we reviewed previous medical and familial history, history of present illness, and recent lab results along with all available histopathologic and imaging studies. The tumor board considered available treatment options and made the following recommendations:  Additional screening (Next generation sequencing; if PDL-1 positive, treat with immunotherapy).       The following procedures/referrals were also placed: No orders of the defined types were placed in this encounter.      Clinical Trial Status: Not discussed     National site-specific guidelines were discussed with respect to the case.    Tumor board is a meeting of clinicians from various specialty areas who evaluate and discuss patients for whom a multidisciplinary approach is being considered. Final determinations in the plan of care are those of the provider(s). The responsibility for follow up of recommendations given during tumor board is that of the provider.     Teodora Friedman RN

## 2019-06-21 NOTE — H&P
Patient ID: Chai Murry is a 79 y.o. male.     Chief Complaint:  Mr Torres is 79 years old'  New to me by Dr Patel  : Seen Dr Ernandez  Asked to see me for possible EBUS  PET CT reviewed  Hx Squamous lung cancer SP pneumonectomy 2016  AP window was +ve  Recent vocal cord paralysis since Good Friday: seen ENT  Dr Jackson  PET CT was reviewed and also discussed on secure chat with GI and thoracic  Report suggest esophageal involvement  FEV1 1.54( 52.3%)  Regime: BREO: Stable  Dry cough  DW with other Providers: Arrange for EUS        The following portions of the patient's history were reviewed and updated as appropriate:   He  has a past medical history of Anemia, Arthritis, Atrial fibrillation, CAD (coronary artery disease), Cancer, Cataract, COPD (chronic obstructive pulmonary disease), Diverticulosis, ED (erectile dysfunction), HTN (hypertension), Hyperlipidemia, Myocardial infarction, and 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  He does not have any pertinent problems on file.  He  has a past surgical history that includes Appendectomy; Cholecystectomy; cardiac stents; infected stomach gland excision; Cataract extraction w/  intraocular lens implant (Right, 9-3-14); Skin biopsy (Left); Lung removal, total (Left, 04/2016); Colonoscopy (N/A, 4/17/2018); Colonoscopy (N/A, 10/25/2018); and Cardiac catheterization.  His family history includes Cancer in his brother, mother, and sister; Cataracts in his father and mother; Esophageal cancer in his sister; Hypertension in his brother, father, and mother; Lung cancer in his mother; Pneumonia in his father.  He  reports that he quit smoking about 13 years ago. He has a 50.00 pack-year smoking history. He has never used smokeless tobacco. He reports that he does not drink alcohol or use drugs.  He has a current medication list which includes the following prescription(s): albuterol, amlodipine, aspirin, breo ellipta, doxepin, fenofibric  acid, fluticasone propionate, glycopyrrolate-formoterol, hydrocodone-acetaminophen, ipratropium-albuterol, levocetirizine, lisinopril, naproxen sodium, simvastatin, and sotalol af, and the following Facility-Administered Medications: lactated ringers and testosterone cypionate.    Current Outpatient Medications on File Prior to Visit  Medication Sig Dispense Refill   albuterol (PROVENTIL) 2.5 mg /3 mL (0.083 %) nebulizer solution Take 3 mLs (2.5 mg total) by nebulization every 6 (six) hours while awake. 270 mL 11   amLODIPine (NORVASC) 5 MG tablet Take 2 tablets (10 mg total) by mouth every evening. (Patient taking differently: Take 5 mg by mouth once daily. ) 60 tablet 0   aspirin (ECOTRIN) 81 MG EC tablet Take 81 mg by mouth once daily.       BREO ELLIPTA 100-25 mcg/dose diskus inhaler INHALE 1 PUFF INTO THE LUNGS ONCE DAILY   5   doxepin (SINEQUAN) 10 MG capsule TAKE 1 CAPSULE (10 MG TOTAL) BY MOUTH EVERY EVENING. 30 capsule 10   fenofibric acid (FIBRICOR) 135 mg CpDR TAKE 1 CAPSULE BY MOUTH EVERY DAY 30 capsule 11   fluticasone (FLONASE) 50 mcg/actuation nasal spray 2 sprays (100 mcg total) by Each Nare route once daily. 3 Bottle 3   glycopyrrolate-formoterol (BEVESPI AEROSPHERE) 9-4.8 mcg HFAA Inhale 2 puffs into the lungs 2 (two) times daily. Controller 10.9 g 11   HYDROcodone-acetaminophen (NORCO) 5-325 mg per tablet Take 1 tablet by mouth every 6 (six) hours as needed. 12 tablet 0   ipratropium-albuterol (COMBIVENT RESPIMAT)  mcg/actuation inhaler Inhale 1 puff into the lungs every 6 (six) hours as needed for Shortness of Breath. 4 g 11   levocetirizine (XYZAL) 5 MG tablet Take 5 mg by mouth every evening.       lisinopril (PRINIVIL,ZESTRIL) 40 MG tablet Take 40 mg by mouth once daily.   11   naproxen sodium (ANAPROX) 550 MG tablet Take 1 tablet (550 mg total) by mouth 2 (two) times daily with meals. 20 tablet 0   simvastatin (ZOCOR) 40 MG tablet Take 1 tablet (40 mg total) by mouth every  "evening. 30 tablet 11   SOTALOL AF 80 mg tablet TAKE 1 TABLET BY MOUTH TWICE A DAY 60 tablet 10       Current Facility-Administered Medications on File Prior to Visit  Medication Dose Route Frequency Provider Last Rate Last Dose   lactated ringers infusion   Intravenous Continuous Michael Hameed MD   Stopped at 10/25/18 0924   testosterone cypionate injection 200 mg  200 mg Intramuscular Q21 Days Peter Teixeira MD   200 mg at 04/29/19 0908     He is allergic to atorvastatin and bactrim [sulfamethoxazole-trimethoprim]..      Review of Systems   Constitutional: Negative.    HENT: Positive for sore throat.         Hoarse voice   Eyes: Negative.    Respiratory: Positive for cough (dry).    Gastrointestinal: Negative.    Genitourinary: Negative.    Musculoskeletal: Negative.    Skin: Negative.    Neurological: Negative.    Endo/Heme/Allergies: Negative.    Psychiatric/Behavioral: Negative.            Objective:       Vitals  Vitals:    05/16/19 1528  BP: 138/66  Pulse: 70  Resp: 18  SpO2: 99%  Weight: 87.9 kg (193 lb 12.6 oz)  Height: 5' 10" (1.778 m)          Physical Exam   Constitutional: He is oriented to person, place, and time. He appears well-developed and well-nourished. No distress.   HENT:   Head: Normocephalic and atraumatic.   Eyes: Pupils are equal, round, and reactive to light.   Neck: Normal range of motion.   Cardiovascular: Normal rate, regular rhythm and normal heart sounds.   Pulmonary/Chest: Effort normal and breath sounds normal.   Abdominal: Soft. Bowel sounds are normal.   Musculoskeletal: Normal range of motion. He exhibits no edema.   Neurological: He is alert and oriented to person, place, and time. No cranial nerve deficit.   Skin: Skin is warm and dry. He is not diaphoretic.   Multiple tatoos   Nursing note and vitals reviewed.           Data Review:   CBC:     Lab " Results  Component Value Date    WBC 7.93 05/08/2019    RBC 5.05 05/08/2019    HGB 14.4 05/08/2019    HCT 46.5 05/08/2019    HCT 34 (L) 04/12/2016     05/08/2019     BMP:     Lab Results  Component Value Date    GLU 91 05/08/2019     05/08/2019    K 4.8 05/08/2019     05/08/2019    CO2 31 (H) 05/08/2019    BUN 14 05/08/2019    CREATININE 1.0 05/08/2019    CALCIUM 9.6 05/08/2019     Diagnostics: Pulmonary function tests: FEV1: 1.54  (52.3 % predicted), FVC:  2.33 (58.7 % predicted), FEV1/FVC:  66  restriction       PET SCAN  FINDINGS:  Head/neck: There is normal physiologic uptake noted within the visualized brain parenchyma. No FDG avid adenopathy within the neck. There is deviation of the left vocal cord medially consistent with the given history of left-sided vocal cord paralysis.    Chest: Fluid-filled post-pneumonectomy space seen within the left hemithorax with shift of the cardiomediastinal silhouette to the left. No FDG avid pulmonary nodules noted within the right lung. Along the left and posterior aspect of the trachea is a   focal area of intense FDG uptake that demonstrates an SUV max of 12.0 with what appears to represent soft tissue invasion of the left side of the trachea just above the level of the sternum at approximately the level of T1. It is unclear if this   represents a lymph node extrinsic to the trachea that involves the trachea versus a mass originating from the trachea or esophagus.There is also an FDG avid left paratracheal lymph node inferior to this area that demonstrates a SUV max of 6.4.    Abdomen/Pelvis: Normal physiologic uptake noted within the liver, spleen, urinary tract, and bowel.The adrenals are unremarkable in appearance. Bilateral renal cysts are noted. The gallbladder is surgically absent. There is diverticulosis noted   throughout the colon. No diverticulitis.    Skeletal: No FDG avid osseus lesions identified.   Report Conclusions  IMPRESSION:      1.  Significantly FDG avid mass seen along the posterior and left side of the trachea at approximately the T1 level just above the level of the sternum. It is unclear if this mass originates from the trachea, represents a soft tissue mass/lymph node   extrinsic to the trachea or a mass arising from the esophagus itself. Just inferior to this area is an FDG avid left paratracheal lymph node. The combination of these 2 FDG avid lesions is the likely cause of patient's left vocal cord paralysis.  2. Remaining findings as discussed above.    Assessment:       Problem List Items Addressed This Visit     Hyperlipidemia     Current Assessment & Plan         Stable per PCP          Essential hypertension     Current Assessment & Plan         Stable per PCP          History of cancer of upper lobe bronchus or lung     Malignant neoplasm of upper lobe of left lung     Overview         He had   Resection of a left upper lobe mass in 4/2016. .It was a squamous cell carcinoma.   It measured 3.8 cm. The   pathology indicated that this was extending into the bronchial resection margin of the lobe. This lobe was later on removed to complete the pneumonectomy   There was one lymph node positive for metastatic squamous cell carcinoma at the   AP window.  He had post op   chemo/radiation. ( 4 cycles of carboTaxol).  Last chemo at the end of 9/2016          Asbestos-induced pleural plaque     Current Assessment & Plan         A documented history of exposures.  Worked at [employer].          Paralysis of vocal cords and larynx, unilateral - Primary     Current Assessment & Plan         T1 level lesion suspected involving laryngeal nerve          Abnormal PET scan, mediastinum           Plan:      for EGD and EUS: DR Paez           Follow up in about 3 months (around 8/16/2019), or with DR Ernandez cxr and sspiro, EUS by DR Paez, will disucss Multidisclipnary conference.     This note was prepared using voice recognition system and is likely to have  sound alike errors that may have been overlooked even after proof reading.  Please call me with any questions     Discussed diagnosis, its evaluation, treatment and usual course. All questions answered.     Thank you for the courtesy of participating in the care of this patient     Paresh Goldman MD          Electronically signed by Paresh Goldman MD at 5/16/2019  5:11 PM      Office Visit on 5/16/2019        Detailed Report       Note shared with patient

## 2019-06-21 NOTE — ANESTHESIA POSTPROCEDURE EVALUATION
Anesthesia Post Evaluation    Patient: Chai Murry    Procedure(s) Performed: Procedure(s) (LRB):  Bronchoscopy (Bilateral)    Final Anesthesia Type: general  Patient location during evaluation: PACU  Patient participation: Yes- Able to Participate  Level of consciousness: awake  Post-procedure vital signs: reviewed and stable  Pain management: adequate  Airway patency: patent  PONV status at discharge: No PONV  Anesthetic complications: no      Cardiovascular status: blood pressure returned to baseline and hemodynamically stable  Respiratory status: unassisted, spontaneous ventilation and room air  Hydration status: euvolemic  Follow-up not needed.          Vitals Value Taken Time   /77 6/21/2019  7:13 AM   Temp 36.5 °C (97.7 °F) 6/21/2019  7:13 AM   Pulse 56 6/21/2019  7:13 AM   Resp 18 6/19/2019  1:16 PM   SpO2 100 % 6/21/2019  7:13 AM         No case tracking events are documented in the log.      Pain/Brendan Score: No data recorded

## 2019-06-21 NOTE — DISCHARGE SUMMARY
"[facility]  Discharge Summary     Patient ID:  Chai Murry  [id number]  79 y.o.  1940    Admit date: 6/21/2019    Discharge Date and Time:  06/21/2019 9:03 AM    Admitting Physician: Paresh Goldman MD     Discharge Provider: Paresh Goldman    Reason for Admission: Malignant neoplasm of upper lobe of left lung [C34.12]  Malignant neoplasm of lung, unspecified laterality, unspecified part of lung [C34.90]  Malignant neoplasm of upper lobe of left lung [C34.12]    Admission Condition: good    Procedures Performed: Procedure(s) (LRB):  Bronchoscopy (Bilateral)    Hospital Course (synopsis of major diagnoses, care, treatment, and services provided during the course of the hospital stay):       Consults: None    Significant Diagnostic Studies: bronchoscopy:    Mass SUBGLOTIC  3 cm below VC  Wash, Brush and Bxx 3 obtained    Final Diagnoses:    Principal Problem: Tracheal mass   Secondary Diagnoses:   Active Hospital Problems    Diagnosis  POA    *Tracheal mass: 3 cm BELOW VOCAL CORDS: SUBGLOTIC [J39.8]  Yes    Malignant neoplasm of upper lobe of left lung [C34.12]  Yes     He had   Resection of a left upper lobe mass in 4/2016. .It was a squamous cell carcinoma.   It measured 3.8 cm. The   pathology indicated that this was extending into the bronchial resection margin of the lobe. This lobe was later on removed to complete the pneumonectomy   There was one lymph node positive for metastatic squamous cell carcinoma at the   AP window.  He had post op   chemo/radiation. ( 4 cycles of carboTaxol).  Last chemo at the end of 9/2016        Resolved Hospital Problems   No resolved problems to display.       Discharged Condition: good    Discharge Exam:  /61 (BP Location: Right arm, Patient Position: Lying)   Pulse (!) 54   Temp 97.9 °F (36.6 °C) (Temporal)   Resp 19   Ht 5' 10" (1.778 m)   Wt 86.7 kg (191 lb 2.2 oz)   SpO2 97%   BMI 27.43 kg/m²   Lungs: clear to auscultation " bilaterally    Disposition: Home or Self Care    Follow Up/Patient Instructions:     Medications:  Reconciled Home Medications:      Medication List      ASK your doctor about these medications    albuterol 2.5 mg /3 mL (0.083 %) nebulizer solution  Commonly known as:  PROVENTIL  Take 3 mLs (2.5 mg total) by nebulization every 6 (six) hours while awake.     amLODIPine 5 MG tablet  Commonly known as:  NORVASC  Take 2 tablets (10 mg total) by mouth every evening.     aspirin 81 MG EC tablet  Commonly known as:  ECOTRIN  Take 81 mg by mouth once daily.     BREO ELLIPTA 100-25 mcg/dose diskus inhaler  Generic drug:  fluticasone furoate-vilanterol  INHALE 1 PUFF INTO THE LUNGS ONCE DAILY     doxepin 10 MG capsule  Commonly known as:  SINEQUAN  TAKE 1 CAPSULE (10 MG TOTAL) BY MOUTH EVERY EVENING.     doxycycline 100 MG Cap  Commonly known as:  VIBRAMYCIN  Take 1 capsule (100 mg total) by mouth every 12 (twelve) hours. for 10 days     fenofibric acid 135 mg Cpdr  Commonly known as:  FIBRICOR  TAKE 1 CAPSULE BY MOUTH EVERY DAY     fluticasone propionate 50 mcg/actuation nasal spray  Commonly known as:  FLONASE  2 sprays (100 mcg total) by Each Nare route once daily.     glycopyrrolate-formoterol 9-4.8 mcg Hfaa  Commonly known as:  BEVESPI AEROSPHERE  Inhale 2 puffs into the lungs 2 (two) times daily. Controller     HYDROcodone-acetaminophen 5-325 mg per tablet  Commonly known as:  NORCO  Take 1 tablet by mouth every 6 (six) hours as needed.     ipratropium-albuterol  mcg/actuation inhaler  Commonly known as:  COMBIVENT RESPIMAT  Inhale 1 puff into the lungs every 6 (six) hours as needed for Shortness of Breath.     levocetirizine 5 MG tablet  Commonly known as:  XYZAL  Take 5 mg by mouth every evening.     lisinopril 40 MG tablet  Commonly known as:  PRINIVIL,ZESTRIL  TAKE 1 TABLET BY MOUTH DAILY     naproxen sodium 550 MG tablet  Commonly known as:  ANAPROX  Take 1 tablet (550 mg total) by mouth 2 (two) times daily  with meals.     simvastatin 40 MG tablet  Commonly known as:  ZOCOR  Take 1 tablet (40 mg total) by mouth every evening.     sotalol AF 80 MG tablet  Generic drug:  sotalol  TAKE 1 TABLET BY MOUTH TWICE A DAY          No discharge procedures on file.    Activity: activity as tolerated and no driving for today  Diet: clear liquids, advance as tolerated  Wound Care: none needed    Follow-up with ME in 4 weeks.    Signed:  Paresh Goldman  6/21/2019  9:02 AM

## 2019-06-21 NOTE — TRANSFER OF CARE
"Anesthesia Transfer of Care Note    Patient: Chai Murry    Procedure(s) Performed: Procedure(s) (LRB):  Bronchoscopy (Bilateral)    Patient location: PACU    Anesthesia Type: general    Transport from OR: Transported from OR on room air with adequate spontaneous ventilation    Post pain: adequate analgesia    Post assessment: no apparent anesthetic complications and tolerated procedure well    Post vital signs: stable    Level of consciousness: awake    Nausea/Vomiting: no nausea/vomiting    Complications: none    Transfer of care protocol was followed      Last vitals:   Visit Vitals  /77 (BP Location: Left arm, Patient Position: Lying)   Pulse (!) 56   Temp 36.5 °C (97.7 °F) (Oral)   Ht 5' 10" (1.778 m)   Wt 86.7 kg (191 lb 2.2 oz)   SpO2 100%   BMI 27.43 kg/m²     "

## 2019-06-21 NOTE — ANESTHESIA RELEASE NOTE
"Anesthesia Release from PACU Note    Patient: Chai Murry    Procedure(s) Performed: Procedure(s) (LRB):  Bronchoscopy (Bilateral)    Anesthesia type: general    Post pain: Adequate analgesia    Post assessment: no apparent anesthetic complications, tolerated procedure well and no evidence of recall    Last Vitals:   Visit Vitals  /77 (BP Location: Left arm, Patient Position: Lying)   Pulse (!) 56   Temp 36.5 °C (97.7 °F) (Oral)   Ht 5' 10" (1.778 m)   Wt 86.7 kg (191 lb 2.2 oz)   SpO2 100%   BMI 27.43 kg/m²       Post vital signs: stable    Level of consciousness: awake    Nausea/Vomiting: no nausea/no vomiting    Complications: none    Airway Patency: patent    Respiratory: unassisted, spontaneous ventilation, room air    Cardiovascular: stable and blood pressure at baseline    Hydration: euvolemic  "

## 2019-06-21 NOTE — OR NURSING
Patient sedated, airway in place. Final time out performed, patient and procedure verification agreed on by all staff - RN, Tech, MD and CRNA.

## 2019-06-21 NOTE — DISCHARGE INSTRUCTIONS
Flexible Bronchoscopy  A flexible bronchoscopy is an exam of the airways of your lungs. A thin, flexible tube called a bronchoscope is used. It has a light and small camera that allow the healthcare provider to view your airways.    Before your test  · Follow your healthcare provider's instructions carefully. If you dont, the exam may be canceled. Or you may need to take it again.  · If you are taking blood-thinning medicine, ask your healthcare provider if you should stop taking the medicine before this test.  · Have no food or drink for at least 8 hours before the test. Also, avoid smoking for 24 hours before the test.  · You will need to remove any dentures or removable devices from your mouth.  · Right before the test, you will be given sedating medicines to help you relax. The medicine may be given by an IV (intravenously) into one of your veins. In addition, your nose and throat may be numbed with a special spray to help prevent gagging and coughing.  · If you are having this test as an outpatient, make sure you have an adult friend or family member to drive you home.  During your test  Bronchoscopy takes 45 to 60 minutes and includes the following steps:  · You may be given medicine (anesthesia) so that you are unconscious or asleep during the procedure.  · The healthcare provider inserts the tube into your nose or mouth.  · If you have not been given anesthesia, you might feel a gagging sensation. To help ease this feeling, you will be told to swallow or take deep breaths. Your airway will remain open even with the tube in place. But you wont be able to talk.  · The provider checks your breathing passage. He or she may also remove tiny tissue samples for biopsy.  After your test  · You may have a mild sore throat or cough. Your voice may also be hoarse.  · Don't eat or drink until the anesthesia wears off.  · If you had a biopsy, you might see traces of blood being coughed up.  When to call your  healthcare provider  Call your healthcare provider right away if you have any of the following:  · Shortness of breath  · Chest pain  · Bleeding from your nose or throat  · Coughing up a large amount of blood  · A fever above 100.4°F (38°C) for more than 24 hours  Call 911  Call 911 if you have:  · Chest pain  · Severe shortness of breath   Date Last Reviewed: 12/1/2016  © [id number] [employer]. [address] [place] [address], [place] [address]. All rights reserved. This information is not intended as a substitute for professional medical care. Always follow your healthcare professional's instructions.

## 2019-06-21 NOTE — PLAN OF CARE
MD at bedside. Discussed procedure results and plan of care with patient. Vital signs stable. Will cont to monitor.

## 2019-06-24 ENCOUNTER — TELEPHONE (OUTPATIENT)
Dept: OTOLARYNGOLOGY | Facility: CLINIC | Age: 79
End: 2019-06-24

## 2019-06-24 NOTE — PROGRESS NOTES
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 16 left pneumonectomy Dr. Wall returned moderately differentiated 3.8 cm squamous cell carcinoma with a positive AP window lymph node.  A total of 3 nodes were sampled.  Indeterminate lymph vascular invasion was seen There was a positive bronchial resection margin but in my discussion with Dr. Garza, the margin was apparently cleared in discussion with the surgeon.    2. He completed adjuvant radiation at [facility] and weekly carboplatin paclitaxel x 6.    3. 19 CT chest, abdomen and pelvis showed slight growth in a soft tissue nodule in the superior mediastinum since , and the nodule was not present in 2016.  An additional subcentimeter short axis lymph node in the right paratracheal space was also slightly enlarged.  There was also nodular mucosal thickening along the posterior lateral left intrathoracic tracheal airway adherent secretions versus a polypoid lesion.    4. 5/15/19 PET showed 12 SUV along the left and posterior trachea with soft tissue invasion of the left side of the trachea just above T1.  It was unclear if this was a lymph node extrinsic to the trachea versus a mass in the trachea or esophagus.  There was also a left paratracheal lymph node 6.4 SUV.    5. 19 EUS showed esophagitis in the upper 3rd esophagus.  There was a moderate intrinsic stenosis in the upper 3rd of the esophagus.  There was an irregular mediastinal mass 25 cm from the incisors measuring 2.5 x 3 cm cross-sectional.  Pathology returned poorly differentiated carcinoma, favor squamous cell carcinoma.   6. 19 bronchoscopy left local cord paralysis, non obstructing mass at the orifice in the  "subglottic area, endobronchial, friable, infiltrative and ulcerative.  Pathology squamous cell carcinoma.    CURRENT STATUS: Chai Murry is a pleasant 79 y.o. male who presents today for follow-up.  He had a bronchoscopy with pathology as above.  He continues to have cough with liquids relieved with turning his head to the left when swallowing.    PHYSICAL EXAM:   Constitutional: well appearing, no acute distress, ECOG 0 - Fully Active  Vitals:    BP (!) 146/67   Pulse 61   Temp 98.1 °F (36.7 °C)   Resp 18   Ht 5' 10" (1.778 m)   Wt 86.5 kg (190 lb 9.6 oz)   SpO2 96%   BMI 27.35 kg/m²   Laboratory & X-Ray Findings: Per above.      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    PLAN:  His case has been presented at multidisciplinary conference and discuss with members of the multidisciplinary team including Head and neck Oncology, cardiothoracic surgery, ENT, Medical Oncology, pulmonology and radiology.  He likely has a lung cancer with tracheal invasion and AP window nodes.  However, on PET scan his tumor is just below the pilo and bronchoscopy from Dr. Goldman mentions subcarinal location of the tumor in the trachea.  In my discussion with Dr. Jackson, she scope to the patient with fiberoptic scope a few weeks ago and did not see any hypopharyngeal mass. His case will be presented at Head and neck tumor Board this week.  If this is hypopharynx cancer it is metastatic to the mediastinum and treatment would be palliative with chemoradiation.  If it is lung cancer the treatment is chemoradiation.      I reviewed his previous radiation fields which extends superiorly just to the inferior portion of his current disease.  The spinal cord received 16 Gy max dose, the esophagus 61 Gy and the prescription dose was 60 Gy given AP PA to the middle mediastinum and left " hilum.  The superior mediastinum was not included.  Repeat radiation can be associated with increased toxicities to the structures in the mediastinum, lung, heart and other areas and side effects could be severe or fatal.  However, in my discussion with Dr. Garza systemic therapy alone is unlikely to offer long-term control and we agree that chemoradiation is a reasonable approach in the salvage setting for lung cancer and for metastatic hypopharyngeal cancer.  The patient agrees to proceed with chemoradiation and Dr. Garza will probably give cisplatin and will see the patient tomorrow.  Radiation planning will be performed today and we will begin in about a week.  I will try to treat to around 60 Gy in 30 fractions with IMRT with daily image guidance.    Dr. Garza and I discussed systemic therapy alone but favored chemoradiation.  His tumor is being tested for PD-L1, and next generation sequencing can be considered.  He may be a candidate for adjuvant immunotherapy after chemoradiation.    He is at risk for tracheomediastinal or tracheoesophageal fistula if the tracheal tumor regresses and does not repair. This possibility was discussed at tumor board and he will need to be seen by pulmonology of this develops.  He is also at risk for esophageal stenosis from repeat radiation, damage to blood vessels and nerves, the heart and lung.    HERRERA Castro M.D.  Radiation Oncology  [facility]  [id number] [place] [address] [place] [address]  Ph: [phone]  [email or web address]

## 2019-06-24 NOTE — TELEPHONE ENCOUNTER
Please call and schedule this patient to come and see me today or tomorrow for a repeat scope examination.  Please let the patient know I have spoken with Dr. Castro and he would like for me to take another look at his larynx.  Thank you.  OK to overbook if needed.

## 2019-06-24 NOTE — TELEPHONE ENCOUNTER
I tried to call the patient back to clarify, but he did not answer.  The scope that I am talking about is the one that we did in clinic the very first time I saw him, not anything that would require sedation or the time that Dr. Goldman's scope required.  I am happy to speak to him, but his tumor is extremely complex and we are trying to make sure that all of the specialties are very accurate with regards to his tumor so that we can figure out his best treatment plan.  I would like to see him in clinic, if he wants to speak to me first just let me know.

## 2019-06-24 NOTE — TELEPHONE ENCOUNTER
I conveyed the exact message below.  I explained that Dr. Castro wants you to do another scope exam.  Patient states he was just in the hospital for 4 hours having a scope. I told him this is probably a bit different then the one in the hospital.  Patient wants more information before he is willing to book an appointment.  I apologized to the patient that this is as much as I knew.  I told him I would send the message back to Dr. Jackson and once she responds we will call him back.  Please advise.  Thanks.

## 2019-06-25 ENCOUNTER — OFFICE VISIT (OUTPATIENT)
Dept: RADIATION ONCOLOGY | Facility: CLINIC | Age: 79
End: 2019-06-25
Payer: MEDICARE

## 2019-06-25 ENCOUNTER — HOSPITAL ENCOUNTER (OUTPATIENT)
Dept: RADIOLOGY | Facility: HOSPITAL | Age: 79
Discharge: HOME | End: 2019-06-25
Attending: RADIOLOGY
Payer: MEDICARE

## 2019-06-25 ENCOUNTER — HOSPITAL ENCOUNTER (OUTPATIENT)
Dept: RADIATION THERAPY | Facility: HOSPITAL | Age: 79
Discharge: HOME | End: 2019-06-25
Attending: RADIOLOGY
Payer: MEDICARE

## 2019-06-25 VITALS
RESPIRATION RATE: 18 BRPM | DIASTOLIC BLOOD PRESSURE: 67 MMHG | TEMPERATURE: 98 F | BODY MASS INDEX: 27.29 KG/M2 | HEIGHT: 70 IN | WEIGHT: 190.63 LBS | OXYGEN SATURATION: 96 % | SYSTOLIC BLOOD PRESSURE: 146 MMHG | HEART RATE: 61 BPM

## 2019-06-25 DIAGNOSIS — C34.12 MALIGNANT NEOPLASM OF UPPER LOBE OF LEFT LUNG: Primary | ICD-10-CM

## 2019-06-25 PROCEDURE — 99215 OFFICE O/P EST HI 40 MIN: CPT | Mod: 25,S$GLB,, | Performed by: RADIOLOGY

## 2019-06-25 PROCEDURE — 77334 RADIATION TREATMENT AID(S): CPT | Mod: 26,,, | Performed by: RADIOLOGY

## 2019-06-25 PROCEDURE — 99215 PR OFFICE/OUTPT VISIT, EST, LEVL V, 40-54 MIN: ICD-10-PCS | Mod: 25,S$GLB,, | Performed by: RADIOLOGY

## 2019-06-25 PROCEDURE — 1101F PT FALLS ASSESS-DOCD LE1/YR: CPT | Mod: CPTII,S$GLB,, | Performed by: RADIOLOGY

## 2019-06-25 PROCEDURE — 1101F PR PT FALLS ASSESS DOC 0-1 FALLS W/OUT INJ PAST YR: ICD-10-PCS | Mod: CPTII,S$GLB,, | Performed by: RADIOLOGY

## 2019-06-25 PROCEDURE — 77334 RADIATION TREATMENT AID(S): CPT | Mod: TC | Performed by: RADIOLOGY

## 2019-06-25 PROCEDURE — 77263 PR  RADIATION THERAPY PLAN COMPLEX: ICD-10-PCS | Mod: ,,, | Performed by: RADIOLOGY

## 2019-06-25 PROCEDURE — 99999 PR PBB SHADOW E&M-EST. PATIENT-LVL IV: CPT | Mod: PBBFAC,,, | Performed by: RADIOLOGY

## 2019-06-25 PROCEDURE — 3077F SYST BP >= 140 MM HG: CPT | Mod: CPTII,S$GLB,, | Performed by: RADIOLOGY

## 2019-06-25 PROCEDURE — 77014 HC CT GUIDANCE RADIATION THERAPY FLDS PLACEMENT: CPT | Mod: TC | Performed by: RADIOLOGY

## 2019-06-25 PROCEDURE — 77334 PR  RADN TREATMENT AID(S) COMPLX: ICD-10-PCS | Mod: 26,,, | Performed by: RADIOLOGY

## 2019-06-25 PROCEDURE — 77263 THER RADIOLOGY TX PLNG CPLX: CPT | Mod: ,,, | Performed by: RADIOLOGY

## 2019-06-25 PROCEDURE — 77290 THER RAD SIMULAJ FIELD CPLX: CPT | Mod: TC | Performed by: RADIOLOGY

## 2019-06-25 PROCEDURE — 3078F DIAST BP <80 MM HG: CPT | Mod: CPTII,S$GLB,, | Performed by: RADIOLOGY

## 2019-06-25 PROCEDURE — 77290 THER RAD SIMULAJ FIELD CPLX: CPT | Mod: 26,,, | Performed by: RADIOLOGY

## 2019-06-25 PROCEDURE — 77290 PR  SET RADN THERAPY FIELD COMPLEX: ICD-10-PCS | Mod: 26,,, | Performed by: RADIOLOGY

## 2019-06-25 PROCEDURE — 3078F PR MOST RECENT DIASTOLIC BLOOD PRESSURE < 80 MM HG: ICD-10-PCS | Mod: CPTII,S$GLB,, | Performed by: RADIOLOGY

## 2019-06-25 PROCEDURE — 99999 PR PBB SHADOW E&M-EST. PATIENT-LVL IV: ICD-10-PCS | Mod: PBBFAC,,, | Performed by: RADIOLOGY

## 2019-06-25 PROCEDURE — 3077F PR MOST RECENT SYSTOLIC BLOOD PRESSURE >= 140 MM HG: ICD-10-PCS | Mod: CPTII,S$GLB,, | Performed by: RADIOLOGY

## 2019-06-26 ENCOUNTER — LAB VISIT (OUTPATIENT)
Dept: LAB | Facility: HOSPITAL | Age: 79
End: 2019-06-26
Attending: INTERNAL MEDICINE
Payer: MEDICARE

## 2019-06-26 ENCOUNTER — OFFICE VISIT (OUTPATIENT)
Dept: OTOLARYNGOLOGY | Facility: CLINIC | Age: 79
End: 2019-06-26
Payer: MEDICARE

## 2019-06-26 ENCOUNTER — TELEPHONE (OUTPATIENT)
Dept: HEMATOLOGY/ONCOLOGY | Facility: CLINIC | Age: 79
End: 2019-06-26

## 2019-06-26 ENCOUNTER — OFFICE VISIT (OUTPATIENT)
Dept: HEMATOLOGY/ONCOLOGY | Facility: CLINIC | Age: 79
End: 2019-06-26
Payer: MEDICARE

## 2019-06-26 ENCOUNTER — DOCUMENTATION ONLY (OUTPATIENT)
Dept: HEMATOLOGY/ONCOLOGY | Facility: CLINIC | Age: 79
End: 2019-06-26

## 2019-06-26 VITALS
TEMPERATURE: 98 F | HEIGHT: 70 IN | BODY MASS INDEX: 27.46 KG/M2 | RESPIRATION RATE: 18 BRPM | DIASTOLIC BLOOD PRESSURE: 62 MMHG | SYSTOLIC BLOOD PRESSURE: 110 MMHG | OXYGEN SATURATION: 98 % | HEART RATE: 61 BPM | WEIGHT: 191.81 LBS

## 2019-06-26 VITALS — BODY MASS INDEX: 27.42 KG/M2 | WEIGHT: 191.56 LBS | HEIGHT: 70 IN

## 2019-06-26 DIAGNOSIS — J38.00 VOCAL CORD PARALYSIS: Primary | ICD-10-CM

## 2019-06-26 DIAGNOSIS — C34.12 MALIGNANT NEOPLASM OF UPPER LOBE OF LEFT LUNG: ICD-10-CM

## 2019-06-26 DIAGNOSIS — C34.12 MALIGNANT NEOPLASM OF UPPER LOBE OF LEFT LUNG: Primary | ICD-10-CM

## 2019-06-26 LAB
ALBUMIN SERPL BCP-MCNC: 3.6 G/DL (ref 3.5–5.2)
ALP SERPL-CCNC: 55 U/L (ref 55–135)
ALT SERPL W/O P-5'-P-CCNC: 23 U/L (ref 10–44)
ANION GAP SERPL CALC-SCNC: 5 MMOL/L (ref 8–16)
AST SERPL-CCNC: 25 U/L (ref 10–40)
BASOPHILS # BLD AUTO: 0.08 K/UL (ref 0–0.2)
BASOPHILS NFR BLD: 1 % (ref 0–1.9)
BILIRUB SERPL-MCNC: 0.4 MG/DL (ref 0.1–1)
BUN SERPL-MCNC: 21 MG/DL (ref 8–23)
CALCIUM SERPL-MCNC: 9.9 MG/DL (ref 8.7–10.5)
CHLORIDE SERPL-SCNC: 105 MMOL/L (ref 95–110)
CO2 SERPL-SCNC: 31 MMOL/L (ref 23–29)
CREAT SERPL-MCNC: 1 MG/DL (ref 0.5–1.4)
DIFFERENTIAL METHOD: ABNORMAL
EOSINOPHIL # BLD AUTO: 0.6 K/UL (ref 0–0.5)
EOSINOPHIL NFR BLD: 8.2 % (ref 0–8)
ERYTHROCYTE [DISTWIDTH] IN BLOOD BY AUTOMATED COUNT: 14.6 % (ref 11.5–14.5)
EST. GFR  (AFRICAN AMERICAN): >60 ML/MIN/1.73 M^2
EST. GFR  (NON AFRICAN AMERICAN): >60 ML/MIN/1.73 M^2
GLUCOSE SERPL-MCNC: 100 MG/DL (ref 70–110)
HCT VFR BLD AUTO: 46.5 % (ref 40–54)
HGB BLD-MCNC: 14.4 G/DL (ref 14–18)
IMM GRANULOCYTES # BLD AUTO: 0.02 K/UL (ref 0–0.04)
IMM GRANULOCYTES NFR BLD AUTO: 0.3 % (ref 0–0.5)
LYMPHOCYTES # BLD AUTO: 1 K/UL (ref 1–4.8)
LYMPHOCYTES NFR BLD: 13.1 % (ref 18–48)
MCH RBC QN AUTO: 29.1 PG (ref 27–31)
MCHC RBC AUTO-ENTMCNC: 31 G/DL (ref 32–36)
MCV RBC AUTO: 94 FL (ref 82–98)
MONOCYTES # BLD AUTO: 0.5 K/UL (ref 0.3–1)
MONOCYTES NFR BLD: 6.9 % (ref 4–15)
NEUTROPHILS # BLD AUTO: 5.5 K/UL (ref 1.8–7.7)
NEUTROPHILS NFR BLD: 70.8 % (ref 38–73)
NRBC BLD-RTO: 0 /100 WBC
PLATELET # BLD AUTO: 186 K/UL (ref 150–350)
PMV BLD AUTO: 11 FL (ref 9.2–12.9)
POTASSIUM SERPL-SCNC: 5.3 MMOL/L (ref 3.5–5.1)
PROT SERPL-MCNC: 7.3 G/DL (ref 6–8.4)
RBC # BLD AUTO: 4.95 M/UL (ref 4.6–6.2)
SODIUM SERPL-SCNC: 141 MMOL/L (ref 136–145)
WBC # BLD AUTO: 7.72 K/UL (ref 3.9–12.7)

## 2019-06-26 PROCEDURE — 1101F PR PT FALLS ASSESS DOC 0-1 FALLS W/OUT INJ PAST YR: ICD-10-PCS | Mod: CPTII,S$GLB,, | Performed by: INTERNAL MEDICINE

## 2019-06-26 PROCEDURE — 85025 COMPLETE CBC W/AUTO DIFF WBC: CPT

## 2019-06-26 PROCEDURE — 31575 PR LARYNGOSCOPY, FLEXIBLE; DIAGNOSTIC: ICD-10-PCS | Mod: S$GLB,,, | Performed by: ORTHOPAEDIC SURGERY

## 2019-06-26 PROCEDURE — 3074F SYST BP LT 130 MM HG: CPT | Mod: CPTII,S$GLB,, | Performed by: ORTHOPAEDIC SURGERY

## 2019-06-26 PROCEDURE — 31575 DIAGNOSTIC LARYNGOSCOPY: CPT | Mod: S$GLB,,, | Performed by: ORTHOPAEDIC SURGERY

## 2019-06-26 PROCEDURE — 3078F DIAST BP <80 MM HG: CPT | Mod: CPTII,S$GLB,, | Performed by: ORTHOPAEDIC SURGERY

## 2019-06-26 PROCEDURE — 99999 PR PBB SHADOW E&M-EST. PATIENT-LVL IV: CPT | Mod: PBBFAC,,, | Performed by: INTERNAL MEDICINE

## 2019-06-26 PROCEDURE — 99999 PR PBB SHADOW E&M-EST. PATIENT-LVL III: CPT | Mod: PBBFAC,,, | Performed by: ORTHOPAEDIC SURGERY

## 2019-06-26 PROCEDURE — 99999 PR PBB SHADOW E&M-EST. PATIENT-LVL IV: ICD-10-PCS | Mod: PBBFAC,,, | Performed by: INTERNAL MEDICINE

## 2019-06-26 PROCEDURE — 99215 OFFICE O/P EST HI 40 MIN: CPT | Mod: S$GLB,,, | Performed by: INTERNAL MEDICINE

## 2019-06-26 PROCEDURE — 99214 PR OFFICE/OUTPT VISIT, EST, LEVL IV, 30-39 MIN: ICD-10-PCS | Mod: 25,S$GLB,, | Performed by: ORTHOPAEDIC SURGERY

## 2019-06-26 PROCEDURE — 3074F PR MOST RECENT SYSTOLIC BLOOD PRESSURE < 130 MM HG: ICD-10-PCS | Mod: CPTII,S$GLB,, | Performed by: INTERNAL MEDICINE

## 2019-06-26 PROCEDURE — 3074F PR MOST RECENT SYSTOLIC BLOOD PRESSURE < 130 MM HG: ICD-10-PCS | Mod: CPTII,S$GLB,, | Performed by: ORTHOPAEDIC SURGERY

## 2019-06-26 PROCEDURE — 1101F PT FALLS ASSESS-DOCD LE1/YR: CPT | Mod: CPTII,S$GLB,, | Performed by: INTERNAL MEDICINE

## 2019-06-26 PROCEDURE — 1101F PT FALLS ASSESS-DOCD LE1/YR: CPT | Mod: CPTII,S$GLB,, | Performed by: ORTHOPAEDIC SURGERY

## 2019-06-26 PROCEDURE — 1101F PR PT FALLS ASSESS DOC 0-1 FALLS W/OUT INJ PAST YR: ICD-10-PCS | Mod: CPTII,S$GLB,, | Performed by: ORTHOPAEDIC SURGERY

## 2019-06-26 PROCEDURE — 36415 COLL VENOUS BLD VENIPUNCTURE: CPT

## 2019-06-26 PROCEDURE — 3078F DIAST BP <80 MM HG: CPT | Mod: CPTII,S$GLB,, | Performed by: INTERNAL MEDICINE

## 2019-06-26 PROCEDURE — 99214 OFFICE O/P EST MOD 30 MIN: CPT | Mod: 25,S$GLB,, | Performed by: ORTHOPAEDIC SURGERY

## 2019-06-26 PROCEDURE — 3074F SYST BP LT 130 MM HG: CPT | Mod: CPTII,S$GLB,, | Performed by: INTERNAL MEDICINE

## 2019-06-26 PROCEDURE — 99215 PR OFFICE/OUTPT VISIT, EST, LEVL V, 40-54 MIN: ICD-10-PCS | Mod: S$GLB,,, | Performed by: INTERNAL MEDICINE

## 2019-06-26 PROCEDURE — 3078F PR MOST RECENT DIASTOLIC BLOOD PRESSURE < 80 MM HG: ICD-10-PCS | Mod: CPTII,S$GLB,, | Performed by: INTERNAL MEDICINE

## 2019-06-26 PROCEDURE — 3078F PR MOST RECENT DIASTOLIC BLOOD PRESSURE < 80 MM HG: ICD-10-PCS | Mod: CPTII,S$GLB,, | Performed by: ORTHOPAEDIC SURGERY

## 2019-06-26 PROCEDURE — 80053 COMPREHEN METABOLIC PANEL: CPT

## 2019-06-26 PROCEDURE — 99999 PR PBB SHADOW E&M-EST. PATIENT-LVL III: ICD-10-PCS | Mod: PBBFAC,,, | Performed by: ORTHOPAEDIC SURGERY

## 2019-06-26 RX ORDER — ONDANSETRON 4 MG/1
4 TABLET, FILM COATED ORAL EVERY 12 HOURS PRN
Qty: 30 TABLET | Refills: 1 | Status: SHIPPED | OUTPATIENT
Start: 2019-06-26 | End: 2019-10-24

## 2019-06-26 RX ORDER — SODIUM CHLORIDE 0.9 % (FLUSH) 0.9 %
10 SYRINGE (ML) INJECTION
Status: CANCELLED | OUTPATIENT
Start: 2019-07-08

## 2019-06-26 RX ORDER — HEPARIN 100 UNIT/ML
500 SYRINGE INTRAVENOUS
Status: CANCELLED | OUTPATIENT
Start: 2019-07-08

## 2019-06-26 NOTE — TELEPHONE ENCOUNTER
Called pt to address recent distress screening of 7/10. Pt is familiar to SW as SW met pt when he was diagnosed and treated in 2016. Normalized and validated distress in light of his circumstances. He endorsed treatment concerns, nervousness and worry. He has no specific needs for SW at this time. He will be coming in tomorrow to see NP for chemo education. SW colleague will f/u with pt in person to offer a checklist of services so that pt can review and let us know if there is something particular that we can help him with. Will also provide contact card again as well for future reference.

## 2019-06-26 NOTE — PROGRESS NOTES
Subjective:       Patient ID: Chai Murry is a 79 y.o. male.    Chief Complaint: Lung Cancer    HPI HPI This is a 79 year old  gentleman who comes for follow up of his lung cancer.  He is s/p left pneumonectomy for a squamous cell carcinoma of the left lung.0n 4/12/2016  Prior to the surgery, the PET scan showed an FDG-avid mass in the left upper   lobe abutting the left hilum with an SUV of 11.6. There seemed to be a left   hilar adenopathy as well.  Radiologist felt that he was unable to discriminate between the mass and the   lymphadenopathy. The patient had had a bronchoscopy by Dr. Roel Ernandez on   03/04/2006 that showed a partially obstructing airway in the anterior segment of  the left upper lobe with an endobronchial lesion in the anterior segment of the  left upper lobe. The specimen from the biopsy at the time of bronchoscopy was   reported as being a squamous cell carcinoma.  At the time of the surgery after the left lung was removed, the pathologist   indicated that this was a squamous cell carcinoma. It measured 3.8 cm. The   pathology indicated that this is extending into the bronchial resection margin of the lobe. This lobe was later on removed to complete the pneumonectomy   There was one lymph node positive for metastatic squamous cell carcinoma at the   AP window.  The patient was told that we would prefer to treat him with post-op simultaneous chemo/radiation.  He had Medi-port. He started chemo/radiation therapy andreceived 6 weekly cycles of carbo/Taxol along with radiatioon.  After a month, he had a Ct scan and it showed stability   He then had 2 additional cyles of carbo/Taxol finishing in 9/27/2016.    He comes for follow up.   He developed hoarseness on good Friday 2019 and has been found to have a left vocal cord paralysis by EENT exam. CT of the chest was non contributory, shows changes from surgery  PET showed a PET avid mass to the left of the trachea.  The case was discussed  with dr annika Goldman and GI.  We discussed the possibility of doing a EUS or EBUs, and finally, because of the localization, it was decided to do a EUS with biopsy if possible.  Cytology shows recurrent squamous cell cancer.  I have asked Pathology to run a PD-L1 from his original pathology from 2016.  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  He has been discussed extensively with radiation oncology. We have decided to treat him with radiation therapy and Cisplatin as a chemo-sensitizer.  Dr Castro has reviewed the therapy ports from the previous radiation treatment and the area in question seems to be above the previously radiated fields.   MEDICATIONS: See list.  SURGERIES: Appendectomy at age 3. Bilateral cataract surgery. Infected gland   removed from the abdomen, cholecystectomy, left pneumonectomy..Medi-port placed and removed  SOCIAL HISTORY: He is  with three children. He lives in [facility]. He   used to smoke from age 14 To age 67 or so,, averaging a pack a day. Denies significant   drinking. He worked in a chemical plant for 40 years.  FAMILY HISTORY: Sister had cancer of the throat. Mother had cancer of the   lung. Father had prostate cancer. No diabetes. Paternal grandfather had a   heart attack.  PAST MEDICAL HISTORY:  1. Squamous cell carcinoma of the lung s/p left penumonectomy.  2. Tobacco use.  3. High blood pressure.  4. Arteriosclerotic cardiovascular disease, status post previous heart attacks.  5. Arthritis.   6-left vocal cord paralysis  Review of Systems   Constitutional: Negative.  Negative for fatigue.   Eyes: Negative.    Cardiovascular: Negative.  Negative for chest pain.   Gastrointestinal: Negative for abdominal pain and nausea.   Genitourinary: Negative.  Negative for hematuria.   Musculoskeletal: Negative.    Skin: Negative.    Neurological: Negative.    Psychiatric/Behavioral: Negative.        Objective:      Physical Exam    Constitutional: He is oriented to person, place, and time. He appears well-developed and well-nourished.   HENT:   Head: Normocephalic.   Mouth/Throat: No oropharyngeal exudate.   Eyes: Pupils are equal, round, and reactive to light.   Neck: No thyromegaly present.   Cardiovascular: Normal rate, regular rhythm and normal heart sounds. Exam reveals no gallop.   No murmur heard.  Pulmonary/Chest: No respiratory distress. He has no wheezes. He has no rales.   Abdominal: Soft. Bowel sounds are normal. He exhibits no distension and no mass. There is no rebound and no guarding.   Musculoskeletal: Normal range of motion. He exhibits no edema.   Lymphadenopathy:     He has no cervical adenopathy.   Neurological: He is alert and oriented to person, place, and time.   Skin: Skin is warm and dry.   Psychiatric: He has a normal mood and affect. His behavior is normal.       Wt Readings from Last 3 Encounters:   06/26/19 87 kg (191 lb 12.8 oz)   06/25/19 86.5 kg (190 lb 9.6 oz)   06/21/19 86.7 kg (191 lb 2.2 oz)     Temp Readings from Last 3 Encounters:   06/26/19 98.2 °F (36.8 °C)   06/25/19 98.1 °F (36.7 °C)   06/21/19 97.9 °F (36.6 °C) (Temporal)     BP Readings from Last 3 Encounters:   06/26/19 110/62   06/25/19 (!) 146/67   06/21/19 (!) 102/57     Pulse Readings from Last 3 Encounters:   06/26/19 61   06/25/19 61   06/21/19 (!) 53       Assessment:       1. Malignant neoplasm of upper lobe of left lung        Plan:       .lwe have discussed with the patient what our plan is.  Pathology has been asked to expedite the PDL-1 test result.  We plan on giving his weekly cisplatin along with radiation. Plan is to start chemo/radiation next week.He will sign condent today. He has received chemotherapy before. reminded about potential side effects including alopecia, nausea, vomiting, myelosuppression, neuro-toxicity, nephrotoxicity.  [facility]     CBC/CMP today.  Start Cisplatin next July  3rd  Audiogram ordered as x protocol. Nurses let me know he will need a mediport so we will ask surgery to see.See me in 2 weeks with a cbc/cmp

## 2019-06-27 ENCOUNTER — CLINICAL SUPPORT (OUTPATIENT)
Dept: AUDIOLOGY | Facility: CLINIC | Age: 79
End: 2019-06-27
Payer: MEDICARE

## 2019-06-27 ENCOUNTER — OFFICE VISIT (OUTPATIENT)
Dept: HEMATOLOGY/ONCOLOGY | Facility: CLINIC | Age: 79
End: 2019-06-27
Payer: MEDICARE

## 2019-06-27 ENCOUNTER — OFFICE VISIT (OUTPATIENT)
Dept: SURGERY | Facility: CLINIC | Age: 79
End: 2019-06-27
Payer: MEDICARE

## 2019-06-27 VITALS
TEMPERATURE: 98 F | BODY MASS INDEX: 27.34 KG/M2 | WEIGHT: 190.94 LBS | SYSTOLIC BLOOD PRESSURE: 107 MMHG | HEIGHT: 70 IN | DIASTOLIC BLOOD PRESSURE: 65 MMHG | HEART RATE: 58 BPM

## 2019-06-27 DIAGNOSIS — C34.12 MALIGNANT NEOPLASM OF UPPER LOBE OF LEFT LUNG: ICD-10-CM

## 2019-06-27 DIAGNOSIS — H90.3 SENSORINEURAL HEARING LOSS, BILATERAL: Primary | ICD-10-CM

## 2019-06-27 DIAGNOSIS — H93.13 TINNITUS, BILATERAL: ICD-10-CM

## 2019-06-27 DIAGNOSIS — C34.90 MALIGNANT NEOPLASM OF LUNG, UNSPECIFIED LATERALITY, UNSPECIFIED PART OF LUNG: Primary | ICD-10-CM

## 2019-06-27 PROCEDURE — 3078F PR MOST RECENT DIASTOLIC BLOOD PRESSURE < 80 MM HG: ICD-10-PCS | Mod: CPTII,S$GLB,, | Performed by: SURGERY

## 2019-06-27 PROCEDURE — 99999 PR PBB SHADOW E&M-EST. PATIENT-LVL V: ICD-10-PCS | Mod: PBBFAC,,, | Performed by: SURGERY

## 2019-06-27 PROCEDURE — 99999 PR PBB SHADOW E&M-EST. PATIENT-LVL I: CPT | Mod: PBBFAC,,, | Performed by: NURSE PRACTITIONER

## 2019-06-27 PROCEDURE — 92557 PR COMPREHENSIVE HEARING TEST: ICD-10-PCS | Mod: S$GLB,,, | Performed by: AUDIOLOGIST-HEARING AID FITTER

## 2019-06-27 PROCEDURE — 1101F PR PT FALLS ASSESS DOC 0-1 FALLS W/OUT INJ PAST YR: ICD-10-PCS | Mod: CPTII,S$GLB,, | Performed by: NURSE PRACTITIONER

## 2019-06-27 PROCEDURE — 3078F DIAST BP <80 MM HG: CPT | Mod: CPTII,S$GLB,, | Performed by: NURSE PRACTITIONER

## 2019-06-27 PROCEDURE — 3074F SYST BP LT 130 MM HG: CPT | Mod: CPTII,S$GLB,, | Performed by: SURGERY

## 2019-06-27 PROCEDURE — 1101F PT FALLS ASSESS-DOCD LE1/YR: CPT | Mod: CPTII,S$GLB,, | Performed by: SURGERY

## 2019-06-27 PROCEDURE — 99214 OFFICE O/P EST MOD 30 MIN: CPT | Mod: S$GLB,,, | Performed by: SURGERY

## 2019-06-27 PROCEDURE — 99214 PR OFFICE/OUTPT VISIT, EST, LEVL IV, 30-39 MIN: ICD-10-PCS | Mod: S$GLB,,, | Performed by: SURGERY

## 2019-06-27 PROCEDURE — 3078F DIAST BP <80 MM HG: CPT | Mod: CPTII,S$GLB,, | Performed by: SURGERY

## 2019-06-27 PROCEDURE — 92567 TYMPANOMETRY: CPT | Mod: S$GLB,,, | Performed by: AUDIOLOGIST-HEARING AID FITTER

## 2019-06-27 PROCEDURE — 1101F PT FALLS ASSESS-DOCD LE1/YR: CPT | Mod: CPTII,S$GLB,, | Performed by: NURSE PRACTITIONER

## 2019-06-27 PROCEDURE — 3074F SYST BP LT 130 MM HG: CPT | Mod: CPTII,S$GLB,, | Performed by: NURSE PRACTITIONER

## 2019-06-27 PROCEDURE — 99215 OFFICE O/P EST HI 40 MIN: CPT | Mod: S$GLB,,, | Performed by: NURSE PRACTITIONER

## 2019-06-27 PROCEDURE — 3078F PR MOST RECENT DIASTOLIC BLOOD PRESSURE < 80 MM HG: ICD-10-PCS | Mod: CPTII,S$GLB,, | Performed by: NURSE PRACTITIONER

## 2019-06-27 PROCEDURE — 99999 PR PBB SHADOW E&M-EST. PATIENT-LVL I: ICD-10-PCS | Mod: PBBFAC,,, | Performed by: NURSE PRACTITIONER

## 2019-06-27 PROCEDURE — 3074F PR MOST RECENT SYSTOLIC BLOOD PRESSURE < 130 MM HG: ICD-10-PCS | Mod: CPTII,S$GLB,, | Performed by: SURGERY

## 2019-06-27 PROCEDURE — 99215 PR OFFICE/OUTPT VISIT, EST, LEVL V, 40-54 MIN: ICD-10-PCS | Mod: S$GLB,,, | Performed by: NURSE PRACTITIONER

## 2019-06-27 PROCEDURE — 99999 PR PBB SHADOW E&M-EST. PATIENT-LVL V: CPT | Mod: PBBFAC,,, | Performed by: SURGERY

## 2019-06-27 PROCEDURE — 92557 COMPREHENSIVE HEARING TEST: CPT | Mod: S$GLB,,, | Performed by: AUDIOLOGIST-HEARING AID FITTER

## 2019-06-27 PROCEDURE — 1101F PR PT FALLS ASSESS DOC 0-1 FALLS W/OUT INJ PAST YR: ICD-10-PCS | Mod: CPTII,S$GLB,, | Performed by: SURGERY

## 2019-06-27 PROCEDURE — 3074F PR MOST RECENT SYSTOLIC BLOOD PRESSURE < 130 MM HG: ICD-10-PCS | Mod: CPTII,S$GLB,, | Performed by: NURSE PRACTITIONER

## 2019-06-27 PROCEDURE — 92567 PR TYMPA2METRY: ICD-10-PCS | Mod: S$GLB,,, | Performed by: AUDIOLOGIST-HEARING AID FITTER

## 2019-06-27 RX ORDER — LIDOCAINE HYDROCHLORIDE 10 MG/ML
1 INJECTION, SOLUTION EPIDURAL; INFILTRATION; INTRACAUDAL; PERINEURAL ONCE
Status: DISCONTINUED | OUTPATIENT
Start: 2019-06-27 | End: 2019-07-03 | Stop reason: HOSPADM

## 2019-06-27 RX ORDER — ONDANSETRON 4 MG/1
8 TABLET, ORALLY DISINTEGRATING ORAL EVERY 8 HOURS PRN
Status: CANCELLED | OUTPATIENT
Start: 2019-06-27

## 2019-06-27 NOTE — H&P (VIEW-ONLY)
Patient ID: Chai Murry is a 79 y.o. male.    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  He developed hoarseness related to invasion of the recurrent laryngeal nerve. He has a history of having a left pneumonectomy.  He had a left-sided MediPort in the past.    Patient's only complaint is some generalized fatigue and dyspnea on exertion      Review of Systems   Respiratory: Positive for shortness of breath (on exsertion).        Current Outpatient Medications   Medication Sig Dispense Refill    albuterol (PROVENTIL) 2.5 mg /3 mL (0.083 %) nebulizer solution Take 3 mLs (2.5 mg total) by nebulization every 6 (six) hours while awake. 270 mL 11    amLODIPine (NORVASC) 5 MG tablet Take 2 tablets (10 mg total) by mouth every evening. (Patient taking differently: Take 5 mg by mouth once daily. ) 60 tablet 0    aspirin (ECOTRIN) 81 MG EC tablet Take 81 mg by mouth once daily.      BREO ELLIPTA 100-25 mcg/dose diskus inhaler INHALE 1 PUFF INTO THE LUNGS ONCE DAILY  5    doxepin (SINEQUAN) 10 MG capsule TAKE 1 CAPSULE (10 MG TOTAL) BY MOUTH EVERY EVENING. 30 capsule 10    fenofibric acid (FIBRICOR) 135 mg CpDR TAKE 1 CAPSULE BY MOUTH EVERY DAY 30 capsule 11    fluticasone (FLONASE) 50 mcg/actuation nasal spray 2 sprays (100 mcg total) by Each Nare route once daily. 3 Bottle 3    glycopyrrolate-formoterol (BEVESPI AEROSPHERE) 9-4.8 mcg HFAA Inhale 2 puffs into the lungs 2 (two) times daily. Controller 10.9 g 11    ipratropium-albuterol (COMBIVENT RESPIMAT)  mcg/actuation inhaler Inhale 1 puff into the lungs every 6 (six) hours as needed for Shortness of Breath. 4 g 11    lisinopril (PRINIVIL,ZESTRIL) 40 MG tablet TAKE 1 TABLET BY MOUTH DAILY 30 tablet 11    ondansetron (ZOFRAN) 4 MG tablet Take 1 tablet (4 mg total) by mouth every  12 (twelve) hours as needed for Nausea. 30 tablet 1    simvastatin (ZOCOR) 40 MG tablet Take 1 tablet (40 mg total) by mouth every evening. 30 tablet 11    SOTALOL AF 80 mg tablet TAKE 1 TABLET BY MOUTH TWICE A DAY 60 tablet 10    HYDROcodone-acetaminophen (NORCO) 5-325 mg per tablet Take 1 tablet by mouth every 6 (six) hours as needed. 12 tablet 0    levocetirizine (XYZAL) 5 MG tablet Take 5 mg by mouth every evening.      naproxen sodium (ANAPROX) 550 MG tablet Take 1 tablet (550 mg total) by mouth 2 (two) times daily with meals. 20 tablet 0     Current Facility-Administered Medications   Medication Dose Route Frequency Provider Last Rate Last Dose    testosterone cypionate injection 200 mg  200 mg Intramuscular Q21 Days Peter Teixeira MD   200 mg at 05/20/19 0824     Facility-Administered Medications Ordered in Other Visits   Medication Dose Route Frequency Provider Last Rate Last Dose    lactated ringers infusion   Intravenous Continuous Michael Hameed MD   Stopped at 06/21/19 0837       Review of patient's allergies indicates:   Allergen Reactions    Atorvastatin      Other reaction(s): Muscle pain    Bactrim [sulfamethoxazole-trimethoprim]      Lip swelling       Past Medical History:   Diagnosis Date    Anemia     Arthritis     Atrial fibrillation     CAD (coronary artery disease)     Cancer     lung cancer-Left    Cataract     COPD (chronic obstructive pulmonary disease)     Diverticulosis     ED (erectile dysfunction)     HTN (hypertension)     Hyperlipidemia     Myocardial infarction     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 Courtney dwyer bx       Past Surgical History:   Procedure Laterality Date    APPENDECTOMY      Bronchoscopy Bilateral 6/21/2019    [facility]    BRONCHOSCOPY- insert lighted tube into airway to obtain biopsy of lung Bilateral 3/4/2016    [facility] by [facility]el [facility] at [facility]  ENDO    CARDIAC CATHETERIZATION      cardiac stents      CATARACT EXTRACTION W/  INTRAOCULAR LENS IMPLANT Right 9-3-14    CHOLECYSTECTOMY      COLONOSCOPY N/A 10/25/2018    [facility] at [facility]    COLONOSCOPY N/A 4/17/2018    Performed by Dionne Doan MD at [facility] ENDO    Colonoscopy N/A 8/12/2014    Performed by Eriberto Simpson MD at [facility] ENDO    EGD (ESOPHAGOGASTRODUODENOSCOPY) N/A 6/11/2019    Performed by [facility]k Knox MD at [facility] ENDO    infected stomach gland excision      INSERTION-PORT Left 5/26/2016    [facility] at [facility] OR    LOBECTOMY-LUNG Left 4/12/2016    [facility] at [facility] OR    LUNG REMOVAL, TOTAL Left 04/2016    lung cancer left upper lobe    PNEUMONECTOMY Left 4/12/2016    [facility] at [facility] OR    SKIN BIOPSY Left     arm    THORACOTOMY Left 4/12/2016    [facility] at [facility] OR    ULTRASOUND, UPPER GI TRACT, ENDOSCOPIC N/A 6/11/2019    Performed by [facility]k Knox [facility] at [facility] ENDO       Family History   Problem Relation Age of Onset    Esophageal cancer Sister     Cancer Sister     Lung cancer Mother     Cancer Mother     Cataracts Mother     Hypertension Mother     Pneumonia Father     Cataracts Father     Hypertension Father     Cancer Brother     Hypertension Brother     Blindness Neg Hx     Diabetes Neg Hx     Glaucoma Neg Hx     Macular degeneration Neg Hx     Retinal detachment Neg Hx     Strabismus Neg Hx     Stroke Neg Hx     Thyroid disease Neg Hx        Social History     Socioeconomic History    Marital status:      Spouse name: Not on file    Number of children: 3    Years of education: Not on file    Highest education level: Not on file   Occupational History    Occupation: retired   Social Needs    Financial resource strain: Not on file    Food insecurity:     Worry: Not on file     Inability: Not on file     Transportation needs:     Medical: Not on file     Non-medical: Not on file   Tobacco Use    Smoking status: Former Smoker     Packs/day: 1.00     Years: 50.00     Pack years: 50.00     Last attempt to quit: 2005     Years since quittin.8    Smokeless tobacco: Never Used   Substance and Sexual Activity    Alcohol use: No    Drug use: No    Sexual activity: Not Currently   Lifestyle    Physical activity:     Days per week: Not on file     Minutes per session: Not on file    Stress: Not on file   Relationships    Social connections:     Talks on phone: Not on file     Gets together: Not on file     Attends [employer] service: Not on file     Active member of club or organization: Not on file     Attends meetings of clubs or organizations: Not on file     Relationship status: Not on file   Other Topics Concern    Not on file   Social History Narrative    Not on file       Vitals:    19 1458   BP: 107/65   Pulse: (!) 58   Temp: 98.2 °F (36.8 °C)       Physical Exam   Constitutional:   Barrel chest   HENT:   Head: Normocephalic and atraumatic.   Eyes: Pupils are equal, round, and reactive to light.   Neck: Normal range of motion. Neck supple.   Cardiovascular: Normal rate, regular rhythm and normal heart sounds.   Pulmonary/Chest: Effort normal and breath sounds normal.   Abdominal: Soft. Bowel sounds are normal. He exhibits no distension. There is no tenderness.   Neurological: He is alert.   Skin: Skin is warm and dry.   Well-healed scar on the left chest below the clavicle   Vitals reviewed.    Pathology was reviewed  Imaging studies were reviewed  Laboratories were reviewed    Assessment & Plan:      Chai was seen today for consult.    Diagnoses and all orders for this visit:    Malignant neoplasm of lung, unspecified laterality, unspecified part of lung  -     Vital signs ; Standing  -     Insert peripheral IV; Standing  -     Verify consent; Standing  -     Chlorhexidine (CHG) 2% Wipes;  Standing  -     Diet NPO Except for: Ice Chips, Sips with Medication, Medication; Standing  -     Case Request Operating Room: [id number]  -     Full code; Standing  -     Place in Outpatient; Standing  -     Place sequential compression device; Standing  -     Insert peripheral IV  -     Full code  MediPort placement on July 3th.  Will have her hold his aspirin today    Other orders  -     lidocaine (PF) 10 mg/ml (1%) injection 10 mg  -     IP VTE HIGH RISK PATIENT; Standing  -     ondansetron disintegrating tablet 8 mg  -     promethazine (PHENERGAN) 6.25 mg in dextrose 5 % 50 mL IVPB  -     ceFAZolin (ANCEF) 2 g in dextrose 5 % 50 mL IVPB    The need for MediPort placement was discussed. The risks,benefits, and potential complications were discussed.  This includes infection, bleeding, Pneumothorax, hemothorax, injury to the great vessels, loss of the Guidewire or catheter.  I also discussed embolism of air or fat, pericardial tamponade, narrowing or stenosis of the vessels and infection of the port site.    Complete list of complications were reviewed and are listed on the consent form.  Informed consent was obtained.  Surgery  was scheduled.  Preoperative instructions were given

## 2019-06-27 NOTE — LETTER
June 27, 2019      Jorge L Patel MD  [id number] [facility] [address]           [facility]  [address] [place] [address]  Phone: [phone]  Fax: [phone]          Patient: Chai Murry   MR Number: [id number]   YOB: 1940   Date of Visit: 6/27/2019       Dear Dr. Jorge L Patel:    Thank you for referring Chai Murry to me for evaluation. Attached you will find relevant portions of my assessment and plan of care.    If you have questions, please do not hesitate to call me. I look forward to following Chai Murry along with you.    Sincerely,    Jean Carlos Constantino MD    Enclosure  CC:  No Recipients    If you would like to receive this communication electronically, please contact [email or web address] or [phone] to request more information on [employer] access.    For providers and/or their staff who would like to refer a patient to [facility], please contact us through our one-stop-shop provider referral line, [facility], at [phone].    If you feel you have received this communication in error or would no longer like to receive these types of communications, please e-mail [email or web address]

## 2019-06-27 NOTE — PROGRESS NOTES
Patient ID: Chai Murry is a 79 y.o. male.    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  He developed hoarseness related to invasion of the recurrent laryngeal nerve. He has a history of having a left pneumonectomy.  He had a left-sided MediPort in the past.    Patient's only complaint is some generalized fatigue and dyspnea on exertion      Review of Systems   Respiratory: Positive for shortness of breath (on exsertion).        Current Outpatient Medications   Medication Sig Dispense Refill    albuterol (PROVENTIL) 2.5 mg /3 mL (0.083 %) nebulizer solution Take 3 mLs (2.5 mg total) by nebulization every 6 (six) hours while awake. 270 mL 11    amLODIPine (NORVASC) 5 MG tablet Take 2 tablets (10 mg total) by mouth every evening. (Patient taking differently: Take 5 mg by mouth once daily. ) 60 tablet 0    aspirin (ECOTRIN) 81 MG EC tablet Take 81 mg by mouth once daily.      BREO ELLIPTA 100-25 mcg/dose diskus inhaler INHALE 1 PUFF INTO THE LUNGS ONCE DAILY  5    doxepin (SINEQUAN) 10 MG capsule TAKE 1 CAPSULE (10 MG TOTAL) BY MOUTH EVERY EVENING. 30 capsule 10    fenofibric acid (FIBRICOR) 135 mg CpDR TAKE 1 CAPSULE BY MOUTH EVERY DAY 30 capsule 11    fluticasone (FLONASE) 50 mcg/actuation nasal spray 2 sprays (100 mcg total) by Each Nare route once daily. 3 Bottle 3    glycopyrrolate-formoterol (BEVESPI AEROSPHERE) 9-4.8 mcg HFAA Inhale 2 puffs into the lungs 2 (two) times daily. Controller 10.9 g 11    ipratropium-albuterol (COMBIVENT RESPIMAT)  mcg/actuation inhaler Inhale 1 puff into the lungs every 6 (six) hours as needed for Shortness of Breath. 4 g 11    lisinopril (PRINIVIL,ZESTRIL) 40 MG tablet TAKE 1 TABLET BY MOUTH DAILY 30 tablet 11    ondansetron (ZOFRAN) 4 MG tablet Take 1 tablet (4 mg total) by mouth every  12 (twelve) hours as needed for Nausea. 30 tablet 1    simvastatin (ZOCOR) 40 MG tablet Take 1 tablet (40 mg total) by mouth every evening. 30 tablet 11    SOTALOL AF 80 mg tablet TAKE 1 TABLET BY MOUTH TWICE A DAY 60 tablet 10    HYDROcodone-acetaminophen (NORCO) 5-325 mg per tablet Take 1 tablet by mouth every 6 (six) hours as needed. 12 tablet 0    levocetirizine (XYZAL) 5 MG tablet Take 5 mg by mouth every evening.      naproxen sodium (ANAPROX) 550 MG tablet Take 1 tablet (550 mg total) by mouth 2 (two) times daily with meals. 20 tablet 0     Current Facility-Administered Medications   Medication Dose Route Frequency Provider Last Rate Last Dose    testosterone cypionate injection 200 mg  200 mg Intramuscular Q21 Days Peter Teixeira MD   200 mg at 05/20/19 0824     Facility-Administered Medications Ordered in Other Visits   Medication Dose Route Frequency Provider Last Rate Last Dose    lactated ringers infusion   Intravenous Continuous Michael Hameed MD   Stopped at 06/21/19 0837       Review of patient's allergies indicates:   Allergen Reactions    Atorvastatin      Other reaction(s): Muscle pain    Bactrim [sulfamethoxazole-trimethoprim]      Lip swelling       Past Medical History:   Diagnosis Date    Anemia     Arthritis     Atrial fibrillation     CAD (coronary artery disease)     Cancer     lung cancer-Left    Cataract     COPD (chronic obstructive pulmonary disease)     Diverticulosis     ED (erectile dysfunction)     HTN (hypertension)     Hyperlipidemia     Myocardial infarction     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 Courtney dwyer bx       Past Surgical History:   Procedure Laterality Date    APPENDECTOMY      Bronchoscopy Bilateral 6/21/2019    [facility] ENDO    BRONCHOSCOPY- insert lighted tube into airway to obtain biopsy of lung Bilateral 3/4/2016    [facility]formed by [facility]el Ernandez [facility] at [facility]  ENDO    CARDIAC CATHETERIZATION      cardiac stents      CATARACT EXTRACTION W/  INTRAOCULAR LENS IMPLANT Right 9-3-14    CHOLECYSTECTOMY      COLONOSCOPY N/A 10/25/2018    [facility] ENDO    COLONOSCOPY N/A 4/17/2018    [facility]formed by Dionne Doan MD at [facility] ENDO    Colonoscopy N/A 8/12/2014    Performed by Eriberto Simpson MD at [facility] ENDO    EGD (ESOPHAGOGASTRODUODENOSCOPY) N/A 6/11/2019    [facility]formed by [facility]k Knox [facility] at [facility] ENDO    infected stomach gland excision      INSERTION-PORT Left 5/26/2016    [facility] at [facility] OR    LOBECTOMY-LUNG Left 4/12/2016    [facility] at [facility] OR    LUNG REMOVAL, TOTAL Left 04/2016    lung cancer left upper lobe    PNEUMONECTOMY Left 4/12/2016    [facility] at [facility] OR    SKIN BIOPSY Left     arm    THORACOTOMY Left 4/12/2016    [facility] at [facility] OR    ULTRASOUND, UPPER GI TRACT, ENDOSCOPIC N/A 6/11/2019    Performed by [facility]k Knox MD at [facility] ENDO       Family History   Problem Relation Age of Onset    Esophageal cancer Sister     Cancer Sister     Lung cancer Mother     Cancer Mother     Cataracts Mother     Hypertension Mother     Pneumonia Father     Cataracts Father     Hypertension Father     Cancer Brother     Hypertension Brother     Blindness Neg Hx     Diabetes Neg Hx     Glaucoma Neg Hx     Macular degeneration Neg Hx     Retinal detachment Neg Hx     Strabismus Neg Hx     Stroke Neg Hx     Thyroid disease Neg Hx        Social History     Socioeconomic History    Marital status:      Spouse name: Not on file    Number of children: 3    Years of education: Not on file    Highest education level: Not on file   Occupational History    Occupation: retired   Social Needs    Financial resource strain: Not on file    Food insecurity:     Worry: Not on file     Inability: Not on file     Transportation needs:     Medical: Not on file     Non-medical: Not on file   Tobacco Use    Smoking status: Former Smoker     Packs/day: 1.00     Years: 50.00     Pack years: 50.00     Last attempt to quit: 2005     Years since quittin.8    Smokeless tobacco: Never Used   Substance and Sexual Activity    Alcohol use: No    Drug use: No    Sexual activity: Not Currently   Lifestyle    Physical activity:     Days per week: Not on file     Minutes per session: Not on file    Stress: Not on file   Relationships    Social connections:     Talks on phone: Not on file     Gets together: Not on file     Attends [employer] service: Not on file     Active member of club or organization: Not on file     Attends meetings of clubs or organizations: Not on file     Relationship status: Not on file   Other Topics Concern    Not on file   Social History Narrative    Not on file       Vitals:    19 1458   BP: 107/65   Pulse: (!) 58   Temp: 98.2 °F (36.8 °C)       Physical Exam   Constitutional:   Barrel chest   HENT:   Head: Normocephalic and atraumatic.   Eyes: Pupils are equal, round, and reactive to light.   Neck: Normal range of motion. Neck supple.   Cardiovascular: Normal rate, regular rhythm and normal heart sounds.   Pulmonary/Chest: Effort normal and breath sounds normal.   Abdominal: Soft. Bowel sounds are normal. He exhibits no distension. There is no tenderness.   Neurological: He is alert.   Skin: Skin is warm and dry.   Well-healed scar on the left chest below the clavicle   Vitals reviewed.    Pathology was reviewed  Imaging studies were reviewed  Laboratories were reviewed    Assessment & Plan:      Chai was seen today for consult.    Diagnoses and all orders for this visit:    Malignant neoplasm of lung, unspecified laterality, unspecified part of lung  -     Vital signs ; Standing  -     Insert peripheral IV; Standing  -     Verify consent; Standing  -     Chlorhexidine (CHG) 2% Wipes;  Standing  -     Diet NPO Except for: Ice Chips, Sips with Medication, Medication; Standing  -     Case Request Operating Room: [id number]  -     Full code; Standing  -     Place in Outpatient; Standing  -     Place sequential compression device; Standing  -     Insert peripheral IV  -     Full code  MediPort placement on July 3th.  Will have her hold his aspirin today    Other orders  -     lidocaine (PF) 10 mg/ml (1%) injection 10 mg  -     IP VTE HIGH RISK PATIENT; Standing  -     ondansetron disintegrating tablet 8 mg  -     promethazine (PHENERGAN) 6.25 mg in dextrose 5 % 50 mL IVPB  -     ceFAZolin (ANCEF) 2 g in dextrose 5 % 50 mL IVPB    The need for MediPort placement was discussed. The risks,benefits, and potential complications were discussed.  This includes infection, bleeding, Pneumothorax, hemothorax, injury to the great vessels, loss of the Guidewire or catheter.  I also discussed embolism of air or fat, pericardial tamponade, narrowing or stenosis of the vessels and infection of the port site.    Complete list of complications were reviewed and are listed on the consent form.  Informed consent was obtained.  Surgery  was scheduled.  Preoperative instructions were given

## 2019-06-27 NOTE — PROGRESS NOTES
78 y/o male who [facility] today for chemotherapy teaching.  Patient given the Navigation Notebook.  I had a detailed discussion with patient in regards to how to use notebook.  I had a detailed discussion with patient regarding the rationale for chemotherapy, how the chemotherapy works, the process of treatment, potential side effects along with managing the potential side effects.  Also discussed with patient signs and symptoms to report.     I had a detailed discussion with the patient and reviewed the specific medication(s) and gave them a handout describing the potential side effects of CISPLATIN along with the management of those potential side effects. Also discussed with patient signs and symptoms to report.     Discussed in detail potential side effects such as:  Nausea and vomiting  Bone Marrow Suppression  Thrombocytopenia precautions  Anemia  Leukopenia  Fatigue  Anorexia  Alopecia  Stomatitis  Diarrhea  Cystitis  Gastritis  Fever > 100.4  Antiemetics instructions  Skin care  Constipation  Hyperpigmentation  Rash  Photosensitivity  Sunscreen SPF 30   Small frequent meals  Increased protein  Increased calories  Vitamin support   Taste alterations  Neuropathys  Increased Hydration  Renal toxicity   DVTs  Stress   Depression   Port management  Peripheral IV   Community resources    60 Minutes face to face time spent with patient educating him regarding his chemotherapy regimen

## 2019-06-27 NOTE — PATIENT INSTRUCTIONS
"Surgery scheduled for July 3rd at 7 in the morning.  The hospital call you with the time of arrival.    Please stop your aspirin today.    There are no preop studies that you need.    Please take your morning medications with a sip of water.        [facility]  Instructions for Patients and Families    You are invited to share your experience with me and my staff.  If you receive a survey in the mail, please return it at your convenience, or complete a brief survey on [email or web address].  We value your opinion!        Did you know that [facility] can be used to make appointments?  Just select "Schedule Appointment" under the "Visits" menu.    Notify you if any of your preop laboratories show abnormalities.  I    Before surgery:  The pre-op nurse from the hospital will call you before the day of your surgery to confirm your arrival time.  The time of your surgery may change due to emergencies or other unforeseen events.  Do not eat or drink anything after midnight the night before your procedure, except for clear liquids up to three hours before your surgery time, and sips of water with medication.  If you are not diabetic, it is recommended that you drink a glass of clear fruit juice (apple, grape, cranberry, not orange) three hours before your surgery time, but nothing after that.  If you are diabetic, you may have water or sugar-free clear liquids such as Crystal Light up to three hours before your surgery time.    Day of Surgery:  · You will go to a pre-op area where an IV will be started and you will speak to the anesthesiologist and surgeon.  · Your family will be updated throughout the operation.  After surgery, your family member may be taken to a private room for consultation with the surgeon.  This is for the privacy of your medical information and does not necessarily mean there is anything wrong.    If your incisions have:  · Glue:  You may take a bath or shower immediately and wash " your skin as you normally do.  The glue will eventually crumble or peel off. Do not let your incisions soak under water.  · Strips: Leave them on, but it is OK if they fall off on their own. It is OK to get them wet 48 hours after surgery.  · Bandage: You may remove it 2 days after your surgery, and then you may leave the incision open and take a shower or bath, unless otherwise instructed. If your bandage has clear plastic, you may shower with it on and then remove it 2 days after surgery.    Activities  · Walking is recommended after surgery; bed rest is not recommended unless specifically ordered.  · If you have had abdominal surgery, do not lift over 20 pounds for 4 weeks after surgery.  · If you have had hernia surgery, do not lift over 20 pounds for 6 weeks after surgery.  · You may drive when you are off your post-operative pain medication.  · Do not smoke after surgery, it decreases your ability to heal and increases the risk of infection and pneumonia.    Diet:  Drink lots of fluids after surgery.  You might not have much of an appetite at first, you may eat regular food when you feel ready, unless you are given special diet instructions.    Post-operative symptoms and medications  · It is safe to take over-the-counter medications for constipation, heartburn, sleep, or itching if needed.  Prescription pain medication may contain acetaminophen (Tylenol), so you should not take additional acetaminophen (Tylenol) at the same time as your pain medication.  · You may experience nausea, low fever/chills, and clear drainage from your incision, sometimes up to a month after surgery.  Notify our office if you have fever over 101 degrees, worsening redness around your incision, thick cloudy drainage, or inability to drink any liquids.  · You will experience some level of pain after surgery.  Your pain medication should help with the pain, but may not be able to eliminate it entirely.  Pain will decrease with time,  "and most pain will be gone by 4 to 6 weeks after surgery.  · We are not able to call in prescriptions for pain medication after hours or on weekends.  If your pain medication is ineffective or you will run out soon and need a refill, please call our office at [phone].  We are not able to replace pain medication that has been lost or stolen.    After surgery, you will either be discharged home or admitted to the hospital.  If you are admitted to the hospital, one of the surgeons or a physician assistant will see you once a day.  Due to scheduled surgery, we may see you in the afternoon or at night; however, your nurse is able to page us at any time.  If you feel there is a situation that is not being addressed properly, please dial [phone] from the phone in your room.    Follow-up appointment  · You will see your surgeon or a physician assistant in clinic for a follow-up appointment at either our [facility] (off [facility]) or [facility] (off [facility]) locations, usually between one and four weeks after your surgery.  · The hospital nurses can make your follow up appointment, or you can make it online at [email or web address] or call [phone].  · If you have a smartphone with the [employer] krystyna, please let us know if you would like to do a phone visit instead of a post-op office visit.    If you are signed up for [facility], install the "[employer]" krystyna to access your test results, send messages to your doctors, and schedule appointments from your smartphone!    "

## 2019-06-27 NOTE — PROGRESS NOTES
Subjective:       Patient ID: Chai Murry is a 79 y.o. male.    Chief Complaint: Follow-up    Patient is a very pleasant 79-year-old gentleman here to see me in follow-up for evaluation of his left vocal cord paralysis.  I last saw him approximately 6 weeks ago at which time I scoped him in diagnosed a paralyzed left vocal cord.  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  He has had a swallow study since his last visit.  He does have some intermittent issues with coughing and choking with swallowing especially if he drinks too fast.  He does continue to be frustrated with the breathiness and volume of his voice.    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 Negative for fever and unexpected weight change.   HENT: Positive for trouble swallowing and voice change. Negative for congestion, ear discharge, ear pain, facial swelling, hearing loss, nosebleeds, postnasal drip, rhinorrhea, sinus pressure, sneezing, sore throat and tinnitus.    Eyes: Negative for discharge, redness and itching.   Respiratory: Positive for cough and shortness of breath. Negative for choking and wheezing.    Cardiovascular: Negative for chest pain and palpitations.   Gastrointestinal: Negative for abdominal pain.        No reflux.   Musculoskeletal: Negative for neck pain.   Neurological: Positive for headaches. Negative for dizziness, facial asymmetry and light-headedness.   Hematological: Negative for adenopathy. Does not bruise/bleed easily.   Psychiatric/Behavioral: Negative for agitation, behavioral problems, confusion and decreased concentration.       Objective:      Physical Exam   Constitutional: He is oriented to person, place, and time. Vital signs are normal. He  appears well-developed and well-nourished. No distress.   HENT:   Head: Normocephalic and atraumatic.   Right Ear: Hearing, tympanic membrane, external ear and ear canal normal.   Left Ear: Hearing, tympanic membrane, external ear and ear canal normal.   Nose: Nose normal. No mucosal edema, rhinorrhea, nasal deformity or septal deviation.   Mouth/Throat: Uvula is midline, oropharynx is clear and moist and mucous membranes are normal. No trismus in the jaw. Normal dentition. No uvula swelling. No oropharyngeal exudate or posterior oropharyngeal edema.   Weak and breathy voice   Eyes: Pupils are equal, round, and reactive to light. Conjunctivae and EOM are normal. Right eye exhibits no chemosis. Left eye exhibits no chemosis. Right conjunctiva is not injected. Left conjunctiva is not injected. No scleral icterus.   Neck: Trachea normal and phonation normal. No tracheal tenderness present. No tracheal deviation present. No thyroid mass and no thyromegaly present.   Cardiovascular: Intact distal pulses.   Pulmonary/Chest: Effort normal. No accessory muscle usage or stridor. No respiratory distress.   Lymphadenopathy:        Head (right side): No submental, no submandibular, no preauricular and no posterior auricular adenopathy present.        Head (left side): No submental, no submandibular, no preauricular and no posterior auricular adenopathy present.     He has no cervical adenopathy.        Right cervical: No superficial cervical and no deep cervical adenopathy present.       Left cervical: No superficial cervical and no deep cervical adenopathy present.   Neurological: He is alert and oriented to person, place, and time. No cranial nerve deficit.   Skin: Skin is warm and dry. No rash noted. No erythema.   Psychiatric: He has a normal mood and affect. His behavior is normal. Thought content normal.       Due to indication in patient's history, presentation or risk factors,  a fiber optic exam was  performed.    SEPARATE PROCEDURE NOTE:    ANESTHESIA:  Topical xylocaine with sheba-synephrine  FINDINGS:  Paralyzed left vocal cord, visualized portion of upper trachea normal, cannot see tumor in the immediate subglottis with this exam      PROCEDURE:  After verbal consent was obtained, the flexible scope was passed through the patient's nasal cavity without difficulty.  The nasopharynx (adenoid pad) and eustachian tube orifices were first visualized and were found to be normal, without masses or irregularity.  The posterior pharyngeal wall and base of tongue were then examined and no mass or irregular tissue was seen.  The scope was then advanced to the larynx, and the epiglottis, valleculae, and piriform sinuses were normal, without masses or mucosal irregularity.  The false vocal folds and true vocal folds were then examined and no masses or mucosal irregularity was seen.  His left TVF remains paralyzed in the paramedian position with incomplete closure of his glottis.  I was able to visualize much of his subglottis, and did not appreciate any tumor on this exam.  The arytenoids and the interarytenoid area were normal.      MBSS:  Recommendations:                General Recommendations:  Voice therapy  Diet recommendations: Regular with thin liquids   Aspiration Precautions: Head turned to the left, HOB to 90 degrees and Remain upright 30 minutes post meal   General Precautions: Standard,    Communication strategies:  none      Assessment:       1. Vocal cord paralysis    2. Malignant neoplasm of upper lobe of left lung        Plan:       1.   Vocal cord paralysis:  No improvement in his left paralyzed vocal cord.  Reviewed his swallow study as well as speech therapy recommendations.  At this time, he has many upcoming appointment is and does not wish to pursue speech therapy.  We discussed that times a vocal cord augmentation injection can be indicated in helpful for patients with vocal cord paralysis, but I  would not recommend at this point as any sort of injection with augmentation of the vocal cord was slightly narrowed the airway.  Could consider in the future after active treatment is complete.  2.  Malignant neoplasm of upper lung and mediastinum:  Will discuss with Radiation Oncology.  There was some question of primary source of tumor either be in the subglottis versus trachea.  Though endoscopic laryngeal examination is certainly not the ideal way to make that distinction I do not appreciate any tumor in the immediate subglottis.

## 2019-06-27 NOTE — PROGRESS NOTES
"Referring provider: Dr. Jorge Murry was seen 06/27/2019 for an audiological evaluation.  Patient is here for baseline audiogram prior to Cisplatin therapy; diagnosis of malignant neoplasm of left lobe of lung.  He denies hearing loss dizziness.  No family history of hearing loss.  He reports tinnitus that sounds like "crickets" bilaterally. 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      Results reveal a normal to moderate sensorineural hearing loss 250-8000 Hz for the right ear, and  normal to moderate sensorineural hearing loss 250-8000 Hz for the left ear.   Speech Reception Thresholds were  10 dBHL for the right ear and 10 dBHL for the left ear.   Word recognition scores were excellent for the right ear and excellent for the left ear.    Tympanograms were Type A, normal for the right ear and Type A, normal for the left ear.     Patient was counseled on the above findings and recommendations.  Advised to notify his healthcare providers of any perceived changes in hearing.      Recommendations:  1. Audiologic monitoring per ototoxicity protocol, during and following completion of therapy.    2. Loud noise precaution, including avoidance when possible and/or hearing protection during chemotherapy administration and following; discussed with patient loud noise exposure can exacerbate the ototoxicity of Cisplatin.                "

## 2019-06-28 DIAGNOSIS — C34.12 MALIGNANT NEOPLASM OF UPPER LOBE OF LEFT LUNG: Primary | ICD-10-CM

## 2019-06-28 NOTE — PRE ADMISSION SCREENING
Pre op instructions reviewed with patient per phone:    To confirm, Your surgeon has instructed you:  Surgery is scheduled 7/9/19 at 0700.      Please report to [facility] 1st floor main lobby by 0530.   Pre admit office to call afternoon prior to surgery with final arrival time      INSTRUCTIONS IMPORTANT!!!  ¨ Do not eat, drink, or smoke after 12 midnight-including water. OK to brush teeth, no gum, candy or mints!    ¨ Take only these medicines with a small swallow of water-morning of surgery.  All inhalers/nebulizers and Sotalol     ____  Do not wear makeup, including mascara.  ____  No powder, lotions or creams to surgical area.  ____  Please remove all jewelry, including piercings and leave at home.  ____  No money or valuables needed. Please leave at home.  ____  Please bring identification and insurance information to hospital.  ____  If going home the same day, arrange for a ride home. You will not be able to   drive if Anesthesia was used.  ____  Children, under 12 years old, must remain in the waiting room with an adult.  They are not allowed in patient areas.  ____  Wear loose fitting clothing. Allow for dressings, bandages.  ____  Stop Aspirin, Ibuprofen, Motrin and Aleve at least 5-7 days before surgery, unless otherwise instructed by your doctor, or the nurse.   You MAY use Tylenol/acetaminophen until day of surgery.  ____  If you take diabetic medication, do not take am of surgery unless instructed by   Doctor.  ____ Stop taking any Fish Oil supplement or any Vitamins that contain Vitamin E at least 5 days prior to surgery.          Bathing Instructions-- The night before surgery and the morning prior to coming to the hospital:   -Do not shave the surgical area.   -Shower and wash your hair and body as usual with anti-bacterial  soap and shampoo.   -Rinse your hair and body completely.   -Use one packet of hibiclens to wash the surgical site (using your hand) gently for 5  minutes.  Do not scrub you skin too hard.   -Do not use hibiclens on your head, face, or genitals.   -Do not wash with anti-bacterial soap after you use the hibiclens.   -Rinse your body thoroughly.   -Dry with clean, soft towel.  Do not use lotion, cream, deodorant, or powders on   the surgical site.    Use antibacterial soap in place of hibiclens if your surgery is on the head, face or genitals.         Surgical Site Infection    Prevention of surgical site infections:     -Keep incisions clean and dry.   -Do not soak/submerge incisions in water until completely healed.   -Do not apply lotions, powders, creams, or deodorants to site.   -Always make sure hands are cleaned with antibacterial soap/ alcohol-based   prior to touching the surgical site.  (This includes doctors, nurses, staff, and yourself.)    Signs and symptoms:   -Redness and pain around the area where you had surgery   -Drainage of cloudy fluid from your surgical wound   -Fever over 100.4  I have read or had read and explained to me, and understand the above information.

## 2019-07-01 ENCOUNTER — HOSPITAL ENCOUNTER (OUTPATIENT)
Dept: RADIATION THERAPY | Facility: HOSPITAL | Age: 79
Discharge: HOME | End: 2019-07-01
Attending: RADIOLOGY
Payer: MEDICARE

## 2019-07-01 ENCOUNTER — TELEPHONE (OUTPATIENT)
Dept: SURGERY | Facility: CLINIC | Age: 79
End: 2019-07-01

## 2019-07-01 NOTE — TELEPHONE ENCOUNTER
----- Message from Racheal Rubin sent at 7/1/2019  4:21 PM CDT -----  Contact: self [phone]  Pt would like to reschedule procedure time.  Please call back at [phone]. Md Justice

## 2019-07-02 DIAGNOSIS — Z01.818 PREOP TESTING: Primary | ICD-10-CM

## 2019-07-02 DIAGNOSIS — C34.90 MALIGNANT NEOPLASM OF LUNG, UNSPECIFIED LATERALITY, UNSPECIFIED PART OF LUNG: ICD-10-CM

## 2019-07-02 DIAGNOSIS — K20.80 RADIATION ESOPHAGITIS: Primary | ICD-10-CM

## 2019-07-02 DIAGNOSIS — T66.XXXA RADIATION ESOPHAGITIS: Primary | ICD-10-CM

## 2019-07-03 ENCOUNTER — ANESTHESIA (OUTPATIENT)
Dept: SURGERY | Facility: HOSPITAL | Age: 79
End: 2019-07-03
Payer: MEDICARE

## 2019-07-03 ENCOUNTER — HOSPITAL ENCOUNTER (OUTPATIENT)
Facility: HOSPITAL | Age: 79
Discharge: HOME | End: 2019-07-03
Attending: SURGERY | Admitting: SURGERY
Payer: MEDICARE

## 2019-07-03 ENCOUNTER — ANESTHESIA EVENT (OUTPATIENT)
Dept: SURGERY | Facility: HOSPITAL | Age: 79
End: 2019-07-03
Payer: MEDICARE

## 2019-07-03 VITALS
TEMPERATURE: 98 F | SYSTOLIC BLOOD PRESSURE: 133 MMHG | HEART RATE: 55 BPM | OXYGEN SATURATION: 94 % | WEIGHT: 192.44 LBS | BODY MASS INDEX: 27.55 KG/M2 | HEIGHT: 70 IN | DIASTOLIC BLOOD PRESSURE: 64 MMHG | RESPIRATION RATE: 20 BRPM

## 2019-07-03 DIAGNOSIS — C34.90 MALIGNANT NEOPLASM OF LUNG, UNSPECIFIED LATERALITY, UNSPECIFIED PART OF LUNG: ICD-10-CM

## 2019-07-03 DIAGNOSIS — C34.12 MALIGNANT NEOPLASM OF UPPER LOBE OF LEFT LUNG: ICD-10-CM

## 2019-07-03 PROBLEM — Z95.828 PORT-A-CATH IN PLACE: Status: ACTIVE | Noted: 2019-07-03

## 2019-07-03 LAB — POTASSIUM SERPL-SCNC: 4.2 MMOL/L (ref 3.5–5.1)

## 2019-07-03 PROCEDURE — 36000706: Performed by: SURGERY

## 2019-07-03 PROCEDURE — 36561 PR INSERT TUNNELED CV CATH WITH PORT: ICD-10-PCS | Mod: RT,,, | Performed by: SURGERY

## 2019-07-03 PROCEDURE — 36000707: Performed by: SURGERY

## 2019-07-03 PROCEDURE — 63600175 PHARM REV CODE 636 W HCPCS: Performed by: NURSE ANESTHETIST, CERTIFIED REGISTERED

## 2019-07-03 PROCEDURE — 77470 PR  SPECIAL RADIATION TREATMENT: ICD-10-PCS | Mod: 26,59,, | Performed by: RADIOLOGY

## 2019-07-03 PROCEDURE — 71000015 HC POSTOP RECOV 1ST HR: Performed by: SURGERY

## 2019-07-03 PROCEDURE — 77470 SPECIAL RADIATION TREATMENT: CPT | Mod: 26,59,, | Performed by: RADIOLOGY

## 2019-07-03 PROCEDURE — 77470 SPECIAL RADIATION TREATMENT: CPT | Mod: 59,TC | Performed by: RADIOLOGY

## 2019-07-03 PROCEDURE — 77301 PR  INTEN MOD RADIOTHER PLAN W/DOSE VOL HIST: ICD-10-PCS | Mod: 26,,, | Performed by: RADIOLOGY

## 2019-07-03 PROCEDURE — 84132 ASSAY OF SERUM POTASSIUM: CPT

## 2019-07-03 PROCEDURE — 37000009 HC ANESTHESIA EA ADD 15 MINS: Performed by: SURGERY

## 2019-07-03 PROCEDURE — 77001 FLUOROGUIDE FOR VEIN DEVICE: CPT | Mod: 26,,, | Performed by: SURGERY

## 2019-07-03 PROCEDURE — 63600175 PHARM REV CODE 636 W HCPCS: Performed by: SURGERY

## 2019-07-03 PROCEDURE — 71000033 HC RECOVERY, INTIAL HOUR: Performed by: SURGERY

## 2019-07-03 PROCEDURE — 77001 CHG FLUOROGUIDE CNTRL VEN ACCESS,PLACE,REPLACE,REMOVE: ICD-10-PCS | Mod: 26,,, | Performed by: SURGERY

## 2019-07-03 PROCEDURE — 77301 RADIOTHERAPY DOSE PLAN IMRT: CPT | Mod: TC | Performed by: RADIOLOGY

## 2019-07-03 PROCEDURE — 77301 RADIOTHERAPY DOSE PLAN IMRT: CPT | Mod: 26,,, | Performed by: RADIOLOGY

## 2019-07-03 PROCEDURE — 37000008 HC ANESTHESIA 1ST 15 MINUTES: Performed by: SURGERY

## 2019-07-03 PROCEDURE — 25000003 PHARM REV CODE 250: Performed by: SURGERY

## 2019-07-03 PROCEDURE — 36561 INSERT TUNNELED CV CATH: CPT | Mod: RT,,, | Performed by: SURGERY

## 2019-07-03 PROCEDURE — 25500020 PHARM REV CODE 255: Performed by: SURGERY

## 2019-07-03 PROCEDURE — C1788 PORT, INDWELLING, IMP: HCPCS | Performed by: SURGERY

## 2019-07-03 DEVICE — PORT POWER CLEAR VIEW: Type: IMPLANTABLE DEVICE | Site: SUBCLAVIAN | Status: FUNCTIONAL

## 2019-07-03 RX ORDER — ONDANSETRON 8 MG/1
8 TABLET, ORALLY DISINTEGRATING ORAL EVERY 8 HOURS PRN
Status: DISCONTINUED | OUTPATIENT
Start: 2019-07-03 | End: 2019-07-03 | Stop reason: HOSPADM

## 2019-07-03 RX ORDER — DIPHENHYDRAMINE HYDROCHLORIDE 50 MG/ML
25 INJECTION INTRAMUSCULAR; INTRAVENOUS EVERY 6 HOURS PRN
Status: DISCONTINUED | OUTPATIENT
Start: 2019-07-03 | End: 2019-07-03 | Stop reason: HOSPADM

## 2019-07-03 RX ORDER — SODIUM CHLORIDE 0.9 % (FLUSH) 0.9 %
3 SYRINGE (ML) INJECTION
Status: DISCONTINUED | OUTPATIENT
Start: 2019-07-03 | End: 2019-07-03 | Stop reason: HOSPADM

## 2019-07-03 RX ORDER — PHENYLEPHRINE HYDROCHLORIDE 10 MG/ML
INJECTION INTRAVENOUS
Status: DISCONTINUED | OUTPATIENT
Start: 2019-07-03 | End: 2019-07-03

## 2019-07-03 RX ORDER — HYDROMORPHONE HYDROCHLORIDE 2 MG/ML
1 INJECTION, SOLUTION INTRAMUSCULAR; INTRAVENOUS; SUBCUTANEOUS EVERY 4 HOURS PRN
Status: DISCONTINUED | OUTPATIENT
Start: 2019-07-03 | End: 2019-07-03 | Stop reason: HOSPADM

## 2019-07-03 RX ORDER — HYDROMORPHONE HYDROCHLORIDE 2 MG/ML
0.2 INJECTION, SOLUTION INTRAMUSCULAR; INTRAVENOUS; SUBCUTANEOUS EVERY 5 MIN PRN
Status: DISCONTINUED | OUTPATIENT
Start: 2019-07-03 | End: 2019-07-03 | Stop reason: HOSPADM

## 2019-07-03 RX ORDER — BUPIVACAINE HYDROCHLORIDE 2.5 MG/ML
INJECTION, SOLUTION EPIDURAL; INFILTRATION; INTRACAUDAL
Status: DISCONTINUED | OUTPATIENT
Start: 2019-07-03 | End: 2019-07-03 | Stop reason: HOSPADM

## 2019-07-03 RX ORDER — MEPERIDINE HYDROCHLORIDE 50 MG/ML
12.5 INJECTION INTRAMUSCULAR; INTRAVENOUS; SUBCUTANEOUS ONCE AS NEEDED
Status: DISCONTINUED | OUTPATIENT
Start: 2019-07-03 | End: 2019-07-03 | Stop reason: HOSPADM

## 2019-07-03 RX ORDER — ACETAMINOPHEN 10 MG/ML
1000 INJECTION, SOLUTION INTRAVENOUS ONCE
Status: DISCONTINUED | OUTPATIENT
Start: 2019-07-03 | End: 2019-07-03 | Stop reason: HOSPADM

## 2019-07-03 RX ORDER — METOCLOPRAMIDE HYDROCHLORIDE 5 MG/ML
10 INJECTION INTRAMUSCULAR; INTRAVENOUS EVERY 10 MIN PRN
Status: DISCONTINUED | OUTPATIENT
Start: 2019-07-03 | End: 2019-07-03 | Stop reason: HOSPADM

## 2019-07-03 RX ORDER — HYDROCODONE BITARTRATE AND ACETAMINOPHEN 5; 325 MG/1; MG/1
1 TABLET ORAL EVERY 6 HOURS PRN
Qty: 20 TABLET | Refills: 0 | Status: SHIPPED | OUTPATIENT
Start: 2019-07-03 | End: 2019-07-29 | Stop reason: SDUPTHER

## 2019-07-03 RX ORDER — CEFAZOLIN SODIUM 2 G/50ML
2 SOLUTION INTRAVENOUS
Status: COMPLETED | OUTPATIENT
Start: 2019-07-03 | End: 2019-07-03

## 2019-07-03 RX ORDER — FENTANYL CITRATE 50 UG/ML
INJECTION, SOLUTION INTRAMUSCULAR; INTRAVENOUS
Status: DISCONTINUED | OUTPATIENT
Start: 2019-07-03 | End: 2019-07-03

## 2019-07-03 RX ORDER — ONDANSETRON 2 MG/ML
INJECTION INTRAMUSCULAR; INTRAVENOUS
Status: DISCONTINUED | OUTPATIENT
Start: 2019-07-03 | End: 2019-07-03

## 2019-07-03 RX ORDER — PROPOFOL 10 MG/ML
VIAL (ML) INTRAVENOUS
Status: DISCONTINUED | OUTPATIENT
Start: 2019-07-03 | End: 2019-07-03

## 2019-07-03 RX ORDER — HEPARIN 100 UNIT/ML
SYRINGE INTRAVENOUS
Status: DISCONTINUED | OUTPATIENT
Start: 2019-07-03 | End: 2019-07-03 | Stop reason: HOSPADM

## 2019-07-03 RX ORDER — LIDOCAINE HYDROCHLORIDE AND EPINEPHRINE 10; 10 MG/ML; UG/ML
INJECTION, SOLUTION INFILTRATION; PERINEURAL
Status: DISCONTINUED | OUTPATIENT
Start: 2019-07-03 | End: 2019-07-03 | Stop reason: HOSPADM

## 2019-07-03 RX ORDER — LIDOCAINE HCL/PF 100 MG/5ML
SYRINGE (ML) INTRAVENOUS
Status: DISCONTINUED | OUTPATIENT
Start: 2019-07-03 | End: 2019-07-03

## 2019-07-03 RX ADMIN — PROPOFOL 20 MG: 10 INJECTION, EMULSION INTRAVENOUS at 07:07

## 2019-07-03 RX ADMIN — ONDANSETRON 4 MG: 2 INJECTION, SOLUTION INTRAMUSCULAR; INTRAVENOUS at 07:07

## 2019-07-03 RX ADMIN — FENTANYL CITRATE 25 MCG: 50 INJECTION, SOLUTION INTRAMUSCULAR; INTRAVENOUS at 07:07

## 2019-07-03 RX ADMIN — PHENYLEPHRINE HYDROCHLORIDE 200 MCG: 10 INJECTION INTRAVENOUS at 07:07

## 2019-07-03 RX ADMIN — CEFAZOLIN SODIUM 2 G: 2 SOLUTION INTRAVENOUS at 07:07

## 2019-07-03 RX ADMIN — LIDOCAINE HYDROCHLORIDE 100 MG: 20 INJECTION, SOLUTION INTRAVENOUS at 07:07

## 2019-07-03 RX ADMIN — PROPOFOL 50 MG: 10 INJECTION, EMULSION INTRAVENOUS at 07:07

## 2019-07-03 RX ADMIN — PROPOFOL 30 MG: 10 INJECTION, EMULSION INTRAVENOUS at 07:07

## 2019-07-03 NOTE — DISCHARGE INSTRUCTIONS
Please call for any fever, increase in pain, nausea or vomiting or redness or drainage from incision(s).      May shower with the clear plastic dressing in place and once it has been removed.        You may remove the dressing on Saturday morning.  You may shower once the dressing has been removed     Remove steri strips as they begin to fall off    If you become constipated from the pain medication you can use over the counter laxatives,  Miralax or Glycolax, or Magnesium Citrate for severe constipation.

## 2019-07-03 NOTE — ANESTHESIA PREPROCEDURE EVALUATION
07/03/2019  Chai Murry is a 79 y.o., male.    Anesthesia Evaluation    I have reviewed the Patient Summary Reports.    I have reviewed the Nursing Notes.   I have reviewed the Medications.     Review of Systems  Anesthesia Hx:  No problems with previous Anesthesia  Denies Family Hx of Anesthesia complications.   Denies Personal Hx of Anesthesia complications.   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 ekg 5/2019:  Normal sinus rhythm 60  Voltage criteria for left ventricular hypertrophy  ST elevation, consider early repolarization, pericarditis, or injury  Abnormal ECG  When compared with ECG of 28-AUG-2018 12:02,  No significant change was found    stents   Pulmonary:   COPD s/p left pnuemonectomy for KAYLI Lunc Ca  Tracheal mass: 3 cm BELOW VOCAL CORDS: SUBGLOTIC   Renal/:  Renal/ Normal     Hepatic/GI:  Hepatic/GI Normal  Denies GERD. Denies Liver Disease.  Denies Hepatitis.    Musculoskeletal:  Musculoskeletal Normal Arthritis      Neurological:  Neurology Normal Paralysis of vocal cords and larynx, unilateral   Endocrine:  Endocrine Normal    Dermatological:  Skin Normal    Psych:   Psychiatric History Insomnia         Physical Exam  General:  Well nourished    Airway/Jaw/Neck:  Airway Findings: Mouth Opening: Normal Tongue: Normal  General Airway Assessment: Adult  Mallampati: II      Dental:  Dental Findings: Upper Dentures, Lower Dentures   Chest/Lungs:  Chest/Lungs Findings: Clear to auscultation, Normal Respiratory Rate     Heart/Vascular:  Heart Findings: Rate: Normal  Rhythm: Regular Rhythm             Anesthesia  Plan  Type of Anesthesia, risks & benefits discussed:  Anesthesia Type:  MAC, general  Patient's Preference:   Intra-op Monitoring Plan: standard ASA monitors  Intra-op Monitoring Plan Comments:   Post Op Pain Control Plan: multimodal analgesia  Post Op Pain Control Plan Comments:   Induction:   IV  Beta Blocker:  Patient is on a Beta-Blocker and has received one dose within the past 24 hours (No further documentation required).       Informed Consent: Patient understands risks and agrees with Anesthesia plan.  Questions answered. Anesthesia consent signed with patient.  ASA Score: 3     Day of Surgery Review of History & Physical: I have interviewed and examined the patient. I have reviewed the patient's H&P dated:  There are no significant changes.  H&P update referred to the surgeon.         Ready For Surgery From Anesthesia Perspective.

## 2019-07-03 NOTE — INTERVAL H&P NOTE
The patient has been examined and the H&P has been reviewed:    I concur with the findings and no changes have occurred since H&P was written.    Anesthesia/Surgery risks, benefits and alternative options discussed and understood by patient/family.          Active Hospital Problems    Diagnosis  POA    Malignant neoplasm of lung [C34.90]  Yes      Resolved Hospital Problems   No resolved problems to display.

## 2019-07-03 NOTE — TRANSFER OF CARE
"Anesthesia Transfer of Care Note    Patient: Chai Murry    Procedure(s) Performed: Procedure(s) (LRB):  [id number] (Right)    Patient location: PACU    Anesthesia Type: MAC    Transport from OR: Transported from OR on room air with adequate spontaneous ventilation    Post pain: adequate analgesia    Post assessment: no apparent anesthetic complications and tolerated procedure well    Post vital signs: stable    Level of consciousness: awake    Nausea/Vomiting: no nausea/vomiting    Complications: none    Transfer of care protocol was followed      Last vitals:   Visit Vitals  /69 (BP Location: Right arm, Patient Position: Sitting)   Pulse 64   Temp 36.6 °C (97.9 °F) (Temporal)   Resp 20   Ht 5' 10" (1.778 m)   Wt 87.3 kg (192 lb 7.4 oz)   SpO2 95%   BMI 27.62 kg/m²     "

## 2019-07-03 NOTE — OP NOTE
Operative Note       Surgery Date: 7/3/2019     Surgeon(s) and Role:     * Jean Carlos Constantino MD - Primary    Pre-op Diagnosis:  Malignant neoplasm of lung, unspecified laterality, unspecified part of lung [C34.90]    Post-op Diagnosis: Post-Op Diagnosis Codes:     * Malignant neoplasm of lung, unspecified laterality, unspecified part of lung [C34.90]    Procedure(s) (LRB):  [id number] (Right)    Anesthesia: Local MAC    Procedure in Detail/Findings:    The patient was placed supine .  Sedation was provided SCDs were placed for DVT prophylaxis and antibiotics were given  before the incision. SCD on the lower extremities.  The upper chest and neck were prepped and draped with sterile technique.       A time out was completed.    The central catheter was flushed with heparin to ensure function. Landmarks were identified and a skin entry site was chosen 1-2 cm inferior to the distal third of the clavicle. The left chest  wall skin and subcutaneous tissue were anesthetized with lidocaine. Local anesthesia was carried down to the periosteum of the clavicle.     The patient's head was turned in the contralateral direction and, as the ipsilateral arm was retracted caudad.  The bed was placed in slight Trendelenburg position.  The left subclavian vein was then located with the needle and a 10-mL syringe. The needle was inserted at the chosen site with the bevel down and directed toward the sternal notch. With continuous negative pressure in the syringe, the needle was advanced in a stepwise fashion down the clavicle. The vein was located as the needle passed beneath the bone. As soon as the syringe began to fill with venous blood, the needle was rotated 90 degrees so the bevel was pointed at the foot of the bed. The needle position was secured and the syringe removed. The guidewire was passed through the needle but cannot be passed beyond the midline.  The guidewire did enter the right jugular.    The needle and  guidewire were then removed.  The left subclavian vein was react says.  The guidewire was advanced and once again it could not be passed beyond the midline.    The patient's right subclavian was then accessed after generous amounts of local anesthetic was placed.  The guidewire could not be advanced into the superior vena cava.    The right neck was then anesthetized in the right jugular vein was accessed using ultrasound guidance.  The ultrasound was used to identify the internal jugular vein. It was patent and compressible.  The needle could be seen entering the vein under ultrasound guidance.  The guidewire could be seen entering the vein under ultrasound guidance.  The guidewire could not be passed the on the right clavicle.    A venogram was then carried out through the internal jugular vein.  The contrast showed a torturous.  The contrasted fill the right subclavian in the superior vena cava which were normal in contour.    The right subclavian vein was then accessed more medially.  The guidewire was easily advanced into the superior vena cava.  This was done under fluoroscopic visualization.    . A 3-cm incision was made on the anterior chest wall, and electrocautery was used to create a subcutaneous pocket for the port. The guidewire was brought into the MediPort pocket    The dilator/introducer was placed over the guidewire and advanced into the superior Vena cava.  The dilator and guidewire were removed.  The MediPort catheter was placed through the introducer and advanced into the distal superior vena cava/atrial.  The catheter was aspirated and there was brisk return of blood.  The catheter was then withdrawn under fluoroscopic visualization such that the tip was at the superior vena cava/atrial junction.  The catheter was cut and connected to the reservoir.    The port was placed in the subcutaneous pocket.  The port was secured in place in the subcutaneous pocket with 0-Vicryl sutures. The port was  aspirated to ensure adequate blood flow and then flushed with heparinized saline. Both skin incisions were then closed in two layers with interrupted 3-0 Vicryl sutures and running 4-0 Monocryl sutures.     The patient tolerated the procedure well and was taken to the postanesthesia care unit in stable condition.  An upright chest x-ray was ordered to document location of the catheter tip and check for pneumothorax.     Estimated Blood Loss: 30 mL           Specimens (From admission, onward)    None        Implants:   Implant Name Type Inv. Item Serial No.  Lot No. LRB No. Used   PORT POWER CLEAR VIEW - [id number]  PORT POWER CLEAR VIEW  [employer] [id number] Right 1              Disposition: PACU - hemodynamically stable.           Condition: Stable    Attestation:  I performed the procedure.           Discharge Note    Admit Date: 7/3/2019    Attending Physician: Jean Carlos Constantino MD     Discharge Physician: Jean Carlos Constantino MD    Final Diagnosis: Post-Op Diagnosis Codes:     * Malignant neoplasm of lung, unspecified laterality, unspecified part of lung [C34.90]    Disposition: Home or Self Care    Patient Instructions:   Current Discharge Medication List      START taking these medications    Details   !! HYDROcodone-acetaminophen (NORCO) 5-325 mg per tablet Take 1 tablet by mouth every 6 (six) hours as needed for Pain.  Qty: 20 tablet, Refills: 0      nozaseptin (NOZIN) nasal  1 each by Each Nare route 2 (two) times daily. for 7 days  Qty: 1 applicator, Refills: 0       !! - Potential duplicate medications found. Please discuss with provider.      CONTINUE these medications which have NOT CHANGED    Details   albuterol (PROVENTIL) 2.5 mg /3 mL (0.083 %) nebulizer solution Take 3 mLs (2.5 mg total) by nebulization every 6 (six) hours while awake.  Qty: 270 mL, Refills: 11    Associated Diagnoses: Chronic obstructive pulmonary disease, unspecified COPD type      amLODIPine (NORVASC) 5 MG tablet  Take 2 tablets (10 mg total) by mouth every evening.  Qty: 60 tablet, Refills: 0      aspirin (ECOTRIN) 81 MG EC tablet Take 81 mg by mouth once daily.      BREO ELLIPTA 100-25 mcg/dose diskus inhaler INHALE 1 PUFF INTO THE LUNGS ONCE DAILY  Refills: 5      doxepin (SINEQUAN) 10 MG capsule TAKE 1 CAPSULE (10 MG TOTAL) BY MOUTH EVERY EVENING.  Qty: 30 capsule, Refills: 10    Associated Diagnoses: Insomnia, unspecified type      fenofibric acid (FIBRICOR) 135 mg CpDR TAKE 1 CAPSULE BY MOUTH EVERY DAY  Qty: 30 capsule, Refills: 11      glycopyrrolate-formoterol (BEVESPI AEROSPHERE) 9-4.8 mcg HFAA Inhale 2 puffs into the lungs 2 (two) times daily. Controller  Qty: 10.9 g, Refills: 11    Associated Diagnoses: Chronic obstructive pulmonary disease, unspecified COPD type      !! HYDROcodone-acetaminophen (NORCO) 5-325 mg per tablet Take 1 tablet by mouth every 6 (six) hours as needed.  Qty: 12 tablet, Refills: 0      ipratropium-albuterol (COMBIVENT RESPIMAT)  mcg/actuation inhaler Inhale 1 puff into the lungs every 6 (six) hours as needed for Shortness of Breath.  Qty: 4 g, Refills: 11    Associated Diagnoses: Asthma with COPD      lisinopril (PRINIVIL,ZESTRIL) 40 MG tablet TAKE 1 TABLET BY MOUTH DAILY  Qty: 30 tablet, Refills: 11      simvastatin (ZOCOR) 40 MG tablet Take 1 tablet (40 mg total) by mouth every evening.  Qty: 30 tablet, Refills: 11      SOTALOL AF 80 mg tablet TAKE 1 TABLET BY MOUTH TWICE A DAY  Qty: 60 tablet, Refills: 10      fluticasone (FLONASE) 50 mcg/actuation nasal spray 2 sprays (100 mcg total) by Each Nare route once daily.  Qty: 3 Bottle, Refills: 3    Associated Diagnoses: Seasonal allergic rhinitis due to other allergic trigger      levocetirizine (XYZAL) 5 MG tablet Take 5 mg by mouth as needed.       naproxen sodium (ANAPROX) 550 MG tablet Take 1 tablet (550 mg total) by mouth 2 (two) times daily with meals.  Qty: 20 tablet, Refills: 0    Associated Diagnoses: Pain of left heel       ondansetron (ZOFRAN) 4 MG tablet Take 1 tablet (4 mg total) by mouth every 12 (twelve) hours as needed for Nausea.  Qty: 30 tablet, Refills: 1    Associated Diagnoses: Malignant neoplasm of upper lobe of left lung       !! - Potential duplicate medications found. Please discuss with provider.          Discharge Procedure Orders (must include Diet, Follow-up, Activity)   Discharge Procedure Orders (must include Diet, Follow-up, Activity)   Diet general     Call MD for:  temperature >100.4     Call MD for:  persistent nausea and vomiting     Call MD for:  severe uncontrolled pain     Call MD for:  difficulty breathing, headache or visual disturbances     Call MD for:  redness, tenderness, or signs of infection (pain, swelling, redness, odor or green/yellow discharge around incision site)     Remove dressing in 72 hours   Order Comments: You may shower with the clear plastic dressing in place     Activity as tolerated        Discharge Date: No discharge date for patient encounter.

## 2019-07-03 NOTE — DISCHARGE SUMMARY
[facility]  Discharge Note  Short Stay    Admit Date: 7/3/2019    Discharge Date and Time: 7/3/2019  9:14 AM     Attending Physician: No att. providers found     Discharge Provider: Jean Carlos Constantino    Diagnoses:  Active Hospital Problems    Diagnosis  POA    *Malignant neoplasm of lung [C34.90]  Yes      Resolved Hospital Problems   No resolved problems to display.       Discharged Condition: stable    Hospital Course: Patient was admitted for an outpatient procedure and tolerated the procedure well with no complications    Right subclavian CT compatible an MRI safe power port    Final Diagnoses: Same as principal problem.    Disposition: Home or Self Care    Follow up/Patient Instructions:    Medications:  Reconciled Home Medications:      Medication List      START taking these medications    nozaseptin nasal   Commonly known as:  NOZIN  1 each by Each Nare route 2 (two) times daily. for 7 days        CHANGE how you take these medications    albuterol 2.5 mg /3 mL (0.083 %) nebulizer solution  Commonly known as:  PROVENTIL  Take 3 mLs (2.5 mg total) by nebulization every 6 (six) hours while awake.  What changed:    · when to take this  · reasons to take this     amLODIPine 5 MG tablet  Commonly known as:  NORVASC  Take 2 tablets (10 mg total) by mouth every evening.  What changed:    · how much to take  · when to take this     fluticasone propionate 50 mcg/actuation nasal spray  Commonly known as:  FLONASE  2 sprays (100 mcg total) by Each Nare route once daily.  What changed:    · when to take this  · reasons to take this     * HYDROcodone-acetaminophen 5-325 mg per tablet  Commonly known as:  NORCO  Take 1 tablet by mouth every 6 (six) hours as needed.  What changed:  Another medication with the same name was added. Make sure you understand how and when to take each.     * HYDROcodone-acetaminophen 5-325 mg per tablet  Commonly known as:  NORCO  Take 1 tablet by mouth every 6 (six) hours as  needed for Pain.  What changed:  You were already taking a medication with the same name, and this prescription was added. Make sure you understand how and when to take each.         * This list has 2 medication(s) that are the same as other medications prescribed for you. Read the directions carefully, and ask your doctor or other care provider to review them with you.            CONTINUE taking these medications    aspirin 81 MG EC tablet  Commonly known as:  ECOTRIN  Take 81 mg by mouth once daily.     BREO ELLIPTA 100-25 mcg/dose diskus inhaler  Generic drug:  fluticasone furoate-vilanterol  INHALE 1 PUFF INTO THE LUNGS ONCE DAILY     doxepin 10 MG capsule  Commonly known as:  SINEQUAN  TAKE 1 CAPSULE (10 MG TOTAL) BY MOUTH EVERY EVENING.     fenofibric acid 135 mg Cpdr  Commonly known as:  FIBRICOR  TAKE 1 CAPSULE BY MOUTH EVERY DAY     glycopyrrolate-formoterol 9-4.8 mcg Hfaa  Commonly known as:  BEVESPI AEROSPHERE  Inhale 2 puffs into the lungs 2 (two) times daily. Controller     ipratropium-albuterol  mcg/actuation inhaler  Commonly known as:  COMBIVENT RESPIMAT  Inhale 1 puff into the lungs every 6 (six) hours as needed for Shortness of Breath.     levocetirizine 5 MG tablet  Commonly known as:  XYZAL  Take 5 mg by mouth as needed.     lisinopril 40 MG tablet  Commonly known as:  PRINIVIL,ZESTRIL  TAKE 1 TABLET BY MOUTH DAILY     ondansetron 4 MG tablet  Commonly known as:  ZOFRAN  Take 1 tablet (4 mg total) by mouth every 12 (twelve) hours as needed for Nausea.     simvastatin 40 MG tablet  Commonly known as:  ZOCOR  Take 1 tablet (40 mg total) by mouth every evening.     sotalol AF 80 MG tablet  Generic drug:  sotalol  TAKE 1 TABLET BY MOUTH TWICE A DAY        ASK your doctor about these medications    naproxen sodium 550 MG tablet  Commonly known as:  ANAPROX  Take 1 tablet (550 mg total) by mouth 2 (two) times daily with meals.          Discharge Procedure Orders   Diet general     Call MD for:   temperature >100.4     Call MD for:  persistent nausea and vomiting     Call MD for:  severe uncontrolled pain     Call MD for:  difficulty breathing, headache or visual disturbances     Call MD for:  redness, tenderness, or signs of infection (pain, swelling, redness, odor or green/yellow discharge around incision site)     Remove dressing in 72 hours   Order Comments: You may shower with the clear plastic dressing in place     Activity as tolerated     Follow-up Information     [facility] - Hematology Oncology.    Specialty:  Hematology and Oncology  Why:  As scheduled for chemotherapy  Contact information:  [address] [facility] [id number] [phone]                 Discharge Procedure Orders (must include Diet, Follow-up, Activity):   Discharge Procedure Orders (must include Diet, Follow-up, Activity)   Diet general     Call MD for:  temperature >100.4     Call MD for:  persistent nausea and vomiting     Call MD for:  severe uncontrolled pain     Call MD for:  difficulty breathing, headache or visual disturbances     Call MD for:  redness, tenderness, or signs of infection (pain, swelling, redness, odor or green/yellow discharge around incision site)     Remove dressing in 72 hours   Order Comments: You may shower with the clear plastic dressing in place     Activity as tolerated

## 2019-07-04 PROCEDURE — 77338 PR  MLC IMRT DESIGN & CONSTRUCTION PER IMRT PLAN: ICD-10-PCS | Mod: 26,,, | Performed by: RADIOLOGY

## 2019-07-04 PROCEDURE — 77300 RADIATION THERAPY DOSE PLAN: CPT | Mod: TC | Performed by: RADIOLOGY

## 2019-07-04 PROCEDURE — 77338 DESIGN MLC DEVICE FOR IMRT: CPT | Mod: 26,,, | Performed by: RADIOLOGY

## 2019-07-04 PROCEDURE — 77300 PR RADIATION THERAPY,DOSIMETRY PLAN: ICD-10-PCS | Mod: 26,,, | Performed by: RADIOLOGY

## 2019-07-04 PROCEDURE — 77300 RADIATION THERAPY DOSE PLAN: CPT | Mod: 26,,, | Performed by: RADIOLOGY

## 2019-07-04 PROCEDURE — 77338 DESIGN MLC DEVICE FOR IMRT: CPT | Mod: TC | Performed by: RADIOLOGY

## 2019-07-05 ENCOUNTER — DOCUMENTATION ONLY (OUTPATIENT)
Dept: RADIATION ONCOLOGY | Facility: CLINIC | Age: 79
End: 2019-07-05

## 2019-07-05 PROCEDURE — 77014 HC CT GUIDANCE RADIATION THERAPY FLDS PLACEMENT: CPT | Mod: TC | Performed by: RADIOLOGY

## 2019-07-05 PROCEDURE — 77386 HC IMRT, COMPLEX: CPT | Performed by: RADIOLOGY

## 2019-07-05 NOTE — PLAN OF CARE
Problem: Adult Inpatient Plan of Care  Goal: Plan of Care Review  Outcome: Ongoing (interventions implemented as appropriate)  Day 1 outpatient xrt to lung. Lung handout and verbal instructions given. Miaderm cream and contact info provided. Skin care and side effects reviewed. Patient verbalized understanding.

## 2019-07-08 ENCOUNTER — OFFICE VISIT (OUTPATIENT)
Dept: HEMATOLOGY/ONCOLOGY | Facility: CLINIC | Age: 79
End: 2019-07-08
Payer: MEDICARE

## 2019-07-08 ENCOUNTER — INFUSION (OUTPATIENT)
Dept: INFUSION THERAPY | Facility: HOSPITAL | Age: 79
End: 2019-07-08
Attending: INTERNAL MEDICINE
Payer: MEDICARE

## 2019-07-08 VITALS
TEMPERATURE: 98 F | BODY MASS INDEX: 27.65 KG/M2 | SYSTOLIC BLOOD PRESSURE: 152 MMHG | HEIGHT: 70 IN | HEART RATE: 65 BPM | DIASTOLIC BLOOD PRESSURE: 75 MMHG | RESPIRATION RATE: 20 BRPM | WEIGHT: 193.13 LBS | OXYGEN SATURATION: 97 %

## 2019-07-08 DIAGNOSIS — Z85.118 HISTORY OF CANCER OF UPPER LOBE BRONCHUS OR LUNG: ICD-10-CM

## 2019-07-08 DIAGNOSIS — C34.12 MALIGNANT NEOPLASM OF UPPER LOBE OF LEFT LUNG: Primary | ICD-10-CM

## 2019-07-08 PROCEDURE — 99999 PR PBB SHADOW E&M-EST. PATIENT-LVL IV: CPT | Mod: PBBFAC,,, | Performed by: INTERNAL MEDICINE

## 2019-07-08 PROCEDURE — 63600175 PHARM REV CODE 636 W HCPCS: Mod: TB | Performed by: INTERNAL MEDICINE

## 2019-07-08 PROCEDURE — 63600175 PHARM REV CODE 636 W HCPCS: Performed by: INTERNAL MEDICINE

## 2019-07-08 PROCEDURE — 99999 PR PBB SHADOW E&M-EST. PATIENT-LVL IV: ICD-10-PCS | Mod: PBBFAC,,, | Performed by: INTERNAL MEDICINE

## 2019-07-08 PROCEDURE — 25000003 PHARM REV CODE 250: Performed by: INTERNAL MEDICINE

## 2019-07-08 PROCEDURE — 99215 OFFICE O/P EST HI 40 MIN: CPT | Mod: 25,S$GLB,, | Performed by: INTERNAL MEDICINE

## 2019-07-08 PROCEDURE — 3078F PR MOST RECENT DIASTOLIC BLOOD PRESSURE < 80 MM HG: ICD-10-PCS | Mod: CPTII,S$GLB,, | Performed by: INTERNAL MEDICINE

## 2019-07-08 PROCEDURE — 77014 HC CT GUIDANCE RADIATION THERAPY FLDS PLACEMENT: CPT | Mod: TC | Performed by: RADIOLOGY

## 2019-07-08 PROCEDURE — 3077F SYST BP >= 140 MM HG: CPT | Mod: CPTII,S$GLB,, | Performed by: INTERNAL MEDICINE

## 2019-07-08 PROCEDURE — 77386 HC IMRT, COMPLEX: CPT | Performed by: RADIOLOGY

## 2019-07-08 PROCEDURE — 96367 TX/PROPH/DG ADDL SEQ IV INF: CPT

## 2019-07-08 PROCEDURE — 96413 CHEMO IV INFUSION 1 HR: CPT

## 2019-07-08 PROCEDURE — 3077F PR MOST RECENT SYSTOLIC BLOOD PRESSURE >= 140 MM HG: ICD-10-PCS | Mod: CPTII,S$GLB,, | Performed by: INTERNAL MEDICINE

## 2019-07-08 PROCEDURE — 96361 HYDRATE IV INFUSION ADD-ON: CPT

## 2019-07-08 PROCEDURE — 1101F PR PT FALLS ASSESS DOC 0-1 FALLS W/OUT INJ PAST YR: ICD-10-PCS | Mod: CPTII,S$GLB,, | Performed by: INTERNAL MEDICINE

## 2019-07-08 PROCEDURE — 3078F DIAST BP <80 MM HG: CPT | Mod: CPTII,S$GLB,, | Performed by: INTERNAL MEDICINE

## 2019-07-08 PROCEDURE — 99215 PR OFFICE/OUTPT VISIT, EST, LEVL V, 40-54 MIN: ICD-10-PCS | Mod: 25,S$GLB,, | Performed by: INTERNAL MEDICINE

## 2019-07-08 PROCEDURE — 1101F PT FALLS ASSESS-DOCD LE1/YR: CPT | Mod: CPTII,S$GLB,, | Performed by: INTERNAL MEDICINE

## 2019-07-08 RX ORDER — HEPARIN 100 UNIT/ML
500 SYRINGE INTRAVENOUS
Status: DISCONTINUED | OUTPATIENT
Start: 2019-07-08 | End: 2019-07-08 | Stop reason: HOSPADM

## 2019-07-08 RX ADMIN — APREPITANT 130 MG: 130 INJECTION, EMULSION INTRAVENOUS at 10:07

## 2019-07-08 RX ADMIN — CISPLATIN 83 MG: 100 INJECTION, SOLUTION INTRAVENOUS at 11:07

## 2019-07-08 RX ADMIN — PALONOSETRON HYDROCHLORIDE: 0.25 INJECTION, SOLUTION INTRAVENOUS at 09:07

## 2019-07-08 RX ADMIN — HEPARIN 500 UNITS: 100 SYRINGE at 02:07

## 2019-07-08 RX ADMIN — SODIUM CHLORIDE: 0.9 INJECTION, SOLUTION INTRAVENOUS at 08:07

## 2019-07-08 RX ADMIN — SODIUM CHLORIDE: 0.9 INJECTION, SOLUTION INTRAVENOUS at 12:07

## 2019-07-08 NOTE — PLAN OF CARE
Problem: Adult Inpatient Plan of Care  Goal: Plan of Care Review  Outcome: Ongoing (interventions implemented as appropriate)  Patient did not have any concerns today.

## 2019-07-08 NOTE — DISCHARGE INSTRUCTIONS
[facility]  [id number] [facility]  [address] [address]  [phone] phone     [phone] fax  Hours of Operation: Monday- Friday 8:00am- 5:00pm  After hours phone  [phone]  Hematology / Oncology Physicians on call      Dr. Jose Vila      Please call with any concerns regarding your appointment today.    HOME CARE AFTER CHEMOTHERAPY   Meals   Many patients feel sick and lose their appetites during treatment. Eat small meals several times a day. Choose bland foods with little taste or smell if you have problems with nausea. Be sure to cook all food thoroughly. This kills bacteria and helps you avoid intestinal infection. Soft foods are easier to swallow and digest.   Activity   Exercise keeps you strong and keeps your heart and lungs active. Talk to your doctor about an appropriate exercise program for you.   Skin Care   To prevent a skin infection, bathe or shower once a day. Use a moisturizing soap and wash with warm water. Avoid very hot or cold water. Chemotherapy can make your skin dry . Apply moisturizing lotion to help relieve dry skin. Some drugs used in high doses can cause slight burns to appear (like sunburn). Ask for a special cream to help relieve the burn and protect your skin.   Prevent Mouth Sores   During chemotherapy, many people get mouth sores. Do the following to help prevent mouth sores or to ease discomfort.   Brush your teeth with a soft-bristle toothbrush after every meal.  Don't use dental floss if your platelet count is below 50,000. Your doctor or nurse will tell you if this is the case.  Use an oral swab or special soft toothbrush if your gums bleed during regular brushing.  Use mouthwash as directed. If you can't tolerate commercial mouthwash, use salt and baking soda to clean your mouth. Mix 1 teaspoon of salt and 1 teaspoon of baking soda into a glass of water. Swish and spit.  Call your  doctor or return to this facility if you develop any of the following:   Sore throat   White patches in the mouth or throat   Fever of 100.4ºF (38ºC) or higher, or as directed by your healthcare provider  © 6026-4046 [facility], [address] [place] [address]. All rights reserved. This information is not intended as a substitute for professional medical care. Always follow your healthcare professional's   Oncology: Controlling Diarrhea   Diarrhea (loose stools) is a common side effect of chemotherapy and radiation therapy. Diarrhea results when treatment affects the normal cells lining the intestine. To help limit this problem, try the tips on this handout.      For breakfast, try eating toasted white bread and a banana.      Tips for Controlling Diarrhea   Limit the amount of fiber in your diet. Avoid high-fiber foods such as whole-grain bread and brown rice. Instead, eat white bread and rice.   Eat foods rich in potassium such as bananas and oranges.   Eat small, frequent meals.   Drink plenty of fluids. Clear liquids and flat light sodas, such as ginger ale, are best.   Do not drink coffee, tea, or alcohol.   Try not to eat foods that are fried, greasy, spicy, or sweet.   Limit the amount of milk you drink.  Medications Can Help   Ongoing diarrhea is not something you have to live with. Talk with your doctor. Medication to stop diarrhea may be prescribed. Your doctor may also suggest using an ointment to soothe irritation. Keeping the anal area clean helps as well. Do not take any over-the-counter product without asking your doctor first.   Call the Doctor If:   The diarrhea lasts more than 48 hours.   There is blood in your stool.   You have pain in your abdomen.    © 4196-7245 [facility], [address] [place] [address]. All rights reserved. This information is not intended as a substitute for professional medical care. Always follow   FALL PREVENTION   Falls often occur due to  slipping, tripping or losing your balance. Here are ways to reduce your risk of falling again.   Was there anything that caused your fall that can be fixed, removed or replaced?   Make your home safe by keeping walkways clear of objects you may trip over.   Use non-slip pads under rugs.   Do not walk in poorly lit areas.   Do not stand on chairs or wobbly ladders.   Use caution when reaching overhead or looking upward. This position can cause a loss of balance.   Be sure your shoes fit properly, have non-slip bottoms and are in good condition.   Be cautious when going up and down stairs, curbs, and when walking on uneven sidewalks.   If your balance is poor, consider using a cane or walker.   If your fall was related to alcohol use, stop or limit alcohol intake.   If your fall was related to use of sleeping medicines, talk to your doctor about this. You may need to reduce your dosage at bedtime if you awaken during the night to go to the bathroom.   To reduce the need for nighttime bathroom trips:   Avoid drinking fluids for several hours before going to bed   Empty your bladder before going to bed   Men can keep a urinal at the bedside   © 3243-5042 [facility], [address] [place] [address], [place] [address]. All rights reserved. This information is not intended as a substitute for professional medical care. Always follow your healthcare professional's instructions.  Oncology: Managing Fatigue   Fatigue is a common side effect of chemotherapy and radiation therapy. It can be caused by worry, lack of sleep, and poor appetite. Fatigue can also be a sign of anemia (a shortage of red blood cells). This could require medical treatment. The tips below can help you feel better.      Family members can help with meals and chores around the house.      Conserving Energy   Note the times of day when you are most tired and plan around them. For instance, if you are more tired in the afternoon, try to get tasks done in the  morning.   Decide which tasks are most important. Do those first.   Pass tasks along to others when you need to. Ask for help.   Accept help when its offered. Tell people what they can do to help. For instance, you may need someone to fix a meal, fold clothes, or put gas in your car.   Plan rest times. You may want to take a nap each day. Just sitting quietly for a few minutes can make you feel more rested.  What You Can Do to Feel Better   Relax before you try to sleep. Take a bath or read for a while.   Form a sleep pattern. Go to bed at the same time each night and get up at the same time each morning.   Eat well. Choose foods from all of the food groups each day.   Exercise. Take a brisk walk to help increase your energy.   Avoid caffeine and alcohol. Drink plenty of water or fruit juices instead.  Treating Anemia   If you begin to feel more tired than normal, tell your doctor. Fatigue could be a sign of anemia. This problem is fairly common during chemotherapy and radiation treatments. If your red blood cell count is too low, you are likely to receive a blood transfusion. Most people feel better shortly after the transfusion. In some cases, medication may be prescribed to increase red blood cell production.   Call Your Doctor If You Have:   Shortness of breath or chest pain   A dizzy feeling when you get up from lying or sitting down   Paler skin than normal   Extreme tiredness that is not helped by sleep    © 3271-4000 [facility], [address] [place] [address], [place] [address]. All rights reserved. This information is not intended as a substitute for professional medical care. Always follow your healthcare professional's instructions.  Mouth Care During Chemotherapy   Call the Doctor If:   You develop mouth sores   Mouth pain keeps you from eating      Mouth sores (stomatitis) and dry mouth are common side effects of chemotherapy and radiation therapy. These side effects occur because these treatments affect  normal cells as well as cancer cells. Using the tips on this handout may help you feel better.   Remedies That Help   Rinse with 1/2 teaspoon baking soda in a glass of water. This helps keep your mouth free of germs.   Use products that coat and protect the mouth and throat. Or use medications that coat and soothe mouth sores themselves.   Numb your mouth and throat with special sprays or lozenges to make eating easier.  Prevent Mouth Sores      Rinse with baking soda.      Buy a special type of soft toothbrush, mild toothpaste, and mouthwash without alcohol.   Gently brush your teeth and gums.   Have your dentist treat any dental problems before therapy begins.  Moisten a Dry Mouth   Drink plenty of water. Take frequent sips, or keep a spray bottle.   Suck on sugar-free candy and lozenges. Chew gum.   Use products that moisten the mouth if your doctor prescribes them.   Apply lip balm to help prevent dry lips.   Avoid mouthwash that contains alcohol.  Choose Foods Less Likely to Irritate   Try foods that are:   Soft and go down smoothly, such as a milkshake or food puréed with a    Served cold or at room temperature   Cooked until tender and cut into small pieces  © 2553-9509 [facility]erg[facility] with [facility], [address] [place] [address], [place] [address]. All rights reserved. This information is not intended as a substitute for professional medical care. Always follow   Nutrition During Chemotherapy   During chemotherapy, the energy provided by a healthy diet can help you rebuild normal cells. It can also help you keep up your strength and fight infection. As a result, you may feel better and be more able to cope with side effects. Ask your doctor about your nutrition needs.   Drink Plenty of Fluids   Fluids help the body produce urine and decrease constipation. They help prevent kidney and bladder problems. They also help replace fluids lost from vomiting and diarrhea.   Try water, unsweetened juices, and other flavored drinks  without caffeine. They flush toxins from the body. Avoid drinks with added sugar or those with artificial sweetener.  Get Enough Calories   Calories are fuel. The body uses this fuel to perform all of its functions, including healing.   Its okay to be lean, but be sure you are not underweight. If you are, try eating more calories.   Eat calorie-dense foods such as avocados, peanut butter, eggs, and ice cream.   If you need extra calories, add butter, gravy, and sauces to foods (if tolerated).   Limit the amount of processed foods you eat. Also try to limit foods that are fried, greasy, or high in fat or added sugar.  Eat Protein, Fruits, and Vegetables   Protein builds muscle, bone, skin, and blood. It helps your body heal and fight infection. It also helps boost your energy level.   Good choices include yogurt, eggs, chicken, lean meats, and peanut butter.   Fruits and vegetables are full of vitamins and nutrition. Beans are high in protein.   Try to eat a variety of vegetables, fruits, whole grains, and beans.   Ask your doctor about instant protein powder or other supplements.  Eating Right During Treatment   Side effects may make it a little harder to eat well on some days. The following tips will help you to continue to get the nutrition you need.   Be open to new foods and recipes.   Eat small portions often and slowly.   Have a healthy snack instead of a meal if you are not very hungry.   Try eating in a new setting.   Physical activity, such as walking, can help increase your appetite. Try to be active for at least 30 minutes each day.   Round off your diet with vitamins from fruit, vegetables, and grains.   If you live alone and are not up to cooking, ask your healthcare provider about Meals on Wheels or other outreach programs.  For more information, go to www.cancer.org or call 7-865-QTW-9366.    © 5164-4100 [facility], [address] [place] [address], [place] [address]. All rights reserved. This  information is not intended as a substitute for professional medical care. Always follow your healthcare professional's      WAYS TO HELP PREVENT INFECTION         WASH YOUR HANDS OFTEN DURING THE DAY, ESPECIALLY BEFORE YOU EAT, AFTER USING THE BATHROOM, AND AFTER TOUCHING ANIMALS     STAY AWAY FROM PEOPLE WHO HAVE ILLNESSES YOU CAN CATCH; SUCH AS COLDS, FLU, CHICKEN POX     TRY TO AVOID CROWDS     STAY AWAY FROM CHILDREN WHO RECENTLY HAVE RECEIVED LIVE VIRUS VACCINES     MAINTAIN GOOD MOUTH CARE     DO NOT SQUEEZE OR SCRATCH PIMPLES     CLEAN CUTS & SCRAPES RIGHT AWAY AND DAILY UNTIL HEALED WITH WARM WATER, SOAP & AN ANTISEPTIC     AVOID CONTACT WITH LITTER BOXES, BIRD CAGES, & FISH TANKS     AVOID STANDING WATER, IE., BIRD BATHS, FLOWER POTS/VASES, OR HUMIDIFIERS     WEAR GLOVES WHEN GARDENING OR CLEANING UP AFTER OTHERS, ESPECIALLY BABIES & SMALL CHILDREN    DO NOT EAT RAW FISH, SEAFOOD, MEAT, OR EGGSYOU HAVE STARTED ON CHEMOTHERAPY. IF YOU EXPERIENCE ANY OF THE FOLLOWING PROBLEMS, CALL THE OFFICE IMMEDIATELY.    *FEVER .0 OR GREATER    *CHILLS, ESPECIALLY SHAKING CHILLS, OR SWEATING    *A SEVERE COUGH OR SORE THROAT, OR SINUS PAIN/     PRESSURE    *REDNESS, SWELLING, OR TENDERNESS AROUND A WOUND,     SORE, PIMPLE, RECTAL AREA, OR IV SITE    *SORES OR ULCERS IN THE MOUTH    *BLISTERS ON THE LIPS OR SKIN    *FREQUENT URGENCY TO URINATE OR A BURNING FEELING   WHEN YOU URINATE    *BLOOD IN THE URINE OR STOOL    *ANY UNEXPLAINED BRUISING OR PROLONGED BLEEDING,     (NOSEBLEEDS OR BLEEDING GUMS)    *LOOSE BOWEL MOVEMENTS THAT DO NOT RESPOND TO     IMODIUM OR MORE THAN THREE TIMES A DAY    *VOMITING UNRESPONSIVE TO ANTINAUSEA MEDICINE    *ANY UNUSUAL PHYSICAL SYMPTOMS THAT BEGAN AFTER     CHEMOTHERAPY     DURING WEEKDAYS, CALL AND ASK TO SPEAK DIRECTLY TO A NURSE.  AT OTHER TIMES, CALL THE OFFICE PHONE NUMBER; THE ANSWERING SERVICE WILL CONTACT THE ON-CALL PHYSICIAN.  SOMEONE IS AVAILABLE 24 HOURS A  DAY, SEVEN DAYS A WEEK.

## 2019-07-08 NOTE — PROGRESS NOTES
Subjective:       Patient ID: Chai Murry is a 79 y.o. male.    Chief Complaint: Malignant neoplasm of upper lobe of left lung [C34.12]  HPI: We have an opportunity to see Mr. Chai Murry in Hematology Oncology clinic at [facility] on 07/08/2019.  Mr. Chai Murry is a 79 y.o. 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    He is s/p left pneumonectomy for a squamous cell carcinoma of the left lung.0n 4/12/2016  Prior to the surgery, the PET scan showed an FDG-avid mass in the left upper   lobe abutting the left hilum with an SUV of 11.6. There seemed to be a left   hilar adenopathy as well.  Radiologist felt that he was unable to discriminate between the mass and the   lymphadenopathy. The patient had had a bronchoscopy by Dr. Roel Ernandez on   03/04/2006 that showed a partially obstructing airway in the anterior segment of  the left upper lobe with an endobronchial lesion in the anterior segment of the  left upper lobe. The specimen from the biopsy at the time of bronchoscopy was   reported as being a squamous cell carcinoma.  At the time of the surgery after the left lung was removed, the pathologist   indicated that this was a squamous cell carcinoma. It measured 3.8 cm. The   pathology indicated that this is extending into the bronchial resection margin of the lobe. This lobe was later on removed to complete the pneumonectomy   There was one lymph node positive for metastatic squamous cell carcinoma at the   AP window.  The patient was told that we would prefer to treat him with post-op simultaneous chemo/radiation.  He had Medi-port. He started chemo/radiation therapy andreceived 6 weekly cycles of carbo/Taxol along with radiatioon.  After a month, he had a Ct scan and it showed stability   He then had 2 additional cyles of carbo/Taxol finishing in 9/27/2016.    He comes for follow up.   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 CT of the chest was non contributory, shows changes from surgery  PET showed a PET avid mass to the left of the trachea.  The case was discussed with dr annika Goldman and GI.  We discussed the possibility of doing a EUS or EBUs, and finally, because of the localization, it was decided to do a EUS with biopsy if possible.  Cytology shows recurrent squamous cell cancer.  I have asked Pathology to run a PD-L1 from his original pathology from 2016.  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  He has been discussed extensively with radiation oncology. We have decided to treat him with radiation therapy and Cisplatin as a chemo-sensitizer.  Dr Castro has reviewed the therapy ports from the previous radiation treatment and the area in question seems to be above the previously radiated fields.       Malignant neoplasm of upper lobe of left lung    4/12/2016 Initial Diagnosis     Malignant neoplasm of upper lobe of left lung         6/19/2019 Cancer Staged     Staging form: Lung, AJCC 8th Edition  - Clinical stage from 6/19/2019: Stage IIIB (rcT4, cN2, cM0) - Signed by Alejandro Castro II, MD on 6/19/2019 6/21/2019 Tumor Conference     Presenting Hospital / Clinic: [facility] [place]  Virtual Tumor Board Conference: In person  Presenter: Dr. Chance Castro  Date Presented to Tumor Board: 06/21/19  Specialties Present: Medical Oncology;Radiation Oncology;Surgical Oncology;Pathology;Navigation;Plastic Surgery;Radiology;Gastrointestinal;Pulmonology  Presentation at Cancer Conference: Prospective  Cancer Type: Lung cancer  Recommended Plan: Additional screening  Recommended Plan Note: If PDL-1 positive, treat with immunotherapy  Send tissue for next generation sequencing.         6/28/2019 Tumor Conference     His case was discussed at the Multidisciplinary Head and Neck Team Planning Meeting.    Representatives from Medical Oncology, Radiation  Oncology, Head and Neck Surgical Oncology, Psychosocial Oncology, and Speech and Language Pathology discussed the case with the following recommendations:    1) treat lung cancer                  Lung cancer    6/11/2019 Initial Diagnosis     Lung cancer         7/8/2019 -  Chemotherapy     Treatment Summary   Plan Name: OP HEAD NECK CISPLATIN WEEKLY + RADIOTHERAPY  Treatment Goal: Supportive  Status: Active  Start Date: 7/8/2019 (Planned)  End Date: 8/12/2019 (Planned)  Provider: Jorge L Patel MD  Chemotherapy: CISplatin (PLATINOL) 70 mg in sodium chloride 0.9% 500 mL chemo infusion, 70 mg (original dose 40 mg/m2), Intravenous, Clinic/HOD 1 time, 0 of 6 cycles  Dose modification: 70 mg (original dose 40 mg/m2, Cycle 1, Reason: MD Discretion)          Past Medical History:   Diagnosis Date    Anemia     Arthritis     Atrial fibrillation     CAD (coronary artery disease)     Cancer     lung cancer-Left    Cataract     COPD (chronic obstructive pulmonary disease)     Diverticulosis     ED (erectile dysfunction)     HTN (hypertension)     Hyperlipidemia     Myocardial infarction     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 Courtney dwyer bx     Family History   Problem Relation Age of Onset    Esophageal cancer Sister     Cancer Sister     Lung cancer Mother     Cancer Mother     Cataracts Mother     Hypertension Mother     Pneumonia Father     Cataracts Father     Hypertension Father     Cancer Brother     Hypertension Brother     Blindness Neg Hx     Diabetes Neg Hx     Glaucoma Neg Hx     Macular degeneration Neg Hx     Retinal detachment Neg Hx     Strabismus Neg Hx     Stroke Neg Hx     Thyroid disease Neg Hx      Social History     Socioeconomic History    Marital status:      Spouse name: Not on file    Number of children: 3    Years of education: Not on file    Highest education level: Not on file   Occupational History     Occupation: retired   Social Needs    Financial resource strain: Not on file    Food insecurity:     Worry: Not on file     Inability: Not on file    Transportation needs:     Medical: Not on file     Non-medical: Not on file   Tobacco Use    Smoking status: Former Smoker     Packs/day: 1.00     Years: 50.00     Pack years: 50.00     Last attempt to quit: 2005     Years since quittin.9    Smokeless tobacco: Never Used   Substance and Sexual Activity    Alcohol use: No    Drug use: No    Sexual activity: Not Currently   Lifestyle    Physical activity:     Days per week: Not on file     Minutes per session: Not on file    Stress: Not on file   Relationships    Social connections:     Talks on phone: Not on file     Gets together: Not on file     Attends Hinduism service: Not on file     Active member of club or organization: Not on file     Attends meetings of clubs or organizations: Not on file     Relationship status: Not on file   Other Topics Concern    Not on file   Social History Narrative    Not on file     Past Surgical History:   Procedure Laterality Date    APPENDECTOMY      Bronchoscopy Bilateral 2019    Performed by Paresh Goldman MD at [facility] ENDO    BRONCHOSCOPY- insert lighted tube into airway to obtain biopsy of lung Bilateral 3/4/2016    [facility]    CARDIAC CATHETERIZATION      cardiac stents      CATARACT EXTRACTION W/  INTRAOCULAR LENS IMPLANT Right 9-3-14    CHOLECYSTECTOMY      COLONOSCOPY N/A 10/25/2018    Performed by Michael Hameed MD at [facility] ENDO    COLONOSCOPY N/A 2018    Performed by Dionne Doan MD at [facility] ENDO    Colonoscopy N/A 2014    Performed by Eriberto Simpson MD at [facility] ENDO    EGD (ESOPHAGOGASTRODUODENOSCOPY) N/A 2019    Performed by Abhishek Knox MD at [facility] ENDO    infected stomach gland excision      INSERTION-PORT Left 2016    Performed by [facility]io Wall [facility] at  [facility] OR    [id number]-S-CATH Right 7/3/2019    Performed by Jean Carlos Constantino MD at [facility] OR    LOBECTOMY-LUNG Left 4/12/2016    Performed by Nemesio Wall MD at [facility] OR    LUNG REMOVAL, TOTAL Left 04/2016    lung cancer left upper lobe    PNEUMONECTOMY Left 4/12/2016    Performed by Nemesio Wall MD at [facility] OR    SKIN BIOPSY Left     arm    THORACOTOMY Left 4/12/2016    Performed by Nemesio Wall MD at [facility]    ULTRASOUND, UPPER GI TRACT, ENDOSCOPIC N/A 6/11/2019    Performed by Abhishek Knox MD at [facility] ENDO     Current Outpatient Medications   Medication Sig Dispense Refill    albuterol (PROVENTIL) 2.5 mg /3 mL (0.083 %) nebulizer solution Take 3 mLs (2.5 mg total) by nebulization every 6 (six) hours while awake. (Patient taking differently: Take 2.5 mg by nebulization as needed. ) 270 mL 11    amLODIPine (NORVASC) 5 MG tablet Take 2 tablets (10 mg total) by mouth every evening. (Patient taking differently: Take 5 mg by mouth once daily. ) 60 tablet 0    aspirin (ECOTRIN) 81 MG EC tablet Take 81 mg by mouth once daily.      BREO ELLIPTA 100-25 mcg/dose diskus inhaler INHALE 1 PUFF INTO THE LUNGS ONCE DAILY  5    doxepin (SINEQUAN) 10 MG capsule TAKE 1 CAPSULE (10 MG TOTAL) BY MOUTH EVERY EVENING. 30 capsule 10    fenofibric acid (FIBRICOR) 135 mg CpDR TAKE 1 CAPSULE BY MOUTH EVERY DAY 30 capsule 11    fluticasone (FLONASE) 50 mcg/actuation nasal spray 2 sprays (100 mcg total) by Each Nare route once daily. (Patient taking differently: 2 sprays by Each Nare route as needed. ) 3 Bottle 3    glycopyrrolate-formoterol (BEVESPI AEROSPHERE) 9-4.8 mcg HFAA Inhale 2 puffs into the lungs 2 (two) times daily. Controller 10.9 g 11    HYDROcodone-acetaminophen (NORCO) 5-325 mg per tablet Take 1 tablet by mouth every 6 (six) hours as needed. 12 tablet 0    HYDROcodone-acetaminophen (NORCO) 5-325 mg per tablet Take 1 tablet by mouth every 6 (six) hours as needed for Pain. 20 tablet 0     ipratropium-albuterol (COMBIVENT RESPIMAT)  mcg/actuation inhaler Inhale 1 puff into the lungs every 6 (six) hours as needed for Shortness of Breath. 4 g 11    levocetirizine (XYZAL) 5 MG tablet Take 5 mg by mouth as needed.       lisinopril (PRINIVIL,ZESTRIL) 40 MG tablet TAKE 1 TABLET BY MOUTH DAILY 30 tablet 11    naproxen sodium (ANAPROX) 550 MG tablet Take 1 tablet (550 mg total) by mouth 2 (two) times daily with meals. 20 tablet 0    nozaseptin (NOZIN) nasal  1 each by Each Nare route 2 (two) times daily. for 7 days 1 applicator 0    ondansetron (ZOFRAN) 4 MG tablet Take 1 tablet (4 mg total) by mouth every 12 (twelve) hours as needed for Nausea. 30 tablet 1    simvastatin (ZOCOR) 40 MG tablet Take 1 tablet (40 mg total) by mouth every evening. 30 tablet 11    SOTALOL AF 80 mg tablet TAKE 1 TABLET BY MOUTH TWICE A DAY 60 tablet 10     Current Facility-Administered Medications   Medication Dose Route Frequency Provider Last Rate Last Dose    testosterone cypionate injection 200 mg  200 mg Intramuscular Q21 Days Peter Teixeira MD   200 mg at 05/20/19 0824     Facility-Administered Medications Ordered in Other Visits   Medication Dose Route Frequency Provider Last Rate Last Dose    lactated ringers infusion   Intravenous Continuous Michael Hameed MD   Stopped at 07/03/19 0810       Labs:  Lab Results   Component Value Date    WBC 7.72 06/26/2019    HGB 14.4 06/26/2019    HCT 46.5 06/26/2019    MCV 94 06/26/2019     06/26/2019     BMP  Lab Results   Component Value Date     06/26/2019    K 4.2 07/03/2019     06/26/2019    CO2 31 (H) 06/26/2019    BUN 21 06/26/2019    CREATININE 1.1 07/08/2019    CALCIUM 9.9 06/26/2019    ANIONGAP 5 (L) 06/26/2019    ESTGFRAFRICA >60 07/08/2019    EGFRNONAA >60 07/08/2019     Lab Results   Component Value Date    ALT 23 06/26/2019    AST 25 06/26/2019    ALKPHOS 55 06/26/2019    BILITOT 0.4 06/26/2019       No results found for:  IRON, TIBC, FERRITIN, SATURATEDIRO  No results found for: MYMIIDFZ05  No results found for: FOLATE  Lab Results   Component Value Date    TSH 3.422 02/20/2012       I have reviewed the radiology reports and examined the scan/xray images.    Review of Systems   Constitutional: Negative.    HENT: Negative.    Eyes: Negative.    Respiratory: Negative.    Cardiovascular: Negative.    Gastrointestinal: Negative.    Endocrine: Negative.    Genitourinary: Negative.    Musculoskeletal: Negative.    Skin: Negative.    Allergic/Immunologic: Negative.    Neurological: Negative.    Hematological: Negative.    Psychiatric/Behavioral: Negative.      ECOG SCORE    0 - Fully active-able to carry on all pre-disease performance without restriction            Objective:     Vitals:    07/08/19 0808   BP: (!) 152/75   Pulse: 65   Resp: 20   Temp: 97.7 °F (36.5 °C)   Body mass index is 27.71 kg/m².  Physical Exam   Constitutional: He is oriented to person, place, and time. He appears well-developed and well-nourished.   HENT:   Head: Normocephalic and atraumatic.   Eyes: Conjunctivae and EOM are normal.   Neck: Normal range of motion. Neck supple.   Cardiovascular: Normal rate and regular rhythm.   Pulmonary/Chest: Effort normal and breath sounds normal.   Abdominal: Soft. Bowel sounds are normal.   Musculoskeletal: Normal range of motion.   Neurological: He is alert and oriented to person, place, and time.   Skin: Skin is warm and dry.   Psychiatric: He has a normal mood and affect. His behavior is normal. Judgment and thought content normal.   Nursing note and vitals reviewed.        Assessment:      1. Malignant neoplasm of upper lobe of left lung           Plan:     Malignant neoplasm of upper lobe of left lung    Will start weekly cisplatin with radiation.  Has prn antinausea meds.    -     CBC auto differential; Future; Expected date: 07/15/2019  -     Comprehensive metabolic panel; Future; Expected date: 07/15/2019

## 2019-07-08 NOTE — ANESTHESIA POSTPROCEDURE EVALUATION
Anesthesia Post Evaluation    Patient: Chai Murry    Procedure(s) Performed: Procedure(s) (LRB):  [id number] (Right)    Final Anesthesia Type: MAC  Patient location during evaluation: PACU  Patient participation: Yes- Able to Participate  Level of consciousness: awake and alert, oriented and awake  Post-procedure vital signs: reviewed and stable  Pain management: adequate  Airway patency: patent  PONV status at discharge: No PONV  Anesthetic complications: no      Cardiovascular status: blood pressure returned to baseline  Respiratory status: unassisted and spontaneous ventilation  Hydration status: euvolemic  Follow-up not needed.          Vitals Value Taken Time   BP  7/8/2019  7:03 AM   Temp  7/8/2019  7:03 AM   Pulse  7/8/2019  7:03 AM   Resp  7/8/2019  7:03 AM   SpO2  7/8/2019  7:03 AM         Event Time     Out of Recovery 08:48:08          Pain/Brendan Score: No data recorded

## 2019-07-09 PROCEDURE — 77331 PR  SPECIAL RADIATION DOSIMETRY: ICD-10-PCS | Mod: 26,,, | Performed by: RADIOLOGY

## 2019-07-09 PROCEDURE — 77014 HC CT GUIDANCE RADIATION THERAPY FLDS PLACEMENT: CPT | Mod: TC | Performed by: RADIOLOGY

## 2019-07-09 PROCEDURE — 77331 SPECIAL RADIATION DOSIMETRY: CPT | Mod: TC | Performed by: RADIOLOGY

## 2019-07-09 PROCEDURE — 77331 SPECIAL RADIATION DOSIMETRY: CPT | Mod: 26,,, | Performed by: RADIOLOGY

## 2019-07-09 PROCEDURE — 77386 HC IMRT, COMPLEX: CPT | Performed by: RADIOLOGY

## 2019-07-10 ENCOUNTER — DOCUMENTATION ONLY (OUTPATIENT)
Dept: RADIATION ONCOLOGY | Facility: CLINIC | Age: 79
End: 2019-07-10

## 2019-07-10 PROCEDURE — 77386 HC IMRT, COMPLEX: CPT | Performed by: RADIOLOGY

## 2019-07-10 PROCEDURE — 77014 HC CT GUIDANCE RADIATION THERAPY FLDS PLACEMENT: CPT | Mod: TC | Performed by: RADIOLOGY

## 2019-07-11 ENCOUNTER — OFFICE VISIT (OUTPATIENT)
Dept: INTERNAL MEDICINE | Facility: CLINIC | Age: 79
End: 2019-07-11
Payer: MEDICARE

## 2019-07-11 VITALS
HEIGHT: 70 IN | HEART RATE: 65 BPM | WEIGHT: 196.88 LBS | DIASTOLIC BLOOD PRESSURE: 70 MMHG | TEMPERATURE: 98 F | BODY MASS INDEX: 28.18 KG/M2 | SYSTOLIC BLOOD PRESSURE: 128 MMHG

## 2019-07-11 DIAGNOSIS — J39.8 TRACHEAL MASS: Primary | ICD-10-CM

## 2019-07-11 DIAGNOSIS — I10 ESSENTIAL HYPERTENSION: ICD-10-CM

## 2019-07-11 DIAGNOSIS — I25.10 CORONARY ARTERY DISEASE INVOLVING NATIVE CORONARY ARTERY OF NATIVE HEART WITHOUT ANGINA PECTORIS: ICD-10-CM

## 2019-07-11 DIAGNOSIS — E29.1 HYPOGONADISM IN MALE: ICD-10-CM

## 2019-07-11 PROCEDURE — 99214 PR OFFICE/OUTPT VISIT, EST, LEVL IV, 30-39 MIN: ICD-10-PCS | Mod: 25,S$GLB,, | Performed by: INTERNAL MEDICINE

## 2019-07-11 PROCEDURE — 99999 PR PBB SHADOW E&M-EST. PATIENT-LVL III: CPT | Mod: PBBFAC,,, | Performed by: INTERNAL MEDICINE

## 2019-07-11 PROCEDURE — 96372 THER/PROPH/DIAG INJ SC/IM: CPT | Mod: S$GLB,,, | Performed by: INTERNAL MEDICINE

## 2019-07-11 PROCEDURE — 3078F DIAST BP <80 MM HG: CPT | Mod: CPTII,S$GLB,, | Performed by: INTERNAL MEDICINE

## 2019-07-11 PROCEDURE — 77336 RADIATION PHYSICS CONSULT: CPT | Performed by: RADIOLOGY

## 2019-07-11 PROCEDURE — 77014 HC CT GUIDANCE RADIATION THERAPY FLDS PLACEMENT: CPT | Mod: TC | Performed by: RADIOLOGY

## 2019-07-11 PROCEDURE — 96372 PR INJECTION,THERAP/PROPH/DIAG2ST, IM OR SUBCUT: ICD-10-PCS | Mod: S$GLB,,, | Performed by: INTERNAL MEDICINE

## 2019-07-11 PROCEDURE — 77386 HC IMRT, COMPLEX: CPT | Performed by: RADIOLOGY

## 2019-07-11 PROCEDURE — 99999 PR PBB SHADOW E&M-EST. PATIENT-LVL III: ICD-10-PCS | Mod: PBBFAC,,, | Performed by: INTERNAL MEDICINE

## 2019-07-11 PROCEDURE — 1101F PT FALLS ASSESS-DOCD LE1/YR: CPT | Mod: CPTII,S$GLB,, | Performed by: INTERNAL MEDICINE

## 2019-07-11 PROCEDURE — 3074F PR MOST RECENT SYSTOLIC BLOOD PRESSURE < 130 MM HG: ICD-10-PCS | Mod: CPTII,S$GLB,, | Performed by: INTERNAL MEDICINE

## 2019-07-11 PROCEDURE — 99214 OFFICE O/P EST MOD 30 MIN: CPT | Mod: 25,S$GLB,, | Performed by: INTERNAL MEDICINE

## 2019-07-11 PROCEDURE — 3074F SYST BP LT 130 MM HG: CPT | Mod: CPTII,S$GLB,, | Performed by: INTERNAL MEDICINE

## 2019-07-11 PROCEDURE — 3078F PR MOST RECENT DIASTOLIC BLOOD PRESSURE < 80 MM HG: ICD-10-PCS | Mod: CPTII,S$GLB,, | Performed by: INTERNAL MEDICINE

## 2019-07-11 PROCEDURE — 1101F PR PT FALLS ASSESS DOC 0-1 FALLS W/OUT INJ PAST YR: ICD-10-PCS | Mod: CPTII,S$GLB,, | Performed by: INTERNAL MEDICINE

## 2019-07-11 RX ORDER — TESTOSTERONE CYPIONATE 200 MG/ML
200 INJECTION, SOLUTION INTRAMUSCULAR
Status: COMPLETED | OUTPATIENT
Start: 2019-07-11 | End: 2019-12-26

## 2019-07-11 RX ADMIN — TESTOSTERONE CYPIONATE 200 MG: 200 INJECTION, SOLUTION INTRAMUSCULAR at 10:07

## 2019-07-11 NOTE — PROGRESS NOTES
"Subjective:       Patient ID: Chai Murry is a 79 y.o. male.    Chief Complaint: No chief complaint on file.    HPI Patient is a 79-year-old male presenting today following up on some chronic issues.  He is patient has had a history of lung cancer which was treated.  He recently was discovered to have a paralyzed vocal cord and workup was found to have paratracheal mass.  This is cancers in nature.  He has been seen by Dr. Castro and Dr. Vila.  He has started on radiation therapy with concomitant chemo.  He states that he is already started the treatment and seems to be tolerating it well at this point.  He is frustrated about his vocal cords situation.  He is aware that he is unlikely to get his voice back to baseline.    He has history of coronary artery disease and he is doing well from that standpoint.  He is having no issues with chest pain or palpitations.  He is tolerating medications well.  He has no signs or symptoms of cardiac decompensation.    His blood pressure remains under good control at this time.    Patient is on testosterone replacement for hypogonadism.  He continues to do well with that.  He is due for updated orders today.    Review of Systems   Constitutional: Negative for fever and unexpected weight change.   HENT: Positive for voice change.    Respiratory: Negative for cough, shortness of breath and wheezing.    Cardiovascular: Negative for chest pain and palpitations.   Gastrointestinal: Negative for constipation, diarrhea, nausea and vomiting.   Genitourinary: Negative for dysuria and hematuria.       Objective:   /70   Pulse 65   Temp 98.3 °F (36.8 °C)   Ht 5' 10" (1.778 m)   Wt 89.3 kg (196 lb 13.9 oz)   BMI 28.25 kg/m²      Physical Exam   Constitutional: He appears well-developed and well-nourished.   HENT:   Head: Normocephalic and atraumatic.   Eyes: Pupils are equal, round, and reactive to light.   Neck: Neck supple. No thyromegaly present.   Cardiovascular: Normal rate, " regular rhythm and normal heart sounds. Exam reveals no gallop and no friction rub.   No murmur heard.  Pulmonary/Chest: Breath sounds normal. He has no wheezes. He has no rales.   Abdominal: Soft. Bowel sounds are normal. He exhibits no distension. There is no tenderness.   Vitals reviewed.      Lab Visit on 07/08/2019   Component Date Value    Creatinine 07/08/2019 1.1     eGFR if African American 07/08/2019 >60     eGFR if non African Amer* 07/08/2019 >60    Admission on 07/03/2019, Discharged on 07/03/2019   Component Date Value    Potassium 07/03/2019 4.2        Assessment:       1. Tracheal mass: 3 cm BELOW VOCAL CORDS: SUBGLOTIC    2. Coronary artery disease involving native coronary artery of native heart without angina pectoris    3. Essential hypertension    4. Hypogonadism in male        Plan:   No problem-specific Assessment & Plan notes found for this encounter.    Tracheal mass: 3 cm BELOW VOCAL CORDS: SUBGLOTIC  Comments:  Seeing Dr. Castro and Dr. Vila.  Chemo and radiation to the lesion.  Just started treatment.  No issues at this time    Coronary artery disease involving native coronary artery of native heart without angina pectoris  Comments:  stable over time. no changes today    Essential hypertension  Comments:  Continue meds, stable    Hypogonadism in male  -     testosterone cypionate injection 200 mg          Follow up in about 3 months (around 10/11/2019).

## 2019-07-11 NOTE — PLAN OF CARE
Problem: Adult Inpatient Plan of Care  Goal: Plan of Care Review  Outcome: Ongoing (interventions implemented as appropriate)  Day 4 of outpatient xrt to the lung. Denies any c/o pain. No N/V/D. No sore swallowing, no sore throat. Does have SOB on exertion. Will continue to monitor.

## 2019-07-15 ENCOUNTER — OFFICE VISIT (OUTPATIENT)
Dept: HEMATOLOGY/ONCOLOGY | Facility: CLINIC | Age: 79
End: 2019-07-15
Payer: MEDICARE

## 2019-07-15 ENCOUNTER — INFUSION (OUTPATIENT)
Dept: INFUSION THERAPY | Facility: HOSPITAL | Age: 79
End: 2019-07-15
Attending: INTERNAL MEDICINE
Payer: MEDICARE

## 2019-07-15 VITALS
BODY MASS INDEX: 27.9 KG/M2 | DIASTOLIC BLOOD PRESSURE: 69 MMHG | WEIGHT: 194.88 LBS | TEMPERATURE: 97 F | HEART RATE: 62 BPM | SYSTOLIC BLOOD PRESSURE: 131 MMHG | OXYGEN SATURATION: 98 % | HEIGHT: 70 IN

## 2019-07-15 DIAGNOSIS — C34.12 MALIGNANT NEOPLASM OF UPPER LOBE OF LEFT LUNG: Primary | ICD-10-CM

## 2019-07-15 DIAGNOSIS — Z51.11 ENCOUNTER FOR ANTINEOPLASTIC CHEMOTHERAPY: ICD-10-CM

## 2019-07-15 DIAGNOSIS — Z85.118 HISTORY OF CANCER OF UPPER LOBE BRONCHUS OR LUNG: ICD-10-CM

## 2019-07-15 PROCEDURE — 99215 PR OFFICE/OUTPT VISIT, EST, LEVL V, 40-54 MIN: ICD-10-PCS | Mod: S$GLB,,, | Performed by: NURSE PRACTITIONER

## 2019-07-15 PROCEDURE — 3075F PR MOST RECENT SYSTOLIC BLOOD PRESS GE 130-139MM HG: ICD-10-PCS | Mod: CPTII,S$GLB,, | Performed by: NURSE PRACTITIONER

## 2019-07-15 PROCEDURE — 25000003 PHARM REV CODE 250: Performed by: INTERNAL MEDICINE

## 2019-07-15 PROCEDURE — 99215 OFFICE O/P EST HI 40 MIN: CPT | Mod: S$GLB,,, | Performed by: NURSE PRACTITIONER

## 2019-07-15 PROCEDURE — 63600175 PHARM REV CODE 636 W HCPCS: Mod: JG | Performed by: INTERNAL MEDICINE

## 2019-07-15 PROCEDURE — 1101F PT FALLS ASSESS-DOCD LE1/YR: CPT | Mod: CPTII,S$GLB,, | Performed by: NURSE PRACTITIONER

## 2019-07-15 PROCEDURE — 96413 CHEMO IV INFUSION 1 HR: CPT

## 2019-07-15 PROCEDURE — 25000003 PHARM REV CODE 250: Performed by: NURSE PRACTITIONER

## 2019-07-15 PROCEDURE — 77014 HC CT GUIDANCE RADIATION THERAPY FLDS PLACEMENT: CPT | Mod: TC | Performed by: RADIOLOGY

## 2019-07-15 PROCEDURE — 96367 TX/PROPH/DG ADDL SEQ IV INF: CPT

## 2019-07-15 PROCEDURE — 3078F PR MOST RECENT DIASTOLIC BLOOD PRESSURE < 80 MM HG: ICD-10-PCS | Mod: CPTII,S$GLB,, | Performed by: NURSE PRACTITIONER

## 2019-07-15 PROCEDURE — 99999 PR PBB SHADOW E&M-EST. PATIENT-LVL III: CPT | Mod: PBBFAC,,, | Performed by: NURSE PRACTITIONER

## 2019-07-15 PROCEDURE — 3078F DIAST BP <80 MM HG: CPT | Mod: CPTII,S$GLB,, | Performed by: NURSE PRACTITIONER

## 2019-07-15 PROCEDURE — 99999 PR PBB SHADOW E&M-EST. PATIENT-LVL III: ICD-10-PCS | Mod: PBBFAC,,, | Performed by: NURSE PRACTITIONER

## 2019-07-15 PROCEDURE — 77386 HC IMRT, COMPLEX: CPT | Performed by: RADIOLOGY

## 2019-07-15 PROCEDURE — 96361 HYDRATE IV INFUSION ADD-ON: CPT

## 2019-07-15 PROCEDURE — 96375 TX/PRO/DX INJ NEW DRUG ADDON: CPT

## 2019-07-15 PROCEDURE — 1101F PR PT FALLS ASSESS DOC 0-1 FALLS W/OUT INJ PAST YR: ICD-10-PCS | Mod: CPTII,S$GLB,, | Performed by: NURSE PRACTITIONER

## 2019-07-15 PROCEDURE — 3075F SYST BP GE 130 - 139MM HG: CPT | Mod: CPTII,S$GLB,, | Performed by: NURSE PRACTITIONER

## 2019-07-15 RX ORDER — HEPARIN 100 UNIT/ML
500 SYRINGE INTRAVENOUS
Status: CANCELLED | OUTPATIENT
Start: 2019-07-15

## 2019-07-15 RX ORDER — HEPARIN 100 UNIT/ML
500 SYRINGE INTRAVENOUS
Status: DISCONTINUED | OUTPATIENT
Start: 2019-07-15 | End: 2019-07-15 | Stop reason: HOSPADM

## 2019-07-15 RX ORDER — SODIUM CHLORIDE 0.9 % (FLUSH) 0.9 %
10 SYRINGE (ML) INJECTION
Status: CANCELLED | OUTPATIENT
Start: 2019-07-15

## 2019-07-15 RX ORDER — SODIUM CHLORIDE 0.9 % (FLUSH) 0.9 %
10 SYRINGE (ML) INJECTION
Status: DISCONTINUED | OUTPATIENT
Start: 2019-07-15 | End: 2019-07-15 | Stop reason: HOSPADM

## 2019-07-15 RX ADMIN — ALTEPLASE 2 MG: 2.2 INJECTION, POWDER, LYOPHILIZED, FOR SOLUTION INTRAVENOUS at 08:07

## 2019-07-15 RX ADMIN — APREPITANT 130 MG: 130 INJECTION, EMULSION INTRAVENOUS at 11:07

## 2019-07-15 RX ADMIN — DEXAMETHASONE SODIUM PHOSPHATE: 4 INJECTION, SOLUTION INTRA-ARTICULAR; INTRALESIONAL; INTRAMUSCULAR; INTRAVENOUS; SOFT TISSUE at 11:07

## 2019-07-15 RX ADMIN — SODIUM CHLORIDE: 0.9 INJECTION, SOLUTION INTRAVENOUS at 09:07

## 2019-07-15 RX ADMIN — SODIUM CHLORIDE: 0.9 INJECTION, SOLUTION INTRAVENOUS at 01:07

## 2019-07-15 RX ADMIN — CISPLATIN 84 MG: 1 INJECTION INTRAVENOUS at 12:07

## 2019-07-15 RX ADMIN — Medication 500 UNITS: at 03:07

## 2019-07-15 NOTE — PROGRESS NOTES
Subjective:      Patient ID: Chai Murry is a 79 y.o. male.    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  He is getting concurrent chemo radiation and is here today for cycle #2 Cisplatin and daily radiation.  Last cycle received on 19.  He has no complaints today and states that he is feeling well.  Patient's primary oncologist is Dr. Vila.  Today is the first time that I am evaluating/assessing the patient.      Patient denies fever, nausea, vomiting, diarrhea, mouth sores, rashes, or increased fatigue.  Patient reports good oral intake.       Social History     Socioeconomic History    Marital status:      Spouse name: Not on file    Number of children: 3    Years of education: Not on file    Highest education level: Not on file   Occupational History    Occupation: retired   Social Needs    Financial resource strain: Not on file    Food insecurity:     Worry: Not on file     Inability: Not on file    Transportation needs:     Medical: Not on file     Non-medical: Not on file   Tobacco Use    Smoking status: Former Smoker     Packs/day: 1.00     Years: 50.00     Pack years: 50.00     Last attempt to quit: 2005     Years since quittin.9    Smokeless tobacco: Never Used   Substance and Sexual Activity    Alcohol use: No    Drug use: No    Sexual activity: Not Currently   Lifestyle    Physical activity:     Days per week: Not on file     Minutes per session: Not on file    Stress: Not on file   Relationships    Social connections:     Talks on phone: Not on file     Gets together: Not on file     Attends [employer] service: Not on file     Active member of club or organization: Not on file     Attends meetings of clubs or organizations: Not on file     Relationship status: Not on file   Other Topics Concern    Not on file   Social History Narrative    Not  on file       Family History   Problem Relation Age of Onset    Esophageal cancer Sister     Cancer Sister     Lung cancer Mother     Cancer Mother     Cataracts Mother     Hypertension Mother     Pneumonia Father     Cataracts Father     Hypertension Father     Cancer Brother     Hypertension Brother     Blindness Neg Hx     Diabetes Neg Hx     Glaucoma Neg Hx     Macular degeneration Neg Hx     Retinal detachment Neg Hx     Strabismus Neg Hx     Stroke Neg Hx     Thyroid disease Neg Hx        Past Surgical History:   Procedure Laterality Date    APPENDECTOMY      Bronchoscopy Bilateral 6/21/2019    Performed by [facility]resh Goldman [facility] at [facility]    BRONCHOSCOPY- insert lighted tube into airway to obtain biopsy of lung Bilateral 3/4/2016    Performed by Roel Ernandez MD at [facility]    CARDIAC CATHETERIZATION      cardiac stents      CATARACT EXTRACTION W/  INTRAOCULAR LENS IMPLANT Right 9-3-14    CHOLECYSTECTOMY      COLONOSCOPY N/A 10/25/2018    Performed by Michael Hameed MD at [facility]    COLONOSCOPY N/A 4/17/2018    Performed by Dionne Doan MD at [facility]    Colonoscopy N/A 8/12/2014    Performed by Eriberto Simpson MD at [facility]    EGD (ESOPHAGOGASTRODUODENOSCOPY) N/A 6/11/2019    Performed by Abhishek Knox MD at [facility]    infected stomach gland excision      INSERTION-PORT Left 5/26/2016    Performed by Nemesio Wall MD at [facility] OR    RFMAGTRTI-IZOD-R-CATH Right 7/3/2019    Performed by Jean Carlos Constantino MD at [facility]    LOBECTOMY-LUNG Left 4/12/2016    Performed by Nemesio Wall MD at [facility]    LUNG REMOVAL, TOTAL Left 04/2016    lung cancer left upper lobe    PNEUMONECTOMY Left 4/12/2016    Performed by Nemesio Wall MD at [facility]    SKIN BIOPSY Left     arm    THORACOTOMY Left 4/12/2016    Performed by Nemesio Wall MD at [facility]    ULTRASOUND, UPPER GI TRACT, ENDOSCOPIC N/A 6/11/2019    Performed by Ab[facility]hek Knox MD at  [facility]       Past Medical History:   Diagnosis Date    Anemia     Arthritis     Atrial fibrillation     CAD (coronary artery disease)     Cancer     lung cancer-Left    Cataract     COPD (chronic obstructive pulmonary disease)     Diverticulosis     ED (erectile dysfunction)     HTN (hypertension)     Hyperlipidemia     Myocardial infarction     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 Courtney dwyer bx       Review of Systems   Constitutional: Negative.    HENT: Negative.    Eyes: Negative.    Respiratory: Negative.    Cardiovascular: Negative.    Gastrointestinal: Negative.    Endocrine: Negative.    Genitourinary: Negative.    Musculoskeletal: Negative.    Skin: Negative.    Allergic/Immunologic: Negative.    Neurological: Negative.    Hematological: Negative.    Psychiatric/Behavioral: Negative.           Medication List with Changes/Refills   Current Medications    ALBUTEROL (PROVENTIL) 2.5 MG /3 ML (0.083 %) NEBULIZER SOLUTION    Take 3 mLs (2.5 mg total) by nebulization every 6 (six) hours while awake.    AMLODIPINE (NORVASC) 5 MG TABLET    Take 2 tablets (10 mg total) by mouth every evening.    ASPIRIN (ECOTRIN) 81 MG EC TABLET    Take 81 mg by mouth once daily.    BREO ELLIPTA 100-25 MCG/DOSE DISKUS INHALER    INHALE 1 PUFF INTO THE LUNGS ONCE DAILY    DOXEPIN (SINEQUAN) 10 MG CAPSULE    TAKE 1 CAPSULE (10 MG TOTAL) BY MOUTH EVERY EVENING.    FENOFIBRIC ACID (FIBRICOR) 135 MG CPDR    TAKE 1 CAPSULE BY MOUTH EVERY DAY    FLUTICASONE (FLONASE) 50 MCG/ACTUATION NASAL SPRAY    2 sprays (100 mcg total) by Each Nare route once daily.    GLYCOPYRROLATE-FORMOTEROL (BEVESPI AEROSPHERE) 9-4.8 MCG HFAA    Inhale 2 puffs into the lungs 2 (two) times daily. Controller    HYDROCODONE-ACETAMINOPHEN (NORCO) 5-325 MG PER TABLET    Take 1 tablet by mouth every 6 (six) hours as needed for Pain.    IPRATROPIUM-ALBUTEROL (COMBIVENT RESPIMAT)  MCG/ACTUATION INHALER     Inhale 1 puff into the lungs every 6 (six) hours as needed for Shortness of Breath.    LEVOCETIRIZINE (XYZAL) 5 MG TABLET    Take 5 mg by mouth as needed.     LISINOPRIL (PRINIVIL,ZESTRIL) 40 MG TABLET    TAKE 1 TABLET BY MOUTH DAILY    NAPROXEN SODIUM (ANAPROX) 550 MG TABLET    Take 1 tablet (550 mg total) by mouth 2 (two) times daily with meals.    ONDANSETRON (ZOFRAN) 4 MG TABLET    Take 1 tablet (4 mg total) by mouth every 12 (twelve) hours as needed for Nausea.    SIMVASTATIN (ZOCOR) 40 MG TABLET    Take 1 tablet (40 mg total) by mouth every evening.    SOTALOL AF 80 MG TABLET    TAKE 1 TABLET BY MOUTH TWICE A DAY        Objective:     Vitals:    07/15/19 0751   BP: 131/69   Pulse: 62   Temp: 97 °F (36.1 °C)       Physical Exam   Constitutional: He is oriented to person, place, and time. He appears well-developed and well-nourished. No distress.   HENT:   Head: Normocephalic and atraumatic.   Eyes: Conjunctivae and EOM are normal.   Neck: Normal range of motion. Neck supple.   Cardiovascular: Normal rate and regular rhythm.   Pulmonary/Chest: Effort normal and breath sounds normal.   Abdominal: Soft. Bowel sounds are normal.   Musculoskeletal: Normal range of motion.   Neurological: He is alert and oriented to person, place, and time.   Skin: Skin is warm and dry. He is not diaphoretic.   Psychiatric: He has a normal mood and affect. His behavior is normal. Judgment and thought content normal.   Nursing note and vitals reviewed.      Assessment:     Problem List Items Addressed This Visit        Oncology    Malignant neoplasm of upper lobe of left lung - Primary    Relevant Orders    CBC auto differential    Comprehensive metabolic panel        Lab Results   Component Value Date    WBC 8.51 07/15/2019    HGB 13.8 (L) 07/15/2019    HCT 43.1 07/15/2019    MCV 93 07/15/2019     07/15/2019     CMP  Sodium   Date Value Ref Range Status   07/15/2019 144 136 - 145 mmol/L Final     Potassium   Date Value Ref  Range Status   07/15/2019 4.1 3.5 - 5.1 mmol/L Final     Chloride   Date Value Ref Range Status   07/15/2019 105 95 - 110 mmol/L Final     CO2   Date Value Ref Range Status   07/15/2019 31 (H) 23 - 29 mmol/L Final     Glucose   Date Value Ref Range Status   07/15/2019 103 70 - 110 mg/dL Final     BUN, Bld   Date Value Ref Range Status   07/15/2019 22 8 - 23 mg/dL Final     Creatinine   Date Value Ref Range Status   07/15/2019 0.9 0.5 - 1.4 mg/dL Final     Calcium   Date Value Ref Range Status   07/15/2019 9.5 8.7 - 10.5 mg/dL Final     Total Protein   Date Value Ref Range Status   07/15/2019 6.9 6.0 - 8.4 g/dL Final     Albumin   Date Value Ref Range Status   07/15/2019 3.3 (L) 3.5 - 5.2 g/dL Final     Total Bilirubin   Date Value Ref Range Status   07/15/2019 0.3 0.1 - 1.0 mg/dL Final     Comment:     For infants and newborns, interpretation of results should be based  on gestational age, weight and in agreement with clinical  observations.  Premature Infant recommended reference ranges:  Up to 24 hours.............<8.0 mg/dL  Up to 48 hours............<12.0 mg/dL  3-5 days..................<15.0 mg/dL  6-29 days.................<15.0 mg/dL       Alkaline Phosphatase   Date Value Ref Range Status   07/15/2019 55 55 - 135 U/L Final     AST   Date Value Ref Range Status   07/15/2019 19 10 - 40 U/L Final     ALT   Date Value Ref Range Status   07/15/2019 22 10 - 44 U/L Final     Anion Gap   Date Value Ref Range Status   07/15/2019 8 8 - 16 mmol/L Final     eGFR if    Date Value Ref Range Status   07/15/2019 >60 >60 mL/min/1.73 m^2 Final     eGFR if non    Date Value Ref Range Status   07/15/2019 >60 >60 mL/min/1.73 m^2 Final     Comment:     Calculation used to obtain the estimated glomerular filtration  rate (eGFR) is the CKD-EPI equation.          Plan:   Malignant neoplasm of upper lobe of left lung  -     CBC auto differential; Future; Expected date: 07/15/2019  -     Comprehensive  metabolic panel; Future; Expected date: 07/15/2019    Will proceed with cycle #2 Cisplatin.  Will follow up in 1 weeks with labs - cbc and cmp, see Dr. Vila and proceed with cycle #3 if labs permit.      Added on potassium and magnesium to today's labs.  Will have check with next lab as well.     I will review assessment/plan with collaborating physician, Dr. Pantera Milan.    Thank You,  TAMICA Briseno-C

## 2019-07-15 NOTE — PLAN OF CARE
Problem: Adult Inpatient Plan of Care  Goal: Plan of Care Review  Outcome: Ongoing (interventions implemented as appropriate)  I'm  Doing pretty good.

## 2019-07-15 NOTE — DISCHARGE INSTRUCTIONS
"  [facility]  [address] [address]  [address]  [phone] phone     [phone] fax  Hours of Operation: Monday- Friday 8:00am- 5:00pm  After hours phone  [phone]  Hematology / Oncology Physicians on call      Dr. Jose Vila    Please call with any concerns regarding your appointment today.Support Groups/Classes    Support groups and classes are being offered at the   [facility] and [place]!!    "Cooking with Cancer" (Nutrition Class):  Second Wednesday of each month   at noon at the [facility].  Metastatic Support Group:  Third Tuesday of each month   at noon at the [facility].  Next Steps Class/Group: Second and fourth Thursday of each month at noon at the [facility].  [facility] (Breast Cancer Support Group): First Tuesday of each month   at 5:30pm at the [facility] location.  [employer]: [facility]: Second and third Tuesday of each month from 7:30am - 2pm.  [facility]: First and fourth Tuesday of each month from 7:30am - 2pm    If you are interested in attending or would like more information please ask our social workers or your nurse!  "

## 2019-07-16 ENCOUNTER — DOCUMENTATION ONLY (OUTPATIENT)
Dept: RADIATION ONCOLOGY | Facility: CLINIC | Age: 79
End: 2019-07-16

## 2019-07-16 PROCEDURE — 77386 HC IMRT, COMPLEX: CPT | Performed by: RADIOLOGY

## 2019-07-16 PROCEDURE — 77014 HC CT GUIDANCE RADIATION THERAPY FLDS PLACEMENT: CPT | Mod: TC | Performed by: RADIOLOGY

## 2019-07-16 NOTE — PLAN OF CARE
Problem: Adult Inpatient Plan of Care  Goal: Plan of Care Review  Outcome: Ongoing (interventions implemented as appropriate)  Day 7 outpatient xrt to lung. Pt c/o insomnia despite sleeping pill. Pt can not tolerate Benadryl. The insomnia happens after chemo. No other c/o. Will continue to monitor.

## 2019-07-17 PROCEDURE — 77386 HC IMRT, COMPLEX: CPT | Performed by: RADIOLOGY

## 2019-07-17 PROCEDURE — 77014 HC CT GUIDANCE RADIATION THERAPY FLDS PLACEMENT: CPT | Mod: TC | Performed by: RADIOLOGY

## 2019-07-18 PROCEDURE — 77014 HC CT GUIDANCE RADIATION THERAPY FLDS PLACEMENT: CPT | Mod: TC | Performed by: RADIOLOGY

## 2019-07-18 PROCEDURE — 77386 HC IMRT, COMPLEX: CPT | Performed by: RADIOLOGY

## 2019-07-19 PROCEDURE — 77386 HC IMRT, COMPLEX: CPT | Performed by: RADIOLOGY

## 2019-07-19 PROCEDURE — 77336 RADIATION PHYSICS CONSULT: CPT | Performed by: RADIOLOGY

## 2019-07-19 PROCEDURE — 77014 HC CT GUIDANCE RADIATION THERAPY FLDS PLACEMENT: CPT | Mod: TC | Performed by: RADIOLOGY

## 2019-07-22 ENCOUNTER — INFUSION (OUTPATIENT)
Dept: INFUSION THERAPY | Facility: HOSPITAL | Age: 79
End: 2019-07-22
Attending: INTERNAL MEDICINE
Payer: MEDICARE

## 2019-07-22 ENCOUNTER — OFFICE VISIT (OUTPATIENT)
Dept: HEMATOLOGY/ONCOLOGY | Facility: CLINIC | Age: 79
End: 2019-07-22
Payer: MEDICARE

## 2019-07-22 VITALS
DIASTOLIC BLOOD PRESSURE: 72 MMHG | WEIGHT: 192.88 LBS | RESPIRATION RATE: 18 BRPM | OXYGEN SATURATION: 98 % | HEIGHT: 70 IN | BODY MASS INDEX: 27.61 KG/M2 | SYSTOLIC BLOOD PRESSURE: 125 MMHG | HEART RATE: 63 BPM | TEMPERATURE: 97 F

## 2019-07-22 DIAGNOSIS — Z51.11 ENCOUNTER FOR ANTINEOPLASTIC CHEMOTHERAPY: ICD-10-CM

## 2019-07-22 DIAGNOSIS — C34.90 MALIGNANT NEOPLASM OF LUNG, UNSPECIFIED LATERALITY, UNSPECIFIED PART OF LUNG: ICD-10-CM

## 2019-07-22 DIAGNOSIS — C34.12 MALIGNANT NEOPLASM OF UPPER LOBE OF LEFT LUNG: Primary | ICD-10-CM

## 2019-07-22 DIAGNOSIS — Z85.118 HISTORY OF CANCER OF UPPER LOBE BRONCHUS OR LUNG: ICD-10-CM

## 2019-07-22 PROCEDURE — 63600175 PHARM REV CODE 636 W HCPCS: Mod: TB | Performed by: INTERNAL MEDICINE

## 2019-07-22 PROCEDURE — 99215 OFFICE O/P EST HI 40 MIN: CPT | Mod: 25,S$GLB,, | Performed by: NURSE PRACTITIONER

## 2019-07-22 PROCEDURE — 99215 PR OFFICE/OUTPT VISIT, EST, LEVL V, 40-54 MIN: ICD-10-PCS | Mod: 25,S$GLB,, | Performed by: NURSE PRACTITIONER

## 2019-07-22 PROCEDURE — 96365 THER/PROPH/DIAG IV INF INIT: CPT

## 2019-07-22 PROCEDURE — 96366 THER/PROPH/DIAG IV INF ADDON: CPT

## 2019-07-22 PROCEDURE — 1101F PT FALLS ASSESS-DOCD LE1/YR: CPT | Mod: CPTII,S$GLB,, | Performed by: NURSE PRACTITIONER

## 2019-07-22 PROCEDURE — 77014 HC CT GUIDANCE RADIATION THERAPY FLDS PLACEMENT: CPT | Mod: TC | Performed by: RADIOLOGY

## 2019-07-22 PROCEDURE — 77386 HC IMRT, COMPLEX: CPT | Performed by: RADIOLOGY

## 2019-07-22 PROCEDURE — 25000003 PHARM REV CODE 250: Performed by: INTERNAL MEDICINE

## 2019-07-22 PROCEDURE — 3074F SYST BP LT 130 MM HG: CPT | Mod: CPTII,S$GLB,, | Performed by: NURSE PRACTITIONER

## 2019-07-22 PROCEDURE — 96368 THER/DIAG CONCURRENT INF: CPT

## 2019-07-22 PROCEDURE — 3074F PR MOST RECENT SYSTOLIC BLOOD PRESSURE < 130 MM HG: ICD-10-PCS | Mod: CPTII,S$GLB,, | Performed by: NURSE PRACTITIONER

## 2019-07-22 PROCEDURE — 96367 TX/PROPH/DG ADDL SEQ IV INF: CPT

## 2019-07-22 PROCEDURE — 99999 PR PBB SHADOW E&M-EST. PATIENT-LVL III: CPT | Mod: PBBFAC,,, | Performed by: NURSE PRACTITIONER

## 2019-07-22 PROCEDURE — 96413 CHEMO IV INFUSION 1 HR: CPT

## 2019-07-22 PROCEDURE — 3078F DIAST BP <80 MM HG: CPT | Mod: CPTII,S$GLB,, | Performed by: NURSE PRACTITIONER

## 2019-07-22 PROCEDURE — 99999 PR PBB SHADOW E&M-EST. PATIENT-LVL III: ICD-10-PCS | Mod: PBBFAC,,, | Performed by: NURSE PRACTITIONER

## 2019-07-22 PROCEDURE — 3078F PR MOST RECENT DIASTOLIC BLOOD PRESSURE < 80 MM HG: ICD-10-PCS | Mod: CPTII,S$GLB,, | Performed by: NURSE PRACTITIONER

## 2019-07-22 PROCEDURE — 1101F PR PT FALLS ASSESS DOC 0-1 FALLS W/OUT INJ PAST YR: ICD-10-PCS | Mod: CPTII,S$GLB,, | Performed by: NURSE PRACTITIONER

## 2019-07-22 RX ORDER — POTASSIUM CHLORIDE 29.8 MG/ML
20 INJECTION INTRAVENOUS ONCE
Status: COMPLETED | OUTPATIENT
Start: 2019-07-22 | End: 2019-07-22

## 2019-07-22 RX ORDER — SODIUM CHLORIDE 9 MG/ML
INJECTION, SOLUTION INTRAVENOUS CONTINUOUS
Status: ACTIVE | OUTPATIENT
Start: 2019-07-22 | End: 2019-07-22

## 2019-07-22 RX ORDER — MAGNESIUM SULFATE 1 G/100ML
2 INJECTION INTRAVENOUS ONCE
Status: COMPLETED | OUTPATIENT
Start: 2019-07-22 | End: 2019-07-22

## 2019-07-22 RX ORDER — HEPARIN 100 UNIT/ML
500 SYRINGE INTRAVENOUS
Status: CANCELLED | OUTPATIENT
Start: 2019-07-22

## 2019-07-22 RX ORDER — HEPARIN 100 UNIT/ML
500 SYRINGE INTRAVENOUS
Status: DISCONTINUED | OUTPATIENT
Start: 2019-07-22 | End: 2019-07-22 | Stop reason: HOSPADM

## 2019-07-22 RX ORDER — SODIUM CHLORIDE 0.9 % (FLUSH) 0.9 %
10 SYRINGE (ML) INJECTION
Status: CANCELLED | OUTPATIENT
Start: 2019-07-22

## 2019-07-22 RX ORDER — SODIUM CHLORIDE 0.9 % (FLUSH) 0.9 %
10 SYRINGE (ML) INJECTION
Status: DISCONTINUED | OUTPATIENT
Start: 2019-07-22 | End: 2019-07-22 | Stop reason: HOSPADM

## 2019-07-22 RX ADMIN — CISPLATIN 83 MG: 1 INJECTION INTRAVENOUS at 12:07

## 2019-07-22 RX ADMIN — MAGNESIUM SULFATE HEPTAHYDRATE 2 G: 1 INJECTION, SOLUTION INTRAVENOUS at 09:07

## 2019-07-22 RX ADMIN — APREPITANT 130 MG: 130 INJECTION, EMULSION INTRAVENOUS at 11:07

## 2019-07-22 RX ADMIN — POTASSIUM CHLORIDE 20 MEQ: 29.8 INJECTION, SOLUTION INTRAVENOUS at 09:07

## 2019-07-22 RX ADMIN — DEXAMETHASONE SODIUM PHOSPHATE: 4 INJECTION, SOLUTION INTRAMUSCULAR; INTRAVENOUS at 12:07

## 2019-07-22 RX ADMIN — MAGNESIUM SULFATE HEPTAHYDRATE 2 G: 1 INJECTION, SOLUTION INTRAVENOUS at 01:07

## 2019-07-22 RX ADMIN — SODIUM CHLORIDE: 0.9 INJECTION, SOLUTION INTRAVENOUS at 01:07

## 2019-07-22 RX ADMIN — Medication 500 UNITS: at 03:07

## 2019-07-22 RX ADMIN — POTASSIUM CHLORIDE 20 MEQ: 29.8 INJECTION, SOLUTION INTRAVENOUS at 01:07

## 2019-07-22 RX ADMIN — SODIUM CHLORIDE: 0.9 INJECTION, SOLUTION INTRAVENOUS at 09:07

## 2019-07-22 NOTE — ASSESSMENT & PLAN NOTE
Laboratory data reviewed by Dr. Garcia and myself. Patient will proceed with Cycle 3 weekly Cisplatin. Orders signed by Dr. Garcia    F/U 1 week with Dr. Vila with Cbc, CMP, Mag level for cycle 4 cisplatin. He knows to call sooner for questions or concerns

## 2019-07-22 NOTE — PROGRESS NOTES
Subjective:       Patient ID: Chai Murry is a 79 y.o. male.    Chief Complaint: Lung Cancer; Results (rev labs); and Chemotherapy    HPI: 79 y.o male with recurrent lung squamous cell carcinoma with invasion of trachea who presents today for Cycle 3/6 weekly Cisplatin. He is followed in the Heme-Onc clinic by . He is currently receiving concurrent chemoradiation for treatment.     Patient is without complaints. Reports tolerating his chemo treatments without issues. Laboratory review stable.      Social History     Socioeconomic History    Marital status:      Spouse name: Not on file    Number of children: 3    Years of education: Not on file    Highest education level: Not on file   Occupational History    Occupation: retired   Social Needs    Financial resource strain: Not on file    Food insecurity:     Worry: Not on file     Inability: Not on file    Transportation needs:     Medical: Not on file     Non-medical: Not on file   Tobacco Use    Smoking status: Former Smoker     Packs/day: 1.00     Years: 50.00     Pack years: 50.00     Last attempt to quit: 2005     Years since quittin.9    Smokeless tobacco: Never Used   Substance and Sexual Activity    Alcohol use: No    Drug use: No    Sexual activity: Not Currently   Lifestyle    Physical activity:     Days per week: Not on file     Minutes per session: Not on file    Stress: Not on file   Relationships    Social connections:     Talks on phone: Not on file     Gets together: Not on file     Attends Spiritism service: Not on file     Active member of club or organization: Not on file     Attends meetings of clubs or organizations: Not on file     Relationship status: Not on file   Other Topics Concern    Not on file   Social History Narrative    Not on file       Past Medical History:   Diagnosis Date    Anemia     Arthritis     Atrial fibrillation     CAD (coronary artery disease)     Cancer     lung  cancer-Left    Cataract     COPD (chronic obstructive pulmonary disease)     Diverticulosis     ED (erectile dysfunction)     HTN (hypertension)     Hyperlipidemia     Myocardial infarction     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 Courtney dwyer bx       Family History   Problem Relation Age of Onset    Esophageal cancer Sister     Cancer Sister     Lung cancer Mother     Cancer Mother     Cataracts Mother     Hypertension Mother     Pneumonia Father     Cataracts Father     Hypertension Father     Cancer Brother     Hypertension Brother     Blindness Neg Hx     Diabetes Neg Hx     Glaucoma Neg Hx     Macular degeneration Neg Hx     Retinal detachment Neg Hx     Strabismus Neg Hx     Stroke Neg Hx     Thyroid disease Neg Hx        Past Surgical History:   Procedure Laterality Date    APPENDECTOMY      Bronchoscopy Bilateral 6/21/2019    Performed by Paresh Goldman MD at [facility]    BRONCHOSCOPY- insert lighted tube into airway to obtain biopsy of lung Bilateral 3/4/2016    Performed by Roel Ernandez MD at [facility]    CARDIAC CATHETERIZATION      cardiac stents      CATARACT EXTRACTION W/  INTRAOCULAR LENS IMPLANT Right 9-3-14    CHOLECYSTECTOMY      COLONOSCOPY N/A 10/25/2018    Performed by Michael Hameed MD at [facility]    COLONOSCOPY N/A 4/17/2018    Performed by Dionne Doan MD at [facility] END[facility]    Colonoscopy N/A 8/12/2014    Performed by Eriberto Simpson MD at [facility]    EGD (ESOPHAGOGASTRODUODENOSCOPY) N/A 6/11/2019    Performed by Abhishek Knox MD at [facility]    infected stomach gland excision      INSERTION-PORT Left 5/26/2016    Performed by Nemesio Wall MD at [facility] OR    FDCYFICIN-MMLD-X-CATH Right 7/3/2019    Performed by Jean Carlos Constantino MD at [facility]    LOBECTOMY-LUNG Left 4/12/2016    Performed by Nem[facility]io Wall MD at [facility]    LUNG REMOVAL, TOTAL Left 04/2016    lung cancer left upper  lobe    PNEUMONECTOMY Left 4/12/2016    [facility]    SKIN BIOPSY Left     arm    THORACOTOMY Left 4/12/2016    [facility] by [facility] at [facility]    ULTRASOUND, UPPER GI TRACT, ENDOSCOPIC N/A 6/11/2019    Performed by Abhishek Knox MD at [facility] ENDO       Review of Systems      Medication List with Changes/Refills   Current Medications    ALBUTEROL (PROVENTIL) 2.5 MG /3 ML (0.083 %) NEBULIZER SOLUTION    Take 3 mLs (2.5 mg total) by nebulization every 6 (six) hours while awake.    AMLODIPINE (NORVASC) 5 MG TABLET    Take 2 tablets (10 mg total) by mouth every evening.    ASPIRIN (ECOTRIN) 81 MG EC TABLET    Take 81 mg by mouth once daily.    BREO ELLIPTA 100-25 MCG/DOSE DISKUS INHALER    INHALE 1 PUFF INTO THE LUNGS ONCE DAILY    DOXEPIN (SINEQUAN) 10 MG CAPSULE    TAKE 1 CAPSULE (10 MG TOTAL) BY MOUTH EVERY EVENING.    FENOFIBRIC ACID (FIBRICOR) 135 MG CPDR    TAKE 1 CAPSULE BY MOUTH EVERY DAY    FLUTICASONE (FLONASE) 50 MCG/ACTUATION NASAL SPRAY    2 sprays (100 mcg total) by Each Nare route once daily.    GLYCOPYRROLATE-FORMOTEROL (BEVESPI AEROSPHERE) 9-4.8 MCG HFAA    Inhale 2 puffs into the lungs 2 (two) times daily. Controller    HYDROCODONE-ACETAMINOPHEN (NORCO) 5-325 MG PER TABLET    Take 1 tablet by mouth every 6 (six) hours as needed for Pain.    IPRATROPIUM-ALBUTEROL (COMBIVENT RESPIMAT)  MCG/ACTUATION INHALER    Inhale 1 puff into the lungs every 6 (six) hours as needed for Shortness of Breath.    LEVOCETIRIZINE (XYZAL) 5 MG TABLET    Take 5 mg by mouth as needed.     LISINOPRIL (PRINIVIL,ZESTRIL) 40 MG TABLET    TAKE 1 TABLET BY MOUTH DAILY    NAPROXEN SODIUM (ANAPROX) 550 MG TABLET    Take 1 tablet (550 mg total) by mouth 2 (two) times daily with meals.    ONDANSETRON (ZOFRAN) 4 MG TABLET    Take 1 tablet (4 mg total) by mouth every 12 (twelve) hours as needed for Nausea.    SIMVASTATIN (ZOCOR) 40 MG TABLET    Take 1 tablet (40 mg total) by mouth  every evening.    SOTALOL AF 80 MG TABLET    TAKE 1 TABLET BY MOUTH TWICE A DAY     Objective:     Vitals:    07/22/19 0812   BP: 125/72   Pulse: 63   Resp: 18   Temp: 97 °F (36.1 °C)     Lab Results   Component Value Date    WBC 7.53 07/22/2019    HGB 13.8 (L) 07/22/2019    HCT 43.1 07/22/2019    MCV 93 07/22/2019     (L) 07/22/2019     BMP  Lab Results   Component Value Date     07/22/2019    K 4.0 07/22/2019     07/22/2019    CO2 28 07/22/2019    BUN 19 07/22/2019    CREATININE 0.9 07/22/2019    CALCIUM 9.2 07/22/2019    ANIONGAP 11 07/22/2019    ESTGFRAFRICA >60 07/22/2019    EGFRNONAA >60 07/22/2019     Lab Results   Component Value Date    ALT 27 07/22/2019    AST 25 07/22/2019    ALKPHOS 54 (L) 07/22/2019    BILITOT 0.3 07/22/2019       Physical Exam     Assessment:     Problem List Items Addressed This Visit        Oncology    Malignant neoplasm of upper lobe of left lung - Primary    Relevant Orders    CBC auto differential    Comprehensive metabolic panel    Magnesium    Malignant neoplasm of lung     Laboratory data reviewed by Dr. Garcia and myself. Patient will proceed with Cycle 3 weekly Cisplatin. Orders signed by Dr. Garcia    F/U 1 week with Dr. Vila with Cbc, CMP, Mag level for cycle 4 cisplatin. He knows to call sooner for questions or concerns         Relevant Orders    CBC auto differential    Comprehensive metabolic panel    Magnesium      Other Visit Diagnoses     Encounter for antineoplastic chemotherapy                Plan:     Malignant neoplasm of upper lobe of left lung  -     CBC auto differential; Future; Expected date: 07/22/2019  -     Comprehensive metabolic panel; Future; Expected date: 07/22/2019  -     Magnesium; Future; Expected date: 07/22/2019    Encounter for antineoplastic chemotherapy    Malignant neoplasm of lung, unspecified laterality, unspecified part of lung          I will review assessment/plan with collaborating physician Dr. Jose Babb  BALJEET Werner

## 2019-07-22 NOTE — DISCHARGE INSTRUCTIONS
[facility]  [address] [address]  [address]  [phone] phone     [phone] fax  Hours of Operation: Monday- Friday 8:00am- 5:00pm  After hours phone  [phone]  Hematology / Oncology Physicians on call      Dr. Jose Vila      Please call with any concerns regarding your appointment today.    FALL PREVENTION   Falls often occur due to slipping, tripping or losing your balance. Here are ways to reduce your risk of falling again.   Was there anything that caused your fall that can be fixed, removed or replaced?   Make your home safe by keeping walkways clear of objects you may trip over.   Use non-slip pads under rugs.   Do not walk in poorly lit areas.   Do not stand on chairs or wobbly ladders.   Use caution when reaching overhead or looking upward. This position can cause a loss of balance.   Be sure your shoes fit properly, have non-slip bottoms and are in good condition.   Be cautious when going up and down stairs, curbs, and when walking on uneven sidewalks.   If your balance is poor, consider using a cane or walker.   If your fall was related to alcohol use, stop or limit alcohol intake.   If your fall was related to use of sleeping medicines, talk to your doctor about this. You may need to reduce your dosage at bedtime if you awaken during the night to go to the bathroom.   To reduce the need for nighttime bathroom trips:   Avoid drinking fluids for several hours before going to bed   Empty your bladder before going to bed   Men can keep a urinal at the bedside   © 1512-7346 [employer], [address] [place] [id number]. All rights reserved. This information is not intended as a substitute for professional medical care. Always follow your healthcare professional's instructions.  WAYS TO HELP PREVENT INFECTION         WASH YOUR HANDS OFTEN DURING THE DAY, ESPECIALLY BEFORE YOU EAT, AFTER  "USING THE BATHROOM, AND AFTER TOUCHING ANIMALS     STAY AWAY FROM PEOPLE WHO HAVE ILLNESSES YOU CAN CATCH; SUCH AS COLDS, FLU, CHICKEN POX     TRY TO AVOID CROWDS     STAY AWAY FROM CHILDREN WHO RECENTLY HAVE RECEIVED LIVE VIRUS VACCINES     MAINTAIN GOOD MOUTH CARE     DO NOT SQUEEZE OR SCRATCH PIMPLES     CLEAN CUTS & SCRAPES RIGHT AWAY AND DAILY UNTIL HEALED WITH WARM WATER, SOAP & AN ANTISEPTIC     AVOID CONTACT WITH LITTER BOXES, BIRD CAGES, & FISH TANKS     AVOID STANDING WATER, IE., BIRD BATHS, FLOWER POTS/VASES, OR HUMIDIFIERS     WEAR GLOVES WHEN GARDENING OR CLEANING UP AFTER OTHERS, ESPECIALLY BABIES & SMALL CHILDREN    DO NOT EAT RAW FISH, SEAFOOD, MEAT, OR EGGSSupport Groups/Classes    Support groups and classes are being offered at the   [facility] and "[employer]"!!    "Cooking with Cancer" (Nutrition Class):  Second Wednesday of each month   at noon at the [facility].  Metastatic Support Group:  Third Tuesday of each month   at noon at the [facility].  Next Steps Class/Group: Second and fourth Thursday of each month at noon at the [facility].  [facility] (Breast Cancer Support Group): First Tuesday of each month   at 5:30pm at the [facility] location.  [employer] [facility]: [facility]: Second and third Tuesday of each month from 7:30am - 2pm.  [facility]: First and fourth Tuesday of each month from 7:30am - 2pm     If you are interested in attending or would like more information please ask our social workers or your nurse!  "

## 2019-07-23 PROCEDURE — 77386 HC IMRT, COMPLEX: CPT | Performed by: RADIOLOGY

## 2019-07-23 PROCEDURE — 77014 HC CT GUIDANCE RADIATION THERAPY FLDS PLACEMENT: CPT | Mod: TC | Performed by: RADIOLOGY

## 2019-07-24 ENCOUNTER — DOCUMENTATION ONLY (OUTPATIENT)
Dept: RADIATION ONCOLOGY | Facility: CLINIC | Age: 79
End: 2019-07-24

## 2019-07-24 PROCEDURE — 77014 HC CT GUIDANCE RADIATION THERAPY FLDS PLACEMENT: CPT | Mod: TC | Performed by: RADIOLOGY

## 2019-07-24 PROCEDURE — 77386 HC IMRT, COMPLEX: CPT | Performed by: RADIOLOGY

## 2019-07-24 NOTE — PLAN OF CARE
Problem: Adult Inpatient Plan of Care  Goal: Plan of Care Review  Outcome: Ongoing (interventions implemented as appropriate)  Day 13 outpatient xrt to lung. Tolerating therapy well, no sore swallowing. Fatigued after chemo. Will continue to monitor.

## 2019-07-25 PROCEDURE — 77386 HC IMRT, COMPLEX: CPT | Performed by: RADIOLOGY

## 2019-07-25 PROCEDURE — 77014 HC CT GUIDANCE RADIATION THERAPY FLDS PLACEMENT: CPT | Mod: TC | Performed by: RADIOLOGY

## 2019-07-26 PROCEDURE — 77014 HC CT GUIDANCE RADIATION THERAPY FLDS PLACEMENT: CPT | Mod: TC | Performed by: RADIOLOGY

## 2019-07-26 PROCEDURE — 77336 RADIATION PHYSICS CONSULT: CPT | Performed by: RADIOLOGY

## 2019-07-26 PROCEDURE — 77386 HC IMRT, COMPLEX: CPT | Performed by: RADIOLOGY

## 2019-07-29 ENCOUNTER — INFUSION (OUTPATIENT)
Dept: INFUSION THERAPY | Facility: HOSPITAL | Age: 79
End: 2019-07-29
Attending: INTERNAL MEDICINE
Payer: MEDICARE

## 2019-07-29 ENCOUNTER — OFFICE VISIT (OUTPATIENT)
Dept: HEMATOLOGY/ONCOLOGY | Facility: CLINIC | Age: 79
End: 2019-07-29
Payer: MEDICARE

## 2019-07-29 VITALS
SYSTOLIC BLOOD PRESSURE: 139 MMHG | HEART RATE: 65 BPM | HEIGHT: 70 IN | BODY MASS INDEX: 27.65 KG/M2 | DIASTOLIC BLOOD PRESSURE: 72 MMHG | WEIGHT: 193.13 LBS | TEMPERATURE: 98 F | OXYGEN SATURATION: 97 %

## 2019-07-29 DIAGNOSIS — C34.12 MALIGNANT NEOPLASM OF UPPER LOBE OF LEFT LUNG: Primary | ICD-10-CM

## 2019-07-29 DIAGNOSIS — Z85.118 HISTORY OF CANCER OF UPPER LOBE BRONCHUS OR LUNG: ICD-10-CM

## 2019-07-29 PROCEDURE — 77014 HC CT GUIDANCE RADIATION THERAPY FLDS PLACEMENT: CPT | Mod: TC | Performed by: RADIOLOGY

## 2019-07-29 PROCEDURE — 25000003 PHARM REV CODE 250: Performed by: INTERNAL MEDICINE

## 2019-07-29 PROCEDURE — 1101F PT FALLS ASSESS-DOCD LE1/YR: CPT | Mod: CPTII,S$GLB,, | Performed by: INTERNAL MEDICINE

## 2019-07-29 PROCEDURE — 3075F PR MOST RECENT SYSTOLIC BLOOD PRESS GE 130-139MM HG: ICD-10-PCS | Mod: CPTII,S$GLB,, | Performed by: INTERNAL MEDICINE

## 2019-07-29 PROCEDURE — 99999 PR PBB SHADOW E&M-EST. PATIENT-LVL IV: CPT | Mod: PBBFAC,,, | Performed by: INTERNAL MEDICINE

## 2019-07-29 PROCEDURE — 3075F SYST BP GE 130 - 139MM HG: CPT | Mod: CPTII,S$GLB,, | Performed by: INTERNAL MEDICINE

## 2019-07-29 PROCEDURE — 1101F PR PT FALLS ASSESS DOC 0-1 FALLS W/OUT INJ PAST YR: ICD-10-PCS | Mod: CPTII,S$GLB,, | Performed by: INTERNAL MEDICINE

## 2019-07-29 PROCEDURE — 3078F DIAST BP <80 MM HG: CPT | Mod: CPTII,S$GLB,, | Performed by: INTERNAL MEDICINE

## 2019-07-29 PROCEDURE — 63600175 PHARM REV CODE 636 W HCPCS: Performed by: INTERNAL MEDICINE

## 2019-07-29 PROCEDURE — A4216 STERILE WATER/SALINE, 10 ML: HCPCS | Performed by: INTERNAL MEDICINE

## 2019-07-29 PROCEDURE — 96367 TX/PROPH/DG ADDL SEQ IV INF: CPT

## 2019-07-29 PROCEDURE — 96361 HYDRATE IV INFUSION ADD-ON: CPT

## 2019-07-29 PROCEDURE — 96413 CHEMO IV INFUSION 1 HR: CPT

## 2019-07-29 PROCEDURE — 99215 PR OFFICE/OUTPT VISIT, EST, LEVL V, 40-54 MIN: ICD-10-PCS | Mod: 25,S$GLB,, | Performed by: INTERNAL MEDICINE

## 2019-07-29 PROCEDURE — 77386 HC IMRT, COMPLEX: CPT | Performed by: RADIOLOGY

## 2019-07-29 PROCEDURE — 99999 PR PBB SHADOW E&M-EST. PATIENT-LVL IV: ICD-10-PCS | Mod: PBBFAC,,, | Performed by: INTERNAL MEDICINE

## 2019-07-29 PROCEDURE — 99215 OFFICE O/P EST HI 40 MIN: CPT | Mod: 25,S$GLB,, | Performed by: INTERNAL MEDICINE

## 2019-07-29 PROCEDURE — 3078F PR MOST RECENT DIASTOLIC BLOOD PRESSURE < 80 MM HG: ICD-10-PCS | Mod: CPTII,S$GLB,, | Performed by: INTERNAL MEDICINE

## 2019-07-29 PROCEDURE — 96366 THER/PROPH/DIAG IV INF ADDON: CPT

## 2019-07-29 RX ORDER — HEPARIN 100 UNIT/ML
500 SYRINGE INTRAVENOUS
Status: CANCELLED | OUTPATIENT
Start: 2019-07-29

## 2019-07-29 RX ORDER — SODIUM CHLORIDE 0.9 % (FLUSH) 0.9 %
10 SYRINGE (ML) INJECTION
Status: CANCELLED | OUTPATIENT
Start: 2019-07-29

## 2019-07-29 RX ORDER — HYDROCODONE BITARTRATE AND ACETAMINOPHEN 5; 325 MG/1; MG/1
1 TABLET ORAL EVERY 6 HOURS PRN
Qty: 40 TABLET | Refills: 0 | Status: CANCELLED | OUTPATIENT
Start: 2019-07-29

## 2019-07-29 RX ORDER — HYDROCODONE BITARTRATE AND ACETAMINOPHEN 5; 325 MG/1; MG/1
1 TABLET ORAL EVERY 6 HOURS PRN
Qty: 40 TABLET | Refills: 0 | Status: SHIPPED | OUTPATIENT
Start: 2019-07-29 | End: 2019-07-30 | Stop reason: SDUPTHER

## 2019-07-29 RX ORDER — SODIUM CHLORIDE 0.9 % (FLUSH) 0.9 %
10 SYRINGE (ML) INJECTION
Status: DISCONTINUED | OUTPATIENT
Start: 2019-07-29 | End: 2019-07-29 | Stop reason: HOSPADM

## 2019-07-29 RX ORDER — HEPARIN 100 UNIT/ML
500 SYRINGE INTRAVENOUS
Status: DISCONTINUED | OUTPATIENT
Start: 2019-07-29 | End: 2019-07-29 | Stop reason: HOSPADM

## 2019-07-29 RX ADMIN — APREPITANT 130 MG: 130 INJECTION, EMULSION INTRAVENOUS at 09:07

## 2019-07-29 RX ADMIN — HEPARIN 500 UNITS: 100 SYRINGE at 01:07

## 2019-07-29 RX ADMIN — MAGNESIUM SULFATE HEPTAHYDRATE: 500 INJECTION, SOLUTION INTRAMUSCULAR; INTRAVENOUS at 11:07

## 2019-07-29 RX ADMIN — SODIUM CHLORIDE: 0.9 INJECTION, SOLUTION INTRAVENOUS at 08:07

## 2019-07-29 RX ADMIN — Medication 10 ML: at 01:07

## 2019-07-29 RX ADMIN — CISPLATIN 83 MG: 100 INJECTION, SOLUTION INTRAVENOUS at 10:07

## 2019-07-29 RX ADMIN — PALONOSETRON HYDROCHLORIDE: 0.25 INJECTION INTRAVENOUS at 09:07

## 2019-07-29 NOTE — PROGRESS NOTES
Subjective:       Patient ID: Chai Murry is a 79 y.o. male.    Chief Complaint: Malignant neoplasm of upper lobe of left lung [C34.12]  HPI: We have an opportunity to see Mr. Chai Murry in Hematology Oncology clinic at [facility] on 07/29/2019.  Mr. Chai Murry is a 79 y.o. 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     He is s/p left pneumonectomy for a squamous cell carcinoma of the left lung.0n 4/12/2016  Prior to the surgery, the PET scan showed an FDG-avid mass in the left upper   lobe abutting the left hilum with an SUV of 11.6. There seemed to be a left   hilar adenopathy as well.  Radiologist felt that he was unable to discriminate between the mass and the   lymphadenopathy. The patient had had a bronchoscopy by Dr. Roel Ernandez on   03/04/2006 that showed a partially obstructing airway in the anterior segment of  the left upper lobe with an endobronchial lesion in the anterior segment of the  left upper lobe. The specimen from the biopsy at the time of bronchoscopy was   reported as being a squamous cell carcinoma.  At the time of the surgery after the left lung was removed, the pathologist   indicated that this was a squamous cell carcinoma. It measured 3.8 cm. The   pathology indicated that this is extending into the bronchial resection margin of the lobe. This lobe was later on removed to complete the pneumonectomy   There was one lymph node positive for metastatic squamous cell carcinoma at the   AP window.  The patient was told that we would prefer to treat him with post-op simultaneous chemo/radiation.  He had Medi-port. He started chemo/radiation therapy andreceived 6 weekly cycles of carbo/Taxol along with radiatioon.  After a month, he had a Ct scan and it showed stability   He then had 2 additional cyles of carbo/Taxol finishing in 9/27/2016.    He comes for follow up.   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 CT of the chest was non contributory, shows changes from surgery  PET showed a PET avid mass to the left of the trachea.  The case was discussed with dr annika Goldman and GI.  We discussed the possibility of doing a EUS or EBUs, and finally, because of the localization, it was decided to do a EUS with biopsy if possible.  Cytology shows recurrent squamous cell cancer.  I have asked Pathology to run a PD-L1 from his original pathology from 2016.  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  He has been discussed extensively with radiation oncology. We have decided to treat him with radiation therapy and Cisplatin as a chemo-sensitizer.  Dr Castro has reviewed the therapy ports from the previous radiation treatment and the area in question seems to be above the previously radiated fields.     Currently on chemoradiation s/p 3 weekly cisplatin treatments.  Has tolerable throat soreness. Able to eat and drink.       Malignant neoplasm of upper lobe of left lung    4/12/2016 Initial Diagnosis     Malignant neoplasm of upper lobe of left lung         6/19/2019 Cancer Staged     Staging form: Lung, AJCC 8th Edition  - Clinical stage from 6/19/2019: Stage IIIB (rcT4, cN2, cM0) - Signed by Alejandro Castro II, MD on 6/19/2019 6/21/2019 Tumor Conference     Presenting Hospital / Clinic: [facility]  Virtual Tumor Board Conference: In person  Presenter: Dr. Chance Castro  Date Presented to Tumor Board: 06/21/19  Specialties Present: Medical Oncology;Radiation Oncology;Surgical Oncology;Pathology;Navigation;Plastic Surgery;Radiology;Gastrointestinal;Pulmonology  Presentation at Cancer Conference: Prospective  Cancer Type: Lung cancer  Recommended Plan: Additional screening  Recommended Plan Note: If PDL-1 positive, treat with immunotherapy  Send tissue for next generation sequencing.         6/28/2019 Tumor Conference     His case was  discussed at the Multidisciplinary Head and Neck Team Planning Meeting.    Representatives from Medical Oncology, Radiation Oncology, Head and Neck Surgical Oncology, Psychosocial Oncology, and Speech and Language Pathology discussed the case with the following recommendations:    1) treat lung cancer                  Lung cancer    6/11/2019 Initial Diagnosis     Lung cancer         7/8/2019 -  Chemotherapy     Treatment Summary   Plan Name: OP HEAD NECK CISPLATIN WEEKLY + RADIOTHERAPY  Treatment Goal: Supportive  Status: Active  Start Date: 7/8/2019  End Date: 8/12/2019 (Planned)  Provider: Jorge L Patel MD  Chemotherapy: CISplatin (PLATINOL) 40 mg/m2 = 83 mg in sodium chloride 0.9% 583 mL chemo infusion, 40 mg/m2 = 83 mg (100 % of original dose 40 mg/m2), Intravenous, Clinic/HOD 1 time, 3 of 6 cycles  Dose modification: 70 mg (original dose 40 mg/m2, Cycle 1, Reason: MD Discretion), 40 mg/m2 (original dose 40 mg/m2, Cycle 1)  Administration: 83 mg (7/8/2019), 84 mg (7/15/2019), 83 mg (7/22/2019)          Past Medical History:   Diagnosis Date    Anemia     Arthritis     Atrial fibrillation     CAD (coronary artery disease)     Cancer     lung cancer-Left    Cataract     COPD (chronic obstructive pulmonary disease)     Diverticulosis     ED (erectile dysfunction)     HTN (hypertension)     Hyperlipidemia     Myocardial infarction     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 Courtney dwyer bx     Family History   Problem Relation Age of Onset    Esophageal cancer Sister     Cancer Sister     Lung cancer Mother     Cancer Mother     Cataracts Mother     Hypertension Mother     Pneumonia Father     Cataracts Father     Hypertension Father     Cancer Brother     Hypertension Brother     Blindness Neg Hx     Diabetes Neg Hx     Glaucoma Neg Hx     Macular degeneration Neg Hx     Retinal detachment Neg Hx     Strabismus Neg Hx     Stroke Neg Hx      Thyroid disease Neg Hx      Social History     Socioeconomic History    Marital status:      Spouse name: Not on file    Number of children: 3    Years of education: Not on file    Highest education level: Not on file   Occupational History    Occupation: retired   Social Needs    Financial resource strain: Not on file    Food insecurity:     Worry: Not on file     Inability: Not on file    Transportation needs:     Medical: Not on file     Non-medical: Not on file   Tobacco Use    Smoking status: Former Smoker     Packs/day: 1.00     Years: 50.00     Pack years: 50.00     Last attempt to quit: 2005     Years since quittin.9    Smokeless tobacco: Never Used   Substance and Sexual Activity    Alcohol use: No    Drug use: No    Sexual activity: Not Currently   Lifestyle    Physical activity:     Days per week: Not on file     Minutes per session: Not on file    Stress: Not on file   Relationships    Social connections:     Talks on phone: Not on file     Gets together: Not on file     Attends Shinto service: Not on file     Active member of club or organization: Not on file     Attends meetings of clubs or organizations: Not on file     Relationship status: Not on file   Other Topics Concern    Not on file   Social History Narrative    Not on file     Past Surgical History:   Procedure Laterality Date    APPENDECTOMY      Bronchoscopy Bilateral 2019    Performed by [facility]resh Goldman [facility] at [facility]    BRONCHOSCOPY- insert lighted tube into airway to obtain biopsy of lung Bilateral 3/4/2016    Performed by Roel Ernandez MD at [facility]    CARDIAC CATHETERIZATION      cardiac stents      CATARACT EXTRACTION W/  INTRAOCULAR LENS IMPLANT Right 9-3-14    CHOLECYSTECTOMY      COLONOSCOPY N/A 10/25/2018    Performed by Michael Hameed MD at [facility] END[facility]    COLONOSCOPY N/A 2018    Performed by Dionne Doan MD at [facility] END[facility]    Colonoscopy N/A 2014     Performed by [facility]riberto Simpson MD at [facility]    EGD (ESOPHAGOGASTRODUODENOSCOPY) N/A 6/11/2019    Performed by Abhishek Knox MD at [facility] ENDO    infected stomach gland excision      INSERTION-PORT Left 5/26/2016    Performed by Nemesio Wall MD at [facility] OR    [id number]-T-CATH Right 7/3/2019    Performed by Jean Carlos Constantino MD at [facility]    LOBECTOMY-LUNG Left 4/12/2016    Performed [facility] at [facility]    LUNG REMOVAL, TOTAL Left 04/2016    lung cancer left upper lobe    PNEUMONECTOMY Left 4/12/2016    Performed by [facility]io Wall [facility] at [facility]    SKIN BIOPSY Left     arm    THORACOTOMY Left 4/12/2016    Performed by [facility]io Wall [facility] at [facility]    ULTRASOUND, UPPER GI TRACT, ENDOSCOPIC N/A 6/11/2019    Performed by Abhishek Knox MD at [facility] ENDO     Current Outpatient Medications   Medication Sig Dispense Refill    (Magic mouthwash) 1:1:1 Benadryl 12.5mg/5ml liq, aluminum & magnesium hydroxide-simehticone (Maalox), lidocaine viscous 2% Swish and spit 15 mLs every 4 (four) hours as needed. for mouth sores 90 mL 2    albuterol (PROVENTIL) 2.5 mg /3 mL (0.083 %) nebulizer solution Take 3 mLs (2.5 mg total) by nebulization every 6 (six) hours while awake. (Patient taking differently: Take 2.5 mg by nebulization as needed. ) 270 mL 11    amLODIPine (NORVASC) 5 MG tablet Take 2 tablets (10 mg total) by mouth every evening. (Patient taking differently: Take 5 mg by mouth once daily. ) 60 tablet 0    aspirin (ECOTRIN) 81 MG EC tablet Take 81 mg by mouth once daily.      BREO ELLIPTA 100-25 mcg/dose diskus inhaler INHALE 1 PUFF INTO THE LUNGS ONCE DAILY  5    doxepin (SINEQUAN) 10 MG capsule TAKE 1 CAPSULE (10 MG TOTAL) BY MOUTH EVERY EVENING. 30 capsule 10    fenofibric acid (FIBRICOR) 135 mg CpDR TAKE 1 CAPSULE BY MOUTH EVERY DAY 30 capsule 11    fluticasone (FLONASE) 50 mcg/actuation nasal spray 2 sprays (100 mcg total) by Each Nare route once daily. (Patient taking  differently: 2 sprays by Each Nare route as needed. ) 3 Bottle 3    glycopyrrolate-formoterol (BEVESPI AEROSPHERE) 9-4.8 mcg HFAA Inhale 2 puffs into the lungs 2 (two) times daily. Controller 10.9 g 11    HYDROcodone-acetaminophen (NORCO) 5-325 mg per tablet Take 1 tablet by mouth every 6 (six) hours as needed for Pain. 40 tablet 0    ipratropium-albuterol (COMBIVENT RESPIMAT)  mcg/actuation inhaler Inhale 1 puff into the lungs every 6 (six) hours as needed for Shortness of Breath. 4 g 11    levocetirizine (XYZAL) 5 MG tablet Take 5 mg by mouth as needed.       lisinopril (PRINIVIL,ZESTRIL) 40 MG tablet TAKE 1 TABLET BY MOUTH DAILY 30 tablet 11    naproxen sodium (ANAPROX) 550 MG tablet Take 1 tablet (550 mg total) by mouth 2 (two) times daily with meals. 20 tablet 0    ondansetron (ZOFRAN) 4 MG tablet Take 1 tablet (4 mg total) by mouth every 12 (twelve) hours as needed for Nausea. 30 tablet 1    simvastatin (ZOCOR) 40 MG tablet Take 1 tablet (40 mg total) by mouth every evening. 30 tablet 11    SOTALOL AF 80 mg tablet TAKE 1 TABLET BY MOUTH TWICE A DAY 60 tablet 10     Current Facility-Administered Medications   Medication Dose Route Frequency Provider Last Rate Last Dose    testosterone cypionate injection 200 mg  200 mg Intramuscular Q21 Days Peter Teixeira MD   200 mg at 07/11/19 1006     Facility-Administered Medications Ordered in Other Visits   Medication Dose Route Frequency Provider Last Rate Last Dose    lactated ringers infusion   Intravenous Continuous Michael Hameed MD   Stopped at 07/03/19 0810       Labs:  Lab Results   Component Value Date    WBC 5.42 07/29/2019    HGB 13.6 (L) 07/29/2019    HCT 41.8 07/29/2019    MCV 94 07/29/2019     (L) 07/29/2019     BMP  Lab Results   Component Value Date     07/29/2019    K 4.6 07/29/2019     07/29/2019    CO2 30 (H) 07/29/2019    BUN 28 (H) 07/29/2019    CREATININE 1.2 07/29/2019    CALCIUM 9.5 07/29/2019    ANIONGAP  9 07/29/2019    ESTGFRAFRICA >60 07/29/2019    EGFRNONAA 58 (A) 07/29/2019     Lab Results   Component Value Date    ALT 29 07/29/2019    AST 21 07/29/2019    ALKPHOS 61 07/29/2019    BILITOT 0.3 07/29/2019       No results found for: IRON, TIBC, FERRITIN, SATURATEDIRO  No results found for: [id number]  No results found for: FOLATE  Lab Results   Component Value Date    TSH 3.422 02/20/2012       I have reviewed the radiology reports and examined the scan/xray images.    Review of Systems   Constitutional: Negative.    HENT: Negative.    Eyes: Negative.    Respiratory: Negative.    Cardiovascular: Negative.    Gastrointestinal: Negative.    Endocrine: Negative.    Genitourinary: Negative.    Musculoskeletal: Negative.    Skin: Negative.    Allergic/Immunologic: Negative.    Neurological: Negative.    Hematological: Negative.    Psychiatric/Behavioral: Negative.      ECOG SCORE              Objective:     Vitals:    07/29/19 0748   BP: 139/72   Pulse: 65   Temp: 97.6 °F (36.4 °C)   Body mass index is 27.71 kg/m².  Physical Exam   Constitutional: He is oriented to person, place, and time. He appears well-developed and well-nourished.   HENT:   Head: Normocephalic and atraumatic.   Eyes: Conjunctivae and EOM are normal.   Neck: Normal range of motion. Neck supple.   Cardiovascular: Normal rate and regular rhythm.   Pulmonary/Chest: Effort normal and breath sounds normal.   Abdominal: Soft. Bowel sounds are normal.   Musculoskeletal: Normal range of motion.   Neurological: He is alert and oriented to person, place, and time.   Skin: Skin is warm and dry.   Psychiatric: He has a normal mood and affect. His behavior is normal. Judgment and thought content normal.   Nursing note and vitals reviewed.        Assessment:      1. Malignant neoplasm of upper lobe of left lung           Plan:     Malignant neoplasm of upper lobe of left lung    Continue on weekly cisplatin during radiation.  -     HYDROcodone-acetaminophen  (NORCO) 5-325 mg per tablet; Take 1 tablet by mouth every 6 (six) hours as needed for Pain.  Dispense: 40 tablet; Refill: 0  -     CBC auto differential; Future; Expected date: 08/05/2019  -     Comprehensive metabolic panel; Future; Expected date: 08/05/2019  -     Magnesium; Future; Expected date: 08/05/2019

## 2019-07-29 NOTE — PLAN OF CARE
Problem: Adult Inpatient Plan of Care  Goal: Plan of Care Review  Outcome: Ongoing (interventions implemented as appropriate)  Pt stated feeling good , just a little throat pain .

## 2019-07-29 NOTE — DISCHARGE INSTRUCTIONS
[facility]  [address] [address]  [address]  [phone] phone     [phone] fax  Hours of Operation: Monday- Friday 8:00am- 5:00pm  After hours phone  [phone]  Hematology / Oncology Physicians on call      Dr. Jose Vila      Please call with any concerns regarding your appointment today.    FALL PREVENTION   Falls often occur due to slipping, tripping or losing your balance. Here are ways to reduce your risk of falling again.   Was there anything that caused your fall that can be fixed, removed or replaced?   Make your home safe by keeping walkways clear of objects you may trip over.   Use non-slip pads under rugs.   Do not walk in poorly lit areas.   Do not stand on chairs or wobbly ladders.   Use caution when reaching overhead or looking upward. This position can cause a loss of balance.   Be sure your shoes fit properly, have non-slip bottoms and are in good condition.   Be cautious when going up and down stairs, curbs, and when walking on uneven sidewalks.   If your balance is poor, consider using a cane or walker.   If your fall was related to alcohol use, stop or limit alcohol intake.   If your fall was related to use of sleeping medicines, talk to your doctor about this. You may need to reduce your dosage at bedtime if you awaken during the night to go to the bathroom.   To reduce the need for nighttime bathroom trips:   Avoid drinking fluids for several hours before going to bed   Empty your bladder before going to bed   Men can keep a urinal at the bedside   © 5795-5042 [employer], [address] [place] [id number]. All rights reserved. This information is not intended as a substitute for professional medical care. Always follow your healthcare professional's instructions.  WAYS TO HELP PREVENT INFECTION         WASH YOUR HANDS OFTEN DURING THE DAY, ESPECIALLY BEFORE YOU EAT, AFTER  "USING THE BATHROOM, AND AFTER TOUCHING ANIMALS     STAY AWAY FROM PEOPLE WHO HAVE ILLNESSES YOU CAN CATCH; SUCH AS COLDS, FLU, CHICKEN POX     TRY TO AVOID CROWDS     STAY AWAY FROM CHILDREN WHO RECENTLY HAVE RECEIVED LIVE VIRUS VACCINES     MAINTAIN GOOD MOUTH CARE     DO NOT SQUEEZE OR SCRATCH PIMPLES     CLEAN CUTS & SCRAPES RIGHT AWAY AND DAILY UNTIL HEALED WITH WARM WATER, SOAP & AN ANTISEPTIC     AVOID CONTACT WITH LITTER BOXES, BIRD CAGES, & FISH TANKS     AVOID STANDING WATER, IE., BIRD BATHS, FLOWER POTS/VASES, OR HUMIDIFIERS     WEAR GLOVES WHEN GARDENING OR CLEANING UP AFTER OTHERS, ESPECIALLY BABIES & SMALL CHILDREN    DO NOT EAT RAW FISH, SEAFOOD, MEAT, OR EGGSSupport Groups/Classes    Support groups and classes are being offered at the   [facility] and [employer]!!    "Cooking with Cancer" (Nutrition Class):  Second Wednesday of each month   at noon at the [facility].  Metastatic Support Group:  Third Tuesday of each month   at noon at the [facility].  Next Steps Class/Group: Second and fourth Thursday of each month at noon at the [facility].  [facility] (Breast Cancer Support Group): First Tuesday of each month   at 5:30pm at the [facility] location.  [employer]: [facility]: Second and third Tuesday of each month from 7:30am - 2pm.  [facility]: First and fourth Tuesday of each month from 7:30am - 2pm     If you are interested in attending or would like more information please ask our social workers or your nurse!  "

## 2019-07-30 DIAGNOSIS — C34.12 MALIGNANT NEOPLASM OF UPPER LOBE OF LEFT LUNG: ICD-10-CM

## 2019-07-30 PROCEDURE — 77386 HC IMRT, COMPLEX: CPT | Performed by: RADIOLOGY

## 2019-07-30 PROCEDURE — 77014 HC CT GUIDANCE RADIATION THERAPY FLDS PLACEMENT: CPT | Mod: TC | Performed by: RADIOLOGY

## 2019-07-30 RX ORDER — HYDROCODONE BITARTRATE AND ACETAMINOPHEN 5; 325 MG/1; MG/1
1 TABLET ORAL EVERY 6 HOURS PRN
Qty: 40 TABLET | Refills: 0 | Status: SHIPPED | OUTPATIENT
Start: 2019-07-30 | End: 2019-10-23 | Stop reason: SDUPTHER

## 2019-07-31 ENCOUNTER — DOCUMENTATION ONLY (OUTPATIENT)
Dept: RADIATION ONCOLOGY | Facility: CLINIC | Age: 79
End: 2019-07-31

## 2019-07-31 PROCEDURE — 77386 HC IMRT, COMPLEX: CPT | Performed by: RADIOLOGY

## 2019-07-31 PROCEDURE — 77014 HC CT GUIDANCE RADIATION THERAPY FLDS PLACEMENT: CPT | Mod: TC | Performed by: RADIOLOGY

## 2019-07-31 NOTE — PLAN OF CARE
Problem: Adult Inpatient Plan of Care  Goal: Plan of Care Review  Outcome: Ongoing (interventions implemented as appropriate)  Day 18 outpatient xrt to lung. Denies any pain or n/v. Had sore swallowing but it has resolved, has MMW but hasn't had to use it yet. No SOB. Will continue to monitor.

## 2019-08-01 ENCOUNTER — HOSPITAL ENCOUNTER (OUTPATIENT)
Dept: RADIATION THERAPY | Facility: HOSPITAL | Age: 79
Discharge: HOME | End: 2019-08-01
Attending: RADIOLOGY
Payer: MEDICARE

## 2019-08-01 ENCOUNTER — CLINICAL SUPPORT (OUTPATIENT)
Dept: INTERNAL MEDICINE | Facility: CLINIC | Age: 79
End: 2019-08-01
Payer: MEDICARE

## 2019-08-01 DIAGNOSIS — E29.1 HYPOGONADISM IN MALE: Primary | ICD-10-CM

## 2019-08-01 PROCEDURE — 99999 PR PBB SHADOW E&M-EST. PATIENT-LVL II: ICD-10-PCS | Mod: PBBFAC,,,

## 2019-08-01 PROCEDURE — 99999 PR PBB SHADOW E&M-EST. PATIENT-LVL II: CPT | Mod: PBBFAC,,,

## 2019-08-01 PROCEDURE — 96372 THER/PROPH/DIAG INJ SC/IM: CPT | Mod: S$GLB,,, | Performed by: INTERNAL MEDICINE

## 2019-08-01 PROCEDURE — 96372 PR INJECTION,THERAP/PROPH/DIAG2ST, IM OR SUBCUT: ICD-10-PCS | Mod: S$GLB,,, | Performed by: INTERNAL MEDICINE

## 2019-08-01 PROCEDURE — 77386 HC IMRT, COMPLEX: CPT | Performed by: RADIOLOGY

## 2019-08-01 PROCEDURE — 77014 HC CT GUIDANCE RADIATION THERAPY FLDS PLACEMENT: CPT | Mod: TC | Performed by: RADIOLOGY

## 2019-08-01 RX ADMIN — TESTOSTERONE CYPIONATE 200 MG: 200 INJECTION, SOLUTION INTRAMUSCULAR at 08:08

## 2019-08-02 PROCEDURE — 77336 RADIATION PHYSICS CONSULT: CPT | Performed by: RADIOLOGY

## 2019-08-02 PROCEDURE — 77014 HC CT GUIDANCE RADIATION THERAPY FLDS PLACEMENT: CPT | Mod: TC | Performed by: RADIOLOGY

## 2019-08-02 PROCEDURE — 77386 HC IMRT, COMPLEX: CPT | Performed by: RADIOLOGY

## 2019-08-05 ENCOUNTER — OFFICE VISIT (OUTPATIENT)
Dept: HEMATOLOGY/ONCOLOGY | Facility: CLINIC | Age: 79
End: 2019-08-05
Payer: MEDICARE

## 2019-08-05 ENCOUNTER — INFUSION (OUTPATIENT)
Dept: INFUSION THERAPY | Facility: HOSPITAL | Age: 79
End: 2019-08-05
Attending: INTERNAL MEDICINE
Payer: MEDICARE

## 2019-08-05 VITALS
BODY MASS INDEX: 27.34 KG/M2 | WEIGHT: 190.94 LBS | TEMPERATURE: 97 F | DIASTOLIC BLOOD PRESSURE: 79 MMHG | HEIGHT: 70 IN | HEART RATE: 65 BPM | SYSTOLIC BLOOD PRESSURE: 159 MMHG | OXYGEN SATURATION: 96 %

## 2019-08-05 DIAGNOSIS — C34.12 MALIGNANT NEOPLASM OF UPPER LOBE OF LEFT LUNG: Primary | ICD-10-CM

## 2019-08-05 DIAGNOSIS — Z85.118 HISTORY OF CANCER OF UPPER LOBE BRONCHUS OR LUNG: ICD-10-CM

## 2019-08-05 PROCEDURE — 3077F SYST BP >= 140 MM HG: CPT | Mod: CPTII,S$GLB,, | Performed by: INTERNAL MEDICINE

## 2019-08-05 PROCEDURE — 96367 TX/PROPH/DG ADDL SEQ IV INF: CPT

## 2019-08-05 PROCEDURE — A4216 STERILE WATER/SALINE, 10 ML: HCPCS | Performed by: INTERNAL MEDICINE

## 2019-08-05 PROCEDURE — 96413 CHEMO IV INFUSION 1 HR: CPT

## 2019-08-05 PROCEDURE — 3078F PR MOST RECENT DIASTOLIC BLOOD PRESSURE < 80 MM HG: ICD-10-PCS | Mod: CPTII,S$GLB,, | Performed by: INTERNAL MEDICINE

## 2019-08-05 PROCEDURE — 3078F DIAST BP <80 MM HG: CPT | Mod: CPTII,S$GLB,, | Performed by: INTERNAL MEDICINE

## 2019-08-05 PROCEDURE — 1101F PR PT FALLS ASSESS DOC 0-1 FALLS W/OUT INJ PAST YR: ICD-10-PCS | Mod: CPTII,S$GLB,, | Performed by: INTERNAL MEDICINE

## 2019-08-05 PROCEDURE — 1101F PT FALLS ASSESS-DOCD LE1/YR: CPT | Mod: CPTII,S$GLB,, | Performed by: INTERNAL MEDICINE

## 2019-08-05 PROCEDURE — 25000003 PHARM REV CODE 250: Performed by: INTERNAL MEDICINE

## 2019-08-05 PROCEDURE — 99999 PR PBB SHADOW E&M-EST. PATIENT-LVL III: CPT | Mod: PBBFAC,,, | Performed by: INTERNAL MEDICINE

## 2019-08-05 PROCEDURE — 99215 PR OFFICE/OUTPT VISIT, EST, LEVL V, 40-54 MIN: ICD-10-PCS | Mod: 25,S$GLB,, | Performed by: INTERNAL MEDICINE

## 2019-08-05 PROCEDURE — 63600175 PHARM REV CODE 636 W HCPCS: Mod: TB | Performed by: INTERNAL MEDICINE

## 2019-08-05 PROCEDURE — 99215 OFFICE O/P EST HI 40 MIN: CPT | Mod: 25,S$GLB,, | Performed by: INTERNAL MEDICINE

## 2019-08-05 PROCEDURE — 77386 HC IMRT, COMPLEX: CPT | Performed by: RADIOLOGY

## 2019-08-05 PROCEDURE — 96366 THER/PROPH/DIAG IV INF ADDON: CPT

## 2019-08-05 PROCEDURE — 99999 PR PBB SHADOW E&M-EST. PATIENT-LVL III: ICD-10-PCS | Mod: PBBFAC,,, | Performed by: INTERNAL MEDICINE

## 2019-08-05 PROCEDURE — 77014 HC CT GUIDANCE RADIATION THERAPY FLDS PLACEMENT: CPT | Mod: TC | Performed by: RADIOLOGY

## 2019-08-05 PROCEDURE — 3077F PR MOST RECENT SYSTOLIC BLOOD PRESSURE >= 140 MM HG: ICD-10-PCS | Mod: CPTII,S$GLB,, | Performed by: INTERNAL MEDICINE

## 2019-08-05 PROCEDURE — 96361 HYDRATE IV INFUSION ADD-ON: CPT

## 2019-08-05 RX ORDER — SODIUM CHLORIDE 0.9 % (FLUSH) 0.9 %
10 SYRINGE (ML) INJECTION
Status: DISCONTINUED | OUTPATIENT
Start: 2019-08-05 | End: 2019-08-05 | Stop reason: HOSPADM

## 2019-08-05 RX ORDER — HEPARIN 100 UNIT/ML
500 SYRINGE INTRAVENOUS
Status: DISCONTINUED | OUTPATIENT
Start: 2019-08-05 | End: 2019-08-05 | Stop reason: HOSPADM

## 2019-08-05 RX ORDER — SODIUM CHLORIDE 0.9 % (FLUSH) 0.9 %
10 SYRINGE (ML) INJECTION
Status: CANCELLED | OUTPATIENT
Start: 2019-08-05

## 2019-08-05 RX ORDER — HEPARIN 100 UNIT/ML
500 SYRINGE INTRAVENOUS
Status: CANCELLED | OUTPATIENT
Start: 2019-08-05

## 2019-08-05 RX ADMIN — SODIUM CHLORIDE: 0.9 INJECTION, SOLUTION INTRAVENOUS at 09:08

## 2019-08-05 RX ADMIN — PALONOSETRON HYDROCHLORIDE: 0.25 INJECTION INTRAVENOUS at 10:08

## 2019-08-05 RX ADMIN — HEPARIN 500 UNITS: 100 SYRINGE at 02:08

## 2019-08-05 RX ADMIN — Medication 10 ML: at 02:08

## 2019-08-05 RX ADMIN — MAGNESIUM SULFATE HEPTAHYDRATE: 500 INJECTION, SOLUTION INTRAMUSCULAR; INTRAVENOUS at 12:08

## 2019-08-05 RX ADMIN — APREPITANT 130 MG: 130 INJECTION, EMULSION INTRAVENOUS at 09:08

## 2019-08-05 RX ADMIN — CISPLATIN 83 MG: 100 INJECTION, SOLUTION INTRAVENOUS at 11:08

## 2019-08-05 NOTE — PLAN OF CARE
Problem: Adult Inpatient Plan of Care  Goal: Plan of Care Review  Outcome: Ongoing (interventions implemented as appropriate)  Pt stated no complaints today

## 2019-08-05 NOTE — DISCHARGE INSTRUCTIONS
[facility]  [address] [facility]  [address]  [phone] phone     [phone] fax  Hours of Operation: Monday- Friday 8:00am- 5:00pm  After hours phone  [phone]  Hematology / Oncology Physicians on call      Dr. Jose Vila      Please call with any concerns regarding your appointment today.    FALL PREVENTION   Falls often occur due to slipping, tripping or losing your balance. Here are ways to reduce your risk of falling again.   Was there anything that caused your fall that can be fixed, removed or replaced?   Make your home safe by keeping walkways clear of objects you may trip over.   Use non-slip pads under rugs.   Do not walk in poorly lit areas.   Do not stand on chairs or wobbly ladders.   Use caution when reaching overhead or looking upward. This position can cause a loss of balance.   Be sure your shoes fit properly, have non-slip bottoms and are in good condition.   Be cautious when going up and down stairs, curbs, and when walking on uneven sidewalks.   If your balance is poor, consider using a cane or walker.   If your fall was related to alcohol use, stop or limit alcohol intake.   If your fall was related to use of sleeping medicines, talk to your doctor about this. You may need to reduce your dosage at bedtime if you awaken during the night to go to the bathroom.   To reduce the need for nighttime bathroom trips:   Avoid drinking fluids for several hours before going to bed   Empty your bladder before going to bed   Men can keep a urinal at the bedside   © 4746-2018 [employer], [address] [place] [address], [place] [address]. All rights reserved. This information is not intended as a substitute for professional medical care. Always follow your healthcare professional's instructions.  Support Groups/Classes    Support groups and classes are being offered at the   [facility] and  "[facility]    "Cooking with Cancer" (Nutrition Class):  Second Wednesday of each month   at noon at the Cancer Center.  Metastatic Support Group:  Third Tuesday of each month   at noon at the [facility].  Next Steps Class/Group: Second and fourth Thursday of each month at noon at the [facility].  [facility] (Breast Cancer Support Group): First Tuesday of each month   at 5:30pm at the [facility] location.  [employer]: [facility]: Second and third Tuesday of each month from 7:30am - 2pm.  [facility]: First and fourth Tuesday of each month from 7:30am - 2pm    If you are interested in attending or would like more information please ask our social workers or your nurse!WAYS TO HELP PREVENT INFECTION         WASH YOUR HANDS OFTEN DURING THE DAY, ESPECIALLY BEFORE YOU EAT, AFTER USING THE BATHROOM, AND AFTER TOUCHING ANIMALS     STAY AWAY FROM PEOPLE WHO HAVE ILLNESSES YOU CAN CATCH; SUCH AS COLDS, FLU, CHICKEN POX     TRY TO AVOID CROWDS     STAY AWAY FROM CHILDREN WHO RECENTLY HAVE RECEIVED LIVE VIRUS VACCINES     MAINTAIN GOOD MOUTH CARE     DO NOT SQUEEZE OR SCRATCH PIMPLES     CLEAN CUTS & SCRAPES RIGHT AWAY AND DAILY UNTIL HEALED WITH WARM WATER, SOAP & AN ANTISEPTIC     AVOID CONTACT WITH LITTER BOXES, BIRD CAGES, & FISH TANKS     AVOID STANDING WATER, IE., BIRD BATHS, FLOWER POTS/VASES, OR HUMIDIFIERS     WEAR GLOVES WHEN GARDENING OR CLEANING UP AFTER OTHERS, ESPECIALLY BABIES & SMALL CHILDREN    DO NOT EAT RAW FISH, SEAFOOD, MEAT, OR EGGS  "

## 2019-08-05 NOTE — PROGRESS NOTES
Subjective:       Patient ID: Chai Murry is a 79 y.o. male.    Chief Complaint: Malignant neoplasm of upper lobe of left lung [C34.12]  HPI: We have an opportunity to see Mr. Chai Murry in Hematology Oncology clinic at [facility] on 08/05/2019.  Mr. Chai Murry is a 79 y.o. 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     He is s/p left pneumonectomy for a squamous cell carcinoma of the left lung.0n 4/12/2016  Prior to the surgery, the PET scan showed an FDG-avid mass in the left upper   lobe abutting the left hilum with an SUV of 11.6. There seemed to be a left   hilar adenopathy as well.  Radiologist felt that he was unable to discriminate between the mass and the   lymphadenopathy. The patient had had a bronchoscopy by Dr. Roel Ernandez on   03/04/2006 that showed a partially obstructing airway in the anterior segment of  the left upper lobe with an endobronchial lesion in the anterior segment of the  left upper lobe. The specimen from the biopsy at the time of bronchoscopy was   reported as being a squamous cell carcinoma.  At the time of the surgery after the left lung was removed, the pathologist   indicated that this was a squamous cell carcinoma. It measured 3.8 cm. The   pathology indicated that this is extending into the bronchial resection margin of the lobe. This lobe was later on removed to complete the pneumonectomy   There was one lymph node positive for metastatic squamous cell carcinoma at the   AP window.  The patient was told that we would prefer to treat him with post-op simultaneous chemo/radiation.  He had Medi-port. He started chemo/radiation therapy andreceived 6 weekly cycles of carbo/Taxol along with radiatioon.  After a month, he had a Ct scan and it showed stability   He then had 2 additional cyles of carbo/Taxol finishing in 9/27/2016.    He comes for follow up.   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 CT of the chest was non contributory, shows changes from surgery  PET showed a PET avid mass to the left of the trachea.  The case was discussed with dr annika Goldman and GI.  We discussed the possibility of doing a EUS or EBUs, and finally, because of the localization, it was decided to do a EUS with biopsy if possible.  Cytology shows recurrent squamous cell cancer.  I have asked Pathology to run a PD-L1 from his original pathology from 2016.  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  He has been discussed extensively with radiation oncology. We have decided to treat him with radiation therapy and Cisplatin as a chemo-sensitizer.  Dr Castro has reviewed the therapy ports from the previous radiation treatment and the area in question seems to be above the previously radiated fields.     Currently on chemoradiation s/p 4 weekly cisplatin treatments.  Has tolerable throat soreness. Able to eat and drink.       Malignant neoplasm of upper lobe of left lung    4/12/2016 Initial Diagnosis     Malignant neoplasm of upper lobe of left lung         6/19/2019 Cancer Staged     Staging form: Lung, AJCC 8th Edition  - Clinical stage from 6/19/2019: Stage IIIB (rcT4, cN2, cM0) - Signed by Alejandro Castro II, MD on 6/19/2019 6/21/2019 Tumor Conference     Presenting Hospital / Clinic: [facility]  Virtual Tumor Board Conference: In person  Presenter: Dr. Chance Castro  Date Presented to Tumor Board: 06/21/19  Specialties Present: Medical Oncology;Radiation Oncology;Surgical Oncology;Pathology;Navigation;Plastic Surgery;Radiology;Gastrointestinal;Pulmonology  Presentation at Cancer Conference: Prospective  Cancer Type: Lung cancer  Recommended Plan: Additional screening  Recommended Plan Note: If PDL-1 positive, treat with immunotherapy  Send tissue for next generation sequencing.         6/28/2019 Tumor Conference     His case was  discussed at the Multidisciplinary Head and Neck Team Planning Meeting.    Representatives from Medical Oncology, Radiation Oncology, Head and Neck Surgical Oncology, Psychosocial Oncology, and Speech and Language Pathology discussed the case with the following recommendations:    1) treat lung cancer                  Lung cancer    6/11/2019 Initial Diagnosis     Lung cancer         7/8/2019 -  Chemotherapy     Treatment Summary   Plan Name: OP HEAD NECK CISPLATIN WEEKLY + RADIOTHERAPY  Treatment Goal: Supportive  Status: Active  Start Date: 7/8/2019  End Date: 8/12/2019 (Planned)  Provider: Jorge L Patel MD  Chemotherapy: CISplatin (PLATINOL) 40 mg/m2 = 83 mg in sodium chloride 0.9% 583 mL chemo infusion, 40 mg/m2 = 83 mg (100 % of original dose 40 mg/m2), Intravenous, Clinic/HOD 1 time, 4 of 6 cycles  Dose modification: 70 mg (original dose 40 mg/m2, Cycle 1, Reason: MD Discretion), 40 mg/m2 (original dose 40 mg/m2, Cycle 1)  Administration: 83 mg (7/8/2019), 84 mg (7/15/2019), 83 mg (7/22/2019), 83 mg (7/29/2019)          Past Medical History:   Diagnosis Date    Anemia     Arthritis     Atrial fibrillation     CAD (coronary artery disease)     Cancer     lung cancer-Left    Cataract     COPD (chronic obstructive pulmonary disease)     Diverticulosis     ED (erectile dysfunction)     HTN (hypertension)     Hyperlipidemia     Myocardial infarction     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 Courtney dwyer bx     Family History   Problem Relation Age of Onset    Esophageal cancer Sister     Cancer Sister     Lung cancer Mother     Cancer Mother     Cataracts Mother     Hypertension Mother     Pneumonia Father     Cataracts Father     Hypertension Father     Cancer Brother     Hypertension Brother     Blindness Neg Hx     Diabetes Neg Hx     Glaucoma Neg Hx     Macular degeneration Neg Hx     Retinal detachment Neg Hx     Strabismus Neg Hx      Stroke Neg Hx     Thyroid disease Neg Hx      Social History     Socioeconomic History    Marital status:      Spouse name: Not on file    Number of children: 3    Years of education: Not on file    Highest education level: Not on file   Occupational History    Occupation: retired   Social Needs    Financial resource strain: Not on file    Food insecurity:     Worry: Not on file     Inability: Not on file    Transportation needs:     Medical: Not on file     Non-medical: Not on file   Tobacco Use    Smoking status: Former Smoker     Packs/day: 1.00     Years: 50.00     Pack years: 50.00     Last attempt to quit: 2005     Years since quittin.9    Smokeless tobacco: Never Used   Substance and Sexual Activity    Alcohol use: No    Drug use: No    Sexual activity: Not Currently   Lifestyle    Physical activity:     Days per week: Not on file     Minutes per session: Not on file    Stress: Not on file   Relationships    Social connections:     Talks on phone: Not on file     Gets together: Not on file     Attends Worship service: Not on file     Active member of club or organization: Not on file     Attends meetings of clubs or organizations: Not on file     Relationship status: Not on file   Other Topics Concern    Not on file   Social History Narrative    Not on file     Past Surgical History:   Procedure Laterality Date    APPENDECTOMY      Bronchoscopy Bilateral 2019    [facility]formed by [facility]resh Goldman [facility] at [facility]    BRONCHOSCOPY- insert lighted tube into airway to obtain biopsy of lung Bilateral 3/4/2016    Performed by Roel Ernandez MD at [facility] ENDO    CARDIAC CATHETERIZATION      cardiac stents      CATARACT EXTRACTION W/  INTRAOCULAR LENS IMPLANT Right 9-3-14    CHOLECYSTECTOMY      COLONOSCOPY N/A 10/25/2018    Performed by Michael Hameed MD at [facility] ENDO    COLONOSCOPY N/A 2018    Performed by Dionne Doan MD at [facility] END[facility]    Colonoscopy  N/A 8/12/2014    Performed by Eriberto Simpson MD at [facility] ENDO    EGD (ESOPHAGOGASTRODUODENOSCOPY) N/A 6/11/2019    Performed by Abhishek Knox MD at [facility] ENDO    infected stomach gland excision      INSERTION-PORT Left 5/26/2016    Performed by Nemesio Wall MD at [facility] OR    WOIJQPJUL-SGME-V-CATH Right 7/3/2019    Performed by Jean Carlos Constantino MD at [facility] OR    LOBECTOMY-LUNG Left 4/12/2016    Performed by [facility]io Wall [facility] at [facility] OR    LUNG REMOVAL, TOTAL Left 04/2016    lung cancer left upper lobe    PNEUMONECTOMY Left 4/12/2016    Performed by [facility]io Wall [facility] at [facility] OR    SKIN BIOPSY Left     arm    THORACOTOMY Left 4/12/2016    Performed by Nem[facility]io Wall [facility] at [facility] OR    ULTRASOUND, UPPER GI TRACT, ENDOSCOPIC N/A 6/11/2019    Performed by Abhishek Knox MD at [facility] ENDO     Current Outpatient Medications   Medication Sig Dispense Refill    (Magic mouthwash) 1:1:1 Benadryl 12.5mg/5ml liq, aluminum & magnesium hydroxide-simehticone (Maalox), lidocaine viscous 2% Swish and spit 15 mLs every 4 (four) hours as needed. for mouth sores 90 mL 2    albuterol (PROVENTIL) 2.5 mg /3 mL (0.083 %) nebulizer solution Take 3 mLs (2.5 mg total) by nebulization every 6 (six) hours while awake. (Patient taking differently: Take 2.5 mg by nebulization as needed. ) 270 mL 11    amLODIPine (NORVASC) 5 MG tablet Take 2 tablets (10 mg total) by mouth every evening. (Patient taking differently: Take 5 mg by mouth once daily. ) 60 tablet 0    aspirin (ECOTRIN) 81 MG EC tablet Take 81 mg by mouth once daily.      BREO ELLIPTA 100-25 mcg/dose diskus inhaler INHALE 1 PUFF INTO THE LUNGS ONCE DAILY  5    doxepin (SINEQUAN) 10 MG capsule TAKE 1 CAPSULE (10 MG TOTAL) BY MOUTH EVERY EVENING. 30 capsule 10    fenofibric acid (FIBRICOR) 135 mg CpDR TAKE 1 CAPSULE BY MOUTH EVERY DAY 30 capsule 11    fluticasone (FLONASE) 50 mcg/actuation nasal spray 2 sprays (100 mcg total) by Each Nare route once daily.  (Patient taking differently: 2 sprays by Each Nare route as needed. ) 3 Bottle 3    glycopyrrolate-formoterol (BEVESPI AEROSPHERE) 9-4.8 mcg HFAA Inhale 2 puffs into the lungs 2 (two) times daily. Controller 10.9 g 11    HYDROcodone-acetaminophen (NORCO) 5-325 mg per tablet Take 1 tablet by mouth every 6 (six) hours as needed for Pain. 40 tablet 0    ipratropium-albuterol (COMBIVENT RESPIMAT)  mcg/actuation inhaler Inhale 1 puff into the lungs every 6 (six) hours as needed for Shortness of Breath. 4 g 11    levocetirizine (XYZAL) 5 MG tablet Take 5 mg by mouth as needed.       lisinopril (PRINIVIL,ZESTRIL) 40 MG tablet TAKE 1 TABLET BY MOUTH DAILY 30 tablet 11    naproxen sodium (ANAPROX) 550 MG tablet Take 1 tablet (550 mg total) by mouth 2 (two) times daily with meals. 20 tablet 0    ondansetron (ZOFRAN) 4 MG tablet Take 1 tablet (4 mg total) by mouth every 12 (twelve) hours as needed for Nausea. 30 tablet 1    simvastatin (ZOCOR) 40 MG tablet Take 1 tablet (40 mg total) by mouth every evening. 30 tablet 11    SOTALOL AF 80 mg tablet TAKE 1 TABLET BY MOUTH TWICE A DAY 60 tablet 10     Current Facility-Administered Medications   Medication Dose Route Frequency Provider Last Rate Last Dose    testosterone cypionate injection 200 mg  200 mg Intramuscular Q21 Days Peter Teixeira MD   200 mg at 08/01/19 0841     Facility-Administered Medications Ordered in Other Visits   Medication Dose Route Frequency Provider Last Rate Last Dose    lactated ringers infusion   Intravenous Continuous Michael Hameed MD   Stopped at 07/03/19 0810       Labs:  Lab Results   Component Value Date    WBC 5.86 08/05/2019    HGB 12.9 (L) 08/05/2019    HCT 40.5 08/05/2019    MCV 94 08/05/2019     (L) 08/05/2019     BMP  Lab Results   Component Value Date     08/05/2019    K 4.9 08/05/2019     08/05/2019    CO2 30 (H) 08/05/2019    BUN 19 08/05/2019    CREATININE 1.1 08/05/2019    CALCIUM 9.2 08/05/2019     ANIONGAP 7 (L) 08/05/2019    ESTGFRAFRICA >60 08/05/2019    EGFRNONAA >60 08/05/2019     Lab Results   Component Value Date    ALT 30 08/05/2019    AST 22 08/05/2019    ALKPHOS 66 08/05/2019    BILITOT 0.4 08/05/2019       No results found for: IRON, TIBC, FERRITIN, SATURATEDIRO  No results found for: [id number]  No results found for: FOLATE  Lab Results   Component Value Date    TSH 3.422 02/20/2012       I have reviewed the radiology reports and examined the scan/xray images.    Review of Systems   Constitutional: Negative.    HENT: Negative.    Eyes: Negative.    Respiratory: Negative.    Cardiovascular: Negative.    Gastrointestinal: Negative.    Endocrine: Negative.    Genitourinary: Negative.    Musculoskeletal: Negative.    Skin: Negative.    Allergic/Immunologic: Negative.    Neurological: Negative.    Hematological: Negative.    Psychiatric/Behavioral: Negative.      ECOG SCORE              Objective:     Vitals:    08/05/19 0758   BP: (!) 159/79   Pulse: 65   Temp: 96.9 °F (36.1 °C)   Body mass index is 27.39 kg/m².  Physical Exam   Constitutional: He is oriented to person, place, and time. He appears well-developed and well-nourished.   HENT:   Head: Normocephalic and atraumatic.   Eyes: Conjunctivae and EOM are normal.   Neck: Normal range of motion. Neck supple.   Cardiovascular: Normal rate and regular rhythm.   Pulmonary/Chest: Effort normal and breath sounds normal.   Abdominal: Soft. Bowel sounds are normal.   Musculoskeletal: Normal range of motion.   Neurological: He is alert and oriented to person, place, and time.   Skin: Skin is warm and dry.   Psychiatric: He has a normal mood and affect. His behavior is normal. Judgment and thought content normal.   Nursing note and vitals reviewed.        Assessment:      1. Malignant neoplasm of upper lobe of left lung           Plan:     Malignant neoplasm of upper lobe of left lung    Doing well. Continue cisplatin weekly with radiation.

## 2019-08-06 PROCEDURE — 77386 HC IMRT, COMPLEX: CPT | Performed by: RADIOLOGY

## 2019-08-06 PROCEDURE — 77014 HC CT GUIDANCE RADIATION THERAPY FLDS PLACEMENT: CPT | Mod: TC | Performed by: RADIOLOGY

## 2019-08-07 ENCOUNTER — DOCUMENTATION ONLY (OUTPATIENT)
Dept: RADIATION ONCOLOGY | Facility: CLINIC | Age: 79
End: 2019-08-07

## 2019-08-07 PROCEDURE — 77386 HC IMRT, COMPLEX: CPT | Performed by: RADIOLOGY

## 2019-08-07 PROCEDURE — 77014 HC CT GUIDANCE RADIATION THERAPY FLDS PLACEMENT: CPT | Mod: TC | Performed by: RADIOLOGY

## 2019-08-07 NOTE — PLAN OF CARE
Problem: Adult Inpatient Plan of Care  Goal: Plan of Care Review  Outcome: Ongoing (interventions implemented as appropriate)  Day 23 of outpatient xrt to the lung. Denies any pain, no sore swallowing or SOB. Will continue to monitor.

## 2019-08-08 PROCEDURE — 77386 HC IMRT, COMPLEX: CPT | Performed by: RADIOLOGY

## 2019-08-08 PROCEDURE — 77014 HC CT GUIDANCE RADIATION THERAPY FLDS PLACEMENT: CPT | Mod: TC | Performed by: RADIOLOGY

## 2019-08-09 PROCEDURE — 77014 HC CT GUIDANCE RADIATION THERAPY FLDS PLACEMENT: CPT | Mod: TC | Performed by: RADIOLOGY

## 2019-08-09 PROCEDURE — 77386 HC IMRT, COMPLEX: CPT | Performed by: RADIOLOGY

## 2019-08-09 PROCEDURE — 77336 RADIATION PHYSICS CONSULT: CPT | Performed by: RADIOLOGY

## 2019-08-12 ENCOUNTER — INFUSION (OUTPATIENT)
Dept: INFUSION THERAPY | Facility: HOSPITAL | Age: 79
End: 2019-08-12
Attending: INTERNAL MEDICINE
Payer: MEDICARE

## 2019-08-12 ENCOUNTER — OFFICE VISIT (OUTPATIENT)
Dept: HEMATOLOGY/ONCOLOGY | Facility: CLINIC | Age: 79
End: 2019-08-12
Payer: MEDICARE

## 2019-08-12 VITALS
OXYGEN SATURATION: 95 % | BODY MASS INDEX: 26.92 KG/M2 | DIASTOLIC BLOOD PRESSURE: 69 MMHG | TEMPERATURE: 98 F | WEIGHT: 188.06 LBS | HEART RATE: 62 BPM | SYSTOLIC BLOOD PRESSURE: 119 MMHG | HEIGHT: 70 IN

## 2019-08-12 VITALS — WEIGHT: 188.06 LBS | BODY MASS INDEX: 26.92 KG/M2 | HEIGHT: 70 IN

## 2019-08-12 DIAGNOSIS — C34.12 MALIGNANT NEOPLASM OF UPPER LOBE OF LEFT LUNG: Primary | ICD-10-CM

## 2019-08-12 DIAGNOSIS — Z85.118 HISTORY OF CANCER OF UPPER LOBE BRONCHUS OR LUNG: ICD-10-CM

## 2019-08-12 PROCEDURE — 96367 TX/PROPH/DG ADDL SEQ IV INF: CPT

## 2019-08-12 PROCEDURE — 99999 PR PBB SHADOW E&M-EST. PATIENT-LVL III: CPT | Mod: PBBFAC,,, | Performed by: INTERNAL MEDICINE

## 2019-08-12 PROCEDURE — 3074F SYST BP LT 130 MM HG: CPT | Mod: CPTII,S$GLB,, | Performed by: INTERNAL MEDICINE

## 2019-08-12 PROCEDURE — A4216 STERILE WATER/SALINE, 10 ML: HCPCS | Performed by: INTERNAL MEDICINE

## 2019-08-12 PROCEDURE — 25000003 PHARM REV CODE 250: Performed by: INTERNAL MEDICINE

## 2019-08-12 PROCEDURE — 1101F PT FALLS ASSESS-DOCD LE1/YR: CPT | Mod: CPTII,S$GLB,, | Performed by: INTERNAL MEDICINE

## 2019-08-12 PROCEDURE — 3074F PR MOST RECENT SYSTOLIC BLOOD PRESSURE < 130 MM HG: ICD-10-PCS | Mod: CPTII,S$GLB,, | Performed by: INTERNAL MEDICINE

## 2019-08-12 PROCEDURE — 99215 PR OFFICE/OUTPT VISIT, EST, LEVL V, 40-54 MIN: ICD-10-PCS | Mod: 25,S$GLB,, | Performed by: INTERNAL MEDICINE

## 2019-08-12 PROCEDURE — 77386 HC IMRT, COMPLEX: CPT | Performed by: RADIOLOGY

## 2019-08-12 PROCEDURE — 99999 PR PBB SHADOW E&M-EST. PATIENT-LVL III: ICD-10-PCS | Mod: PBBFAC,,, | Performed by: INTERNAL MEDICINE

## 2019-08-12 PROCEDURE — 3078F DIAST BP <80 MM HG: CPT | Mod: CPTII,S$GLB,, | Performed by: INTERNAL MEDICINE

## 2019-08-12 PROCEDURE — 96366 THER/PROPH/DIAG IV INF ADDON: CPT

## 2019-08-12 PROCEDURE — 1101F PR PT FALLS ASSESS DOC 0-1 FALLS W/OUT INJ PAST YR: ICD-10-PCS | Mod: CPTII,S$GLB,, | Performed by: INTERNAL MEDICINE

## 2019-08-12 PROCEDURE — 3078F PR MOST RECENT DIASTOLIC BLOOD PRESSURE < 80 MM HG: ICD-10-PCS | Mod: CPTII,S$GLB,, | Performed by: INTERNAL MEDICINE

## 2019-08-12 PROCEDURE — 99215 OFFICE O/P EST HI 40 MIN: CPT | Mod: 25,S$GLB,, | Performed by: INTERNAL MEDICINE

## 2019-08-12 PROCEDURE — 77014 HC CT GUIDANCE RADIATION THERAPY FLDS PLACEMENT: CPT | Mod: TC | Performed by: RADIOLOGY

## 2019-08-12 PROCEDURE — 63600175 PHARM REV CODE 636 W HCPCS: Mod: JG | Performed by: INTERNAL MEDICINE

## 2019-08-12 PROCEDURE — 96413 CHEMO IV INFUSION 1 HR: CPT

## 2019-08-12 RX ORDER — SODIUM CHLORIDE 0.9 % (FLUSH) 0.9 %
10 SYRINGE (ML) INJECTION
Status: CANCELLED | OUTPATIENT
Start: 2019-08-12

## 2019-08-12 RX ORDER — SODIUM CHLORIDE 0.9 % (FLUSH) 0.9 %
10 SYRINGE (ML) INJECTION
Status: DISCONTINUED | OUTPATIENT
Start: 2019-08-12 | End: 2019-08-12 | Stop reason: HOSPADM

## 2019-08-12 RX ORDER — HEPARIN 100 UNIT/ML
500 SYRINGE INTRAVENOUS
Status: DISCONTINUED | OUTPATIENT
Start: 2019-08-12 | End: 2019-08-12 | Stop reason: HOSPADM

## 2019-08-12 RX ORDER — HEPARIN 100 UNIT/ML
500 SYRINGE INTRAVENOUS
Status: CANCELLED | OUTPATIENT
Start: 2019-08-12

## 2019-08-12 RX ADMIN — SODIUM CHLORIDE 10 ML: 9 INJECTION, SOLUTION INTRAMUSCULAR; INTRAVENOUS; SUBCUTANEOUS at 03:08

## 2019-08-12 RX ADMIN — SODIUM CHLORIDE 10 ML: 9 INJECTION, SOLUTION INTRAMUSCULAR; INTRAVENOUS; SUBCUTANEOUS at 09:08

## 2019-08-12 RX ADMIN — HEPARIN 500 UNITS: 100 SYRINGE at 03:08

## 2019-08-12 RX ADMIN — DEXAMETHASONE SODIUM PHOSPHATE: 4 INJECTION, SOLUTION INTRA-ARTICULAR; INTRALESIONAL; INTRAMUSCULAR; INTRAVENOUS; SOFT TISSUE at 10:08

## 2019-08-12 RX ADMIN — CISPLATIN 82 MG: 1 INJECTION INTRAVENOUS at 11:08

## 2019-08-12 RX ADMIN — SODIUM CHLORIDE: 0.9 INJECTION, SOLUTION INTRAVENOUS at 09:08

## 2019-08-12 RX ADMIN — MAGNESIUM SULFATE HEPTAHYDRATE: 500 INJECTION, SOLUTION INTRAMUSCULAR; INTRAVENOUS at 12:08

## 2019-08-12 RX ADMIN — APREPITANT 130 MG: 130 INJECTION, EMULSION INTRAVENOUS at 09:08

## 2019-08-12 NOTE — PROGRESS NOTES
Subjective:       Patient ID: Chia Murry is a 79 y.o. male.    Chief Complaint: Results; Chemotherapy; and Cancer    HPI 79-year-old male receiving concurrent cisplatin external beam radiation therapy for hypopharyngeal carcinoma.  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 Courtney dwyer bx     Family History   Problem Relation Age of Onset    Esophageal cancer Sister     Cancer Sister     Lung cancer Mother     Cancer Mother     Cataracts Mother     Hypertension Mother     Pneumonia Father     Cataracts Father     Hypertension Father     Cancer Brother     Hypertension Brother     Blindness Neg Hx     Diabetes Neg Hx     Glaucoma Neg Hx     Macular degeneration Neg Hx     Retinal detachment Neg Hx     Strabismus Neg Hx     Stroke Neg Hx     Thyroid disease Neg Hx      Social History     Socioeconomic History    Marital status:      Spouse name: Not on file    Number of children: 3    Years of education: Not on file    Highest education level: Not on file   Occupational History    Occupation: retired   Social Needs    Financial resource strain: Not on file    Food insecurity:     Worry: Not on file     Inability: Not on file    Transportation needs:     Medical: Not on file     Non-medical: Not on file   Tobacco Use    Smoking status: Former Smoker     Packs/day: 1.00     Years: 50.00     Pack years: 50.00     Last attempt to quit: 2005     Years since quittin.0    Smokeless tobacco: Never Used   Substance and Sexual Activity    Alcohol use: No     Drug use: No    Sexual activity: Not Currently   Lifestyle    Physical activity:     Days per week: Not on file     Minutes per session: Not on file    Stress: Not on file   Relationships    Social connections:     Talks on phone: Not on file     Gets together: Not on file     Attends Anabaptism service: Not on file     Active member of club or organization: Not on file     Attends meetings of clubs or organizations: Not on file     Relationship status: Not on file   Other Topics Concern    Not on file   Social History Narrative    Not on file     Past Surgical History:   Procedure Laterality Date    APPENDECTOMY      Bronchoscopy Bilateral 6/21/2019    Performed by Paresh Goldman MD at [facility] ENDO    BRONCHOSCOPY- insert lighted tube into airway to obtain biopsy of lung Bilateral 3/4/2016    Performed by Roel Ernandez MD at [facility] ENDO    CARDIAC CATHETERIZATION      cardiac stents      CATARACT EXTRACTION W/  INTRAOCULAR LENS IMPLANT Right 9-3-14    CHOLECYSTECTOMY      COLONOSCOPY N/A 10/25/2018    Performed by Michael Hameed MD at [facility] ENDO    COLONOSCOPY N/A 4/17/2018    Performed by Dionne Doan MD at [facility] ENDO    Colonoscopy N/A 8/12/2014    Performed by Eriberto Simpson MD at [facility] ENDO    EGD (ESOPHAGOGASTRODUODENOSCOPY) N/A 6/11/2019    Performed by Abhishek Knox MD at [facility] ENDO    infected stomach gland excision      INSERTION-PORT Left 5/26/2016    Performed by Nemesio Wall MD at [facility] OR    KIVJLQBYV-EHCM-Q-CATH Right 7/3/2019    Performed by Jean Carlos Constantino MD at [facility] OR    LOBECTOMY-LUNG Left 4/12/2016    Performed by Nemesio Wall MD at [facility] OR    LUNG REMOVAL, TOTAL Left 04/2016    lung cancer left upper lobe    PNEUMONECTOMY Left 4/12/2016    Performed by Nemesio Wall MD at [facility] OR    SKIN BIOPSY Left     arm    THORACOTOMY Left 4/12/2016    Performed by Nemesio Wall MD at [facility] OR    ULTRASOUND, UPPER GI TRACT, ENDOSCOPIC N/A  6/11/2019    Performed by [facility]hek Knox MD at [facility] ENDO       Labs:  Lab Results   Component Value Date    WBC 4.01 08/12/2019    HGB 12.6 (L) 08/12/2019    HCT 39.6 (L) 08/12/2019    MCV 95 08/12/2019     (L) 08/12/2019     BMP  Lab Results   Component Value Date     08/12/2019    K 4.2 08/12/2019     08/12/2019    CO2 32 (H) 08/12/2019    BUN 21 08/12/2019    CREATININE 1.2 08/12/2019    CALCIUM 9.3 08/12/2019    ANIONGAP 9 08/12/2019    ESTGFRAFRICA >60 08/12/2019    EGFRNONAA 58 (A) 08/12/2019     Lab Results   Component Value Date    ALT 31 08/12/2019    AST 23 08/12/2019    ALKPHOS 76 08/12/2019    BILITOT 0.4 08/12/2019       No results found for: IRON, TIBC, FERRITIN, SATURATEDIRO  No results found for: [id number]  No results found for: FOLATE  Lab Results   Component Value Date    TSH 3.422 02/20/2012         Review of Systems   Constitutional: Positive for activity change, appetite change and fatigue. Negative for chills, diaphoresis, fever and unexpected weight change.   HENT: Positive for sore throat, trouble swallowing and voice change. Negative for congestion, dental problem, drooling, ear discharge, ear pain, facial swelling, hearing loss, mouth sores, nosebleeds, postnasal drip, rhinorrhea, sinus pressure, sneezing and tinnitus.    Eyes: Negative for photophobia, pain, discharge, redness, itching and visual disturbance.   Respiratory: Negative for apnea, cough, choking, chest tightness, shortness of breath, wheezing and stridor.    Cardiovascular: Negative for chest pain, palpitations and leg swelling.   Gastrointestinal: Negative for abdominal distention, abdominal pain, anal bleeding, blood in stool, constipation, diarrhea, nausea, rectal pain and vomiting.   Endocrine: Negative for cold intolerance, heat intolerance, polydipsia, polyphagia and polyuria.   Genitourinary: Negative for decreased urine volume, difficulty urinating, discharge, dysuria, enuresis, flank pain,  frequency, genital sores, hematuria, penile pain, penile swelling, scrotal swelling, testicular pain and urgency.   Musculoskeletal: Negative for arthralgias, back pain, gait problem, joint swelling, myalgias, neck pain and neck stiffness.   Skin: Negative for color change, pallor, rash and wound.   Allergic/Immunologic: Negative for environmental allergies, food allergies and immunocompromised state.   Neurological: Positive for weakness. Negative for dizziness, tremors, seizures, syncope, facial asymmetry, speech difficulty, light-headedness, numbness and headaches.   Hematological: Negative for adenopathy. Does not bruise/bleed easily.   Psychiatric/Behavioral: Positive for dysphoric mood. Negative for agitation, behavioral problems, confusion, decreased concentration, hallucinations, self-injury, sleep disturbance and suicidal ideas. The patient is nervous/anxious. The patient is not hyperactive.        Objective:      Physical Exam   Constitutional: He is oriented to person, place, and time. He appears well-developed and well-nourished. He appears distressed.   HENT:   Head: Normocephalic.   Right Ear: External ear normal.   Left Ear: External ear normal.   Nose: Nose normal. Right sinus exhibits no maxillary sinus tenderness and no frontal sinus tenderness. Left sinus exhibits no maxillary sinus tenderness and no frontal sinus tenderness.   Mouth/Throat: Oropharynx is clear and moist. No oropharyngeal exudate.   Eyes: Pupils are equal, round, and reactive to light. EOM and lids are normal. Right eye exhibits no discharge. Left eye exhibits no discharge. Right conjunctiva is not injected. Right conjunctiva has no hemorrhage. Left conjunctiva is not injected. Left conjunctiva has no hemorrhage. No scleral icterus. Right eye exhibits normal extraocular motion. Left eye exhibits normal extraocular motion.   Neck: Normal range of motion. Neck supple. No JVD present. No tracheal deviation present. No thyromegaly  present.   Cardiovascular: Normal rate and regular rhythm.   Pulmonary/Chest: Effort normal. No stridor. No respiratory distress.   Abdominal: Soft. He exhibits no mass. There is no hepatosplenomegaly, splenomegaly or hepatomegaly. There is no tenderness.   Musculoskeletal: Normal range of motion. He exhibits no edema or tenderness.   Lymphadenopathy:        Head (right side): No posterior auricular and no occipital adenopathy present.        Head (left side): No posterior auricular and no occipital adenopathy present.     He has no cervical adenopathy.        Right cervical: No superficial cervical, no deep cervical and no posterior cervical adenopathy present.       Left cervical: No superficial cervical, no deep cervical and no posterior cervical adenopathy present.     He has no axillary adenopathy.        Right: No supraclavicular adenopathy present.        Left: No supraclavicular adenopathy present.   Neurological: He is alert and oriented to person, place, and time. He has normal strength. No cranial nerve deficit. Coordination normal.   Skin: Skin is dry. No rash noted. He is not diaphoretic. No cyanosis or erythema. Nails show no clubbing.   Psychiatric: He has a normal mood and affect. His behavior is normal. Judgment and thought content normal. Cognition and memory are normal.   Vitals reviewed.          Assessment:      1. Malignant neoplasm of upper lobe of left lung           Plan:     Hyper pharyngeal carcinoma patient will be treated with concurrent cisplatin today final cycle 2 additional doses of radiation.  Continue follow-up in 2 weeks to recheck with CBC CMP and magnesium for recovery of counts status post consult occlusion of therapy orders written reviewed        Kyler Garcia Jr, MD FACP

## 2019-08-12 NOTE — PLAN OF CARE
Problem: Adult Inpatient Plan of Care  Goal: Plan of Care Review  Outcome: Ongoing (interventions implemented as appropriate)  Pt states tired, weak, SOB.  Today is last chemo tx.  Has 2 more R/T txs.

## 2019-08-12 NOTE — DISCHARGE INSTRUCTIONS
[facility]  [address]  [address] [address]  [phone] phone     [phone] fax  Hours of Operation: Monday- Friday 8:00am- 5:00pm  After hours phone  [phone]  Hematology / Oncology Physicians on call      Dr. Jose Weaver                        Please call with any concerns regarding your appointment today.      HOME CARE AFTER CHEMOTHERAPY   Meals   Many patients feel sick and lose their appetites during treatment. Eat small meals several times a day. Choose bland foods with little taste or smell if you have problems with nausea. Be sure to cook all food thoroughly. This kills bacteria and helps you avoid intestinal infection. Soft foods are easier to swallow and digest.   Activity   Exercise keeps you strong and keeps your heart and lungs active. Talk to your doctor about an appropriate exercise program for you.   Skin Care   To prevent a skin infection, bathe or shower once a day. Use a moisturizing soap and wash with warm water. Avoid very hot or cold water. Chemotherapy can make your skin dry . Apply moisturizing lotion to help relieve dry skin. Some drugs used in high doses can cause slight burns to appear (like sunburn). Ask for a special cream to help relieve the burn and protect your skin.   Prevent Mouth Sores   During chemotherapy, many people get mouth sores. Do the following to help prevent mouth sores or to ease discomfort.   Brush your teeth with a soft-bristle toothbrush after every meal.  Don't use dental floss if your platelet count is below 50,000. Your doctor or nurse will tell you if this is the case.  Use an oral swab or special soft toothbrush if your gums bleed during regular brushing.  Use mouthwash as directed. If you can't tolerate commercial mouthwash, use salt and baking soda to clean your mouth. Mix 1 teaspoon of salt and 1 teaspoon of baking soda into a glass of water. Swish and spit.  Call your  doctor or return to this facility if you develop any of the following:   Sore throat   White patches in the mouth or throat   Fever of 100.4ºF (38ºC) or higher, or as directed by your healthcare provider  © 2266-7877 [employer], [address] [place] [address], [place] [address]. All rights reserved. This information is not intended as a substitute for professional medical care. Always follow your healthcare professional's       WAYS TO HELP PREVENT INFECTION         WASH YOUR HANDS OFTEN DURING THE DAY, ESPECIALLY BEFORE YOU EAT, AFTER USING THE BATHROOM, AND AFTER TOUCHING ANIMALS     STAY AWAY FROM PEOPLE WHO HAVE ILLNESSES YOU CAN CATCH; SUCH AS COLDS, FLU, CHICKEN POX     TRY TO AVOID CROWDS     STAY AWAY FROM CHILDREN WHO RECENTLY HAVE RECEIVED LIVE VIRUS VACCINES     MAINTAIN GOOD MOUTH CARE     DO NOT SQUEEZE OR SCRATCH PIMPLES     CLEAN CUTS & SCRAPES RIGHT AWAY AND DAILY UNTIL HEALED WITH WARM WATER, SOAP & AN ANTISEPTIC     AVOID CONTACT WITH LITTER BOXES, BIRD CAGES, & FISH TANKS     AVOID STANDING WATER, IE., BIRD BATHS, FLOWER POTS/VASES, OR HUMIDIFIERS     WEAR GLOVES WHEN GARDENING OR CLEANING UP AFTER OTHERS, ESPECIALLY BABIES & SMALL CHILDREN      YOU HAVE STARTED ON CHEMOTHERAPY. IF YOU EXPERIENCE ANY OF THE FOLLOWING PROBLEMS, CALL THE OFFICE IMMEDIATELY.    *FEVER .0 OR GREATER    *CHILLS, ESPECIALLY SHAKING CHILLS, OR SWEATING    *A SEVERE COUGH OR SORE THROAT, OR SINUS PAIN/     PRESSURE    *REDNESS, SWELLING, OR TENDERNESS AROUND A WOUND,     SORE, PIMPLE, RECTAL AREA, OR IV SITE    *SORES OR ULCERS IN THE MOUTH    *BLISTERS ON THE LIPS OR SKIN    *FREQUENT URGENCY TO URINATE OR A BURNING FEELING   WHEN YOU URINATE    *BLOOD IN THE URINE OR STOOL    *ANY UNEXPLAINED BRUISING OR PROLONGED BLEEDING,     (NOSEBLEEDS OR BLEEDING GUMS)    *LOOSE BOWEL MOVEMENTS THAT DO NOT RESPOND TO     IMODIUM OR MORE THAN THREE TIMES A DAY    *VOMITING UNRESPONSIVE TO ANTINAUSEA MEDICINE    *ANY  UNUSUAL PHYSICAL SYMPTOMS THAT BEGAN AFTER     CHEMOTHERAPY    DURING WEEKDAYS, CALL AND ASK TO SPEAK DIRECTLY TO A NURSE.  AT OTHER TIMES, CALL THE OFFICE PHONE NUMBER; THE ANSWERING SERVICE WILL CONTACT THE ON-CALL PHYSICIAN.  SOMEONE IS AVAILABLE 24 HOURS A DAY, SEVEN DAYS A WEEK.    FALL PREVENTION   Falls often occur due to slipping, tripping or losing your balance. Here are ways to reduce your risk of falling again.   Was there anything that caused your fall that can be fixed, removed or replaced?   Make your home safe by keeping walkways clear of objects you may trip over.   Use non-slip pads under rugs.   Do not walk in poorly lit areas.   Do not stand on chairs or wobbly ladders.   Use caution when reaching overhead or looking upward. This position can cause a loss of balance.   Be sure your shoes fit properly, have non-slip bottoms and are in good condition.   Be cautious when going up and down stairs, curbs, and when walking on uneven sidewalks.   If your balance is poor, consider using a cane or walker.   If your fall was related to alcohol use, stop or limit alcohol intake.   If your fall was related to use of sleeping medicines, talk to your doctor about this. You may need to reduce your dosage at bedtime if you awaken during the night to go to the bathroom.   To reduce the need for nighttime bathroom trips:   Avoid drinking fluids for several hours before going to bed   Empty your bladder before going to bed   Men can keep a urinal at the bedside   © 5276-2351 [employer] [facility], [address] [place] [address], [place] [address]. All rights reserved. This information is not intended as a substitute for professional medical care. Always follow your healthcare professional's instructions.     DO NOT EAT RAW FISH, SEAFOOD, MEAT, OR EGGS

## 2019-08-13 PROCEDURE — 77386 HC IMRT, COMPLEX: CPT | Performed by: RADIOLOGY

## 2019-08-13 PROCEDURE — 77014 HC CT GUIDANCE RADIATION THERAPY FLDS PLACEMENT: CPT | Mod: TC | Performed by: RADIOLOGY

## 2019-08-14 ENCOUNTER — DOCUMENTATION ONLY (OUTPATIENT)
Dept: RADIATION ONCOLOGY | Facility: CLINIC | Age: 79
End: 2019-08-14

## 2019-08-14 PROCEDURE — 77014 HC CT GUIDANCE RADIATION THERAPY FLDS PLACEMENT: CPT | Mod: TC | Performed by: RADIOLOGY

## 2019-08-14 PROCEDURE — 77386 HC IMRT, COMPLEX: CPT | Performed by: RADIOLOGY

## 2019-08-14 NOTE — PLAN OF CARE
Problem: Adult Inpatient Plan of Care  Goal: Plan of Care Review  Outcome: Ongoing (interventions implemented as appropriate)  Completed outpatient xrt to lung today 8-14-19. Will make f/u appt.

## 2019-08-19 ENCOUNTER — INFUSION (OUTPATIENT)
Dept: INFUSION THERAPY | Facility: HOSPITAL | Age: 79
End: 2019-08-19
Attending: INTERNAL MEDICINE
Payer: MEDICARE

## 2019-08-19 ENCOUNTER — OFFICE VISIT (OUTPATIENT)
Dept: RADIATION ONCOLOGY | Facility: CLINIC | Age: 79
End: 2019-08-19
Payer: MEDICARE

## 2019-08-19 ENCOUNTER — OFFICE VISIT (OUTPATIENT)
Dept: HEMATOLOGY/ONCOLOGY | Facility: CLINIC | Age: 79
End: 2019-08-19
Payer: MEDICARE

## 2019-08-19 VITALS
TEMPERATURE: 100 F | HEIGHT: 70 IN | SYSTOLIC BLOOD PRESSURE: 144 MMHG | DIASTOLIC BLOOD PRESSURE: 77 MMHG | RESPIRATION RATE: 18 BRPM | WEIGHT: 185.13 LBS | OXYGEN SATURATION: 98 % | HEART RATE: 78 BPM | BODY MASS INDEX: 26.5 KG/M2

## 2019-08-19 VITALS
SYSTOLIC BLOOD PRESSURE: 145 MMHG | HEART RATE: 78 BPM | OXYGEN SATURATION: 98 % | TEMPERATURE: 100 F | BODY MASS INDEX: 26.5 KG/M2 | WEIGHT: 185.13 LBS | DIASTOLIC BLOOD PRESSURE: 77 MMHG | HEIGHT: 70 IN

## 2019-08-19 DIAGNOSIS — C34.12 MALIGNANT NEOPLASM OF UPPER LOBE OF LEFT LUNG: Primary | ICD-10-CM

## 2019-08-19 DIAGNOSIS — E86.1 HYPOTENSION DUE TO HYPOVOLEMIA: ICD-10-CM

## 2019-08-19 DIAGNOSIS — Z85.118 HX OF CANCER OF LUNG: ICD-10-CM

## 2019-08-19 PROBLEM — I95.89 HYPOTENSION DUE TO HYPOVOLEMIA: Status: ACTIVE | Noted: 2019-08-19

## 2019-08-19 PROCEDURE — 99999 PR PBB SHADOW E&M-EST. PATIENT-LVL IV: ICD-10-PCS | Mod: PBBFAC,,, | Performed by: RADIOLOGY

## 2019-08-19 PROCEDURE — 96365 THER/PROPH/DIAG IV INF INIT: CPT

## 2019-08-19 PROCEDURE — 1101F PT FALLS ASSESS-DOCD LE1/YR: CPT | Mod: CPTII,S$GLB,, | Performed by: INTERNAL MEDICINE

## 2019-08-19 PROCEDURE — 99214 OFFICE O/P EST MOD 30 MIN: CPT | Mod: S$GLB,,, | Performed by: RADIOLOGY

## 2019-08-19 PROCEDURE — 3077F SYST BP >= 140 MM HG: CPT | Mod: CPTII,S$GLB,, | Performed by: RADIOLOGY

## 2019-08-19 PROCEDURE — 3077F PR MOST RECENT SYSTOLIC BLOOD PRESSURE >= 140 MM HG: ICD-10-PCS | Mod: CPTII,S$GLB,, | Performed by: INTERNAL MEDICINE

## 2019-08-19 PROCEDURE — 1101F PR PT FALLS ASSESS DOC 0-1 FALLS W/OUT INJ PAST YR: ICD-10-PCS | Mod: CPTII,S$GLB,, | Performed by: RADIOLOGY

## 2019-08-19 PROCEDURE — 99214 PR OFFICE/OUTPT VISIT, EST, LEVL IV, 30-39 MIN: ICD-10-PCS | Mod: S$GLB,,, | Performed by: RADIOLOGY

## 2019-08-19 PROCEDURE — 63600175 PHARM REV CODE 636 W HCPCS: Performed by: INTERNAL MEDICINE

## 2019-08-19 PROCEDURE — 3078F PR MOST RECENT DIASTOLIC BLOOD PRESSURE < 80 MM HG: ICD-10-PCS | Mod: CPTII,S$GLB,, | Performed by: INTERNAL MEDICINE

## 2019-08-19 PROCEDURE — 1101F PR PT FALLS ASSESS DOC 0-1 FALLS W/OUT INJ PAST YR: ICD-10-PCS | Mod: CPTII,S$GLB,, | Performed by: INTERNAL MEDICINE

## 2019-08-19 PROCEDURE — 99999 PR PBB SHADOW E&M-EST. PATIENT-LVL III: ICD-10-PCS | Mod: PBBFAC,,, | Performed by: INTERNAL MEDICINE

## 2019-08-19 PROCEDURE — 3078F DIAST BP <80 MM HG: CPT | Mod: CPTII,S$GLB,, | Performed by: RADIOLOGY

## 2019-08-19 PROCEDURE — 99214 PR OFFICE/OUTPT VISIT, EST, LEVL IV, 30-39 MIN: ICD-10-PCS | Mod: 25,S$GLB,, | Performed by: INTERNAL MEDICINE

## 2019-08-19 PROCEDURE — 99214 OFFICE O/P EST MOD 30 MIN: CPT | Mod: 25,S$GLB,, | Performed by: INTERNAL MEDICINE

## 2019-08-19 PROCEDURE — 3078F DIAST BP <80 MM HG: CPT | Mod: CPTII,S$GLB,, | Performed by: INTERNAL MEDICINE

## 2019-08-19 PROCEDURE — 3078F PR MOST RECENT DIASTOLIC BLOOD PRESSURE < 80 MM HG: ICD-10-PCS | Mod: CPTII,S$GLB,, | Performed by: RADIOLOGY

## 2019-08-19 PROCEDURE — 99999 PR PBB SHADOW E&M-EST. PATIENT-LVL III: CPT | Mod: PBBFAC,,, | Performed by: INTERNAL MEDICINE

## 2019-08-19 PROCEDURE — 99999 PR PBB SHADOW E&M-EST. PATIENT-LVL IV: CPT | Mod: PBBFAC,,, | Performed by: RADIOLOGY

## 2019-08-19 PROCEDURE — 3077F PR MOST RECENT SYSTOLIC BLOOD PRESSURE >= 140 MM HG: ICD-10-PCS | Mod: CPTII,S$GLB,, | Performed by: RADIOLOGY

## 2019-08-19 PROCEDURE — 3077F SYST BP >= 140 MM HG: CPT | Mod: CPTII,S$GLB,, | Performed by: INTERNAL MEDICINE

## 2019-08-19 PROCEDURE — 1101F PT FALLS ASSESS-DOCD LE1/YR: CPT | Mod: CPTII,S$GLB,, | Performed by: RADIOLOGY

## 2019-08-19 PROCEDURE — 77336 RADIATION PHYSICS CONSULT: CPT | Performed by: RADIOLOGY

## 2019-08-19 RX ORDER — HEPARIN 100 UNIT/ML
500 SYRINGE INTRAVENOUS
Status: CANCELLED | OUTPATIENT
Start: 2019-08-19

## 2019-08-19 RX ORDER — HEPARIN 100 UNIT/ML
500 SYRINGE INTRAVENOUS
Status: DISCONTINUED | OUTPATIENT
Start: 2019-08-19 | End: 2019-08-19 | Stop reason: HOSPADM

## 2019-08-19 RX ORDER — MAGNESIUM SULFATE 1 G/100ML
2 INJECTION INTRAVENOUS
Status: COMPLETED | OUTPATIENT
Start: 2019-08-19 | End: 2019-08-19

## 2019-08-19 RX ORDER — SODIUM CHLORIDE 0.9 % (FLUSH) 0.9 %
10 SYRINGE (ML) INJECTION
Status: DISCONTINUED | OUTPATIENT
Start: 2019-08-19 | End: 2019-08-19 | Stop reason: HOSPADM

## 2019-08-19 RX ORDER — SODIUM CHLORIDE 9 MG/ML
1000 INJECTION, SOLUTION INTRAVENOUS
Status: COMPLETED | OUTPATIENT
Start: 2019-08-19 | End: 2019-08-19

## 2019-08-19 RX ORDER — LORAZEPAM/0.9% SODIUM CHLORIDE 100MG/0.1L
2 PLASTIC BAG, INJECTION (ML) INTRAVENOUS
Status: CANCELLED | OUTPATIENT
Start: 2019-08-19

## 2019-08-19 RX ORDER — SODIUM CHLORIDE 0.9 % (FLUSH) 0.9 %
10 SYRINGE (ML) INJECTION
Status: CANCELLED | OUTPATIENT
Start: 2019-08-19

## 2019-08-19 RX ADMIN — MAGNESIUM SULFATE HEPTAHYDRATE 2 G: 1 INJECTION, SOLUTION INTRAVENOUS at 04:08

## 2019-08-19 RX ADMIN — Medication 500 UNITS: at 05:08

## 2019-08-19 RX ADMIN — SODIUM CHLORIDE 1000 ML: 0.9 INJECTION, SOLUTION INTRAVENOUS at 04:08

## 2019-08-19 NOTE — DISCHARGE INSTRUCTIONS
"[facility]  [address] [address]  or  [address]  [phone] phone     [phone] fax  Hours of Operation: Monday- Friday 8:00am- 5:00pm  After hours phone  [phone]  Hematology / Oncology Physicians on call      Dr. Jose Vila      Please call with any concerns regarding your appointment today.    Support Groups/Classes    Support groups and classes are being offered at the   [facility] and [facility]!!    "Cooking with Cancer" (Nutrition Class):  Second Wednesday of each month   at noon at the [facility].  Metastatic Support Group:  Third Tuesday of each month   at noon at the [facility].  Next Steps Class/Group: Second and fourth Thursday of each month at noon at the [facility].  [facility] (Breast Cancer Support Group): First Tuesday of each month   at 5:30pm at the [facility] location.  [employer]: [facility]: Second and third Tuesday of each month from 7:30am - 2pm.  [facility]: First and fourth Tuesday of each month from 7:30am - 2pm    If you are interested in attending or would like more information please ask our social workers or your nurse!  Dehydration (Adult)  Dehydration occurs when your body loses too much fluid. This may be the result of prolonged vomiting or diarrhea, excessive sweating, or a high fever. It may also happen if you dont drink enough fluid when youre sick or out in the heat. Misuse of diuretics (water pills) can also be a cause.  Symptoms include thirst and decreased urine output. You may also feel dizzy, weak, fatigued, or very drowsy. The diet described below is usually enough to treat dehydration. In some cases, you may need medicine.  Home care  · Drink at least 12 8-ounce glasses of fluid every day to resolve the dehydration. Fluid may include water; orange juice; lemonade; apple, grape, or cranberry juice; clear fruit drinks; electrolyte " replacement and sports drinks; and teas and coffee without caffeine. If you have been diagnosed with a kidney disease, ask your doctor how much and what types of fluids you should drink to prevent dehydration. If you have kidney disease, fluid can build up in the body. This can be dangerous to your health.  · If you have a fever, muscle aches, or a headache as a result of a cold or flu, you may take acetaminophen or ibuprofen, unless another medicine was prescribed. If you have chronic liver or kidney disease, or have ever had a stomach ulcer or gastrointestinal bleeding, talk with your health care provider before using these medicines. Don't take aspirin if you are younger than 18 and have a fever. Aspirin raises the chance for severe liver injury.  Follow-up care  Follow up with your health care provider, or as advised.  When to seek medical advice  Call your health care provider right away if any of these occur:  · Continued vomiting  · Frequent diarrhea (more than 5 times a day); blood (red or black color) or mucus in diarrhea  · Blood in vomit or stool  · Swollen abdomen or increasing abdominal pain  · Weakness, dizziness, or fainting  · Unusual drowsiness or confusion  · Reduced urine output or extreme thirst  · Fever of 100.4°F (34°C) or higher  Date Last Reviewed: 5/31/2015  © 3535-7507 [employer]. [address] [place] [address], [place] [address]. All rights reserved. This information is not intended as a substitute for professional medical care. Always follow your healthcare professional's instructions.

## 2019-08-19 NOTE — PROGRESS NOTES
Subjective:       Patient ID: Chai Murry is a 79 y.o. male.    Chief Complaint: Malignant neoplasm of upper lobe of left lung [C34.12]  HPI: We have an opportunity to see Mr. Chai Murry in Hematology Oncology clinic at [facility] on 08/19/2019.  Mr. Chai Murry is a 79 y.o. 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     He is s/p left pneumonectomy for a squamous cell carcinoma of the left lung.0n 4/12/2016  Prior to the surgery, the PET scan showed an FDG-avid mass in the left upper   lobe abutting the left hilum with an SUV of 11.6. There seemed to be a left   hilar adenopathy as well.  Radiologist felt that he was unable to discriminate between the mass and the   lymphadenopathy. The patient had had a bronchoscopy by Dr. Roel Ernandez on   03/04/2006 that showed a partially obstructing airway in the anterior segment of  the left upper lobe with an endobronchial lesion in the anterior segment of the  left upper lobe. The specimen from the biopsy at the time of bronchoscopy was   reported as being a squamous cell carcinoma.  At the time of the surgery after the left lung was removed, the pathologist   indicated that this was a squamous cell carcinoma. It measured 3.8 cm. The   pathology indicated that this is extending into the bronchial resection margin of the lobe. This lobe was later on removed to complete the pneumonectomy   There was one lymph node positive for metastatic squamous cell carcinoma at the   AP window.  The patient was told that we would prefer to treat him with post-op simultaneous chemo/radiation.  He had Medi-port. He started chemo/radiation therapy andreceived 6 weekly cycles of carbo/Taxol along with radiatioon.  After a month, he had a Ct scan and it showed stability   He then had 2 additional cyles of carbo/Taxol finishing in 9/27/2016.    He comes for follow up.   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 CT of the chest was non contributory, shows changes from surgery  PET showed a PET avid mass to the left of the trachea.  The case was discussed with dr annika Goldman and GI.  We discussed the possibility of doing a EUS or EBUs, and finally, because of the localization, it was decided to do a EUS with biopsy if possible.  Cytology shows recurrent squamous cell cancer.  I have asked Pathology to run a PD-L1 from his original pathology from 2016.  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  He has been discussed extensively with radiation oncology. We have decided to treat him with radiation therapy and Cisplatin as a chemo-sensitizer.  Dr Castro has reviewed the therapy ports from the previous radiation treatment and the area in question seems to be above the previously radiated fields.     Recently completed chemoradiation s/p 6 weekly cisplatin treatments.  Has tolerable throat soreness. Feels weak and dizzy.       Malignant neoplasm of upper lobe of left lung    4/12/2016 Initial Diagnosis     Malignant neoplasm of upper lobe of left lung         6/19/2019 Cancer Staged     Staging form: Lung, AJCC 8th Edition  - Clinical stage from 6/19/2019: Stage IIIB (rcT4, cN2, cM0) - Signed by Alejandro Castro II, MD on 6/19/2019 6/21/2019 Tumor Conference     Presenting Hospital / Clinic: [facility]  Virtual Tumor Board Conference: In person  Presenter: Dr. Chance Castro  Date Presented to Tumor Board: 06/21/19  Specialties Present: Medical Oncology;Radiation Oncology;Surgical Oncology;Pathology;Navigation;Plastic Surgery;Radiology;Gastrointestinal;Pulmonology  Presentation at Cancer Conference: Prospective  Cancer Type: Lung cancer  Recommended Plan: Additional screening  Recommended Plan Note: If PDL-1 positive, treat with immunotherapy  Send tissue for next generation sequencing.         6/28/2019 Tumor Conference     His case  was discussed at the Multidisciplinary Head and Neck Team Planning Meeting.    Representatives from Medical Oncology, Radiation Oncology, Head and Neck Surgical Oncology, Psychosocial Oncology, and Speech and Language Pathology discussed the case with the following recommendations:    1) treat lung cancer                7/5/2019 - 8/14/2019 Radiation Therapy     Treatment Site Ref. ID Energy Dose/Fx (Gy) #Fx Dose Correction (Gy) Total Dose (Gy) Start Date End Date Elapsed Days   XczpoYEBH97.4:1 PTV LNs 6X 1.8 28 / 28 0 50.4 7/5/2019 8/14/2019 40                Lung cancer    6/11/2019 Initial Diagnosis     Lung cancer         7/8/2019 -  Chemotherapy     Treatment Summary   Plan Name: OP HEAD NECK CISPLATIN WEEKLY + RADIOTHERAPY  Treatment Goal: Supportive  Status: Active  Start Date: 7/8/2019  End Date: 8/12/2019  Provider: Jorge L Patel MD  Chemotherapy: CISplatin (PLATINOL) 40 mg/m2 = 83 mg in sodium chloride 0.9% 583 mL chemo infusion, 40 mg/m2 = 83 mg (100 % of original dose 40 mg/m2), Intravenous, Clinic/HOD 1 time, 6 of 6 cycles  Dose modification: 70 mg (original dose 40 mg/m2, Cycle 1, Reason: MD Discretion), 40 mg/m2 (original dose 40 mg/m2, Cycle 1)  Administration: 83 mg (7/8/2019), 84 mg (7/15/2019), 83 mg (7/22/2019), 83 mg (7/29/2019), 83 mg (8/5/2019), 82 mg (8/12/2019)          Past Medical History:   Diagnosis Date    Anemia     Arthritis     Atrial fibrillation     CAD (coronary artery disease)     Cancer     lung cancer-Left    Cataract     COPD (chronic obstructive pulmonary disease)     Diverticulosis     ED (erectile dysfunction)     HTN (hypertension)     Hyperlipidemia     Myocardial infarction     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 Courtney dwyer bx     Family History   Problem Relation Age of Onset    Esophageal cancer Sister     Cancer Sister     Lung cancer Mother     Cancer Mother     Cataracts Mother     Hypertension  Mother     Pneumonia Father     Cataracts Father     Hypertension Father     Cancer Brother     Hypertension Brother     Blindness Neg Hx     Diabetes Neg Hx     Glaucoma Neg Hx     Macular degeneration Neg Hx     Retinal detachment Neg Hx     Strabismus Neg Hx     Stroke Neg Hx     Thyroid disease Neg Hx      Social History     Socioeconomic History    Marital status:      Spouse name: Not on file    Number of children: 3    Years of education: Not on file    Highest education level: Not on file   Occupational History    Occupation: retired   Social Needs    Financial resource strain: Not on file    Food insecurity:     Worry: Not on file     Inability: Not on file    Transportation needs:     Medical: Not on file     Non-medical: Not on file   Tobacco Use    Smoking status: Former Smoker     Packs/day: 1.00     Years: 50.00     Pack years: 50.00     Last attempt to quit: 2005     Years since quittin.0    Smokeless tobacco: Never Used   Substance and Sexual Activity    Alcohol use: No    Drug use: No    Sexual activity: Not Currently   Lifestyle    Physical activity:     Days per week: Not on file     Minutes per session: Not on file    Stress: Not on file   Relationships    Social connections:     Talks on phone: Not on file     Gets together: Not on file     Attends Bahai service: Not on file     Active member of club or organization: Not on file     Attends meetings of clubs or organizations: Not on file     Relationship status: Not on file   Other Topics Concern    Not on file   Social History Narrative    Not on file     Past Surgical History:   Procedure Laterality Date    APPENDECTOMY      Bronchoscopy Bilateral 2019    Performed by [facility]resh Goldman [facility] at [facility] ENDO    BRONCHOSCOPY- insert lighted tube into airway to obtain biopsy of lung Bilateral 3/4/2016    Performed by Roel Ernandez MD at [facility] ENDO    CARDIAC CATHETERIZATION      cardiac  stents      CATARACT EXTRACTION W/  INTRAOCULAR LENS IMPLANT Right 9-3-14    CHOLECYSTECTOMY      COLONOSCOPY N/A 10/25/2018    Performed by [facility]ed MD at [facility] ENDO    COLONOSCOPY N/A 4/17/2018    Performed by Dionne Doan MD at [facility] ENDO    Colonoscopy N/A 8/12/2014    Performed by Eriberto Simpson MD at [facility] ENDO    EGD (ESOPHAGOGASTRODUODENOSCOPY) N/A 6/11/2019    [facility]k Knox [facility] at [facility]    infected stomach gland excision      INSERTION-PORT Left 5/26/2016    Performed by Nemesio Wall MD at [facility] OR    NVPLMYIAQ-HUFS-Z-CATH Right 7/3/2019    Performed by Jean Carlos Constantino MD at [facility] OR    LOBECTOMY-LUNG Left 4/12/2016    Performed by Nem[facility]io Wall MD at [facility] OR    LUNG REMOVAL, TOTAL Left 04/2016    lung cancer left upper lobe    PNEUMONECTOMY Left 4/12/2016    Performed by Nemesio Wall MD at [facility]    SKIN BIOPSY Left     arm    THORACOTOMY Left 4/12/2016    Performed by Nem[facility]io Wall MD at [facility] OR    ULTRASOUND, UPPER GI TRACT, ENDOSCOPIC N/A 6/11/2019    Performed by Ab[facility]hek Knox MD at [facility]     Current Outpatient Medications   Medication Sig Dispense Refill    (Magic mouthwash) 1:1:1 Benadryl 12.5mg/5ml liq, aluminum & magnesium hydroxide-simehticone (Maalox), lidocaine viscous 2% Swish and spit 15 mLs every 4 (four) hours as needed. for mouth sores 90 mL 2    albuterol (PROVENTIL) 2.5 mg /3 mL (0.083 %) nebulizer solution Take 3 mLs (2.5 mg total) by nebulization every 6 (six) hours while awake. (Patient taking differently: Take 2.5 mg by nebulization as needed. ) 270 mL 11    amLODIPine (NORVASC) 5 MG tablet Take 2 tablets (10 mg total) by mouth every evening. (Patient taking differently: Take 5 mg by mouth once daily. ) 60 tablet 0    aspirin (ECOTRIN) 81 MG EC tablet Take 81 mg by mouth once daily.      BREO ELLIPTA 100-25 mcg/dose diskus inhaler INHALE 1 PUFF INTO THE LUNGS ONCE DAILY  5    doxepin (SINEQUAN) 10 MG  capsule TAKE 1 CAPSULE (10 MG TOTAL) BY MOUTH EVERY EVENING. 30 capsule 10    fenofibric acid (FIBRICOR) 135 mg CpDR TAKE 1 CAPSULE BY MOUTH EVERY DAY 30 capsule 11    fluticasone (FLONASE) 50 mcg/actuation nasal spray 2 sprays (100 mcg total) by Each Nare route once daily. (Patient taking differently: 2 sprays by Each Nare route as needed. ) 3 Bottle 3    glycopyrrolate-formoterol (BEVESPI AEROSPHERE) 9-4.8 mcg HFAA Inhale 2 puffs into the lungs 2 (two) times daily. Controller 10.9 g 11    HYDROcodone-acetaminophen (NORCO) 5-325 mg per tablet Take 1 tablet by mouth every 6 (six) hours as needed for Pain. 40 tablet 0    ipratropium-albuterol (COMBIVENT RESPIMAT)  mcg/actuation inhaler Inhale 1 puff into the lungs every 6 (six) hours as needed for Shortness of Breath. 4 g 11    levocetirizine (XYZAL) 5 MG tablet Take 5 mg by mouth as needed.       lisinopril (PRINIVIL,ZESTRIL) 40 MG tablet TAKE 1 TABLET BY MOUTH DAILY 30 tablet 11    ondansetron (ZOFRAN) 4 MG tablet Take 1 tablet (4 mg total) by mouth every 12 (twelve) hours as needed for Nausea. 30 tablet 1    simvastatin (ZOCOR) 40 MG tablet Take 1 tablet (40 mg total) by mouth every evening. 30 tablet 11    SOTALOL AF 80 mg tablet TAKE 1 TABLET BY MOUTH TWICE A DAY 60 tablet 10     Current Facility-Administered Medications   Medication Dose Route Frequency Provider Last Rate Last Dose    testosterone cypionate injection 200 mg  200 mg Intramuscular Q21 Days Peter Teixeira MD   200 mg at 08/01/19 0841     Facility-Administered Medications Ordered in Other Visits   Medication Dose Route Frequency Provider Last Rate Last Dose    0.9%  NaCl infusion  1,000 mL Intravenous 1 time in Clinic[facility] Fermin Vila MD   1,000 mL at 08/19/19 1605    heparin, porcine (PF) 100 unit/mL injection flush 500 Units  500 Units Intravenous PRN Fermin Vila MD        lactated ringers infusion   Intravenous Continuous Michael Hameed MD   Stopped at 07/03/19 0810     magnesium sulfate 2 g/50 ml IVPB  2 g Intravenous 1 time in Clinic/HOD Fermin Vila MD        sodium chloride 0.9% flush 10 mL  10 mL Intravenous PRN Fermin Vila MD           Labs:  Lab Results   Component Value Date    WBC 4.01 08/12/2019    HGB 12.6 (L) 08/12/2019    HCT 39.6 (L) 08/12/2019    MCV 95 08/12/2019     (L) 08/12/2019     BMP  Lab Results   Component Value Date     08/12/2019    K 4.2 08/12/2019     08/12/2019    CO2 32 (H) 08/12/2019    BUN 21 08/12/2019    CREATININE 1.2 08/12/2019    CALCIUM 9.3 08/12/2019    ANIONGAP 9 08/12/2019    ESTGFRAFRICA >60 08/12/2019    EGFRNONAA 58 (A) 08/12/2019     Lab Results   Component Value Date    ALT 31 08/12/2019    AST 23 08/12/2019    ALKPHOS 76 08/12/2019    BILITOT 0.4 08/12/2019       No results found for: IRON, TIBC, FERRITIN, SATURATEDIRO  No results found for: [id number]  No results found for: FOLATE  Lab Results   Component Value Date    TSH 3.422 02/20/2012       I have reviewed the radiology reports and examined the scan/xray images.    Review of Systems   Constitutional: Positive for activity change and fatigue.   HENT: Negative.    Eyes: Negative.    Respiratory: Negative.    Cardiovascular: Negative.    Gastrointestinal: Negative.    Endocrine: Negative.    Genitourinary: Negative.    Musculoskeletal: Negative.    Skin: Negative.    Allergic/Immunologic: Negative.    Neurological: Negative.    Hematological: Negative.    Psychiatric/Behavioral: Negative.      ECOG SCORE              Objective:     Vitals:    08/19/19 1538   BP: (!) 145/77   Pulse: 78   Temp: 99.7 °F (37.6 °C)   Body mass index is 26.56 kg/m².  Physical Exam   Constitutional: He is oriented to person, place, and time. He appears well-developed and well-nourished.   HENT:   Head: Normocephalic and atraumatic.   Eyes: Conjunctivae and EOM are normal.   Neck: Normal range of motion. Neck supple.   Cardiovascular: Normal rate and regular rhythm.    Pulmonary/Chest: Effort normal and breath sounds normal.   Abdominal: Soft. Bowel sounds are normal.   Musculoskeletal: Normal range of motion.   Neurological: He is alert and oriented to person, place, and time.   Skin: Skin is warm and dry.   Psychiatric: He has a normal mood and affect. His behavior is normal. Judgment and thought content normal.   Nursing note and vitals reviewed.        Assessment:      1. Malignant neoplasm of upper lobe of left lung           Plan:     Malignant neoplasm of upper lobe of left lung  Will give daily iv fluids this week with NS 1 liter.  Give 2 gm magnesium too today.  -     CBC auto differential; Future; Expected date: 08/20/2019  -     Comprehensive metabolic panel; Future; Expected date: 08/20/2019  -     Magnesium; Future; Expected date: 08/20/2019

## 2019-08-19 NOTE — PLAN OF CARE
Problem: Adult Inpatient Plan of Care  Goal: Plan of Care Review  Outcome: Ongoing (interventions implemented as appropriate)  Pt stated had diarrhea for a couple of days and feeling weak today .

## 2019-08-19 NOTE — PROGRESS NOTES
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 16 left pneumonectomy Dr. Wall returned moderately differentiated 3.8 cm squamous cell carcinoma with a positive AP window lymph node. A total of 3 nodes were sampled. Indeterminate lymph vascular invasion was seen There was a positive bronchial resection margin but in my discussion with Dr. Garza, the margin was apparently cleared in discussion with the surgeon.   2. He completed adjuvant radiation at [facility] and weekly carboplatin paclitaxel x 6.   3. 19 CT chest, abdomen and pelvis showed slight growth in a soft tissue nodule in the superior mediastinum since , and the nodule was not present in 2016. An additional subcentimeter short axis lymph node in the right paratracheal space was also slightly enlarged. There was also nodular mucosal thickening along the posterior lateral left intrathoracic tracheal airway adherent secretions versus a polypoid lesion.   4. 5/15/19 PET showed 12 SUV along the left and posterior trachea with soft tissue invasion of the left side of the trachea just above T1. It was unclear if this was a lymph node extrinsic to the trachea versus a mass in the trachea or esophagus. There was also a left paratracheal lymph node 6.4 SUV.   5.  19 EUS showed esophagitis in the upper 3rd esophagus. There was a moderate intrinsic stenosis in the upper 3rd of the esophagus. There was an irregular mediastinal mass 25 cm from the incisors measuring 2.5 x 3 cm cross-sectional. Pathology returned poorly differentiated carcinoma, favor squamous cell carcinoma.   6. 19 bronchoscopy left local cord paralysis, non obstructing mass at the orifice in the subglottic area,  "endobronchial, friable, infiltrative and ulcerative. Pathology squamous cell carcinoma.   7. 8/14/19 completed 50.4 Gy radiation with cisplatin mediastinal and tracheal recurrence     CURRENT STATUS: Chai Murry is a pleasant 79 y.o. male who presents today for follow-up.  He presents today with a complaint of weakness.  He tells me he gets dizzy every time he stands up for the past couple of days.  He also has very low energy and even small tasks such as helping with the dishes are difficult.  He has been drinking plenty of fluids.  I reviewed his labs from last week.  His magnesium was slightly low and his hemoglobin was 12.6.  He denies any new respiratory problems, shortness of breath at rest, dyspnea on exertion, abdominal pain, headache.    PHYSICAL EXAM:   Constitutional: well appearing, no acute distress, ECOG 1 - Ambulates, capable of light work  Vitals:    BP (!) 144/77   Pulse 78   Temp 99.7 °F (37.6 °C)   Resp 18   Ht 5' 10" (1.778 m)   Wt 84 kg (185 lb 1.6 oz)   SpO2 98%   BMI 26.56 kg/m²   Cardiovascular: regular rate, no murmurs, no edema of the upper or lower extremities, radial pulse 2+, on standing he does become dizzy but does not have syncope  Respiratory: unlabored effort, clear to auscultation, no wheezes      Laboratory & X-Ray Findings: Per above.      ASSESSMENT:  Probably orthostatic hypotension, also low magnesium last week    PLAN:  I think he is a bit dehydrated from several weeks of treatment.  I discussed the case with Dr. Vila and I appreciate him seeing the patient today.  He may administer fluids and possibly electrolytes.  I will see him as previously scheduled in about 1 month.    HERRERA Castro M.D.  Radiation Oncology  [facility]  [id number] [place] [address] [place] [address]  Ph: [phone]  [email or web address]      "

## 2019-08-20 ENCOUNTER — INFUSION (OUTPATIENT)
Dept: INFUSION THERAPY | Facility: HOSPITAL | Age: 79
End: 2019-08-20
Attending: INTERNAL MEDICINE
Payer: MEDICARE

## 2019-08-20 VITALS
TEMPERATURE: 98 F | HEART RATE: 106 BPM | DIASTOLIC BLOOD PRESSURE: 69 MMHG | OXYGEN SATURATION: 94 % | RESPIRATION RATE: 18 BRPM | SYSTOLIC BLOOD PRESSURE: 116 MMHG

## 2019-08-20 DIAGNOSIS — C34.12 MALIGNANT NEOPLASM OF UPPER LOBE OF LEFT LUNG: Primary | ICD-10-CM

## 2019-08-20 DIAGNOSIS — Z85.118 HX OF CANCER OF LUNG: ICD-10-CM

## 2019-08-20 LAB
ALBUMIN SERPL BCP-MCNC: 3.4 G/DL (ref 3.5–5.2)
ALP SERPL-CCNC: 75 U/L (ref 55–135)
ALT SERPL W/O P-5'-P-CCNC: 51 U/L (ref 10–44)
ANION GAP SERPL CALC-SCNC: 10 MMOL/L (ref 8–16)
AST SERPL-CCNC: 49 U/L (ref 10–40)
BASOPHILS # BLD AUTO: 0.01 K/UL (ref 0–0.2)
BASOPHILS NFR BLD: 0.4 % (ref 0–1.9)
BILIRUB SERPL-MCNC: 0.6 MG/DL (ref 0.1–1)
BUN SERPL-MCNC: 24 MG/DL (ref 8–23)
CALCIUM SERPL-MCNC: 8.7 MG/DL (ref 8.7–10.5)
CHLORIDE SERPL-SCNC: 104 MMOL/L (ref 95–110)
CO2 SERPL-SCNC: 26 MMOL/L (ref 23–29)
CREAT SERPL-MCNC: 1.1 MG/DL (ref 0.5–1.4)
DIFFERENTIAL METHOD: ABNORMAL
EOSINOPHIL # BLD AUTO: 0.1 K/UL (ref 0–0.5)
EOSINOPHIL NFR BLD: 4.6 % (ref 0–8)
ERYTHROCYTE [DISTWIDTH] IN BLOOD BY AUTOMATED COUNT: 14.7 % (ref 11.5–14.5)
EST. GFR  (AFRICAN AMERICAN): >60 ML/MIN/1.73 M^2
EST. GFR  (NON AFRICAN AMERICAN): >60 ML/MIN/1.73 M^2
GLUCOSE SERPL-MCNC: 113 MG/DL (ref 70–110)
HCT VFR BLD AUTO: 32 % (ref 40–54)
HGB BLD-MCNC: 10.6 G/DL (ref 14–18)
IMM GRANULOCYTES # BLD AUTO: 0.01 K/UL (ref 0–0.04)
IMM GRANULOCYTES NFR BLD AUTO: 0.4 % (ref 0–0.5)
LYMPHOCYTES # BLD AUTO: 0.4 K/UL (ref 1–4.8)
LYMPHOCYTES NFR BLD: 16.2 % (ref 18–48)
MAGNESIUM SERPL-MCNC: 1.5 MG/DL (ref 1.6–2.6)
MCH RBC QN AUTO: 30.3 PG (ref 27–31)
MCHC RBC AUTO-ENTMCNC: 33.1 G/DL (ref 32–36)
MCV RBC AUTO: 91 FL (ref 82–98)
MONOCYTES # BLD AUTO: 0.3 K/UL (ref 0.3–1)
MONOCYTES NFR BLD: 10.8 % (ref 4–15)
NEUTROPHILS # BLD AUTO: 1.8 K/UL (ref 1.8–7.7)
NEUTROPHILS NFR BLD: 68 % (ref 38–73)
NRBC BLD-RTO: 0 /100 WBC
PLATELET # BLD AUTO: 95 K/UL (ref 150–350)
PMV BLD AUTO: 10.5 FL (ref 9.2–12.9)
POTASSIUM SERPL-SCNC: 4.5 MMOL/L (ref 3.5–5.1)
PROT SERPL-MCNC: 6.6 G/DL (ref 6–8.4)
RBC # BLD AUTO: 3.5 M/UL (ref 4.6–6.2)
SODIUM SERPL-SCNC: 140 MMOL/L (ref 136–145)
WBC # BLD AUTO: 2.6 K/UL (ref 3.9–12.7)

## 2019-08-20 PROCEDURE — 96361 HYDRATE IV INFUSION ADD-ON: CPT

## 2019-08-20 PROCEDURE — 80053 COMPREHEN METABOLIC PANEL: CPT

## 2019-08-20 PROCEDURE — 85025 COMPLETE CBC W/AUTO DIFF WBC: CPT

## 2019-08-20 PROCEDURE — 83735 ASSAY OF MAGNESIUM: CPT

## 2019-08-20 PROCEDURE — 63600175 PHARM REV CODE 636 W HCPCS: Performed by: INTERNAL MEDICINE

## 2019-08-20 PROCEDURE — 96360 HYDRATION IV INFUSION INIT: CPT

## 2019-08-20 RX ORDER — HEPARIN 100 UNIT/ML
500 SYRINGE INTRAVENOUS
Status: CANCELLED | OUTPATIENT
Start: 2019-08-23

## 2019-08-20 RX ORDER — SODIUM CHLORIDE 0.9 % (FLUSH) 0.9 %
10 SYRINGE (ML) INJECTION
Status: DISCONTINUED | OUTPATIENT
Start: 2019-08-20 | End: 2019-08-20 | Stop reason: HOSPADM

## 2019-08-20 RX ORDER — SODIUM CHLORIDE 0.9 % (FLUSH) 0.9 %
10 SYRINGE (ML) INJECTION
Status: CANCELLED | OUTPATIENT
Start: 2019-08-24

## 2019-08-20 RX ORDER — HEPARIN 100 UNIT/ML
500 SYRINGE INTRAVENOUS
Status: CANCELLED | OUTPATIENT
Start: 2019-08-21

## 2019-08-20 RX ORDER — SODIUM CHLORIDE 0.9 % (FLUSH) 0.9 %
10 SYRINGE (ML) INJECTION
Status: CANCELLED | OUTPATIENT
Start: 2019-08-23

## 2019-08-20 RX ORDER — HEPARIN 100 UNIT/ML
500 SYRINGE INTRAVENOUS
Status: CANCELLED | OUTPATIENT
Start: 2019-08-24

## 2019-08-20 RX ORDER — HEPARIN 100 UNIT/ML
500 SYRINGE INTRAVENOUS
Status: DISCONTINUED | OUTPATIENT
Start: 2019-08-20 | End: 2019-08-20 | Stop reason: HOSPADM

## 2019-08-20 RX ORDER — SODIUM CHLORIDE 0.9 % (FLUSH) 0.9 %
10 SYRINGE (ML) INJECTION
Status: CANCELLED | OUTPATIENT
Start: 2019-08-21

## 2019-08-20 RX ADMIN — SODIUM CHLORIDE 1000 ML: 9 INJECTION, SOLUTION INTRAVENOUS at 01:08

## 2019-08-20 RX ADMIN — HEPARIN SODIUM (PORCINE) LOCK FLUSH IV SOLN 100 UNIT/ML 500 UNITS: 100 SOLUTION at 03:08

## 2019-08-20 NOTE — DISCHARGE INSTRUCTIONS
.  [facility]  [address]  [address]  [phone] phone     [phone] fax  Hours of Operation: Monday- Friday 8:00am- 5:00pm  After hours phone  [phone]  Hematology / Oncology Physicians on call      Dr. Jose Vila    Please call with any concerns regarding your appointment today.  .FALL PREVENTION   Falls often occur due to slipping, tripping or losing your balance. Here are ways to reduce your risk of falling again.   Was there anything that caused your fall that can be fixed, removed or replaced?   Make your home safe by keeping walkways clear of objects you may trip over.   Use non-slip pads under rugs.   Do not walk in poorly lit areas.   Do not stand on chairs or wobbly ladders.   Use caution when reaching overhead or looking upward. This position can cause a loss of balance.   Be sure your shoes fit properly, have non-slip bottoms and are in good condition.   Be cautious when going up and down stairs, curbs, and when walking on uneven sidewalks.   If your balance is poor, consider using a cane or walker.   If your fall was related to alcohol use, stop or limit alcohol intake.   If your fall was related to use of sleeping medicines, talk to your doctor about this. You may need to reduce your dosage at bedtime if you awaken during the night to go to the bathroom.   To reduce the need for nighttime bathroom trips:   Avoid drinking fluids for several hours before going to bed   Empty your bladder before going to bed   Men can keep a urinal at the bedside   © 4713-1845 [employer], [address] [place] [address], [place] [address]. All rights reserved. This information is not intended as a substitute for professional medical care. Always follow your healthcare professional's instructions.

## 2019-08-20 NOTE — PLAN OF CARE
Problem: Adult Inpatient Plan of Care  Goal: Patient-Specific Goal (Individualization)  Outcome: Ongoing (interventions implemented as appropriate)  Pt likes ernie

## 2019-08-20 NOTE — PLAN OF CARE
"Problem: Adult Inpatient Plan of Care  Goal: Plan of Care Review  Outcome: Ongoing (interventions implemented as appropriate)  Pt states, " I feel bad, I have been so dizzy and almost passed out this morning"      "

## 2019-08-21 ENCOUNTER — TELEPHONE (OUTPATIENT)
Dept: INFUSION THERAPY | Facility: HOSPITAL | Age: 79
End: 2019-08-21

## 2019-08-21 ENCOUNTER — CLINICAL SUPPORT (OUTPATIENT)
Dept: PULMONOLOGY | Facility: CLINIC | Age: 79
End: 2019-08-21
Payer: MEDICARE

## 2019-08-21 ENCOUNTER — TELEPHONE (OUTPATIENT)
Dept: RADIATION ONCOLOGY | Facility: CLINIC | Age: 79
End: 2019-08-21

## 2019-08-21 ENCOUNTER — OFFICE VISIT (OUTPATIENT)
Dept: PULMONOLOGY | Facility: CLINIC | Age: 79
End: 2019-08-21
Payer: MEDICARE

## 2019-08-21 VITALS
BODY MASS INDEX: 26.74 KG/M2 | OXYGEN SATURATION: 98 % | RESPIRATION RATE: 18 BRPM | DIASTOLIC BLOOD PRESSURE: 60 MMHG | WEIGHT: 186.75 LBS | HEIGHT: 70 IN | HEART RATE: 89 BPM | SYSTOLIC BLOOD PRESSURE: 118 MMHG

## 2019-08-21 VITALS — WEIGHT: 186.75 LBS | HEIGHT: 70 IN | BODY MASS INDEX: 26.74 KG/M2

## 2019-08-21 DIAGNOSIS — G47.34 NOCTURNAL HYPOXEMIA: ICD-10-CM

## 2019-08-21 DIAGNOSIS — R09.02 EXERCISE HYPOXEMIA: ICD-10-CM

## 2019-08-21 DIAGNOSIS — J44.9 CHRONIC OBSTRUCTIVE PULMONARY DISEASE, UNSPECIFIED COPD TYPE: Primary | ICD-10-CM

## 2019-08-21 DIAGNOSIS — Z90.2 STATUS POST PNEUMONECTOMY: ICD-10-CM

## 2019-08-21 PROCEDURE — 3074F PR MOST RECENT SYSTOLIC BLOOD PRESSURE < 130 MM HG: ICD-10-PCS | Mod: CPTII,S$GLB,, | Performed by: INTERNAL MEDICINE

## 2019-08-21 PROCEDURE — 99215 OFFICE O/P EST HI 40 MIN: CPT | Mod: 25,S$GLB,, | Performed by: INTERNAL MEDICINE

## 2019-08-21 PROCEDURE — 1101F PT FALLS ASSESS-DOCD LE1/YR: CPT | Mod: CPTII,S$GLB,, | Performed by: INTERNAL MEDICINE

## 2019-08-21 PROCEDURE — 3074F SYST BP LT 130 MM HG: CPT | Mod: CPTII,S$GLB,, | Performed by: INTERNAL MEDICINE

## 2019-08-21 PROCEDURE — 1101F PR PT FALLS ASSESS DOC 0-1 FALLS W/OUT INJ PAST YR: ICD-10-PCS | Mod: CPTII,S$GLB,, | Performed by: INTERNAL MEDICINE

## 2019-08-21 PROCEDURE — 3078F PR MOST RECENT DIASTOLIC BLOOD PRESSURE < 80 MM HG: ICD-10-PCS | Mod: CPTII,S$GLB,, | Performed by: INTERNAL MEDICINE

## 2019-08-21 PROCEDURE — 99999 PR PBB SHADOW E&M-EST. PATIENT-LVL I: CPT | Mod: PBBFAC,,,

## 2019-08-21 PROCEDURE — 99999 PR PBB SHADOW E&M-EST. PATIENT-LVL IV: CPT | Mod: PBBFAC,,, | Performed by: INTERNAL MEDICINE

## 2019-08-21 PROCEDURE — 99999 PR PBB SHADOW E&M-EST. PATIENT-LVL I: ICD-10-PCS | Mod: PBBFAC,,,

## 2019-08-21 PROCEDURE — 99999 PR PBB SHADOW E&M-EST. PATIENT-LVL IV: ICD-10-PCS | Mod: PBBFAC,,, | Performed by: INTERNAL MEDICINE

## 2019-08-21 PROCEDURE — 3078F DIAST BP <80 MM HG: CPT | Mod: CPTII,S$GLB,, | Performed by: INTERNAL MEDICINE

## 2019-08-21 PROCEDURE — 94618 PULMONARY STRESS TESTING: ICD-10-PCS | Mod: S$GLB,,, | Performed by: INTERNAL MEDICINE

## 2019-08-21 PROCEDURE — 94618 PULMONARY STRESS TESTING: CPT | Mod: S$GLB,,, | Performed by: INTERNAL MEDICINE

## 2019-08-21 PROCEDURE — 99215 PR OFFICE/OUTPT VISIT, EST, LEVL V, 40-54 MIN: ICD-10-PCS | Mod: 25,S$GLB,, | Performed by: INTERNAL MEDICINE

## 2019-08-21 RX ORDER — PREDNISONE 20 MG/1
TABLET ORAL
Qty: 20 TABLET | Refills: 1 | Status: ON HOLD | OUTPATIENT
Start: 2019-08-21 | End: 2019-08-24 | Stop reason: HOSPADM

## 2019-08-21 RX ORDER — ALBUTEROL SULFATE 0.83 MG/ML
2.5 SOLUTION RESPIRATORY (INHALATION)
Qty: 270 ML | Refills: 11 | Status: SHIPPED | OUTPATIENT
Start: 2019-08-21 | End: 2022-03-14 | Stop reason: SDUPTHER

## 2019-08-21 NOTE — TELEPHONE ENCOUNTER
Pt saw Dr. Ernandez and had a walk test in pulmonary lab who called and said pt did not feel like coming up for fluids today.      Called pt and left message to go to ED tonight should he begin to feel worse and is unable to drink.

## 2019-08-21 NOTE — PROCEDURES
"[facility] - Pulmonary Function Sv  Six Minute Walk     SUMMARY     Ordering Provider: Dr Ernandez   Interpreting Provider: Dr Ernandez  Performing nurse/tech/RT: REYNALDO Angulo RRT  Diagnosis: (exercise hypoxemia)  Height: 5' 10" (177.8 cm)  Weight: 84.7 kg (186 lb 11.7 oz)  BMI (Calculated): 26.8   Patient Race:             Phase Oxygen Assessment Supplemental O2 Heart   Rate Blood Pressure Jimenez Dyspnea Scale Rating   Resting 92 % Room Air 86 bpm 133/71 1   Exercise        Minute        1 82 % Room Air 104 bpm     2 (unable to obtain,testing stopped,pt could not continue) (2-3 lpm) (unable to obtain)     3 (testing stopped)         4 (testing stopped)         5 (testing stopped)         6  (testing stopped)           Recovery        Minute        1 (testing stopped)         2 (testing stopped)         3 (testing stopped)         4               Six Minute Walk Summary  6MWT Status: not completed  Patient Reported: Dyspnea(weak)Patient became very weak and cold and could not continue testing.O2 sat dropped to 82% on room air with exertion.Patient only went 100ft with no assistance.Patient placed in wheelchair and oxygen placed on patient at 2lpm. Dr Ernandez notified and ordered to stop testing and place patient on 3lpm.    Interpretation:  Did the patient stop or pause?: Yes  How many times did the patient stop or pause?: 1  Stop Time 1: 60  Restart Time 1: (did not restart)                                Total Time Walked (Calculated): 360 seconds  Final Partial Lap Distance (feet): 100 feet  Total Distance Meters (Calculated): 30.48 meters  Predicted Distance Meters (Calculated): 496.31 meters  Percentage of Predicted (Calculated): 6.14  Peak VO2 (Calculated): 4.89  Mets: 1.4  Has The Patient Had a Previous Six Minute Walk Test?: No  Comments: (pt went 100 ft,had to stop,ear probe used poor perfusion)    Previous 6MWT Results  Has The Patient Had a Previous Six Minute Walk Test?: No      Interpretation:  The patient " became severely hypoxemic as well as cold and clammy after walking briskly for a minute.  His oxygen saturation was measured at 82% on room air.  The remainder of the 6 min walk test was canceled because the patient was unable to complete.  Distance walked during 6 minutes was severely reduced when compared to predicted values. The 6 minute walk demonstrated severe functional impairment. There was significant oxygen desaturation during the test. Clinical correlation suggested.    Roel Ernandez MD     Additional Area 1 Location: Forehead and [place]

## 2019-08-21 NOTE — TELEPHONE ENCOUNTER
Made call to check on patient since completing xrt to the lung last week. He said he received fluids on Monday & is scheduled to receive more fluids tomorrow 8/22/19. He saw Pulmonology today & was put on home O2. Said he still has sore swallowing but is still using the MMW. He is still having SOB with exertion. Advised him to keep follow up appt with Dr Castro and call if needed, patient verbalized understanding.

## 2019-08-21 NOTE — PROGRESS NOTES
Subjective:       Patient ID: Chai Murry is a 79 y.o. male.    Chief Complaint: He       COPD    HPI   79-year-old gentleman who has complaints of shortness of breath and dyspnea.  He is status post left pneumonectomy for lung cancer.  He has received chemotherapy and has mild anemia.  When he stands up quickly is lightheaded and he has difficulty performing some daily activities such as picking up to a box etc.  He has a nonproductive cough.  He reports that his sleep is disrupted.  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 Courtney dwyer bx     Past Surgical History:   Procedure Laterality Date    APPENDECTOMY      Bronchoscopy Bilateral 6/21/2019    Performed by Paresh Goldman [facility] at [facility]    BRONCHOSCOPY- insert lighted tube into airway to obtain biopsy of lung Bilateral 3/4/2016    Performed by Roel Ernandez MD at [facility]    CARDIAC CATHETERIZATION      cardiac stents      CATARACT EXTRACTION W/  INTRAOCULAR LENS IMPLANT Right 9-3-14    CHOLECYSTECTOMY      COLONOSCOPY N/A 10/25/2018    Performed by Michael Hameed MD at [facility]    COLONOSCOPY N/A 4/17/2018    Performed by Dionne Doan MD at [facility]    Colonoscopy N/A 8/12/2014    Performed by Eriberto Simpson MD at [facility]    EGD (ESOPHAGOGASTRODUODENOSCOPY) N/A 6/11/2019    Performed by Abhishek Knox MD at [facility]    infected stomach gland excision      INSERTION-PORT Left 5/26/2016    Performed by Nem[facility]io Wall [facility] at [facility] OR    XQWEXUSHW-LQVU-L-CATH Right 7/3/2019    Performed by Jean Carlos  [facility] Dougie at [facility]    LOBECTOMY-LUNG Left 2016    Per[facility] at [facility]    LUNG REMOVAL, TOTAL Left 2016    lung cancer left upper lobe    PNEUMONECTOMY Left 2016    Performed by [facility] at [facility]    SKIN BIOPSY Left     arm    THORACOTOMY Left 2016    Performed by [facility] at [facility]    ULTRASOUND, UPPER GI TRACT, ENDOSCOPIC N/A 2019    Performed by Abhishek Knox MD at [facility]     Social History     Socioeconomic History    Marital status:      Spouse name: Not on file    Number of children: 3    Years of education: Not on file    Highest education level: Not on file   Occupational History    Occupation: retired   Social Needs    Financial resource strain: Not on file    Food insecurity:     Worry: Not on file     Inability: Not on file    Transportation needs:     Medical: Not on file     Non-medical: Not on file   Tobacco Use    Smoking status: Former Smoker     Packs/day: 1.00     Years: 50.00     Pack years: 50.00     Last attempt to quit: 2005     Years since quittin.0    Smokeless tobacco: Never Used   Substance and Sexual Activity    Alcohol use: No    Drug use: No    Sexual activity: Not Currently   Lifestyle    Physical activity:     Days per week: Not on file     Minutes per session: Not on file    Stress: Not on file   Relationships    Social connections:     Talks on phone: Not on file     Gets together: Not on file     Attends [employer] service: Not on file     Active member of club or organization: Not on file     Attends meetings of clubs or organizations: Not on file     Relationship status: Not on file   Other Topics Concern    Not on file   Social History Narrative    Not on file     Review of Systems   Constitutional: Positive for fatigue. Negative for fever.   HENT: Positive for postnasal drip, rhinorrhea and congestion.    Eyes: Negative for redness and itching.  "  Respiratory: Positive for cough, sputum production, shortness of breath, dyspnea on extertion, use of rescue inhaler and Paroxysmal Nocturnal Dyspnea.    Cardiovascular: Negative for chest pain, palpitations and leg swelling.   Genitourinary: Negative for difficulty urinating and hematuria.   Endocrine: Positive for cold intolerance. Negative for heat intolerance.    Musculoskeletal: Positive for arthralgias.   Skin: Negative.  Negative for rash.   Gastrointestinal: Negative.  Negative for nausea and abdominal pain.   Neurological: Positive for dizziness, syncope, weakness and light-headedness.   Hematological: Negative for adenopathy. Does not bruise/bleed easily.   Psychiatric/Behavioral: Positive for sleep disturbance. The patient is nervous/anxious.        Objective:      /60   Pulse 89   Resp 18   Ht 5' 10" (1.778 m)   Wt 84.7 kg (186 lb 11.7 oz)   SpO2 98%   BMI 26.79 kg/m²   Physical Exam   Constitutional: He is oriented to person, place, and time. He appears well-developed and well-nourished.   Chronically ill-appearing   HENT:   Head: Normocephalic and atraumatic.   Mouth/Throat: Oropharyngeal exudate present.   Eyes: Pupils are equal, round, and reactive to light. Conjunctivae are normal.   Neck: Neck supple. No JVD present. No tracheal deviation present. No thyromegaly present.   Cardiovascular: Normal rate and normal heart sounds. An irregularly irregular rhythm present.   No murmur heard.  Pulmonary/Chest: Effort normal. He has decreased breath sounds in the left upper field, the left middle field and the left lower field. He has wheezes in the right lower field. He has no rhonchi. He has no rales.   Abdominal: Soft. Bowel sounds are normal.   Musculoskeletal: Normal range of motion. He exhibits no edema or tenderness.   Lymphadenopathy:     He has no cervical adenopathy.   Neurological: He is alert and oriented to person, place, and time.   Skin: Skin is warm and dry.   Nursing note and " vitals reviewed.    Personal Diagnostic Review  Chest x-ray: left pneumonectomy  FL Fluoro For Venous Access  Narrative: EXAMINATION:  FL FLUORO FOR VENOUS ACCESS    CLINICAL HISTORY:  Lung cancer.    TECHNIQUE:  Intraoperative fluoroscopy was provided during MediPort catheter placement.    COMPARISON:  None    FINDINGS:  No images.  Fluoroscopy time 2.45 minutes.  Impression: See above.    Electronically signed by: Peter Mcfarlane MD  Date:    07/03/2019  Time:    10:59  X-Ray Chest AP Portable  Narrative: EXAMINATION:  XR CHEST AP PORTABLE    CLINICAL HISTORY:  Lung cancer.  Follow-up., Right subclavian MediPort, history of a left pneumonectomy;    COMPARISON:  05/03/2019.    FINDINGS:  Right chest wall MediPort catheter in good position.  Postop changes of the left chest consistent with pneumonectomy.  Shift of the mediastinal contents to the right.    No pneumothorax.    No change.  Impression: Stable chest x-ray.    Electronically signed by: Peter Mcfarlane MD  Date:    07/03/2019  Time:    08:35      Office Spirometry Results:     No flowsheet data found.  Pulmonary Studies Review 8/21/2019   SpO2 98   Height 70.000   Weight 2987.67   BMI (Calculated) 26.8   Predicted Distance 295.33   Predicted Distance Meters (Calculated) 496.31       A 6 min walk was attempted but when the patient walked down the hallway is oxygen saturation fell precipitously to 80% while on room air.  Patient had a prolonged period of recovery was given supplemental oxygen and remainder of the 6 min walk was canceled.     Oxygen Qualification Test  Conditions of testing: Stable outpatient  Oxygen saturation at rest on room air:  98 %  Oxygen saturation with exercise on room air: 80 %  Oxygen saturation with exercise on   2  liters oxygen  is  90%       Roel Ernandez MD  Pulmonary / Critical Care Medicine          Assessment:       Chronic obstructive pulmonary disease, unspecified COPD type  -     predniSONE (DELTASONE) 20 MG  tablet; Prednisone 60 mg/ day for 3 days, 40 mg/day for 3 days,20 mg/ day for 3 days, (1/2 tablet )10 mg a day for 3 days.  Dispense: 20 tablet; Refill: 1  -     albuterol (PROVENTIL) 2.5 mg /3 mL (0.083 %) nebulizer solution; Take 3 mLs (2.5 mg total) by nebulization every 6 (six) hours while awake.  Dispense: 270 mL; Refill: 11  -     PULSE OXIMETRY OVERNIGHT; Future    Nocturnal hypoxemia  -     PULSE OXIMETRY OVERNIGHT; Future    Exercise hypoxemia  -     Stress test, pulmonary; Future; Expected date: 08/21/2019          Outpatient Encounter Medications as of 8/21/2019   Medication Sig Dispense Refill    (Magic mouthwash) 1:1:1 Benadryl 12.5mg/5ml liq, aluminum & magnesium hydroxide-simehticone (Maalox), lidocaine viscous 2% Swish and spit 15 mLs every 4 (four) hours as needed. for mouth sores 90 mL 2    albuterol (PROVENTIL) 2.5 mg /3 mL (0.083 %) nebulizer solution Take 3 mLs (2.5 mg total) by nebulization every 6 (six) hours while awake. 270 mL 11    amLODIPine (NORVASC) 5 MG tablet Take 2 tablets (10 mg total) by mouth every evening. (Patient taking differently: Take 5 mg by mouth once daily. ) 60 tablet 0    aspirin (ECOTRIN) 81 MG EC tablet Take 81 mg by mouth once daily.      BREO ELLIPTA 100-25 mcg/dose diskus inhaler INHALE 1 PUFF INTO THE LUNGS ONCE DAILY  5    doxepin (SINEQUAN) 10 MG capsule TAKE 1 CAPSULE (10 MG TOTAL) BY MOUTH EVERY EVENING. 30 capsule 10    fenofibric acid (FIBRICOR) 135 mg CpDR TAKE 1 CAPSULE BY MOUTH EVERY DAY 30 capsule 11    fluticasone (FLONASE) 50 mcg/actuation nasal spray 2 sprays (100 mcg total) by Each Nare route once daily. (Patient taking differently: 2 sprays by Each Nare route as needed. ) 3 Bottle 3    glycopyrrolate-formoterol (BEVESPI AEROSPHERE) 9-4.8 mcg HFAA Inhale 2 puffs into the lungs 2 (two) times daily. Controller 10.9 g 11    HYDROcodone-acetaminophen (NORCO) 5-325 mg per tablet Take 1 tablet by mouth every 6 (six) hours as needed for Pain. 40  tablet 0    ipratropium-albuterol (COMBIVENT RESPIMAT)  mcg/actuation inhaler Inhale 1 puff into the lungs every 6 (six) hours as needed for Shortness of Breath. 4 g 11    levocetirizine (XYZAL) 5 MG tablet Take 5 mg by mouth as needed.       lisinopril (PRINIVIL,ZESTRIL) 40 MG tablet TAKE 1 TABLET BY MOUTH DAILY 30 tablet 11    ondansetron (ZOFRAN) 4 MG tablet Take 1 tablet (4 mg total) by mouth every 12 (twelve) hours as needed for Nausea. 30 tablet 1    simvastatin (ZOCOR) 40 MG tablet Take 1 tablet (40 mg total) by mouth every evening. 30 tablet 11    SOTALOL AF 80 mg tablet TAKE 1 TABLET BY MOUTH TWICE A DAY 60 tablet 10    [DISCONTINUED] albuterol (PROVENTIL) 2.5 mg /3 mL (0.083 %) nebulizer solution Take 3 mLs (2.5 mg total) by nebulization every 6 (six) hours while awake. (Patient taking differently: Take 2.5 mg by nebulization as needed. ) 270 mL 11    predniSONE (DELTASONE) 20 MG tablet Prednisone 60 mg/ day for 3 days, 40 mg/day for 3 days,20 mg/ day for 3 days, (1/2 tablet )10 mg a day for 3 days. 20 tablet 1     Facility-Administered Encounter Medications as of 8/21/2019   Medication Dose Route Frequency Provider Last Rate Last Dose    lactated ringers infusion   Intravenous Continuous Michael Hameed MD   Stopped at 07/03/19 0810    [COMPLETED] sodium chloride 0.9% bolus 1,000 mL  1,000 mL Intravenous 1 time in Clinic/HOD Jorge L Patel MD   Stopped at 08/20/19 1524    testosterone cypionate injection 200 mg  200 mg Intramuscular Q21 Days Peter Teixeira MD   200 mg at 08/01/19 0841    [DISCONTINUED] heparin, porcine (PF) 100 unit/mL injection flush 500 Units  500 Units Intravenous PRN Jorge L Patel MD   500 Units at 08/20/19 1525    [DISCONTINUED] sodium chloride 0.9% flush 10 mL  10 mL Intravenous PRN Jorge L Patel MD         Plan:       Requested Prescriptions     Signed Prescriptions Disp Refills    predniSONE (DELTASONE) 20 MG tablet 20 tablet 1     Sig:  Prednisone 60 mg/ day for 3 days, 40 mg/day for 3 days,20 mg/ day for 3 days, (1/2 tablet )10 mg a day for 3 days.    albuterol (PROVENTIL) 2.5 mg /3 mL (0.083 %) nebulizer solution 270 mL 11     Sig: Take 3 mLs (2.5 mg total) by nebulization every 6 (six) hours while awake.     Problem List Items Addressed This Visit     Chronic obstructive pulmonary disease - Primary    Relevant Medications    predniSONE (DELTASONE) 20 MG tablet    albuterol (PROVENTIL) 2.5 mg /3 mL (0.083 %) nebulizer solution    Other Relevant Orders    PULSE OXIMETRY OVERNIGHT      Other Visit Diagnoses     Nocturnal hypoxemia        Relevant Orders    PULSE OXIMETRY OVERNIGHT    Exercise hypoxemia        Relevant Orders    Stress test, pulmonary             Follow up in about 3 months (around 11/21/2019) for Overnight O2 Sat ASAP, 6 min walk.    MEDICAL DECISION MAKING: Moderate to high complexity.  Overall, the multiple problems listed are of moderate to high severity that may impact quality of life and activities of daily living. Side effects of medications, treatment plan as well as options and alternatives reviewed and discussed with patient. There was counseling of patient concerning these issues.    Total time spent in face to face counseling and coordination of care - 45  minutes over 50% of time was used in discussion of prognosis, risks, benefits of treatment, instructions and compliance with regimen . Discussion with other physicians or health care providers (DME, NP, pharmacy, respiratory therapy) occurred.

## 2019-08-22 ENCOUNTER — CLINICAL SUPPORT (OUTPATIENT)
Dept: INTERNAL MEDICINE | Facility: CLINIC | Age: 79
End: 2019-08-22
Payer: MEDICARE

## 2019-08-22 ENCOUNTER — INFUSION (OUTPATIENT)
Dept: INFUSION THERAPY | Facility: HOSPITAL | Age: 79
End: 2019-08-22
Attending: INTERNAL MEDICINE
Payer: MEDICARE

## 2019-08-22 VITALS
OXYGEN SATURATION: 98 % | HEART RATE: 81 BPM | DIASTOLIC BLOOD PRESSURE: 71 MMHG | TEMPERATURE: 97 F | SYSTOLIC BLOOD PRESSURE: 128 MMHG

## 2019-08-22 DIAGNOSIS — C34.12 MALIGNANT NEOPLASM OF UPPER LOBE OF LEFT LUNG: Primary | ICD-10-CM

## 2019-08-22 DIAGNOSIS — E29.1 HYPOGONADISM IN MALE: Primary | ICD-10-CM

## 2019-08-22 DIAGNOSIS — Z85.118 HX OF CANCER OF LUNG: ICD-10-CM

## 2019-08-22 PROCEDURE — 99999 PR PBB SHADOW E&M-EST. PATIENT-LVL II: CPT | Mod: PBBFAC,,,

## 2019-08-22 PROCEDURE — 99999 PR PBB SHADOW E&M-EST. PATIENT-LVL II: ICD-10-PCS | Mod: PBBFAC,,,

## 2019-08-22 PROCEDURE — 96361 HYDRATE IV INFUSION ADD-ON: CPT

## 2019-08-22 PROCEDURE — 96372 THER/PROPH/DIAG INJ SC/IM: CPT | Mod: S$GLB,,, | Performed by: INTERNAL MEDICINE

## 2019-08-22 PROCEDURE — 96372 PR INJECTION,THERAP/PROPH/DIAG2ST, IM OR SUBCUT: ICD-10-PCS | Mod: S$GLB,,, | Performed by: INTERNAL MEDICINE

## 2019-08-22 PROCEDURE — 63600175 PHARM REV CODE 636 W HCPCS: Performed by: INTERNAL MEDICINE

## 2019-08-22 PROCEDURE — 96360 HYDRATION IV INFUSION INIT: CPT

## 2019-08-22 RX ORDER — HEPARIN 100 UNIT/ML
500 SYRINGE INTRAVENOUS
Status: DISCONTINUED | OUTPATIENT
Start: 2019-08-22 | End: 2019-08-22 | Stop reason: HOSPADM

## 2019-08-22 RX ADMIN — TESTOSTERONE CYPIONATE 200 MG: 200 INJECTION, SOLUTION INTRAMUSCULAR at 09:08

## 2019-08-22 RX ADMIN — SODIUM CHLORIDE 1000 ML: 0.9 INJECTION, SOLUTION INTRAVENOUS at 12:08

## 2019-08-22 RX ADMIN — HEPARIN SODIUM (PORCINE) LOCK FLUSH IV SOLN 100 UNIT/ML 500 UNITS: 100 SOLUTION at 02:08

## 2019-08-22 NOTE — PLAN OF CARE
Problem: Adult Inpatient Plan of Care  Goal: Plan of Care Review  Outcome: Ongoing (interventions implemented as appropriate)  Pt states something happened with my lungs/heart a few days ago

## 2019-08-23 ENCOUNTER — INFUSION (OUTPATIENT)
Dept: INFUSION THERAPY | Facility: HOSPITAL | Age: 79
End: 2019-08-23
Attending: NURSE PRACTITIONER
Payer: MEDICARE

## 2019-08-23 VITALS
SYSTOLIC BLOOD PRESSURE: 144 MMHG | OXYGEN SATURATION: 98 % | RESPIRATION RATE: 18 BRPM | HEART RATE: 82 BPM | DIASTOLIC BLOOD PRESSURE: 81 MMHG

## 2019-08-23 DIAGNOSIS — C34.12 MALIGNANT NEOPLASM OF UPPER LOBE OF LEFT LUNG: Primary | ICD-10-CM

## 2019-08-23 DIAGNOSIS — Z85.118 HX OF CANCER OF LUNG: ICD-10-CM

## 2019-08-23 PROBLEM — I21.4 NSTEMI (NON-ST ELEVATED MYOCARDIAL INFARCTION): Status: ACTIVE | Noted: 2019-08-23

## 2019-08-23 PROCEDURE — 96374 THER/PROPH/DIAG INJ IV PUSH: CPT | Mod: 59

## 2019-08-23 PROCEDURE — 63600175 PHARM REV CODE 636 W HCPCS: Performed by: INTERNAL MEDICINE

## 2019-08-23 PROCEDURE — 63600175 PHARM REV CODE 636 W HCPCS: Performed by: NURSE PRACTITIONER

## 2019-08-23 PROCEDURE — 96361 HYDRATE IV INFUSION ADD-ON: CPT

## 2019-08-23 PROCEDURE — 96374 THER/PROPH/DIAG INJ IV PUSH: CPT

## 2019-08-23 RX ORDER — HEPARIN 100 UNIT/ML
500 SYRINGE INTRAVENOUS
Status: DISCONTINUED | OUTPATIENT
Start: 2019-08-23 | End: 2019-08-23 | Stop reason: HOSPADM

## 2019-08-23 RX ORDER — MORPHINE SULFATE 2 MG/ML
4 INJECTION, SOLUTION INTRAMUSCULAR; INTRAVENOUS
Status: COMPLETED | OUTPATIENT
Start: 2019-08-23 | End: 2019-08-23

## 2019-08-23 RX ADMIN — MORPHINE SULFATE 4 MG: 2 INJECTION, SOLUTION INTRAMUSCULAR; INTRAVENOUS at 01:08

## 2019-08-23 RX ADMIN — SODIUM CHLORIDE 1000 ML: 0.9 INJECTION, SOLUTION INTRAVENOUS at 11:08

## 2019-08-23 NOTE — NURSING
1350 -1353, meds given to help alleviate chest pain;     1355, pt rated chest pain 2/10;    EKG completed at chairside 1355, pt on 3L of O2    EMS notified of transfer

## 2019-08-23 NOTE — NURSING
Pt complaining of chest pain 5/10, stabbing new onset. Notified Dr. Milan who will come to assess pt.

## 2019-08-23 NOTE — NURSING
1346, Dr. Milan at chairside with pt; pt complaining of chest pain over his heart rating it 5/10. Dr. Milan ordered Aspirin 325 mg PO, Nitroglycerin 0.4 SL, and Morphine 4 mg IVP;

## 2019-08-23 NOTE — PLAN OF CARE
Problem: Adult Inpatient Plan of Care  Goal: Plan of Care Review  Outcome: Ongoing (interventions implemented as appropriate)  Pt states he is not feeling great today

## 2019-08-26 ENCOUNTER — OFFICE VISIT (OUTPATIENT)
Dept: HEMATOLOGY/ONCOLOGY | Facility: CLINIC | Age: 79
End: 2019-08-26
Payer: MEDICARE

## 2019-08-26 ENCOUNTER — INFUSION (OUTPATIENT)
Dept: INFUSION THERAPY | Facility: HOSPITAL | Age: 79
End: 2019-08-26
Attending: NURSE PRACTITIONER
Payer: MEDICARE

## 2019-08-26 VITALS — HEART RATE: 69 BPM | SYSTOLIC BLOOD PRESSURE: 121 MMHG | DIASTOLIC BLOOD PRESSURE: 67 MMHG

## 2019-08-26 VITALS
HEIGHT: 70 IN | SYSTOLIC BLOOD PRESSURE: 82 MMHG | RESPIRATION RATE: 18 BRPM | TEMPERATURE: 98 F | WEIGHT: 188.25 LBS | BODY MASS INDEX: 26.95 KG/M2 | OXYGEN SATURATION: 98 % | DIASTOLIC BLOOD PRESSURE: 55 MMHG | HEART RATE: 65 BPM

## 2019-08-26 DIAGNOSIS — J43.1 PANLOBULAR EMPHYSEMA: ICD-10-CM

## 2019-08-26 DIAGNOSIS — C34.12 MALIGNANT NEOPLASM OF UPPER LOBE OF LEFT LUNG: Primary | ICD-10-CM

## 2019-08-26 DIAGNOSIS — I10 ESSENTIAL HYPERTENSION: ICD-10-CM

## 2019-08-26 DIAGNOSIS — Z85.118 HX OF CANCER OF LUNG: ICD-10-CM

## 2019-08-26 DIAGNOSIS — I48.20 CHRONIC ATRIAL FIBRILLATION: ICD-10-CM

## 2019-08-26 PROCEDURE — 1101F PT FALLS ASSESS-DOCD LE1/YR: CPT | Mod: CPTII,S$GLB,, | Performed by: NURSE PRACTITIONER

## 2019-08-26 PROCEDURE — 1101F PR PT FALLS ASSESS DOC 0-1 FALLS W/OUT INJ PAST YR: ICD-10-PCS | Mod: CPTII,S$GLB,, | Performed by: NURSE PRACTITIONER

## 2019-08-26 PROCEDURE — 99215 OFFICE O/P EST HI 40 MIN: CPT | Mod: 25,S$GLB,, | Performed by: NURSE PRACTITIONER

## 2019-08-26 PROCEDURE — 99999 PR PBB SHADOW E&M-EST. PATIENT-LVL III: CPT | Mod: PBBFAC,,, | Performed by: NURSE PRACTITIONER

## 2019-08-26 PROCEDURE — 63600175 PHARM REV CODE 636 W HCPCS: Performed by: NURSE PRACTITIONER

## 2019-08-26 PROCEDURE — 99215 PR OFFICE/OUTPT VISIT, EST, LEVL V, 40-54 MIN: ICD-10-PCS | Mod: 25,S$GLB,, | Performed by: NURSE PRACTITIONER

## 2019-08-26 PROCEDURE — 3074F PR MOST RECENT SYSTOLIC BLOOD PRESSURE < 130 MM HG: ICD-10-PCS | Mod: CPTII,S$GLB,, | Performed by: NURSE PRACTITIONER

## 2019-08-26 PROCEDURE — 63600175 PHARM REV CODE 636 W HCPCS: Performed by: INTERNAL MEDICINE

## 2019-08-26 PROCEDURE — 3074F SYST BP LT 130 MM HG: CPT | Mod: CPTII,S$GLB,, | Performed by: NURSE PRACTITIONER

## 2019-08-26 PROCEDURE — 3078F DIAST BP <80 MM HG: CPT | Mod: CPTII,S$GLB,, | Performed by: NURSE PRACTITIONER

## 2019-08-26 PROCEDURE — 96366 THER/PROPH/DIAG IV INF ADDON: CPT

## 2019-08-26 PROCEDURE — 99999 PR PBB SHADOW E&M-EST. PATIENT-LVL III: ICD-10-PCS | Mod: PBBFAC,,, | Performed by: NURSE PRACTITIONER

## 2019-08-26 PROCEDURE — 3078F PR MOST RECENT DIASTOLIC BLOOD PRESSURE < 80 MM HG: ICD-10-PCS | Mod: CPTII,S$GLB,, | Performed by: NURSE PRACTITIONER

## 2019-08-26 PROCEDURE — 96365 THER/PROPH/DIAG IV INF INIT: CPT

## 2019-08-26 RX ORDER — HEPARIN 100 UNIT/ML
500 SYRINGE INTRAVENOUS
Status: DISCONTINUED | OUTPATIENT
Start: 2019-08-26 | End: 2019-08-26 | Stop reason: HOSPADM

## 2019-08-26 RX ADMIN — HEPARIN SODIUM (PORCINE) LOCK FLUSH IV SOLN 100 UNIT/ML 500 UNITS: 100 SOLUTION at 12:08

## 2019-08-26 RX ADMIN — MAGNESIUM SULFATE HEPTAHYDRATE: 500 INJECTION, SOLUTION INTRAMUSCULAR; INTRAVENOUS at 10:08

## 2019-08-26 NOTE — PROGRESS NOTES
Subjective:       Patient ID: Chai Murry is a 79 y.o. male.    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  Completed concurrent radiation 2019.  He is followed in the Heme-Onc clinic by .     Patient with swallowing issues due to sore throat from radiation treatments.  He does admit to decreased appetite.  Reports that he does tolerate soft diet and liquids.    Patient hospitalized over the weekend for evaluation of chest pain. Patient found to have NSTEMI.  Medical management chosen per cardiologist.  Has upcoming outpatient follow-up with Dr. Palencia.  Patient on 2 L supplemental oxygen per nasal cannula since hospitalization.    Patient is without complaints.       Social History     Socioeconomic History    Marital status:      Spouse name: Not on file    Number of children: 3    Years of education: Not on file    Highest education level: Not on file   Occupational History    Occupation: retired   Social Needs    Financial resource strain: Not on file    Food insecurity:     Worry: Not on file     Inability: Not on file    Transportation needs:     Medical: Not on file     Non-medical: Not on file   Tobacco Use    Smoking status: Former Smoker     Packs/day: 1.00     Years: 50.00     Pack years: 50.00     Last attempt to quit: 2005     Years since quittin.0    Smokeless tobacco: Never Used   Substance and Sexual Activity    Alcohol use: No    Drug use: No    Sexual activity: Not Currently   Lifestyle    Physical activity:     Days per week: Not on file     Minutes per session: Not on file    Stress: Not on file   Relationships    Social connections:     Talks on phone: Not on file     Gets together: Not on file     Attends Orthodoxy service: Not on file     Active member of  club or organization: Not on file     Attends meetings of clubs or organizations: Not on file     Relationship status: Not on file   Other Topics Concern    Not on file   Social History Narrative    Not on file       Past Medical History:   Diagnosis Date    Anemia     Arthritis     Atrial fibrillation     CAD (coronary artery disease)     Cataract     COPD (chronic obstructive pulmonary disease)     Diverticulosis     ED (erectile dysfunction)     HTN (hypertension)     Hyperlipidemia     Lung cancer     left    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 Courtney dwyer bx       Family History   Problem Relation Age of Onset    Esophageal cancer Sister     Cancer Sister     Lung cancer Mother     Cancer Mother     Cataracts Mother     Hypertension Mother     Pneumonia Father     Cataracts Father     Hypertension Father     Cancer Brother     Hypertension Brother     Blindness Neg Hx     Diabetes Neg Hx     Glaucoma Neg Hx     Macular degeneration Neg Hx     Retinal detachment Neg Hx     Strabismus Neg Hx     Stroke Neg Hx     Thyroid disease Neg Hx        Past Surgical History:   Procedure Laterality Date    APPENDECTOMY      Bronchoscopy Bilateral 6/21/2019    Performed by Paresh Goldman [facility] at [facility]    BRONCHOSCOPY- insert lighted tube into airway to obtain biopsy of lung Bilateral 3/4/2016    Performed by Roel Ernandez MD at [facility]    CARDIAC CATHETERIZATION      cardiac stents      CATARACT EXTRACTION W/  INTRAOCULAR LENS IMPLANT Right 9-3-14    CHOLECYSTECTOMY      COLONOSCOPY N/A 10/25/2018    Performed by Michael Hameed MD at [facility] END[facility]    COLONOSCOPY N/A 4/17/2018    Performed by Dionne Doan MD at [facility] END[facility]    Colonoscopy N/A 8/12/2014    Performed by Eriberto Simpson MD at [facility] END[facility]    EGD (ESOPHAGOGASTRODUODENOSCOPY) N/A 6/11/2019    Performed by Abhishek Knox MD at [facility] END[facility]    infected stomach  gland excision      INSERTION-PORT Left 5/26/2016    Performed by [facility]io Wall [facility] at [facility] OR    XARDQCFWE-VFUZ-S-CATH Right 7/3/2019    Performed by Jean Carlos Constantino MD at [facility] OR    LOBECTOMY-LUNG Left 4/12/2016    Performed by Nem[facility]io Wall MD at [facility] OR    LUNG REMOVAL, TOTAL Left 04/2016    lung cancer left upper lobe    PNEUMONECTOMY Left 4/12/2016    Performed by Nemesio Wall MD at [facility] OR    SKIN BIOPSY Left     arm    THORACOTOMY Left 4/12/2016    Performed by Nemesio Wall MD at [facility]    ULTRASOUND, UPPER GI TRACT, ENDOSCOPIC N/A 6/11/2019    Performed by Abhishek Knox MD at [facility] ENDO       Review of Systems   Constitutional: Positive for activity change and appetite change. Negative for chills, diaphoresis, fatigue and fever.   HENT: Negative for congestion, mouth sores, nosebleeds, sore throat, trouble swallowing and voice change.    Eyes: Negative for photophobia and visual disturbance.   Respiratory: Positive for shortness of breath ( with exertion). Negative for cough, chest tightness and wheezing.    Cardiovascular: Negative for chest pain, palpitations and leg swelling.   Gastrointestinal: Negative for abdominal distention, abdominal pain, anal bleeding, blood in stool, constipation, diarrhea, nausea, rectal pain and vomiting.   Genitourinary: Negative for difficulty urinating, dysuria and hematuria.   Musculoskeletal: Negative for arthralgias, back pain and myalgias.   Skin: Negative for pallor, rash and wound.   Neurological: Positive for dizziness, weakness and light-headedness. Negative for syncope and headaches.   Hematological: Negative for adenopathy. Does not bruise/bleed easily.   Psychiatric/Behavioral: The patient is nervous/anxious.          Medication List with Changes/Refills   Current Medications    (MAGIC MOUTHWASH) 1:1:1 BENADRYL 12.5MG/5ML LIQ, ALUMINUM & MAGNESIUM HYDROXIDE-SIMEHTICONE (MAALOX), LIDOCAINE VISCOUS 2%    Swish and spit 15 mLs every 4  (four) hours as needed. for mouth sores    ALBUTEROL (PROVENTIL) 2.5 MG /3 ML (0.083 %) NEBULIZER SOLUTION    Take 3 mLs (2.5 mg total) by nebulization every 6 (six) hours while awake.    AMLODIPINE (NORVASC) 5 MG TABLET    Take 1 tablet (5 mg total) by mouth once daily.    APIXABAN (ELIQUIS) 5 MG TAB    Take 1 tablet (5 mg total) by mouth 2 (two) times daily.    ASPIRIN (ECOTRIN) 81 MG EC TABLET    Take 81 mg by mouth once daily.    BREO ELLIPTA 100-25 MCG/DOSE DISKUS INHALER    INHALE 1 PUFF INTO THE LUNGS ONCE DAILY    DOXEPIN (SINEQUAN) 10 MG CAPSULE    TAKE 1 CAPSULE (10 MG TOTAL) BY MOUTH EVERY EVENING.    FENOFIBRIC ACID (FIBRICOR) 135 MG CPDR    TAKE 1 CAPSULE BY MOUTH EVERY DAY    FLUTICASONE (FLONASE) 50 MCG/ACTUATION NASAL SPRAY    2 sprays (100 mcg total) by Each Nare route once daily.    GLYCOPYRROLATE-FORMOTEROL (BEVESPI AEROSPHERE) 9-4.8 MCG HFAA    Inhale 2 puffs into the lungs 2 (two) times daily. Controller    HYDROCODONE-ACETAMINOPHEN (NORCO) 5-325 MG PER TABLET    Take 1 tablet by mouth every 6 (six) hours as needed for Pain.    IPRATROPIUM-ALBUTEROL (COMBIVENT RESPIMAT)  MCG/ACTUATION INHALER    Inhale 1 puff into the lungs every 6 (six) hours as needed for Shortness of Breath.    LEVOCETIRIZINE (XYZAL) 5 MG TABLET    Take 5 mg by mouth as needed.     LISINOPRIL (PRINIVIL,ZESTRIL) 40 MG TABLET    TAKE 1 TABLET BY MOUTH DAILY    ONDANSETRON (ZOFRAN) 4 MG TABLET    Take 1 tablet (4 mg total) by mouth every 12 (twelve) hours as needed for Nausea.    RANOLAZINE (RANEXA) 500 MG TB12    Take 1 tablet (500 mg total) by mouth 2 (two) times daily.    SIMVASTATIN (ZOCOR) 40 MG TABLET    Take 1 tablet (40 mg total) by mouth every evening.    SOTALOL AF 80 MG TABLET    TAKE 1 TABLET BY MOUTH TWICE A DAY     Objective:     Vitals:    08/26/19 0939   BP: (!) 82/55   Pulse: 65   Resp:    Temp:      Lab Results   Component Value Date    WBC 3.66 (L) 08/26/2019    HGB 9.8 (L) 08/26/2019    HCT 30.0 (L)  08/26/2019    MCV 94 08/26/2019     08/26/2019     BMP  Lab Results   Component Value Date     08/26/2019    K 3.6 08/26/2019     08/26/2019    CO2 29 08/26/2019    BUN 21 08/26/2019    CREATININE 1.5 (H) 08/26/2019    CALCIUM 9.5 08/26/2019    ANIONGAP 13 08/26/2019    ESTGFRAFRICA 51 (A) 08/26/2019    EGFRNONAA 44 (A) 08/26/2019     Lab Results   Component Value Date    ALT 12 08/26/2019    AST 14 08/26/2019    ALKPHOS 56 08/26/2019    BILITOT 0.6 08/26/2019       Physical Exam   Constitutional: He is oriented to person, place, and time. He appears well-developed and well-nourished. He is cooperative. Nasal cannula in place.   HENT:   Head: Normocephalic.   Right Ear: External ear normal.   Left Ear: External ear normal.   Nose: Nose normal.   Mouth/Throat: Oropharynx is clear and moist.   Eyes: Conjunctivae, EOM and lids are normal. Right eye exhibits no discharge. Left eye exhibits no discharge. No scleral icterus.   Neck: Normal range of motion. No thyroid mass present.   Cardiovascular: Normal rate and normal heart sounds. An irregular rhythm present.   No murmur heard.  Pulmonary/Chest: Effort normal and breath sounds normal. No respiratory distress. He has no wheezes. He has no rhonchi. He has no rales.   2 L supplemental oxygen per nasal cannula   Abdominal: Soft. Bowel sounds are normal. He exhibits no distension. There is no tenderness.   Genitourinary:   Genitourinary Comments: deferred   Musculoskeletal: Normal range of motion. He exhibits no edema.   Lymphadenopathy:        Head (right side): No submandibular, no preauricular and no posterior auricular adenopathy present.        Head (left side): No submandibular, no preauricular and no posterior auricular adenopathy present.        Right cervical: No superficial cervical adenopathy present.       Left cervical: No superficial cervical adenopathy present.   Neurological: He is alert and oriented to person, place, and time.   Skin:  Skin is warm, dry and intact.   Psychiatric: His speech is normal and behavior is normal. Thought content normal. He exhibits a depressed mood.   Vitals reviewed.       Assessment:     Problem List Items Addressed This Visit        Pulmonary    Chronic obstructive pulmonary disease     Management per pulmonology            Cardiac/Vascular    Essential hypertension     Followed by Cardiology         Chronic atrial fibrillation     Followed by Cardiology            Oncology    Malignant neoplasm of upper lobe of left lung - Primary     Patient presents today for lab counts s/p chemotherapy received on 08/12/2019.  Patient has completed weekly concurrent chemoradiation with cisplatin.    Patient admitted to [facility] over the weekend for evaluation and management of possible NSTEMI.  Notes review from recent hospitalization.  Patient found to have elevated troponin, elevated BNP    Laboratory reviewed reflective of some dehydration creatinine 1.5.  Hypomagnesia with Mag level 1.5.    Patient's BP on the lower side of normal. Does admit to some dizziness this morning.  Had nurse to orthostatic vital signs.  Systolic blood pressure negative for orthostatic hypotension however, 11 point drop in diastolic blood pressure noted. Have nurse to fax a copy of orthostatic vital signs to patient's cardiologist.  Will communicate with office of cardiology regarding this.    Proceed with 1 L normal saline bolus.  Magnesium sulfate 2 g IVPB.  Repeat 1 L normal saline bolus every other day.  Follow-up 1 week with Dr. Vila with repeat CBC, CMP, Mag level         Relevant Orders    CBC auto differential    Comprehensive metabolic panel    Magnesium            Plan:     Malignant neoplasm of upper lobe of left lung  -     CBC auto differential; Future; Expected date: 08/26/2019  -     Comprehensive metabolic panel; Future; Expected date: 08/26/2019  -     Magnesium; Future; Expected date: 08/26/2019    Chronic atrial  fibrillation    Essential hypertension    Panlobular emphysema          I will review assessment/plan with collaborating physician Dr. Fermin Werner, [id number]

## 2019-08-26 NOTE — ASSESSMENT & PLAN NOTE
Patient presents today for lab counts s/p chemotherapy received on 08/12/2019.  Patient has completed weekly concurrent chemoradiation with cisplatin.    Patient admitted to [facility] over the weekend for evaluation and management of possible NSTEMI.  Notes review from recent hospitalization.  Patient found to have elevated troponin, elevated BNP    Laboratory reviewed reflective of some dehydration creatinine 1.5.  Hypomagnesia with Mag level 1.5.    Patient's BP on the lower side of normal. Does admit to some dizziness this morning.  Had nurse to orthostatic vital signs.  Systolic blood pressure negative for orthostatic hypotension however, 11 point drop in diastolic blood pressure noted. Have nurse to fax a copy of orthostatic vital signs to patient's cardiologist.  Will communicate with office of cardiology regarding this.    Proceed with 1 L normal saline bolus.  Magnesium sulfate 2 g IVPB.  Repeat 1 L normal saline bolus every other day.  Follow-up 1 week with Dr. Vila with repeat CBC, CMP, Mag level

## 2019-08-26 NOTE — PLAN OF CARE
Problem: Adult Inpatient Plan of Care  Goal: Plan of Care Review  Outcome: Ongoing (interventions implemented as appropriate)  Pt states I'm weak and tired

## 2019-08-27 ENCOUNTER — TELEPHONE (OUTPATIENT)
Dept: INFUSION THERAPY | Facility: HOSPITAL | Age: 79
End: 2019-08-27

## 2019-08-27 ENCOUNTER — PATIENT MESSAGE (OUTPATIENT)
Dept: INTERNAL MEDICINE | Facility: CLINIC | Age: 79
End: 2019-08-27

## 2019-08-27 RX ORDER — SODIUM CHLORIDE 0.9 % (FLUSH) 0.9 %
10 SYRINGE (ML) INJECTION
Status: CANCELLED | OUTPATIENT
Start: 2019-08-27

## 2019-08-27 RX ORDER — HEPARIN 100 UNIT/ML
500 SYRINGE INTRAVENOUS
Status: CANCELLED | OUTPATIENT
Start: 2019-08-27

## 2019-08-27 RX ORDER — MAGNESIUM SULFATE 1 G/100ML
1 INJECTION INTRAVENOUS
Status: CANCELLED
Start: 2019-08-27

## 2019-08-28 ENCOUNTER — INFUSION (OUTPATIENT)
Dept: INFUSION THERAPY | Facility: HOSPITAL | Age: 79
End: 2019-08-28
Attending: INTERNAL MEDICINE
Payer: MEDICARE

## 2019-08-28 VITALS
HEART RATE: 70 BPM | OXYGEN SATURATION: 99 % | SYSTOLIC BLOOD PRESSURE: 136 MMHG | RESPIRATION RATE: 16 BRPM | TEMPERATURE: 98 F | DIASTOLIC BLOOD PRESSURE: 76 MMHG

## 2019-08-28 DIAGNOSIS — C34.12 MALIGNANT NEOPLASM OF UPPER LOBE OF LEFT LUNG: Primary | ICD-10-CM

## 2019-08-28 DIAGNOSIS — Z85.118 HX OF CANCER OF LUNG: ICD-10-CM

## 2019-08-28 PROCEDURE — 63600175 PHARM REV CODE 636 W HCPCS: Performed by: NURSE PRACTITIONER

## 2019-08-28 PROCEDURE — 96365 THER/PROPH/DIAG IV INF INIT: CPT

## 2019-08-28 PROCEDURE — 96361 HYDRATE IV INFUSION ADD-ON: CPT

## 2019-08-28 RX ORDER — MAGNESIUM SULFATE 1 G/100ML
1 INJECTION INTRAVENOUS
Status: COMPLETED | OUTPATIENT
Start: 2019-08-28 | End: 2019-08-28

## 2019-08-28 RX ORDER — HEPARIN 100 UNIT/ML
500 SYRINGE INTRAVENOUS
Status: DISCONTINUED | OUTPATIENT
Start: 2019-08-28 | End: 2019-08-28 | Stop reason: HOSPADM

## 2019-08-28 RX ADMIN — MAGNESIUM SULFATE HEPTAHYDRATE 1 G: 1 INJECTION, SOLUTION INTRAVENOUS at 11:08

## 2019-08-28 RX ADMIN — HEPARIN SODIUM (PORCINE) LOCK FLUSH IV SOLN 100 UNIT/ML 500 UNITS: 100 SOLUTION at 01:08

## 2019-08-28 RX ADMIN — SODIUM CHLORIDE 1000 ML: 0.9 INJECTION, SOLUTION INTRAVENOUS at 11:08

## 2019-08-28 NOTE — PLAN OF CARE
Problem: Adult Inpatient Plan of Care  Goal: Plan of Care Review  Outcome: Ongoing (interventions implemented as appropriate)  Pt states I feel good today, my oxygen levels are good

## 2019-08-29 ENCOUNTER — OFFICE VISIT (OUTPATIENT)
Dept: CARDIOLOGY | Facility: CLINIC | Age: 79
End: 2019-08-29
Payer: MEDICARE

## 2019-08-29 ENCOUNTER — OFFICE VISIT (OUTPATIENT)
Dept: INTERNAL MEDICINE | Facility: CLINIC | Age: 79
End: 2019-08-29
Payer: MEDICARE

## 2019-08-29 VITALS
DIASTOLIC BLOOD PRESSURE: 64 MMHG | WEIGHT: 181.69 LBS | SYSTOLIC BLOOD PRESSURE: 124 MMHG | HEART RATE: 64 BPM | HEIGHT: 70 IN | BODY MASS INDEX: 26.01 KG/M2

## 2019-08-29 VITALS
WEIGHT: 181.44 LBS | TEMPERATURE: 98 F | DIASTOLIC BLOOD PRESSURE: 60 MMHG | BODY MASS INDEX: 25.98 KG/M2 | HEART RATE: 80 BPM | HEIGHT: 70 IN | SYSTOLIC BLOOD PRESSURE: 120 MMHG | RESPIRATION RATE: 20 BRPM

## 2019-08-29 DIAGNOSIS — I25.10 CORONARY ARTERY DISEASE INVOLVING NATIVE CORONARY ARTERY OF NATIVE HEART WITHOUT ANGINA PECTORIS: ICD-10-CM

## 2019-08-29 DIAGNOSIS — I25.10 CORONARY ARTERY DISEASE INVOLVING NATIVE CORONARY ARTERY OF NATIVE HEART WITHOUT ANGINA PECTORIS: Primary | ICD-10-CM

## 2019-08-29 DIAGNOSIS — I10 ESSENTIAL HYPERTENSION: ICD-10-CM

## 2019-08-29 DIAGNOSIS — J39.8 TRACHEAL MASS: ICD-10-CM

## 2019-08-29 DIAGNOSIS — E78.00 PURE HYPERCHOLESTEROLEMIA: ICD-10-CM

## 2019-08-29 DIAGNOSIS — I48.20 CHRONIC ATRIAL FIBRILLATION: ICD-10-CM

## 2019-08-29 DIAGNOSIS — J41.0 SIMPLE CHRONIC BRONCHITIS: Primary | ICD-10-CM

## 2019-08-29 DIAGNOSIS — E86.0 DEHYDRATION: ICD-10-CM

## 2019-08-29 PROCEDURE — 3074F SYST BP LT 130 MM HG: CPT | Mod: CPTII,S$GLB,, | Performed by: INTERNAL MEDICINE

## 2019-08-29 PROCEDURE — 1101F PR PT FALLS ASSESS DOC 0-1 FALLS W/OUT INJ PAST YR: ICD-10-PCS | Mod: CPTII,S$GLB,, | Performed by: INTERNAL MEDICINE

## 2019-08-29 PROCEDURE — 3078F DIAST BP <80 MM HG: CPT | Mod: CPTII,S$GLB,, | Performed by: INTERNAL MEDICINE

## 2019-08-29 PROCEDURE — 3074F PR MOST RECENT SYSTOLIC BLOOD PRESSURE < 130 MM HG: ICD-10-PCS | Mod: CPTII,S$GLB,, | Performed by: INTERNAL MEDICINE

## 2019-08-29 PROCEDURE — 99214 OFFICE O/P EST MOD 30 MIN: CPT | Mod: S$GLB,,, | Performed by: INTERNAL MEDICINE

## 2019-08-29 PROCEDURE — 1101F PT FALLS ASSESS-DOCD LE1/YR: CPT | Mod: CPTII,S$GLB,, | Performed by: INTERNAL MEDICINE

## 2019-08-29 PROCEDURE — 99999 PR PBB SHADOW E&M-EST. PATIENT-LVL III: CPT | Mod: PBBFAC,,, | Performed by: INTERNAL MEDICINE

## 2019-08-29 PROCEDURE — 3078F PR MOST RECENT DIASTOLIC BLOOD PRESSURE < 80 MM HG: ICD-10-PCS | Mod: CPTII,S$GLB,, | Performed by: INTERNAL MEDICINE

## 2019-08-29 PROCEDURE — 99999 PR PBB SHADOW E&M-EST. PATIENT-LVL III: ICD-10-PCS | Mod: PBBFAC,,, | Performed by: INTERNAL MEDICINE

## 2019-08-29 PROCEDURE — 99214 PR OFFICE/OUTPT VISIT, EST, LEVL IV, 30-39 MIN: ICD-10-PCS | Mod: S$GLB,,, | Performed by: INTERNAL MEDICINE

## 2019-08-29 RX ORDER — SODIUM CHLORIDE 0.9 % (FLUSH) 0.9 %
10 SYRINGE (ML) INJECTION
Status: CANCELLED | OUTPATIENT
Start: 2019-08-29

## 2019-08-29 RX ORDER — HEPARIN 100 UNIT/ML
500 SYRINGE INTRAVENOUS
Status: CANCELLED | OUTPATIENT
Start: 2019-08-29

## 2019-08-29 RX ORDER — MAGNESIUM SULFATE 1 G/100ML
1 INJECTION INTRAVENOUS
Status: CANCELLED
Start: 2019-08-29

## 2019-08-29 NOTE — PROGRESS NOTES
"Subjective:       Patient ID: Chai Murry is a 79 y.o. male.    Chief Complaint: Hospital Follow Up    HPI Patient is a 79-year-old male presenting today following up on recent hospital visit.  He was in the infusion center and they noted that he was having chest pain.  The given nitroglycerin which resolved the chest pain.  He subsequently was sent to the emergency room.  In the emergency room he had positive troponins and was diagnosed of having a non ST segment elevated MI.  He was admitted to the hospital.  Cardiology was consulted.  He was started on Ranexa and he had no further issues with chest pain.  He has also been continued on Eliquis for his atrial fibrillation.    His blood pressure is doing well today.  He is not having problems with blood pressure.  There has been no further issues with chest pain or signs of cardiac decompensation.    He has been treated for a lung cancer and had a subsequent tracheal mass discovered after treatment.  He has recently completed chemotherapy.  He has had some issues with dehydration that were also noted during the hospitalization.  He is currently going to the infusion center to get a little IV fluids every other day.  He will get his final treatment of that tomorrow.  He states that since getting out of the hospital he is eating drinking normally at this time.    Review of Systems   Constitutional: Negative for fever and unexpected weight change.   Respiratory: Negative for cough, shortness of breath and wheezing.    Cardiovascular: Negative for chest pain and palpitations.   Gastrointestinal: Negative for constipation, diarrhea, nausea and vomiting.   Genitourinary: Negative for dysuria and hematuria.       Objective:   /60   Pulse 80   Temp 97.9 °F (36.6 °C) (Tympanic)   Resp 20   Ht 5' 10" (1.778 m)   Wt 82.3 kg (181 lb 7 oz)   BMI 26.03 kg/m²      Physical Exam   Constitutional: He appears well-developed and well-nourished.   HENT:   Head: " Normocephalic and atraumatic.   Eyes: Pupils are equal, round, and reactive to light.   Neck: Neck supple. No thyromegaly present.   Cardiovascular: Normal rate, regular rhythm and normal heart sounds. Exam reveals no gallop and no friction rub.   No murmur heard.  Pulmonary/Chest: Breath sounds normal. He has no wheezes. He has no rales.   Abdominal: Soft. Bowel sounds are normal. He exhibits no distension. There is no tenderness.   Vitals reviewed.      Lab Visit on 08/26/2019   Component Date Value    Sodium 08/26/2019 143     Potassium 08/26/2019 3.6     Chloride 08/26/2019 101     CO2 08/26/2019 29     Glucose 08/26/2019 96     BUN, Bld 08/26/2019 21     Creatinine 08/26/2019 1.5*    Calcium 08/26/2019 9.5     Total Protein 08/26/2019 6.8     Albumin 08/26/2019 2.9*    Total Bilirubin 08/26/2019 0.6     Alkaline Phosphatase 08/26/2019 56     AST 08/26/2019 14     ALT 08/26/2019 12     Anion Gap 08/26/2019 13     eGFR if  08/26/2019 51*    eGFR if non African Amer* 08/26/2019 44*    WBC 08/26/2019 3.66*    RBC 08/26/2019 3.18*    Hemoglobin 08/26/2019 9.8*    Hematocrit 08/26/2019 30.0*    Mean Corpuscular Volume 08/26/2019 94     Mean Corpuscular Hemoglo* 08/26/2019 30.8     Mean Corpuscular Hemoglo* 08/26/2019 32.7     RDW 08/26/2019 16.2*    Platelets 08/26/2019 179     MPV 08/26/2019 9.5     Immature Granulocytes 08/26/2019 1.4*    Gran # (ANC) 08/26/2019 2.5     Immature Grans (Abs) 08/26/2019 0.05*    Lymph # 08/26/2019 0.5*    Mono # 08/26/2019 0.5     Eos # 08/26/2019 0.1     Baso # 08/26/2019 0.01     nRBC 08/26/2019 0     Gran% 08/26/2019 66.9     Lymph% 08/26/2019 14.2*    Mono% 08/26/2019 14.8     Eosinophil% 08/26/2019 3.8     Basophil% 08/26/2019 0.3     Differential Method 08/26/2019 Automated     Magnesium 08/26/2019 1.5*   Admission on 08/23/2019, Discharged on 08/24/2019   Component Date Value    WBC 08/23/2019 2.86*    RBC 08/23/2019  2.97*    Hemoglobin 08/23/2019 9.0*    Hematocrit 08/23/2019 27.1*    Mean Corpuscular Volume 08/23/2019 91     Mean Corpuscular Hemoglo* 08/23/2019 30.3     Mean Corpuscular Hemoglo* 08/23/2019 33.2     RDW 08/23/2019 16.1*    Platelets 08/23/2019 113*    MPV 08/23/2019 9.9     Gran # (ANC) 08/23/2019 2.4     Lymph # 08/23/2019 0.3*    Mono # 08/23/2019 0.2*    Eos # 08/23/2019 0.0     Baso # 08/23/2019 0.00     Gran% 08/23/2019 82.9*    Lymph% 08/23/2019 9.8*    Mono% 08/23/2019 7.3     Eosinophil% 08/23/2019 0.0     Basophil% 08/23/2019 0.0     Differential Method 08/23/2019 Automated     Sodium 08/23/2019 141     Potassium 08/23/2019 4.3     Chloride 08/23/2019 105     CO2 08/23/2019 23     Glucose 08/23/2019 113*    BUN, Bld 08/23/2019 22     Creatinine 08/23/2019 1.1     Calcium 08/23/2019 8.2*    Total Protein 08/23/2019 6.1     Albumin 08/23/2019 2.9*    Total Bilirubin 08/23/2019 0.4     Alkaline Phosphatase 08/23/2019 59     AST 08/23/2019 22     ALT 08/23/2019 30     Anion Gap 08/23/2019 13     eGFR if  08/23/2019 >60     eGFR if non  Amer* 08/23/2019 >60     Troponin I 08/23/2019 0.102*    BNP 08/23/2019 846*    Prothrombin Time 08/23/2019 11.6     INR 08/23/2019 1.1     aPTT 08/23/2019 25.5     POC PTWBT 08/23/2019 14.1     POC PTINR 08/23/2019 1.2     Sample 08/23/2019 unknown     Cholesterol 08/24/2019 150     Triglycerides 08/24/2019 186*    HDL 08/24/2019 34*    LDL Cholesterol 08/24/2019 78.8     Hdl/Cholesterol Ratio 08/24/2019 22.7     Total Cholesterol/HDL Ra* 08/24/2019 4.4     Non-HDL Cholesterol 08/24/2019 116     TSH 08/24/2019 0.640     Hemoglobin A1C 08/24/2019 6.3*    Estimated Avg Glucose 08/24/2019 134*    Troponin I 08/23/2019 0.089*    aPTT 08/24/2019 142.0*    WBC 08/24/2019 2.99*    RBC 08/24/2019 3.06*    Hemoglobin 08/24/2019 9.1*    Hematocrit 08/24/2019 28.2*    Mean Corpuscular Volume 08/24/2019  92     Mean Corpuscular Hemoglo* 08/24/2019 29.7     Mean Corpuscular Hemoglo* 08/24/2019 32.3     RDW 08/24/2019 16.4*    Platelets 08/24/2019 123*    MPV 08/24/2019 10.2     Gran # (ANC) 08/24/2019 2.1     Lymph # 08/24/2019 0.5*    Mono # 08/24/2019 0.4     Eos # 08/24/2019 0.1     Baso # 08/24/2019 0.01     Gran% 08/24/2019 68.9     Lymph% 08/24/2019 16.7*    Mono% 08/24/2019 12.4     Eosinophil% 08/24/2019 2.0     Basophil% 08/24/2019 0.3     Differential Method 08/24/2019 Automated     Troponin I 08/24/2019 0.090*    Sodium 08/24/2019 139     Potassium 08/24/2019 3.6     Chloride 08/24/2019 103     CO2 08/24/2019 25     Glucose 08/24/2019 169*    BUN, Bld 08/24/2019 21     Creatinine 08/24/2019 1.0     Calcium 08/24/2019 8.0*    Anion Gap 08/24/2019 11     eGFR if  08/24/2019 >60     eGFR if non  Amer* 08/24/2019 >60     Magnesium 08/24/2019 0.9*    aPTT 08/24/2019 26.5     aPTT 08/24/2019 25.0     QEF 08/24/2019 60     Mitral Valve Regurgitati* 08/24/2019 MILD     Aortic Valve Regurgitati* 08/24/2019 MODERATE*    Aortic Valve Stenosis 08/24/2019 MILD*    Est. PA Systolic Pressure 08/24/2019 38.28     Mitral Valve Mobility 08/24/2019 MILDLY RESTRICTED     Tricuspid Valve Regurgit* 08/24/2019 MILD    Infusion on 08/20/2019   Component Date Value    Magnesium 08/20/2019 1.5*    Sodium 08/20/2019 140     Potassium 08/20/2019 4.5     Chloride 08/20/2019 104     CO2 08/20/2019 26     Glucose 08/20/2019 113*    BUN, Bld 08/20/2019 24*    Creatinine 08/20/2019 1.1     Calcium 08/20/2019 8.7     Total Protein 08/20/2019 6.6     Albumin 08/20/2019 3.4*    Total Bilirubin 08/20/2019 0.6     Alkaline Phosphatase 08/20/2019 75     AST 08/20/2019 49*    ALT 08/20/2019 51*    Anion Gap 08/20/2019 10     eGFR if African American 08/20/2019 >60     eGFR if non African Amer* 08/20/2019 >60     WBC 08/20/2019 2.60*    RBC 08/20/2019 3.50*     Hemoglobin 08/20/2019 10.6*    Hematocrit 08/20/2019 32.0*    Mean Corpuscular Volume 08/20/2019 91     Mean Corpuscular Hemoglo* 08/20/2019 30.3     Mean Corpuscular Hemoglo* 08/20/2019 33.1     RDW 08/20/2019 14.7*    Platelets 08/20/2019 95*    MPV 08/20/2019 10.5     Immature Granulocytes 08/20/2019 0.4     Gran # (ANC) 08/20/2019 1.8     Immature Grans (Abs) 08/20/2019 0.01     Lymph # 08/20/2019 0.4*    Mono # 08/20/2019 0.3     Eos # 08/20/2019 0.1     Baso # 08/20/2019 0.01     nRBC 08/20/2019 0     Gran% 08/20/2019 68.0     Lymph% 08/20/2019 16.2*    Mono% 08/20/2019 10.8     Eosinophil% 08/20/2019 4.6     Basophil% 08/20/2019 0.4     Differential Method 08/20/2019 Automated        Assessment:       1. Coronary artery disease involving native coronary artery of native heart without angina pectoris    2. Essential hypertension    3. 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 Dehydration        Plan:   No problem-specific Assessment & Plan notes found for this encounter.    Coronary artery disease involving native coronary artery of native heart without angina pectoris  Comments:  Stable on Ranexa now.  NSTEMI last week. FOllow up with Cardiology as planned.    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    Dehydration  Comments:  Focus on good oral intake, fluids and food.  Orders:  -     Basic metabolic panel; Future; Expected date: 09/05/2019          Follow up in about 8 weeks (around 10/24/2019).

## 2019-08-29 NOTE — PROGRESS NOTES
Subjective:   Patient ID:  Chai Murry is a 79 y.o. male who presents for follow-up of Follow-up  Pt recently admitted for dehydration and afib.Pt discharged in NSR. Pt now on eliquis    Hypertension   This is a chronic problem. The current episode started more than 1 year ago. The problem has been gradually improving since onset. The problem is controlled. Pertinent negatives include no chest pain, palpitations or shortness of breath. Past treatments include ACE inhibitors and calcium channel blockers. The current treatment provides moderate improvement. There are no compliance problems.    Hyperlipidemia   This is a chronic problem. The current episode started more than 1 year ago. Pertinent negatives include no chest pain or shortness of breath. Current antihyperlipidemic treatment includes fibric acid derivatives. The current treatment provides moderate improvement of lipids. There are no compliance problems.        Review of Systems   Constitution: Negative. Negative for weight gain.   HENT: Negative.    Eyes: Negative.    Cardiovascular: Negative.  Negative for chest pain, leg swelling and palpitations.   Respiratory: Negative.  Negative for shortness of breath.    Endocrine: Negative.    Hematologic/Lymphatic: Negative.    Skin: Negative.    Musculoskeletal: Negative for muscle weakness.   Gastrointestinal: Negative.    Genitourinary: Negative.    Neurological: Negative.  Negative for dizziness.   Psychiatric/Behavioral: Negative.    Allergic/Immunologic: Negative.      Family History   Problem Relation Age of Onset    Esophageal cancer Sister     Cancer Sister     Lung cancer Mother     Cancer Mother     Cataracts Mother     Hypertension Mother     Pneumonia Father     Cataracts Father     Hypertension Father     Cancer Brother     Hypertension Brother     Blindness Neg Hx     Diabetes Neg Hx     Glaucoma Neg Hx     Macular degeneration Neg Hx     Retinal detachment Neg Hx     Strabismus  Neg Hx     Stroke Neg Hx     Thyroid disease Neg Hx      Past Medical History:   Diagnosis Date    Anemia     Arthritis     Atrial fibrillation     CAD (coronary artery disease)     Cataract     COPD (chronic obstructive pulmonary disease)     Diverticulosis     ED (erectile dysfunction)     HTN (hypertension)     Hyperlipidemia     Lung cancer     left    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 Courtney dwyer bx     Social History     Socioeconomic History    Marital status:      Spouse name: Not on file    Number of children: 3    Years of education: Not on file    Highest education level: Not on file   Occupational History    Occupation: retired   Social Needs    Financial resource strain: Not on file    Food insecurity:     Worry: Not on file     Inability: Not on file    Transportation needs:     Medical: Not on file     Non-medical: Not on file   Tobacco Use    Smoking status: Former Smoker     Packs/day: 1.00     Years: 50.00     Pack years: 50.00     Last attempt to quit: 2005     Years since quittin.0    Smokeless tobacco: Never Used   Substance and Sexual Activity    Alcohol use: No    Drug use: No    Sexual activity: Not Currently   Lifestyle    Physical activity:     Days per week: Not on file     Minutes per session: Not on file    Stress: Not on file   Relationships    Social connections:     Talks on phone: Not on file     Gets together: Not on file     Attends Scientologist service: Not on file     Active member of club or organization: Not on file     Attends meetings of clubs or organizations: Not on file     Relationship status: Not on file   Other Topics Concern    Not on file   Social History Narrative    Not on file     Current Outpatient Medications on File Prior to Visit   Medication Sig Dispense Refill    (Magic mouthwash) 1:1:1 Benadryl 12.5mg/5ml liq, aluminum & magnesium hydroxide-simehticone (Maalox),  lidocaine viscous 2% Swish and spit 15 mLs every 4 (four) hours as needed. for mouth sores 90 mL 2    albuterol (PROVENTIL) 2.5 mg /3 mL (0.083 %) nebulizer solution Take 3 mLs (2.5 mg total) by nebulization every 6 (six) hours while awake. 270 mL 11    amLODIPine (NORVASC) 5 MG tablet Take 1 tablet (5 mg total) by mouth once daily.      apixaban (ELIQUIS) 5 mg Tab Take 1 tablet (5 mg total) by mouth 2 (two) times daily. 60 tablet 0    aspirin (ECOTRIN) 81 MG EC tablet Take 81 mg by mouth once daily.      BREO ELLIPTA 100-25 mcg/dose diskus inhaler INHALE 1 PUFF INTO THE LUNGS ONCE DAILY  5    doxepin (SINEQUAN) 10 MG capsule TAKE 1 CAPSULE (10 MG TOTAL) BY MOUTH EVERY EVENING. 30 capsule 10    fenofibric acid (FIBRICOR) 135 mg CpDR TAKE 1 CAPSULE BY MOUTH EVERY DAY 30 capsule 11    fluticasone (FLONASE) 50 mcg/actuation nasal spray 2 sprays (100 mcg total) by Each Nare route once daily. (Patient taking differently: 2 sprays by Each Nare route as needed. ) 3 Bottle 3    glycopyrrolate-formoterol (BEVESPI AEROSPHERE) 9-4.8 mcg HFAA Inhale 2 puffs into the lungs 2 (two) times daily. Controller 10.9 g 11    HYDROcodone-acetaminophen (NORCO) 5-325 mg per tablet Take 1 tablet by mouth every 6 (six) hours as needed for Pain. 40 tablet 0    ipratropium-albuterol (COMBIVENT RESPIMAT)  mcg/actuation inhaler Inhale 1 puff into the lungs every 6 (six) hours as needed for Shortness of Breath. 4 g 11    levocetirizine (XYZAL) 5 MG tablet Take 5 mg by mouth as needed.       lisinopril (PRINIVIL,ZESTRIL) 40 MG tablet TAKE 1 TABLET BY MOUTH DAILY 30 tablet 11    ondansetron (ZOFRAN) 4 MG tablet Take 1 tablet (4 mg total) by mouth every 12 (twelve) hours as needed for Nausea. 30 tablet 1    ranolazine (RANEXA) 500 MG Tb12 Take 1 tablet (500 mg total) by mouth 2 (two) times daily. 60 tablet 0    simvastatin (ZOCOR) 40 MG tablet Take 1 tablet (40 mg total) by mouth every evening. 30 tablet 11    SOTALOL AF 80 mg  tablet TAKE 1 TABLET BY MOUTH TWICE A DAY 60 tablet 10     Current Facility-Administered Medications on File Prior to Visit   Medication Dose Route Frequency Provider Last Rate Last Dose    lactated ringers infusion   Intravenous Continuous Michael Hameed MD   Stopped at 07/03/19 0810    testosterone cypionate injection 200 mg  200 mg Intramuscular Q21 Days Peter Teixeira MD   200 mg at 08/22/19 0927    [DISCONTINUED] heparin, porcine (PF) 100 unit/mL injection flush 500 Units  500 Units Intravenous PRN Holley Werner NP   500 Units at 08/28/19 1312     Review of patient's allergies indicates:   Allergen Reactions    Atorvastatin      Other reaction(s): Muscle pain    Bactrim [sulfamethoxazole-trimethoprim]      Lip swelling       Objective:     Physical Exam   Constitutional: He is oriented to person, place, and time. He appears well-developed and well-nourished.   HENT:   Head: Normocephalic and atraumatic.   Eyes: Pupils are equal, round, and reactive to light. Conjunctivae are normal.   Neck: Normal range of motion. Neck supple.   Cardiovascular: Normal rate, regular rhythm, normal heart sounds and intact distal pulses.   Pulmonary/Chest: Effort normal and breath sounds normal.   Abdominal: Soft. Bowel sounds are normal.   Neurological: He is alert and oriented to person, place, and time.   Skin: Skin is warm and dry.   Psychiatric: He has a normal mood and affect.   Nursing note and vitals reviewed.      Assessment:     1. Simple chronic bronchitis    2. Coronary artery disease involving native coronary artery of native heart without angina pectoris    3. Pure hypercholesterolemia    4. Essential hypertension    5. Chronic atrial fibrillation        Plan:     Simple chronic bronchitis    Coronary artery disease involving native coronary artery of native heart without angina pectoris    Pure hypercholesterolemia    Essential hypertension    Chronic atrial fibrillation        Continue norvasc,  lisinopril-htn  Continue asa- cad  Continue sotalol- afib now NSR  holter/zio

## 2019-08-30 ENCOUNTER — INFUSION (OUTPATIENT)
Dept: INFUSION THERAPY | Facility: HOSPITAL | Age: 79
End: 2019-08-30
Attending: INTERNAL MEDICINE
Payer: MEDICARE

## 2019-08-30 VITALS
SYSTOLIC BLOOD PRESSURE: 154 MMHG | OXYGEN SATURATION: 95 % | RESPIRATION RATE: 16 BRPM | HEART RATE: 69 BPM | TEMPERATURE: 97 F | DIASTOLIC BLOOD PRESSURE: 78 MMHG

## 2019-08-30 DIAGNOSIS — Z85.118 HX OF CANCER OF LUNG: ICD-10-CM

## 2019-08-30 DIAGNOSIS — C34.12 MALIGNANT NEOPLASM OF UPPER LOBE OF LEFT LUNG: Primary | ICD-10-CM

## 2019-08-30 PROCEDURE — 63600175 PHARM REV CODE 636 W HCPCS: Performed by: NURSE PRACTITIONER

## 2019-08-30 PROCEDURE — 96361 HYDRATE IV INFUSION ADD-ON: CPT

## 2019-08-30 PROCEDURE — 96365 THER/PROPH/DIAG IV INF INIT: CPT

## 2019-08-30 RX ORDER — MAGNESIUM SULFATE 1 G/100ML
1 INJECTION INTRAVENOUS
Status: COMPLETED | OUTPATIENT
Start: 2019-08-30 | End: 2019-08-30

## 2019-08-30 RX ORDER — HEPARIN 100 UNIT/ML
500 SYRINGE INTRAVENOUS
Status: DISCONTINUED | OUTPATIENT
Start: 2019-08-30 | End: 2019-08-30 | Stop reason: HOSPADM

## 2019-08-30 RX ADMIN — MAGNESIUM SULFATE HEPTAHYDRATE 1 G: 1 INJECTION, SOLUTION INTRAVENOUS at 10:08

## 2019-08-30 RX ADMIN — SODIUM CHLORIDE 1000 ML: 0.9 INJECTION, SOLUTION INTRAVENOUS at 10:08

## 2019-08-30 RX ADMIN — HEPARIN SODIUM (PORCINE) LOCK FLUSH IV SOLN 100 UNIT/ML 500 UNITS: 100 SOLUTION at 12:08

## 2019-08-30 NOTE — PLAN OF CARE
Problem: Adult Inpatient Plan of Care  Goal: Plan of Care Review  Outcome: Ongoing (interventions implemented as appropriate)  Pt states I feel good today

## 2019-09-02 ENCOUNTER — PATIENT MESSAGE (OUTPATIENT)
Dept: HEMATOLOGY/ONCOLOGY | Facility: CLINIC | Age: 79
End: 2019-09-02

## 2019-09-03 ENCOUNTER — TELEPHONE (OUTPATIENT)
Dept: HEMATOLOGY/ONCOLOGY | Facility: CLINIC | Age: 79
End: 2019-09-03

## 2019-09-03 NOTE — TELEPHONE ENCOUNTER
Spoke to pt and he stated he cancelled th appt and does not need to see the Dr. Pt declined to reschedule his appt. Spoke with Dr. Vila and he stated pt needs f/u appt for monitoring. Will call pt back to set up F/u appt.

## 2019-09-05 ENCOUNTER — LAB VISIT (OUTPATIENT)
Dept: LAB | Facility: HOSPITAL | Age: 79
End: 2019-09-05
Attending: INTERNAL MEDICINE
Payer: MEDICARE

## 2019-09-05 DIAGNOSIS — E86.0 DEHYDRATION: ICD-10-CM

## 2019-09-05 PROCEDURE — 80048 BASIC METABOLIC PNL TOTAL CA: CPT

## 2019-09-05 PROCEDURE — 36415 COLL VENOUS BLD VENIPUNCTURE: CPT | Mod: PO

## 2019-09-06 LAB
ANION GAP SERPL CALC-SCNC: 10 MMOL/L (ref 8–16)
BUN SERPL-MCNC: 23 MG/DL (ref 8–23)
CALCIUM SERPL-MCNC: 9.5 MG/DL (ref 8.7–10.5)
CHLORIDE SERPL-SCNC: 102 MMOL/L (ref 95–110)
CO2 SERPL-SCNC: 28 MMOL/L (ref 23–29)
CREAT SERPL-MCNC: 1.3 MG/DL (ref 0.5–1.4)
EST. GFR  (AFRICAN AMERICAN): >60 ML/MIN/1.73 M^2
EST. GFR  (NON AFRICAN AMERICAN): 52.3 ML/MIN/1.73 M^2
GLUCOSE SERPL-MCNC: 112 MG/DL (ref 70–110)
POTASSIUM SERPL-SCNC: 4.1 MMOL/L (ref 3.5–5.1)
SODIUM SERPL-SCNC: 140 MMOL/L (ref 136–145)

## 2019-09-09 NOTE — PROGRESS NOTES
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 16 left pneumonectomy Dr. Wall returned moderately differentiated 3.8 cm squamous cell carcinoma with a positive AP window lymph node. A total of 3 nodes were sampled. Indeterminate lymph vascular invasion was seen There was a positive bronchial resection margin but in my discussion with Dr. Garza, the margin was apparently cleared in discussion with the surgeon.   2. He completed adjuvant radiation at [facility] and weekly carboplatin paclitaxel x 6.   3. 19 CT chest, abdomen and pelvis showed slight growth in a soft tissue nodule in the superior mediastinum since , and the nodule was not present in 2016. An additional subcentimeter short axis lymph node in the right paratracheal space was also slightly enlarged. There was also nodular mucosal thickening along the posterior lateral left intrathoracic tracheal airway adherent secretions versus a polypoid lesion.   4. 5/15/19 PET showed 12 SUV along the left and posterior trachea with soft tissue invasion of the left side of the trachea just above T1. It was unclear if this was a lymph node extrinsic to the trachea versus a mass in the trachea or esophagus. There was also a left paratracheal lymph node 6.4 SUV.   5.  19 EUS showed esophagitis in the upper 3rd esophagus. There was a moderate intrinsic stenosis in the upper 3rd of the esophagus. There was an irregular mediastinal mass 25 cm from the incisors measuring 2.5 x 3 cm cross-sectional. Pathology returned poorly differentiated carcinoma, favor squamous cell carcinoma.   6. 19 bronchoscopy left local cord paralysis, non obstructing mass at the orifice in the subglottic area,  "endobronchial, friable, infiltrative and ulcerative. Pathology squamous cell carcinoma.   7. 8/14/19 completed 50.4 Gy radiation with cisplatin mediastinal and tracheal recurrence     CURRENT STATUS: Chai Murry is a pleasant 79 y.o. male who presents today for follow-up.  He was given fluids and magnesium few weeks ago after my appointment.  He is following pulmonology and was recently given prednisone taper starting at 60 mg a day and albuterol nebulizer.  August 24th he went to the ER with left chest pain radiating to the left upper extremity.  He was thought to have an NSTEMI.  Cardiology recommended medical management adding Ranexa and Eliquis.  He is following his primary physician.  He received more fluids at the end of August with Dr. Vila.  He had some mild sore swallowing treated with Magic mouthwash week or 2 after radiation but this has resolved.  He continues have cough with liquids but not solids.  His appetite is improving and he has gained about 4 lb in the last 2 weeks.    PHYSICAL EXAM:   Constitutional: well appearing, no acute distress, ECOG 0 - Fully Active  Vitals:    BP (!) 150/70   Pulse 75   Temp 97.8 °F (36.6 °C)   Resp 18   Ht 5' 10" (1.778 m)   Wt 83.8 kg (184 lb 11.2 oz)   SpO2 95%   BMI 26.50 kg/m²   ENT:  Stable hoarseness of voice  Neck: trachea midline, neck supple  Lymphatic: no cervical, supraclavicular adenopathy  Cardiovascular: regular rate, no murmurs, no edema of the upper or lower extremities, radial pulse 2+  Respiratory: unlabored effort, clear to auscultation, no wheezes  Abdomen: soft, non-tender, no rigidity, no masses, no hepatomegaly    Laboratory & X-Ray Findings: Per above.      ASSESSMENT:  Recovering well from acute radiation toxicity    PLAN:  I discussed the case with Dr. Vila.  I have ordered a PET scan and based on the results of the PET scan Dr. Vila consider either Durvalumab if there is no distant progression, or possibly Keytruda distant " progression.  We will hold off on bronchoscopy.  He had a good response of the endotracheal lesion during radiation based on the daily CT scan I performed.  I will also send him to Dr. Jackson at his request to discuss vocal cord procedures to help his voice.  I will see him back in 2 months.      HERRERA Castro M.D.  Radiation Oncology  [facility] [address] [place] [address]  Ph: [phone]  [email or web address]

## 2019-09-12 ENCOUNTER — CLINICAL SUPPORT (OUTPATIENT)
Dept: INTERNAL MEDICINE | Facility: CLINIC | Age: 79
End: 2019-09-12
Payer: MEDICARE

## 2019-09-12 ENCOUNTER — TELEPHONE (OUTPATIENT)
Dept: INTERNAL MEDICINE | Facility: CLINIC | Age: 79
End: 2019-09-12

## 2019-09-12 DIAGNOSIS — E29.1 HYPOGONADISM IN MALE: Primary | ICD-10-CM

## 2019-09-12 PROCEDURE — 96372 THER/PROPH/DIAG INJ SC/IM: CPT | Mod: S$GLB,,, | Performed by: INTERNAL MEDICINE

## 2019-09-12 PROCEDURE — 96372 PR INJECTION,THERAP/PROPH/DIAG2ST, IM OR SUBCUT: ICD-10-PCS | Mod: S$GLB,,, | Performed by: INTERNAL MEDICINE

## 2019-09-12 PROCEDURE — 99999 PR PBB SHADOW E&M-EST. PATIENT-LVL II: CPT | Mod: PBBFAC,,,

## 2019-09-12 PROCEDURE — 99999 PR PBB SHADOW E&M-EST. PATIENT-LVL II: ICD-10-PCS | Mod: PBBFAC,,,

## 2019-09-12 RX ADMIN — TESTOSTERONE CYPIONATE 200 MG: 200 INJECTION, SOLUTION INTRAMUSCULAR at 08:09

## 2019-09-12 NOTE — TELEPHONE ENCOUNTER
----- Message from Mariel Gardner sent at 9/12/2019  3:28 PM CDT -----  Caller needs call back to check status of rx for medical supplies [phone]

## 2019-09-12 NOTE — PROGRESS NOTES
After obtaining consent, and per orders of Dr. Peter Teixeira, injection of Depo Testosterone 200 mg given by Guadalupe Lane. Patient instructed to remain in clinic for 20 minutes afterwards, and to report any adverse reaction to me immediately.    Guadalupe Lane, LPN

## 2019-09-17 RX ORDER — SOTALOL HYDROCHLORIDE 80 MG/1
TABLET ORAL
Qty: 180 TABLET | Refills: 3 | Status: SHIPPED | OUTPATIENT
Start: 2019-09-17 | End: 2020-04-02 | Stop reason: SDUPTHER

## 2019-09-19 ENCOUNTER — TELEPHONE (OUTPATIENT)
Dept: OTOLARYNGOLOGY | Facility: CLINIC | Age: 79
End: 2019-09-19

## 2019-09-19 ENCOUNTER — OFFICE VISIT (OUTPATIENT)
Dept: RADIATION ONCOLOGY | Facility: CLINIC | Age: 79
End: 2019-09-19
Payer: MEDICARE

## 2019-09-19 VITALS
HEIGHT: 70 IN | DIASTOLIC BLOOD PRESSURE: 70 MMHG | HEART RATE: 75 BPM | TEMPERATURE: 98 F | WEIGHT: 184.69 LBS | OXYGEN SATURATION: 95 % | SYSTOLIC BLOOD PRESSURE: 150 MMHG | RESPIRATION RATE: 18 BRPM | BODY MASS INDEX: 26.44 KG/M2

## 2019-09-19 DIAGNOSIS — C34.12 MALIGNANT NEOPLASM OF UPPER LOBE OF LEFT LUNG: ICD-10-CM

## 2019-09-19 DIAGNOSIS — J38.00 VOCAL CORD PARALYSIS: Primary | ICD-10-CM

## 2019-09-19 DIAGNOSIS — C34.12 MALIGNANT NEOPLASM OF UPPER LOBE OF LEFT LUNG: Primary | ICD-10-CM

## 2019-09-19 PROCEDURE — 99999 PR PBB SHADOW E&M-EST. PATIENT-LVL IV: ICD-10-PCS | Mod: PBBFAC,,, | Performed by: RADIOLOGY

## 2019-09-19 PROCEDURE — 99213 OFFICE O/P EST LOW 20 MIN: CPT | Mod: S$GLB,,, | Performed by: RADIOLOGY

## 2019-09-19 PROCEDURE — 3077F SYST BP >= 140 MM HG: CPT | Mod: CPTII,S$GLB,, | Performed by: RADIOLOGY

## 2019-09-19 PROCEDURE — 1101F PR PT FALLS ASSESS DOC 0-1 FALLS W/OUT INJ PAST YR: ICD-10-PCS | Mod: CPTII,S$GLB,, | Performed by: RADIOLOGY

## 2019-09-19 PROCEDURE — 3077F PR MOST RECENT SYSTOLIC BLOOD PRESSURE >= 140 MM HG: ICD-10-PCS | Mod: CPTII,S$GLB,, | Performed by: RADIOLOGY

## 2019-09-19 PROCEDURE — 3078F DIAST BP <80 MM HG: CPT | Mod: CPTII,S$GLB,, | Performed by: RADIOLOGY

## 2019-09-19 PROCEDURE — 3078F PR MOST RECENT DIASTOLIC BLOOD PRESSURE < 80 MM HG: ICD-10-PCS | Mod: CPTII,S$GLB,, | Performed by: RADIOLOGY

## 2019-09-19 PROCEDURE — 99213 PR OFFICE/OUTPT VISIT, EST, LEVL III, 20-29 MIN: ICD-10-PCS | Mod: S$GLB,,, | Performed by: RADIOLOGY

## 2019-09-19 PROCEDURE — 1101F PT FALLS ASSESS-DOCD LE1/YR: CPT | Mod: CPTII,S$GLB,, | Performed by: RADIOLOGY

## 2019-09-19 PROCEDURE — 99999 PR PBB SHADOW E&M-EST. PATIENT-LVL IV: CPT | Mod: PBBFAC,,, | Performed by: RADIOLOGY

## 2019-09-19 RX ORDER — SIMVASTATIN 80 MG/1
80 TABLET, FILM COATED ORAL DAILY
Refills: 3 | COMMUNITY
Start: 2019-09-04 | End: 2019-10-24 | Stop reason: SDUPTHER

## 2019-09-19 NOTE — TELEPHONE ENCOUNTER
----- Message from Alejandro Castro II, MD sent at 9/19/2019 11:46 AM CDT -----  This patient completed radiation and chemo to the tracheal recurrence.  He is still having hoarsness.  He will likely receive more systemic therapy  With Dr Vila.  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

## 2019-09-19 NOTE — TELEPHONE ENCOUNTER
That would be done by Dr. Lofton in [place], laryngology.  If he is interested, please schedule him an appointment to see Dr. Lofton, referral in computer.

## 2019-09-20 ENCOUNTER — CLINICAL SUPPORT (OUTPATIENT)
Dept: CARDIOLOGY | Facility: CLINIC | Age: 79
End: 2019-09-20
Attending: INTERNAL MEDICINE
Payer: MEDICARE

## 2019-09-20 ENCOUNTER — TELEPHONE (OUTPATIENT)
Dept: INTERNAL MEDICINE | Facility: CLINIC | Age: 79
End: 2019-09-20

## 2019-09-20 DIAGNOSIS — I48.20 CHRONIC ATRIAL FIBRILLATION: ICD-10-CM

## 2019-09-20 PROCEDURE — 93224 XTRNL ECG REC UP TO 48 HRS: CPT | Mod: S$GLB,,, | Performed by: INTERNAL MEDICINE

## 2019-09-20 PROCEDURE — 93224 HOLTER MONITOR - 48 HOUR: ICD-10-PCS | Mod: S$GLB,,, | Performed by: INTERNAL MEDICINE

## 2019-09-20 NOTE — TELEPHONE ENCOUNTER
[facility] notified that Dr. Peter Teixeira will sign this.  Patient will need to see ortho. Patient was notified of this.

## 2019-09-20 NOTE — TELEPHONE ENCOUNTER
----- Message from Kiesha Galaviz sent at 9/20/2019 11:19 AM CDT -----  Contact: [facility]  This rep states this is his fourth call and wants to know if a fax was receive on this pt, no additional info given and can be reached at [phone]///[id number]

## 2019-09-24 ENCOUNTER — TELEPHONE (OUTPATIENT)
Dept: RADIATION ONCOLOGY | Facility: CLINIC | Age: 79
End: 2019-09-24

## 2019-09-24 ENCOUNTER — TELEPHONE (OUTPATIENT)
Dept: RADIOLOGY | Facility: HOSPITAL | Age: 79
End: 2019-09-24

## 2019-09-24 NOTE — TELEPHONE ENCOUNTER
----- Message from Hermelinda Alfaro sent at 9/24/2019  3:36 PM CDT -----  Contact: Patient   Patient would like a call back at [phone] or [phone], Regards to his PET scan is still pending review.    Thanks  Td

## 2019-09-25 ENCOUNTER — HOSPITAL ENCOUNTER (OUTPATIENT)
Dept: RADIOLOGY | Facility: HOSPITAL | Age: 79
Discharge: HOME | End: 2019-09-25
Attending: RADIOLOGY
Payer: MEDICARE

## 2019-09-25 DIAGNOSIS — C34.12 MALIGNANT NEOPLASM OF UPPER LOBE OF LEFT LUNG: ICD-10-CM

## 2019-09-25 PROCEDURE — 78815 PET IMAGE W/CT SKULL-THIGH: CPT | Mod: TC,PS

## 2019-09-25 PROCEDURE — A9552 F18 FDG: HCPCS

## 2019-09-25 PROCEDURE — 78815 NM PET CT ROUTINE: ICD-10-PCS | Mod: 26,PS,, | Performed by: RADIOLOGY

## 2019-09-25 PROCEDURE — 78815 PET IMAGE W/CT SKULL-THIGH: CPT | Mod: 26,PS,, | Performed by: RADIOLOGY

## 2019-09-25 NOTE — TELEPHONE ENCOUNTER
Pt notified and needs refill of eliquis sent to [facility]. Cm    ----- Message from Romain Palencia MD sent at 9/24/2019  8:29 PM CDT -----  holter with episodes of afib, continue eliquis

## 2019-09-26 ENCOUNTER — OFFICE VISIT (OUTPATIENT)
Dept: HEMATOLOGY/ONCOLOGY | Facility: CLINIC | Age: 79
End: 2019-09-26
Payer: MEDICARE

## 2019-09-26 VITALS
TEMPERATURE: 98 F | BODY MASS INDEX: 26.41 KG/M2 | OXYGEN SATURATION: 94 % | DIASTOLIC BLOOD PRESSURE: 64 MMHG | WEIGHT: 184.5 LBS | HEART RATE: 72 BPM | SYSTOLIC BLOOD PRESSURE: 128 MMHG | HEIGHT: 70 IN

## 2019-09-26 DIAGNOSIS — Z90.2 STATUS POST PNEUMONECTOMY: ICD-10-CM

## 2019-09-26 DIAGNOSIS — C34.12 MALIGNANT NEOPLASM OF UPPER LOBE OF LEFT LUNG: Primary | ICD-10-CM

## 2019-09-26 PROCEDURE — 3074F SYST BP LT 130 MM HG: CPT | Mod: CPTII,S$GLB,, | Performed by: INTERNAL MEDICINE

## 2019-09-26 PROCEDURE — 3078F DIAST BP <80 MM HG: CPT | Mod: CPTII,S$GLB,, | Performed by: INTERNAL MEDICINE

## 2019-09-26 PROCEDURE — 3074F PR MOST RECENT SYSTOLIC BLOOD PRESSURE < 130 MM HG: ICD-10-PCS | Mod: CPTII,S$GLB,, | Performed by: INTERNAL MEDICINE

## 2019-09-26 PROCEDURE — 1101F PT FALLS ASSESS-DOCD LE1/YR: CPT | Mod: CPTII,S$GLB,, | Performed by: INTERNAL MEDICINE

## 2019-09-26 PROCEDURE — 99999 PR PBB SHADOW E&M-EST. PATIENT-LVL IV: CPT | Mod: PBBFAC,,, | Performed by: INTERNAL MEDICINE

## 2019-09-26 PROCEDURE — 3078F PR MOST RECENT DIASTOLIC BLOOD PRESSURE < 80 MM HG: ICD-10-PCS | Mod: CPTII,S$GLB,, | Performed by: INTERNAL MEDICINE

## 2019-09-26 PROCEDURE — 99215 OFFICE O/P EST HI 40 MIN: CPT | Mod: S$GLB,,, | Performed by: INTERNAL MEDICINE

## 2019-09-26 PROCEDURE — 1101F PR PT FALLS ASSESS DOC 0-1 FALLS W/OUT INJ PAST YR: ICD-10-PCS | Mod: CPTII,S$GLB,, | Performed by: INTERNAL MEDICINE

## 2019-09-26 PROCEDURE — 99215 PR OFFICE/OUTPT VISIT, EST, LEVL V, 40-54 MIN: ICD-10-PCS | Mod: S$GLB,,, | Performed by: INTERNAL MEDICINE

## 2019-09-26 PROCEDURE — 99999 PR PBB SHADOW E&M-EST. PATIENT-LVL IV: ICD-10-PCS | Mod: PBBFAC,,, | Performed by: INTERNAL MEDICINE

## 2019-09-26 RX ORDER — COLESEVELAM 180 1/1
TABLET ORAL
Status: ON HOLD | COMMUNITY
Start: 2013-05-03 | End: 2019-12-06

## 2019-09-26 RX ORDER — LISINOPRIL AND HYDROCHLOROTHIAZIDE 20; 25 MG/1; MG/1
TABLET ORAL
COMMUNITY
Start: 2013-04-01 | End: 2019-10-24

## 2019-09-26 RX ORDER — CLOPIDOGREL BISULFATE 75 MG/1
TABLET ORAL
COMMUNITY
Start: 2013-05-03 | End: 2020-01-16

## 2019-09-26 NOTE — PLAN OF CARE
START ON PATHWAY REGIMEN - Non-Small Cell Lung    [id number]        Durvalumab (Imfinzi(R))           Additional Orders: Monitor for infusion reactions; interrupt or slow   infusion for grade 1 or 2 reaction occurs and consider premedication for   subsequent infusion; discontinue for grade 3 or 4 infusion reaction.   See   prescribing information for dose interruption guidelines and management of   toxicity.  Ref: Samir et al. J Clin Oncol 34:7964-6882, 2016.    **Always confirm dose/schedule in your pharmacy ordering system**    Patient Characteristics:  Stage III - Unresectable, PS = 0, 1  AJCC T Category: T4  Current Disease Status: No Distant Mets or Local Recurrence  AJCC N Category: N3  AJCC M Category: M0  AJCC 8 Stage Grouping: IIIC  Performance Status: PS = 0, 1  Intent of Therapy:  Curative Intent, Discussed with Patient

## 2019-09-26 NOTE — PROGRESS NOTES
Subjective:       Patient ID: Chai Murry is a 79 y.o. male.    Chief Complaint: Malignant neoplasm of upper lobe of left lung [C34.12]  HPI: We have an opportunity to see Mr. Chai Murry in Hematology Oncology clinic at [facility] on 09/26/2019.  Mr. Chai Murry is a 79 y.o. 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     He is s/p left pneumonectomy for a squamous cell carcinoma of the left lung.0n 4/12/2016  Prior to the surgery, the PET scan showed an FDG-avid mass in the left upper   lobe abutting the left hilum with an SUV of 11.6. There seemed to be a left   hilar adenopathy as well.  Radiologist felt that he was unable to discriminate between the mass and the   lymphadenopathy. The patient had had a bronchoscopy by Dr. Roel Ernandez on   03/04/2006 that showed a partially obstructing airway in the anterior segment of  the left upper lobe with an endobronchial lesion in the anterior segment of the  left upper lobe. The specimen from the biopsy at the time of bronchoscopy was   reported as being a squamous cell carcinoma.  At the time of the surgery after the left lung was removed, the pathologist   indicated that this was a squamous cell carcinoma. It measured 3.8 cm. The   pathology indicated that this is extending into the bronchial resection margin of the lobe. This lobe was later on removed to complete the pneumonectomy   There was one lymph node positive for metastatic squamous cell carcinoma at the   AP window.  The patient was told that we would prefer to treat him with post-op simultaneous chemo/radiation.  He had Medi-port. He started chemo/radiation therapy andreceived 6 weekly cycles of carbo/Taxol along with radiatioon.  After a month, he had a Ct scan and it showed stability   He then had 2 additional cyles of carbo/Taxol finishing in 9/27/2016.    He comes for follow up.   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 CT of the chest was non contributory, shows changes from surgery  PET showed a PET avid mass to the left of the trachea.  The case was discussed with dr annika Goldman and GI.  We discussed the possibility of doing a EUS or EBUs, and finally, because of the localization, it was decided to do a EUS with biopsy if possible.  Cytology shows recurrent squamous cell cancer.  I have asked Pathology to run a PD-L1 from his original pathology from 2016.  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  He has been discussed extensively with radiation oncology. We have decided to treat him with radiation therapy and Cisplatin as a chemo-sensitizer.  Dr Castro has reviewed the therapy ports from the previous radiation treatment and the area in question seems to be above the previously radiated fields.     Recently completed chemoradiation s/p 6 weekly cisplatin treatments.        Malignant neoplasm of upper lobe of left lung    4/12/2016 Initial Diagnosis     Malignant neoplasm of upper lobe of left lung      6/19/2019 Cancer Staged     Staging form: Lung, AJCC 8th Edition  - Clinical stage from 6/19/2019: Stage IIIB (rcT4, cN2, cM0) - Signed by Alejandro Castro II, MD on 6/19/2019 6/21/2019 Tumor Conference     Presenting Hospital / Clinic: [facility]  Virtual Tumor Board Conference: In person  Presenter: Dr. Chance Castro  Date Presented to Tumor Board: 06/21/19  Specialties Present: Medical Oncology;Radiation Oncology;Surgical Oncology;Pathology;Navigation;Plastic Surgery;Radiology;Gastrointestinal;Pulmonology  Presentation at Cancer Conference: Prospective  Cancer Type: Lung cancer  Recommended Plan: Additional screening  Recommended Plan Note: If PDL-1 positive, treat with immunotherapy  Send tissue for next generation sequencing.      6/28/2019 Tumor Conference     His case was discussed at the Multidisciplinary Head and Neck Team  Planning Meeting.    Representatives from Medical Oncology, Radiation Oncology, Head and Neck Surgical Oncology, Psychosocial Oncology, and Speech and Language Pathology discussed the case with the following recommendations:    1) treat lung cancer             7/5/2019 - 8/14/2019 Radiation Therapy     Treatment Site Ref. ID Energy Dose/Fx (Gy) #Fx Dose Correction (Gy) Total Dose (Gy) Start Date End Date Elapsed Days   [id number]:1 PTV LNs 6X 1.8 28 / 28 0 50.4 7/5/2019 8/14/2019 40             Lung cancer    6/11/2019 Initial Diagnosis     Lung cancer      7/8/2019 - 8/18/2019 Chemotherapy     Treatment Summary   Plan Name: OP HEAD NECK CISPLATIN WEEKLY + RADIOTHERAPY  Treatment Goal: Supportive  Status: Inactive  Start Date: 7/8/2019  End Date: 8/12/2019  Provider: Jorge L Patel MD  Chemotherapy: CISplatin (PLATINOL) 40 mg/m2 = 83 mg in sodium chloride 0.9% 583 mL chemo infusion, 40 mg/m2 = 83 mg (100 % of original dose 40 mg/m2), Intravenous, Clinic/HOD 1 time, 6 of 6 cycles  Dose modification: 70 mg (original dose 40 mg/m2, Cycle 1, Reason: MD Discretion), 40 mg/m2 (original dose 40 mg/m2, Cycle 1)  Administration: 83 mg (7/8/2019), 84 mg (7/15/2019), 83 mg (7/22/2019), 83 mg (7/29/2019), 83 mg (8/5/2019), 82 mg (8/12/2019)      10/3/2019 -  Chemotherapy     Treatment Summary   Plan Name: OP DURVALUMAB Q2W  Treatment Goal: Curative  Status: Active  Start Date: 10/3/2019 (Planned)  End Date: 9/17/2020 (Planned)  Provider: Fermin Vila MD  Chemotherapy: durvalumab (IMFINZI) 835 mg in sodium chloride 0.9% 266.7 mL chemo infusion, 10 mg/kg, Intravenous, Clinic/HOD 1 time, 0 of 26 cycles       Past Medical History:   Diagnosis Date    Anemia     Arthritis     Atrial fibrillation     CAD (coronary artery disease)     Cataract     COPD (chronic obstructive pulmonary disease)     Diverticulosis     ED (erectile dysfunction)     HTN (hypertension)     Hyperlipidemia     Lung cancer     left     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 Courtney dwyer bx     Family History   Problem Relation Age of Onset    Esophageal cancer Sister     Cancer Sister     Lung cancer Mother     Cancer Mother     Cataracts Mother     Hypertension Mother     Pneumonia Father     Cataracts Father     Hypertension Father     Cancer Brother     Hypertension Brother     Blindness Neg Hx     Diabetes Neg Hx     Glaucoma Neg Hx     Macular degeneration Neg Hx     Retinal detachment Neg Hx     Strabismus Neg Hx     Stroke Neg Hx     Thyroid disease Neg Hx      Social History     Socioeconomic History    Marital status:      Spouse name: Not on file    Number of children: 3    Years of education: Not on file    Highest education level: Not on file   Occupational History    Occupation: retired   Social Needs    Financial resource strain: Not on file    Food insecurity:     Worry: Not on file     Inability: Not on file    Transportation needs:     Medical: Not on file     Non-medical: Not on file   Tobacco Use    Smoking status: Former Smoker     Packs/day: 1.00     Years: 50.00     Pack years: 50.00     Last attempt to quit: 2005     Years since quittin.1    Smokeless tobacco: Never Used   Substance and Sexual Activity    Alcohol use: No    Drug use: No    Sexual activity: Not Currently   Lifestyle    Physical activity:     Days per week: Not on file     Minutes per session: Not on file    Stress: Not on file   Relationships    Social connections:     Talks on phone: Not on file     Gets together: Not on file     Attends [employer] service: Not on file     Active member of club or organization: Not on file     Attends meetings of clubs or organizations: Not on file     Relationship status: Not on file   Other Topics Concern    Not on file   Social History Narrative    Not on file     Past Surgical History:   Procedure Laterality Date    APPENDECTOMY       BRONCHOSCOPY Bilateral 6/21/2019    Procedure: Bronchoscopy;  Surgeon: Paresh Goldman MD;  Location: [facility] ENDO;  Service: Endoscopy;  Laterality: Bilateral;    CARDIAC CATHETERIZATION      cardiac stents      CATARACT EXTRACTION W/  INTRAOCULAR LENS IMPLANT Right 9-3-14    CHOLECYSTECTOMY      COLONOSCOPY N/A 4/17/2018    Procedure: COLONOSCOPY;  Surgeon: Dionne Doan MD;  Location: [facility] ENDO;  Service: Endoscopy;  Laterality: N/A;    COLONOSCOPY N/A 10/25/2018    Procedure: COLONOSCOPY;  Surgeon: Michael Hameed MD;  Location: [facility] ENDO;  Service: Endoscopy;  Laterality: N/A;    ENDOSCOPIC ULTRASOUND OF UPPER GASTROINTESTINAL TRACT N/A 6/11/2019    Procedure: ULTRASOUND, UPPER GI TRACT, ENDOSCOPIC;  Surgeon: Abhishek Knox MD;  Location: [facility] ENDO;  Service: Endoscopy;  Laterality: N/A;    ESOPHAGOGASTRODUODENOSCOPY N/A 6/11/2019    Procedure: EGD (ESOPHAGOGASTRODUODENOSCOPY);  Surgeon: Abhishek Knox MD;  Location: [facility] ENDO;  Service: Endoscopy;  Laterality: N/A;    infected stomach gland excision      INSERTION OF TUNNELED CENTRAL VENOUS CATHETER (CVC) WITH SUBCUTANEOUS PORT Right 7/3/2019    Procedure: QIMYNOTOO-NEHZ-V-CATH;  Surgeon: Jean Carlos Constantino MD;  Location: [facility] OR;  Service: General;  Laterality: Right;    LUNG REMOVAL, TOTAL Left 04/2016    lung cancer left upper lobe    SKIN BIOPSY Left     arm     Current Outpatient Medications   Medication Sig Dispense Refill    albuterol (PROVENTIL) 2.5 mg /3 mL (0.083 %) nebulizer solution Take 3 mLs (2.5 mg total) by nebulization every 6 (six) hours while awake. 270 mL 11    amLODIPine (NORVASC) 5 MG tablet Take 1 tablet (5 mg total) by mouth once daily.      apixaban (ELIQUIS) 5 mg Tab Take 1 tablet (5 mg total) by mouth 2 (two) times daily. 60 tablet 5    aspirin (ECOTRIN) 81 MG EC tablet Take 81 mg by mouth once daily.      BREO ELLIPTA 100-25 mcg/dose diskus inhaler INHALE 1 PUFF INTO THE LUNGS ONCE DAILY  5     clopidogrel (PLAVIX) 75 mg tablet       colesevelam (WELCHOL) 625 mg tablet       doxepin (SINEQUAN) 10 MG capsule TAKE 1 CAPSULE (10 MG TOTAL) BY MOUTH EVERY EVENING. 30 capsule 10    fenofibric acid (FIBRICOR) 135 mg CpDR TAKE 1 CAPSULE BY MOUTH EVERY DAY 30 capsule 11    fluticasone (FLONASE) 50 mcg/actuation nasal spray 2 sprays (100 mcg total) by Each Nare route once daily. (Patient taking differently: 2 sprays by Each Nare route as needed. ) 3 Bottle 3    FLUZONE HIGH-DOSE 2019-20, PF, 180 mcg/0.5 mL Syrg       glycopyrrolate-formoterol (BEVESPI AEROSPHERE) 9-4.8 mcg HFAA Inhale 2 puffs into the lungs 2 (two) times daily. Controller 10.9 g 11    HYDROcodone-acetaminophen (NORCO) 5-325 mg per tablet Take 1 tablet by mouth every 6 (six) hours as needed for Pain. 40 tablet 0    ipratropium-albuterol (COMBIVENT RESPIMAT)  mcg/actuation inhaler Inhale 1 puff into the lungs every 6 (six) hours as needed for Shortness of Breath. 4 g 11    levocetirizine (XYZAL) 5 MG tablet Take 5 mg by mouth as needed.       lisinopril (PRINIVIL,ZESTRIL) 40 MG tablet TAKE 1 TABLET BY MOUTH DAILY 30 tablet 11    lisinopril-hydrochlorothiazide (PRINZIDE,ZESTORETIC) 20-25 mg Tab       ondansetron (ZOFRAN) 4 MG tablet Take 1 tablet (4 mg total) by mouth every 12 (twelve) hours as needed for Nausea. 30 tablet 1    simvastatin (ZOCOR) 40 MG tablet Take 1 tablet (40 mg total) by mouth every evening. 30 tablet 11    SOTALOL AF 80 mg tablet TAKE 1 TABLET BY MOUTH TWICE A  tablet 3    (Magic mouthwash) 1:1:1 Benadryl 12.5mg/5ml liq, aluminum & magnesium hydroxide-simehticone (Maalox), lidocaine viscous 2% Swish and spit 15 mLs every 4 (four) hours as needed. for mouth sores (Patient not taking: Reported on 9/26/2019) 90 mL 2    ranolazine (RANEXA) 500 MG Tb12 Take 1 tablet (500 mg total) by mouth 2 (two) times daily. (Patient not taking: Reported on 9/26/2019) 60 tablet 0    simvastatin (ZOCOR) 80 MG tablet Take  80 mg by mouth once daily.  3     Current Facility-Administered Medications   Medication Dose Route Frequency Provider Last Rate Last Dose    testosterone cypionate injection 200 mg  200 mg Intramuscular Q21 Days Peter Teixeira MD   200 mg at 09/12/19 0824     Facility-Administered Medications Ordered in Other Visits   Medication Dose Route Frequency Provider Last Rate Last Dose    lactated ringers infusion   Intravenous Continuous Michael Hameed MD   Stopped at 07/03/19 0810       Labs:  Lab Results   Component Value Date    WBC 3.66 (L) 08/26/2019    HGB 9.8 (L) 08/26/2019    HCT 30.0 (L) 08/26/2019    MCV 94 08/26/2019     08/26/2019     BMP  Lab Results   Component Value Date     09/05/2019    K 4.1 09/05/2019     09/05/2019    CO2 28 09/05/2019    BUN 23 09/05/2019    CREATININE 1.3 09/05/2019    CALCIUM 9.5 09/05/2019    ANIONGAP 10 09/05/2019    ESTGFRAFRICA >60.0 09/05/2019    EGFRNONAA 52.3 (A) 09/05/2019     Lab Results   Component Value Date    ALT 12 08/26/2019    AST 14 08/26/2019    ALKPHOS 56 08/26/2019    BILITOT 0.6 08/26/2019       No results found for: IRON, TIBC, FERRITIN, SATURATEDIRO  No results found for: [id number]  No results found for: FOLATE  Lab Results   Component Value Date    TSH 0.640 08/24/2019       I have reviewed the radiology reports and examined the scan/xray images.    Review of Systems   Constitutional: Negative.    HENT: Negative.    Eyes: Negative.    Respiratory: Negative.    Cardiovascular: Negative.    Gastrointestinal: Negative.    Endocrine: Negative.    Genitourinary: Negative.    Musculoskeletal: Negative.    Skin: Negative.    Allergic/Immunologic: Negative.    Neurological: Negative.    Hematological: Negative.    Psychiatric/Behavioral: Negative.      ECOG SCORE    0 - Fully active-able to carry on all pre-disease performance without restriction            Objective:     Vitals:    09/26/19 1002   BP: 128/64   Pulse: 72   Temp: 97.9 °F  (36.6 °C)   Body mass index is 26.48 kg/m².  Physical Exam   Constitutional: He is oriented to person, place, and time. He appears well-developed and well-nourished.   HENT:   Head: Normocephalic and atraumatic.   Eyes: Conjunctivae and EOM are normal.   Neck: Normal range of motion. Neck supple.   Cardiovascular: Normal rate and regular rhythm.   Pulmonary/Chest: Effort normal and breath sounds normal.   Abdominal: Soft. Bowel sounds are normal.   Musculoskeletal: Normal range of motion.   Neurological: He is alert and oriented to person, place, and time.   Skin: Skin is warm and dry.   Psychiatric: He has a normal mood and affect. His behavior is normal. Judgment and thought content normal.   Nursing note and vitals reviewed.        Assessment:      1. Malignant neoplasm of upper lobe of left lung    2. s/p left pnuemonectomy for KAYLI Lunc Ca           Plan:     Malignant neoplasm of upper lobe of left lung  PET CT showed good response.  Will start maintenance Imfinzi.  We discussed the benefit and risk of treatment.  Goal of treatment is for curative intent with improved chance of cancer free survival and overall survival. Risk of treatment includes but is not limited to fatigue, pneumonitis, colitis.    s/p left pnuemonectomy for KAYLI Lunc Ca  -     CBC auto differential; Future; Expected date: 10/03/2019  -     Comprehensive metabolic panel; Future; Expected date: 10/03/2019  -     TSH; Future; Expected date: 10/03/2019  -     CBC auto differential; Future; Expected date: 10/17/2019  -     Comprehensive metabolic panel; Future; Expected date: 10/17/2019  -     TSH; Future; Expected date: 10/17/2019

## 2019-09-30 RX ORDER — HEPARIN 100 UNIT/ML
500 SYRINGE INTRAVENOUS
Status: CANCELLED | OUTPATIENT
Start: 2019-09-30

## 2019-09-30 RX ORDER — SODIUM CHLORIDE 0.9 % (FLUSH) 0.9 %
10 SYRINGE (ML) INJECTION
Status: CANCELLED | OUTPATIENT
Start: 2019-09-30

## 2019-10-03 ENCOUNTER — INFUSION (OUTPATIENT)
Dept: INFUSION THERAPY | Facility: HOSPITAL | Age: 79
End: 2019-10-03
Attending: INTERNAL MEDICINE
Payer: MEDICARE

## 2019-10-03 VITALS
DIASTOLIC BLOOD PRESSURE: 70 MMHG | SYSTOLIC BLOOD PRESSURE: 131 MMHG | OXYGEN SATURATION: 95 % | TEMPERATURE: 98 F | HEART RATE: 60 BPM | RESPIRATION RATE: 16 BRPM

## 2019-10-03 DIAGNOSIS — J39.8 TRACHEAL MASS: Primary | ICD-10-CM

## 2019-10-03 DIAGNOSIS — C34.12 MALIGNANT NEOPLASM OF UPPER LOBE OF LEFT LUNG: ICD-10-CM

## 2019-10-03 DIAGNOSIS — Z85.118 HISTORY OF CANCER OF UPPER LOBE BRONCHUS OR LUNG: ICD-10-CM

## 2019-10-03 PROCEDURE — 96413 CHEMO IV INFUSION 1 HR: CPT

## 2019-10-03 PROCEDURE — 63600175 PHARM REV CODE 636 W HCPCS: Mod: TB | Performed by: INTERNAL MEDICINE

## 2019-10-03 RX ORDER — HEPARIN 100 UNIT/ML
500 SYRINGE INTRAVENOUS
Status: DISCONTINUED | OUTPATIENT
Start: 2019-10-03 | End: 2019-10-03 | Stop reason: HOSPADM

## 2019-10-03 RX ADMIN — HEPARIN SODIUM (PORCINE) LOCK FLUSH IV SOLN 100 UNIT/ML 500 UNITS: 100 SOLUTION at 12:10

## 2019-10-03 RX ADMIN — DURVALUMAB 835 MG: 500 INJECTION, SOLUTION INTRAVENOUS at 11:10

## 2019-10-03 NOTE — DISCHARGE INSTRUCTIONS
[facility]  [address]  [address] [address]  [phone] phone     [phone] fax  Hours of Operation: Monday- Friday 8:00am- 5:00pm  After hours phone  [phone]  Hematology / Oncology Physicians on call      Dr. Jose Vila    Please call with any concerns regarding your appointment today.HOME CARE AFTER CHEMOTHERAPY   Meals   Many patients feel sick and lose their appetites during treatment. Eat small meals several times a day. Choose bland foods with little taste or smell if you have problems with nausea. Be sure to cook all food thoroughly. This kills bacteria and helps you avoid intestinal infection. Soft foods are easier to swallow and digest.   Activity   Exercise keeps you strong and keeps your heart and lungs active. Talk to your doctor about an appropriate exercise program for you.   Skin Care   To prevent a skin infection, bathe or shower once a day. Use a moisturizing soap and wash with warm water. Avoid very hot or cold water. Chemotherapy can make your skin dry . Apply moisturizing lotion to help relieve dry skin. Some drugs used in high doses can cause slight burns to appear (like sunburn). Ask for a special cream to help relieve the burn and protect your skin.   Prevent Mouth Sores   During chemotherapy, many people get mouth sores. Do the following to help prevent mouth sores or to ease discomfort.   Brush your teeth with a soft-bristle toothbrush after every meal.  Don't use dental floss if your platelet count is below 50,000. Your doctor or nurse will tell you if this is the case.  Use an oral swab or special soft toothbrush if your gums bleed during regular brushing.  Use mouthwash as directed. If you can't tolerate commercial mouthwash, use salt and baking soda to clean your mouth. Mix 1 teaspoon of salt and 1 teaspoon of baking soda into a glass of water. Swish and spit.  Call your doctor or  return to this facility if you develop any of the following:   Sore throat   White patches in the mouth or throat   Fever of 100.4ºF (38ºC) or higher, or as directed by your healthcare provider  © 2000-2011 [facility], [address] [place] [id number]. All rights reserved. This information is not intended as a substitute for professional medical care. Always follow your healthcare professional's   FALL PREVENTION   Falls often occur due to slipping, tripping or losing your balance. Here are ways to reduce your risk of falling again.   Was there anything that caused your fall that can be fixed, removed or replaced?   Make your home safe by keeping walkways clear of objects you may trip over.   Use non-slip pads under rugs.   Do not walk in poorly lit areas.   Do not stand on chairs or wobbly ladders.   Use caution when reaching overhead or looking upward. This position can cause a loss of balance.   Be sure your shoes fit properly, have non-slip bottoms and are in good condition.   Be cautious when going up and down stairs, curbs, and when walking on uneven sidewalks.   If your balance is poor, consider using a cane or walker.   If your fall was related to alcohol use, stop or limit alcohol intake.   If your fall was related to use of sleeping medicines, talk to your doctor about this. You may need to reduce your dosage at bedtime if you awaken during the night to go to the bathroom.   To reduce the need for nighttime bathroom trips:   Avoid drinking fluids for several hours before going to bed   Empty your bladder before going to bed   Men can keep a urinal at the bedside   © 2000-2011 [facility], [address] [place] [id number]. All rights reserved. This information is not intended as a substitute for professional medical care. Always follow your healthcare professional's instructions.  WAYS TO HELP PREVENT INFECTION         WASH YOUR HANDS OFTEN DURING THE DAY, ESPECIALLY BEFORE YOU  EAT, AFTER USING THE BATHROOM, AND AFTER TOUCHING ANIMALS     STAY AWAY FROM PEOPLE WHO HAVE ILLNESSES YOU CAN CATCH; SUCH AS COLDS, FLU, CHICKEN POX     TRY TO AVOID CROWDS     STAY AWAY FROM CHILDREN WHO RECENTLY HAVE RECEIVED LIVE VIRUS VACCINES     MAINTAIN GOOD MOUTH CARE     DO NOT SQUEEZE OR SCRATCH PIMPLES     CLEAN CUTS & SCRAPES RIGHT AWAY AND DAILY UNTIL HEALED WITH WARM WATER, SOAP & AN ANTISEPTIC     AVOID CONTACT WITH LITTER BOXES, BIRD CAGES, & FISH TANKS     AVOID STANDING WATER, IE., BIRD BATHS, FLOWER POTS/VASES, OR HUMIDIFIERS     WEAR GLOVES WHEN GARDENING OR CLEANING UP AFTER OTHERS, ESPECIALLY BABIES & SMALL CHILDREN     DO NOT EAT RAW FISH, SEAFOOD, MEAT, OR EGGS

## 2019-10-23 ENCOUNTER — OFFICE VISIT (OUTPATIENT)
Dept: HEMATOLOGY/ONCOLOGY | Facility: CLINIC | Age: 79
End: 2019-10-23
Payer: MEDICARE

## 2019-10-23 ENCOUNTER — INFUSION (OUTPATIENT)
Dept: INFUSION THERAPY | Facility: HOSPITAL | Age: 79
End: 2019-10-23
Attending: INTERNAL MEDICINE
Payer: MEDICARE

## 2019-10-23 VITALS
WEIGHT: 192.69 LBS | HEART RATE: 67 BPM | OXYGEN SATURATION: 96 % | SYSTOLIC BLOOD PRESSURE: 156 MMHG | HEIGHT: 70 IN | BODY MASS INDEX: 27.58 KG/M2 | DIASTOLIC BLOOD PRESSURE: 76 MMHG | TEMPERATURE: 98 F

## 2019-10-23 DIAGNOSIS — J39.8 TRACHEAL MASS: Primary | ICD-10-CM

## 2019-10-23 DIAGNOSIS — Z85.118 HISTORY OF CANCER OF UPPER LOBE BRONCHUS OR LUNG: ICD-10-CM

## 2019-10-23 DIAGNOSIS — Z85.118 HISTORY OF CANCER OF UPPER LOBE BRONCHUS OR LUNG: Primary | ICD-10-CM

## 2019-10-23 DIAGNOSIS — C34.12 MALIGNANT NEOPLASM OF UPPER LOBE OF LEFT LUNG: ICD-10-CM

## 2019-10-23 PROCEDURE — 3078F PR MOST RECENT DIASTOLIC BLOOD PRESSURE < 80 MM HG: ICD-10-PCS | Mod: CPTII,S$GLB,, | Performed by: INTERNAL MEDICINE

## 2019-10-23 PROCEDURE — 3077F SYST BP >= 140 MM HG: CPT | Mod: CPTII,S$GLB,, | Performed by: INTERNAL MEDICINE

## 2019-10-23 PROCEDURE — 1101F PT FALLS ASSESS-DOCD LE1/YR: CPT | Mod: CPTII,S$GLB,, | Performed by: INTERNAL MEDICINE

## 2019-10-23 PROCEDURE — 3078F DIAST BP <80 MM HG: CPT | Mod: CPTII,S$GLB,, | Performed by: INTERNAL MEDICINE

## 2019-10-23 PROCEDURE — 99215 OFFICE O/P EST HI 40 MIN: CPT | Mod: 25,S$GLB,, | Performed by: INTERNAL MEDICINE

## 2019-10-23 PROCEDURE — 99999 PR PBB SHADOW E&M-EST. PATIENT-LVL IV: ICD-10-PCS | Mod: PBBFAC,,, | Performed by: INTERNAL MEDICINE

## 2019-10-23 PROCEDURE — 96413 CHEMO IV INFUSION 1 HR: CPT

## 2019-10-23 PROCEDURE — 3077F PR MOST RECENT SYSTOLIC BLOOD PRESSURE >= 140 MM HG: ICD-10-PCS | Mod: CPTII,S$GLB,, | Performed by: INTERNAL MEDICINE

## 2019-10-23 PROCEDURE — 99215 PR OFFICE/OUTPT VISIT, EST, LEVL V, 40-54 MIN: ICD-10-PCS | Mod: 25,S$GLB,, | Performed by: INTERNAL MEDICINE

## 2019-10-23 PROCEDURE — 1101F PR PT FALLS ASSESS DOC 0-1 FALLS W/OUT INJ PAST YR: ICD-10-PCS | Mod: CPTII,S$GLB,, | Performed by: INTERNAL MEDICINE

## 2019-10-23 PROCEDURE — 99999 PR PBB SHADOW E&M-EST. PATIENT-LVL IV: CPT | Mod: PBBFAC,,, | Performed by: INTERNAL MEDICINE

## 2019-10-23 PROCEDURE — A4216 STERILE WATER/SALINE, 10 ML: HCPCS | Performed by: INTERNAL MEDICINE

## 2019-10-23 PROCEDURE — 63600175 PHARM REV CODE 636 W HCPCS: Mod: TB | Performed by: INTERNAL MEDICINE

## 2019-10-23 PROCEDURE — 25000003 PHARM REV CODE 250: Performed by: INTERNAL MEDICINE

## 2019-10-23 RX ORDER — SODIUM CHLORIDE 0.9 % (FLUSH) 0.9 %
10 SYRINGE (ML) INJECTION
Status: CANCELLED | OUTPATIENT
Start: 2019-10-23

## 2019-10-23 RX ORDER — HEPARIN 100 UNIT/ML
500 SYRINGE INTRAVENOUS
Status: CANCELLED | OUTPATIENT
Start: 2019-10-23

## 2019-10-23 RX ORDER — HEPARIN 100 UNIT/ML
500 SYRINGE INTRAVENOUS
Status: DISCONTINUED | OUTPATIENT
Start: 2019-10-23 | End: 2019-10-23 | Stop reason: HOSPADM

## 2019-10-23 RX ORDER — SODIUM CHLORIDE 0.9 % (FLUSH) 0.9 %
10 SYRINGE (ML) INJECTION
Status: DISCONTINUED | OUTPATIENT
Start: 2019-10-23 | End: 2019-10-23 | Stop reason: HOSPADM

## 2019-10-23 RX ORDER — HYDROCODONE BITARTRATE AND ACETAMINOPHEN 5; 325 MG/1; MG/1
1 TABLET ORAL EVERY 6 HOURS PRN
Qty: 40 TABLET | Refills: 0 | Status: SHIPPED | OUTPATIENT
Start: 2019-10-23 | End: 2020-02-25 | Stop reason: SDUPTHER

## 2019-10-23 RX ADMIN — DURVALUMAB 835 MG: 500 INJECTION, SOLUTION INTRAVENOUS at 11:10

## 2019-10-23 RX ADMIN — HEPARIN 500 UNITS: 100 SYRINGE at 12:10

## 2019-10-23 RX ADMIN — SODIUM CHLORIDE: 9 INJECTION, SOLUTION INTRAVENOUS at 11:10

## 2019-10-23 RX ADMIN — Medication 10 ML: at 12:10

## 2019-10-23 NOTE — PROGRESS NOTES
Subjective:       Patient ID: Chai Murry is a 79 y.o. male.    Chief Complaint: History of cancer of upper lobe bronchus or lung [Z85.118]  HPI: We have an opportunity to see Mr. Chai Murry in Hematology Oncology clinic at [facility] on 10/23/2019.  Mr. Chai Murry is a 79 y.o. 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     He is s/p left pneumonectomy for a squamous cell carcinoma of the left lung.0n 4/12/2016  Prior to the surgery, the PET scan showed an FDG-avid mass in the left upper   lobe abutting the left hilum with an SUV of 11.6. There seemed to be a left   hilar adenopathy as well.  Radiologist felt that he was unable to discriminate between the mass and the   lymphadenopathy. The patient had had a bronchoscopy by Dr. Roel Ernandez on   03/04/2006 that showed a partially obstructing airway in the anterior segment of  the left upper lobe with an endobronchial lesion in the anterior segment of the  left upper lobe. The specimen from the biopsy at the time of bronchoscopy was   reported as being a squamous cell carcinoma.  At the time of the surgery after the left lung was removed, the pathologist   indicated that this was a squamous cell carcinoma. It measured 3.8 cm. The   pathology indicated that this is extending into the bronchial resection margin of the lobe. This lobe was later on removed to complete the pneumonectomy   There was one lymph node positive for metastatic squamous cell carcinoma at the   AP window.  The patient was told that we would prefer to treat him with post-op simultaneous chemo/radiation.  He had Medi-port. He started chemo/radiation therapy andreceived 6 weekly cycles of carbo/Taxol along with radiatioon.  After a month, he had a Ct scan and it showed stability   He then had 2 additional cyles of carbo/Taxol finishing in 9/27/2016.    He comes for follow up.   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 CT of the chest was non contributory, shows changes from surgery  PET showed a PET avid mass to the left of the trachea.  The case was discussed with dr annika Goldman and GI.  We discussed the possibility of doing a EUS or EBUs, and finally, because of the localization, it was decided to do a EUS with biopsy if possible.  Cytology shows recurrent squamous cell cancer.  I have asked Pathology to run a PD-L1 from his original pathology from 2016.  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  He has been discussed extensively with radiation oncology. We have decided to treat him with radiation therapy and Cisplatin as a chemo-sensitizer.  Dr Castro has reviewed the therapy ports from the previous radiation treatment and the area in question seems to be above the previously radiated fields.     Completed chemoradiation s/p 6 weekly cisplatin treatments with response.  Currently on maintenance Imfinzi.  Reports doing well.        Malignant neoplasm of upper lobe of left lung    4/12/2016 Initial Diagnosis     Malignant neoplasm of upper lobe of left lung      6/19/2019 Cancer Staged     Staging form: Lung, AJCC 8th Edition  - Clinical stage from 6/19/2019: Stage IIIB (rcT4, cN2, cM0) - Signed by Alejandro Castro II, MD on 6/19/2019 6/21/2019 Tumor Conference     Presenting Hospital / Clinic: [facility]  Virtual Tumor Board Conference: In person  Presenter: Dr. Chance Castro  Date Presented to Tumor Board: 06/21/19  Specialties Present: Medical Oncology;Radiation Oncology;Surgical Oncology;Pathology;Navigation;Plastic Surgery;Radiology;Gastrointestinal;Pulmonology  Presentation at Cancer Conference: Prospective  Cancer Type: Lung cancer  Recommended Plan: Additional screening  Recommended Plan Note: If PDL-1 positive, treat with immunotherapy  Send tissue for next generation sequencing.      6/28/2019 Tumor Conference     His case  was discussed at the Multidisciplinary Head and Neck Team Planning Meeting.    Representatives from Medical Oncology, Radiation Oncology, Head and Neck Surgical Oncology, Psychosocial Oncology, and Speech and Language Pathology discussed the case with the following recommendations:    1) treat lung cancer             7/5/2019 - 8/14/2019 Radiation Therapy     Treatment Site Ref. ID Energy Dose/Fx (Gy) #Fx Dose Correction (Gy) Total Dose (Gy) Start Date End Date Elapsed Days   RttcxJMSN86.4:1 PTV LNs 6X 1.8 28 / 28 0 50.4 7/5/2019 8/14/2019 40             Lung cancer    6/11/2019 Initial Diagnosis     Lung cancer      7/8/2019 - 8/18/2019 Chemotherapy     Treatment Summary   Plan Name: OP HEAD NECK CISPLATIN WEEKLY + RADIOTHERAPY  Treatment Goal: Supportive  Status: Inactive  Start Date: 7/8/2019  End Date: 8/12/2019  Provider: Jorge L Patel MD  Chemotherapy: CISplatin (PLATINOL) 40 mg/m2 = 83 mg in sodium chloride 0.9% 583 mL chemo infusion, 40 mg/m2 = 83 mg (100 % of original dose 40 mg/m2), Intravenous, Clinic/HOD 1 time, 6 of 6 cycles  Dose modification: 70 mg (original dose 40 mg/m2, Cycle 1, Reason: MD Discretion), 40 mg/m2 (original dose 40 mg/m2, Cycle 1)  Administration: 83 mg (7/8/2019), 84 mg (7/15/2019), 83 mg (7/22/2019), 83 mg (7/29/2019), 83 mg (8/5/2019), 82 mg (8/12/2019)      9/30/2019 -  Chemotherapy     Treatment Summary   Plan Name: OP DURVALUMAB Q2W  Treatment Goal: Curative  Status: Active  Start Date: 10/3/2019  End Date: 9/23/2020 (Planned)  Provider: Fermin Vila MD  Chemotherapy: durvalumab (IMFINZI) 835 mg in sodium chloride 0.9% 266.7 mL chemo infusion, 10 mg/kg = 835 mg, Intravenous, Clinic/HOD 1 time, 2 of 26 cycles  Administration: 835 mg (10/3/2019)       Past Medical History:   Diagnosis Date    Anemia     Arthritis     Atrial fibrillation     CAD (coronary artery disease)     Cataract     COPD (chronic obstructive pulmonary disease)     Diverticulosis     ED (erectile  dysfunction)     HTN (hypertension)     Hyperlipidemia     Lung cancer     left    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 Courtney dwyer bx     Family History   Problem Relation Age of Onset    Esophageal cancer Sister     Cancer Sister     Lung cancer Mother     Cancer Mother     Cataracts Mother     Hypertension Mother     Pneumonia Father     Cataracts Father     Hypertension Father     Cancer Brother     Hypertension Brother     Blindness Neg Hx     Diabetes Neg Hx     Glaucoma Neg Hx     Macular degeneration Neg Hx     Retinal detachment Neg Hx     Strabismus Neg Hx     Stroke Neg Hx     Thyroid disease Neg Hx      Social History     Socioeconomic History    Marital status:      Spouse name: Not on file    Number of children: 3    Years of education: Not on file    Highest education level: Not on file   Occupational History    Occupation: retired   Social Needs    Financial resource strain: Not on file    Food insecurity:     Worry: Not on file     Inability: Not on file    Transportation needs:     Medical: Not on file     Non-medical: Not on file   Tobacco Use    Smoking status: Former Smoker     Packs/day: 1.00     Years: 50.00     Pack years: 50.00     Last attempt to quit: 2005     Years since quittin.2    Smokeless tobacco: Never Used   Substance and Sexual Activity    Alcohol use: No    Drug use: No    Sexual activity: Not Currently   Lifestyle    Physical activity:     Days per week: Not on file     Minutes per session: Not on file    Stress: Not on file   Relationships    Social connections:     Talks on phone: Not on file     Gets together: Not on file     Attends Restorationism service: Not on file     Active member of club or organization: Not on file     Attends meetings of clubs or organizations: Not on file     Relationship status: Not on file   Other Topics Concern    Not on file   Social History Narrative     Not on file     Past Surgical History:   Procedure Laterality Date    APPENDECTOMY      BRONCHOSCOPY Bilateral 6/21/2019    Procedure: Bronchoscopy;  Surgeon: Paresh Goldman MD;  Location: [facility] ENDO;  Service: Endoscopy;  Laterality: Bilateral;    CARDIAC CATHETERIZATION      cardiac stents      CATARACT EXTRACTION W/  INTRAOCULAR LENS IMPLANT Right 9-3-14    CHOLECYSTECTOMY      COLONOSCOPY N/A 4/17/2018    Procedure: COLONOSCOPY;  Surgeon: Dionne Doan MD;  Location: [facility] ENDO;  Service: Endoscopy;  Laterality: N/A;    COLONOSCOPY N/A 10/25/2018    Procedure: COLONOSCOPY;  Surgeon: Michael Hameed MD;  Location: [facility] ENDO;  Service: Endoscopy;  Laterality: N/A;    ENDOSCOPIC ULTRASOUND OF UPPER GASTROINTESTINAL TRACT N/A 6/11/2019    Procedure: ULTRASOUND, UPPER GI TRACT, ENDOSCOPIC;  Surgeon: Abhishek Knox MD;  Location: [facility] ENDO;  Service: Endoscopy;  Laterality: N/A;    ESOPHAGOGASTRODUODENOSCOPY N/A 6/11/2019    Procedure: EGD (ESOPHAGOGASTRODUODENOSCOPY);  Surgeon: Abhishek Knox MD;  Location: [facility];  Service: Endoscopy;  Laterality: N/A;    infected stomach gland excision      INSERTION OF TUNNELED CENTRAL VENOUS CATHETER (CVC) WITH SUBCUTANEOUS PORT Right 7/3/2019    Procedure: [id number];  Surgeon: Jean Carlos Constantino MD;  Location: [facility];  Service: General;  Laterality: Right;    LUNG REMOVAL, TOTAL Left 04/2016    lung cancer left upper lobe    SKIN BIOPSY Left     arm     Current Outpatient Medications   Medication Sig Dispense Refill    albuterol (PROVENTIL) 2.5 mg /3 mL (0.083 %) nebulizer solution Take 3 mLs (2.5 mg total) by nebulization every 6 (six) hours while awake. 270 mL 11    amLODIPine (NORVASC) 5 MG tablet Take 1 tablet (5 mg total) by mouth once daily.      apixaban (ELIQUIS) 5 mg Tab Take 1 tablet (5 mg total) by mouth 2 (two) times daily. 60 tablet 5    aspirin (ECOTRIN) 81 MG EC tablet Take 81 mg by mouth once daily.      BREO  ELLIPTA 100-25 mcg/dose diskus inhaler INHALE 1 PUFF INTO THE LUNGS ONCE DAILY  5    clopidogrel (PLAVIX) 75 mg tablet       colesevelam (WELCHOL) 625 mg tablet       doxepin (SINEQUAN) 10 MG capsule TAKE 1 CAPSULE (10 MG TOTAL) BY MOUTH EVERY EVENING. 30 capsule 10    fenofibric acid (FIBRICOR) 135 mg CpDR TAKE 1 CAPSULE BY MOUTH EVERY DAY 30 capsule 11    fluticasone (FLONASE) 50 mcg/actuation nasal spray 2 sprays (100 mcg total) by Each Nare route once daily. (Patient taking differently: 2 sprays by Each Nare route as needed. ) 3 Bottle 3    FLUZONE HIGH-DOSE 2019-20, PF, 180 mcg/0.5 mL Syrg       glycopyrrolate-formoterol (BEVESPI AEROSPHERE) 9-4.8 mcg HFAA Inhale 2 puffs into the lungs 2 (two) times daily. Controller 10.9 g 11    HYDROcodone-acetaminophen (NORCO) 5-325 mg per tablet Take 1 tablet by mouth every 6 (six) hours as needed for Pain. 40 tablet 0    ipratropium-albuterol (COMBIVENT RESPIMAT)  mcg/actuation inhaler Inhale 1 puff into the lungs every 6 (six) hours as needed for Shortness of Breath. 4 g 11    levocetirizine (XYZAL) 5 MG tablet Take 5 mg by mouth as needed.       lisinopril (PRINIVIL,ZESTRIL) 40 MG tablet TAKE 1 TABLET BY MOUTH DAILY 30 tablet 11    lisinopril-hydrochlorothiazide (PRINZIDE,ZESTORETIC) 20-25 mg Tab       ondansetron (ZOFRAN) 4 MG tablet Take 1 tablet (4 mg total) by mouth every 12 (twelve) hours as needed for Nausea. 30 tablet 1    ranolazine (RANEXA) 500 MG Tb12 Take 1 tablet (500 mg total) by mouth 2 (two) times daily. 60 tablet 0    simvastatin (ZOCOR) 40 MG tablet Take 1 tablet (40 mg total) by mouth every evening. 30 tablet 11    SOTALOL AF 80 mg tablet TAKE 1 TABLET BY MOUTH TWICE A  tablet 3    (Magic mouthwash) 1:1:1 Benadryl 12.5mg/5ml liq, aluminum & magnesium hydroxide-simehticone (Maalox), lidocaine viscous 2% Swish and spit 15 mLs every 4 (four) hours as needed. for mouth sores (Patient not taking: Reported on 10/23/2019) 90 mL 2     simvastatin (ZOCOR) 80 MG tablet Take 80 mg by mouth once daily.  3     Current Facility-Administered Medications   Medication Dose Route Frequency Provider Last Rate Last Dose    testosterone cypionate injection 200 mg  200 mg Intramuscular Q21 Days Peter Teixeira MD   200 mg at 09/12/19 0824     Facility-Administered Medications Ordered in Other Visits   Medication Dose Route Frequency Provider Last Rate Last Dose    heparin, porcine (PF) 100 unit/mL injection flush 500 Units  500 Units Intravenous PRN Fermin Vila MD   500 Units at 10/23/19 1218    lactated ringers infusion   Intravenous Continuous Michael Hameed MD   Stopped at 07/03/19 0810    sodium chloride 0.9% flush 10 mL  10 mL Intravenous PRN Fermin Vila MD   10 mL at 10/23/19 1218       Labs:  Lab Results   Component Value Date    WBC 5.45 10/23/2019    HGB 11.4 (L) 10/23/2019    HCT 36.8 (L) 10/23/2019     (H) 10/23/2019     10/23/2019     BMP  Lab Results   Component Value Date     10/23/2019    K 4.4 10/23/2019     10/23/2019    CO2 28 10/23/2019    BUN 18 10/23/2019    CREATININE 1.3 10/23/2019    CALCIUM 10.2 10/23/2019    ANIONGAP 11 10/23/2019    ESTGFRAFRICA >60 10/23/2019    EGFRNONAA 52 (A) 10/23/2019     Lab Results   Component Value Date    ALT 14 10/23/2019    AST 17 10/23/2019    ALKPHOS 63 10/23/2019    BILITOT 0.3 10/23/2019       No results found for: IRON, TIBC, FERRITIN, SATURATEDIRO  No results found for: [id number]  No results found for: FOLATE  Lab Results   Component Value Date    TSH 2.065 10/23/2019       I have reviewed the radiology reports and examined the scan/xray images.    Review of Systems   Constitutional: Negative.    HENT: Negative.    Eyes: Negative.    Respiratory: Negative.    Cardiovascular: Negative.    Gastrointestinal: Negative.    Endocrine: Negative.    Genitourinary: Negative.    Musculoskeletal: Negative.    Skin: Negative.    Allergic/Immunologic: Negative.     Neurological: Negative.    Hematological: Negative.    Psychiatric/Behavioral: Negative.      ECOG SCORE    0 - Fully active-able to carry on all pre-disease performance without restriction            Objective:     Vitals:    10/23/19 0939   BP: (!) 156/76   Pulse: 67   Temp: 97.9 °F (36.6 °C)   Body mass index is 27.65 kg/m².  Physical Exam   Constitutional: He is oriented to person, place, and time. He appears well-developed and well-nourished.   HENT:   Head: Normocephalic and atraumatic.   Eyes: Conjunctivae and EOM are normal.   Neck: Normal range of motion. Neck supple.   Cardiovascular: Normal rate and regular rhythm.   Pulmonary/Chest: Effort normal and breath sounds normal.   Abdominal: Soft. Bowel sounds are normal.   Musculoskeletal: Normal range of motion.   Neurological: He is alert and oriented to person, place, and time.   Skin: Skin is warm and dry.   Psychiatric: He has a normal mood and affect. His behavior is normal. Judgment and thought content normal.   Nursing note and vitals reviewed.        Assessment:      1. History of cancer of upper lobe bronchus or lung    2. Malignant neoplasm of upper lobe of left lung           Plan:     Malignant neoplasm of upper lobe of left lung    Continue on maintenance Imfinzi.  -     CBC auto differential; Future; Expected date: 11/06/2019  -     Comprehensive metabolic panel; Future; Expected date: 11/06/2019  -     TSH; Future; Expected date: 11/06/2019      -     HYDROcodone-acetaminophen (NORCO) 5-325 mg per tablet; Take 1 tablet by mouth every 6 (six) hours as needed for Pain.  Dispense: 40 tablet; Refill: 0

## 2019-10-23 NOTE — PROGRESS NOTES
Pharmacy Chemotherapy Education    Chai Murry is 79 y.o. year old male being seen today for immunotherapy treatment with durvalumab. Pharmacist met with patient to discuss new treatment. patient verbalized understanding     During education pharmacist briefly reviewed the components of the treatment regimen with patient including immunotherapy agents  Provided with printed educational materials for durvalumab  Discussed potential side effects of chemotherapy including:   Nausea and vomiting, diarrhea, colitis, shortness of breath, joint pain, muscle cramps/discomfort, liver toxicity, renal toxicity and rash, pneumonitis and endocrine adverse effects    Bertha Delon, Pharm.D., [facility]  Pharmacy Clinical Specialist  - Hematology/Oncology  [facility]

## 2019-10-23 NOTE — DISCHARGE INSTRUCTIONS
[facility]  [id number] [facility]  [address]  [phone] phone     [phone] fax  Hours of Operation: Monday- Friday 8:00am- 5:00pm  After hours phone  [phone]  Hematology / Oncology Physicians on call      Dr. Jose Fermin, JA [place] NP Tyesha Taylor, NP    Please call with any concerns regarding your appointment today.FALL PREVENTION   Falls often occur due to slipping, tripping or losing your balance. Here are ways to reduce your risk of falling again.   Was there anything that caused your fall that can be fixed, removed or replaced?   Make your home safe by keeping walkways clear of objects you may trip over.   Use non-slip pads under rugs.   Do not walk in poorly lit areas.   Do not stand on chairs or wobbly ladders.   Use caution when reaching overhead or looking upward. This position can cause a loss of balance.   Be sure your shoes fit properly, have non-slip bottoms and are in good condition.   Be cautious when going up and down stairs, curbs, and when walking on uneven sidewalks.   If your balance is poor, consider using a cane or walker.   If your fall was related to alcohol use, stop or limit alcohol intake.   If your fall was related to use of sleeping medicines, talk to your doctor about this. You may need to reduce your dosage at bedtime if you awaken during the night to go to the bathroom.   To reduce the need for nighttime bathroom trips:   Avoid drinking fluids for several hours before going to bed   Empty your bladder before going to bed   Men can keep a urinal at the bedside   © 9114-4523 [employer] [facility], [address] [place] [address], [place] [address]. All rights reserved. This information is not intended as a substitute for professional medical care. Always follow your healthcare professional's instructions.  Support Groups/Classes    Support groups  "and classes are being offered at the   [facility] and [facility]!!    "Cooking with Cancer" (Nutrition Class):  Second Wednesday of each month   at noon at the Cancer Center.  Metastatic Support Group:  Third Tuesday of each month   at noon at the Cancer Center.  Next Steps Class/Group: Second and fourth Thursday of each month at noon at the Cancer Center.  Hope [facility] (Breast Cancer Support Group): First Tuesday of each month   at 5:30pm at the [facility] location.  First Class EV Conversions Mobile: [facility]: Second and third Tuesday of each month from 7:30am - 2pm.  [facility]: First and fourth Tuesday of each month from 7:30am - 2pm    If you are interested in attending or would like more information please ask our social workers or your nurse!WAYS TO HELP PREVENT INFECTION         WASH YOUR HANDS OFTEN DURING THE DAY, ESPECIALLY BEFORE YOU EAT, AFTER USING THE BATHROOM, AND AFTER TOUCHING ANIMALS     STAY AWAY FROM PEOPLE WHO HAVE ILLNESSES YOU CAN CATCH; SUCH AS COLDS, FLU, CHICKEN POX     TRY TO AVOID CROWDS     STAY AWAY FROM CHILDREN WHO RECENTLY HAVE RECEIVED LIVE VIRUS VACCINES     MAINTAIN GOOD MOUTH CARE     DO NOT SQUEEZE OR SCRATCH PIMPLES     CLEAN CUTS & SCRAPES RIGHT AWAY AND DAILY UNTIL HEALED WITH WARM WATER, SOAP & AN ANTISEPTIC     AVOID CONTACT WITH LITTER BOXES, BIRD CAGES, & FISH TANKS     AVOID STANDING WATER, IE., BIRD BATHS, FLOWER POTS/VASES, OR HUMIDIFIERS     WEAR GLOVES WHEN GARDENING OR CLEANING UP AFTER OTHERS, ESPECIALLY BABIES & SMALL CHILDREN    DO NOT EAT RAW FISH, SEAFOOD, MEAT, OR EGGS  "

## 2019-10-24 ENCOUNTER — OFFICE VISIT (OUTPATIENT)
Dept: INTERNAL MEDICINE | Facility: CLINIC | Age: 79
End: 2019-10-24
Payer: MEDICARE

## 2019-10-24 VITALS
HEIGHT: 70 IN | TEMPERATURE: 97 F | OXYGEN SATURATION: 97 % | BODY MASS INDEX: 27.46 KG/M2 | DIASTOLIC BLOOD PRESSURE: 60 MMHG | WEIGHT: 191.81 LBS | HEART RATE: 68 BPM | SYSTOLIC BLOOD PRESSURE: 120 MMHG

## 2019-10-24 DIAGNOSIS — R53.0 NEOPLASTIC MALIGNANT RELATED FATIGUE: ICD-10-CM

## 2019-10-24 DIAGNOSIS — I48.20 CHRONIC ATRIAL FIBRILLATION: ICD-10-CM

## 2019-10-24 DIAGNOSIS — C34.12 MALIGNANT NEOPLASM OF UPPER LOBE OF LEFT LUNG: ICD-10-CM

## 2019-10-24 DIAGNOSIS — I25.10 CORONARY ARTERY DISEASE INVOLVING NATIVE CORONARY ARTERY OF NATIVE HEART WITHOUT ANGINA PECTORIS: Primary | ICD-10-CM

## 2019-10-24 PROCEDURE — 3074F PR MOST RECENT SYSTOLIC BLOOD PRESSURE < 130 MM HG: ICD-10-PCS | Mod: CPTII,S$GLB,, | Performed by: INTERNAL MEDICINE

## 2019-10-24 PROCEDURE — 96372 THER/PROPH/DIAG INJ SC/IM: CPT | Mod: S$GLB,,, | Performed by: INTERNAL MEDICINE

## 2019-10-24 PROCEDURE — 96372 PR INJECTION,THERAP/PROPH/DIAG2ST, IM OR SUBCUT: ICD-10-PCS | Mod: S$GLB,,, | Performed by: INTERNAL MEDICINE

## 2019-10-24 PROCEDURE — 99214 OFFICE O/P EST MOD 30 MIN: CPT | Mod: 25,S$GLB,, | Performed by: INTERNAL MEDICINE

## 2019-10-24 PROCEDURE — 3074F SYST BP LT 130 MM HG: CPT | Mod: CPTII,S$GLB,, | Performed by: INTERNAL MEDICINE

## 2019-10-24 PROCEDURE — 99999 PR PBB SHADOW E&M-EST. PATIENT-LVL V: CPT | Mod: PBBFAC,,, | Performed by: INTERNAL MEDICINE

## 2019-10-24 PROCEDURE — 1101F PT FALLS ASSESS-DOCD LE1/YR: CPT | Mod: CPTII,S$GLB,, | Performed by: INTERNAL MEDICINE

## 2019-10-24 PROCEDURE — 1101F PR PT FALLS ASSESS DOC 0-1 FALLS W/OUT INJ PAST YR: ICD-10-PCS | Mod: CPTII,S$GLB,, | Performed by: INTERNAL MEDICINE

## 2019-10-24 PROCEDURE — 3078F DIAST BP <80 MM HG: CPT | Mod: CPTII,S$GLB,, | Performed by: INTERNAL MEDICINE

## 2019-10-24 PROCEDURE — 99999 PR PBB SHADOW E&M-EST. PATIENT-LVL V: ICD-10-PCS | Mod: PBBFAC,,, | Performed by: INTERNAL MEDICINE

## 2019-10-24 PROCEDURE — 3078F PR MOST RECENT DIASTOLIC BLOOD PRESSURE < 80 MM HG: ICD-10-PCS | Mod: CPTII,S$GLB,, | Performed by: INTERNAL MEDICINE

## 2019-10-24 PROCEDURE — 99214 PR OFFICE/OUTPT VISIT, EST, LEVL IV, 30-39 MIN: ICD-10-PCS | Mod: 25,S$GLB,, | Performed by: INTERNAL MEDICINE

## 2019-10-24 RX ORDER — SIMVASTATIN 80 MG/1
80 TABLET, FILM COATED ORAL DAILY
Qty: 90 TABLET | Refills: 3 | Status: SHIPPED | OUTPATIENT
Start: 2019-10-24 | End: 2020-04-02 | Stop reason: SDUPTHER

## 2019-10-24 RX ORDER — RANOLAZINE 500 MG/1
500 TABLET, EXTENDED RELEASE ORAL 2 TIMES DAILY
Qty: 60 TABLET | Refills: 11 | Status: SHIPPED | OUTPATIENT
Start: 2019-10-24 | End: 2020-05-22 | Stop reason: SDUPTHER

## 2019-10-24 RX ADMIN — TESTOSTERONE CYPIONATE 200 MG: 200 INJECTION, SOLUTION INTRAMUSCULAR at 02:10

## 2019-10-24 NOTE — PROGRESS NOTES
"Subjective:       Patient ID: Chai Murry is a 79 y.o. male.    Chief Complaint: Follow-up (8w)    HPI Patient is a 79-year-old male coming today following up on his coronary artery disease and his lung cancer.  Patient indicates he is doing all right at this point.  He was recently hospitalized.  He was diagnosed with a non ST segment elevation MI.  He was started on Ranexa and deemed to occur and treatment was to be medically managed.  He also had some atrial fibrillation and has been on Eliquis.  He indicates he is not having any cardiac symptoms at this time.  He is feeling is he is doing pretty well.  He states he is taking his medications as prescribed.    In regards to his cancer she is now on immune therapy.  He is getting in infusion every 2 weeks.  He seems to be tolerating at this time.  They are hopeful that will hold his cancer at [place].  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    Review of Systems   Constitutional: Positive for fatigue. Negative for fever and unexpected weight change.   HENT: Positive for voice change.    Respiratory: Negative for cough, shortness of breath and wheezing.    Cardiovascular: Negative for chest pain and palpitations.   Gastrointestinal: Negative for constipation, diarrhea, nausea and vomiting.   Genitourinary: Negative for dysuria and hematuria.       Objective:   /60   Pulse 68   Temp 97 °F (36.1 °C)   Ht 5' 10" (1.778 m)   Wt 87 kg (191 lb 12.8 oz)   SpO2 97%   BMI 27.52 kg/m²      Physical Exam   Constitutional: He appears well-developed and well-nourished.   HENT:   Head: Normocephalic and atraumatic.   Eyes: Pupils are equal, round, and reactive to light.   Neck: Neck supple. No thyromegaly present.   Cardiovascular: Normal rate, regular rhythm and normal heart sounds. Exam reveals no gallop and no friction rub.   No murmur heard.  Pulmonary/Chest: " Breath sounds normal. He has no wheezes. He has no rales.   Abdominal: Soft. Bowel sounds are normal. He exhibits no distension. There is no tenderness.   Vitals reviewed.      Lab Visit on 10/23/2019   Component Date Value    WBC 10/23/2019 5.45     RBC 10/23/2019 3.59*    Hemoglobin 10/23/2019 11.4*    Hematocrit 10/23/2019 36.8*    Mean Corpuscular Volume 10/23/2019 103*    Mean Corpuscular Hemoglo* 10/23/2019 31.8*    Mean Corpuscular Hemoglo* 10/23/2019 31.0*    RDW 10/23/2019 13.9     Platelets 10/23/2019 203     MPV 10/23/2019 10.2     Immature Granulocytes 10/23/2019 0.6*    Gran # (ANC) 10/23/2019 3.1     Immature Grans (Abs) 10/23/2019 0.03     Lymph # 10/23/2019 0.7*    Mono # 10/23/2019 0.7     Eos # 10/23/2019 0.9*    Baso # 10/23/2019 0.05     nRBC 10/23/2019 0     Gran% 10/23/2019 56.5     Lymph% 10/23/2019 13.6*    Mono% 10/23/2019 12.1     Eosinophil% 10/23/2019 16.3*    Basophil% 10/23/2019 0.9     Differential Method 10/23/2019 Automated     Sodium 10/23/2019 143     Potassium 10/23/2019 4.4     Chloride 10/23/2019 104     CO2 10/23/2019 28     Glucose 10/23/2019 98     BUN, Bld 10/23/2019 18     Creatinine 10/23/2019 1.3     Calcium 10/23/2019 10.2     Total Protein 10/23/2019 7.8     Albumin 10/23/2019 3.6     Total Bilirubin 10/23/2019 0.3     Alkaline Phosphatase 10/23/2019 63     AST 10/23/2019 17     ALT 10/23/2019 14     Anion Gap 10/23/2019 11     eGFR if African American 10/23/2019 >60     eGFR if non  Amer* 10/23/2019 52*    TSH 10/23/2019 2.065        Assessment:       1. Coronary artery disease involving native coronary artery of native heart without angina pectoris    2. Malignant neoplasm of upper lobe of left lung    3. Chronic atrial fibrillation    4. Neoplastic malignant related fatigue        Plan:   No problem-specific Assessment & Plan notes found for this encounter.    Coronary artery disease involving native coronary artery of  native heart without angina pectoris  Comments:  NSTEMI in August.  Continue ranexa, simvastatin, bp control.  Follow up with cardiology as planned.    Malignant neoplasm of upper lobe of left lung  Comments:  On Imfinzi for chemo.  Tolerating it well at this time.  Continue following with hem/onc    Chronic atrial fibrillation  Comments:  on eliquis.  Rate controlled. no changes today    Neoplastic malignant related fatigue  Comments:  Continue to strive for mild exercise and increased activity.  Thyroid normal. very mild anemia.      Other orders  -     ranolazine (RANEXA) 500 MG Tb12; Take 1 tablet (500 mg total) by mouth 2 (two) times daily.  Dispense: 60 tablet; Refill: 11  -     simvastatin (ZOCOR) 80 MG tablet; Take 1 tablet (80 mg total) by mouth once daily.  Dispense: 90 tablet; Refill: 3          Follow up in about 3 months (around 1/24/2020).

## 2019-10-25 ENCOUNTER — OFFICE VISIT (OUTPATIENT)
Dept: OTOLARYNGOLOGY | Facility: CLINIC | Age: 79
End: 2019-10-25
Payer: MEDICARE

## 2019-10-25 ENCOUNTER — PATIENT OUTREACH (OUTPATIENT)
Dept: ADMINISTRATIVE | Facility: OTHER | Age: 79
End: 2019-10-25

## 2019-10-25 VITALS
DIASTOLIC BLOOD PRESSURE: 77 MMHG | BODY MASS INDEX: 27.81 KG/M2 | SYSTOLIC BLOOD PRESSURE: 147 MMHG | WEIGHT: 193.81 LBS | TEMPERATURE: 98 F | HEART RATE: 72 BPM

## 2019-10-25 DIAGNOSIS — R49.0 DYSPHONIA: Primary | ICD-10-CM

## 2019-10-25 DIAGNOSIS — J38.01 UNILATERAL COMPLETE VOCAL FOLD PARALYSIS: ICD-10-CM

## 2019-10-25 DIAGNOSIS — J38.01 UNILATERAL COMPLETE VOCAL FOLD PARALYSIS: Primary | ICD-10-CM

## 2019-10-25 PROCEDURE — 99999 PR PBB SHADOW E&M-EST. PATIENT-LVL III: ICD-10-PCS | Mod: PBBFAC,,, | Performed by: OTOLARYNGOLOGY

## 2019-10-25 PROCEDURE — 3077F SYST BP >= 140 MM HG: CPT | Mod: CPTII,S$GLB,, | Performed by: OTOLARYNGOLOGY

## 2019-10-25 PROCEDURE — 31579 PR LARYNGOSCOPY, FLEX/RIGID TELESCOPIC, W/STROBOSCOPY: ICD-10-PCS | Mod: S$GLB,,, | Performed by: OTOLARYNGOLOGY

## 2019-10-25 PROCEDURE — 31579 LARYNGOSCOPY TELESCOPIC: CPT | Mod: S$GLB,,, | Performed by: OTOLARYNGOLOGY

## 2019-10-25 PROCEDURE — 3078F DIAST BP <80 MM HG: CPT | Mod: CPTII,S$GLB,, | Performed by: OTOLARYNGOLOGY

## 2019-10-25 PROCEDURE — 3077F PR MOST RECENT SYSTOLIC BLOOD PRESSURE >= 140 MM HG: ICD-10-PCS | Mod: CPTII,S$GLB,, | Performed by: OTOLARYNGOLOGY

## 2019-10-25 PROCEDURE — 1101F PR PT FALLS ASSESS DOC 0-1 FALLS W/OUT INJ PAST YR: ICD-10-PCS | Mod: CPTII,S$GLB,, | Performed by: OTOLARYNGOLOGY

## 2019-10-25 PROCEDURE — 99214 OFFICE O/P EST MOD 30 MIN: CPT | Mod: 25,S$GLB,, | Performed by: OTOLARYNGOLOGY

## 2019-10-25 PROCEDURE — 3078F PR MOST RECENT DIASTOLIC BLOOD PRESSURE < 80 MM HG: ICD-10-PCS | Mod: CPTII,S$GLB,, | Performed by: OTOLARYNGOLOGY

## 2019-10-25 PROCEDURE — 99999 PR PBB SHADOW E&M-EST. PATIENT-LVL III: CPT | Mod: PBBFAC,,, | Performed by: OTOLARYNGOLOGY

## 2019-10-25 PROCEDURE — 99214 PR OFFICE/OUTPT VISIT, EST, LEVL IV, 30-39 MIN: ICD-10-PCS | Mod: 25,S$GLB,, | Performed by: OTOLARYNGOLOGY

## 2019-10-25 PROCEDURE — 1101F PT FALLS ASSESS-DOCD LE1/YR: CPT | Mod: CPTII,S$GLB,, | Performed by: OTOLARYNGOLOGY

## 2019-10-25 NOTE — LETTER
October 25, 2019      Lluvia Jackson MD  [address] [place] [address] [address] [id number]           [facility]  [address] [facility] [address]  [facility] [address]  Phone: [phone]  Fax: [phone]          Patient: Chai Murry   MR Number: [id number]   YOB: 1940   Date of Visit: 10/25/2019       Dear Dr. Lluvia Jackson:    Thank you for referring Chai Murry to me for evaluation. Attached you will find relevant portions of my assessment and plan of care.    If you have questions, please do not hesitate to call me. I look forward to following Chai Murry along with you.    Sincerely,    Con Lofton MD    Enclosure  CC:  No Recipients    If you would like to receive this communication electronically, please contact [email or web address] or [phone] to request more information on [employer] Link access.    For providers and/or their staff who would like to refer a patient to [facility], please contact us through our one-stop-shop provider referral line, [facility], at [phone].    If you feel you have received this communication in error or would no longer like to receive these types of communications, please e-mail [email or web address]

## 2019-10-25 NOTE — PROGRESS NOTES
[facility]  Department of Otorhinolaryngology and Communication Sciences    Chai Murry is a 79 y.o. male who presents to the [facility] for consultation at the kind request of Dr. Lluvia Jackson for further evaluation of vocal fold paralysis.     He complains of a soft/weak voice. Onset was sudden. Duration is about 6 months. Time course is constant. Symptoms are stable. He denies any exacerbating factors. He denies any alleviating factors. Associated symptoms include liquid phase dypshagia. He has had a reassuring MBSS in 5/2019.    He has recurrent lung SCC, with a history of a left left pneumonectomy in 4/2016. 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 He completed radiation/cisplatin  8/14/19 and still has ongoing chemo treatments.     Dr. Jackson has noted a left vocal fold paralysis, but there was some concern about whether the recurrent disease actually involved the subglottis or trachea itself and might be contributing to airway obstruction. Nevertheless, he appears to have had a favorable treatment response.    Last PET/CT 1 mo ago: Interval resolution of prior hypermetabolic tracheal mass and improvement in FDG avid left paratracheal lymphadenopathy consistent with positive treatment response. I see no evidence of airway obstruction in the subglottic/tracheal level.    Voice Handicap Index-10 (VHI-10) score is 20.   Reflux Symptom Index (RSI) score is 16.   Eating Assessment Tool-10 (EAT-10) score is 10.   Dyspnea Index (DI) score is 6.  Cough Severity Index (CSI) score is 0.    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    Past Surgical History  He has a past surgical history that includes Appendectomy; Cholecystectomy; cardiac stents; infected stomach gland excision; Cataract extraction w/  intraocular lens implant (Right, 9-3-14); Skin biopsy (Left); Lung removal, total (Left, 04/2016); Colonoscopy (N/A, 4/17/2018); Colonoscopy (N/A, 10/25/2018); Cardiac catheterization; Esophagogastroduodenoscopy (N/A, 6/11/2019); Endoscopic ultrasound of upper gastrointestinal tract (N/A, 6/11/2019); Bronchoscopy (Bilateral, 6/21/2019); and Insertion of tunneled central venous catheter (CVC) with subcutaneous port (Right, 7/3/2019).    Family History  His family history includes Cancer in his brother, mother, and sister; Cataracts in his father and mother; Esophageal cancer in his sister; Hypertension in his brother, father, and mother; Lung cancer in his mother; Pneumonia in his father.    Social History  He reports that he quit smoking about 14 years ago. He has a 50.00 pack-year smoking history. He has never used smokeless tobacco. He reports that he does not drink alcohol or use drugs.    Allergies  He is allergic to atorvastatin and bactrim [sulfamethoxazole-trimethoprim].    Medications  He has a current medication list which includes the following prescription(s): diphenhydramine hcl, albuterol, amlodipine, apixaban, aspirin, breo ellipta, clopidogrel, colesevelam, doxepin, fenofibric acid, fluticasone propionate, glycopyrrolate-formoterol, hydrocodone-acetaminophen, ipratropium-albuterol, levocetirizine, lisinopril, ranolazine, simvastatin, and sotalol af, and the following Facility-Administered Medications: lactated ringers and testosterone cypionate.    Review of Systems   Constitutional: Negative for fever.   HENT: Negative for sore throat.    Eyes: Negative for visual disturbance.   Respiratory: Positive for wheezing.    Cardiovascular: Negative for chest pain.    Gastrointestinal: Negative for nausea.   Musculoskeletal: Negative for arthralgias.   Skin: Negative for rash.   Neurological: Negative for tremors.   Hematological: Does not bruise/bleed easily.   Psychiatric/Behavioral: The patient is not nervous/anxious.           Objective:     BP (!) 147/77   Pulse 72   Temp 98.4 °F (36.9 °C)   Wt 87.9 kg (193 lb 12.6 oz)   BMI 27.81 kg/m²      Physical Exam    Constitutional: comfortable, well dressed  Psychiatric: appropriate affect  Respiratory: comfortably breathing, symmetric chest rise, no stridor  Voice: severe breathiness; impaired projection; variable rough diplophonia  Cardiovascular: upper extremities non-edematous  Lymphatic: no cervical lymphadenopathy  Neurologic: alert and oriented to time, place, person, and situation; cranial nerves 3-12 grossly intact  Head: normocephalic  Eyes: conjunctivae and sclerae clear  Ears: normal pinnae, normal external auditory canals, tympanic membranes intact  Nose: mucosa pink and noncongested, no masses, no mucopurulence, no polyps  Oral cavity / oropharynx: no mucosal lesions  Neck: soft, full range of motion, laryngotracheal complex palpable with appropriate landmarks, larynx elevates on swallowing  Indirect laryngoscopy: limited due to gag    Procedure  Flexible Laryngeal Videostroboscopy (31894): Laryngeal videostroboscopy is indicated to assess laryngeal vibratory biomechanics and vocal fold oscillation, which cannot be assessed with a plain light examination. This was carried out transnasally with a distal chip videoendoscope. After verbal consent was obtained, the patient was positioned and the nose was topically decongested with 1% phenylephrine and topically anesthetized with 4% lidocaine. The endoscope was passed through the most patent nasal cavity and positioned to image the nasopharynx, larynx, and hypopharynx in detail. The following features were examined: nasopharyngeal, laryngeal, hypopharyngeal masses;  velopharyngeal strength, closure, and symmetry of motion; vocal fold range and symmetry of motion; laryngeal mucosal edema, erythema, inflammation, and hydration; salivary pooling; and gross laryngeal sensation. During phonation, the vocal folds were assessed for glottal closure; mucosal wave; vocal fold lesions; vibratory periodicity, amplitude, and phase symmetry; and vertical height match. The equipment was removed. The patient tolerated the procedure well without complication. All findings were normal except:  - left vocal fold immobile, intermediate position, bowed  - glottal insufficiency across all frequencies with aperiodic vibration and phonatroy suprgalottic hyperfunction  - visible portions of subglottis/trachea widely patent - no evidence of mass or obstruction      Data Reviewed    see HPI      Assessment:     Chai Murry is a 79 y.o. male with left vocal fold immobility, low likelihood of recovery, 2/2 recurrent lung cancer with paratracheal disease.       Plan:        I had a discussion with the patient regarding his condition and the further workup and management options.      I educated the patient on the prognosis of newly identified vocal fold immobility and emphasized that it may take up to a year for the nerve to demonstrate its full recovery potential. In the meantime, treatment options include the following: a) no treatment and continued observation; b) voice therapy; c) a vocal fold injection augmentation procedure. I counseled him that, due to the extent of glottal insufficiency, I would not expect him to derive benefit from voice therapy alone.    The risks and benefits of vocal fold injection augmentation were discussed with the patient. Risks included but were not limited to bleeding, infection, allergic reaction to the injectable, scarring, worsening of voice, early resorption, need for additional procedures, pain, epistaxis, and airway edema which could necessitate need for intubation  or tracheostomy. We informed the patient that while in the past this procedure was performed mainly in the operating room under general anesthesia, we now primarily perform this procedure in our procedure suite without the need for general anesthesia.    All questions were answered and the patient would like to proceed with an office-based left vocal fold injection augmentation procedure. We will plan to use hyaluronic acid. Informed consent was obtained. We will arrange this in the coming weeks.    All questions were answered, and the patient is in agreement with the above.     Con Lofton M.D.  [facility]  Department of Otorhinolaryngology and Communication Sciences

## 2019-10-25 NOTE — PATIENT INSTRUCTIONS
VOCAL CORD PARALYSIS & PARESIS     What is vocal fold paralysis/paresis?     Vocal fold paralysis is a condition in which one or more nerves to the vocal folds work poorly, and results in the vocal folds moving weakly or asymmetrically. In most cases, vocal fold closure is reduced, which results in a weak or breathy voice. There may also be problems with the vocal folds elongating, so it becomes difficult to produce high pitches. In other more rare cases, the vocal folds may not be able to move apart, causing breathing difficulty. Paresis is a similar condition, but less severe.     Vocal fold paralysis and vocal fold paresis have several causes. These include viruses, certain types of surgeries, heart problems, tumors, or other diseases of the brain.     How is it treated?     First, it is best to determine the cause of the paralysis/paresis. Based on the cause, special tests may be conducted to determine if the paralysis or paresis is new or old, and if it is getting worse or better. Clearly, if breathing is an issue, securing the airway surgically is the first step. Otherwise, depending on the results of the tests, it is common to either adopt a wait and see approach or to begin voice therapy. In some cases, microsurgery is used to mechanically reposition the affected vocal fold so that it gets good closure with the good one, thereby improving voice and swallowing.           VOCAL FOLD INJECTION AUGMENTATION     Description   If the vocal folds (vocal cords) cannot close completely, you may experience voice problems: roughness, breathiness, inability to get loud, increased vocal effort, increased vocal fatigue. Some patients may also experience aspiration (coughing or choking with swallowing). Vocal fold injection augmentation plumps up the vocal fold and/or repositions it in the midline in order to help the vocal folds close completely while speaking or swallowing. Following the procedure, most  patients experience a louder, stronger, clearer voice. The procedure also helps some patients protect against aspiration, although the swallowing improvement is not as dramatic as the voice improvement. We use the following materials for the procedure: hyaluronic acid (Restylane); carboxymethylcellulose (Radiesse Voice Gel); and calcium hydroxyapatite (Radiesse Voice). For most patients, the injection is performed with a small needle passed through the skin of the neck. However, in some patients we perform the injection with a device passed through the mouth. In either case, the injection is guided by the visualization provided by a scope passed through the nose.     What to expect during the procedure   We perform the injection in our office under local (topical) anesthesia. You are awake the whole time, and the entire procedure lasts about 15 minutes. Our primary focus is your safety and comfort. We usually make the larynx numb by spraying the throat and/or dripping anesthetic on the larynx. At this time, you may cough or gag, or you may have the sensation that you spilled some water down the wrong pipe. These are temporary sensations that allow us to get you numb. The numbing process takes about 2 minutes. We will not proceed until we know you will be as comfortable as possible. A small needle is passed either through the skin of the neck or via a long instrument through the mouth to perform the injection. During the injection, you may experience mild discomfort in the throat. You may feel an unusual sensation in the ear because the larynx and the ear share the same sensory nerve. In rare cases in which a patient does not tolerate the procedure, it may be performed in the operating room, with the patient completely asleep under general anesthesia.     What to expect afterwards   Most patients note a stronger, less effortful voice immediately after the injection. Sometimes the voice is tight or pressed voice is  noted right after the injection. The voice usually has good days and bad days and gradually improves until you reach your new baseline voice over the first 1-2 weeks. Voice therapy may be a necessary part of your treatment plan to optimize your vocal outcome. None of the materials we inject are permanent. As the material dissolves, you may experience a gradual worsening of voice quality over the course of several months. At this point we may consider repeating the injection. You may be a candidate for a permanent fix, which involves an open surgery in the neck performed in the operating room.     Instructions: before the procedure   1. Do not take aspirin-containing products or other medications that can thin the blood (such as ibuprofen, Advil, Motrin, Aleve, Plavix) for 7 days prior to the injection. If you take Coumadin (warfarin), you may need to stop using this a few days prior to the injection. If you are on blood thinning (anti-platelet or anti-coagulant) medication and it is not clear what you should do, please clarify this with your physician.   2. On the day of your procedure, please make sure you take your other regular morning medications.   3. On the day of your procedure, it is OK to eat and drink as you would normally, up until 3 hours before to your appointment time. During that time frame, we ask that you restrict yourself only to clear liquids. A clear liquid is anything you can see through (water, ginger ale, apple juice).     Instructions: after the procedure   1. Please refrain from eating or drinking for 1 hour following the procedure. This allows time for the numbing medication to wear off.   2. Most patients experience very little pain. If necessary, most patients are able to keep comfortable with plain Tylenol (acetaminophen) and/or other non-steroidal anti-inflammatory medications such as ibuprofen (Advil, Motrin). Please follow package instructions if considering taking these medications.    3. In most instances, it is OK to use your voice immediately after the procedure. However, for the first week, you should avoid speaking over heavy background noise or in a very loud voice. It is rare, but in some cases you will be asked to rest your voice for the first 24 hours.   4. Please call the Voice Center at [phone] if   · You have a temperature above 101°F   · You develop Increasing throat pain not relieved by over-the-counter medications   · You have any other post-operative questions or concerns   5. Please go immediately to the nearest emergency room if you are experiencing   · Shortness of breath   · Difficulty breathing   · Difficulty swallowing   · Severe bleeding     FREQUENTLY ASKED QUESTIONS     Is this a Botox injection? No. Botox weakens the voice box muscles. Instead, with a vocal fold injection augmentation, we are injecting filler material to bulk up the vocal fold(s).     How do you decide which material to use for the injection? Our decision is based on the indication for the procedure, the position of the vocal fold, and other patient historical/anatomical factors. In some instances, the approval of your insurance company is an important factor.     How to you decide which technique to use (through the neck versus through the mouth)? Patient anatomy and the position of the vocal fold play an important role. Other patient factors such as gag reflex are also strongly considered.     Why do you perform this in your office instead of in the operating room? Performing the procedure in the office is safe and precise. In addition, performing the injection with the patient awake gives us direct visual and auditory feedback, which we do not get when the patient is asleep in the operating room. Furthermore, an office-based injection is much less time consuming, is more convenient for the patient, and does not involve the risks or the recovery time associated with general anesthesia. We can  still do this in the operating room; we save that setting for specific diagnoses or situations, as well as for the rare patient who is unable to tolerate the awake procedure.     Why did I have discomfort in my ear (during or after the injection)? This is an example of referred pain. The ear and the larynx share the same sensory nerve.     I got an injection 3 days ago. Why is my voice still hoarse? To optimize vocal outcome, we overinject a little bit. Additionally, there may be a mild amount of swelling. Finally, the muscles of the larynx need to adjust to the injected material. Most people will have good days and bad days at first. After 1-2 weeks, you should settle out to your new baseline voice.     How long does the injection last? Carboxymethylcellulose (Radiesse Voice Gel) lasts approximately 1-2 months. Hyaluronic acid (Restylane) lasts approximately 4 months. Calcium hydroxyapatite (Radiesse Voice) lasts up to 1 year; however its characteristics are such that only few patients are appropriate for using this material.     Is there a permanent injectable material? No.     Can the injection be repeated? Yes. There is no limit to the number of times an injection can be repeated. However, a permanent surgical fix is often an option to consider.

## 2019-11-06 ENCOUNTER — INFUSION (OUTPATIENT)
Dept: INFUSION THERAPY | Facility: HOSPITAL | Age: 79
End: 2019-11-06
Attending: INTERNAL MEDICINE
Payer: MEDICARE

## 2019-11-06 ENCOUNTER — OFFICE VISIT (OUTPATIENT)
Dept: HEMATOLOGY/ONCOLOGY | Facility: CLINIC | Age: 79
End: 2019-11-06
Payer: MEDICARE

## 2019-11-06 VITALS
HEART RATE: 64 BPM | OXYGEN SATURATION: 95 % | HEIGHT: 70 IN | WEIGHT: 191.56 LBS | BODY MASS INDEX: 27.42 KG/M2 | TEMPERATURE: 98 F | SYSTOLIC BLOOD PRESSURE: 109 MMHG | DIASTOLIC BLOOD PRESSURE: 59 MMHG

## 2019-11-06 DIAGNOSIS — C34.12 MALIGNANT NEOPLASM OF UPPER LOBE OF LEFT LUNG: Primary | ICD-10-CM

## 2019-11-06 DIAGNOSIS — C34.12 MALIGNANT NEOPLASM OF UPPER LOBE OF LEFT LUNG: ICD-10-CM

## 2019-11-06 DIAGNOSIS — J39.8 TRACHEAL MASS: Primary | ICD-10-CM

## 2019-11-06 DIAGNOSIS — Z85.118 HISTORY OF CANCER OF UPPER LOBE BRONCHUS OR LUNG: ICD-10-CM

## 2019-11-06 PROCEDURE — 63600175 PHARM REV CODE 636 W HCPCS: Performed by: INTERNAL MEDICINE

## 2019-11-06 PROCEDURE — 99999 PR PBB SHADOW E&M-EST. PATIENT-LVL III: CPT | Mod: PBBFAC,,, | Performed by: INTERNAL MEDICINE

## 2019-11-06 PROCEDURE — 99215 OFFICE O/P EST HI 40 MIN: CPT | Mod: 25,S$GLB,, | Performed by: INTERNAL MEDICINE

## 2019-11-06 PROCEDURE — 96413 CHEMO IV INFUSION 1 HR: CPT

## 2019-11-06 PROCEDURE — 3074F PR MOST RECENT SYSTOLIC BLOOD PRESSURE < 130 MM HG: ICD-10-PCS | Mod: CPTII,S$GLB,, | Performed by: INTERNAL MEDICINE

## 2019-11-06 PROCEDURE — 3078F DIAST BP <80 MM HG: CPT | Mod: CPTII,S$GLB,, | Performed by: INTERNAL MEDICINE

## 2019-11-06 PROCEDURE — 1101F PR PT FALLS ASSESS DOC 0-1 FALLS W/OUT INJ PAST YR: ICD-10-PCS | Mod: CPTII,S$GLB,, | Performed by: INTERNAL MEDICINE

## 2019-11-06 PROCEDURE — 3074F SYST BP LT 130 MM HG: CPT | Mod: CPTII,S$GLB,, | Performed by: INTERNAL MEDICINE

## 2019-11-06 PROCEDURE — 99999 PR PBB SHADOW E&M-EST. PATIENT-LVL III: ICD-10-PCS | Mod: PBBFAC,,, | Performed by: INTERNAL MEDICINE

## 2019-11-06 PROCEDURE — 1101F PT FALLS ASSESS-DOCD LE1/YR: CPT | Mod: CPTII,S$GLB,, | Performed by: INTERNAL MEDICINE

## 2019-11-06 PROCEDURE — 3078F PR MOST RECENT DIASTOLIC BLOOD PRESSURE < 80 MM HG: ICD-10-PCS | Mod: CPTII,S$GLB,, | Performed by: INTERNAL MEDICINE

## 2019-11-06 PROCEDURE — 99215 PR OFFICE/OUTPT VISIT, EST, LEVL V, 40-54 MIN: ICD-10-PCS | Mod: 25,S$GLB,, | Performed by: INTERNAL MEDICINE

## 2019-11-06 RX ORDER — HEPARIN 100 UNIT/ML
500 SYRINGE INTRAVENOUS
Status: DISCONTINUED | OUTPATIENT
Start: 2019-11-06 | End: 2019-11-06 | Stop reason: HOSPADM

## 2019-11-06 RX ORDER — HEPARIN 100 UNIT/ML
500 SYRINGE INTRAVENOUS
Status: CANCELLED | OUTPATIENT
Start: 2019-11-06

## 2019-11-06 RX ORDER — SODIUM CHLORIDE 0.9 % (FLUSH) 0.9 %
10 SYRINGE (ML) INJECTION
Status: CANCELLED | OUTPATIENT
Start: 2019-11-06

## 2019-11-06 RX ADMIN — DURVALUMAB 835 MG: 500 INJECTION, SOLUTION INTRAVENOUS at 09:11

## 2019-11-06 RX ADMIN — HEPARIN 500 UNITS: 100 SYRINGE at 10:11

## 2019-11-06 RX ADMIN — SODIUM CHLORIDE: 9 INJECTION, SOLUTION INTRAVENOUS at 09:11

## 2019-11-06 NOTE — PROGRESS NOTES
Subjective:       Patient ID: Chai Murry is a 79 y.o. male.    Chief Complaint: Malignant neoplasm of upper lobe of left lung [C34.12]  HPI: We have an opportunity to see Mr. Chai Murry in Hematology Oncology clinic at [facility] on 11/06/2019.  Mr. Chai Murry is a 79 y.o. 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     He is s/p left pneumonectomy for a squamous cell carcinoma of the left lung.0n 4/12/2016  Prior to the surgery, the PET scan showed an FDG-avid mass in the left upper   lobe abutting the left hilum with an SUV of 11.6. There seemed to be a left   hilar adenopathy as well.  Radiologist felt that he was unable to discriminate between the mass and the   lymphadenopathy. The patient had had a bronchoscopy by Dr. Roel Ernandez on   03/04/2006 that showed a partially obstructing airway in the anterior segment of  the left upper lobe with an endobronchial lesion in the anterior segment of the  left upper lobe. The specimen from the biopsy at the time of bronchoscopy was   reported as being a squamous cell carcinoma.  At the time of the surgery after the left lung was removed, the pathologist   indicated that this was a squamous cell carcinoma. It measured 3.8 cm. The   pathology indicated that this is extending into the bronchial resection margin of the lobe. This lobe was later on removed to complete the pneumonectomy   There was one lymph node positive for metastatic squamous cell carcinoma at the   AP window.  The patient was told that we would prefer to treat him with post-op simultaneous chemo/radiation.  He had Medi-port. He started chemo/radiation therapy andreceived 6 weekly cycles of carbo/Taxol along with radiatioon.  After a month, he had a Ct scan and it showed stability   He then had 2 additional cyles of carbo/Taxol finishing in 9/27/2016.    He comes for follow up.   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 CT of the chest was non contributory, shows changes from surgery  PET showed a PET avid mass to the left of the trachea.  The case was discussed with dr annika Goldman and GI.  We discussed the possibility of doing a EUS or EBUs, and finally, because of the localization, it was decided to do a EUS with biopsy if possible.  Cytology shows recurrent squamous cell cancer.  I have asked Pathology to run a PD-L1 from his original pathology from 2016.  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  He has been discussed extensively with radiation oncology. We have decided to treat him with radiation therapy and Cisplatin as a chemo-sensitizer.  Dr Castro has reviewed the therapy ports from the previous radiation treatment and the area in question seems to be above the previously radiated fields.     Completed chemoradiation s/p 6 weekly cisplatin treatments with response.  Currently on maintenance Imfinzi.  Reports doing well.        Malignant neoplasm of upper lobe of left lung    4/12/2016 Initial Diagnosis     Malignant neoplasm of upper lobe of left lung      6/19/2019 Cancer Staged     Staging form: Lung, AJCC 8th Edition  - Clinical stage from 6/19/2019: Stage IIIB (rcT4, cN2, cM0) - Signed by Alejandro Castro II, MD on 6/19/2019 6/21/2019 Tumor Conference     Presenting Hospital / Clinic: [facility]  Virtual Tumor Board Conference: In person  Presenter: Dr. Chance Castro  Date Presented to Tumor Board: 06/21/19  Specialties Present: Medical Oncology;Radiation Oncology;Surgical Oncology;Pathology;Navigation;Plastic Surgery;Radiology;Gastrointestinal;Pulmonology  Presentation at Cancer Conference: Prospective  Cancer Type: Lung cancer  Recommended Plan: Additional screening  Recommended Plan Note: If PDL-1 positive, treat with immunotherapy  Send tissue for next generation sequencing.      6/28/2019 Tumor Conference     His case was  discussed at the Multidisciplinary Head and Neck Team Planning Meeting.    Representatives from Medical Oncology, Radiation Oncology, Head and Neck Surgical Oncology, Psychosocial Oncology, and Speech and Language Pathology discussed the case with the following recommendations:    1) treat lung cancer             7/5/2019 - 8/14/2019 Radiation Therapy     Treatment Site Ref. ID Energy Dose/Fx (Gy) #Fx Dose Correction (Gy) Total Dose (Gy) Start Date End Date Elapsed Days   [id number]:1 PTV LNs 6X 1.8 28 / 28 0 50.4 7/5/2019 8/14/2019 40             Lung cancer    6/11/2019 Initial Diagnosis     Lung cancer      7/8/2019 - 8/18/2019 Chemotherapy     Treatment Summary   Plan Name: OP HEAD NECK CISPLATIN WEEKLY + RADIOTHERAPY  Treatment Goal: Supportive  Status: Inactive  Start Date: 7/8/2019  End Date: 8/12/2019  Provider: Jorge L Patel MD  Chemotherapy: CISplatin (PLATINOL) 40 mg/m2 = 83 mg in sodium chloride 0.9% 583 mL chemo infusion, 40 mg/m2 = 83 mg (100 % of original dose 40 mg/m2), Intravenous, Clinic/HOD 1 time, 6 of 6 cycles  Dose modification: 70 mg (original dose 40 mg/m2, Cycle 1, Reason: MD Discretion), 40 mg/m2 (original dose 40 mg/m2, Cycle 1)  Administration: 83 mg (7/8/2019), 84 mg (7/15/2019), 83 mg (7/22/2019), 83 mg (7/29/2019), 83 mg (8/5/2019), 82 mg (8/12/2019)      9/30/2019 -  Chemotherapy     Treatment Summary   Plan Name: OP DURVALUMAB Q2W  Treatment Goal: Curative  Status: Active  Start Date: 10/3/2019  End Date: 9/23/2020 (Planned)  Provider: Fermin Vila MD  Chemotherapy: durvalumab (IMFINZI) 835 mg in sodium chloride 0.9% 266.7 mL chemo infusion, 10 mg/kg = 835 mg, Intravenous, Clinic/HOD 1 time, 2 of 26 cycles  Administration: 835 mg (10/3/2019), 835 mg (10/23/2019)       Past Medical History:   Diagnosis Date    Anemia     Arthritis     Atrial fibrillation     CAD (coronary artery disease)     Cataract     COPD (chronic obstructive pulmonary disease)     Diverticulosis      ED (erectile dysfunction)     HTN (hypertension)     Hyperlipidemia     Lung cancer     left    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 Courtney dwyer bx     Family History   Problem Relation Age of Onset    Esophageal cancer Sister     Cancer Sister     Lung cancer Mother     Cancer Mother     Cataracts Mother     Hypertension Mother     Pneumonia Father     Cataracts Father     Hypertension Father     Cancer Brother     Hypertension Brother     Blindness Neg Hx     Diabetes Neg Hx     Glaucoma Neg Hx     Macular degeneration Neg Hx     Retinal detachment Neg Hx     Strabismus Neg Hx     Stroke Neg Hx     Thyroid disease Neg Hx      Social History     Socioeconomic History    Marital status:      Spouse name: Not on file    Number of children: 3    Years of education: Not on file    Highest education level: Not on file   Occupational History    Occupation: retired   Social Needs    Financial resource strain: Not on file    Food insecurity:     Worry: Not on file     Inability: Not on file    Transportation needs:     Medical: Not on file     Non-medical: Not on file   Tobacco Use    Smoking status: Former Smoker     Packs/day: 1.00     Years: 50.00     Pack years: 50.00     Last attempt to quit: 2005     Years since quittin.2    Smokeless tobacco: Never Used   Substance and Sexual Activity    Alcohol use: No    Drug use: No    Sexual activity: Not Currently   Lifestyle    Physical activity:     Days per week: Not on file     Minutes per session: Not on file    Stress: Not on file   Relationships    Social connections:     Talks on phone: Not on file     Gets together: Not on file     Attends Yazidi service: Not on file     Active member of club or organization: Not on file     Attends meetings of clubs or organizations: Not on file     Relationship status: Not on file   Other Topics Concern    Not on file   Social  History Narrative    Not on file     Past Surgical History:   Procedure Laterality Date    APPENDECTOMY      BRONCHOSCOPY Bilateral 6/21/2019    Procedure: Bronchoscopy;  Surgeon: aPresh Goldman MD;  Location: [facility] ENDO;  Service: Endoscopy;  Laterality: Bilateral;    CARDIAC CATHETERIZATION      cardiac stents      CATARACT EXTRACTION W/  INTRAOCULAR LENS IMPLANT Right 9-3-14    CHOLECYSTECTOMY      COLONOSCOPY N/A 4/17/2018    Procedure: COLONOSCOPY;  Surgeon: Dionne Doan MD;  Location: [facility] ENDO;  Service: Endoscopy;  Laterality: N/A;    COLONOSCOPY N/A 10/25/2018    Procedure: COLONOSCOPY;  Surgeon: Michael Hameed MD;  Location: [facility] ENDO;  Service: Endoscopy;  Laterality: N/A;    ENDOSCOPIC ULTRASOUND OF UPPER GASTROINTESTINAL TRACT N/A 6/11/2019    Procedure: ULTRASOUND, UPPER GI TRACT, ENDOSCOPIC;  Surgeon: Abhishek Knox MD;  Location: [facility] ENDO;  Service: Endoscopy;  Laterality: N/A;    ESOPHAGOGASTRODUODENOSCOPY N/A 6/11/2019    Procedure: EGD (ESOPHAGOGASTRODUODENOSCOPY);  Surgeon: Abhishek Knox MD;  Location: [facility];  Service: Endoscopy;  Laterality: N/A;    infected stomach gland excision      INSERTION OF TUNNELED CENTRAL VENOUS CATHETER (CVC) WITH SUBCUTANEOUS PORT Right 7/3/2019    Procedure: AJSZNSOGL-XCDZ-U-CATH;  Surgeon: Jean Carlos Constantino MD;  Location: [facility];  Service: General;  Laterality: Right;    LUNG REMOVAL, TOTAL Left 04/2016    lung cancer left upper lobe    SKIN BIOPSY Left     arm     Current Outpatient Medications   Medication Sig Dispense Refill    (Magic mouthwash) 1:1:1 Benadryl 12.5mg/5ml liq, aluminum & magnesium hydroxide-simehticone (Maalox), lidocaine viscous 2% Swish and spit 15 mLs every 4 (four) hours as needed. for mouth sores 90 mL 2    albuterol (PROVENTIL) 2.5 mg /3 mL (0.083 %) nebulizer solution Take 3 mLs (2.5 mg total) by nebulization every 6 (six) hours while awake. 270 mL 11    amLODIPine (NORVASC) 5 MG tablet Take 1  tablet (5 mg total) by mouth once daily.      apixaban (ELIQUIS) 5 mg Tab Take 1 tablet (5 mg total) by mouth 2 (two) times daily. 60 tablet 5    aspirin (ECOTRIN) 81 MG EC tablet Take 81 mg by mouth once daily.      BREO ELLIPTA 100-25 mcg/dose diskus inhaler INHALE 1 PUFF INTO THE LUNGS ONCE DAILY  5    clopidogrel (PLAVIX) 75 mg tablet       colesevelam (WELCHOL) 625 mg tablet       doxepin (SINEQUAN) 10 MG capsule TAKE 1 CAPSULE (10 MG TOTAL) BY MOUTH EVERY EVENING. 30 capsule 10    fenofibric acid (FIBRICOR) 135 mg CpDR TAKE 1 CAPSULE BY MOUTH EVERY DAY 30 capsule 11    fluticasone (FLONASE) 50 mcg/actuation nasal spray 2 sprays (100 mcg total) by Each Nare route once daily. (Patient taking differently: 2 sprays by Each Nare route as needed. ) 3 Bottle 3    glycopyrrolate-formoterol (BEVESPI AEROSPHERE) 9-4.8 mcg HFAA Inhale 2 puffs into the lungs 2 (two) times daily. Controller 10.9 g 11    HYDROcodone-acetaminophen (NORCO) 5-325 mg per tablet Take 1 tablet by mouth every 6 (six) hours as needed for Pain. 40 tablet 0    ipratropium-albuterol (COMBIVENT RESPIMAT)  mcg/actuation inhaler Inhale 1 puff into the lungs every 6 (six) hours as needed for Shortness of Breath. 4 g 11    levocetirizine (XYZAL) 5 MG tablet Take 5 mg by mouth as needed.       lisinopril (PRINIVIL,ZESTRIL) 40 MG tablet TAKE 1 TABLET BY MOUTH DAILY 30 tablet 11    ranolazine (RANEXA) 500 MG Tb12 Take 1 tablet (500 mg total) by mouth 2 (two) times daily. 60 tablet 11    simvastatin (ZOCOR) 80 MG tablet Take 1 tablet (80 mg total) by mouth once daily. 90 tablet 3    SOTALOL AF 80 mg tablet TAKE 1 TABLET BY MOUTH TWICE A  tablet 3     Current Facility-Administered Medications   Medication Dose Route Frequency Provider Last Rate Last Dose    testosterone cypionate injection 200 mg  200 mg Intramuscular Q21 Days Peter Teixeira MD   200 mg at 10/24/19 1404     Facility-Administered Medications Ordered in Other  Visits   Medication Dose Route Frequency Provider Last Rate Last Dose    lactated ringers infusion   Intravenous Continuous Michael Hameed MD   Stopped at 07/03/19 0810       Labs:  Lab Results   Component Value Date    WBC 7.67 11/06/2019    HGB 10.6 (L) 11/06/2019    HCT 36.7 (L) 11/06/2019     (H) 11/06/2019     11/06/2019     BMP  Lab Results   Component Value Date     10/23/2019    K 4.4 10/23/2019     10/23/2019    CO2 28 10/23/2019    BUN 18 10/23/2019    CREATININE 1.3 10/23/2019    CALCIUM 10.2 10/23/2019    ANIONGAP 11 10/23/2019    ESTGFRAFRICA >60 10/23/2019    EGFRNONAA 52 (A) 10/23/2019     Lab Results   Component Value Date    ALT 14 10/23/2019    AST 17 10/23/2019    ALKPHOS 63 10/23/2019    BILITOT 0.3 10/23/2019       No results found for: IRON, TIBC, FERRITIN, SATURATEDIRO  No results found for: [id number]  No results found for: FOLATE  Lab Results   Component Value Date    TSH 2.065 10/23/2019       I have reviewed the radiology reports and examined the scan/xray images.    Review of Systems   Constitutional: Negative.    HENT: Negative.    Eyes: Negative.    Respiratory: Negative.    Cardiovascular: Negative.    Gastrointestinal: Negative.    Endocrine: Negative.    Genitourinary: Negative.    Musculoskeletal: Negative.    Skin: Negative.    Allergic/Immunologic: Negative.    Neurological: Negative.    Hematological: Negative.    Psychiatric/Behavioral: Negative.      ECOG SCORE    0 - Fully active-able to carry on all pre-disease performance without restriction            Objective:     Vitals:    11/06/19 0756   BP: (!) 109/59   Pulse: 64   Temp: 97.7 °F (36.5 °C)   Body mass index is 27.49 kg/m².  Physical Exam   Constitutional: He is oriented to person, place, and time. He appears well-developed and well-nourished.   HENT:   Head: Normocephalic and atraumatic.   Eyes: Conjunctivae and EOM are normal.   Neck: Normal range of motion. Neck supple.    Cardiovascular: Normal rate and regular rhythm.   Pulmonary/Chest: Effort normal and breath sounds normal.   Abdominal: Soft. Bowel sounds are normal.   Musculoskeletal: Normal range of motion.   Neurological: He is alert and oriented to person, place, and time.   Skin: Skin is warm and dry.   Psychiatric: He has a normal mood and affect. His behavior is normal. Judgment and thought content normal.   Nursing note and vitals reviewed.        Assessment:      1. Malignant neoplasm of upper lobe of left lung           Plan:     Malignant neoplasm of upper lobe of left lung    Continue on maintenance Imfinzi.  -     CBC auto differential; Future; Expected date: 11/20/2019  -     Comprehensive metabolic panel; Future; Expected date: 11/20/2019  -     TSH; Future; Expected date: 11/20/2019

## 2019-11-06 NOTE — DISCHARGE INSTRUCTIONS
[facility]  [address] [facility]  [address]  [phone] phone     [phone] fax  Hours of Operation: Monday- Friday 8:00am- 5:00pm  After hours phone  [phone]  Hematology / Oncology Physicians on call      Dr. Jose Fermin, JA Hemphill NP Tyesha Taylor, NP    Please call with any concerns regarding your appointment today.    IF YOU EXPERIENCE ANY OF THE FOLLOWING PROBLEMS, CALL THE OFFICE IMMEDIATELY.    *FEVER .0 OR GREATER    *CHILLS, ESPECIALLY SHAKING CHILLS, OR SWEATING    *A SEVERE COUGH OR SORE THROAT, OR SINUS PAIN/     PRESSURE    *REDNESS, SWELLING, OR TENDERNESS AROUND A WOUND,     SORE, PIMPLE, RECTAL AREA, OR IV SITE    *SORES OR ULCERS IN THE MOUTH    *BLISTERS ON THE LIPS OR SKIN    *FREQUENT URGENCY TO URINATE OR A BURNING FEELING   WHEN YOU URINATE    *BLOOD IN THE URINE OR STOOL    *ANY UNEXPLAINED BRUISING OR PROLONGED BLEEDING,     (NOSEBLEEDS OR BLEEDING GUMS)    *LOOSE BOWEL MOVEMENTS THAT DO NOT RESPOND TO     IMODIUM OR MORE THAN THREE TIMES A DAY    *VOMITING UNRESPONSIVE TO ANTINAUSEA MEDICINE    *ANY UNUSUAL PHYSICAL SYMPTOMS THAT BEGAN AFTER     CHEMOTHERAPY    DURING WEEKDAYS, CALL AND ASK TO SPEAK DIRECTLY TO A NURSE.  AT OTHER TIMES, CALL THE OFFICE PHONE NUMBER; THE ANSWERING SERVICE WILL CONTACT THE ON-CALL PHYSICIAN.  SOMEONE IS AVAILABLE 24 HOURS A DAY, SEVEN DAYS A WEEK.    FALL PREVENTION   Falls often occur due to slipping, tripping or losing your balance. Here are ways to reduce your risk of falling again.   Was there anything that caused your fall that can be fixed, removed or replaced?   Make your home safe by keeping walkways clear of objects you may trip over.   Use non-slip pads under rugs.   Do not walk in poorly lit areas.   Do not stand on chairs or wobbly ladders.   Use caution when reaching overhead or looking  upward. This position can cause a loss of balance.   Be sure your shoes fit properly, have non-slip bottoms and are in good condition.   Be cautious when going up and down stairs, curbs, and when walking on uneven sidewalks.   If your balance is poor, consider using a cane or walker.   If your fall was related to alcohol use, stop or limit alcohol intake.   If your fall was related to use of sleeping medicines, talk to your doctor about this. You may need to reduce your dosage at bedtime if you awaken during the night to go to the bathroom.   To reduce the need for nighttime bathroom trips:   Avoid drinking fluids for several hours before going to bed   Empty your bladder before going to bed   Men can keep a urinal at the bedside   © [phone] [facility], [address] [place] [address], [place] [address]. All rights reserved. This information is not intended as a substitute for professional medical care. Always follow your healthcare professional's instructions.

## 2019-11-06 NOTE — NURSING
Mr. Murry  tolerated Imfinzi well. No adverse reaction noted. Pt education reinforced on chemo regimen, side effects, what to expect, and when to call . Pt verbalized understanding. I reviewed pt calendar w/ pt and understanding verbalized. Right PAC deaccessed and flushed w/ NS and heparin per protocol.

## 2019-11-14 ENCOUNTER — CLINICAL SUPPORT (OUTPATIENT)
Dept: INTERNAL MEDICINE | Facility: CLINIC | Age: 79
End: 2019-11-14
Payer: MEDICARE

## 2019-11-14 DIAGNOSIS — E29.1 HYPOGONADISM IN MALE: Primary | ICD-10-CM

## 2019-11-14 PROCEDURE — 96372 PR INJECTION,THERAP/PROPH/DIAG2ST, IM OR SUBCUT: ICD-10-PCS | Mod: S$GLB,,, | Performed by: INTERNAL MEDICINE

## 2019-11-14 PROCEDURE — 96372 THER/PROPH/DIAG INJ SC/IM: CPT | Mod: S$GLB,,, | Performed by: INTERNAL MEDICINE

## 2019-11-14 PROCEDURE — 99999 PR PBB SHADOW E&M-EST. PATIENT-LVL II: ICD-10-PCS | Mod: PBBFAC,,,

## 2019-11-14 PROCEDURE — 99999 PR PBB SHADOW E&M-EST. PATIENT-LVL II: CPT | Mod: PBBFAC,,,

## 2019-11-14 RX ADMIN — TESTOSTERONE CYPIONATE 200 MG: 200 INJECTION, SOLUTION INTRAMUSCULAR at 08:11

## 2019-11-15 ENCOUNTER — PROCEDURE VISIT (OUTPATIENT)
Dept: OTOLARYNGOLOGY | Facility: CLINIC | Age: 79
End: 2019-11-15
Payer: MEDICARE

## 2019-11-15 VITALS
TEMPERATURE: 99 F | RESPIRATION RATE: 20 BRPM | HEART RATE: 62 BPM | SYSTOLIC BLOOD PRESSURE: 118 MMHG | DIASTOLIC BLOOD PRESSURE: 59 MMHG

## 2019-11-15 DIAGNOSIS — J38.01 UNILATERAL COMPLETE VOCAL FOLD PARALYSIS: ICD-10-CM

## 2019-11-15 DIAGNOSIS — R49.0 DYSPHONIA: Primary | ICD-10-CM

## 2019-11-15 PROCEDURE — L8607 INJ VOCAL CORD BULKING AGENT: HCPCS | Mod: S$GLB,,, | Performed by: OTOLARYNGOLOGY

## 2019-11-15 PROCEDURE — 31574 LARGSC W/NJX AUGMENTATION: CPT | Mod: LT,S$GLB,, | Performed by: OTOLARYNGOLOGY

## 2019-11-15 PROCEDURE — L8607 PR INJ BULKING AGENT, VOCAL CORD, 0.1ML, RESTYLANE-L: ICD-10-PCS | Mod: S$GLB,,, | Performed by: OTOLARYNGOLOGY

## 2019-11-15 PROCEDURE — 31574 PR LARYNGOSCOPY, FLEXIBLE; W/ INJ AUGMENTATION, UNI: ICD-10-PCS | Mod: LT,S$GLB,, | Performed by: OTOLARYNGOLOGY

## 2019-11-15 NOTE — PROCEDURES
Procedures   [facility]  Department of Otorhinolaryngology and Communication Sciences    Awake Laryngeal Procedure Operative Report    Preoperative Diagnosis: 1. Left vocal fold immobility.  2. Glottal insufficiency.  3. Dysphonia.    Postoperative Diagnosis: 1. Left vocal fold immobility.  2. Glottal insufficiency.  3. Dysphonia.    Procedure: Transnasal flexible magnified laryngoscopy with left vocal fold injection augmentation with hyaluronic acid (Restylane-L).    Surgeon: Con Lofton M.D.     Estimated blood loss: None.    Anesthesia: Local and topical.    Complications: None.    Findings: Appropriate medialization, convexity, and support with a total volume of 0.8 mL hyaluronic acid (Restylane-L).    Indications for procedure: Chai Murry is a 79 y.o. male with  left vocal fold immobility,  low likelihood of recovery, 2/2 recurrent lung cancer with paratracheal disease. The patient presents for elective vocal fold injection augmentation. Risks of the procedure were discussed, including but not limited to bleeding, infection, allergic reaction to the injectable or medication, scarring, worsening of voice, early resorption, need for additional procedures, pain, epistaxis, laryngospasm, and airway obstruction which could necessitate need for intubation or tracheostomy. All questions were answered and the patient agreed to move forward with the procedure. Informed consent was obtained.    Procedure in detail: Chai Murry was positively identified with two identifiers and was brought into the procedure suite. He was seated upright in a comfortable position. Final time-out was performed for verification purposes. Local cutaneous and subcutaneous anesthesia was achieved with 1 mL of 2% lidocaine  injected into the skin and subcutaneous tissues at the level of the thyroid notch and into the pre-epiglottic space. Oropharyngeal anesthesia was not necessary. The nasal cavity was topically decongested  with 1% phenylephrine and topically anesthetized with 4% lidocaine. The distal chip digital videolaryngoscope was advanced through the patients most patent nasal cavity. The larynx was inspected under maximal magnification, with findings as noted above.    Attention was turned toward anesthetizing the endolarynx, which was achieved with a total volume of 3 mL of 4% lidocaine administered  in consecutive aliquots through the side port of the laryngoscope using the laryngeal gargle technique.    After an adequate degree of anesthesia was obtained, attention was turned to injection augmentation. A syringe pre-loaded with hyaluronic acid (Restylane-L) was loaded onto a 23 gauge 1.5 inch needle. Under the guidance of magnified laryngoscopy, the needle was advanced percutaneously, through the thyrohyoid membrane and infrapetiole epiglottis, into the endolarynx. The needle was advanced into the left vocal fold at the junction of the vocal process with the superior arcuate line. After confirmation of appropriate depth of the needle tip, careful injection augmentation of the left vocal fold was carried out. Appropriate fullness, convexity, support, and medialization were achieved, with slight overcorrection, with a total volume of 0.8 mL injected. A pressed but less effortful voice, as well as a stronger cough, were noted immediately. The equipment was removed. The patient tolerated the procedure well without complication. All needle and instrument counts were correct at the completion of the case.    Attestation: As the attending of record, I was present and participated in all portions of this procedure.    Disposition: The patient was observed briefly before being discharged home in good condition. He was instructed to call the office or return to the emergency department should he develop a fever greater than 101 degrees F, increasing pain not relieved by over-the-counter medication, shortness of breath or noisy  breathing, difficulty swallowing, swelling, bleeding, or any other concerns. Post-procedure instructions were provided and were reviewed with the patient, who acknowledged understanding. He will follow up with me in about 4 weeks.    Con Lofton M.D.  [facility]  Department of Otorhinolaryngology and Communication Sciences

## 2019-11-15 NOTE — PATIENT INSTRUCTIONS
VOCAL FOLD INJECTION AUGMENTATION     Description   If the vocal folds (vocal cords) cannot close completely, you may experience voice problems: roughness, breathiness, inability to get loud, increased vocal effort, increased vocal fatigue. Some patients may also experience aspiration (coughing or choking with swallowing). Vocal fold injection augmentation plumps up the vocal fold and/or repositions it in the midline in order to help the vocal folds close completely while speaking or swallowing. Following the procedure, most patients experience a louder, stronger, clearer voice. The procedure also helps some patients protect against aspiration, although the swallowing improvement is not as dramatic as the voice improvement. We use the following materials for the procedure: hyaluronic acid (Restylane); carboxymethylcellulose (Radiesse Voice Gel); and calcium hydroxyapatite (Radiesse Voice). For most patients, the injection is performed with a small needle passed through the skin of the neck. However, in some patients we perform the injection with a device passed through the mouth. In either case, the injection is guided by the visualization provided by a scope passed through the nose.     What to expect during the procedure   We perform the injection in our office under local (topical) anesthesia. You are awake the whole time, and the entire procedure lasts about 15 minutes. Our primary focus is your safety and comfort. We usually make the larynx numb by spraying the throat and/or dripping anesthetic on the larynx. At this time, you may cough or gag, or you may have the sensation that you spilled some water down the wrong pipe. These are temporary sensations that allow us to get you numb. The numbing process takes about 2 minutes. We will not proceed until we know you will be as comfortable as possible. A small needle is passed either through the skin of the neck or via a long instrument through the mouth to  perform the injection. During the injection, you may experience mild discomfort in the throat. You may feel an unusual sensation in the ear because the larynx and the ear share the same sensory nerve. In rare cases in which a patient does not tolerate the procedure, it may be performed in the operating room, with the patient completely asleep under general anesthesia.     What to expect afterwards   Most patients note a stronger, less effortful voice immediately after the injection. Sometimes the voice is tight or pressed voice is noted right after the injection. The voice usually has good days and bad days and gradually improves until you reach your new baseline voice over the first 1-2 weeks. Voice therapy may be a necessary part of your treatment plan to optimize your vocal outcome. None of the materials we inject are permanent. As the material dissolves, you may experience a gradual worsening of voice quality over the course of several months. At this point we may consider repeating the injection. You may be a candidate for a permanent fix, which involves an open surgery in the neck performed in the operating room.     Instructions: before the procedure   1. Do not take aspirin-containing products or other medications that can thin the blood (such as ibuprofen, Advil, Motrin, Aleve, Plavix) for 7 days prior to the injection. If you take Coumadin (warfarin), you may need to stop using this a few days prior to the injection. If you are on blood thinning (anti-platelet or anti-coagulant) medication and it is not clear what you should do, please clarify this with your physician.   2. On the day of your procedure, please make sure you take your other regular morning medications.   3. On the day of your procedure, it is OK to eat and drink as you would normally, up until 3 hours before to your appointment time. During that time frame, we ask that you restrict yourself only to clear liquids. A clear liquid is anything  you can see through (water, ginger ale, apple juice).     Instructions: after the procedure   1. Please refrain from eating or drinking for 1 hour following the procedure. This allows time for the numbing medication to wear off.   2. Most patients experience very little pain. If necessary, most patients are able to keep comfortable with plain Tylenol (acetaminophen) and/or other non-steroidal anti-inflammatory medications such as ibuprofen (Advil, Motrin). Please follow package instructions if considering taking these medications.   3. In most instances, it is OK to use your voice immediately after the procedure. However, for the first week, you should avoid speaking over heavy background noise or in a very loud voice. It is rare, but in some cases you will be asked to rest your voice for the first 24 hours.   4. Please call the [facility] at [phone] if   · You have a temperature above 101°F   · You develop Increasing throat pain not relieved by over-the-counter medications   · You have any other post-operative questions or concerns   5. Please go immediately to the nearest emergency room if you are experiencing   · Shortness of breath   · Difficulty breathing   · Difficulty swallowing   · Severe bleeding     FREQUENTLY ASKED QUESTIONS     Is this a Botox injection? No. Botox weakens the voice box muscles. Instead, with a vocal fold injection augmentation, we are injecting filler material to bulk up the vocal fold(s).     How do you decide which material to use for the injection? Our decision is based on the indication for the procedure, the position of the vocal fold, and other patient historical/anatomical factors. In some instances, the approval of your insurance company is an important factor.     How to you decide which technique to use (through the neck versus through the mouth)? Patient anatomy and the position of the vocal fold play an important role. Other patient factors such as gag reflex are  also strongly considered.     Why do you perform this in your office instead of in the operating room? Performing the procedure in the office is safe and precise. In addition, performing the injection with the patient awake gives us direct visual and auditory feedback, which we do not get when the patient is asleep in the operating room. Furthermore, an office-based injection is much less time consuming, is more convenient for the patient, and does not involve the risks or the recovery time associated with general anesthesia. We can still do this in the operating room; we save that setting for specific diagnoses or situations, as well as for the rare patient who is unable to tolerate the awake procedure.     Why did I have discomfort in my ear (during or after the injection)? This is an example of referred pain. The ear and the larynx share the same sensory nerve.     I got an injection 3 days ago. Why is my voice still hoarse? To optimize vocal outcome, we overinject a little bit. Additionally, there may be a mild amount of swelling. Finally, the muscles of the larynx need to adjust to the injected material. Most people will have good days and bad days at first. After 1-2 weeks, you should settle out to your new baseline voice.     How long does the injection last? Carboxymethylcellulose (Radiesse Voice Gel) lasts approximately 1-2 months. Hyaluronic acid (Restylane) lasts approximately 4 months. Calcium hydroxyapatite (Radiesse Voice) lasts up to 1 year; however its characteristics are such that only few patients are appropriate for using this material.     Is there a permanent injectable material? No.     Can the injection be repeated? Yes. There is no limit to the number of times an injection can be repeated. However, a permanent surgical fix is often an option to consider.

## 2019-11-18 NOTE — PROGRESS NOTES
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 16 left pneumonectomy Dr. Wall returned moderately differentiated 3.8 cm squamous cell carcinoma with a positive AP window lymph node. A total of 3 nodes were sampled. Indeterminate lymph vascular invasion was seen There was a positive bronchial resection margin but in my discussion with Dr. Garza, the margin was apparently cleared in discussion with the surgeon.   2. He completed adjuvant radiation at [facility] and weekly carboplatin paclitaxel x 6.   3. 19 CT chest, abdomen and pelvis showed slight growth in a soft tissue nodule in the superior mediastinum since , and the nodule was not present in 2016. An additional subcentimeter short axis lymph node in the right paratracheal space was also slightly enlarged. There was also nodular mucosal thickening along the posterior lateral left intrathoracic tracheal airway adherent secretions versus a polypoid lesion.   4. 5/15/19 PET showed 12 SUV along the left and posterior trachea with soft tissue invasion of the left side of the trachea just above T1. It was unclear if this was a lymph node extrinsic to the trachea versus a mass in the trachea or esophagus. There was also a left paratracheal lymph node 6.4 SUV.   5.  19 EUS showed esophagitis in the upper 3rd esophagus. There was a moderate intrinsic stenosis in the upper 3rd of the esophagus. There was an irregular mediastinal mass 25 cm from the incisors measuring 2.5 x 3 cm cross-sectional. Pathology returned poorly differentiated carcinoma, favor squamous cell carcinoma.   6. 19 bronchoscopy left local cord paralysis, non obstructing mass at the orifice in the subglottic area,  endobronchial, friable, infiltrative and ulcerative. Pathology squamous cell carcinoma.   7. 8/14/19 completed 50.4 Gy radiation with cisplatin mediastinal and tracheal recurrence   8. 9/25/19 PET decreased SUV in size of paratracheal lymph nodes, resolution of tracheal mass.  9. 11/15/19 transnasal flexible magnified laryngoscopy with left vocal fold injection augmentation with hyaluronic acid    CURRENT STATUS: Chai Murry is a pleasant 79 y.o. male who presents today for follow-up.  He had a PET scan as above which I reviewed and started maintenance Durvalumab.  He had transnasal flexible magnified laryngoscopy with left vocal fold injection augmentation with hyaluronic acid.  His voice is improving since the procedure.  The cough with liquids has resolved.  He has no chest pain, dysphagia, odynophagia, increased dyspnea on exertion.    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 Courtney dwyer bx       PAST SURGICAL HISTORY:  Past Surgical History:   Procedure Laterality Date    APPENDECTOMY      BRONCHOSCOPY Bilateral 6/21/2019    Procedure: Bronchoscopy;  Surgeon: Paresh Goldman MD;  Location: [facility];  Service: Endoscopy;  Laterality: Bilateral;    CARDIAC CATHETERIZATION      cardiac stents      CATARACT EXTRACTION W/  INTRAOCULAR LENS IMPLANT Right 9-3-14    CHOLECYSTECTOMY      COLONOSCOPY N/A 4/17/2018    Procedure: COLONOSCOPY;  Surgeon: Dionne Doan MD;  Location: [facility];  Service: Endoscopy;  Laterality: N/A;    COLONOSCOPY N/A 10/25/2018    Procedure: COLONOSCOPY;  Surgeon: Michael Hameed MD;  Location: [facility]  ENDO;  Service: Endoscopy;  Laterality: N/A;    ENDOSCOPIC ULTRASOUND OF UPPER GASTROINTESTINAL TRACT N/A 2019    Procedure: ULTRASOUND, UPPER GI TRACT, ENDOSCOPIC;  Surgeon: Abhishek Knox MD;  Location: [facility];  Service: Endoscopy;  Laterality: N/A;    ESOPHAGOGASTRODUODENOSCOPY N/A 2019    Procedure: EGD (ESOPHAGOGASTRODUODENOSCOPY);  Surgeon: Abhishek Knox MD;  Location: [facility];  Service: Endoscopy;  Laterality: N/A;    infected stomach gland excision      INSERTION OF TUNNELED CENTRAL VENOUS CATHETER (CVC) WITH SUBCUTANEOUS PORT Right 7/3/2019    Procedure: [id number];  Surgeon: Jean Carlos Constantino MD;  Location: [facility] OR;  Service: General;  Laterality: Right;    LUNG REMOVAL, TOTAL Left 2016    lung cancer left upper lobe    SKIN BIOPSY Left     arm       SOCIAL HISTORY:  Social History     Socioeconomic History    Marital status:      Spouse name: Not on file    Number of children: 3    Years of education: Not on file    Highest education level: Not on file   Occupational History    Occupation: retired   Social Needs    Financial resource strain: Not on file    Food insecurity:     Worry: Not on file     Inability: Not on file    Transportation needs:     Medical: Not on file     Non-medical: Not on file   Tobacco Use    Smoking status: Former Smoker     Packs/day: 1.00     Years: 50.00     Pack years: 50.00     Last attempt to quit: 2005     Years since quittin.2    Smokeless tobacco: Never Used   Substance and Sexual Activity    Alcohol use: No    Drug use: No    Sexual activity: Not Currently   Lifestyle    Physical activity:     Days per week: Not on file     Minutes per session: Not on file    Stress: Not on file   Relationships    Social connections:     Talks on phone: Not on file     Gets together: Not on file     Attends [employer] service: Not on file     Active member of club or organization: Not on file     Attends meetings of  "clubs or organizations: Not on file     Relationship status: Not on file   Other Topics Concern    Not on file   Social History Narrative    Not on file       FAMILY HISTORY:  Family History   Problem Relation Age of Onset    Esophageal cancer Sister     Cancer Sister     Lung cancer Mother     Cancer Mother     Cataracts Mother     Hypertension Mother     Pneumonia Father     Cataracts Father     Hypertension Father     Cancer Brother     Hypertension Brother     Blindness Neg Hx     Diabetes Neg Hx     Glaucoma Neg Hx     Macular degeneration Neg Hx     Retinal detachment Neg Hx     Strabismus Neg Hx     Stroke Neg Hx     Thyroid disease Neg Hx        PHYSICAL EXAMINATION:  Constitutional/Vitals: well appearing, no acute distress, ECOG 0 - Fully Active, BP (!) 141/72   Pulse 65   Temp 98.5 °F (36.9 °C)   Resp 18   Ht 5' 10" (1.778 m)   Wt 83.8 kg (184 lb 12.8 oz)   SpO2 97%   BMI 26.52 kg/m²   Lymphatic: no cervical, supraclavicular adenopathy  Cardiovascular: regular rate, no murmurs, no edema of the upper or lower extremities, radial pulse 2+  Respiratory: unlabored effort, clear to auscultation, no wheezes    IMAGING AND LABORATORY FINDINGS: As per HPI; images, labs and medical records reviewed personally in EPIC.    ASSESSMENT:  Excellent response on PET    PLAN:  He is continuing immunotherapy with Dr. Vila.  I will see him in 6 months.    HERRERA Castro M.D.  Radiation Oncology  [facility] [address] [place] [address]  Ph: [phone]  [email or web address]    "

## 2019-11-19 ENCOUNTER — OFFICE VISIT (OUTPATIENT)
Dept: RADIATION ONCOLOGY | Facility: CLINIC | Age: 79
End: 2019-11-19
Payer: MEDICARE

## 2019-11-19 VITALS
OXYGEN SATURATION: 97 % | HEART RATE: 65 BPM | SYSTOLIC BLOOD PRESSURE: 141 MMHG | WEIGHT: 184.81 LBS | BODY MASS INDEX: 26.46 KG/M2 | HEIGHT: 70 IN | RESPIRATION RATE: 18 BRPM | TEMPERATURE: 99 F | DIASTOLIC BLOOD PRESSURE: 72 MMHG

## 2019-11-19 DIAGNOSIS — Z85.118 HISTORY OF CANCER OF UPPER LOBE BRONCHUS OR LUNG: ICD-10-CM

## 2019-11-19 DIAGNOSIS — C34.12 MALIGNANT NEOPLASM OF UPPER LOBE OF LEFT LUNG: Primary | ICD-10-CM

## 2019-11-19 PROCEDURE — 3078F PR MOST RECENT DIASTOLIC BLOOD PRESSURE < 80 MM HG: ICD-10-PCS | Mod: CPTII,S$GLB,, | Performed by: RADIOLOGY

## 2019-11-19 PROCEDURE — 99999 PR PBB SHADOW E&M-EST. PATIENT-LVL IV: CPT | Mod: PBBFAC,,, | Performed by: RADIOLOGY

## 2019-11-19 PROCEDURE — 99999 PR PBB SHADOW E&M-EST. PATIENT-LVL IV: ICD-10-PCS | Mod: PBBFAC,,, | Performed by: RADIOLOGY

## 2019-11-19 PROCEDURE — 1101F PT FALLS ASSESS-DOCD LE1/YR: CPT | Mod: CPTII,S$GLB,, | Performed by: RADIOLOGY

## 2019-11-19 PROCEDURE — 3077F PR MOST RECENT SYSTOLIC BLOOD PRESSURE >= 140 MM HG: ICD-10-PCS | Mod: CPTII,S$GLB,, | Performed by: RADIOLOGY

## 2019-11-19 PROCEDURE — 1101F PR PT FALLS ASSESS DOC 0-1 FALLS W/OUT INJ PAST YR: ICD-10-PCS | Mod: CPTII,S$GLB,, | Performed by: RADIOLOGY

## 2019-11-19 PROCEDURE — 99214 PR OFFICE/OUTPT VISIT, EST, LEVL IV, 30-39 MIN: ICD-10-PCS | Mod: S$GLB,,, | Performed by: RADIOLOGY

## 2019-11-19 PROCEDURE — 3077F SYST BP >= 140 MM HG: CPT | Mod: CPTII,S$GLB,, | Performed by: RADIOLOGY

## 2019-11-19 PROCEDURE — 3078F DIAST BP <80 MM HG: CPT | Mod: CPTII,S$GLB,, | Performed by: RADIOLOGY

## 2019-11-19 PROCEDURE — 99214 OFFICE O/P EST MOD 30 MIN: CPT | Mod: S$GLB,,, | Performed by: RADIOLOGY

## 2019-11-20 ENCOUNTER — OFFICE VISIT (OUTPATIENT)
Dept: HEMATOLOGY/ONCOLOGY | Facility: CLINIC | Age: 79
End: 2019-11-20
Payer: MEDICARE

## 2019-11-20 ENCOUNTER — INFUSION (OUTPATIENT)
Dept: INFUSION THERAPY | Facility: HOSPITAL | Age: 79
End: 2019-11-20
Attending: INTERNAL MEDICINE
Payer: MEDICARE

## 2019-11-20 VITALS
SYSTOLIC BLOOD PRESSURE: 127 MMHG | RESPIRATION RATE: 16 BRPM | OXYGEN SATURATION: 97 % | DIASTOLIC BLOOD PRESSURE: 66 MMHG | HEART RATE: 67 BPM

## 2019-11-20 VITALS
SYSTOLIC BLOOD PRESSURE: 102 MMHG | DIASTOLIC BLOOD PRESSURE: 60 MMHG | HEIGHT: 70 IN | WEIGHT: 191.38 LBS | OXYGEN SATURATION: 95 % | HEART RATE: 72 BPM | TEMPERATURE: 97 F | BODY MASS INDEX: 27.4 KG/M2

## 2019-11-20 DIAGNOSIS — J39.8 TRACHEAL MASS: Primary | ICD-10-CM

## 2019-11-20 DIAGNOSIS — F51.01 PRIMARY INSOMNIA: ICD-10-CM

## 2019-11-20 DIAGNOSIS — C34.12 MALIGNANT NEOPLASM OF UPPER LOBE OF LEFT LUNG: ICD-10-CM

## 2019-11-20 DIAGNOSIS — C34.12 MALIGNANT NEOPLASM OF UPPER LOBE OF LEFT LUNG: Primary | ICD-10-CM

## 2019-11-20 DIAGNOSIS — Z85.118 HISTORY OF CANCER OF UPPER LOBE BRONCHUS OR LUNG: ICD-10-CM

## 2019-11-20 PROCEDURE — 1159F MED LIST DOCD IN RCRD: CPT | Mod: S$GLB,,, | Performed by: INTERNAL MEDICINE

## 2019-11-20 PROCEDURE — 3074F SYST BP LT 130 MM HG: CPT | Mod: CPTII,S$GLB,, | Performed by: INTERNAL MEDICINE

## 2019-11-20 PROCEDURE — 3078F DIAST BP <80 MM HG: CPT | Mod: CPTII,S$GLB,, | Performed by: INTERNAL MEDICINE

## 2019-11-20 PROCEDURE — 99999 PR PBB SHADOW E&M-EST. PATIENT-LVL IV: ICD-10-PCS | Mod: PBBFAC,,, | Performed by: INTERNAL MEDICINE

## 2019-11-20 PROCEDURE — 99999 PR PBB SHADOW E&M-EST. PATIENT-LVL IV: CPT | Mod: PBBFAC,,, | Performed by: INTERNAL MEDICINE

## 2019-11-20 PROCEDURE — 63600175 PHARM REV CODE 636 W HCPCS: Performed by: INTERNAL MEDICINE

## 2019-11-20 PROCEDURE — 99215 OFFICE O/P EST HI 40 MIN: CPT | Mod: 25,S$GLB,, | Performed by: INTERNAL MEDICINE

## 2019-11-20 PROCEDURE — 3078F PR MOST RECENT DIASTOLIC BLOOD PRESSURE < 80 MM HG: ICD-10-PCS | Mod: CPTII,S$GLB,, | Performed by: INTERNAL MEDICINE

## 2019-11-20 PROCEDURE — 1126F AMNT PAIN NOTED NONE PRSNT: CPT | Mod: S$GLB,,, | Performed by: INTERNAL MEDICINE

## 2019-11-20 PROCEDURE — 99215 PR OFFICE/OUTPT VISIT, EST, LEVL V, 40-54 MIN: ICD-10-PCS | Mod: 25,S$GLB,, | Performed by: INTERNAL MEDICINE

## 2019-11-20 PROCEDURE — 1126F PR PAIN SEVERITY QUANTIFIED, NO PAIN PRESENT: ICD-10-PCS | Mod: S$GLB,,, | Performed by: INTERNAL MEDICINE

## 2019-11-20 PROCEDURE — 3074F PR MOST RECENT SYSTOLIC BLOOD PRESSURE < 130 MM HG: ICD-10-PCS | Mod: CPTII,S$GLB,, | Performed by: INTERNAL MEDICINE

## 2019-11-20 PROCEDURE — 1101F PT FALLS ASSESS-DOCD LE1/YR: CPT | Mod: CPTII,S$GLB,, | Performed by: INTERNAL MEDICINE

## 2019-11-20 PROCEDURE — 1159F PR MEDICATION LIST DOCUMENTED IN MEDICAL RECORD: ICD-10-PCS | Mod: S$GLB,,, | Performed by: INTERNAL MEDICINE

## 2019-11-20 PROCEDURE — 96413 CHEMO IV INFUSION 1 HR: CPT

## 2019-11-20 PROCEDURE — 1101F PR PT FALLS ASSESS DOC 0-1 FALLS W/OUT INJ PAST YR: ICD-10-PCS | Mod: CPTII,S$GLB,, | Performed by: INTERNAL MEDICINE

## 2019-11-20 RX ORDER — TEMAZEPAM 15 MG/1
15-30 CAPSULE ORAL NIGHTLY PRN
Qty: 60 CAPSULE | Refills: 3 | Status: SHIPPED | OUTPATIENT
Start: 2019-11-20 | End: 2019-12-20

## 2019-11-20 RX ORDER — SODIUM CHLORIDE 0.9 % (FLUSH) 0.9 %
10 SYRINGE (ML) INJECTION
Status: CANCELLED | OUTPATIENT
Start: 2019-11-20

## 2019-11-20 RX ORDER — HEPARIN 100 UNIT/ML
500 SYRINGE INTRAVENOUS
Status: DISCONTINUED | OUTPATIENT
Start: 2019-11-20 | End: 2019-11-20 | Stop reason: HOSPADM

## 2019-11-20 RX ORDER — HEPARIN 100 UNIT/ML
500 SYRINGE INTRAVENOUS
Status: CANCELLED | OUTPATIENT
Start: 2019-11-20

## 2019-11-20 RX ADMIN — HEPARIN SODIUM (PORCINE) LOCK FLUSH IV SOLN 100 UNIT/ML 500 UNITS: 100 SOLUTION at 04:11

## 2019-11-20 RX ADMIN — DURVALUMAB 835 MG: 500 INJECTION, SOLUTION INTRAVENOUS at 03:11

## 2019-11-20 NOTE — DISCHARGE INSTRUCTIONS
[facility]  [address] [address]  [phone] phone     [phone] fax  Hours of Operation: Monday- Friday 8:00am- 5:00pm  After hours phone  [phone]  Hematology / Oncology Physicians on call      Dr. Jose Casper    Please call with any concerns regarding your appointment today.    HOME CARE AFTER CHEMOTHERAPY   Meals   Many patients feel sick and lose their appetites during treatment. Eat small meals several times a day. Choose bland foods with little taste or smell if you have problems with nausea. Be sure to cook all food thoroughly. This kills bacteria and helps you avoid intestinal infection. Soft foods are easier to swallow and digest.   Activity   Exercise keeps you strong and keeps your heart and lungs active. Talk to your doctor about an appropriate exercise program for you.   Skin Care   To prevent a skin infection, bathe or shower once a day. Use a moisturizing soap and wash with warm water. Avoid very hot or cold water. Chemotherapy can make your skin dry . Apply moisturizing lotion to help relieve dry skin. Some drugs used in high doses can cause slight burns to appear (like sunburn). Ask for a special cream to help relieve the burn and protect your skin.   Prevent Mouth Sores   During chemotherapy, many people get mouth sores. Do the following to help prevent mouth sores or to ease discomfort.   Brush your teeth with a soft-bristle toothbrush after every meal.  Don't use dental floss if your platelet count is below 50,000. Your doctor or nurse will tell you if this is the case.  Use an oral swab or special soft toothbrush if your gums bleed during regular brushing.  Use mouthwash as directed. If you can't tolerate commercial mouthwash, use salt and baking soda to clean your mouth. Mix 1 teaspoon of salt and 1 teaspoon of baking soda into a glass of water. Swish and spit.  Call your doctor or  return to this facility if you develop any of the following:   Sore throat   White patches in the mouth or throat   Fever of 100.4ºF (38ºC) or higher, or as directed by your healthcare provider  © 2000-2011 [facility], [address] [place] [id number]. All rights reserved. This information is not intended as a substitute for professional medical care. Always follow your healthcare professional's   FALL PREVENTION   Falls often occur due to slipping, tripping or losing your balance. Here are ways to reduce your risk of falling again.   Was there anything that caused your fall that can be fixed, removed or replaced?   Make your home safe by keeping walkways clear of objects you may trip over.   Use non-slip pads under rugs.   Do not walk in poorly lit areas.   Do not stand on chairs or wobbly ladders.   Use caution when reaching overhead or looking upward. This position can cause a loss of balance.   Be sure your shoes fit properly, have non-slip bottoms and are in good condition.   Be cautious when going up and down stairs, curbs, and when walking on uneven sidewalks.   If your balance is poor, consider using a cane or walker.   If your fall was related to alcohol use, stop or limit alcohol intake.   If your fall was related to use of sleeping medicines, talk to your doctor about this. You may need to reduce your dosage at bedtime if you awaken during the night to go to the bathroom.   To reduce the need for nighttime bathroom trips:   Avoid drinking fluids for several hours before going to bed   Empty your bladder before going to bed   Men can keep a urinal at the bedside   © 2000-2011 [facility], [address] [place] [id number]. All rights reserved. This information is not intended as a substitute for professional medical care. Always follow your healthcare professional's instructions.  WAYS TO HELP PREVENT INFECTION         WASH YOUR HANDS OFTEN DURING THE DAY, ESPECIALLY BEFORE YOU  EAT, AFTER USING THE BATHROOM, AND AFTER TOUCHING ANIMALS     STAY AWAY FROM PEOPLE WHO HAVE ILLNESSES YOU CAN CATCH; SUCH AS COLDS, FLU, CHICKEN POX     TRY TO AVOID CROWDS     STAY AWAY FROM CHILDREN WHO RECENTLY HAVE RECEIVED LIVE VIRUS VACCINES     MAINTAIN GOOD MOUTH CARE     DO NOT SQUEEZE OR SCRATCH PIMPLES     CLEAN CUTS & SCRAPES RIGHT AWAY AND DAILY UNTIL HEALED WITH WARM WATER, SOAP & AN ANTISEPTIC     AVOID CONTACT WITH LITTER BOXES, BIRD CAGES, & FISH TANKS     AVOID STANDING WATER, IE., BIRD BATHS, FLOWER POTS/VASES, OR HUMIDIFIERS     WEAR GLOVES WHEN GARDENING OR CLEANING UP AFTER OTHERS, ESPECIALLY BABIES & SMALL CHILDREN     DO NOT EAT RAW FISH, SEAFOOD, MEAT, OR EGGS

## 2019-11-20 NOTE — PROGRESS NOTES
Subjective:       Patient ID: Chai Murry is a 79 y.o. male.    Chief Complaint: Malignant neoplasm of upper lobe of left lung [C34.12]  HPI: We have an opportunity to see Mr. Chai Murry in Hematology Oncology clinic at [facility] on 11/20/2019.  Mr. Chai Murry is a 79 y.o. 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     He is s/p left pneumonectomy for a squamous cell carcinoma of the left lung.0n 4/12/2016  Prior to the surgery, the PET scan showed an FDG-avid mass in the left upper   lobe abutting the left hilum with an SUV of 11.6. There seemed to be a left   hilar adenopathy as well.  Radiologist felt that he was unable to discriminate between the mass and the   lymphadenopathy. The patient had had a bronchoscopy by Dr. Roel Ernandez on   03/04/2006 that showed a partially obstructing airway in the anterior segment of  the left upper lobe with an endobronchial lesion in the anterior segment of the  left upper lobe. The specimen from the biopsy at the time of bronchoscopy was   reported as being a squamous cell carcinoma.  At the time of the surgery after the left lung was removed, the pathologist   indicated that this was a squamous cell carcinoma. It measured 3.8 cm. The   pathology indicated that this is extending into the bronchial resection margin of the lobe. This lobe was later on removed to complete the pneumonectomy   There was one lymph node positive for metastatic squamous cell carcinoma at the   AP window.  The patient was told that we would prefer to treat him with post-op simultaneous chemo/radiation.  He had Medi-port. He started chemo/radiation therapy andreceived 6 weekly cycles of carbo/Taxol along with radiatioon.  After a month, he had a Ct scan and it showed stability   He then had 2 additional cyles of carbo/Taxol finishing in 9/27/2016.    He comes for follow up.   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 CT of the chest was non contributory, shows changes from surgery  PET showed a PET avid mass to the left of the trachea.  The case was discussed with dr annika Goldman and GI.  We discussed the possibility of doing a EUS or EBUs, and finally, because of the localization, it was decided to do a EUS with biopsy if possible.  Cytology shows recurrent squamous cell cancer.  I have asked Pathology to run a PD-L1 from his original pathology from 2016.  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  He has been discussed extensively with radiation oncology. We have decided to treat him with radiation therapy and Cisplatin as a chemo-sensitizer.  Dr Castro has reviewed the therapy ports from the previous radiation treatment and the area in question seems to be above the previously radiated fields.     Completed chemoradiation s/p 6 weekly cisplatin treatments with response.  Currently on maintenance Imfinzi.  Reports doing well.        Malignant neoplasm of upper lobe of left lung    4/12/2016 Initial Diagnosis     Malignant neoplasm of upper lobe of left lung      6/19/2019 Cancer Staged     Staging form: Lung, AJCC 8th Edition  - Clinical stage from 6/19/2019: Stage IIIB (rcT4, cN2, cM0) - Signed by Alejandro Castro II, MD on 6/19/2019 6/21/2019 Tumor Conference     Presenting Hospital / Clinic: [facility] [place]  Virtual Tumor Board Conference: In person  Presenter: Dr. Chance Castro  Date Presented to Tumor Board: 06/21/19  Specialties Present: Medical Oncology;Radiation Oncology;Surgical Oncology;Pathology;Navigation;Plastic Surgery;Radiology;Gastrointestinal;Pulmonology  Presentation at Cancer Conference: Prospective  Cancer Type: Lung cancer  Recommended Plan: Additional screening  Recommended Plan Note: If PDL-1 positive, treat with immunotherapy  Send tissue for next generation sequencing.      6/28/2019 Tumor Conference     His case was  discussed at the Multidisciplinary Head and Neck Team Planning Meeting.    Representatives from Medical Oncology, Radiation Oncology, Head and Neck Surgical Oncology, Psychosocial Oncology, and Speech and Language Pathology discussed the case with the following recommendations:    1) treat lung cancer             7/5/2019 - 8/14/2019 Radiation Therapy     Treatment Site Ref. ID Energy Dose/Fx (Gy) #Fx Dose Correction (Gy) Total Dose (Gy) Start Date End Date Elapsed Days   [id number]:1 PTV LNs 6X 1.8 28 / 28 0 50.4 7/5/2019 8/14/2019 40             Lung cancer    6/11/2019 Initial Diagnosis     Lung cancer      7/8/2019 - 8/18/2019 Chemotherapy     Treatment Summary   Plan Name: OP HEAD NECK CISPLATIN WEEKLY + RADIOTHERAPY  Treatment Goal: Supportive  Status: Inactive  Start Date: 7/8/2019  End Date: 8/12/2019  Provider: Jorge L Patel MD  Chemotherapy: CISplatin (PLATINOL) 40 mg/m2 = 83 mg in sodium chloride 0.9% 583 mL chemo infusion, 40 mg/m2 = 83 mg (100 % of original dose 40 mg/m2), Intravenous, Clinic/HOD 1 time, 6 of 6 cycles  Dose modification: 70 mg (original dose 40 mg/m2, Cycle 1, Reason: MD Discretion), 40 mg/m2 (original dose 40 mg/m2, Cycle 1)  Administration: 83 mg (7/8/2019), 84 mg (7/15/2019), 83 mg (7/22/2019), 83 mg (7/29/2019), 83 mg (8/5/2019), 82 mg (8/12/2019)      9/30/2019 -  Chemotherapy     Treatment Summary   Plan Name: OP DURVALUMAB Q2W  Treatment Goal: Curative  Status: Active  Start Date: 10/3/2019  End Date: 9/23/2020 (Planned)  Provider: Fermin Vila MD  Chemotherapy: durvalumab (IMFINZI) 835 mg in sodium chloride 0.9% 266.7 mL chemo infusion, 10 mg/kg = 835 mg, Intravenous, Clinic/HOD 1 time, 4 of 26 cycles  Administration: 835 mg (10/3/2019), 835 mg (10/23/2019), 835 mg (11/6/2019)       Past Medical History:   Diagnosis Date    Anemia     Arthritis     Atrial fibrillation     CAD (coronary artery disease)     Cataract     COPD (chronic obstructive pulmonary disease)      Diverticulosis     ED (erectile dysfunction)     HTN (hypertension)     Hyperlipidemia     Lung cancer     left    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 Courtney dwyer bx     Family History   Problem Relation Age of Onset    Esophageal cancer Sister     Cancer Sister     Lung cancer Mother     Cancer Mother     Cataracts Mother     Hypertension Mother     Pneumonia Father     Cataracts Father     Hypertension Father     Cancer Brother     Hypertension Brother     Blindness Neg Hx     Diabetes Neg Hx     Glaucoma Neg Hx     Macular degeneration Neg Hx     Retinal detachment Neg Hx     Strabismus Neg Hx     Stroke Neg Hx     Thyroid disease Neg Hx      Social History     Socioeconomic History    Marital status:      Spouse name: Not on file    Number of children: 3    Years of education: Not on file    Highest education level: Not on file   Occupational History    Occupation: retired   Social Needs    Financial resource strain: Not on file    Food insecurity:     Worry: Not on file     Inability: Not on file    Transportation needs:     Medical: Not on file     Non-medical: Not on file   Tobacco Use    Smoking status: Former Smoker     Packs/day: 1.00     Years: 50.00     Pack years: 50.00     Last attempt to quit: 2005     Years since quittin.2    Smokeless tobacco: Never Used   Substance and Sexual Activity    Alcohol use: No    Drug use: No    Sexual activity: Not Currently   Lifestyle    Physical activity:     Days per week: Not on file     Minutes per session: Not on file    Stress: Not on file   Relationships    Social connections:     Talks on phone: Not on file     Gets together: Not on file     Attends [employer] service: Not on file     Active member of club or organization: Not on file     Attends meetings of clubs or organizations: Not on file     Relationship status: Not on file   Other Topics Concern     Not on file   Social History Narrative    Not on file     Past Surgical History:   Procedure Laterality Date    APPENDECTOMY      BRONCHOSCOPY Bilateral 6/21/2019    Procedure: Bronchoscopy;  Surgeon: Paresh Goldman MD;  Location: [facility] ENDO;  Service: Endoscopy;  Laterality: Bilateral;    CARDIAC CATHETERIZATION      cardiac stents      CATARACT EXTRACTION W/  INTRAOCULAR LENS IMPLANT Right 9-3-14    CHOLECYSTECTOMY      COLONOSCOPY N/A 4/17/2018    Procedure: COLONOSCOPY;  Surgeon: Dionne Doan MD;  Location: [facility] ENDO;  Service: Endoscopy;  Laterality: N/A;    COLONOSCOPY N/A 10/25/2018    Procedure: COLONOSCOPY;  Surgeon: Michael Hameed MD;  Location: [facility] ENDO;  Service: Endoscopy;  Laterality: N/A;    ENDOSCOPIC ULTRASOUND OF UPPER GASTROINTESTINAL TRACT N/A 6/11/2019    Procedure: ULTRASOUND, UPPER GI TRACT, ENDOSCOPIC;  Surgeon: Abhishek Knox MD;  Location: [facility] ENDO;  Service: Endoscopy;  Laterality: N/A;    ESOPHAGOGASTRODUODENOSCOPY N/A 6/11/2019    Procedure: EGD (ESOPHAGOGASTRODUODENOSCOPY);  Surgeon: Abhishek Knox MD;  Location: [facility];  Service: Endoscopy;  Laterality: N/A;    infected stomach gland excision      INSERTION OF TUNNELED CENTRAL VENOUS CATHETER (CVC) WITH SUBCUTANEOUS PORT Right 7/3/2019    Procedure: [id number];  Surgeon: Jean Carlos Constantino MD;  Location: [facility];  Service: General;  Laterality: Right;    LUNG REMOVAL, TOTAL Left 04/2016    lung cancer left upper lobe    SKIN BIOPSY Left     arm     Current Outpatient Medications   Medication Sig Dispense Refill    (Magic mouthwash) 1:1:1 Benadryl 12.5mg/5ml liq, aluminum & magnesium hydroxide-simehticone (Maalox), lidocaine viscous 2% Swish and spit 15 mLs every 4 (four) hours as needed. for mouth sores 90 mL 2    albuterol (PROVENTIL) 2.5 mg /3 mL (0.083 %) nebulizer solution Take 3 mLs (2.5 mg total) by nebulization every 6 (six) hours while awake. 270 mL 11    amLODIPine (NORVASC) 5  MG tablet Take 1 tablet (5 mg total) by mouth once daily.      apixaban (ELIQUIS) 5 mg Tab Take 1 tablet (5 mg total) by mouth 2 (two) times daily. 60 tablet 5    aspirin (ECOTRIN) 81 MG EC tablet Take 81 mg by mouth once daily.      BREO ELLIPTA 100-25 mcg/dose diskus inhaler INHALE 1 PUFF INTO THE LUNGS ONCE DAILY  5    clopidogrel (PLAVIX) 75 mg tablet       colesevelam (WELCHOL) 625 mg tablet       fenofibric acid (FIBRICOR) 135 mg CpDR TAKE 1 CAPSULE BY MOUTH EVERY DAY 30 capsule 11    fluticasone (FLONASE) 50 mcg/actuation nasal spray 2 sprays (100 mcg total) by Each Nare route once daily. (Patient taking differently: 2 sprays by Each Nare route as needed. ) 3 Bottle 3    glycopyrrolate-formoterol (BEVESPI AEROSPHERE) 9-4.8 mcg HFAA Inhale 2 puffs into the lungs 2 (two) times daily. Controller 10.9 g 11    HYDROcodone-acetaminophen (NORCO) 5-325 mg per tablet Take 1 tablet by mouth every 6 (six) hours as needed for Pain. 40 tablet 0    ipratropium-albuterol (COMBIVENT RESPIMAT)  mcg/actuation inhaler Inhale 1 puff into the lungs every 6 (six) hours as needed for Shortness of Breath. 4 g 11    levocetirizine (XYZAL) 5 MG tablet Take 5 mg by mouth as needed.       lisinopril (PRINIVIL,ZESTRIL) 40 MG tablet TAKE 1 TABLET BY MOUTH DAILY 30 tablet 11    ranolazine (RANEXA) 500 MG Tb12 Take 1 tablet (500 mg total) by mouth 2 (two) times daily. 60 tablet 11    simvastatin (ZOCOR) 80 MG tablet Take 1 tablet (80 mg total) by mouth once daily. 90 tablet 3    SOTALOL AF 80 mg tablet TAKE 1 TABLET BY MOUTH TWICE A  tablet 3    temazepam (RESTORIL) 15 mg Cap Take 1 to 2 capsules (15-30 mg total) by mouth nightly as needed. 60 capsule 3     Current Facility-Administered Medications   Medication Dose Route Frequency Provider Last Rate Last Dose    testosterone cypionate injection 200 mg  200 mg Intramuscular Q21 Days Peter Teixeira MD   200 mg at 11/14/19 0829     Facility-Administered  Medications Ordered in Other Visits   Medication Dose Route Frequency Provider Last Rate Last Dose    durvalumab (IMFINZI) 835 mg in sodium chloride 0.9% 266.7 mL chemo infusion  10 mg/kg (Treatment Plan Recorded) Intravenous 1 time in Clinic/HOD Fermin Vila MD        heparin, porcine (PF) 100 unit/mL injection flush 500 Units  500 Units Intravenous PRN Fermin Vila MD        lactated ringers infusion   Intravenous Continuous Michael Hameed MD   Stopped at 07/03/19 0810       Labs:  Lab Results   Component Value Date    WBC 8.24 11/20/2019    HGB 11.3 (L) 11/20/2019    HCT 36.6 (L) 11/20/2019    MCV 98 11/20/2019     11/20/2019     BMP  Lab Results   Component Value Date     11/20/2019    K 5.0 11/20/2019     11/20/2019    CO2 30 (H) 11/20/2019    BUN 18 11/20/2019    CREATININE 1.7 (H) 11/20/2019    CALCIUM 9.9 11/20/2019    ANIONGAP 7 (L) 11/20/2019    ESTGFRAFRICA 44 (A) 11/20/2019    EGFRNONAA 38 (A) 11/20/2019     Lab Results   Component Value Date    ALT 7 (L) 11/20/2019    AST 16 11/20/2019    ALKPHOS 57 11/20/2019    BILITOT 0.2 11/20/2019       No results found for: IRON, TIBC, FERRITIN, SATURATEDIRO  No results found for: [id number]  No results found for: FOLATE  Lab Results   Component Value Date    TSH 1.130 11/06/2019       I have reviewed the radiology reports and examined the scan/xray images.    Review of Systems   Constitutional: Negative.    HENT: Negative.    Eyes: Negative.    Respiratory: Negative.    Cardiovascular: Negative.    Gastrointestinal: Negative.    Endocrine: Negative.    Genitourinary: Negative.    Musculoskeletal: Negative.    Skin: Negative.    Allergic/Immunologic: Negative.    Neurological: Negative.    Hematological: Negative.    Psychiatric/Behavioral: Negative.      ECOG SCORE    0 - Fully active-able to carry on all pre-disease performance without restriction            Objective:     Vitals:    11/20/19 1403   BP: 102/60   Pulse: 72   Temp: 97.1  °F (36.2 °C)   Body mass index is 27.46 kg/m².  Physical Exam   Constitutional: He is oriented to person, place, and time. He appears well-developed and well-nourished.   HENT:   Head: Normocephalic and atraumatic.   Eyes: Conjunctivae and EOM are normal.   Neck: Normal range of motion. Neck supple.   Cardiovascular: Normal rate and regular rhythm.   Pulmonary/Chest: Effort normal and breath sounds normal.   Abdominal: Soft. Bowel sounds are normal.   Musculoskeletal: Normal range of motion.   Neurological: He is alert and oriented to person, place, and time.   Skin: Skin is warm and dry.   Psychiatric: He has a normal mood and affect. His behavior is normal. Judgment and thought content normal.   Nursing note and vitals reviewed.        Assessment:      1. Malignant neoplasm of upper lobe of left lung    2. Primary insomnia           Plan:     Malignant neoplasm of upper lobe of left lung  Continue on maintenance Imfinzi.  Creatinine 1.7, likely due to Imfinzi, monitor.  -     CBC auto differential; Future; Expected date: 12/04/2019  -     Comprehensive metabolic panel; Future; Expected date: 12/04/2019  -     TSH; Future; Expected date: 12/04/2019    Primary insomnia  -     temazepam (RESTORIL) 15 mg Cap; Take 1 to 2 capsules (15-30 mg total) by mouth nightly as needed.  Dispense: 60 capsule; Refill: 3

## 2019-11-26 ENCOUNTER — OFFICE VISIT (OUTPATIENT)
Dept: CARDIOLOGY | Facility: CLINIC | Age: 79
End: 2019-11-26
Payer: MEDICARE

## 2019-11-26 VITALS
HEART RATE: 70 BPM | WEIGHT: 192 LBS | OXYGEN SATURATION: 98 % | DIASTOLIC BLOOD PRESSURE: 60 MMHG | BODY MASS INDEX: 27.49 KG/M2 | HEIGHT: 70 IN | SYSTOLIC BLOOD PRESSURE: 108 MMHG

## 2019-11-26 DIAGNOSIS — E78.00 PURE HYPERCHOLESTEROLEMIA: ICD-10-CM

## 2019-11-26 DIAGNOSIS — I10 ESSENTIAL HYPERTENSION: ICD-10-CM

## 2019-11-26 DIAGNOSIS — J42 CHRONIC BRONCHITIS, UNSPECIFIED CHRONIC BRONCHITIS TYPE: Primary | ICD-10-CM

## 2019-11-26 DIAGNOSIS — I48.20 CHRONIC ATRIAL FIBRILLATION: ICD-10-CM

## 2019-11-26 DIAGNOSIS — I25.10 CORONARY ARTERY DISEASE INVOLVING NATIVE CORONARY ARTERY OF NATIVE HEART WITHOUT ANGINA PECTORIS: ICD-10-CM

## 2019-11-26 PROCEDURE — 1101F PT FALLS ASSESS-DOCD LE1/YR: CPT | Mod: CPTII,S$GLB,, | Performed by: INTERNAL MEDICINE

## 2019-11-26 PROCEDURE — 3074F SYST BP LT 130 MM HG: CPT | Mod: CPTII,S$GLB,, | Performed by: INTERNAL MEDICINE

## 2019-11-26 PROCEDURE — 99215 PR OFFICE/OUTPT VISIT, EST, LEVL V, 40-54 MIN: ICD-10-PCS | Mod: S$GLB,,, | Performed by: INTERNAL MEDICINE

## 2019-11-26 PROCEDURE — 1126F PR PAIN SEVERITY QUANTIFIED, NO PAIN PRESENT: ICD-10-PCS | Mod: S$GLB,,, | Performed by: INTERNAL MEDICINE

## 2019-11-26 PROCEDURE — 99215 OFFICE O/P EST HI 40 MIN: CPT | Mod: S$GLB,,, | Performed by: INTERNAL MEDICINE

## 2019-11-26 PROCEDURE — 3074F PR MOST RECENT SYSTOLIC BLOOD PRESSURE < 130 MM HG: ICD-10-PCS | Mod: CPTII,S$GLB,, | Performed by: INTERNAL MEDICINE

## 2019-11-26 PROCEDURE — 99999 PR PBB SHADOW E&M-EST. PATIENT-LVL III: ICD-10-PCS | Mod: PBBFAC,,, | Performed by: INTERNAL MEDICINE

## 2019-11-26 PROCEDURE — 1101F PR PT FALLS ASSESS DOC 0-1 FALLS W/OUT INJ PAST YR: ICD-10-PCS | Mod: CPTII,S$GLB,, | Performed by: INTERNAL MEDICINE

## 2019-11-26 PROCEDURE — 1159F PR MEDICATION LIST DOCUMENTED IN MEDICAL RECORD: ICD-10-PCS | Mod: S$GLB,,, | Performed by: INTERNAL MEDICINE

## 2019-11-26 PROCEDURE — 3078F PR MOST RECENT DIASTOLIC BLOOD PRESSURE < 80 MM HG: ICD-10-PCS | Mod: CPTII,S$GLB,, | Performed by: INTERNAL MEDICINE

## 2019-11-26 PROCEDURE — 1159F MED LIST DOCD IN RCRD: CPT | Mod: S$GLB,,, | Performed by: INTERNAL MEDICINE

## 2019-11-26 PROCEDURE — 3078F DIAST BP <80 MM HG: CPT | Mod: CPTII,S$GLB,, | Performed by: INTERNAL MEDICINE

## 2019-11-26 PROCEDURE — 99999 PR PBB SHADOW E&M-EST. PATIENT-LVL III: CPT | Mod: PBBFAC,,, | Performed by: INTERNAL MEDICINE

## 2019-11-26 PROCEDURE — 1126F AMNT PAIN NOTED NONE PRSNT: CPT | Mod: S$GLB,,, | Performed by: INTERNAL MEDICINE

## 2019-11-26 NOTE — PROGRESS NOTES
Subjective:   Patient ID:  Chai Murry is a 79 y.o. male who presents for follow-up of Simple chronic bronchitis (3 month f/u)  Holter/zio shows PAF.Patient denies CP, angina or anginal equivalent. Echo- MV density?    Hypertension   This is a chronic problem. The current episode started more than 1 year ago. The problem has been gradually improving since onset. The problem is controlled. Pertinent negatives include no chest pain, palpitations or shortness of breath. Past treatments include ACE inhibitors and calcium channel blockers. The current treatment provides moderate improvement. There are no compliance problems.    Atrial Fibrillation   Presents for follow-up visit. Symptoms are negative for bradycardia, chest pain, dizziness, hypotension, palpitations, shortness of breath, syncope, tachycardia and weakness. The symptoms have been stable. Past medical history includes atrial fibrillation and CAD. There are no medication compliance problems.   Coronary Artery Disease   Presents for follow-up visit. Pertinent negatives include no chest pain, chest pressure, chest tightness, dizziness, leg swelling, muscle weakness, palpitations, shortness of breath or weight gain. The symptoms have been stable. Compliance with diet is variable. Compliance with exercise is variable. Compliance with medications is good.       Review of Systems   Constitution: Negative. Negative for weight gain.   HENT: Negative.    Eyes: Negative.    Cardiovascular: Negative.  Negative for chest pain, leg swelling, palpitations and syncope.   Respiratory: Negative.  Negative for chest tightness and shortness of breath.    Endocrine: Negative.    Hematologic/Lymphatic: Negative.    Skin: Negative.    Musculoskeletal: Negative for muscle weakness.   Gastrointestinal: Negative.    Genitourinary: Negative.    Neurological: Negative.  Negative for dizziness and weakness.   Psychiatric/Behavioral: Negative.    Allergic/Immunologic: Negative.       Family History   Problem Relation Age of Onset    Esophageal cancer Sister     Cancer Sister     Lung cancer Mother     Cancer Mother     Cataracts Mother     Hypertension Mother     Pneumonia Father     Cataracts Father     Hypertension Father     Cancer Brother     Hypertension Brother     Blindness Neg Hx     Diabetes Neg Hx     Glaucoma Neg Hx     Macular degeneration Neg Hx     Retinal detachment Neg Hx     Strabismus Neg Hx     Stroke Neg Hx     Thyroid disease Neg Hx      Past Medical History:   Diagnosis Date    Anemia     Arthritis     Atrial fibrillation     CAD (coronary artery disease)     Cataract     COPD (chronic obstructive pulmonary disease)     Diverticulosis     ED (erectile dysfunction)     HTN (hypertension)     Hyperlipidemia     Lung cancer     left    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 Courtney dwyer bx     Social History     Socioeconomic History    Marital status:      Spouse name: Not on file    Number of children: 3    Years of education: Not on file    Highest education level: Not on file   Occupational History    Occupation: retired   Social Needs    Financial resource strain: Not on file    Food insecurity:     Worry: Not on file     Inability: Not on file    Transportation needs:     Medical: Not on file     Non-medical: Not on file   Tobacco Use    Smoking status: Former Smoker     Packs/day: 1.00     Years: 50.00     Pack years: 50.00     Last attempt to quit: 2005     Years since quittin.2    Smokeless tobacco: Never Used   Substance and Sexual Activity    Alcohol use: No    Drug use: No    Sexual activity: Not Currently   Lifestyle    Physical activity:     Days per week: Not on file     Minutes per session: Not on file    Stress: Not on file   Relationships    Social connections:     Talks on phone: Not on file     Gets together: Not on file     Attends [employer] service:  Not on file     Active member of club or organization: Not on file     Attends meetings of clubs or organizations: Not on file     Relationship status: Not on file   Other Topics Concern    Not on file   Social History Narrative    Not on file     Current Outpatient Medications on File Prior to Visit   Medication Sig Dispense Refill    albuterol (PROVENTIL) 2.5 mg /3 mL (0.083 %) nebulizer solution Take 3 mLs (2.5 mg total) by nebulization every 6 (six) hours while awake. 270 mL 11    amLODIPine (NORVASC) 5 MG tablet Take 1 tablet (5 mg total) by mouth once daily.      apixaban (ELIQUIS) 5 mg Tab Take 1 tablet (5 mg total) by mouth 2 (two) times daily. 60 tablet 5    aspirin (ECOTRIN) 81 MG EC tablet Take 81 mg by mouth once daily.      BREO ELLIPTA 100-25 mcg/dose diskus inhaler INHALE 1 PUFF INTO THE LUNGS ONCE DAILY  5    clopidogrel (PLAVIX) 75 mg tablet       colesevelam (WELCHOL) 625 mg tablet       fenofibric acid (FIBRICOR) 135 mg CpDR TAKE 1 CAPSULE BY MOUTH EVERY DAY 30 capsule 11    fluticasone (FLONASE) 50 mcg/actuation nasal spray 2 sprays (100 mcg total) by Each Nare route once daily. (Patient taking differently: 2 sprays by Each Nare route as needed. ) 3 Bottle 3    glycopyrrolate-formoterol (BEVESPI AEROSPHERE) 9-4.8 mcg HFAA Inhale 2 puffs into the lungs 2 (two) times daily. Controller 10.9 g 11    HYDROcodone-acetaminophen (NORCO) 5-325 mg per tablet Take 1 tablet by mouth every 6 (six) hours as needed for Pain. 40 tablet 0    ipratropium-albuterol (COMBIVENT RESPIMAT)  mcg/actuation inhaler Inhale 1 puff into the lungs every 6 (six) hours as needed for Shortness of Breath. 4 g 11    levocetirizine (XYZAL) 5 MG tablet Take 5 mg by mouth as needed.       lisinopril (PRINIVIL,ZESTRIL) 40 MG tablet TAKE 1 TABLET BY MOUTH DAILY 30 tablet 11    ranolazine (RANEXA) 500 MG Tb12 Take 1 tablet (500 mg total) by mouth 2 (two) times daily. 60 tablet 11    simvastatin (ZOCOR) 80 MG  tablet Take 1 tablet (80 mg total) by mouth once daily. 90 tablet 3    SOTALOL AF 80 mg tablet TAKE 1 TABLET BY MOUTH TWICE A  tablet 3    temazepam (RESTORIL) 15 mg Cap Take 1 to 2 capsules (15-30 mg total) by mouth nightly as needed. 60 capsule 3    [DISCONTINUED] (Magic mouthwash) 1:1:1 Benadryl 12.5mg/5ml liq, aluminum & magnesium hydroxide-simehticone (Maalox), lidocaine viscous 2% Swish and spit 15 mLs every 4 (four) hours as needed. for mouth sores 90 mL 2     Current Facility-Administered Medications on File Prior to Visit   Medication Dose Route Frequency Provider Last Rate Last Dose    lactated ringers infusion   Intravenous Continuous Michael Hameed MD   Stopped at 07/03/19 0810    testosterone cypionate injection 200 mg  200 mg Intramuscular Q21 Days Peter Teixeira MD   200 mg at 11/14/19 0829     Review of patient's allergies indicates:   Allergen Reactions    Atorvastatin      Other reaction(s): Muscle pain    Bactrim [sulfamethoxazole-trimethoprim]      Lip swelling       Objective:     Physical Exam   Constitutional: He is oriented to person, place, and time. He appears well-developed and well-nourished.   HENT:   Head: Normocephalic and atraumatic.   Eyes: Pupils are equal, round, and reactive to light. Conjunctivae are normal.   Neck: Normal range of motion. Neck supple.   Cardiovascular: Normal rate, regular rhythm, normal heart sounds and intact distal pulses.   Pulmonary/Chest: Effort normal and breath sounds normal.   Abdominal: Soft. Bowel sounds are normal.   Neurological: He is alert and oriented to person, place, and time.   Skin: Skin is warm and dry.   Psychiatric: He has a normal mood and affect.   Nursing note and vitals reviewed.      Assessment:     1. Chronic bronchitis, unspecified chronic bronchitis type    2. Coronary artery disease involving native coronary artery of native heart without angina pectoris    3. Pure hypercholesterolemia    4. Essential  hypertension    5. Chronic atrial fibrillation        Plan:     Chronic bronchitis, unspecified chronic bronchitis type    Coronary artery disease involving native coronary artery of native heart without angina pectoris    Pure hypercholesterolemia    Essential hypertension    Chronic atrial fibrillation      Continue norvasc, lisinopril-htn  Continue asa- cad  Continue sotalol, eliquis - paroxysmal afib   SONIA

## 2019-12-02 ENCOUNTER — TELEPHONE (OUTPATIENT)
Dept: PULMONOLOGY | Facility: CLINIC | Age: 79
End: 2019-12-02

## 2019-12-03 ENCOUNTER — PATIENT MESSAGE (OUTPATIENT)
Dept: ADMINISTRATIVE | Facility: OTHER | Age: 79
End: 2019-12-03

## 2019-12-04 ENCOUNTER — LAB VISIT (OUTPATIENT)
Dept: LAB | Facility: HOSPITAL | Age: 79
End: 2019-12-04
Attending: INTERNAL MEDICINE
Payer: MEDICARE

## 2019-12-04 ENCOUNTER — OFFICE VISIT (OUTPATIENT)
Dept: HEMATOLOGY/ONCOLOGY | Facility: CLINIC | Age: 79
End: 2019-12-04
Payer: MEDICARE

## 2019-12-04 ENCOUNTER — INFUSION (OUTPATIENT)
Dept: INFUSION THERAPY | Facility: HOSPITAL | Age: 79
End: 2019-12-04
Attending: INTERNAL MEDICINE
Payer: MEDICARE

## 2019-12-04 VITALS — BODY MASS INDEX: 27.71 KG/M2 | HEIGHT: 70 IN | WEIGHT: 193.56 LBS

## 2019-12-04 VITALS
DIASTOLIC BLOOD PRESSURE: 65 MMHG | SYSTOLIC BLOOD PRESSURE: 131 MMHG | HEART RATE: 65 BPM | BODY MASS INDEX: 27.71 KG/M2 | WEIGHT: 193.56 LBS | TEMPERATURE: 99 F | OXYGEN SATURATION: 98 % | HEIGHT: 70 IN

## 2019-12-04 DIAGNOSIS — C34.12 MALIGNANT NEOPLASM OF UPPER LOBE OF LEFT LUNG: ICD-10-CM

## 2019-12-04 DIAGNOSIS — J39.8 TRACHEAL MASS: Primary | ICD-10-CM

## 2019-12-04 DIAGNOSIS — Z85.118 HISTORY OF CANCER OF UPPER LOBE BRONCHUS OR LUNG: ICD-10-CM

## 2019-12-04 DIAGNOSIS — Z90.2 STATUS POST PNEUMONECTOMY: ICD-10-CM

## 2019-12-04 DIAGNOSIS — C34.12 MALIGNANT NEOPLASM OF UPPER LOBE OF LEFT LUNG: Primary | ICD-10-CM

## 2019-12-04 LAB
ALBUMIN SERPL BCP-MCNC: 3.8 G/DL (ref 3.5–5.2)
ALP SERPL-CCNC: 63 U/L (ref 55–135)
ALT SERPL W/O P-5'-P-CCNC: 28 U/L (ref 10–44)
ANION GAP SERPL CALC-SCNC: 9 MMOL/L (ref 8–16)
AST SERPL-CCNC: 20 U/L (ref 10–40)
BASOPHILS # BLD AUTO: 0.05 K/UL (ref 0–0.2)
BASOPHILS NFR BLD: 0.8 % (ref 0–1.9)
BILIRUB SERPL-MCNC: 0.3 MG/DL (ref 0.1–1)
BUN SERPL-MCNC: 19 MG/DL (ref 8–23)
CALCIUM SERPL-MCNC: 9.6 MG/DL (ref 8.7–10.5)
CHLORIDE SERPL-SCNC: 103 MMOL/L (ref 95–110)
CO2 SERPL-SCNC: 30 MMOL/L (ref 23–29)
CREAT SERPL-MCNC: 1.5 MG/DL (ref 0.5–1.4)
DIFFERENTIAL METHOD: ABNORMAL
EOSINOPHIL # BLD AUTO: 1.1 K/UL (ref 0–0.5)
EOSINOPHIL NFR BLD: 18.1 % (ref 0–8)
ERYTHROCYTE [DISTWIDTH] IN BLOOD BY AUTOMATED COUNT: 14 % (ref 11.5–14.5)
EST. GFR  (AFRICAN AMERICAN): 51 ML/MIN/1.73 M^2
EST. GFR  (NON AFRICAN AMERICAN): 44 ML/MIN/1.73 M^2
GLUCOSE SERPL-MCNC: 99 MG/DL (ref 70–110)
HCT VFR BLD AUTO: 40.4 % (ref 40–54)
HGB BLD-MCNC: 12 G/DL (ref 14–18)
IMM GRANULOCYTES # BLD AUTO: 0.03 K/UL (ref 0–0.04)
IMM GRANULOCYTES NFR BLD AUTO: 0.5 % (ref 0–0.5)
LYMPHOCYTES # BLD AUTO: 0.8 K/UL (ref 1–4.8)
LYMPHOCYTES NFR BLD: 12.9 % (ref 18–48)
MCH RBC QN AUTO: 29.6 PG (ref 27–31)
MCHC RBC AUTO-ENTMCNC: 29.7 G/DL (ref 32–36)
MCV RBC AUTO: 100 FL (ref 82–98)
MONOCYTES # BLD AUTO: 0.8 K/UL (ref 0.3–1)
MONOCYTES NFR BLD: 12.4 % (ref 4–15)
NEUTROPHILS # BLD AUTO: 3.4 K/UL (ref 1.8–7.7)
NEUTROPHILS NFR BLD: 55.3 % (ref 38–73)
NRBC BLD-RTO: 0 /100 WBC
PLATELET # BLD AUTO: 229 K/UL (ref 150–350)
PMV BLD AUTO: 10.1 FL (ref 9.2–12.9)
POTASSIUM SERPL-SCNC: 4.6 MMOL/L (ref 3.5–5.1)
PROT SERPL-MCNC: 7.6 G/DL (ref 6–8.4)
RBC # BLD AUTO: 4.06 M/UL (ref 4.6–6.2)
SODIUM SERPL-SCNC: 142 MMOL/L (ref 136–145)
TSH SERPL DL<=0.005 MIU/L-ACNC: 3.31 UIU/ML (ref 0.4–4)
WBC # BLD AUTO: 6.07 K/UL (ref 3.9–12.7)

## 2019-12-04 PROCEDURE — 99999 PR PBB SHADOW E&M-EST. PATIENT-LVL IV: ICD-10-PCS | Mod: PBBFAC,,, | Performed by: INTERNAL MEDICINE

## 2019-12-04 PROCEDURE — 3078F DIAST BP <80 MM HG: CPT | Mod: CPTII,S$GLB,, | Performed by: INTERNAL MEDICINE

## 2019-12-04 PROCEDURE — 63600175 PHARM REV CODE 636 W HCPCS: Performed by: INTERNAL MEDICINE

## 2019-12-04 PROCEDURE — 1126F AMNT PAIN NOTED NONE PRSNT: CPT | Mod: S$GLB,,, | Performed by: INTERNAL MEDICINE

## 2019-12-04 PROCEDURE — 99999 PR PBB SHADOW E&M-EST. PATIENT-LVL IV: CPT | Mod: PBBFAC,,, | Performed by: INTERNAL MEDICINE

## 2019-12-04 PROCEDURE — 3078F PR MOST RECENT DIASTOLIC BLOOD PRESSURE < 80 MM HG: ICD-10-PCS | Mod: CPTII,S$GLB,, | Performed by: INTERNAL MEDICINE

## 2019-12-04 PROCEDURE — 3075F PR MOST RECENT SYSTOLIC BLOOD PRESS GE 130-139MM HG: ICD-10-PCS | Mod: CPTII,S$GLB,, | Performed by: INTERNAL MEDICINE

## 2019-12-04 PROCEDURE — 99215 OFFICE O/P EST HI 40 MIN: CPT | Mod: 25,S$GLB,, | Performed by: INTERNAL MEDICINE

## 2019-12-04 PROCEDURE — 1159F PR MEDICATION LIST DOCUMENTED IN MEDICAL RECORD: ICD-10-PCS | Mod: S$GLB,,, | Performed by: INTERNAL MEDICINE

## 2019-12-04 PROCEDURE — 80053 COMPREHEN METABOLIC PANEL: CPT

## 2019-12-04 PROCEDURE — 85025 COMPLETE CBC W/AUTO DIFF WBC: CPT

## 2019-12-04 PROCEDURE — 1159F MED LIST DOCD IN RCRD: CPT | Mod: S$GLB,,, | Performed by: INTERNAL MEDICINE

## 2019-12-04 PROCEDURE — 1101F PT FALLS ASSESS-DOCD LE1/YR: CPT | Mod: CPTII,S$GLB,, | Performed by: INTERNAL MEDICINE

## 2019-12-04 PROCEDURE — 1101F PR PT FALLS ASSESS DOC 0-1 FALLS W/OUT INJ PAST YR: ICD-10-PCS | Mod: CPTII,S$GLB,, | Performed by: INTERNAL MEDICINE

## 2019-12-04 PROCEDURE — 99215 PR OFFICE/OUTPT VISIT, EST, LEVL V, 40-54 MIN: ICD-10-PCS | Mod: 25,S$GLB,, | Performed by: INTERNAL MEDICINE

## 2019-12-04 PROCEDURE — 96413 CHEMO IV INFUSION 1 HR: CPT

## 2019-12-04 PROCEDURE — 3075F SYST BP GE 130 - 139MM HG: CPT | Mod: CPTII,S$GLB,, | Performed by: INTERNAL MEDICINE

## 2019-12-04 PROCEDURE — 36415 COLL VENOUS BLD VENIPUNCTURE: CPT

## 2019-12-04 PROCEDURE — 84443 ASSAY THYROID STIM HORMONE: CPT

## 2019-12-04 PROCEDURE — 1126F PR PAIN SEVERITY QUANTIFIED, NO PAIN PRESENT: ICD-10-PCS | Mod: S$GLB,,, | Performed by: INTERNAL MEDICINE

## 2019-12-04 RX ORDER — HEPARIN 100 UNIT/ML
500 SYRINGE INTRAVENOUS
Status: CANCELLED | OUTPATIENT
Start: 2019-12-04

## 2019-12-04 RX ORDER — SODIUM CHLORIDE 0.9 % (FLUSH) 0.9 %
10 SYRINGE (ML) INJECTION
Status: CANCELLED | OUTPATIENT
Start: 2019-12-04

## 2019-12-04 RX ORDER — HEPARIN 100 UNIT/ML
500 SYRINGE INTRAVENOUS
Status: DISCONTINUED | OUTPATIENT
Start: 2019-12-04 | End: 2019-12-04 | Stop reason: HOSPADM

## 2019-12-04 RX ADMIN — DURVALUMAB 835 MG: 500 INJECTION, SOLUTION INTRAVENOUS at 12:12

## 2019-12-04 RX ADMIN — SODIUM CHLORIDE: 9 INJECTION, SOLUTION INTRAVENOUS at 12:12

## 2019-12-04 RX ADMIN — HEPARIN 500 UNITS: 100 SYRINGE at 01:12

## 2019-12-04 NOTE — PROGRESS NOTES
Subjective:       Patient ID: Chai Murry is a 79 y.o. male.    Chief Complaint: Malignant neoplasm of upper lobe of left lung [C34.12]  HPI: We have an opportunity to see Mr. Chai Murry in Hematology Oncology clinic at [facility] on 12/04/2019.  Mr. Chai Murry is a 79 y.o.  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     He is s/p left pneumonectomy for a squamous cell carcinoma of the left lung.0n 4/12/2016  Prior to the surgery, the PET scan showed an FDG-avid mass in the left upper   lobe abutting the left hilum with an SUV of 11.6. There seemed to be a left   hilar adenopathy as well.  Radiologist felt that he was unable to discriminate between the mass and the   lymphadenopathy. The patient had had a bronchoscopy by Dr. Roel Ernandez on   03/04/2006 that showed a partially obstructing airway in the anterior segment of  the left upper lobe with an endobronchial lesion in the anterior segment of the  left upper lobe. The specimen from the biopsy at the time of bronchoscopy was   reported as being a squamous cell carcinoma.  At the time of the surgery after the left lung was removed, the pathologist   indicated that this was a squamous cell carcinoma. It measured 3.8 cm. The   pathology indicated that this is extending into the bronchial resection margin of the lobe. This lobe was later on removed to complete the pneumonectomy   There was one lymph node positive for metastatic squamous cell carcinoma at the   AP window.  The patient was told that we would prefer to treat him with post-op simultaneous chemo/radiation.  He had Medi-port. He started chemo/radiation therapy andreceived 6 weekly cycles of carbo/Taxol along with radiatioon.  After a month, he had a Ct scan and it showed stability   He then had 2 additional cyles of carbo/Taxol finishing in 9/27/2016.    He comes for follow up.   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 CT of the chest was non contributory, shows changes from surgery  PET showed a PET avid mass to the left of the trachea.  The case was discussed with dr annika Goldman and GI.  We discussed the possibility of doing a EUS or EBUs, and finally, because of the localization, it was decided to do a EUS with biopsy if possible.  Cytology shows recurrent squamous cell cancer.  I have asked Pathology to run a PD-L1 from his original pathology from 2016.  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  He has been discussed extensively with radiation oncology. We have decided to treat him with radiation therapy and Cisplatin as a chemo-sensitizer.  Dr Castro has reviewed the therapy ports from the previous radiation treatment and the area in question seems to be above the previously radiated fields.     Completed chemoradiation s/p 6 weekly cisplatin treatments with response.  Currently on maintenance Imfinzi.  Reports doing well.        Malignant neoplasm of upper lobe of left lung    4/12/2016 Initial Diagnosis     Malignant neoplasm of upper lobe of left lung      6/19/2019 Cancer Staged     Staging form: Lung, AJCC 8th Edition  - Clinical stage from 6/19/2019: Stage IIIB (rcT4, cN2, cM0) - Signed by Alejandro Castro II, MD on 6/19/2019 6/21/2019 Tumor Conference     Presenting Hospital / Clinic: [facility]  Virtual Tumor Board Conference: In person  Presenter: Dr. Chance Castro  Date Presented to Tumor Board: 06/21/19  Specialties Present: Medical Oncology;Radiation Oncology;Surgical Oncology;Pathology;Navigation;Plastic Surgery;Radiology;Gastrointestinal;Pulmonology  Presentation at Cancer Conference: Prospective  Cancer Type: Lung cancer  Recommended Plan: Additional screening  Recommended Plan Note: If PDL-1 positive, treat with immunotherapy  Send tissue for next generation sequencing.      6/28/2019 Tumor Conference     His case was  discussed at the Multidisciplinary Head and Neck Team Planning Meeting.    Representatives from Medical Oncology, Radiation Oncology, Head and Neck Surgical Oncology, Psychosocial Oncology, and Speech and Language Pathology discussed the case with the following recommendations:    1) treat lung cancer             7/5/2019 - 8/14/2019 Radiation Therapy     Treatment Site Ref. ID Energy Dose/Fx (Gy) #Fx Dose Correction (Gy) Total Dose (Gy) Start Date End Date Elapsed Days   [id number]:1 PTV LNs 6X 1.8 28 / 28 0 50.4 7/5/2019 8/14/2019 40             Lung cancer    6/11/2019 Initial Diagnosis     Lung cancer      7/8/2019 - 8/18/2019 Chemotherapy     Treatment Summary   Plan Name: OP HEAD NECK CISPLATIN WEEKLY + RADIOTHERAPY  Treatment Goal: Supportive  Status: Inactive  Start Date: 7/8/2019  End Date: 8/12/2019  Provider: Jorge L Patel MD  Chemotherapy: CISplatin (PLATINOL) 40 mg/m2 = 83 mg in sodium chloride 0.9% 583 mL chemo infusion, 40 mg/m2 = 83 mg (100 % of original dose 40 mg/m2), Intravenous, Clinic/HOD 1 time, 6 of 6 cycles  Dose modification: 70 mg (original dose 40 mg/m2, Cycle 1, Reason: MD Discretion), 40 mg/m2 (original dose 40 mg/m2, Cycle 1)  Administration: 83 mg (7/8/2019), 84 mg (7/15/2019), 83 mg (7/22/2019), 83 mg (7/29/2019), 83 mg (8/5/2019), 82 mg (8/12/2019)      9/30/2019 -  Chemotherapy     Treatment Summary   Plan Name: OP DURVALUMAB Q2W  Treatment Goal: Curative  Status: Active  Start Date: 10/3/2019  End Date: 9/23/2020 (Planned)  Provider: Fermin Vila MD  Chemotherapy: durvalumab (IMFINZI) 835 mg in sodium chloride 0.9% 266.7 mL chemo infusion, 10 mg/kg = 835 mg, Intravenous, Clinic/HOD 1 time, 5 of 26 cycles  Administration: 835 mg (10/3/2019), 835 mg (10/23/2019), 835 mg (11/6/2019), 835 mg (11/20/2019)       Past Medical History:   Diagnosis Date    Anemia     Arthritis     Atrial fibrillation     CAD (coronary artery disease)     Cataract     COPD (chronic obstructive  pulmonary disease)     Diverticulosis     ED (erectile dysfunction)     HTN (hypertension)     Hyperlipidemia     Lung cancer     left    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 Courtney dwyer bx     Family History   Problem Relation Age of Onset    Esophageal cancer Sister     Cancer Sister     Lung cancer Mother     Cancer Mother     Cataracts Mother     Hypertension Mother     Pneumonia Father     Cataracts Father     Hypertension Father     Cancer Brother     Hypertension Brother     Blindness Neg Hx     Diabetes Neg Hx     Glaucoma Neg Hx     Macular degeneration Neg Hx     Retinal detachment Neg Hx     Strabismus Neg Hx     Stroke Neg Hx     Thyroid disease Neg Hx      Social History     Socioeconomic History    Marital status:      Spouse name: Not on file    Number of children: 3    Years of education: Not on file    Highest education level: Not on file   Occupational History    Occupation: retired   Social Needs    Financial resource strain: Not on file    Food insecurity:     Worry: Not on file     Inability: Not on file    Transportation needs:     Medical: Not on file     Non-medical: Not on file   Tobacco Use    Smoking status: Former Smoker     Packs/day: 1.00     Years: 50.00     Pack years: 50.00     Last attempt to quit: 2005     Years since quittin.3    Smokeless tobacco: Never Used   Substance and Sexual Activity    Alcohol use: No    Drug use: No    Sexual activity: Not Currently   Lifestyle    Physical activity:     Days per week: Not on file     Minutes per session: Not on file    Stress: Not on file   Relationships    Social connections:     Talks on phone: Not on file     Gets together: Not on file     Attends Zoroastrian service: Not on file     Active member of club or organization: Not on file     Attends meetings of clubs or organizations: Not on file     Relationship status: Not on file   Other  Topics Concern    Not on file   Social History Narrative    Not on file     Past Surgical History:   Procedure Laterality Date    APPENDECTOMY      BRONCHOSCOPY Bilateral 6/21/2019    Procedure: Bronchoscopy;  Surgeon: Paresh Goldman MD;  Location: [facility];  Service: Endoscopy;  Laterality: Bilateral;    CARDIAC CATHETERIZATION      cardiac stents      CATARACT EXTRACTION W/  INTRAOCULAR LENS IMPLANT Right 9-3-14    CHOLECYSTECTOMY      COLONOSCOPY N/A 4/17/2018    Procedure: COLONOSCOPY;  Surgeon: Dionne Doan MD;  Location: [facility] ENDO;  Service: Endoscopy;  Laterality: N/A;    COLONOSCOPY N/A 10/25/2018    Procedure: COLONOSCOPY;  Surgeon: Michael Hameed MD;  Location: [facility] ENDO;  Service: Endoscopy;  Laterality: N/A;    ENDOSCOPIC ULTRASOUND OF UPPER GASTROINTESTINAL TRACT N/A 6/11/2019    Procedure: ULTRASOUND, UPPER GI TRACT, ENDOSCOPIC;  Surgeon: Abhishek Knox MD;  Location: [facility];  Service: Endoscopy;  Laterality: N/A;    ESOPHAGOGASTRODUODENOSCOPY N/A 6/11/2019    Procedure: EGD (ESOPHAGOGASTRODUODENOSCOPY);  Surgeon: Abhishek Knox MD;  Location: [facility];  Service: Endoscopy;  Laterality: N/A;    infected stomach gland excision      INSERTION OF TUNNELED CENTRAL VENOUS CATHETER (CVC) WITH SUBCUTANEOUS PORT Right 7/3/2019    Procedure: [id number];  Surgeon: Jean Carlos Constantino MD;  Location: [facility];  Service: General;  Laterality: Right;    LUNG REMOVAL, TOTAL Left 04/2016    lung cancer left upper lobe    SKIN BIOPSY Left     arm     Current Outpatient Medications   Medication Sig Dispense Refill    albuterol (PROVENTIL) 2.5 mg /3 mL (0.083 %) nebulizer solution Take 3 mLs (2.5 mg total) by nebulization every 6 (six) hours while awake. 270 mL 11    amLODIPine (NORVASC) 5 MG tablet Take 1 tablet (5 mg total) by mouth once daily.      apixaban (ELIQUIS) 5 mg Tab Take 1 tablet (5 mg total) by mouth 2 (two) times daily. 60 tablet 5    aspirin (ECOTRIN) 81  MG EC tablet Take 81 mg by mouth once daily.      BREO ELLIPTA 100-25 mcg/dose diskus inhaler INHALE 1 PUFF INTO THE LUNGS ONCE DAILY  5    clopidogrel (PLAVIX) 75 mg tablet       colesevelam (WELCHOL) 625 mg tablet       fenofibric acid (FIBRICOR) 135 mg CpDR TAKE 1 CAPSULE BY MOUTH EVERY DAY 30 capsule 11    fluticasone (FLONASE) 50 mcg/actuation nasal spray 2 sprays (100 mcg total) by Each Nare route once daily. (Patient taking differently: 2 sprays by Each Nare route as needed. ) 3 Bottle 3    glycopyrrolate-formoterol (BEVESPI AEROSPHERE) 9-4.8 mcg HFAA Inhale 2 puffs into the lungs 2 (two) times daily. Controller 10.9 g 11    HYDROcodone-acetaminophen (NORCO) 5-325 mg per tablet Take 1 tablet by mouth every 6 (six) hours as needed for Pain. 40 tablet 0    ipratropium-albuterol (COMBIVENT RESPIMAT)  mcg/actuation inhaler Inhale 1 puff into the lungs every 6 (six) hours as needed for Shortness of Breath. 4 g 11    levocetirizine (XYZAL) 5 MG tablet Take 5 mg by mouth as needed.       lisinopril (PRINIVIL,ZESTRIL) 40 MG tablet TAKE 1 TABLET BY MOUTH DAILY 30 tablet 11    ranolazine (RANEXA) 500 MG Tb12 Take 1 tablet (500 mg total) by mouth 2 (two) times daily. 60 tablet 11    simvastatin (ZOCOR) 80 MG tablet Take 1 tablet (80 mg total) by mouth once daily. 90 tablet 3    SOTALOL AF 80 mg tablet TAKE 1 TABLET BY MOUTH TWICE A  tablet 3    temazepam (RESTORIL) 15 mg Cap Take 1 to 2 capsules (15-30 mg total) by mouth nightly as needed. 60 capsule 3     Current Facility-Administered Medications   Medication Dose Route Frequency Provider Last Rate Last Dose    testosterone cypionate injection 200 mg  200 mg Intramuscular Q21 Days Peter Teixeira MD   200 mg at 11/14/19 0829     Facility-Administered Medications Ordered in Other Visits   Medication Dose Route Frequency Provider Last Rate Last Dose    durvalumab (IMFINZI) 835 mg in sodium chloride 0.9% 266.7 mL chemo infusion  10 mg/kg  (Treatment Plan Recorded) Intravenous 1 time in Clinic/HOD Fermin Vila MD        heparin, porcine (PF) 100 unit/mL injection flush 500 Units  500 Units Intravenous PRN Fermin Vila MD        lactated ringers infusion   Intravenous Continuous Michael Hameed MD   Stopped at 07/03/19 0810    sodium chloride 0.9% 100 mL flush bag   Intravenous 1 time in Clinic/HOD Fermin Vila MD           Labs:  Lab Results   Component Value Date    WBC 6.07 12/04/2019    HGB 12.0 (L) 12/04/2019    HCT 40.4 12/04/2019     (H) 12/04/2019     12/04/2019     BMP  Lab Results   Component Value Date     12/04/2019    K 4.6 12/04/2019     12/04/2019    CO2 30 (H) 12/04/2019    BUN 19 12/04/2019    CREATININE 1.5 (H) 12/04/2019    CALCIUM 9.6 12/04/2019    ANIONGAP 9 12/04/2019    ESTGFRAFRICA 51 (A) 12/04/2019    EGFRNONAA 44 (A) 12/04/2019     Lab Results   Component Value Date    ALT 28 12/04/2019    AST 20 12/04/2019    ALKPHOS 63 12/04/2019    BILITOT 0.3 12/04/2019       No results found for: IRON, TIBC, FERRITIN, SATURATEDIRO  No results found for: [id number]  No results found for: FOLATE  Lab Results   Component Value Date    TSH 3.305 12/04/2019       I have reviewed the radiology reports and examined the scan/xray images.    Review of Systems   Constitutional: Negative.    HENT: Negative.    Eyes: Negative.    Respiratory: Negative.    Cardiovascular: Negative.    Gastrointestinal: Negative.    Endocrine: Negative.    Genitourinary: Negative.    Musculoskeletal: Negative.    Skin: Negative.    Allergic/Immunologic: Negative.    Neurological: Negative.    Hematological: Negative.    Psychiatric/Behavioral: Negative.      ECOG SCORE    0 - Fully active-able to carry on all pre-disease performance without restriction            Objective:     Vitals:    12/04/19 1026   BP: 131/65   Pulse: 65   Temp: 99.1 °F (37.3 °C)   Body mass index is 27.77 kg/m².  Physical Exam   Constitutional: He is oriented  to person, place, and time. He appears well-developed and well-nourished.   HENT:   Head: Normocephalic and atraumatic.   Eyes: Conjunctivae and EOM are normal.   Neck: Normal range of motion. Neck supple.   Cardiovascular: Normal rate and regular rhythm.   Pulmonary/Chest: Effort normal and breath sounds normal.   Abdominal: Soft. Bowel sounds are normal.   Musculoskeletal: Normal range of motion.   Neurological: He is alert and oriented to person, place, and time.   Skin: Skin is warm and dry.   Psychiatric: He has a normal mood and affect. His behavior is normal. Judgment and thought content normal.   Nursing note and vitals reviewed.        Assessment:      1. Malignant neoplasm of upper lobe of left lung    2. s/p left pnuemonectomy for KAYLI Lunc Ca           Plan:     Malignant neoplasm of upper lobe of left lung  Continue on maintenance Imfinzi.  Will get SONIA this Friday per cardiology to evaluate mitral valve mass.  -     CBC auto differential; Future; Expected date: 12/18/2019  -     Comprehensive metabolic panel; Future; Expected date: 12/18/2019  -     TSH; Future; Expected date: 12/18/2019    s/p left pnuemonectomy for KAYLI Lunc Ca

## 2019-12-05 ENCOUNTER — CLINICAL SUPPORT (OUTPATIENT)
Dept: INTERNAL MEDICINE | Facility: CLINIC | Age: 79
End: 2019-12-05
Payer: MEDICARE

## 2019-12-05 PROBLEM — R93.1 ABNORMAL ECHOCARDIOGRAM: Status: ACTIVE | Noted: 2019-12-05

## 2019-12-05 PROCEDURE — 99999 PR PBB SHADOW E&M-EST. PATIENT-LVL II: ICD-10-PCS | Mod: PBBFAC,,,

## 2019-12-05 PROCEDURE — 96372 THER/PROPH/DIAG INJ SC/IM: CPT | Mod: S$GLB,,, | Performed by: INTERNAL MEDICINE

## 2019-12-05 PROCEDURE — 99999 PR PBB SHADOW E&M-EST. PATIENT-LVL II: CPT | Mod: PBBFAC,,,

## 2019-12-05 PROCEDURE — 96372 PR INJECTION,THERAP/PROPH/DIAG2ST, IM OR SUBCUT: ICD-10-PCS | Mod: S$GLB,,, | Performed by: INTERNAL MEDICINE

## 2019-12-05 RX ORDER — SODIUM CHLORIDE 0.9 % (FLUSH) 0.9 %
10 SYRINGE (ML) INJECTION
Status: CANCELLED | OUTPATIENT
Start: 2019-12-05

## 2019-12-05 RX ADMIN — TESTOSTERONE CYPIONATE 200 MG: 200 INJECTION, SOLUTION INTRAMUSCULAR at 02:12

## 2019-12-05 NOTE — PROGRESS NOTES
Pt reported to clinic for depo testosterone injection. Identified pt using two pt identifiers. Allergies and medications verified with pt. Depo testosterone 200mg IM injection given to pt in right ventrogluteal. Pt tolerated well and was advised of 15 minute wait time. Pt verbalized understanding. Pt scheduled for next appt and appt slip given to pt.

## 2019-12-06 ENCOUNTER — HOSPITAL ENCOUNTER (OUTPATIENT)
Facility: HOSPITAL | Age: 79
Discharge: HOME | End: 2019-12-06
Attending: INTERNAL MEDICINE | Admitting: INTERNAL MEDICINE
Payer: MEDICARE

## 2019-12-06 ENCOUNTER — ANESTHESIA (OUTPATIENT)
Dept: CARDIOLOGY | Facility: HOSPITAL | Age: 79
End: 2019-12-06
Payer: MEDICARE

## 2019-12-06 ENCOUNTER — ANESTHESIA EVENT (OUTPATIENT)
Dept: CARDIOLOGY | Facility: HOSPITAL | Age: 79
End: 2019-12-06
Payer: MEDICARE

## 2019-12-06 VITALS
SYSTOLIC BLOOD PRESSURE: 120 MMHG | HEIGHT: 70 IN | OXYGEN SATURATION: 100 % | RESPIRATION RATE: 12 BRPM | DIASTOLIC BLOOD PRESSURE: 61 MMHG | BODY MASS INDEX: 27.49 KG/M2 | HEART RATE: 61 BPM | TEMPERATURE: 98 F | WEIGHT: 192 LBS

## 2019-12-06 DIAGNOSIS — I63.9 CVA (CEREBRAL VASCULAR ACCIDENT): ICD-10-CM

## 2019-12-06 DIAGNOSIS — I48.20 CHRONIC ATRIAL FIBRILLATION, UNSPECIFIED: ICD-10-CM

## 2019-12-06 DIAGNOSIS — R93.1 ABNORMAL ECHOCARDIOGRAM: Primary | ICD-10-CM

## 2019-12-06 PROCEDURE — 25000003 PHARM REV CODE 250

## 2019-12-06 PROCEDURE — 63600175 PHARM REV CODE 636 W HCPCS: Performed by: NURSE ANESTHETIST, CERTIFIED REGISTERED

## 2019-12-06 PROCEDURE — 93312 TRANSESOPHAGEAL ECHO: ICD-10-PCS | Mod: 26,,, | Performed by: INTERNAL MEDICINE

## 2019-12-06 PROCEDURE — 63600175 PHARM REV CODE 636 W HCPCS

## 2019-12-06 PROCEDURE — 93320 DOPPLER ECHO COMPLETE: CPT | Mod: 26,,, | Performed by: INTERNAL MEDICINE

## 2019-12-06 PROCEDURE — 37000008 HC ANESTHESIA 1ST 15 MINUTES: Performed by: INTERNAL MEDICINE

## 2019-12-06 PROCEDURE — 93320 DOPPLER ECHO COMPLETE: CPT | Performed by: INTERNAL MEDICINE

## 2019-12-06 PROCEDURE — 93312 ECHO TRANSESOPHAGEAL: CPT | Performed by: INTERNAL MEDICINE

## 2019-12-06 PROCEDURE — 93312 ECHO TRANSESOPHAGEAL: CPT | Mod: 26,,, | Performed by: INTERNAL MEDICINE

## 2019-12-06 PROCEDURE — 93320 TRANSESOPHAGEAL ECHO: ICD-10-PCS | Mod: 26,,, | Performed by: INTERNAL MEDICINE

## 2019-12-06 RX ORDER — SODIUM CHLORIDE 9 MG/ML
INJECTION, SOLUTION INTRAVENOUS CONTINUOUS PRN
Status: DISCONTINUED | OUTPATIENT
Start: 2019-12-06 | End: 2019-12-06

## 2019-12-06 RX ORDER — SODIUM CHLORIDE 9 MG/ML
INJECTION, SOLUTION INTRAVENOUS ONCE
Status: DISCONTINUED | OUTPATIENT
Start: 2019-12-06 | End: 2019-12-06 | Stop reason: HOSPADM

## 2019-12-06 RX ORDER — PROPOFOL 10 MG/ML
VIAL (ML) INTRAVENOUS
Status: DISCONTINUED | OUTPATIENT
Start: 2019-12-06 | End: 2019-12-06

## 2019-12-06 RX ADMIN — PROPOFOL 60 MG: 10 INJECTION, EMULSION INTRAVENOUS at 09:12

## 2019-12-06 RX ADMIN — SODIUM CHLORIDE: 9 INJECTION, SOLUTION INTRAVENOUS at 09:12

## 2019-12-06 NOTE — ANESTHESIA POSTPROCEDURE EVALUATION
Anesthesia Post Evaluation    Patient: Chai Murry    Procedure(s) Performed: Procedure(s) (LRB):  ECHOCARDIOGRAM,TRANSESOPHAGEAL (N/A)    Final Anesthesia Type: MAC    Patient location: [facility].  Patient participation: Yes- Able to Participate  Level of consciousness: responds to stimulation  Post-procedure vital signs: reviewed and stable  Pain management: adequate  Airway patency: patent    PONV status at discharge: No PONV  Anesthetic complications: no      Cardiovascular status: blood pressure returned to baseline  Respiratory status: unassisted, room air and nasal cannula  Hydration status: euvolemic  Follow-up not needed.          Vitals Value Taken Time   /61 12/6/2019  8:00 AM   Temp 36.7 °C (98.1 °F) 12/6/2019  7:48 AM   Pulse 61 12/6/2019  7:48 AM   Resp 12 12/6/2019  7:48 AM   SpO2 100 % 12/6/2019  7:48 AM         No case tracking events are documented in the log.      Pain/Brendan Score: No data recorded

## 2019-12-06 NOTE — ANESTHESIA RELEASE NOTE
"Anesthesia Release from PACU Note    Patient: Chai Murry    Procedure(s) Performed: Procedure(s) (LRB):  ECHOCARDIOGRAM,TRANSESOPHAGEAL (N/A)    Anesthesia type: MAC    Post pain: Adequate analgesia    Post assessment: no apparent anesthetic complications    Last Vitals:   Visit Vitals  /61   Pulse 61   Temp 36.7 °C (98.1 °F) (Oral)   Resp 12   Ht 5' 10" (1.778 m)   Wt 87.1 kg (192 lb)   SpO2 100%   BMI 27.55 kg/m²       Post vital signs: stable    Level of consciousness: responds to stimulation    Nausea/Vomiting: no nausea/no vomiting    Complications: none    Airway Patency: patent    Respiratory: unassisted, nasal cannula    Cardiovascular: stable and blood pressure at baseline    Hydration: euvolemic  "

## 2019-12-06 NOTE — ANESTHESIA PREPROCEDURE EVALUATION
12/06/2019  Chai Murry is a 79 y.o., male.    Anesthesia Evaluation    I have reviewed the Patient Summary Reports.    I have reviewed the Nursing Notes.   I have reviewed the Medications.     Review of Systems  Anesthesia Hx:  No problems with previous Anesthesia  Denies Family Hx of Anesthesia complications.   Denies Personal Hx of Anesthesia complications.   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    Pulmonary:   COPD s/p left pnuemonectomy for KAYLI Lunc Ca  Tracheal mass: 3 cm BELOW VOCAL CORDS: SUBGLOTIC   Renal/:  Renal/ Normal     Hepatic/GI:  Hepatic/GI Normal    Musculoskeletal:   Arthritis     Neurological:  Neurology Normal    Endocrine:  Endocrine Normal    Dermatological:  Skin Normal    Psych:   Psychiatric History Insomnia         Physical Exam  General:  Well nourished    Airway/Jaw/Neck:  Airway Findings: Mouth Opening: Normal Tongue: Normal  General Airway Assessment: Adult  Mallampati: II  Improves to II with phonation.  TM Distance: Normal, at least 6 cm      Dental:  Dental Findings: Upper Dentures, Lower Dentures   Chest/Lungs:  Chest/Lungs Findings: Clear to auscultation, Normal Respiratory Rate     Heart/Vascular:  Heart Findings: Rate: Normal  Sounds: Normal             Anesthesia Plan  Type of Anesthesia, risks & benefits discussed:  Anesthesia Type:  MAC  Patient's Preference:   Intra-op Monitoring Plan: standard ASA monitors  Intra-op Monitoring Plan Comments:   Post Op Pain Control Plan: per primary  service following discharge from PACU  Post Op Pain Control Plan Comments:   Induction:   IV  Beta Blocker:  Patient is not currently on a Beta-Blocker (No further documentation required).       Informed Consent: Patient understands risks and agrees with Anesthesia plan.  Questions answered. Anesthesia consent signed with patient.  ASA Score: 3     Day of Surgery Review of History & Physical:  There are no significant changes.  H&P update referred to the surgeon.         Ready For Surgery From Anesthesia Perspective.

## 2019-12-06 NOTE — H&P
Subjective:   Patient ID:  Chai Murry is a 79 y.o. male who presents for follow-up of Simple chronic bronchitis (3 month f/u)  Holter/zio shows PAF.Patient denies CP, angina or anginal equivalent. Echo- MV density?     Hypertension   This is a chronic problem. The current episode started more than 1 year ago. The problem has been gradually improving since onset. The problem is controlled. Pertinent negatives include no chest pain, palpitations or shortness of breath. Past treatments include ACE inhibitors and calcium channel blockers. The current treatment provides moderate improvement. There are no compliance problems.    Atrial Fibrillation   Presents for follow-up visit. Symptoms are negative for bradycardia, chest pain, dizziness, hypotension, palpitations, shortness of breath, syncope, tachycardia and weakness. The symptoms have been stable. Past medical history includes atrial fibrillation and CAD. There are no medication compliance problems.   Coronary Artery Disease   Presents for follow-up visit. Pertinent negatives include no chest pain, chest pressure, chest tightness, dizziness, leg swelling, muscle weakness, palpitations, shortness of breath or weight gain. The symptoms have been stable. Compliance with diet is variable. Compliance with exercise is variable. Compliance with medications is good.         Review of Systems   Constitution: Negative. Negative for weight gain.   HENT: Negative.    Eyes: Negative.    Cardiovascular: Negative.  Negative for chest pain, leg swelling, palpitations and syncope.   Respiratory: Negative.  Negative for chest tightness and shortness of breath.    Endocrine: Negative.    Hematologic/Lymphatic: Negative.    Skin: Negative.    Musculoskeletal: Negative for muscle weakness.   Gastrointestinal: Negative.    Genitourinary: Negative.    Neurological: Negative.  Negative for dizziness and weakness.   Psychiatric/Behavioral: Negative.    Allergic/Immunologic: Negative.              Family History   Problem Relation Age of Onset    Esophageal cancer Sister      Cancer Sister      Lung cancer Mother      Cancer Mother      Cataracts Mother      Hypertension Mother      Pneumonia Father      Cataracts Father      Hypertension Father      Cancer Brother      Hypertension Brother      Blindness Neg Hx      Diabetes Neg Hx      Glaucoma Neg Hx      Macular degeneration Neg Hx      Retinal detachment Neg Hx      Strabismus Neg Hx      Stroke Neg Hx      Thyroid disease Neg Hx             Past Medical and Surgical History:   Diagnosis Date    Anemia      Arthritis      Atrial fibrillation      CAD (coronary artery disease)      Cataract      COPD (chronic obstructive pulmonary disease)      Diverticulosis      ED (erectile dysfunction)      HTN (hypertension)      Hyperlipidemia      Lung cancer       left    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 Courtney dwyer bx      Social History            Socioeconomic History    Marital status:        Spouse name: Not on file    Number of children: 3    Years of education: Not on file    Highest education level: Not on file   Occupational History    Occupation: retired   Social Needs    Financial resource strain: Not on file    Food insecurity:       Worry: Not on file       Inability: Not on file    Transportation needs:       Medical: Not on file       Non-medical: Not on file   Tobacco Use    Smoking status: Former Smoker       Packs/day: 1.00       Years: 50.00       Pack years: 50.00       Last attempt to quit: 2005       Years since quittin.2    Smokeless tobacco: Never Used   Substance and Sexual Activity    Alcohol use: No    Drug use: No    Sexual activity: Not Currently   Lifestyle    Physical activity:       Days per week: Not on file       Minutes per session: Not on file    Stress: Not on file   Relationships    Social connections:        Talks on phone: Not on file       Gets together: Not on file       Attends Che[employer] service: Not on file       Active member of club or organization: Not on file       Attends meetings of clubs or organizations: Not on file       Relationship status: Not on file   Other Topics Concern    Not on file   Social History Narrative    Not on file             Current Outpatient Medications on File Prior to Visit   Medication Sig Dispense Refill    albuterol (PROVENTIL) 2.5 mg /3 mL (0.083 %) nebulizer solution Take 3 mLs (2.5 mg total) by nebulization every 6 (six) hours while awake. 270 mL 11    amLODIPine (NORVASC) 5 MG tablet Take 1 tablet (5 mg total) by mouth once daily.        apixaban (ELIQUIS) 5 mg Tab Take 1 tablet (5 mg total) by mouth 2 (two) times daily. 60 tablet 5    aspirin (ECOTRIN) 81 MG EC tablet Take 81 mg by mouth once daily.        BREO ELLIPTA 100-25 mcg/dose diskus inhaler INHALE 1 PUFF INTO THE LUNGS ONCE DAILY   5    clopidogrel (PLAVIX) 75 mg tablet          colesevelam (WELCHOL) 625 mg tablet          fenofibric acid (FIBRICOR) 135 mg CpDR TAKE 1 CAPSULE BY MOUTH EVERY DAY 30 capsule 11    fluticasone (FLONASE) 50 mcg/actuation nasal spray 2 sprays (100 mcg total) by Each Nare route once daily. (Patient taking differently: 2 sprays by Each Nare route as needed. ) 3 Bottle 3    glycopyrrolate-formoterol (BEVESPI AEROSPHERE) 9-4.8 mcg HFAA Inhale 2 puffs into the lungs 2 (two) times daily. Controller 10.9 g 11    HYDROcodone-acetaminophen (NORCO) 5-325 mg per tablet Take 1 tablet by mouth every 6 (six) hours as needed for Pain. 40 tablet 0    ipratropium-albuterol (COMBIVENT RESPIMAT)  mcg/actuation inhaler Inhale 1 puff into the lungs every 6 (six) hours as needed for Shortness of Breath. 4 g 11    levocetirizine (XYZAL) 5 MG tablet Take 5 mg by mouth as needed.         lisinopril (PRINIVIL,ZESTRIL) 40 MG tablet TAKE 1 TABLET BY MOUTH DAILY 30 tablet 11    ranolazine  (RANEXA) 500 MG Tb12 Take 1 tablet (500 mg total) by mouth 2 (two) times daily. 60 tablet 11    simvastatin (ZOCOR) 80 MG tablet Take 1 tablet (80 mg total) by mouth once daily. 90 tablet 3    SOTALOL AF 80 mg tablet TAKE 1 TABLET BY MOUTH TWICE A  tablet 3    temazepam (RESTORIL) 15 mg Cap Take 1 to 2 capsules (15-30 mg total) by mouth nightly as needed. 60 capsule 3    [DISCONTINUED] (Magic mouthwash) 1:1:1 Benadryl 12.5mg/5ml liq, aluminum & magnesium hydroxide-simehticone (Maalox), lidocaine viscous 2% Swish and spit 15 mLs every 4 (four) hours as needed. for mouth sores 90 mL 2                Current Facility-Administered Medications on File Prior to Visit   Medication Dose Route Frequency Provider Last Rate Last Dose    lactated ringers infusion   Intravenous Continuous Michael Hameed MD   Stopped at 07/03/19 0810    testosterone cypionate injection 200 mg  200 mg Intramuscular Q21 Days Peter Teixeira MD   200 mg at 11/14/19 0829            Review of patient's allergies indicates:   Allergen Reactions    Atorvastatin         Other reaction(s): Muscle pain    Bactrim [sulfamethoxazole-trimethoprim]         Lip swelling         Objective:      Physical Exam   Constitutional: He is oriented to person, place, and time. He appears well-developed and well-nourished.   HENT:   Head: Normocephalic and atraumatic.   Eyes: Pupils are equal, round, and reactive to light. Conjunctivae are normal.   Neck: Normal range of motion. Neck supple.   Cardiovascular: Normal rate, regular rhythm, normal heart sounds and intact distal pulses.   Pulmonary/Chest: Effort normal and breath sounds normal.   Abdominal: Soft. Bowel sounds are normal.   Neurological: He is alert and oriented to person, place, and time.   Skin: Skin is warm and dry.   Psychiatric: He has a normal mood and affect.   Nursing note and vitals reviewed.        Assessment:      1. Chronic bronchitis, unspecified chronic bronchitis type    2.  Coronary artery disease involving native coronary artery of native heart without angina pectoris    3. Pure hypercholesterolemia    4. Essential hypertension    5. Chronic atrial fibrillation          Plan:      Chronic bronchitis, unspecified chronic bronchitis type     Coronary artery disease involving native coronary artery of native heart without angina pectoris     Pure hypercholesterolemia     Essential hypertension     Chronic atrial fibrillation        Continue norvasc, lisinopril-htn  Continue asa- cad  Continue sotalol, eliquis - paroxysmal afib   SONIA

## 2019-12-06 NOTE — TRANSFER OF CARE
"Anesthesia Transfer of Care Note    Patient: Chai Murry    Procedure(s) Performed: Procedure(s) (LRB):  ECHOCARDIOGRAM,TRANSESOPHAGEAL (N/A)    Patient location: [facility].    Anesthesia Type: MAC    Transport from OR: Transported from OR on room air with adequate spontaneous ventilation    Post pain: adequate analgesia    Post assessment: no apparent anesthetic complications    Post vital signs: stable    Level of consciousness: responds to stimulation    Nausea/Vomiting: no nausea/vomiting    Complications: none    Transfer of care protocol was followed      Last vitals:   Visit Vitals  /61   Pulse 61   Temp 36.7 °C (98.1 °F) (Oral)   Resp 12   Ht 5' 10" (1.778 m)   Wt 87.1 kg (192 lb)   SpO2 100%   BMI 27.55 kg/m²     "

## 2019-12-06 NOTE — BRIEF OP NOTE
<[facility]  Surgery Department  Operative Note    SUMMARY     Date of Procedure: 12/6/2019     Procedure: Procedure(s) (LRB):  ECHOCARDIOGRAM,TRANSESOPHAGEAL (N/A)     Surgeon(s) and Role:     * Romain Palencia MD - Primary    Assisting Surgeon: None    Pre-Operative Diagnosis: * No pre-op diagnosis entered *    Post-Operative Diagnosis: Post-Op Diagnosis Codes:     * Chronic atrial fibrillation [I48.20]    Anesthesia: Monitor Anesthesia Care    Technical Procedures Used: SONIA    Description of the Findings of the Procedure: SONIA, DX: MITRAL DENSITY, FINDINGS: CALCIUM IN POSTERIOR LEAFLET OF MV    Significant Surgical Tasks Conducted by the Assistant(s), if Applicable: NONE    Complications: No    Estimated Blood Loss (EBL): < 50 cc           Implants: * No implants in log *    Specimens:   Specimen (12h ago, onward)    None                  Condition: Good    Disposition: PACU - hemodynamically stable.    Attestation: I was present and scrubbed for the entire procedure.

## 2019-12-09 LAB
AORTIC VALVE REGURGITATION: ABNORMAL
AORTIC VALVE STENOSIS: ABNORMAL
MITRAL VALVE MOBILITY: ABNORMAL
MITRAL VALVE REGURGITATION: ABNORMAL
TRICUSPID VALVE REGURGITATION: ABNORMAL

## 2019-12-10 RX ORDER — FENOFIBRIC ACID 135 MG/1
CAPSULE, DELAYED RELEASE ORAL
Qty: 90 CAPSULE | Refills: 3 | Status: SHIPPED | OUTPATIENT
Start: 2019-12-10 | End: 2020-04-02 | Stop reason: SDUPTHER

## 2019-12-10 NOTE — DISCHARGE SUMMARY
[facility] -   Discharge Summary      Admit Date: 12/6/2019    Discharge Date and Time: 12/6/2019 10:15 AM    Attending Physician: No att. providers found     Reason for Admission: SONIA for MV abnormality    Procedures Performed: Procedure(s) (LRB):  ECHOCARDIOGRAM,TRANSESOPHAGEAL (N/A)    Hospital Course (synopsis of major diagnoses, care, treatment, and services provided during the course of the hospital stay): SONIA- calcification at posterior leaflet of MV, MAC, nml lv funtion     Consults: none    Significant Diagnostic Studies: Labs: All labs within the past 24 hours have been reviewed    Final Diagnoses:    Principal Problem: <principal problem not specified>   Secondary Diagnoses:   Active Hospital Problems    Diagnosis  POA    Abnormal echocardiogram [R93.1]  Yes    Hypotension due to hypovolemia [I95.89, E86.1]  Yes    s/p left pnuemonectomy for KAYLI Lunc Ca [Z90.2]  Not Applicable    CAD (coronary artery disease) [I25.10]  Yes    Essential hypertension [I10]  Yes      Resolved Hospital Problems   No resolved problems to display.       Discharged Condition: good    Disposition: Home or Self Care    Follow Up/Patient Instructions:   2-4 weeks  Medications:  Reconciled Home Medications:      Medication List      Ask your doctor about these medications    albuterol 2.5 mg /3 mL (0.083 %) nebulizer solution  Commonly known as:  PROVENTIL  Take 3 mLs (2.5 mg total) by nebulization every 6 (six) hours while awake.     amLODIPine 5 MG tablet  Commonly known as:  NORVASC  Take 1 tablet (5 mg total) by mouth once daily.     apixaban 5 mg Tab  Commonly known as:  ELIQUIS  Take 1 tablet (5 mg total) by mouth 2 (two) times daily.     aspirin 81 MG EC tablet  Commonly known as:  ECOTRIN  Take 81 mg by mouth once daily.     Breo Ellipta 100-25 mcg/dose diskus inhaler  Generic drug:  fluticasone furoate-vilanterol  INHALE 1 PUFF INTO THE LUNGS ONCE DAILY     clopidogrel 75 mg tablet  Commonly known as:   PLAVIX     fenofibric acid 135 mg Cpdr  Commonly known as:  FIBRICOR  TAKE 1 CAPSULE BY MOUTH EVERY DAY     fluticasone propionate 50 mcg/actuation nasal spray  Commonly known as:  FLONASE  2 sprays (100 mcg total) by Each Nare route once daily.     glycopyrrolate-formoterol 9-4.8 mcg Hfaa  Commonly known as:  BEVESPI AEROSPHERE  Inhale 2 puffs into the lungs 2 (two) times daily. Controller     HYDROcodone-acetaminophen 5-325 mg per tablet  Commonly known as:  NORCO  Take 1 tablet by mouth every 6 (six) hours as needed for Pain.     ipratropium-albuterol  mcg/actuation inhaler  Commonly known as:  Combivent Respimat  Inhale 1 puff into the lungs every 6 (six) hours as needed for Shortness of Breath.     levocetirizine 5 MG tablet  Commonly known as:  XYZAL  Take 5 mg by mouth as needed.     lisinopril 40 MG tablet  Commonly known as:  PRINIVIL,ZESTRIL  TAKE 1 TABLET BY MOUTH DAILY     ranolazine 500 MG Tb12  Commonly known as:  RANEXA  Take 1 tablet (500 mg total) by mouth 2 (two) times daily.     simvastatin 80 MG tablet  Commonly known as:  ZOCOR  Take 1 tablet (80 mg total) by mouth once daily.     sotalol AF 80 MG tablet  Generic drug:  sotalol  TAKE 1 TABLET BY MOUTH TWICE A DAY     temazepam 15 mg Cap  Commonly known as:  RESTORIL  Take 1 to 2 capsules (15-30 mg total) by mouth nightly as needed.          No discharge procedures on file.

## 2019-12-18 ENCOUNTER — OFFICE VISIT (OUTPATIENT)
Dept: HEMATOLOGY/ONCOLOGY | Facility: CLINIC | Age: 79
End: 2019-12-18
Payer: MEDICARE

## 2019-12-18 ENCOUNTER — INFUSION (OUTPATIENT)
Dept: INFUSION THERAPY | Facility: HOSPITAL | Age: 79
End: 2019-12-18
Attending: INTERNAL MEDICINE
Payer: MEDICARE

## 2019-12-18 VITALS
OXYGEN SATURATION: 100 % | HEIGHT: 70 IN | DIASTOLIC BLOOD PRESSURE: 68 MMHG | WEIGHT: 192.88 LBS | TEMPERATURE: 98 F | RESPIRATION RATE: 12 BRPM | SYSTOLIC BLOOD PRESSURE: 118 MMHG | HEART RATE: 67 BPM | BODY MASS INDEX: 27.61 KG/M2

## 2019-12-18 VITALS
SYSTOLIC BLOOD PRESSURE: 123 MMHG | DIASTOLIC BLOOD PRESSURE: 62 MMHG | RESPIRATION RATE: 16 BRPM | HEART RATE: 66 BPM | OXYGEN SATURATION: 96 %

## 2019-12-18 DIAGNOSIS — Z85.118 HISTORY OF CANCER OF UPPER LOBE BRONCHUS OR LUNG: ICD-10-CM

## 2019-12-18 DIAGNOSIS — J39.8 TRACHEAL MASS: Primary | ICD-10-CM

## 2019-12-18 DIAGNOSIS — C34.12 MALIGNANT NEOPLASM OF UPPER LOBE OF LEFT LUNG: ICD-10-CM

## 2019-12-18 DIAGNOSIS — C34.12 MALIGNANT NEOPLASM OF UPPER LOBE OF LEFT LUNG: Primary | ICD-10-CM

## 2019-12-18 PROCEDURE — 1101F PT FALLS ASSESS-DOCD LE1/YR: CPT | Mod: CPTII,S$GLB,, | Performed by: INTERNAL MEDICINE

## 2019-12-18 PROCEDURE — 1126F PR PAIN SEVERITY QUANTIFIED, NO PAIN PRESENT: ICD-10-PCS | Mod: S$GLB,,, | Performed by: INTERNAL MEDICINE

## 2019-12-18 PROCEDURE — 3078F PR MOST RECENT DIASTOLIC BLOOD PRESSURE < 80 MM HG: ICD-10-PCS | Mod: CPTII,S$GLB,, | Performed by: INTERNAL MEDICINE

## 2019-12-18 PROCEDURE — 63600175 PHARM REV CODE 636 W HCPCS: Performed by: INTERNAL MEDICINE

## 2019-12-18 PROCEDURE — 1126F AMNT PAIN NOTED NONE PRSNT: CPT | Mod: S$GLB,,, | Performed by: INTERNAL MEDICINE

## 2019-12-18 PROCEDURE — 99215 OFFICE O/P EST HI 40 MIN: CPT | Mod: 25,S$GLB,, | Performed by: INTERNAL MEDICINE

## 2019-12-18 PROCEDURE — 1101F PR PT FALLS ASSESS DOC 0-1 FALLS W/OUT INJ PAST YR: ICD-10-PCS | Mod: CPTII,S$GLB,, | Performed by: INTERNAL MEDICINE

## 2019-12-18 PROCEDURE — 99999 PR PBB SHADOW E&M-EST. PATIENT-LVL IV: CPT | Mod: PBBFAC,,, | Performed by: INTERNAL MEDICINE

## 2019-12-18 PROCEDURE — 3078F DIAST BP <80 MM HG: CPT | Mod: CPTII,S$GLB,, | Performed by: INTERNAL MEDICINE

## 2019-12-18 PROCEDURE — 1159F MED LIST DOCD IN RCRD: CPT | Mod: S$GLB,,, | Performed by: INTERNAL MEDICINE

## 2019-12-18 PROCEDURE — 96413 CHEMO IV INFUSION 1 HR: CPT

## 2019-12-18 PROCEDURE — 3074F SYST BP LT 130 MM HG: CPT | Mod: CPTII,S$GLB,, | Performed by: INTERNAL MEDICINE

## 2019-12-18 PROCEDURE — 3074F PR MOST RECENT SYSTOLIC BLOOD PRESSURE < 130 MM HG: ICD-10-PCS | Mod: CPTII,S$GLB,, | Performed by: INTERNAL MEDICINE

## 2019-12-18 PROCEDURE — 99215 PR OFFICE/OUTPT VISIT, EST, LEVL V, 40-54 MIN: ICD-10-PCS | Mod: 25,S$GLB,, | Performed by: INTERNAL MEDICINE

## 2019-12-18 PROCEDURE — 1159F PR MEDICATION LIST DOCUMENTED IN MEDICAL RECORD: ICD-10-PCS | Mod: S$GLB,,, | Performed by: INTERNAL MEDICINE

## 2019-12-18 PROCEDURE — 99999 PR PBB SHADOW E&M-EST. PATIENT-LVL IV: ICD-10-PCS | Mod: PBBFAC,,, | Performed by: INTERNAL MEDICINE

## 2019-12-18 RX ORDER — HEPARIN 100 UNIT/ML
500 SYRINGE INTRAVENOUS
Status: DISCONTINUED | OUTPATIENT
Start: 2019-12-18 | End: 2019-12-18 | Stop reason: HOSPADM

## 2019-12-18 RX ORDER — HEPARIN 100 UNIT/ML
500 SYRINGE INTRAVENOUS
Status: CANCELLED | OUTPATIENT
Start: 2019-12-18

## 2019-12-18 RX ORDER — SODIUM CHLORIDE 0.9 % (FLUSH) 0.9 %
10 SYRINGE (ML) INJECTION
Status: CANCELLED | OUTPATIENT
Start: 2019-12-18

## 2019-12-18 RX ADMIN — HEPARIN SODIUM (PORCINE) LOCK FLUSH IV SOLN 100 UNIT/ML 500 UNITS: 100 SOLUTION at 03:12

## 2019-12-18 RX ADMIN — DURVALUMAB 835 MG: 500 INJECTION, SOLUTION INTRAVENOUS at 02:12

## 2019-12-18 NOTE — PROGRESS NOTES
Subjective:       Patient ID: Chai Murry is a 79 y.o. male.    Chief Complaint: Malignant neoplasm of upper lobe of left lung [C34.12]  HPI: We have an opportunity to see Mr. Chai Murry in Hematology Oncology clinic at [facility] on 12/18/2019.  Mr. Chai Murry is a 79 y.o.  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     He is s/p left pneumonectomy for a squamous cell carcinoma of the left lung.0n 4/12/2016  Prior to the surgery, the PET scan showed an FDG-avid mass in the left upper   lobe abutting the left hilum with an SUV of 11.6. There seemed to be a left   hilar adenopathy as well.  Radiologist felt that he was unable to discriminate between the mass and the   lymphadenopathy. The patient had had a bronchoscopy by Dr. Roel Ernandez on   03/04/2006 that showed a partially obstructing airway in the anterior segment of  the left upper lobe with an endobronchial lesion in the anterior segment of the  left upper lobe. The specimen from the biopsy at the time of bronchoscopy was   reported as being a squamous cell carcinoma.  At the time of the surgery after the left lung was removed, the pathologist   indicated that this was a squamous cell carcinoma. It measured 3.8 cm. The   pathology indicated that this is extending into the bronchial resection margin of the lobe. This lobe was later on removed to complete the pneumonectomy   There was one lymph node positive for metastatic squamous cell carcinoma at the   AP window.  The patient was told that we would prefer to treat him with post-op simultaneous chemo/radiation.  He had Medi-port. He started chemo/radiation therapy andreceived 6 weekly cycles of carbo/Taxol along with radiatioon.  After a month, he had a Ct scan and it showed stability   He then had 2 additional cyles of carbo/Taxol finishing in 9/27/2016.    He comes for follow up.   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 CT of the chest was non contributory, shows changes from surgery  PET showed a PET avid mass to the left of the trachea.  The case was discussed with dr annika Goldman and GI.  We discussed the possibility of doing a EUS or EBUs, and finally, because of the localization, it was decided to do a EUS with biopsy if possible.  Cytology shows recurrent squamous cell cancer.  I have asked Pathology to run a PD-L1 from his original pathology from 2016.  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  He has been discussed extensively with radiation oncology. We have decided to treat him with radiation therapy and Cisplatin as a chemo-sensitizer.  Dr Castro has reviewed the therapy ports from the previous radiation treatment and the area in question seems to be above the previously radiated fields.    Interval history:  Completed chemoradiation s/p 6 weekly cisplatin treatments with response.  Currently on maintenance Imfinzi.  Today he complains of generalized fatigue but denies any other symptomatology including worsening shortness of breath, chest pain, fever, chills, nausea or vomiting, abdominal pain, diarrhea.  In fact he complains of occasional constipation.       Malignant neoplasm of upper lobe of left lung    4/12/2016 Initial Diagnosis     Malignant neoplasm of upper lobe of left lung      6/19/2019 Cancer Staged     Staging form: Lung, AJCC 8th Edition  - Clinical stage from 6/19/2019: Stage IIIB (rcT4, cN2, cM0) - Signed by Alejandro Castro II, MD on 6/19/2019 6/21/2019 Tumor Conference     Presenting Hospital / Clinic: [facility] [place]  Virtual Tumor Board Conference: In person  Presenter: Dr. Chance Castro  Date Presented to Tumor Board: 06/21/19  Specialties Present: Medical Oncology;Radiation Oncology;Surgical Oncology;Pathology;Navigation;Plastic Surgery;Radiology;Gastrointestinal;Pulmonology  Presentation at Cancer Conference:  Prospective  Cancer Type: Lung cancer  Recommended Plan: Additional screening  Recommended Plan Note: If PDL-1 positive, treat with immunotherapy  Send tissue for next generation sequencing.      6/28/2019 Tumor Conference     His case was discussed at the Multidisciplinary Head and Neck Team Planning Meeting.    Representatives from Medical Oncology, Radiation Oncology, Head and Neck Surgical Oncology, Psychosocial Oncology, and Speech and Language Pathology discussed the case with the following recommendations:    1) treat lung cancer             7/5/2019 - 8/14/2019 Radiation Therapy     Treatment Site Ref. ID Energy Dose/Fx (Gy) #Fx Dose Correction (Gy) Total Dose (Gy) Start Date End Date Elapsed Days   [id number] PTV LNs 6X 1.8 28 / 28 0 50.4 7/5/2019 8/14/2019 40             Lung cancer    6/11/2019 Initial Diagnosis     Lung cancer      7/8/2019 - 8/18/2019 Chemotherapy     Treatment Summary   Plan Name: OP HEAD NECK CISPLATIN WEEKLY + RADIOTHERAPY  Treatment Goal: Supportive  Status: Inactive  Start Date: 7/8/2019  End Date: 8/12/2019  Provider: Jorge L Patel MD  Chemotherapy: CISplatin (PLATINOL) 40 mg/m2 = 83 mg in sodium chloride 0.9% 583 mL chemo infusion, 40 mg/m2 = 83 mg (100 % of original dose 40 mg/m2), Intravenous, Clinic/HOD 1 time, 6 of 6 cycles  Dose modification: 70 mg (original dose 40 mg/m2, Cycle 1, Reason: MD Discretion), 40 mg/m2 (original dose 40 mg/m2, Cycle 1)  Administration: 83 mg (7/8/2019), 84 mg (7/15/2019), 83 mg (7/22/2019), 83 mg (7/29/2019), 83 mg (8/5/2019), 82 mg (8/12/2019)      9/30/2019 -  Chemotherapy     Treatment Summary   Plan Name: OP DURVALUMAB Q2W  Treatment Goal: Curative  Status: Active  Start Date: 10/3/2019  End Date: 9/23/2020 (Planned)  Provider: Fermin Vila MD  Chemotherapy: durvalumab (IMFINZI) 835 mg in sodium chloride 0.9% 266.7 mL chemo infusion, 10 mg/kg = 835 mg, Intravenous, Clinic/HOD 1 time, 6 of 26 cycles  Administration: 835 mg  (10/3/2019), 835 mg (10/23/2019), 835 mg (2019), 835 mg (2019), 835 mg (2019)       Past Medical History:   Diagnosis Date    Anemia     Arthritis     Atrial fibrillation     CAD (coronary artery disease)     Cataract     COPD (chronic obstructive pulmonary disease)     Diverticulosis     ED (erectile dysfunction)     HTN (hypertension)     Hyperlipidemia     Lung cancer     left    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 Courtney dwyer bx     Family History   Problem Relation Age of Onset    Esophageal cancer Sister     Cancer Sister     Lung cancer Mother     Cancer Mother     Cataracts Mother     Hypertension Mother     Pneumonia Father     Cataracts Father     Hypertension Father     Cancer Brother     Hypertension Brother     Blindness Neg Hx     Diabetes Neg Hx     Glaucoma Neg Hx     Macular degeneration Neg Hx     Retinal detachment Neg Hx     Strabismus Neg Hx     Stroke Neg Hx     Thyroid disease Neg Hx      Social History     Socioeconomic History    Marital status:      Spouse name: Not on file    Number of children: 3    Years of education: Not on file    Highest education level: Not on file   Occupational History    Occupation: retired   Social Needs    Financial resource strain: Not on file    Food insecurity:     Worry: Not on file     Inability: Not on file    Transportation needs:     Medical: Not on file     Non-medical: Not on file   Tobacco Use    Smoking status: Former Smoker     Packs/day: 1.00     Years: 50.00     Pack years: 50.00     Last attempt to quit: 2005     Years since quittin.3    Smokeless tobacco: Never Used   Substance and Sexual Activity    Alcohol use: No    Drug use: No    Sexual activity: Not Currently   Lifestyle    Physical activity:     Days per week: Not on file     Minutes per session: Not on file    Stress: Not on file   Relationships    Social  connections:     Talks on phone: Not on file     Gets together: Not on file     Attends Yazidism service: Not on file     Active member of club or organization: Not on file     Attends meetings of clubs or organizations: Not on file     Relationship status: Not on file   Other Topics Concern    Not on file   Social History Narrative    Not on file     Past Surgical History:   Procedure Laterality Date    APPENDECTOMY      BRONCHOSCOPY Bilateral 6/21/2019    Procedure: Bronchoscopy;  Surgeon: Paresh Goldman MD;  Location: [facility] ENDO;  Service: Endoscopy;  Laterality: Bilateral;    CARDIAC CATHETERIZATION      cardiac stents      CATARACT EXTRACTION W/  INTRAOCULAR LENS IMPLANT Right 9-3-14    CHOLECYSTECTOMY      COLONOSCOPY N/A 4/17/2018    Procedure: COLONOSCOPY;  Surgeon: Dionne Doan MD;  Location: [facility] ENDO;  Service: Endoscopy;  Laterality: N/A;    COLONOSCOPY N/A 10/25/2018    Procedure: COLONOSCOPY;  Surgeon: Michael Hameed MD;  Location: [facility] ENDO;  Service: Endoscopy;  Laterality: N/A;    ENDOSCOPIC ULTRASOUND OF UPPER GASTROINTESTINAL TRACT N/A 6/11/2019    Procedure: ULTRASOUND, UPPER GI TRACT, ENDOSCOPIC;  Surgeon: Abhishek Knox MD;  Location: [facility];  Service: Endoscopy;  Laterality: N/A;    ESOPHAGOGASTRODUODENOSCOPY N/A 6/11/2019    Procedure: EGD (ESOPHAGOGASTRODUODENOSCOPY);  Surgeon: Abhishek Knox MD;  Location: [facility];  Service: Endoscopy;  Laterality: N/A;    infected stomach gland excision      INSERTION OF TUNNELED CENTRAL VENOUS CATHETER (CVC) WITH SUBCUTANEOUS PORT Right 7/3/2019    Procedure: [id number];  Surgeon: Jean Carlos Constantino MD;  Location: [facility] OR;  Service: General;  Laterality: Right;    LUNG REMOVAL, TOTAL Left 04/2016    lung cancer left upper lobe    SKIN BIOPSY Left     arm     Current Outpatient Medications   Medication Sig Dispense Refill    albuterol (PROVENTIL) 2.5 mg /3 mL (0.083 %) nebulizer solution Take 3 mLs (2.5 mg  total) by nebulization every 6 (six) hours while awake. 270 mL 11    amLODIPine (NORVASC) 5 MG tablet Take 1 tablet (5 mg total) by mouth once daily.      apixaban (ELIQUIS) 5 mg Tab Take 1 tablet (5 mg total) by mouth 2 (two) times daily. 60 tablet 5    aspirin (ECOTRIN) 81 MG EC tablet Take 81 mg by mouth once daily.      BREO ELLIPTA 100-25 mcg/dose diskus inhaler INHALE 1 PUFF INTO THE LUNGS ONCE DAILY  5    clopidogrel (PLAVIX) 75 mg tablet       fenofibric acid (FIBRICOR) 135 mg CpDR TAKE 1 CAPSULE BY MOUTH EVERY DAY 90 capsule 3    fluticasone (FLONASE) 50 mcg/actuation nasal spray 2 sprays (100 mcg total) by Each Nare route once daily. (Patient taking differently: 2 sprays by Each Nare route as needed. ) 3 Bottle 3    glycopyrrolate-formoterol (BEVESPI AEROSPHERE) 9-4.8 mcg HFAA Inhale 2 puffs into the lungs 2 (two) times daily. Controller 10.9 g 11    HYDROcodone-acetaminophen (NORCO) 5-325 mg per tablet Take 1 tablet by mouth every 6 (six) hours as needed for Pain. 40 tablet 0    ipratropium-albuterol (COMBIVENT RESPIMAT)  mcg/actuation inhaler Inhale 1 puff into the lungs every 6 (six) hours as needed for Shortness of Breath. 4 g 11    levocetirizine (XYZAL) 5 MG tablet Take 5 mg by mouth as needed.       lisinopril (PRINIVIL,ZESTRIL) 40 MG tablet TAKE 1 TABLET BY MOUTH DAILY 30 tablet 11    ranolazine (RANEXA) 500 MG Tb12 Take 1 tablet (500 mg total) by mouth 2 (two) times daily. 60 tablet 11    simvastatin (ZOCOR) 80 MG tablet Take 1 tablet (80 mg total) by mouth once daily. 90 tablet 3    SOTALOL AF 80 mg tablet TAKE 1 TABLET BY MOUTH TWICE A  tablet 3    temazepam (RESTORIL) 15 mg Cap Take 1 to 2 capsules (15-30 mg total) by mouth nightly as needed. 60 capsule 3     Current Facility-Administered Medications   Medication Dose Route Frequency Provider Last Rate Last Dose    testosterone cypionate injection 200 mg  200 mg Intramuscular Q21 Days Peter Teixeira MD   200 mg  at 12/05/19 1454     Facility-Administered Medications Ordered in Other Visits   Medication Dose Route Frequency Provider Last Rate Last Dose    durvalumab (IMFINZI) 835 mg in sodium chloride 0.9% 266.7 mL chemo infusion  10 mg/kg (Treatment Plan Recorded) Intravenous 1 time in Clinic/HOD Kobe Fuentes MD        heparin, porcine (PF) 100 unit/mL injection flush 500 Units  500 Units Intravenous PRN Kobe Fuentes MD        lactated ringers infusion   Intravenous Continuous Michael Hameed MD   Stopped at 07/03/19 0810       Labs:  Lab Results   Component Value Date    WBC 5.80 12/18/2019    HGB 11.8 (L) 12/18/2019    HCT 38.8 (L) 12/18/2019    MCV 97 12/18/2019     12/18/2019     BMP  Lab Results   Component Value Date     12/18/2019    K 4.3 12/18/2019     12/18/2019    CO2 29 12/18/2019    BUN 20 12/18/2019    CREATININE 1.5 (H) 12/18/2019    CALCIUM 9.2 12/18/2019    ANIONGAP 8 12/18/2019    ESTGFRAFRICA 51 (A) 12/18/2019    EGFRNONAA 44 (A) 12/18/2019     Lab Results   Component Value Date    ALT 8 (L) 12/18/2019    AST 15 12/18/2019    ALKPHOS 61 12/18/2019    BILITOT 0.3 12/18/2019       No results found for: IRON, TIBC, FERRITIN, SATURATEDIRO  No results found for: QMEITKKV65  No results found for: FOLATE  Lab Results   Component Value Date    TSH 3.305 12/04/2019       I have reviewed the radiology reports and examined the scan/xray images.    Review of Systems   Constitutional: Positive for fatigue.   HENT: Negative.    Eyes: Negative.    Respiratory: Negative.    Cardiovascular: Negative.    Gastrointestinal: Negative.    Endocrine: Negative.    Genitourinary: Negative.    Musculoskeletal: Negative.    Skin: Negative.    Allergic/Immunologic: Negative.    Neurological: Negative.    Hematological: Negative.    Psychiatric/Behavioral: Negative.      ECOG SCORE              Objective:     Vitals:    12/18/19 1402   BP: 118/68   Pulse: 67   Resp: 12   Temp: 98.4 °F (36.9 °C)    Body mass index is 27.68 kg/m².  Physical Exam   Constitutional: He is oriented to person, place, and time. He appears well-developed and well-nourished.   HENT:   Head: Normocephalic and atraumatic.   Eyes: Conjunctivae and EOM are normal.   Neck: Normal range of motion. Neck supple.   Cardiovascular: Normal rate and regular rhythm.   Pulmonary/Chest: Effort normal and breath sounds normal.   Abdominal: Soft. Bowel sounds are normal.   Musculoskeletal: Normal range of motion.   Neurological: He is alert and oriented to person, place, and time.   Skin: Skin is warm and dry.   Psychiatric: He has a normal mood and affect. His behavior is normal. Judgment and thought content normal.   Nursing note and vitals reviewed.        Assessment:      1. Malignant neoplasm of upper lobe of left lung           Plan:     Malignant neoplasm of upper lobe of left lung  Status post induction chemotherapy and radiation and currently on maintenance with immunotherapy.  I have reviewed his labs today and appears stable.  Will proceed with treatment as scheduled.    Patient did complain of generalized fatigue that has been ongoing for the past few months but seems to be worsening, I have reviewed his TSH and noted normal TSH levels.  It is also a documented fact that immunotherapy tend to increase the risk of endocrinopathies and not necessarily thyroid dysfunction.  I did discuss with him that should the fatigue worsen we may start him on steroid therapy to investigate if we could reverse the side effect of immunotherapy in the situation endocrinopathy.  Will defer to the primary oncologist.    Kobe Fuentes MD

## 2019-12-18 NOTE — DISCHARGE INSTRUCTIONS
[facility]  [address] [facility]  [address]  [phone] phone     [phone] fax  Hours of Operation: Monday- Friday 8:00am- 5:00pm  After hours phone  [phone]  Hematology / Oncology Physicians on call      Dr. Jose Casper    Please call with any concerns regarding your appointment today.    HOME CARE AFTER CHEMOTHERAPY   Meals   Many patients feel sick and lose their appetites during treatment. Eat small meals several times a day. Choose bland foods with little taste or smell if you have problems with nausea. Be sure to cook all food thoroughly. This kills bacteria and helps you avoid intestinal infection. Soft foods are easier to swallow and digest.   Activity   Exercise keeps you strong and keeps your heart and lungs active. Talk to your doctor about an appropriate exercise program for you.   Skin Care   To prevent a skin infection, bathe or shower once a day. Use a moisturizing soap and wash with warm water. Avoid very hot or cold water. Chemotherapy can make your skin dry . Apply moisturizing lotion to help relieve dry skin. Some drugs used in high doses can cause slight burns to appear (like sunburn). Ask for a special cream to help relieve the burn and protect your skin.   Prevent Mouth Sores   During chemotherapy, many people get mouth sores. Do the following to help prevent mouth sores or to ease discomfort.   Brush your teeth with a soft-bristle toothbrush after every meal.  Don't use dental floss if your platelet count is below 50,000. Your doctor or nurse will tell you if this is the case.  Use an oral swab or special soft toothbrush if your gums bleed during regular brushing.  Use mouthwash as directed. If you can't tolerate commercial mouthwash, use salt and baking soda to clean your mouth. Mix 1 teaspoon of salt and 1 teaspoon of baking soda into a glass of water. Swish and spit.  Call your doctor or  return to this facility if you develop any of the following:   Sore throat   White patches in the mouth or throat   Fever of 100.4ºF (38ºC) or higher, or as directed by your healthcare provider  © 2000-2011 [facility], [address] [place] [address]. All rights reserved. This information is not intended as a substitute for professional medical care. Always follow your healthcare professional's   FALL PREVENTION   Falls often occur due to slipping, tripping or losing your balance. Here are ways to reduce your risk of falling again.   Was there anything that caused your fall that can be fixed, removed or replaced?   Make your home safe by keeping walkways clear of objects you may trip over.   Use non-slip pads under rugs.   Do not walk in poorly lit areas.   Do not stand on chairs or wobbly ladders.   Use caution when reaching overhead or looking upward. This position can cause a loss of balance.   Be sure your shoes fit properly, have non-slip bottoms and are in good condition.   Be cautious when going up and down stairs, curbs, and when walking on uneven sidewalks.   If your balance is poor, consider using a cane or walker.   If your fall was related to alcohol use, stop or limit alcohol intake.   If your fall was related to use of sleeping medicines, talk to your doctor about this. You may need to reduce your dosage at bedtime if you awaken during the night to go to the bathroom.   To reduce the need for nighttime bathroom trips:   Avoid drinking fluids for several hours before going to bed   Empty your bladder before going to bed   Men can keep a urinal at the bedside   © 2000-2011 [facility], [address] [place] [address]. All rights reserved. This information is not intended as a substitute for professional medical care. Always follow your healthcare professional's instructions.  WAYS TO HELP PREVENT INFECTION         WASH YOUR HANDS OFTEN DURING THE DAY, ESPECIALLY BEFORE YOU  EAT, AFTER USING THE BATHROOM, AND AFTER TOUCHING ANIMALS     STAY AWAY FROM PEOPLE WHO HAVE ILLNESSES YOU CAN CATCH; SUCH AS COLDS, FLU, CHICKEN POX     TRY TO AVOID CROWDS     STAY AWAY FROM CHILDREN WHO RECENTLY HAVE RECEIVED LIVE VIRUS VACCINES     MAINTAIN GOOD MOUTH CARE     DO NOT SQUEEZE OR SCRATCH PIMPLES     CLEAN CUTS & SCRAPES RIGHT AWAY AND DAILY UNTIL HEALED WITH WARM WATER, SOAP & AN ANTISEPTIC     AVOID CONTACT WITH LITTER BOXES, BIRD CAGES, & FISH TANKS     AVOID STANDING WATER, IE., BIRD BATHS, FLOWER POTS/VASES, OR HUMIDIFIERS     WEAR GLOVES WHEN GARDENING OR CLEANING UP AFTER OTHERS, ESPECIALLY BABIES & SMALL CHILDREN     DO NOT EAT RAW FISH, SEAFOOD, MEAT, OR EGGS

## 2019-12-18 NOTE — ASSESSMENT & PLAN NOTE
Status post induction chemotherapy and radiation and currently on maintenance with immunotherapy.  I have reviewed his labs today and appears stable.  Will proceed with treatment as scheduled.    Patient did complain of generalized fatigue that has been ongoing for the past few months but seems to be worsening, I have reviewed his TSH and noted normal TSH levels.  It is also a documented fact that immunotherapy tend to increase the risk of endocrinopathies and not necessarily thyroid dysfunction.  I did discuss with him that should the fatigue worsen we may start him on steroid therapy to investigate if we could reverse the side effect of immunotherapy in the situation endocrinopathy.  Will defer to the primary oncologist.

## 2019-12-20 ENCOUNTER — NURSE TRIAGE (OUTPATIENT)
Dept: ADMINISTRATIVE | Facility: CLINIC | Age: 79
End: 2019-12-20

## 2019-12-20 ENCOUNTER — OFFICE VISIT (OUTPATIENT)
Dept: URGENT CARE | Facility: CLINIC | Age: 79
End: 2019-12-20
Payer: MEDICARE

## 2019-12-20 VITALS
HEART RATE: 64 BPM | TEMPERATURE: 96 F | WEIGHT: 192 LBS | BODY MASS INDEX: 27.49 KG/M2 | OXYGEN SATURATION: 96 % | HEIGHT: 70 IN | SYSTOLIC BLOOD PRESSURE: 113 MMHG | DIASTOLIC BLOOD PRESSURE: 56 MMHG | RESPIRATION RATE: 16 BRPM

## 2019-12-20 DIAGNOSIS — R60.0 BILATERAL LOWER EXTREMITY EDEMA: Primary | ICD-10-CM

## 2019-12-20 PROCEDURE — 99213 PR OFFICE/OUTPT VISIT, EST, LEVL III, 20-29 MIN: ICD-10-PCS | Mod: S$GLB,,, | Performed by: PHYSICIAN ASSISTANT

## 2019-12-20 PROCEDURE — 99213 OFFICE O/P EST LOW 20 MIN: CPT | Mod: S$GLB,,, | Performed by: PHYSICIAN ASSISTANT

## 2019-12-20 NOTE — TELEPHONE ENCOUNTER
Reason for Disposition   [1] MODERATE leg swelling (e.g., swelling extends up to knees) AND [2] new onset or worsening    Additional Information   Negative: Severe difficulty breathing (e.g., struggling for each breath, speaks in single words)   Negative: Looks like a broken bone or dislocated joint (e.g., crooked or deformed)   Negative: Sounds like a life-threatening emergency to the triager   Negative: Chest pain   Negative: Followed a leg injury   Negative: [1] Small area of swelling AND [2] followed an insect bite to the area   Negative: Swelling of one ankle joint   Negative: Swelling of knee is main symptom   Negative: Pregnant   Negative: Postpartum (< 1 month since delivery)   Negative: Difficulty breathing at rest   Negative: Entire foot is cool or blue in comparison to other side   Negative: [1] Can't walk or can barely walk AND [2] new onset   Negative: [1] Difficulty breathing with exertion (e.g., walking) AND [2] new onset or worsening   Negative: [1] Red area or streak AND [2] fever   Negative: [1] Swelling is painful to touch AND [2] fever   Negative: [1] Cast on leg or ankle AND [2] now increased pain   Negative: Patient sounds very sick or weak to the triager   Negative: SEVERE leg swelling (e.g., swelling extends above knee, entire leg is swollen, weeping fluid)   Negative: [1] Red area or streak [2] large (> 2 in. or 5 cm)   Negative: [1] Thigh or calf pain AND [2] only 1 side AND [3] present > 1 hour   Negative: [1] Thigh, calf, or ankle swelling AND [2] only 1 side   Negative: [1] Thigh, calf, or ankle swelling AND [2] bilateral AND [3] 1 side is more swollen    Protocols used: LEG SWELLING AND EDEMA-A-AH

## 2019-12-20 NOTE — PROGRESS NOTES
"Subjective:       Patient ID: Chai Murry is a 79 y.o. male.    Vitals:  height is 5' 10" (1.778 m) and weight is 87.1 kg (192 lb). His temperature is 96.2 °F (35.7 °C). His blood pressure is 113/56 (abnormal) and his pulse is 64. His respiration is 16 and oxygen saturation is 96%.     Chief Complaint: Edema    Pt w/ c/o swelling to lower legs that he noticed yesterday. He elevated his legs and took one of his wife's Lasix 20 mg, and reports improvement of swelling this morning. Denies SOB, chest pain, abdominal pain or further symptoms.     Edema   This is a new problem. The current episode started yesterday. The problem occurs intermittently. The problem has been resolved. Pertinent negatives include no abdominal pain, arthralgias, chest pain, chills, congestion, coughing, fatigue, fever, headaches, joint swelling, myalgias, nausea, neck pain, rash, sore throat, vertigo or vomiting. Nothing aggravates the symptoms. Treatments tried: lasix. The treatment provided significant relief.       Constitution: Negative for chills, fatigue and fever.   HENT: Negative for ear pain, ear discharge, congestion and sore throat.    Neck: Negative for neck pain, neck stiffness and painful lymph nodes.   Cardiovascular: Positive for leg swelling (lower legs, yesterday). Negative for chest pain.   Eyes: Negative for eye itching, eye pain, eye redness, double vision and blurred vision.   Respiratory: Negative for cough and shortness of breath.    Gastrointestinal: Negative for abdominal pain, nausea, vomiting, constipation and diarrhea.   Genitourinary: Negative for dysuria, frequency and urgency.   Musculoskeletal: Negative for joint pain, joint swelling, muscle cramps and muscle ache.   Skin: Negative for color change, pale, rash and erythema.   Allergic/Immunologic: Negative for seasonal allergies.   Neurological: Negative for dizziness, history of vertigo, light-headedness, passing out and headaches.   Hematologic/Lymphatic: " Negative for swollen lymph nodes, easy bruising/bleeding and history of blood clots. Does not bruise/bleed easily.   Psychiatric/Behavioral: Negative for nervous/anxious, sleep disturbance and depression. The patient is not nervous/anxious.        Objective:      Physical Exam   Constitutional: He is oriented to person, place, and time. Vital signs are normal. He appears well-developed and well-nourished. He is cooperative.  Non-toxic appearance. He does not have a sickly appearance. He does not appear ill. No distress.   HENT:   Head: Normocephalic.   Right Ear: Tympanic membrane, external ear and ear canal normal.   Left Ear: Tympanic membrane, external ear and ear canal normal.   Nose: Nose normal.   Mouth/Throat: Uvula is midline, oropharynx is clear and moist and mucous membranes are normal.   Eyes: Pupils are equal, round, and reactive to light. Conjunctivae, EOM and lids are normal.   Neck: Trachea normal, normal range of motion, full passive range of motion without pain and phonation normal. Neck supple.   Cardiovascular: Normal rate, regular rhythm, normal heart sounds and intact distal pulses.   No murmur heard.  Pulses:       Dorsalis pedis pulses are 2+ on the right side, and 2+ on the left side.   Pulmonary/Chest: Effort normal and breath sounds normal. No stridor. No respiratory distress. He has no decreased breath sounds. He has no wheezes. He has no rhonchi. He has no rales.   Abdominal: Soft. Normal appearance and bowel sounds are normal. He exhibits no distension. There is no tenderness.   Musculoskeletal: Normal range of motion.        Right ankle: Swelling: mild +1.        Left ankle: He exhibits swelling (mild 1+).        Right lower leg: He exhibits edema ( mild +1  ). He exhibits no tenderness, no bony tenderness, no swelling, no deformity and no laceration.        Left lower leg: He exhibits edema (mild +1  ). He exhibits no tenderness, no bony tenderness, no swelling, no deformity and no  laceration.        Right foot: Normal.        Left foot: Normal.   Neurological: He is alert and oriented to person, place, and time.   Skin: Skin is warm, dry, not diaphoretic, no rash and no abscessed. Capillary refill takes less than 2 seconds. not left lower leg and not right lower legabrasion, burn, bruising, erythema, ecchymosis, lesion and petechiae  Psychiatric: He has a normal mood and affect. His behavior is normal. Judgment and thought content normal.   Nursing note and vitals reviewed.        Assessment:       1. Bilateral lower extremity edema        Plan:         Bilateral lower extremity edema  -     COMPRESSION STOCKINGS    Vitals are reassuring   Will recommend continued elevation of legs and compression stockings    I have discussed the diagnosis, treatment plan and recommendations for follow-up with primary care and cardiology and patient verbalized understanding and is agreeable to the plan. ED precautions given. AVS printed and given to patient upon discharge with information regarding this visit. All questions were addressed prior to discharge.    Debbie Bragg PA-C

## 2019-12-20 NOTE — PATIENT INSTRUCTIONS
Wear your compression stockings daily as we discussed    Elevate your legs as much as possible    Follow-up with cardiology if your lower legs continue swelling.    Go to the ER for any emergency or concern      Leg Swelling in Both Legs    Swelling of the feet, ankles, and legs is called edema. It is caused by excess fluid that has collected in the tissues. Extra fluid in the body settles in the lowest part because of gravity. This is why the legs and feet are most affected.  Some of the causes for edema include:  · Disease of the heart like congestive heart failure  · Standing or sitting for long periods of time  · Infection of the feet or legs  · Blood pooling in the veins of your legs (venous insufficiency)  · Dilated veins in your lower leg (varicose veins)  · Garters or other clothing that is tight on your legs. This will cause blood to pool in your legs because the clothing limits blood flow.  · Some medicines such as hormones like birth control pills, some blood pressure medicines like calcium channel blockers (amlodipine) and steroids, some antidepressants like MAO inhibitors and tricyclics  · Menstrual periods that cause you to retain fluids  · Many types of renal disease  · Liver failure or cirrhosis  · Pregnancy, some swelling is normal, but a sudden increase in leg swelling or weight gain can be a sign of a dangerous complication of pregnancy  · Poor nutrition  · Thyroid disease  Medical treatment will depend on what is causing the swelling in your legs. Your healthcare provider may prescribe water pills (diuretics) to get rid of the extra fluid.  Home care  Follow these guidelines when caring for yourself at home:  · Don't wear clothing like garters that is tight on your legs.  · Keep your legs up while lying or sitting.  · If infection, injury, or recent surgery is causing the swelling, stay off your legs as much as possible until symptoms get better.  · If your healthcare provider says that your leg  swelling is caused by venous insufficiency or varicose veins, don't sit or  one place for long periods of time. Take breaks and walk about every few hours. Brisk walking is a good exercise. It helps circulate the blood that has collected in your leg. Talk with your provider about using support stockings to stop daytime leg swelling.  · If your provider says that heart disease is causing your leg swelling, follow a low-salt diet to stop extra fluid from staying in your body. You may also need medicine.  Follow-up care  Follow up with your healthcare provider, or as advised.  When to seek medical advice  Call your healthcare provider right away if any of these occur:  · New shortness of breath or chest pain  · Shortness of breath or chest pain that gets worse  · Swelling in both legs or ankles that gets worse  · Swelling of the abdomen  · Redness, warmth, or swelling in one leg  · Fever of 100.4ºF (38ºC) or higher, or as directed by your healthcare provider  · Yellow color to your skin or eyes  · Rapid, unexplained weight gain  · Having to sleep upright or use an increased number of pillows  Date Last Reviewed: 3/31/2016  © 0986-8724 [employer]. [address] [place] [address], [place] [address]. All rights reserved. This information is not intended as a substitute for professional medical care. Always follow your healthcare professional's instructions.

## 2019-12-23 ENCOUNTER — OFFICE VISIT (OUTPATIENT)
Dept: CARDIOLOGY | Facility: CLINIC | Age: 79
End: 2019-12-23
Payer: MEDICARE

## 2019-12-23 ENCOUNTER — OFFICE VISIT (OUTPATIENT)
Dept: OTOLARYNGOLOGY | Facility: CLINIC | Age: 79
End: 2019-12-23
Payer: MEDICARE

## 2019-12-23 VITALS
SYSTOLIC BLOOD PRESSURE: 120 MMHG | WEIGHT: 191.38 LBS | BODY MASS INDEX: 27.4 KG/M2 | DIASTOLIC BLOOD PRESSURE: 68 MMHG | HEART RATE: 56 BPM | HEIGHT: 70 IN

## 2019-12-23 VITALS
WEIGHT: 189.13 LBS | SYSTOLIC BLOOD PRESSURE: 130 MMHG | TEMPERATURE: 98 F | BODY MASS INDEX: 27.14 KG/M2 | DIASTOLIC BLOOD PRESSURE: 71 MMHG | HEART RATE: 67 BPM

## 2019-12-23 DIAGNOSIS — E78.00 PURE HYPERCHOLESTEROLEMIA: ICD-10-CM

## 2019-12-23 DIAGNOSIS — R49.0 DYSPHONIA: ICD-10-CM

## 2019-12-23 DIAGNOSIS — I48.20 CHRONIC ATRIAL FIBRILLATION: ICD-10-CM

## 2019-12-23 DIAGNOSIS — C34.12 MALIGNANT NEOPLASM OF UPPER LOBE OF LEFT LUNG: ICD-10-CM

## 2019-12-23 DIAGNOSIS — R60.0 LEG EDEMA: Primary | ICD-10-CM

## 2019-12-23 DIAGNOSIS — I25.10 CORONARY ARTERY DISEASE INVOLVING NATIVE CORONARY ARTERY OF NATIVE HEART WITHOUT ANGINA PECTORIS: ICD-10-CM

## 2019-12-23 DIAGNOSIS — J38.01 PARALYSIS OF VOCAL CORDS AND LARYNX, UNILATERAL: Primary | ICD-10-CM

## 2019-12-23 DIAGNOSIS — R60.0 PERIPHERAL EDEMA: Primary | ICD-10-CM

## 2019-12-23 DIAGNOSIS — I10 ESSENTIAL HYPERTENSION: ICD-10-CM

## 2019-12-23 DIAGNOSIS — J42 CHRONIC BRONCHITIS, UNSPECIFIED CHRONIC BRONCHITIS TYPE: ICD-10-CM

## 2019-12-23 PROCEDURE — 3075F SYST BP GE 130 - 139MM HG: CPT | Mod: CPTII,S$GLB,, | Performed by: OTOLARYNGOLOGY

## 2019-12-23 PROCEDURE — 99999 PR PBB SHADOW E&M-EST. PATIENT-LVL IV: CPT | Mod: PBBFAC,,, | Performed by: PHYSICIAN ASSISTANT

## 2019-12-23 PROCEDURE — 3074F SYST BP LT 130 MM HG: CPT | Mod: CPTII,S$GLB,, | Performed by: PHYSICIAN ASSISTANT

## 2019-12-23 PROCEDURE — 1159F PR MEDICATION LIST DOCUMENTED IN MEDICAL RECORD: ICD-10-PCS | Mod: S$GLB,,, | Performed by: OTOLARYNGOLOGY

## 2019-12-23 PROCEDURE — 31579 PR LARYNGOSCOPY, FLEX/RIGID TELESCOPIC, W/STROBOSCOPY: ICD-10-PCS | Mod: S$GLB,,, | Performed by: OTOLARYNGOLOGY

## 2019-12-23 PROCEDURE — 1159F PR MEDICATION LIST DOCUMENTED IN MEDICAL RECORD: ICD-10-PCS | Mod: S$GLB,,, | Performed by: PHYSICIAN ASSISTANT

## 2019-12-23 PROCEDURE — 1101F PT FALLS ASSESS-DOCD LE1/YR: CPT | Mod: CPTII,S$GLB,, | Performed by: OTOLARYNGOLOGY

## 2019-12-23 PROCEDURE — 99999 PR PBB SHADOW E&M-EST. PATIENT-LVL IV: ICD-10-PCS | Mod: PBBFAC,,, | Performed by: PHYSICIAN ASSISTANT

## 2019-12-23 PROCEDURE — 99213 OFFICE O/P EST LOW 20 MIN: CPT | Mod: 25,S$GLB,, | Performed by: OTOLARYNGOLOGY

## 2019-12-23 PROCEDURE — 1126F AMNT PAIN NOTED NONE PRSNT: CPT | Mod: S$GLB,,, | Performed by: PHYSICIAN ASSISTANT

## 2019-12-23 PROCEDURE — 1126F AMNT PAIN NOTED NONE PRSNT: CPT | Mod: S$GLB,,, | Performed by: OTOLARYNGOLOGY

## 2019-12-23 PROCEDURE — 99214 PR OFFICE/OUTPT VISIT, EST, LEVL IV, 30-39 MIN: ICD-10-PCS | Mod: S$GLB,,, | Performed by: PHYSICIAN ASSISTANT

## 2019-12-23 PROCEDURE — 1126F PR PAIN SEVERITY QUANTIFIED, NO PAIN PRESENT: ICD-10-PCS | Mod: S$GLB,,, | Performed by: PHYSICIAN ASSISTANT

## 2019-12-23 PROCEDURE — 31579 LARYNGOSCOPY TELESCOPIC: CPT | Mod: S$GLB,,, | Performed by: OTOLARYNGOLOGY

## 2019-12-23 PROCEDURE — 1101F PT FALLS ASSESS-DOCD LE1/YR: CPT | Mod: CPTII,S$GLB,, | Performed by: PHYSICIAN ASSISTANT

## 2019-12-23 PROCEDURE — 99999 PR PBB SHADOW E&M-EST. PATIENT-LVL III: CPT | Mod: PBBFAC,,, | Performed by: OTOLARYNGOLOGY

## 2019-12-23 PROCEDURE — 99999 PR PBB SHADOW E&M-EST. PATIENT-LVL III: ICD-10-PCS | Mod: PBBFAC,,, | Performed by: OTOLARYNGOLOGY

## 2019-12-23 PROCEDURE — 3078F DIAST BP <80 MM HG: CPT | Mod: CPTII,S$GLB,, | Performed by: OTOLARYNGOLOGY

## 2019-12-23 PROCEDURE — 3078F PR MOST RECENT DIASTOLIC BLOOD PRESSURE < 80 MM HG: ICD-10-PCS | Mod: CPTII,S$GLB,, | Performed by: PHYSICIAN ASSISTANT

## 2019-12-23 PROCEDURE — 1159F MED LIST DOCD IN RCRD: CPT | Mod: S$GLB,,, | Performed by: OTOLARYNGOLOGY

## 2019-12-23 PROCEDURE — 1159F MED LIST DOCD IN RCRD: CPT | Mod: S$GLB,,, | Performed by: PHYSICIAN ASSISTANT

## 2019-12-23 PROCEDURE — 1101F PR PT FALLS ASSESS DOC 0-1 FALLS W/OUT INJ PAST YR: ICD-10-PCS | Mod: CPTII,S$GLB,, | Performed by: OTOLARYNGOLOGY

## 2019-12-23 PROCEDURE — 1101F PR PT FALLS ASSESS DOC 0-1 FALLS W/OUT INJ PAST YR: ICD-10-PCS | Mod: CPTII,S$GLB,, | Performed by: PHYSICIAN ASSISTANT

## 2019-12-23 PROCEDURE — 99214 OFFICE O/P EST MOD 30 MIN: CPT | Mod: S$GLB,,, | Performed by: PHYSICIAN ASSISTANT

## 2019-12-23 PROCEDURE — 1126F PR PAIN SEVERITY QUANTIFIED, NO PAIN PRESENT: ICD-10-PCS | Mod: S$GLB,,, | Performed by: OTOLARYNGOLOGY

## 2019-12-23 PROCEDURE — 3078F PR MOST RECENT DIASTOLIC BLOOD PRESSURE < 80 MM HG: ICD-10-PCS | Mod: CPTII,S$GLB,, | Performed by: OTOLARYNGOLOGY

## 2019-12-23 PROCEDURE — 3078F DIAST BP <80 MM HG: CPT | Mod: CPTII,S$GLB,, | Performed by: PHYSICIAN ASSISTANT

## 2019-12-23 PROCEDURE — 99213 PR OFFICE/OUTPT VISIT, EST, LEVL III, 20-29 MIN: ICD-10-PCS | Mod: 25,S$GLB,, | Performed by: OTOLARYNGOLOGY

## 2019-12-23 PROCEDURE — 3074F PR MOST RECENT SYSTOLIC BLOOD PRESSURE < 130 MM HG: ICD-10-PCS | Mod: CPTII,S$GLB,, | Performed by: PHYSICIAN ASSISTANT

## 2019-12-23 PROCEDURE — 3075F PR MOST RECENT SYSTOLIC BLOOD PRESS GE 130-139MM HG: ICD-10-PCS | Mod: CPTII,S$GLB,, | Performed by: OTOLARYNGOLOGY

## 2019-12-23 RX ORDER — FUROSEMIDE 20 MG/1
20 TABLET ORAL DAILY PRN
Qty: 30 TABLET | Refills: 1 | Status: SHIPPED | OUTPATIENT
Start: 2019-12-23 | End: 2020-01-14

## 2019-12-23 RX ORDER — FUROSEMIDE 20 MG/1
20 TABLET ORAL DAILY
Qty: 30 TABLET | Refills: 0 | Status: SHIPPED | OUTPATIENT
Start: 2019-12-23 | End: 2019-12-23 | Stop reason: SDUPTHER

## 2019-12-23 NOTE — PROGRESS NOTES
Subjective:    Patient ID:  Chai Murry is a 79 y.o. male who presents for follow-up of leg swelling      HPI   Mr. Murry is a 79 year old male patient whose current medical conditions include history of lung CA, PAF (on Eliquis), hypercholesterolemia, and HTN who presents today for follow-up. Patient returns today and states he is doing well. He noticed some BLE edema last week. Took one of his wife's Lasix pills, which helped. He saw UC and compression socks were recommended and he hast noticed vast improvement since that time. Today in office, patient feels well. No cardiac complaints. Denies any worsening SOB/CARVAJAL. No casandra chest pain, tightness, or heaviness.  No s/s of CHF-no abdominal bloating or weight gain. Occasional lightheadedness and dizziness when he changes positions too quickly. No near syncope, syncope, or falls. BP stable and well-controlled today in office.     Review of Systems   Constitution: Negative for chills, decreased appetite, fever and malaise/fatigue.   HENT: Negative for congestion, hoarse voice and sore throat.    Eyes: Negative for blurred vision and discharge.   Cardiovascular: Positive for leg swelling (nearly resolved). Negative for chest pain, claudication, cyanosis, dyspnea on exertion, irregular heartbeat, near-syncope, orthopnea, palpitations and paroxysmal nocturnal dyspnea.   Respiratory: Negative for cough, hemoptysis, shortness of breath, snoring, sputum production and wheezing.    Endocrine: Negative for cold intolerance and heat intolerance.   Hematologic/Lymphatic: Negative for bleeding problem. Does not bruise/bleed easily.   Skin: Negative for rash.   Musculoskeletal: Negative for arthritis, back pain, joint pain, joint swelling, muscle cramps, muscle weakness and myalgias.   Gastrointestinal: Negative for abdominal pain, constipation, diarrhea, heartburn, melena and nausea.   Genitourinary: Negative for hematuria.   Neurological: Negative for dizziness, focal  "weakness, headaches, light-headedness, loss of balance, numbness, paresthesias, seizures and weakness.   Psychiatric/Behavioral: Negative for memory loss. The patient does not have insomnia.    Allergic/Immunologic: Negative for hives.     /68 (BP Location: Right arm, Patient Position: Sitting, BP Method: Medium (Manual))   Pulse (!) 56   Ht 5' 10" (1.778 m)   Wt 86.8 kg (191 lb 5.8 oz)   BMI 27.46 kg/m²     Objective:    Physical Exam   Constitutional: He is oriented to person, place, and time. He appears well-developed and well-nourished. No distress.   HENT:   Head: Normocephalic and atraumatic.   Eyes: Pupils are equal, round, and reactive to light. Right eye exhibits no discharge. Left eye exhibits no discharge.   Neck: Neck supple. No JVD present.   Cardiovascular: Regular rhythm, S1 normal, S2 normal, normal heart sounds and intact distal pulses. Bradycardia present.   No murmur heard.  Pulmonary/Chest: Effort normal and breath sounds normal. No respiratory distress. He has no wheezes. He has no rales.   Abdominal: Soft. He exhibits no distension.   Musculoskeletal: He exhibits no edema.   Neurological: He is alert and oriented to person, place, and time.   Skin: Skin is warm and dry. He is not diaphoretic. No erythema.   Psychiatric: He has a normal mood and affect. His behavior is normal. Thought content normal.   Nursing note and vitals reviewed.      2D Echo CONCLUSIONS     1 - No visualized thrombus in the left atrium.     2 - No visualized thrombus in the left atrial appendage.     3 - Mild aortic stenosis.     4 - Mild to moderate aortic regurgitation.     5 - The mitral valve is mildly sclerotic with mildly restricted leaflet mobility.     6 - Mild mitral regurgitation.     7 - Mild tricuspid regurgitation.   Assessment:       1. Leg edema    2. Chronic bronchitis, unspecified chronic bronchitis type    3. Coronary artery disease involving native coronary artery of native heart without " angina pectoris    4. Chronic atrial fibrillation    5. Essential hypertension    6. Pure hypercholesterolemia    7. Malignant neoplasm of upper lobe of left lung      Patient presents for f/u. Reported some leg edema last week, has since resolved. Agree with leg elevation and compression socks. Can use Lasix 20 mg daily prn, very sparingly. Explained amlodipine also contributing to LE edema. Otherwise, remains stable CV wise. Continue same meds-mgmt-will further discuss why patient is on ASA, Plavix, and Eliquis with Dr. Palencia.  Plan:   -Compression socks  -Leg elevation  -Low salt diet  -Lasix 20 mg daily prn, USE VERY SPARINGLY  -Continue other meds-will further discuss with Dr. Palencia-patient on Eliquis, ASA, Plavix  -Keep scheduled f/u with Dr. Palencia in January

## 2019-12-23 NOTE — PATIENT INSTRUCTIONS
VOCAL FOLD INJECTION AUGMENTATION     Description   If the vocal folds (vocal cords) cannot close completely, you may experience voice problems: roughness, breathiness, inability to get loud, increased vocal effort, increased vocal fatigue. Some patients may also experience aspiration (coughing or choking with swallowing). Vocal fold injection augmentation plumps up the vocal fold and/or repositions it in the midline in order to help the vocal folds close completely while speaking or swallowing. Following the procedure, most patients experience a louder, stronger, clearer voice. The procedure also helps some patients protect against aspiration, although the swallowing improvement is not as dramatic as the voice improvement. We use the following materials for the procedure: hyaluronic acid (Restylane); carboxymethylcellulose (Radiesse Voice Gel); and calcium hydroxyapatite (Radiesse Voice). For most patients, the injection is performed with a small needle passed through the skin of the neck. However, in some patients we perform the injection with a device passed through the mouth. In either case, the injection is guided by the visualization provided by a scope passed through the nose.     What to expect during the procedure   We perform the injection in our office under local (topical) anesthesia. You are awake the whole time, and the entire procedure lasts about 15 minutes. Our primary focus is your safety and comfort. We usually make the larynx numb by spraying the throat and/or dripping anesthetic on the larynx. At this time, you may cough or gag, or you may have the sensation that you spilled some water down the wrong pipe. These are temporary sensations that allow us to get you numb. The numbing process takes about 2 minutes. We will not proceed until we know you will be as comfortable as possible. A small needle is passed either through the skin of the neck or via a long instrument through the mouth to  perform the injection. During the injection, you may experience mild discomfort in the throat. You may feel an unusual sensation in the ear because the larynx and the ear share the same sensory nerve. In rare cases in which a patient does not tolerate the procedure, it may be performed in the operating room, with the patient completely asleep under general anesthesia.     What to expect afterwards   Most patients note a stronger, less effortful voice immediately after the injection. Sometimes the voice is tight or pressed voice is noted right after the injection. The voice usually has good days and bad days and gradually improves until you reach your new baseline voice over the first 1-2 weeks. Voice therapy may be a necessary part of your treatment plan to optimize your vocal outcome. None of the materials we inject are permanent. As the material dissolves, you may experience a gradual worsening of voice quality over the course of several months. At this point we may consider repeating the injection. You may be a candidate for a permanent fix, which involves an open surgery in the neck performed in the operating room.     Instructions: before the procedure   1. Do not take aspirin-containing products or other medications that can thin the blood (such as ibuprofen, Advil, Motrin, Aleve, Plavix) for 7 days prior to the injection. If you take Coumadin (warfarin), you may need to stop using this a few days prior to the injection. If you are on blood thinning (anti-platelet or anti-coagulant) medication and it is not clear what you should do, please clarify this with your physician.   2. On the day of your procedure, please make sure you take your other regular morning medications.   3. On the day of your procedure, it is OK to eat and drink as you would normally, up until 3 hours before to your appointment time. During that time frame, we ask that you restrict yourself only to clear liquids. A clear liquid is anything  you can see through (water, ginger ale, apple juice).     Instructions: after the procedure   1. Please refrain from eating or drinking for 1 hour following the procedure. This allows time for the numbing medication to wear off.   2. Most patients experience very little pain. If necessary, most patients are able to keep comfortable with plain Tylenol (acetaminophen) and/or other non-steroidal anti-inflammatory medications such as ibuprofen (Advil, Motrin). Please follow package instructions if considering taking these medications.   3. In most instances, it is OK to use your voice immediately after the procedure. However, for the first week, you should avoid speaking over heavy background noise or in a very loud voice. It is rare, but in some cases you will be asked to rest your voice for the first 24 hours.   4. Please call the [facility] at [phone] if   · You have a temperature above 101°F   · You develop Increasing throat pain not relieved by over-the-counter medications   · You have any other post-operative questions or concerns   5. Please go immediately to the nearest emergency room if you are experiencing   · Shortness of breath   · Difficulty breathing   · Difficulty swallowing   · Severe bleeding     FREQUENTLY ASKED QUESTIONS     Is this a Botox injection? No. Botox weakens the voice box muscles. Instead, with a vocal fold injection augmentation, we are injecting filler material to bulk up the vocal fold(s).     How do you decide which material to use for the injection? Our decision is based on the indication for the procedure, the position of the vocal fold, and other patient historical/anatomical factors. In some instances, the approval of your insurance company is an important factor.     How to you decide which technique to use (through the neck versus through the mouth)? Patient anatomy and the position of the vocal fold play an important role. Other patient factors such as gag reflex are  also strongly considered.     Why do you perform this in your office instead of in the operating room? Performing the procedure in the office is safe and precise. In addition, performing the injection with the patient awake gives us direct visual and auditory feedback, which we do not get when the patient is asleep in the operating room. Furthermore, an office-based injection is much less time consuming, is more convenient for the patient, and does not involve the risks or the recovery time associated with general anesthesia. We can still do this in the operating room; we save that setting for specific diagnoses or situations, as well as for the rare patient who is unable to tolerate the awake procedure.     Why did I have discomfort in my ear (during or after the injection)? This is an example of referred pain. The ear and the larynx share the same sensory nerve.     I got an injection 3 days ago. Why is my voice still hoarse? To optimize vocal outcome, we overinject a little bit. Additionally, there may be a mild amount of swelling. Finally, the muscles of the larynx need to adjust to the injected material. Most people will have good days and bad days at first. After 1-2 weeks, you should settle out to your new baseline voice.     How long does the injection last? Carboxymethylcellulose (Radiesse Voice Gel) lasts approximately 1-2 months. Hyaluronic acid (Restylane) lasts approximately 4 months. Calcium hydroxyapatite (Radiesse Voice) lasts up to 1 year; however its characteristics are such that only few patients are appropriate for using this material.     Is there a permanent injectable material? No.     Can the injection be repeated? Yes. There is no limit to the number of times an injection can be repeated. However, a permanent surgical fix is often an option to consider.

## 2019-12-23 NOTE — PROGRESS NOTES
[facility]  Department of Otorhinolaryngology and Communication Sciences    Subjective:      Chai Murry is a 79 y.o. male who presents for follow-up. He has left vocal fold immobility, low likelihood of recovery, 2/2 recurrent lung cancer with paratracheal disease.    TREATMENT HISTORY  11/15/2019 - injection aug - HA    The injection has helped him a lot but he does report some ongoing vocal fatigue. His voice tires out by the end of the day.    The patient's medications, allergies, past medical, surgical, social and family histories were reviewed and updated as appropriate.    A detailed review of systems was obtained with pertinent positives as per the above HPI, and otherwise negative.      Objective:     /71   Pulse 67   Temp 97.5 °F (36.4 °C)   Wt 85.8 kg (189 lb 2.5 oz)   BMI 27.14 kg/m²      Constitutional: comfortable, well dressed  Psychiatric: appropriate affect  Respiratory: comfortably breathing, symmetric chest rise, no stridor  Voice: mid-moderate variable breathiness with brief intermittent/inconsistent diplophonia; marked interval improvements  Head: normocephalic  Eyes: conjunctivae and sclerae clear  Indirect laryngoscopy is limited due to gag    Procedure  Flexible Laryngeal Videostroboscopy (62437): Laryngeal videostroboscopy is indicated to assess laryngeal vibratory biomechanics and vocal fold oscillation, which cannot be assessed with a plain light examination. This was carried out transnasally with a distal chip videoendoscope. After verbal consent was obtained, the patient was positioned and the nose was topically decongested with 1% phenylephrine and topically anesthetized with 4% lidocaine. The endoscope was passed through the most patent nasal cavity and positioned to image the nasopharynx, larynx, and hypopharynx in detail. The following features were examined: nasopharyngeal, laryngeal, hypopharyngeal masses; velopharyngeal strength, closure, and symmetry of  motion; vocal fold range and symmetry of motion; laryngeal mucosal edema, erythema, inflammation, and hydration; salivary pooling; and gross laryngeal sensation. During phonation, the vocal folds were assessed for glottal closure; mucosal wave; vocal fold lesions; vibratory periodicity, amplitude, and phase symmetry; and vertical height match. The equipment was removed. The patient tolerated the procedure well without complication. All findings were normal except:  - left vocal fold immobile, intermediate position; linear free edge  - glottal insufficiency across all frequencies; improved closure at high frequencies  - phonatroy suprgalottic hyperfunction      Assessment:     Chai Murry is a 79 y.o. male with left vocal fold immobility, low likelihood of recovery, 2/2 recurrent lung cancer with paratracheal disease.    He has made progress following injection augmentation but does have ongoing dysphonia due to persistent glottal insufficiency.     Plan:     I offered to repeat the injection augmentation.  I reviewed with him the risks/benefits thereof. He wishes to proceed. We will arrange this in the coming weeks.    All questions were answered, and the patient is in agreement with the plan.    Con Lofton M.D.  [facility]  Department of Otorhinolaryngology and Communication Sciences

## 2019-12-26 ENCOUNTER — CLINICAL SUPPORT (OUTPATIENT)
Dept: INTERNAL MEDICINE | Facility: CLINIC | Age: 79
End: 2019-12-26
Payer: MEDICARE

## 2019-12-26 PROCEDURE — 99999 PR PBB SHADOW E&M-EST. PATIENT-LVL II: ICD-10-PCS | Mod: PBBFAC,,,

## 2019-12-26 PROCEDURE — 99999 PR PBB SHADOW E&M-EST. PATIENT-LVL II: CPT | Mod: PBBFAC,,,

## 2019-12-26 PROCEDURE — 96372 THER/PROPH/DIAG INJ SC/IM: CPT | Mod: S$GLB,,, | Performed by: INTERNAL MEDICINE

## 2019-12-26 PROCEDURE — 96372 PR INJECTION,THERAP/PROPH/DIAG2ST, IM OR SUBCUT: ICD-10-PCS | Mod: S$GLB,,, | Performed by: INTERNAL MEDICINE

## 2019-12-26 RX ADMIN — TESTOSTERONE CYPIONATE 200 MG: 200 INJECTION, SOLUTION INTRAMUSCULAR at 08:12

## 2019-12-26 NOTE — PROGRESS NOTES
Pt came in today for Depo Testosterone Injection per Peter Teixeira MD orders. After receiving consent,  injection given. Pt informed to wait in clinic 20 minutes after receiving injection and to report any adverse reaction to me immediately. Pt verbalized understanding.     RADHAMES Valera LPN

## 2020-01-03 ENCOUNTER — HOSPITAL ENCOUNTER (OUTPATIENT)
Dept: RADIOLOGY | Facility: HOSPITAL | Age: 80
Discharge: HOME | End: 2020-01-03
Attending: INTERNAL MEDICINE
Payer: MEDICARE

## 2020-01-03 ENCOUNTER — OFFICE VISIT (OUTPATIENT)
Dept: PULMONOLOGY | Facility: CLINIC | Age: 80
End: 2020-01-03
Payer: MEDICARE

## 2020-01-03 ENCOUNTER — CLINICAL SUPPORT (OUTPATIENT)
Dept: PULMONOLOGY | Facility: CLINIC | Age: 80
End: 2020-01-03
Payer: MEDICARE

## 2020-01-03 VITALS
BODY MASS INDEX: 27.9 KG/M2 | HEIGHT: 70 IN | HEIGHT: 70 IN | BODY MASS INDEX: 27.9 KG/M2 | DIASTOLIC BLOOD PRESSURE: 67 MMHG | SYSTOLIC BLOOD PRESSURE: 116 MMHG | WEIGHT: 194.88 LBS | HEART RATE: 64 BPM | RESPIRATION RATE: 16 BRPM | OXYGEN SATURATION: 95 % | WEIGHT: 194.88 LBS

## 2020-01-03 DIAGNOSIS — J42 CHRONIC BRONCHITIS, UNSPECIFIED CHRONIC BRONCHITIS TYPE: ICD-10-CM

## 2020-01-03 DIAGNOSIS — J44.9 CHRONIC OBSTRUCTIVE PULMONARY DISEASE, UNSPECIFIED COPD TYPE: ICD-10-CM

## 2020-01-03 DIAGNOSIS — J92.0 ASBESTOS-INDUCED PLEURAL PLAQUE: ICD-10-CM

## 2020-01-03 DIAGNOSIS — J39.8 TRACHEAL MASS: ICD-10-CM

## 2020-01-03 DIAGNOSIS — Z85.118 HISTORY OF CANCER OF UPPER LOBE BRONCHUS OR LUNG: Primary | ICD-10-CM

## 2020-01-03 PROCEDURE — 99999 PR PBB SHADOW E&M-EST. PATIENT-LVL IV: ICD-10-PCS | Mod: PBBFAC,,, | Performed by: NURSE PRACTITIONER

## 2020-01-03 PROCEDURE — 99214 PR OFFICE/OUTPT VISIT, EST, LEVL IV, 30-39 MIN: ICD-10-PCS | Mod: S$GLB,,, | Performed by: NURSE PRACTITIONER

## 2020-01-03 PROCEDURE — 3078F PR MOST RECENT DIASTOLIC BLOOD PRESSURE < 80 MM HG: ICD-10-PCS | Mod: CPTII,S$GLB,, | Performed by: NURSE PRACTITIONER

## 2020-01-03 PROCEDURE — 99999 PR PBB SHADOW E&M-EST. PATIENT-LVL IV: CPT | Mod: PBBFAC,,, | Performed by: NURSE PRACTITIONER

## 2020-01-03 PROCEDURE — 94618 PULMONARY STRESS TESTING: CPT | Mod: S$GLB,,, | Performed by: INTERNAL MEDICINE

## 2020-01-03 PROCEDURE — 71046 X-RAY EXAM CHEST 2 VIEWS: CPT | Mod: TC

## 2020-01-03 PROCEDURE — 1101F PT FALLS ASSESS-DOCD LE1/YR: CPT | Mod: CPTII,S$GLB,, | Performed by: NURSE PRACTITIONER

## 2020-01-03 PROCEDURE — 94618 PULMONARY STRESS TESTING: ICD-10-PCS | Mod: S$GLB,,, | Performed by: INTERNAL MEDICINE

## 2020-01-03 PROCEDURE — 71046 XR CHEST PA AND LATERAL: ICD-10-PCS | Mod: 26,,, | Performed by: RADIOLOGY

## 2020-01-03 PROCEDURE — 99214 OFFICE O/P EST MOD 30 MIN: CPT | Mod: S$GLB,,, | Performed by: NURSE PRACTITIONER

## 2020-01-03 PROCEDURE — 71046 X-RAY EXAM CHEST 2 VIEWS: CPT | Mod: 26,,, | Performed by: RADIOLOGY

## 2020-01-03 PROCEDURE — 3074F PR MOST RECENT SYSTOLIC BLOOD PRESSURE < 130 MM HG: ICD-10-PCS | Mod: CPTII,S$GLB,, | Performed by: NURSE PRACTITIONER

## 2020-01-03 PROCEDURE — 1159F PR MEDICATION LIST DOCUMENTED IN MEDICAL RECORD: ICD-10-PCS | Mod: S$GLB,,, | Performed by: NURSE PRACTITIONER

## 2020-01-03 PROCEDURE — 1159F MED LIST DOCD IN RCRD: CPT | Mod: S$GLB,,, | Performed by: NURSE PRACTITIONER

## 2020-01-03 PROCEDURE — 1101F PR PT FALLS ASSESS DOC 0-1 FALLS W/OUT INJ PAST YR: ICD-10-PCS | Mod: CPTII,S$GLB,, | Performed by: NURSE PRACTITIONER

## 2020-01-03 PROCEDURE — 3074F SYST BP LT 130 MM HG: CPT | Mod: CPTII,S$GLB,, | Performed by: NURSE PRACTITIONER

## 2020-01-03 PROCEDURE — 3078F DIAST BP <80 MM HG: CPT | Mod: CPTII,S$GLB,, | Performed by: NURSE PRACTITIONER

## 2020-01-03 NOTE — PROCEDURES
"[facility] - Pulmonary Function [facility]  Six Minute Walk     SUMMARY     Ordering Provider: Dr. Ernandez   Interpreting Provider: Dr. Ernandez  Performing nurse/tech/RT: SHIV Mathew RRT  Diagnosis: COPD  Height: 5' 10" (177.8 cm)  Weight: 88.4 kg (194 lb 14.2 oz)  BMI (Calculated): 28   Patient Race:             Phase Oxygen Assessment Supplemental O2 Heart   Rate Blood Pressure Jimenez Dyspnea Scale Rating   Resting 95 % Room Air 64 bpm 116/67 1   Exercise        Minute        1 89 % Room Air 75 bpm     2 92 % Room Air 84 bpm     3           4 93 %         5 95 % Room Air 95 bpm     6  92 % Room Air 96 bpm 148/67 2   Recovery        Minute        1 94 % Room Air 90 bpm     2 99 % Room Air 73 bpm     3 99 % Room Air 75 bpm     4 100 % Room Air 63 bpm 132/66 0     Six Minute Walk Summary  6MWT Status: completed without stopping  Patient Reported: No complaints     Interpretation:  Did the patient stop or pause?: No                                         Total Time Walked (Calculated): 360 seconds  Final Partial Lap Distance (feet): 25 feet  Total Distance Meters (Calculated): 434.34 meters  Predicted Distance Meters (Calculated): 489.8 meters  Percentage of Predicted (Calculated): 88.68  Peak VO2 (Calculated): 17.01  Mets: 4.86  Has The Patient Had a Previous Six Minute Walk Test?: Yes       Previous 6MWT Results  Has The Patient Had a Previous Six Minute Walk Test?: Yes  Date of Previous Test: 08/21/19  Total Time Walked: 60 seconds  Total Distance (meters): 30.48  Predicted Distance (meters): 496.31 meters  Percentage of Predicted: 6.14  Percent Change (Calculated): -13.25      Interpretation:  Total distance walked in six minutes is normal indicating normal functional capacity. There was moderate exercise induced hypoxemia with significant oxygen desaturation to 89% with exercise.    Oxygen desaturation did not meet criteria for supplemental oxygen prescription.    Clinical correlation suggested.    Roel Ernandez, " MD    Mild exercise-induced hypoxemia described as an arterial oxygen saturation of 93-95% (or 3-4% less than at rest), moderate exercise-induced hypoxemia as 89-93%, and severe exercise induced hypoxemia as < 89% O2 saturation.  Medicare Criteria Comments:   When arterial oxygen saturation is at or below 88% during exercise (severe exercise induced hypoxemia) then the patient falls under Medicare Group 1 criteria for supplemental oxygen.  Details about Medicare Group Criteria coverage can be found at http://www.cms.hhs.gov/manuals/downloads/

## 2020-01-03 NOTE — LETTER
January 3, 2020      Roel Ernandez MD  [address]  [place] [address]           [facility]  [address] [facility] [address] [facility] [place] [address]  Phone: [phone]  Fax: [phone]          Patient: Chai Murry   MR Number: [id number]   YOB: 1940   Date of Visit: 1/3/2020       Dear Dr. Roel Ernandez:    Thank you for referring Chai Murry to me for evaluation. Attached you will find relevant portions of my assessment and plan of care.    If you have questions, please do not hesitate to call me. I look forward to following Chai Murry along with you.    Sincerely,    Venessa Duran, NP    Enclosure  CC:  No Recipients    If you would like to receive this communication electronically, please contact [email or web address] or [phone] to request more information on [facility] [employer] access.    For providers and/or their staff who would like to refer a patient to [facility], please contact us through our one-stop-shop provider referral line, [facility] , at [phone].    If you feel you have received this communication in error or would no longer like to receive these types of communications, please e-mail [email or web address]

## 2020-01-03 NOTE — PROGRESS NOTES
Subjective:      Patient ID: Chai Murry is a 79 y.o. male.    Chief Complaint: COPD      HPI presents to pulmonary clinic for 3 month follow up on hypoxemia requiring supplemental oxygen nasal cannula 2 L with exertion and sleep.  COPD, history of left lung cancer with pneumonectomy 2016.  Last seen in pulmonary clinic by Dr. Ernandez 8/21/2019.  The patient does not have symptoms or an exacerbation.   He reports he is improved over his baseline when he would last presented in August 2019.  When he had desaturations requiring supplemental oxygen with exertion 2 L with exertion.  Patient 1 of the unable to complete 6 min walk related to fatigue and shortness of Breath in his visit in August 2019.     There is occasional cough occasionally productive of white mucus, no wheezing, no chest congestion.  There is chronic hoarseness of voice since 2016.    Current treatment regime of Bevespi inhaler 2 puffs twice daily ordered (taken about 2 puffs twice a week). Also had Breo 100 mcg inhaler he had from a April 2019 urgent care visit, taken sporadically about once a week.  Combivent Respimat utilize about 3 to 4 times a week  Albuterol nebulizer solution utilized about once every 1-2 weeks.  There are no pulmonary symptoms when he utilizes is inhalers he states that he sees in sitting on his counter so he takes them on a sporadic basis since he does not have symptoms.       CAT score today is 14.    The following portions of the patient's history were reviewed and updated as appropriate: allergies, current medications, past family history, past medical history, past social history, past surgical history and problem list.    Previous Report Reviewed: lab reports, office notes and radiology reports     Past Medical History: The following portions of the patient's history were reviewed and updated as appropriate:   He  has a past surgical history that includes Appendectomy; Cholecystectomy; cardiac stents; infected stomach  gland excision; Cataract extraction w/  intraocular lens implant (Right, 9-3-14); Skin biopsy (Left); Lung removal, total (Left, 04/2016); Colonoscopy (N/A, 4/17/2018); Colonoscopy (N/A, 10/25/2018); Cardiac catheterization; Esophagogastroduodenoscopy (N/A, 6/11/2019); Endoscopic ultrasound of upper gastrointestinal tract (N/A, 6/11/2019); Bronchoscopy (Bilateral, 6/21/2019); and Insertion of tunneled central venous catheter (CVC) with subcutaneous port (Right, 7/3/2019).  His family history includes Cancer in his brother, mother, and sister; Cataracts in his father and mother; Esophageal cancer in his sister; Hypertension in his brother, father, and mother; Lung cancer in his mother; Pneumonia in his father.  He  reports that he quit smoking about 14 years ago. He has a 50.00 pack-year smoking history. He has never used smokeless tobacco. He reports that he does not drink alcohol or use drugs.  He has a current medication list which includes the following prescription(s): albuterol, amlodipine, apixaban, aspirin, breo ellipta, clopidogrel, fenofibric acid, fluticasone propionate, furosemide, glycopyrrolate-formoterol, hydrocodone-acetaminophen, ipratropium-albuterol, levocetirizine, lisinopril, ranolazine, simvastatin, and sotalol af, and the following Facility-Administered Medications: lactated ringers.  He is allergic to atorvastatin and bactrim [sulfamethoxazole-trimethoprim]..    Review of Systems   Constitutional: Negative for fever, chills, weight loss, weight gain, activity change, appetite change, fatigue and night sweats.   HENT: Negative for postnasal drip, rhinorrhea, sinus pressure, voice change and congestion.    Eyes: Negative for redness and itching.   Respiratory: Negative for snoring, cough, sputum production, chest tightness, shortness of breath, wheezing, orthopnea, asthma nighttime symptoms, dyspnea on extertion, use of rescue inhaler and somnolence.    Cardiovascular: Negative.  Negative for  "chest pain, palpitations and leg swelling.   Genitourinary: Negative for difficulty urinating and hematuria.   Endocrine: Negative for cold intolerance and heat intolerance.    Musculoskeletal: Negative for arthralgias, gait problem, joint swelling and myalgias.   Skin: Negative.    Gastrointestinal: Negative for nausea, vomiting, abdominal pain and acid reflux.   Neurological: Negative for dizziness, weakness, light-headedness and headaches.   Hematological: Negative for adenopathy. No excessive bruising.   All other systems reviewed and are negative.     Objective:   /67   Pulse 64   Resp 16   Ht 5' 10" (1.778 m)   Wt 88.4 kg (194 lb 14.2 oz)   SpO2 95%   BMI 27.96 kg/m²   Physical Exam   Constitutional: He is oriented to person, place, and time. Vital signs are normal. He appears well-developed and well-nourished. He is active and cooperative.  Non-toxic appearance. He does not have a sickly appearance. He does not appear ill. No distress.   HENT:   Head: Normocephalic and atraumatic.   Right Ear: External ear normal.   Left Ear: External ear normal.   Nose: Nose normal.   Mouth/Throat: Oropharynx is clear and moist. No oropharyngeal exudate.   Eyes: Conjunctivae are normal.   Neck: Normal range of motion. Neck supple.   Cardiovascular: Normal rate, regular rhythm, normal heart sounds and intact distal pulses.   Pulmonary/Chest: Effort normal. No accessory muscle usage or stridor. No respiratory distress. He has decreased breath sounds (left ). He has no wheezes. He has no rhonchi. He has no rales.   Abdominal: Soft.   Musculoskeletal: He exhibits no edema.   Neurological: He is alert and oriented to person, place, and time.   Skin: Skin is warm and dry.   Psychiatric: He has a normal mood and affect. His behavior is normal. Judgment and thought content normal.   Nursing note and vitals reviewed.      Personal Diagnostic Review  Pulmonary stress/six minute walk done 1/3/2020: No desaturations " requiring supplemental oxygen at rest or with exertion.   Phase Oxygen Assessment Supplemental O2 Heart   Rate Blood Pressure Jimenez Dyspnea Scale Rating   Resting 95 % Room Air 64 bpm 116/67 1   Exercise             Minute             1 89 % Room Air 75 bpm       2 92 % Room Air 84 bpm       3          4 93 %         5 95 % Room Air 95 bpm       6  92 % Room Air 96 bpm 148/67 2   Recovery             Minute             1 94 % Room Air 90 bpm       2 99 % Room Air 73 bpm       3 99 % Room Air 75 bpm       4 100 % Room Air 63 bpm 132/66 0      Six Minute Walk Summary  6MWT Status: completed without stopping  Patient Reported: No complaints          X-Ray Chest PA And Lateral  Narrative: EXAMINATION:  XR CHEST PA AND LATERAL    CLINICAL HISTORY:  SOB; Chronic obstructive pulmonary disease, unspecified    TECHNIQUE:  PA and lateral views of the chest were performed.    COMPARISON:  08/23/2019    FINDINGS:  Postsurgical left pneumonectomy findings in expected mediastinal/cardiac silhouette shift are stable.  There remains complete opacification of the left hemithorax.  Right-sided MediPort remains.  Right lung is clear.  No pleural effusion.  No acute osseous abnormality.  Impression: Similar appearance of the chest.    Electronically signed by: Giovani Nicole MD  Date:    01/03/2020  Time:    11:42      Assessment:     1. History of cancer of upper lobe bronchus or lung    2. Asbestos-induced pleural plaque    3. Chronic bronchitis, unspecified chronic bronchitis type    4. 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 Ernandez         Relevant Orders    X-Ray Chest PA And Lateral    Chronic obstructive pulmonary disease     Stable on current regimen, sporadic use of inhalers.  Encouraged Bevespi inhaler 2 puffs twice daily controller  Discontinue use of Breo that was provided at an urgent care visit in 2019, April.  Continue Combivent Respimat if needed, continue albuterol nebulizer.  Treatments as needed.  Continue nasal cannula 2 L with exertion for desaturations >=88%.   Currently patient is stable off of home oxygen 1/3/2020, 6 min walk distance resting O2 sat 95%.  O2 sat christina min 89% improved status stabilized between 90-95% during exertion.   Adherence oxygen 2 L nightly for sleep.          Relevant Orders    X-Ray Chest PA And Lateral    Asbestos-induced pleural plaque     By history of exposure.  Worked at [employer]         Relevant Orders    X-Ray Chest PA And Lateral          Follow up in about 6 months (around 7/3/2020) for COPD/ hx lung ca post left pneumonectomy with Dr. Ernandez .

## 2020-01-03 NOTE — ASSESSMENT & PLAN NOTE
Stable on current regimen, sporadic use of inhalers.  Encouraged Bevespi inhaler 2 puffs twice daily controller  Discontinue use of Breo that was provided at an urgent care visit in 2019, April.  Continue Combivent Respimat if needed, continue albuterol nebulizer.  Treatments as needed.  Continue nasal cannula 2 L with exertion for desaturations >=88%.   Currently patient is stable off of home oxygen 1/3/2020, 6 min walk distance resting O2 sat 95%.  O2 sat christina min 89% improved status stabilized between 90-95% during exertion.   Adherence oxygen 2 L nightly for sleep.

## 2020-01-07 ENCOUNTER — HOSPITAL ENCOUNTER (OUTPATIENT)
Dept: RADIOLOGY | Facility: CLINIC | Age: 80
Discharge: HOME | End: 2020-01-07
Attending: PHYSICIAN ASSISTANT
Payer: MEDICARE

## 2020-01-07 ENCOUNTER — OFFICE VISIT (OUTPATIENT)
Dept: URGENT CARE | Facility: CLINIC | Age: 80
End: 2020-01-07
Payer: MEDICARE

## 2020-01-07 VITALS
HEART RATE: 81 BPM | OXYGEN SATURATION: 96 % | WEIGHT: 188.69 LBS | SYSTOLIC BLOOD PRESSURE: 175 MMHG | DIASTOLIC BLOOD PRESSURE: 79 MMHG | RESPIRATION RATE: 18 BRPM | BODY MASS INDEX: 27.01 KG/M2 | TEMPERATURE: 99 F | HEIGHT: 70 IN

## 2020-01-07 DIAGNOSIS — R05.9 COUGH: ICD-10-CM

## 2020-01-07 DIAGNOSIS — J22 BACTERIAL LOWER RESPIRATORY INFECTION: Primary | ICD-10-CM

## 2020-01-07 DIAGNOSIS — B96.89 BACTERIAL LOWER RESPIRATORY INFECTION: Primary | ICD-10-CM

## 2020-01-07 PROCEDURE — 99214 OFFICE O/P EST MOD 30 MIN: CPT | Mod: S$GLB,,, | Performed by: PHYSICIAN ASSISTANT

## 2020-01-07 PROCEDURE — 71046 XR CHEST PA AND LATERAL: ICD-10-PCS | Mod: S$GLB,,, | Performed by: RADIOLOGY

## 2020-01-07 PROCEDURE — 99214 PR OFFICE/OUTPT VISIT, EST, LEVL IV, 30-39 MIN: ICD-10-PCS | Mod: S$GLB,,, | Performed by: PHYSICIAN ASSISTANT

## 2020-01-07 PROCEDURE — 71046 X-RAY EXAM CHEST 2 VIEWS: CPT | Mod: S$GLB,,, | Performed by: RADIOLOGY

## 2020-01-07 RX ORDER — BENZONATATE 200 MG/1
200 CAPSULE ORAL 3 TIMES DAILY PRN
Qty: 21 CAPSULE | Refills: 0 | Status: SHIPPED | OUTPATIENT
Start: 2020-01-07 | End: 2022-03-24

## 2020-01-07 RX ORDER — DOXYCYCLINE HYCLATE 100 MG
100 TABLET ORAL 2 TIMES DAILY
Qty: 14 TABLET | Refills: 0 | Status: SHIPPED | OUTPATIENT
Start: 2020-01-07 | End: 2020-01-14

## 2020-01-07 RX ORDER — PROMETHAZINE HYDROCHLORIDE 6.25 MG/5ML
25 SYRUP ORAL NIGHTLY PRN
Qty: 240 ML | Refills: 0 | Status: SHIPPED | OUTPATIENT
Start: 2020-01-07 | End: 2022-03-24

## 2020-01-08 NOTE — PROGRESS NOTES
"Subjective:       Patient ID: Chai Murry is a 79 y.o. male.    Vitals:  height is 5' 10" (1.778 m) and weight is 85.6 kg (188 lb 11.4 oz). His temperature is 98.6 °F (37 °C). His blood pressure is 175/79 (abnormal) and his pulse is 81. His respiration is 18 and oxygen saturation is 96%.     Chief Complaint: Cough    Patient had left lung removed 2/2 lung cancer.    Cough   This is a new problem. The current episode started in the past 7 days (About 5 days ago). The problem has been gradually worsening. The problem occurs every few minutes. The cough is productive of sputum. Associated symptoms include a fever, postnasal drip, shortness of breath and wheezing. Pertinent negatives include no chest pain, chills, ear congestion, ear pain, eye redness, headaches, heartburn, hemoptysis, myalgias, nasal congestion, rash, rhinorrhea, sore throat, sweats or weight loss. Nothing aggravates the symptoms. He has tried OTC cough suppressant (inhalers, breathing treatments, mucinex) for the symptoms. The treatment provided no relief. His past medical history is significant for COPD, emphysema, environmental allergies and pneumonia. There is no history of asthma, bronchiectasis or bronchitis.       Constitution: Positive for fever. Negative for chills, sweating and fatigue.   HENT: Positive for postnasal drip. Negative for ear pain, ear discharge, foreign body in ear, tinnitus and sore throat.    Neck: Negative for neck pain, neck stiffness and painful lymph nodes.   Cardiovascular: Negative for chest pain.   Eyes: Negative for eye itching, eye pain and eye redness.   Respiratory: Positive for cough, sputum production, COPD, shortness of breath and wheezing. Negative for sleep apnea, chest tightness, bloody sputum, stridor and asthma.    Gastrointestinal: Negative for abdominal pain, nausea, vomiting, constipation, diarrhea and heartburn.   Genitourinary: Negative for dysuria and flank pain.   Musculoskeletal: Negative for " muscle cramps and muscle ache.   Skin: Negative for rash.   Allergic/Immunologic: Positive for environmental allergies. Negative for asthma.   Neurological: Negative for headaches.   Hematologic/Lymphatic: Negative for swollen lymph nodes.   Psychiatric/Behavioral: Negative for confusion.       Objective:      Physical Exam   Constitutional: He is oriented to person, place, and time. Vital signs are normal. He appears well-developed and well-nourished. He is cooperative.  Non-toxic appearance. He does not have a sickly appearance. He does not appear ill. No distress.   HENT:   Head: Normocephalic.   Right Ear: Tympanic membrane, external ear and ear canal normal.   Left Ear: Tympanic membrane, external ear and ear canal normal.   Nose: Nose normal.   Mouth/Throat: Uvula is midline, oropharynx is clear and moist and mucous membranes are normal.   Eyes: Pupils are equal, round, and reactive to light. Conjunctivae, EOM and lids are normal.   Neck: Trachea normal, normal range of motion, full passive range of motion without pain and phonation normal. Neck supple.   Cardiovascular: Normal rate, regular rhythm, normal heart sounds and intact distal pulses.   No murmur heard.  Pulmonary/Chest: Effort normal. No accessory muscle usage or stridor. No tachypnea and no bradypnea. No respiratory distress. He has no decreased breath sounds. He has wheezes in the right upper field and the right middle field. He has no rhonchi. He has no rales.   Coarse breath sounds of the right mid lung field   Abdominal: Soft. Normal appearance and bowel sounds are normal. He exhibits no distension. There is no tenderness.   Musculoskeletal: Normal range of motion.   Lymphadenopathy:     He has no cervical adenopathy.   Neurological: He is alert and oriented to person, place, and time.   Skin: Skin is warm, dry, not diaphoretic and no rash. Capillary refill takes less than 2 seconds.   Psychiatric: He has a normal mood and affect. His speech is  normal and behavior is normal. Judgment and thought content normal. Cognition and memory are normal.   Nursing note and vitals reviewed.  Xr Chest Pa And Lateral    Result Date: 1/7/2020  EXAMINATION: CHEST PA AND LATERAL CLINICAL HISTORY: Cough TECHNIQUE: PA and lateral chest radiograph COMPARISON: 01/03/2020 FINDINGS: A right central venous catheter is seen with the tip projecting over the superior vena cava.  Postsurgical changes of a left pneumonectomy are seen.  There is mediastinal shift to the left.  The heart does not appear to be enlarged.  The right lung is again emphysematous without focal consolidation, pneumothorax, or pleural effusion.     No acute intrathoracic abnormality.  No significant change. Electronically signed by: Selam Tejeda Date:    01/07/2020 Time:    19:12        Assessment:       1. Bacterial lower respiratory infection    2. Cough        Plan:         Bacterial lower respiratory infection  -     XR CHEST PA AND LATERAL; Future; Expected date: 01/07/2020  -     doxycycline (VIBRA-TABS) 100 MG tablet; Take 1 tablet (100 mg total) by mouth 2 (two) times daily. for 7 days  Dispense: 14 tablet; Refill: 0    Cough  -     benzonatate (TESSALON) 200 MG capsule; Take 1 capsule (200 mg total) by mouth 3 (three) times daily as needed for Cough.  Dispense: 21 capsule; Refill: 0  -     promethazine (PHENERGAN) 6.25 mg/5 mL syrup; Take 20 mLs (25 mg total) by mouth nightly as needed.  Dispense: 240 mL; Refill: 0    Vitals are reassuring   Chest x-ray unrevealing for pneumonia, pneumothorax, pleural effusion.  Exam concerning for bacterial bronchitis therefore will treat with antibiotics.  Patient declined clinic duo-neb; prefers to do at home    I have discussed the diagnosis, treatment plan and recommendations for follow-up with primary care and patient verbalized understanding and is agreeable to the plan. ED precautions given. AVS printed and given to patient upon discharge with information  regarding this visit. All questions were addressed prior to discharge.    Debbie Bragg PA-C

## 2020-01-08 NOTE — PATIENT INSTRUCTIONS
Take antibiotics as directed    You were prescribed Tessalon perles and Promethazine cough syrup for cough. Promethazine causes drowsiness.  Do not drink alcohol or operate motor vehicles while taking.    Use your albuterol nebulizer and inhaler as directed.    Follow-up with primary care as needed.    Go to the Er for any emergency       Bronchitis, Antibiotic Treatment (Adult)    Bronchitis is an infection of the air passages (bronchial tubes) in your lungs. It often occurs when you have a cold. This illness is contagious during the first few days and is spread through the air by coughing and sneezing, or by direct contact (touching the sick person and then touching your own eyes, nose, or mouth).  Symptoms of bronchitis include cough with mucus (phlegm) and low-grade fever. Bronchitis usually lasts 7 to 14 days. Mild cases can be treated with simple home remedies. More severe infection is treated with an antibiotic.  Home care  Follow these guidelines when caring for yourself at home:  · If your symptoms are severe, rest at home for the first 2 to 3 days. When you go back to your usual activities, don't let yourself get too tired.  · Do not smoke. Also avoid being exposed to secondhand smoke.  · You may use over-the-counter medicines to control fever or pain, unless another medicine was prescribed. (Note: If you have chronic liver or kidney disease or have ever had a stomach ulcer or gastrointestinal bleeding, talk with your healthcare provider before using these medicines. Also talk to your provider if you are taking medicine to prevent blood clots.) Aspirin should never be given to anyone younger than 18 years of age who is ill with a viral infection or fever. It may cause severe liver or brain damage.  · Your appetite may be poor, so a light diet is fine. Avoid dehydration by drinking 6 to 8 glasses of fluids per day (such as water, soft drinks, sports drinks, juices, tea, or soup). Extra fluids will help  loosen secretions in the nose and lungs.  · Over-the-counter cough, cold, and sore-throat medicines will not shorten the length of the illness, but they may be helpful to reduce symptoms. (Note: Do not use decongestants if you have high blood pressure.)  · Finish all antibiotic medicine. Do this even if you are feeling better after only a few days.  Follow-up care  Follow up with your healthcare provider, or as advised. If you had an X-ray or ECG (electrocardiogram), a specialist will review it. You will be notified of any new findings that may affect your care.  Note: If you are age 65 or older, or if you have a chronic lung disease or condition that affects your immune system, or you smoke, talk to your healthcare provider about having pneumococcal vaccinations and a yearly influenza vaccination (flu shot).  When to seek medical advice  Call your healthcare provider right away if any of these occur:  · Fever of 100.4°F (38°C) or higher  · Coughing up increased amounts of colored sputum  · Weakness, drowsiness, headache, facial pain, ear pain, or a stiff neck  Call 911, or get immediate medical care  Contact emergency services right away if any of these occur.  · Coughing up blood  · Worsening weakness, drowsiness, headache, or stiff neck  · Trouble breathing, wheezing, or pain with breathing  Date Last Reviewed: 9/13/2015  © 7851-7219 [employer]. [address] [place] [address], [place] [address]. All rights reserved. This information is not intended as a substitute for professional medical care. Always follow your healthcare professional's instructions.

## 2020-01-10 ENCOUNTER — PROCEDURE VISIT (OUTPATIENT)
Dept: OTOLARYNGOLOGY | Facility: CLINIC | Age: 80
End: 2020-01-10
Payer: MEDICARE

## 2020-01-10 ENCOUNTER — TELEPHONE (OUTPATIENT)
Dept: INFUSION THERAPY | Facility: HOSPITAL | Age: 80
End: 2020-01-10

## 2020-01-10 VITALS
SYSTOLIC BLOOD PRESSURE: 135 MMHG | BODY MASS INDEX: 27.27 KG/M2 | HEART RATE: 79 BPM | DIASTOLIC BLOOD PRESSURE: 60 MMHG | WEIGHT: 190.06 LBS

## 2020-01-10 DIAGNOSIS — R49.0 DYSPHONIA: Primary | ICD-10-CM

## 2020-01-10 DIAGNOSIS — J38.01 UNILATERAL COMPLETE VOCAL FOLD PARALYSIS: ICD-10-CM

## 2020-01-10 PROCEDURE — L8607 INJ VOCAL CORD BULKING AGENT: HCPCS | Mod: S$GLB,,, | Performed by: OTOLARYNGOLOGY

## 2020-01-10 PROCEDURE — L8607 PR INJ BULKING AGENT, VOCAL CORD, 0.1ML, RESTYLANE-L: ICD-10-PCS | Mod: S$GLB,,, | Performed by: OTOLARYNGOLOGY

## 2020-01-10 PROCEDURE — 31574 LARGSC W/NJX AUGMENTATION: CPT | Mod: LT,S$GLB,, | Performed by: OTOLARYNGOLOGY

## 2020-01-10 PROCEDURE — 31574 PR LARYNGOSCOPY, FLEXIBLE; W/ INJ AUGMENTATION, UNI: ICD-10-PCS | Mod: LT,S$GLB,, | Performed by: OTOLARYNGOLOGY

## 2020-01-10 NOTE — PATIENT INSTRUCTIONS
VOCAL FOLD INJECTION AUGMENTATION     Description   If the vocal folds (vocal cords) cannot close completely, you may experience voice problems: roughness, breathiness, inability to get loud, increased vocal effort, increased vocal fatigue. Some patients may also experience aspiration (coughing or choking with swallowing). Vocal fold injection augmentation plumps up the vocal fold and/or repositions it in the midline in order to help the vocal folds close completely while speaking or swallowing. Following the procedure, most patients experience a louder, stronger, clearer voice. The procedure also helps some patients protect against aspiration, although the swallowing improvement is not as dramatic as the voice improvement. We use the following materials for the procedure: hyaluronic acid (Restylane); carboxymethylcellulose (Radiesse Voice Gel); and calcium hydroxyapatite (Radiesse Voice). For most patients, the injection is performed with a small needle passed through the skin of the neck. However, in some patients we perform the injection with a device passed through the mouth. In either case, the injection is guided by the visualization provided by a scope passed through the nose.     What to expect during the procedure   We perform the injection in our office under local (topical) anesthesia. You are awake the whole time, and the entire procedure lasts about 15 minutes. Our primary focus is your safety and comfort. We usually make the larynx numb by spraying the throat and/or dripping anesthetic on the larynx. At this time, you may cough or gag, or you may have the sensation that you spilled some water down the wrong pipe. These are temporary sensations that allow us to get you numb. The numbing process takes about 2 minutes. We will not proceed until we know you will be as comfortable as possible. A small needle is passed either through the skin of the neck or via a long instrument through the mouth to  perform the injection. During the injection, you may experience mild discomfort in the throat. You may feel an unusual sensation in the ear because the larynx and the ear share the same sensory nerve. In rare cases in which a patient does not tolerate the procedure, it may be performed in the operating room, with the patient completely asleep under general anesthesia.     What to expect afterwards   Most patients note a stronger, less effortful voice immediately after the injection. Sometimes the voice is tight or pressed voice is noted right after the injection. The voice usually has good days and bad days and gradually improves until you reach your new baseline voice over the first 1-2 weeks. Voice therapy may be a necessary part of your treatment plan to optimize your vocal outcome. None of the materials we inject are permanent. As the material dissolves, you may experience a gradual worsening of voice quality over the course of several months. At this point we may consider repeating the injection. You may be a candidate for a permanent fix, which involves an open surgery in the neck performed in the operating room.     Instructions: before the procedure   1. Do not take aspirin-containing products or other medications that can thin the blood (such as ibuprofen, Advil, Motrin, Aleve, Plavix) for 7 days prior to the injection. If you take Coumadin (warfarin), you may need to stop using this a few days prior to the injection. If you are on blood thinning (anti-platelet or anti-coagulant) medication and it is not clear what you should do, please clarify this with your physician.   2. On the day of your procedure, please make sure you take your other regular morning medications.   3. On the day of your procedure, it is OK to eat and drink as you would normally, up until 3 hours before to your appointment time. During that time frame, we ask that you restrict yourself only to clear liquids. A clear liquid is anything  you can see through (water, ginger ale, apple juice).     Instructions: after the procedure   1. Please refrain from eating or drinking for 1 hour following the procedure. This allows time for the numbing medication to wear off.   2. Most patients experience very little pain. If necessary, most patients are able to keep comfortable with plain Tylenol (acetaminophen) and/or other non-steroidal anti-inflammatory medications such as ibuprofen (Advil, Motrin). Please follow package instructions if considering taking these medications.   3. In most instances, it is OK to use your voice immediately after the procedure. However, for the first week, you should avoid speaking over heavy background noise or in a very loud voice. It is rare, but in some cases you will be asked to rest your voice for the first 24 hours.   4. Please call the [facility] at [phone] if   · You have a temperature above 101°F   · You develop Increasing throat pain not relieved by over-the-counter medications   · You have any other post-operative questions or concerns   5. Please go immediately to the nearest emergency room if you are experiencing   · Shortness of breath   · Difficulty breathing   · Difficulty swallowing   · Severe bleeding     FREQUENTLY ASKED QUESTIONS     Is this a Botox injection? No. Botox weakens the voice box muscles. Instead, with a vocal fold injection augmentation, we are injecting filler material to bulk up the vocal fold(s).     How do you decide which material to use for the injection? Our decision is based on the indication for the procedure, the position of the vocal fold, and other patient historical/anatomical factors. In some instances, the approval of your insurance company is an important factor.     How to you decide which technique to use (through the neck versus through the mouth)? Patient anatomy and the position of the vocal fold play an important role. Other patient factors such as gag reflex are  also strongly considered.     Why do you perform this in your office instead of in the operating room? Performing the procedure in the office is safe and precise. In addition, performing the injection with the patient awake gives us direct visual and auditory feedback, which we do not get when the patient is asleep in the operating room. Furthermore, an office-based injection is much less time consuming, is more convenient for the patient, and does not involve the risks or the recovery time associated with general anesthesia. We can still do this in the operating room; we save that setting for specific diagnoses or situations, as well as for the rare patient who is unable to tolerate the awake procedure.     Why did I have discomfort in my ear (during or after the injection)? This is an example of referred pain. The ear and the larynx share the same sensory nerve.     I got an injection 3 days ago. Why is my voice still hoarse? To optimize vocal outcome, we overinject a little bit. Additionally, there may be a mild amount of swelling. Finally, the muscles of the larynx need to adjust to the injected material. Most people will have good days and bad days at first. After 1-2 weeks, you should settle out to your new baseline voice.     How long does the injection last? Carboxymethylcellulose (Radiesse Voice Gel) lasts approximately 1-2 months. Hyaluronic acid (Restylane) lasts approximately 4 months. Calcium hydroxyapatite (Radiesse Voice) lasts up to 1 year; however its characteristics are such that only few patients are appropriate for using this material.     Is there a permanent injectable material? No.     Can the injection be repeated? Yes. There is no limit to the number of times an injection can be repeated. However, a permanent surgical fix is often an option to consider.

## 2020-01-10 NOTE — PROCEDURES
Procedures     [facility]  Department of Otorhinolaryngology and Communication Sciences    Awake Laryngeal Procedure Operative Report    Preoperative Diagnosis: 1. Left vocal fold immobility.  2. Glottal insufficiency.  3. Dysphonia.    Postoperative Diagnosis: 1. Left vocal fold immobility.  2. Glottal insufficiency.  3. Dysphonia.    Procedure: Transnasal flexible magnified laryngoscopy with left vocal fold injection augmentation with hyaluronic acid (Restylane-L).    Surgeon: Con Lofton M.D.     Estimated blood loss: None.    Anesthesia: Local and topical.    Complications: None.    Findings: Appropriate medialization, convexity, and support with a total volume of 0.7 mL hyaluronic acid (Restylane-L).    Indications for procedure: Chai Murry is a 79 y.o. male with  left vocal fold immobility,  low likelihood of recovery, 2/2 recurrent lung cancer with paratracheal disease. The patient presents for elective vocal fold injection augmentation. Risks of the procedure were discussed, including but not limited to bleeding, infection, allergic reaction to the injectable or medication, scarring, worsening of voice, early resorption, need for additional procedures, pain, epistaxis, laryngospasm, and airway obstruction which could necessitate need for intubation or tracheostomy. All questions were answered and the patient agreed to move forward with the procedure. Informed consent was obtained.    Procedure in detail: Chai Murry was positively identified with two identifiers and was brought into the procedure suite. He was seated upright in a comfortable position. Final time-out was performed for verification purposes. Local cutaneous and subcutaneous anesthesia was achieved with 1 mL of 2% lidocaine  injected into the skin and subcutaneous tissues at the level of the thyroid notch and into the pre-epiglottic space. Oropharyngeal anesthesia was not necessary. The nasal cavity was topically decongested  with 1% phenylephrine and topically anesthetized with 4% lidocaine. The distal chip digital videolaryngoscope was advanced through the patients most patent nasal cavity. The larynx was inspected under maximal magnification, with findings as noted above.    Attention was turned toward anesthetizing the endolarynx, which was achieved with a total volume of 3 mL of 4% lidocaine administered  in consecutive aliquots through the side port of the laryngoscope using the laryngeal gargle technique.    After an adequate degree of anesthesia was obtained, attention was turned to injection augmentation. A syringe pre-loaded with hyaluronic acid (Restylane-L) was loaded onto a 23 gauge 1.5 inch needle. Under the guidance of magnified laryngoscopy, the needle was advanced percutaneously, through the thyrohyoid membrane and infrapetiole epiglottis, into the endolarynx. The needle was advanced into the left vocal fold at the junction of the vocal process with the superior arcuate line. After confirmation of appropriate depth of the needle tip, careful injection augmentation of the left vocal fold was carried out. Appropriate fullness, convexity, support, and medialization were achieved, with slight overcorrection, with a total volume of 0.7 mL injected. A pressed but less effortful voice, as well as a stronger cough, were noted immediately. The equipment was removed. The patient tolerated the procedure well without complication. All needle and instrument counts were correct at the completion of the case.    Attestation: As the attending of record, I was present and participated in all portions of this procedure.    Disposition: The patient was observed briefly before being discharged home in good condition. He was instructed to call the office or return to the emergency department should he develop a fever greater than 101 degrees F, increasing pain not relieved by over-the-counter medication, shortness of breath or noisy  breathing, difficulty swallowing, swelling, bleeding, or any other concerns. Post-procedure instructions were provided and were reviewed with the patient, who acknowledged understanding. He will follow up with me in about 4 weeks.    Con Lofton M.D.  [facility]  Department of Otorhinolaryngology and Communication Sciences

## 2020-01-13 ENCOUNTER — INFUSION (OUTPATIENT)
Dept: INFUSION THERAPY | Facility: HOSPITAL | Age: 80
End: 2020-01-13
Attending: INTERNAL MEDICINE
Payer: MEDICARE

## 2020-01-13 ENCOUNTER — OFFICE VISIT (OUTPATIENT)
Dept: HEMATOLOGY/ONCOLOGY | Facility: CLINIC | Age: 80
End: 2020-01-13
Payer: MEDICARE

## 2020-01-13 VITALS — WEIGHT: 189.38 LBS | BODY MASS INDEX: 27.11 KG/M2 | HEIGHT: 70 IN

## 2020-01-13 VITALS
DIASTOLIC BLOOD PRESSURE: 78 MMHG | SYSTOLIC BLOOD PRESSURE: 144 MMHG | OXYGEN SATURATION: 97 % | TEMPERATURE: 98 F | BODY MASS INDEX: 27.11 KG/M2 | WEIGHT: 189.38 LBS | HEIGHT: 70 IN | RESPIRATION RATE: 18 BRPM | HEART RATE: 62 BPM

## 2020-01-13 DIAGNOSIS — Z85.118 HISTORY OF CANCER OF UPPER LOBE BRONCHUS OR LUNG: ICD-10-CM

## 2020-01-13 DIAGNOSIS — C34.12 MALIGNANT NEOPLASM OF UPPER LOBE OF LEFT LUNG: Primary | ICD-10-CM

## 2020-01-13 DIAGNOSIS — J39.8 TRACHEAL MASS: Primary | ICD-10-CM

## 2020-01-13 DIAGNOSIS — J98.8 CONGESTION OF RESPIRATORY TRACT: ICD-10-CM

## 2020-01-13 DIAGNOSIS — C34.12 MALIGNANT NEOPLASM OF UPPER LOBE OF LEFT LUNG: ICD-10-CM

## 2020-01-13 PROCEDURE — 3078F DIAST BP <80 MM HG: CPT | Mod: CPTII,S$GLB,, | Performed by: INTERNAL MEDICINE

## 2020-01-13 PROCEDURE — 99215 PR OFFICE/OUTPT VISIT, EST, LEVL V, 40-54 MIN: ICD-10-PCS | Mod: S$GLB,,, | Performed by: INTERNAL MEDICINE

## 2020-01-13 PROCEDURE — 63600175 PHARM REV CODE 636 W HCPCS: Performed by: INTERNAL MEDICINE

## 2020-01-13 PROCEDURE — 3078F PR MOST RECENT DIASTOLIC BLOOD PRESSURE < 80 MM HG: ICD-10-PCS | Mod: CPTII,S$GLB,, | Performed by: INTERNAL MEDICINE

## 2020-01-13 PROCEDURE — 3077F PR MOST RECENT SYSTOLIC BLOOD PRESSURE >= 140 MM HG: ICD-10-PCS | Mod: CPTII,S$GLB,, | Performed by: INTERNAL MEDICINE

## 2020-01-13 PROCEDURE — 99999 PR PBB SHADOW E&M-EST. PATIENT-LVL IV: ICD-10-PCS | Mod: PBBFAC,,, | Performed by: INTERNAL MEDICINE

## 2020-01-13 PROCEDURE — 99215 OFFICE O/P EST HI 40 MIN: CPT | Mod: S$GLB,,, | Performed by: INTERNAL MEDICINE

## 2020-01-13 PROCEDURE — 1159F MED LIST DOCD IN RCRD: CPT | Mod: S$GLB,,, | Performed by: INTERNAL MEDICINE

## 2020-01-13 PROCEDURE — 99999 PR PBB SHADOW E&M-EST. PATIENT-LVL IV: CPT | Mod: PBBFAC,,, | Performed by: INTERNAL MEDICINE

## 2020-01-13 PROCEDURE — 96413 CHEMO IV INFUSION 1 HR: CPT

## 2020-01-13 PROCEDURE — 1101F PR PT FALLS ASSESS DOC 0-1 FALLS W/OUT INJ PAST YR: ICD-10-PCS | Mod: CPTII,S$GLB,, | Performed by: INTERNAL MEDICINE

## 2020-01-13 PROCEDURE — 1126F AMNT PAIN NOTED NONE PRSNT: CPT | Mod: S$GLB,,, | Performed by: INTERNAL MEDICINE

## 2020-01-13 PROCEDURE — 1126F PR PAIN SEVERITY QUANTIFIED, NO PAIN PRESENT: ICD-10-PCS | Mod: S$GLB,,, | Performed by: INTERNAL MEDICINE

## 2020-01-13 PROCEDURE — 1101F PT FALLS ASSESS-DOCD LE1/YR: CPT | Mod: CPTII,S$GLB,, | Performed by: INTERNAL MEDICINE

## 2020-01-13 PROCEDURE — 3077F SYST BP >= 140 MM HG: CPT | Mod: CPTII,S$GLB,, | Performed by: INTERNAL MEDICINE

## 2020-01-13 PROCEDURE — 1159F PR MEDICATION LIST DOCUMENTED IN MEDICAL RECORD: ICD-10-PCS | Mod: S$GLB,,, | Performed by: INTERNAL MEDICINE

## 2020-01-13 RX ORDER — SODIUM CHLORIDE 0.9 % (FLUSH) 0.9 %
10 SYRINGE (ML) INJECTION
Status: CANCELLED | OUTPATIENT
Start: 2020-01-13

## 2020-01-13 RX ORDER — HEPARIN 100 UNIT/ML
500 SYRINGE INTRAVENOUS
Status: DISCONTINUED | OUTPATIENT
Start: 2020-01-13 | End: 2020-01-13 | Stop reason: HOSPADM

## 2020-01-13 RX ORDER — HEPARIN 100 UNIT/ML
500 SYRINGE INTRAVENOUS
Status: CANCELLED | OUTPATIENT
Start: 2020-01-13

## 2020-01-13 RX ORDER — TEMAZEPAM 15 MG/1
CAPSULE ORAL
COMMUNITY
Start: 2020-01-06 | End: 2020-02-10

## 2020-01-13 RX ADMIN — DURVALUMAB 835 MG: 500 INJECTION, SOLUTION INTRAVENOUS at 11:01

## 2020-01-13 RX ADMIN — HEPARIN SODIUM (PORCINE) LOCK FLUSH IV SOLN 100 UNIT/ML 500 UNITS: 100 SOLUTION at 12:01

## 2020-01-13 RX ADMIN — SODIUM CHLORIDE: 9 INJECTION, SOLUTION INTRAVENOUS at 11:01

## 2020-01-13 NOTE — PROGRESS NOTES
Subjective:      DATE OF VISIT: 1/13/2020   ?   ?   Patient ID:?Chai Murry is a 79 y.o. male.?? MR#: [id number]   ?   PRIMARY PROVIDER:  Dr. Fuentes  ?   CHIEF COMPLAINT:  Follow-up?????   ?   ONCOLOGIC DIAGNOSIS:  Recurrent squamous cell carcinoma, with metastatic disease involving the trachea  ?   CURRENT TREATMENT:  Maintenance durvalumab    PAST TREATMENT:  Chemoradiation     HPI    Today the pleasure meeting for the 1st time Mr. Abdi a 79-year-old being treated for recurrent in/metastatic squamous cell carcinoma currently on maintenance therapy, tolerating well.  He was recently started antibiotics a couple weeks ago due to congestion without fever.  He has had gradual improvement in congestion.    Review of Systems    ?   A comprehensive 14-point review of systems was reviewed with patient and was negative other than as specified above.   ?     Objective:      Physical Exam      ?   Vitals:    01/13/20 0957   BP: (!) 144/78   Pulse: 62   Resp: 18   Temp: 98.4 °F (36.9 °C)      ?   ECOG:?  0   General appearance: Generally well appearing, in no acute distress.   Head, eyes, ears, nose, and throat: Oropharynx clear with moist mucous membranes.   Cardiovascular: Regular rate and rhythm, S1, S2, no audible murmurs.   Respiratory: Lungs clear to auscultation bilaterally.   Abdomen: nontender, nondistended.   Extremities: Warm, without edema.   Neurologic: Alert and oriented. Grossly normal strength, coordination, and gait.   Skin: No rashes, ecchymoses or petechial lesion.   Psychiatric: normal mood and affect, conversant and appropriate    ?   Laboratory:  ?   Lab Visit on 01/13/2020   Component Date Value Ref Range Status    WBC 01/13/2020 6.63  3.90 - 12.70 K/uL Final    RBC 01/13/2020 4.44* 4.60 - 6.20 M/uL Final    Hemoglobin 01/13/2020 13.2* 14.0 - 18.0 g/dL Final    Hematocrit 01/13/2020 42.7  40.0 - 54.0 % Final    Mean Corpuscular Volume 01/13/2020 96  82 - 98 fL Final    Mean Corpuscular  Hemoglobin 01/13/2020 29.7  27.0 - 31.0 pg Final    Mean Corpuscular Hemoglobin Conc 01/13/2020 30.9* 32.0 - 36.0 g/dL Final    RDW 01/13/2020 15.1* 11.5 - 14.5 % Final    Platelets 01/13/2020 185  150 - 350 K/uL Final    MPV 01/13/2020 9.9  9.2 - 12.9 fL Final    Immature Granulocytes 01/13/2020 0.5  0.0 - 0.5 % Final    Gran # (ANC) 01/13/2020 3.8  1.8 - 7.7 K/uL Final    Immature Grans (Abs) 01/13/2020 0.03  0.00 - 0.04 K/uL Final    Lymph # 01/13/2020 0.7* 1.0 - 4.8 K/uL Final    Mono # 01/13/2020 0.6  0.3 - 1.0 K/uL Final    Eos # 01/13/2020 1.4* 0.0 - 0.5 K/uL Final    Baso # 01/13/2020 0.07  0.00 - 0.20 K/uL Final    nRBC 01/13/2020 0  0 /100 WBC Final    Gran% 01/13/2020 57.2  38.0 - 73.0 % Final    Lymph% 01/13/2020 11.0* 18.0 - 48.0 % Final    Mono% 01/13/2020 9.0  4.0 - 15.0 % Final    Eosinophil% 01/13/2020 21.7* 0.0 - 8.0 % Final    Basophil% 01/13/2020 1.1  0.0 - 1.9 % Final    Differential Method 01/13/2020 Automated   Final    Sodium 01/13/2020 142  136 - 145 mmol/L Final    Potassium 01/13/2020 4.6  3.5 - 5.1 mmol/L Final    Chloride 01/13/2020 104  95 - 110 mmol/L Final    CO2 01/13/2020 31* 23 - 29 mmol/L Final    Glucose 01/13/2020 90  70 - 110 mg/dL Final    BUN, Bld 01/13/2020 19  8 - 23 mg/dL Final    Creatinine 01/13/2020 1.3  0.5 - 1.4 mg/dL Final    Calcium 01/13/2020 9.5  8.7 - 10.5 mg/dL Final    Total Protein 01/13/2020 7.3  6.0 - 8.4 g/dL Final    Albumin 01/13/2020 3.7  3.5 - 5.2 g/dL Final    Total Bilirubin 01/13/2020 0.5  0.1 - 1.0 mg/dL Final    Alkaline Phosphatase 01/13/2020 68  55 - 135 U/L Final    AST 01/13/2020 17  10 - 40 U/L Final    ALT 01/13/2020 12  10 - 44 U/L Final    Anion Gap 01/13/2020 7* 8 - 16 mmol/L Final    eGFR if African American 01/13/2020 >60  >60 mL/min/1.73 m^2 Final    eGFR if non African American 01/13/2020 52* >60 mL/min/1.73 m^2 Final    TSH 01/13/2020 4.060* 0.400 - 4.000 uIU/mL Final    Free T4 01/13/2020 0.88   0.71 - 1.51 ng/dL Final      ?   ?   Assessment/Plan:       1. Malignant neoplasm of upper lobe of left lung    2. Congestion of respiratory tract          Plan:     # recurrent/metastatic squamous cell carcinoma:  In status post chemoradiation now on maintenance durvalumab, tolerating well, labs reviewed and will proceed as planned with treatment today and will return in 2 weeks.    # congestion:  Gradually improving, no fever, status post antibiotics by urgent care.  Continue monitor.      Follow-Up:   - immunotherapy today and RV in 2 weeks with labs

## 2020-01-14 DIAGNOSIS — R60.0 LEG EDEMA: ICD-10-CM

## 2020-01-14 RX ORDER — FUROSEMIDE 20 MG/1
TABLET ORAL
Qty: 30 TABLET | Refills: 1 | Status: SHIPPED | OUTPATIENT
Start: 2020-01-14 | End: 2021-03-02 | Stop reason: SDUPTHER

## 2020-01-16 ENCOUNTER — TELEPHONE (OUTPATIENT)
Dept: CARDIOLOGY | Facility: CLINIC | Age: 80
End: 2020-01-16

## 2020-01-16 ENCOUNTER — OFFICE VISIT (OUTPATIENT)
Dept: CARDIOLOGY | Facility: CLINIC | Age: 80
End: 2020-01-16
Payer: MEDICARE

## 2020-01-16 ENCOUNTER — TELEPHONE (OUTPATIENT)
Dept: INTERNAL MEDICINE | Facility: CLINIC | Age: 80
End: 2020-01-16

## 2020-01-16 ENCOUNTER — CLINICAL SUPPORT (OUTPATIENT)
Dept: INTERNAL MEDICINE | Facility: CLINIC | Age: 80
End: 2020-01-16
Payer: MEDICARE

## 2020-01-16 VITALS
OXYGEN SATURATION: 90 % | SYSTOLIC BLOOD PRESSURE: 114 MMHG | HEIGHT: 70 IN | BODY MASS INDEX: 27.27 KG/M2 | DIASTOLIC BLOOD PRESSURE: 60 MMHG | WEIGHT: 190.5 LBS | HEART RATE: 60 BPM

## 2020-01-16 DIAGNOSIS — J42 CHRONIC BRONCHITIS, UNSPECIFIED CHRONIC BRONCHITIS TYPE: ICD-10-CM

## 2020-01-16 DIAGNOSIS — R60.0 LEG EDEMA: ICD-10-CM

## 2020-01-16 DIAGNOSIS — E78.00 PURE HYPERCHOLESTEROLEMIA: ICD-10-CM

## 2020-01-16 DIAGNOSIS — I25.10 CORONARY ARTERY DISEASE INVOLVING NATIVE CORONARY ARTERY OF NATIVE HEART WITHOUT ANGINA PECTORIS: Primary | ICD-10-CM

## 2020-01-16 PROCEDURE — 1126F AMNT PAIN NOTED NONE PRSNT: CPT | Mod: S$GLB,,, | Performed by: INTERNAL MEDICINE

## 2020-01-16 PROCEDURE — 3078F DIAST BP <80 MM HG: CPT | Mod: CPTII,S$GLB,, | Performed by: INTERNAL MEDICINE

## 2020-01-16 PROCEDURE — 99999 PR PBB SHADOW E&M-EST. PATIENT-LVL II: ICD-10-PCS | Mod: PBBFAC,,,

## 2020-01-16 PROCEDURE — 1159F MED LIST DOCD IN RCRD: CPT | Mod: S$GLB,,, | Performed by: INTERNAL MEDICINE

## 2020-01-16 PROCEDURE — 3078F PR MOST RECENT DIASTOLIC BLOOD PRESSURE < 80 MM HG: ICD-10-PCS | Mod: CPTII,S$GLB,, | Performed by: INTERNAL MEDICINE

## 2020-01-16 PROCEDURE — 3074F SYST BP LT 130 MM HG: CPT | Mod: CPTII,S$GLB,, | Performed by: INTERNAL MEDICINE

## 2020-01-16 PROCEDURE — 1126F PR PAIN SEVERITY QUANTIFIED, NO PAIN PRESENT: ICD-10-PCS | Mod: S$GLB,,, | Performed by: INTERNAL MEDICINE

## 2020-01-16 PROCEDURE — 99214 PR OFFICE/OUTPT VISIT, EST, LEVL IV, 30-39 MIN: ICD-10-PCS | Mod: S$GLB,,, | Performed by: INTERNAL MEDICINE

## 2020-01-16 PROCEDURE — 1101F PR PT FALLS ASSESS DOC 0-1 FALLS W/OUT INJ PAST YR: ICD-10-PCS | Mod: CPTII,S$GLB,, | Performed by: INTERNAL MEDICINE

## 2020-01-16 PROCEDURE — 1159F PR MEDICATION LIST DOCUMENTED IN MEDICAL RECORD: ICD-10-PCS | Mod: S$GLB,,, | Performed by: INTERNAL MEDICINE

## 2020-01-16 PROCEDURE — 99214 OFFICE O/P EST MOD 30 MIN: CPT | Mod: S$GLB,,, | Performed by: INTERNAL MEDICINE

## 2020-01-16 PROCEDURE — 96372 THER/PROPH/DIAG INJ SC/IM: CPT | Mod: S$GLB,,, | Performed by: INTERNAL MEDICINE

## 2020-01-16 PROCEDURE — 3074F PR MOST RECENT SYSTOLIC BLOOD PRESSURE < 130 MM HG: ICD-10-PCS | Mod: CPTII,S$GLB,, | Performed by: INTERNAL MEDICINE

## 2020-01-16 PROCEDURE — 99999 PR PBB SHADOW E&M-EST. PATIENT-LVL II: CPT | Mod: PBBFAC,,,

## 2020-01-16 PROCEDURE — 99999 PR PBB SHADOW E&M-EST. PATIENT-LVL III: ICD-10-PCS | Mod: PBBFAC,,, | Performed by: INTERNAL MEDICINE

## 2020-01-16 PROCEDURE — 1101F PT FALLS ASSESS-DOCD LE1/YR: CPT | Mod: CPTII,S$GLB,, | Performed by: INTERNAL MEDICINE

## 2020-01-16 PROCEDURE — 96372 PR INJECTION,THERAP/PROPH/DIAG2ST, IM OR SUBCUT: ICD-10-PCS | Mod: S$GLB,,, | Performed by: INTERNAL MEDICINE

## 2020-01-16 PROCEDURE — 99999 PR PBB SHADOW E&M-EST. PATIENT-LVL III: CPT | Mod: PBBFAC,,, | Performed by: INTERNAL MEDICINE

## 2020-01-16 RX ORDER — TESTOSTERONE CYPIONATE 200 MG/ML
200 INJECTION, SOLUTION INTRAMUSCULAR
Status: ACTIVE | OUTPATIENT
Start: 2020-01-16 | End: 2020-07-02

## 2020-01-16 RX ADMIN — TESTOSTERONE CYPIONATE 200 MG: 200 INJECTION, SOLUTION INTRAMUSCULAR at 01:01

## 2020-01-16 NOTE — TELEPHONE ENCOUNTER
Please phone patient. Discussed with dr. Palencia. Ok to stop Plavix.    Update med card    Thanks

## 2020-01-16 NOTE — PROGRESS NOTES
Subjective:   Patient ID:  Chai Murry is a 79 y.o. male who presents for follow-up of Bronchitis (6 week f/u)  pt is s/p SONIA - wnl ,calcified valve.Patient denies CP, angina or anginal equivalent.    Hypertension   This is a chronic problem. The current episode started more than 1 year ago. The problem has been gradually improving since onset. The problem is controlled. Pertinent negatives include no chest pain, palpitations or shortness of breath. Past treatments include ACE inhibitors and calcium channel blockers. The current treatment provides moderate improvement. There are no compliance problems.    Hyperlipidemia   This is a chronic problem. The current episode started more than 1 year ago. The problem is controlled. Recent lipid tests were reviewed and are variable. Pertinent negatives include no chest pain or shortness of breath. Current antihyperlipidemic treatment includes statins. The current treatment provides moderate improvement of lipids. There are no compliance problems.    Coronary Artery Disease   Presents for follow-up visit. Pertinent negatives include no chest pain, chest pressure, chest tightness, dizziness, leg swelling, muscle weakness, palpitations, shortness of breath or weight gain. Risk factors include hyperlipidemia. The symptoms have been stable. Compliance with diet is variable. Compliance with exercise is variable. Compliance with medications is good.       Review of Systems   Constitution: Negative. Negative for weight gain.   HENT: Negative.    Eyes: Negative.    Cardiovascular: Negative.  Negative for chest pain, leg swelling and palpitations.   Respiratory: Negative.  Negative for chest tightness and shortness of breath.    Endocrine: Negative.    Hematologic/Lymphatic: Negative.    Skin: Negative.    Musculoskeletal: Negative for muscle weakness.   Gastrointestinal: Negative.    Genitourinary: Negative.    Neurological: Negative.  Negative for dizziness.    Psychiatric/Behavioral: Negative.    Allergic/Immunologic: Negative.      Family History   Problem Relation Age of Onset    Esophageal cancer Sister     Cancer Sister     Lung cancer Mother     Cancer Mother     Cataracts Mother     Hypertension Mother     Pneumonia Father     Cataracts Father     Hypertension Father     Cancer Brother     Hypertension Brother     Blindness Neg Hx     Diabetes Neg Hx     Glaucoma Neg Hx     Macular degeneration Neg Hx     Retinal detachment Neg Hx     Strabismus Neg Hx     Stroke Neg Hx     Thyroid disease Neg Hx      Past Medical History:   Diagnosis Date    Anemia     Arthritis     Atrial fibrillation     CAD (coronary artery disease)     Cataract     COPD (chronic obstructive pulmonary disease)     Diverticulosis     ED (erectile dysfunction)     HTN (hypertension)     Hyperlipidemia     Lung cancer     left    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 Courtney dwyer bx     Social History     Socioeconomic History    Marital status:      Spouse name: Not on file    Number of children: 3    Years of education: Not on file    Highest education level: Not on file   Occupational History    Occupation: retired   Social Needs    Financial resource strain: Not on file    Food insecurity:     Worry: Not on file     Inability: Not on file    Transportation needs:     Medical: Not on file     Non-medical: Not on file   Tobacco Use    Smoking status: Former Smoker     Packs/day: 1.00     Years: 50.00     Pack years: 50.00     Last attempt to quit: 2005     Years since quittin.4    Smokeless tobacco: Never Used   Substance and Sexual Activity    Alcohol use: No    Drug use: No    Sexual activity: Not Currently   Lifestyle    Physical activity:     Days per week: Not on file     Minutes per session: Not on file    Stress: Not on file   Relationships    Social connections:     Talks on phone:  Not on file     Gets together: Not on file     Attends [employer] service: Not on file     Active member of club or organization: Not on file     Attends meetings of clubs or organizations: Not on file     Relationship status: Not on file   Other Topics Concern    Not on file   Social History Narrative    Not on file     Current Outpatient Medications on File Prior to Visit   Medication Sig Dispense Refill    albuterol (PROVENTIL) 2.5 mg /3 mL (0.083 %) nebulizer solution Take 3 mLs (2.5 mg total) by nebulization every 6 (six) hours while awake. 270 mL 11    amLODIPine (NORVASC) 5 MG tablet Take 1 tablet (5 mg total) by mouth once daily.      apixaban (ELIQUIS) 5 mg Tab Take 1 tablet (5 mg total) by mouth 2 (two) times daily. 60 tablet 5    aspirin (ECOTRIN) 81 MG EC tablet Take 81 mg by mouth once daily.      benzonatate (TESSALON) 200 MG capsule Take 1 capsule (200 mg total) by mouth 3 (three) times daily as needed for Cough. 21 capsule 0    BREO ELLIPTA 100-25 mcg/dose diskus inhaler INHALE 1 PUFF INTO THE LUNGS ONCE DAILY  5    clopidogrel (PLAVIX) 75 mg tablet       fenofibric acid (FIBRICOR) 135 mg CpDR TAKE 1 CAPSULE BY MOUTH EVERY DAY 90 capsule 3    fluticasone (FLONASE) 50 mcg/actuation nasal spray 2 sprays (100 mcg total) by Each Nare route once daily. (Patient taking differently: 2 sprays by Each Nare route as needed. ) 3 Bottle 3    furosemide (LASIX) 20 MG tablet TAKE 1 TABLET BY MOUTH EVERY DAY AS NEEDED 30 tablet 1    glycopyrrolate-formoterol (BEVESPI AEROSPHERE) 9-4.8 mcg HFAA Inhale 2 puffs into the lungs 2 (two) times daily. Controller 10.9 g 11    HYDROcodone-acetaminophen (NORCO) 5-325 mg per tablet Take 1 tablet by mouth every 6 (six) hours as needed for Pain. 40 tablet 0    ipratropium-albuterol (COMBIVENT RESPIMAT)  mcg/actuation inhaler Inhale 1 puff into the lungs every 6 (six) hours as needed for Shortness of Breath. 4 g 11    levocetirizine (XYZAL) 5 MG tablet Take 5  mg by mouth as needed.       lisinopril (PRINIVIL,ZESTRIL) 40 MG tablet TAKE 1 TABLET BY MOUTH DAILY 30 tablet 11    promethazine (PHENERGAN) 6.25 mg/5 mL syrup Take 20 mLs (25 mg total) by mouth nightly as needed. 240 mL 0    ranolazine (RANEXA) 500 MG Tb12 Take 1 tablet (500 mg total) by mouth 2 (two) times daily. 60 tablet 11    simvastatin (ZOCOR) 80 MG tablet Take 1 tablet (80 mg total) by mouth once daily. 90 tablet 3    SOTALOL AF 80 mg tablet TAKE 1 TABLET BY MOUTH TWICE A  tablet 3    temazepam (RESTORIL) 15 mg Cap TAKE 1 TO 2 CAPSULES BY MOUTH AT BEDTIME       Current Facility-Administered Medications on File Prior to Visit   Medication Dose Route Frequency Provider Last Rate Last Dose    lactated ringers infusion   Intravenous Continuous Michael Hameed MD   Stopped at 07/03/19 0810     Review of patient's allergies indicates:   Allergen Reactions    Atorvastatin      Other reaction(s): Muscle pain    Bactrim [sulfamethoxazole-trimethoprim]      Lip swelling       Objective:     Physical Exam   Constitutional: He is oriented to person, place, and time. He appears well-developed and well-nourished.   HENT:   Head: Normocephalic and atraumatic.   Eyes: Pupils are equal, round, and reactive to light. Conjunctivae are normal.   Neck: Normal range of motion. Neck supple.   Cardiovascular: Normal rate, regular rhythm, normal heart sounds and intact distal pulses.   Pulmonary/Chest: Effort normal and breath sounds normal.   Abdominal: Soft. Bowel sounds are normal.   Neurological: He is alert and oriented to person, place, and time.   Skin: Skin is warm and dry.   Psychiatric: He has a normal mood and affect.   Nursing note and vitals reviewed.      Assessment:     1. Coronary artery disease involving native coronary artery of native heart without angina pectoris    2. Pure hypercholesterolemia    3. Chronic bronchitis, unspecified chronic bronchitis type    4. Leg edema        Plan:      Coronary artery disease involving native coronary artery of native heart without angina pectoris    Pure hypercholesterolemia    Chronic bronchitis, unspecified chronic bronchitis type    Leg edema    Continue norvasc, lisinopril-htn  Continue asa- cad  Continue statin-hlp  Continue sotalol, eliquis - paroxysmal afib

## 2020-01-27 ENCOUNTER — OFFICE VISIT (OUTPATIENT)
Dept: HEMATOLOGY/ONCOLOGY | Facility: CLINIC | Age: 80
End: 2020-01-27
Payer: MEDICARE

## 2020-01-27 ENCOUNTER — INFUSION (OUTPATIENT)
Dept: INFUSION THERAPY | Facility: HOSPITAL | Age: 80
End: 2020-01-27
Attending: INTERNAL MEDICINE
Payer: MEDICARE

## 2020-01-27 VITALS
HEIGHT: 70 IN | RESPIRATION RATE: 16 BRPM | OXYGEN SATURATION: 88 % | SYSTOLIC BLOOD PRESSURE: 118 MMHG | TEMPERATURE: 98 F | BODY MASS INDEX: 26.86 KG/M2 | HEART RATE: 55 BPM | WEIGHT: 187.63 LBS | DIASTOLIC BLOOD PRESSURE: 65 MMHG

## 2020-01-27 DIAGNOSIS — C34.12 MALIGNANT NEOPLASM OF UPPER LOBE OF LEFT LUNG: ICD-10-CM

## 2020-01-27 DIAGNOSIS — Z85.118 HISTORY OF CANCER OF UPPER LOBE BRONCHUS OR LUNG: ICD-10-CM

## 2020-01-27 DIAGNOSIS — C34.12 MALIGNANT NEOPLASM OF UPPER LOBE OF LEFT LUNG: Primary | ICD-10-CM

## 2020-01-27 DIAGNOSIS — J39.8 TRACHEAL MASS: Primary | ICD-10-CM

## 2020-01-27 PROCEDURE — 1159F MED LIST DOCD IN RCRD: CPT | Mod: S$GLB,,, | Performed by: INTERNAL MEDICINE

## 2020-01-27 PROCEDURE — 1101F PT FALLS ASSESS-DOCD LE1/YR: CPT | Mod: CPTII,S$GLB,, | Performed by: INTERNAL MEDICINE

## 2020-01-27 PROCEDURE — 3074F SYST BP LT 130 MM HG: CPT | Mod: CPTII,S$GLB,, | Performed by: INTERNAL MEDICINE

## 2020-01-27 PROCEDURE — 3078F DIAST BP <80 MM HG: CPT | Mod: CPTII,S$GLB,, | Performed by: INTERNAL MEDICINE

## 2020-01-27 PROCEDURE — 99999 PR PBB SHADOW E&M-EST. PATIENT-LVL V: ICD-10-PCS | Mod: PBBFAC,,, | Performed by: INTERNAL MEDICINE

## 2020-01-27 PROCEDURE — 99215 PR OFFICE/OUTPT VISIT, EST, LEVL V, 40-54 MIN: ICD-10-PCS | Mod: 25,S$GLB,, | Performed by: INTERNAL MEDICINE

## 2020-01-27 PROCEDURE — 99999 PR PBB SHADOW E&M-EST. PATIENT-LVL V: CPT | Mod: PBBFAC,,, | Performed by: INTERNAL MEDICINE

## 2020-01-27 PROCEDURE — 1126F AMNT PAIN NOTED NONE PRSNT: CPT | Mod: S$GLB,,, | Performed by: INTERNAL MEDICINE

## 2020-01-27 PROCEDURE — 3074F PR MOST RECENT SYSTOLIC BLOOD PRESSURE < 130 MM HG: ICD-10-PCS | Mod: CPTII,S$GLB,, | Performed by: INTERNAL MEDICINE

## 2020-01-27 PROCEDURE — 1159F PR MEDICATION LIST DOCUMENTED IN MEDICAL RECORD: ICD-10-PCS | Mod: S$GLB,,, | Performed by: INTERNAL MEDICINE

## 2020-01-27 PROCEDURE — 3078F PR MOST RECENT DIASTOLIC BLOOD PRESSURE < 80 MM HG: ICD-10-PCS | Mod: CPTII,S$GLB,, | Performed by: INTERNAL MEDICINE

## 2020-01-27 PROCEDURE — 1101F PR PT FALLS ASSESS DOC 0-1 FALLS W/OUT INJ PAST YR: ICD-10-PCS | Mod: CPTII,S$GLB,, | Performed by: INTERNAL MEDICINE

## 2020-01-27 PROCEDURE — 1126F PR PAIN SEVERITY QUANTIFIED, NO PAIN PRESENT: ICD-10-PCS | Mod: S$GLB,,, | Performed by: INTERNAL MEDICINE

## 2020-01-27 PROCEDURE — 96413 CHEMO IV INFUSION 1 HR: CPT

## 2020-01-27 PROCEDURE — 99215 OFFICE O/P EST HI 40 MIN: CPT | Mod: 25,S$GLB,, | Performed by: INTERNAL MEDICINE

## 2020-01-27 PROCEDURE — 63600175 PHARM REV CODE 636 W HCPCS: Performed by: INTERNAL MEDICINE

## 2020-01-27 RX ORDER — HEPARIN 100 UNIT/ML
500 SYRINGE INTRAVENOUS
Status: CANCELLED | OUTPATIENT
Start: 2020-01-27

## 2020-01-27 RX ORDER — HEPARIN 100 UNIT/ML
500 SYRINGE INTRAVENOUS
Status: DISCONTINUED | OUTPATIENT
Start: 2020-01-27 | End: 2020-01-27 | Stop reason: HOSPADM

## 2020-01-27 RX ORDER — SODIUM CHLORIDE 0.9 % (FLUSH) 0.9 %
10 SYRINGE (ML) INJECTION
Status: CANCELLED | OUTPATIENT
Start: 2020-01-27

## 2020-01-27 RX ADMIN — DURVALUMAB 835 MG: 500 INJECTION, SOLUTION INTRAVENOUS at 10:01

## 2020-01-27 RX ADMIN — HEPARIN SODIUM (PORCINE) LOCK FLUSH IV SOLN 100 UNIT/ML 500 UNITS: 100 SOLUTION at 11:01

## 2020-01-27 NOTE — PROGRESS NOTES
Subjective:       Patient ID: Chai Murry is a 79 y.o. male.    Chief Complaint: Malignant neoplasm of upper lobe of left lung [C34.12]  HPI: We have an opportunity to see Mr. Chai Murry in Hematology Oncology clinic at [facility] on 01/26/2020.  Mr. Chai Murry is a 79 y.o.    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     He is s/p left pneumonectomy for a squamous cell carcinoma of the left lung.0n 4/12/2016  Prior to the surgery, the PET scan showed an FDG-avid mass in the left upper   lobe abutting the left hilum with an SUV of 11.6. There seemed to be a left   hilar adenopathy as well.  Radiologist felt that he was unable to discriminate between the mass and the   lymphadenopathy. The patient had had a bronchoscopy by Dr. Roel Ernandez on   03/04/2006 that showed a partially obstructing airway in the anterior segment of  the left upper lobe with an endobronchial lesion in the anterior segment of the  left upper lobe. The specimen from the biopsy at the time of bronchoscopy was   reported as being a squamous cell carcinoma.  At the time of the surgery after the left lung was removed, the pathologist   indicated that this was a squamous cell carcinoma. It measured 3.8 cm. The   pathology indicated that this is extending into the bronchial resection margin of the lobe. This lobe was later on removed to complete the pneumonectomy   There was one lymph node positive for metastatic squamous cell carcinoma at the   AP window.  The patient was told that we would prefer to treat him with post-op simultaneous chemo/radiation.  He had Medi-port. He started chemo/radiation therapy andreceived 6 weekly cycles of carbo/Taxol along with radiatioon.  After a month, he had a Ct scan and it showed stability   He then had 2 additional cyles of carbo/Taxol finishing in 9/27/2016.    He comes for follow up.   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 CT of the chest was non contributory, shows changes from surgery  PET showed a PET avid mass to the left of the trachea.  The case was discussed with dr annika Goldman and GI.  We discussed the possibility of doing a EUS or EBUs, and finally, because of the localization, it was decided to do a EUS with biopsy if possible.  Cytology shows recurrent squamous cell cancer.  I have asked Pathology to run a PD-L1 from his original pathology from 2016.  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  He has been discussed extensively with radiation oncology. We have decided to treat him with radiation therapy and Cisplatin as a chemo-sensitizer.  Dr Castro has reviewed the therapy ports from the previous radiation treatment and the area in question seems to be above the previously radiated fields.     Completed chemoradiation s/p 6 weekly cisplatin treatments with response.  Currently on maintenance Imfinzi.  Reports doing well.      Malignant neoplasm of upper lobe of left lung    4/12/2016 Initial Diagnosis     Malignant neoplasm of upper lobe of left lung      6/19/2019 Cancer Staged     Staging form: Lung, AJCC 8th Edition  - Clinical stage from 6/19/2019: Stage IIIB (rcT4, cN2, cM0) - Signed by Alejandro Castro II, MD on 6/19/2019 6/21/2019 Tumor Conference     Presenting Hospital / Clinic: [facility] [place] [facility]  Virtual Tumor Board Conference: In person  Presenter: Dr. Chance Castro  Date Presented to Tumor Board: 06/21/19  Specialties Present: Medical Oncology;Radiation Oncology;Surgical Oncology;Pathology;Navigation;Plastic Surgery;Radiology;Gastrointestinal;Pulmonology  Presentation at Cancer Conference: Prospective  Cancer Type: Lung cancer  Recommended Plan: Additional screening  Recommended Plan Note: If PDL-1 positive, treat with immunotherapy  Send tissue for next generation sequencing.      6/28/2019 Tumor Conference     His case was  discussed at the Multidisciplinary Head and Neck Team Planning Meeting.    Representatives from Medical Oncology, Radiation Oncology, Head and Neck Surgical Oncology, Psychosocial Oncology, and Speech and Language Pathology discussed the case with the following recommendations:    1) treat lung cancer             7/5/2019 - 8/14/2019 Radiation Therapy     Treatment Site Ref. ID Energy Dose/Fx (Gy) #Fx Dose Correction (Gy) Total Dose (Gy) Start Date End Date Elapsed Days   [id number]:1 PTV LNs 6X 1.8 28 / 28 0 50.4 7/5/2019 8/14/2019 40             Lung cancer    6/11/2019 Initial Diagnosis     Lung cancer      7/8/2019 - 8/18/2019 Chemotherapy     Treatment Summary   Plan Name: OP HEAD NECK CISPLATIN WEEKLY + RADIOTHERAPY  Treatment Goal: Supportive  Status: Inactive  Start Date: 7/8/2019  End Date: 8/12/2019  Provider: Jorge L Patel MD  Chemotherapy: CISplatin (PLATINOL) 40 mg/m2 = 83 mg in sodium chloride 0.9% 583 mL chemo infusion, 40 mg/m2 = 83 mg (100 % of original dose 40 mg/m2), Intravenous, Clinic/HOD 1 time, 6 of 6 cycles  Dose modification: 70 mg (original dose 40 mg/m2, Cycle 1, Reason: MD Discretion), 40 mg/m2 (original dose 40 mg/m2, Cycle 1)  Administration: 83 mg (7/8/2019), 84 mg (7/15/2019), 83 mg (7/22/2019), 83 mg (7/29/2019), 83 mg (8/5/2019), 82 mg (8/12/2019)      9/30/2019 -  Chemotherapy     Treatment Summary   Plan Name: OP DURVALUMAB Q2W  Treatment Goal: Curative  Status: Active  Start Date: 10/3/2019  End Date: 10/5/2020 (Planned)  Provider: Fermin Vila MD  Chemotherapy: durvalumab (IMFINZI) 835 mg in sodium chloride 0.9% 266.7 mL chemo infusion, 10 mg/kg = 835 mg, Intravenous, Clinic/HOD 1 time, 7 of 26 cycles  Administration: 835 mg (10/3/2019), 835 mg (10/23/2019), 835 mg (11/6/2019), 835 mg (11/20/2019), 835 mg (12/4/2019), 835 mg (12/18/2019), 835 mg (1/13/2020)       Past Medical History:   Diagnosis Date    Anemia     Arthritis     Atrial fibrillation     CAD (coronary  artery disease)     Cataract     COPD (chronic obstructive pulmonary disease)     Diverticulosis     ED (erectile dysfunction)     HTN (hypertension)     Hyperlipidemia     Lung cancer     left    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 Courtney dwyer bx     Family History   Problem Relation Age of Onset    Esophageal cancer Sister     Cancer Sister     Lung cancer Mother     Cancer Mother     Cataracts Mother     Hypertension Mother     Pneumonia Father     Cataracts Father     Hypertension Father     Cancer Brother     Hypertension Brother     Blindness Neg Hx     Diabetes Neg Hx     Glaucoma Neg Hx     Macular degeneration Neg Hx     Retinal detachment Neg Hx     Strabismus Neg Hx     Stroke Neg Hx     Thyroid disease Neg Hx      Social History     Socioeconomic History    Marital status:      Spouse name: Not on file    Number of children: 3    Years of education: Not on file    Highest education level: Not on file   Occupational History    Occupation: retired   Social Needs    Financial resource strain: Not on file    Food insecurity:     Worry: Not on file     Inability: Not on file    Transportation needs:     Medical: Not on file     Non-medical: Not on file   Tobacco Use    Smoking status: Former Smoker     Packs/day: 1.00     Years: 50.00     Pack years: 50.00     Last attempt to quit: 2005     Years since quittin.4    Smokeless tobacco: Never Used   Substance and Sexual Activity    Alcohol use: No    Drug use: No    Sexual activity: Not Currently   Lifestyle    Physical activity:     Days per week: Not on file     Minutes per session: Not on file    Stress: Not on file   Relationships    Social connections:     Talks on phone: Not on file     Gets together: Not on file     Attends [employer] service: Not on file     Active member of club or organization: Not on file     Attends meetings of clubs or  organizations: Not on file     Relationship status: Not on file   Other Topics Concern    Not on file   Social History Narrative    Not on file     Past Surgical History:   Procedure Laterality Date    APPENDECTOMY      BRONCHOSCOPY Bilateral 6/21/2019    Procedure: Bronchoscopy;  Surgeon: Paresh Goldman MD;  Location: [facility] ENDO;  Service: Endoscopy;  Laterality: Bilateral;    CARDIAC CATHETERIZATION      cardiac stents      CATARACT EXTRACTION W/  INTRAOCULAR LENS IMPLANT Right 9-3-14    CHOLECYSTECTOMY      COLONOSCOPY N/A 4/17/2018    Procedure: COLONOSCOPY;  Surgeon: Dionne Doan MD;  Location: [facility] ENDO;  Service: Endoscopy;  Laterality: N/A;    COLONOSCOPY N/A 10/25/2018    Procedure: COLONOSCOPY;  Surgeon: Michael Hameed MD;  Location: [facility] ENDO;  Service: Endoscopy;  Laterality: N/A;    ENDOSCOPIC ULTRASOUND OF UPPER GASTROINTESTINAL TRACT N/A 6/11/2019    Procedure: ULTRASOUND, UPPER GI TRACT, ENDOSCOPIC;  Surgeon: Abhishek Knox MD;  Location: [facility] ENDO;  Service: Endoscopy;  Laterality: N/A;    ESOPHAGOGASTRODUODENOSCOPY N/A 6/11/2019    Procedure: EGD (ESOPHAGOGASTRODUODENOSCOPY);  Surgeon: Abhishek Knox MD;  Location: [facility];  Service: Endoscopy;  Laterality: N/A;    infected stomach gland excision      INSERTION OF TUNNELED CENTRAL VENOUS CATHETER (CVC) WITH SUBCUTANEOUS PORT Right 7/3/2019    Procedure: [id number];  Surgeon: Jean Carlos Constantino MD;  Location: [facility];  Service: General;  Laterality: Right;    LUNG REMOVAL, TOTAL Left 04/2016    lung cancer left upper lobe    SKIN BIOPSY Left     arm     Current Outpatient Medications   Medication Sig Dispense Refill    albuterol (PROVENTIL) 2.5 mg /3 mL (0.083 %) nebulizer solution Take 3 mLs (2.5 mg total) by nebulization every 6 (six) hours while awake. 270 mL 11    amLODIPine (NORVASC) 5 MG tablet Take 1 tablet (5 mg total) by mouth once daily.      apixaban (ELIQUIS) 5 mg Tab Take 1 tablet (5 mg  total) by mouth 2 (two) times daily. 60 tablet 5    aspirin (ECOTRIN) 81 MG EC tablet Take 81 mg by mouth once daily.      benzonatate (TESSALON) 200 MG capsule Take 1 capsule (200 mg total) by mouth 3 (three) times daily as needed for Cough. 21 capsule 0    BREO ELLIPTA 100-25 mcg/dose diskus inhaler INHALE 1 PUFF INTO THE LUNGS ONCE DAILY  5    fenofibric acid (FIBRICOR) 135 mg CpDR TAKE 1 CAPSULE BY MOUTH EVERY DAY 90 capsule 3    fluticasone (FLONASE) 50 mcg/actuation nasal spray 2 sprays (100 mcg total) by Each Nare route once daily. (Patient taking differently: 2 sprays by Each Nare route as needed. ) 3 Bottle 3    furosemide (LASIX) 20 MG tablet TAKE 1 TABLET BY MOUTH EVERY DAY AS NEEDED 30 tablet 1    glycopyrrolate-formoterol (BEVESPI AEROSPHERE) 9-4.8 mcg HFAA Inhale 2 puffs into the lungs 2 (two) times daily. Controller 10.9 g 11    HYDROcodone-acetaminophen (NORCO) 5-325 mg per tablet Take 1 tablet by mouth every 6 (six) hours as needed for Pain. 40 tablet 0    ipratropium-albuterol (COMBIVENT RESPIMAT)  mcg/actuation inhaler Inhale 1 puff into the lungs every 6 (six) hours as needed for Shortness of Breath. 4 g 11    levocetirizine (XYZAL) 5 MG tablet Take 5 mg by mouth as needed.       lisinopril (PRINIVIL,ZESTRIL) 40 MG tablet TAKE 1 TABLET BY MOUTH DAILY 30 tablet 11    promethazine (PHENERGAN) 6.25 mg/5 mL syrup Take 20 mLs (25 mg total) by mouth nightly as needed. 240 mL 0    ranolazine (RANEXA) 500 MG Tb12 Take 1 tablet (500 mg total) by mouth 2 (two) times daily. 60 tablet 11    simvastatin (ZOCOR) 80 MG tablet Take 1 tablet (80 mg total) by mouth once daily. 90 tablet 3    SOTALOL AF 80 mg tablet TAKE 1 TABLET BY MOUTH TWICE A  tablet 3    temazepam (RESTORIL) 15 mg Cap TAKE 1 TO 2 CAPSULES BY MOUTH AT BEDTIME       Current Facility-Administered Medications   Medication Dose Route Frequency Provider Last Rate Last Dose    testosterone cypionate injection 200 mg  200  mg Intramuscular Q21 Days Peter Teixeira MD   200 mg at 01/16/20 1341     Facility-Administered Medications Ordered in Other Visits   Medication Dose Route Frequency Provider Last Rate Last Dose    lactated ringers infusion   Intravenous Continuous Michael Hameed MD   Stopped at 07/03/19 0810       Labs:  Lab Results   Component Value Date    WBC 6.63 01/13/2020    HGB 13.2 (L) 01/13/2020    HCT 42.7 01/13/2020    MCV 96 01/13/2020     01/13/2020     BMP  Lab Results   Component Value Date     01/13/2020    K 4.6 01/13/2020     01/13/2020    CO2 31 (H) 01/13/2020    BUN 19 01/13/2020    CREATININE 1.3 01/13/2020    CALCIUM 9.5 01/13/2020    ANIONGAP 7 (L) 01/13/2020    ESTGFRAFRICA >60 01/13/2020    EGFRNONAA 52 (A) 01/13/2020     Lab Results   Component Value Date    ALT 12 01/13/2020    AST 17 01/13/2020    ALKPHOS 68 01/13/2020    BILITOT 0.5 01/13/2020       No results found for: IRON, TIBC, FERRITIN, SATURATEDIRO  No results found for: HODXAIRN68  No results found for: FOLATE  Lab Results   Component Value Date    TSH 4.060 (H) 01/13/2020       I have reviewed the radiology reports and examined the scan/xray images.    Review of Systems   Constitutional: Negative.    HENT: Negative.    Eyes: Negative.    Respiratory: Negative.    Cardiovascular: Negative.    Gastrointestinal: Negative.    Endocrine: Negative.    Genitourinary: Negative.    Musculoskeletal: Negative.    Skin: Negative.    Allergic/Immunologic: Negative.    Neurological: Negative.    Hematological: Negative.    Psychiatric/Behavioral: Negative.      ECOG SCORE    0 - Fully active-able to carry on all pre-disease performance without restriction            Objective:     Vitals:    01/27/20 0948   BP: 118/65   Pulse: (!) 55   Resp: 16   Temp: 97.7 °F (36.5 °C)   Body mass index is 26.92 kg/m².  Physical Exam   Constitutional: He is oriented to person, place, and time. He appears well-developed and well-nourished.   HENT:    Head: Normocephalic and atraumatic.   Eyes: Conjunctivae and EOM are normal.   Neck: Normal range of motion. Neck supple.   Cardiovascular: Normal rate and regular rhythm.   Pulmonary/Chest: Effort normal and breath sounds normal.   Abdominal: Soft. Bowel sounds are normal.   Musculoskeletal: Normal range of motion.   Neurological: He is alert and oriented to person, place, and time.   Skin: Skin is warm and dry.   Psychiatric: He has a normal mood and affect. His behavior is normal. Judgment and thought content normal.   Nursing note and vitals reviewed.        Assessment:      1. Malignant neoplasm of upper lobe of left lung           Plan:     Malignant neoplasm of upper lobe of left lung  Continue on maintenance Imfinzi  -     CBC auto differential; Future; Expected date: 02/10/2020  -     Comprehensive metabolic panel; Future; Expected date: 02/10/2020  -     TSH; Future; Expected date: 02/10/2020  -     NM PET CT Routine Skull to Mid Thigh; Future; Expected date: 01/27/2020

## 2020-01-31 DIAGNOSIS — C34.12 MALIGNANT NEOPLASM OF UPPER LOBE OF LEFT LUNG: Primary | ICD-10-CM

## 2020-02-03 ENCOUNTER — TELEPHONE (OUTPATIENT)
Dept: HEMATOLOGY/ONCOLOGY | Facility: CLINIC | Age: 80
End: 2020-02-03

## 2020-02-03 ENCOUNTER — TELEPHONE (OUTPATIENT)
Dept: URGENT CARE | Facility: CLINIC | Age: 80
End: 2020-02-03

## 2020-02-03 ENCOUNTER — OFFICE VISIT (OUTPATIENT)
Dept: URGENT CARE | Facility: CLINIC | Age: 80
End: 2020-02-03
Payer: MEDICARE

## 2020-02-03 VITALS
BODY MASS INDEX: 26.67 KG/M2 | HEART RATE: 94 BPM | RESPIRATION RATE: 22 BRPM | TEMPERATURE: 100 F | SYSTOLIC BLOOD PRESSURE: 146 MMHG | DIASTOLIC BLOOD PRESSURE: 74 MMHG | HEIGHT: 70 IN | OXYGEN SATURATION: 96 % | WEIGHT: 186.31 LBS

## 2020-02-03 DIAGNOSIS — J98.8 BACTERIAL RESPIRATORY INFECTION: Primary | ICD-10-CM

## 2020-02-03 DIAGNOSIS — R05.9 COUGH: ICD-10-CM

## 2020-02-03 DIAGNOSIS — B96.89 BACTERIAL RESPIRATORY INFECTION: Primary | ICD-10-CM

## 2020-02-03 DIAGNOSIS — R06.02 SHORTNESS OF BREATH: ICD-10-CM

## 2020-02-03 PROCEDURE — 94640 AIRWAY INHALATION TREATMENT: CPT | Mod: S$GLB,,, | Performed by: NURSE PRACTITIONER

## 2020-02-03 PROCEDURE — 99214 PR OFFICE/OUTPT VISIT, EST, LEVL IV, 30-39 MIN: ICD-10-PCS | Mod: 25,S$GLB,, | Performed by: NURSE PRACTITIONER

## 2020-02-03 PROCEDURE — 94640 PR INHAL RX, AIRWAY OBST/DX SPUTUM INDUCT: ICD-10-PCS | Mod: S$GLB,,, | Performed by: NURSE PRACTITIONER

## 2020-02-03 PROCEDURE — 99214 OFFICE O/P EST MOD 30 MIN: CPT | Mod: 25,S$GLB,, | Performed by: NURSE PRACTITIONER

## 2020-02-03 RX ORDER — LEVOFLOXACIN 500 MG/1
500 TABLET, FILM COATED ORAL DAILY
Qty: 5 TABLET | Refills: 0 | Status: SHIPPED | OUTPATIENT
Start: 2020-02-03 | End: 2020-02-08

## 2020-02-03 RX ORDER — LEVOFLOXACIN 750 MG/1
750 TABLET ORAL DAILY
Qty: 5 TABLET | Refills: 0 | Status: SHIPPED | OUTPATIENT
Start: 2020-02-03 | End: 2020-02-03

## 2020-02-03 RX ORDER — ALBUTEROL SULFATE 0.83 MG/ML
2.5 SOLUTION RESPIRATORY (INHALATION)
Status: COMPLETED | OUTPATIENT
Start: 2020-02-03 | End: 2020-02-03

## 2020-02-03 RX ORDER — IPRATROPIUM BROMIDE 0.5 MG/2.5ML
0.5 SOLUTION RESPIRATORY (INHALATION)
Status: COMPLETED | OUTPATIENT
Start: 2020-02-03 | End: 2020-02-03

## 2020-02-03 RX ADMIN — ALBUTEROL SULFATE 2.5 MG: 0.83 SOLUTION RESPIRATORY (INHALATION) at 09:02

## 2020-02-03 RX ADMIN — IPRATROPIUM BROMIDE 0.5 MG: 0.5 SOLUTION RESPIRATORY (INHALATION) at 09:02

## 2020-02-03 NOTE — PATIENT INSTRUCTIONS
Advised patient to perform albuterol neb treatments every 6 hours while at home.  Increase fluid intake.  Sleep with head of bed elevated.  Take antibiotics as prescribed.  Take 5mg Xyzal every evening before bed.  Follow up with PCP for follow up chest x-ray in the next two weeks.  Report to ER if extreme shortness of breath, dizziness, chest pain, O2 saturation 92% or less or if loss of consciousness occurs.

## 2020-02-03 NOTE — PROGRESS NOTES
"Subjective:       Patient ID: Chai Murry is a 79 y.o. male.    Vitals:  height is 5' 10" (1.778 m) and weight is 84.5 kg (186 lb 4.6 oz). His temperature is 99.7 °F (37.6 °C). His blood pressure is 146/74 (abnormal) and his pulse is 94. His respiration is 22 (abnormal) and oxygen saturation is 96%.     Chief Complaint: URI    Patient was seen 4 weeks ago in  for similar symptoms and says he never did get completely well and began feelng really short of breath yesterday evening. Did complete a 7 day coarse of doxycyline and currently takes Mucinex, albuterol neb treatments 1-2 times daily as needed and tessalon perles for cough. States when he checks his O2 sats at home it has been reading 95%.    URI    This is a new problem. The current episode started yesterday. The problem has been unchanged. There has been no fever. Associated symptoms include congestion, coughing, joint pain, a sore throat and wheezing. Pertinent negatives include no abdominal pain, chest pain, diarrhea, dysuria, ear pain, headaches, joint swelling, nausea, neck pain, plugged ear sensation, rash, rhinorrhea, sinus pain, sneezing, swollen glands or vomiting. Treatments tried: Tessalon cough and cough syrup, and Mucinex. The treatment provided no relief.       Constitution: Negative for chills, sweating, fatigue and fever.   HENT: Positive for congestion, postnasal drip, sore throat and voice change. Negative for ear pain, sinus pain and sinus pressure.    Neck: Negative for neck pain and painful lymph nodes.   Cardiovascular: Negative for chest pain.   Eyes: Negative for eye redness.   Respiratory: Positive for cough, sputum production, COPD and wheezing. Negative for chest tightness, bloody sputum, shortness of breath, stridor and asthma.    Gastrointestinal: Negative for abdominal pain, nausea, vomiting and diarrhea.   Genitourinary: Negative for dysuria.   Musculoskeletal: Negative for muscle ache.   Skin: Negative for rash. "   Allergic/Immunologic: Negative for seasonal allergies, asthma and sneezing.   Neurological: Negative for headaches.   Hematologic/Lymphatic: Negative for swollen lymph nodes.       Objective:      Physical Exam   Constitutional: He is oriented to person, place, and time. He appears well-developed and well-nourished. He is cooperative.  Non-toxic appearance. He does not have a sickly appearance. He does not appear ill. No distress.   HENT:   Head: Normocephalic and atraumatic.   Right Ear: Hearing, tympanic membrane, external ear and ear canal normal.   Left Ear: Hearing, tympanic membrane, external ear and ear canal normal.   Nose: Nose normal. No mucosal edema, rhinorrhea or nasal deformity. No epistaxis. Right sinus exhibits no maxillary sinus tenderness and no frontal sinus tenderness. Left sinus exhibits no maxillary sinus tenderness and no frontal sinus tenderness.   Mouth/Throat: Uvula is midline, oropharynx is clear and moist and mucous membranes are normal. No trismus in the jaw. Normal dentition. No uvula swelling. Cobblestoning present. No oropharyngeal exudate, posterior oropharyngeal edema or posterior oropharyngeal erythema.   Eyes: Conjunctivae and lids are normal. No scleral icterus.   Neck: Trachea normal, full passive range of motion without pain and phonation normal. Neck supple. No neck rigidity. No edema and no erythema present.   Cardiovascular: Regular rhythm, normal heart sounds, intact distal pulses and normal pulses. Tachycardia present.   Pulmonary/Chest: Breath sounds normal. Accessory muscle usage present. Tachypnea noted. No respiratory distress. He has no decreased breath sounds. He has no rhonchi.   Coarse breath sounds heard in right lower lung   Abdominal: Normal appearance.   Musculoskeletal: Normal range of motion. He exhibits no edema or deformity.   Neurological: He is alert and oriented to person, place, and time. He exhibits normal muscle tone. Coordination normal.   Skin:  Skin is warm, dry, intact, not diaphoretic and not pale.   Psychiatric: He has a normal mood and affect. His speech is normal and behavior is normal. Judgment and thought content normal. Cognition and memory are normal.   Nursing note and vitals reviewed.        Assessment:       1. Bacterial respiratory infection    2. Shortness of breath    3. Cough        Plan:         Bacterial respiratory infection  -     Discontinue: levoFLOXacin (LEVAQUIN) 750 MG tablet; Take 1 tablet (750 mg total) by mouth once daily. for 5 days  Dispense: 5 tablet; Refill: 0  -     levoFLOXacin (LEVAQUIN) 500 MG tablet; Take 1 tablet (500 mg total) by mouth once daily. for 5 days  Dispense: 5 tablet; Refill: 0    Shortness of breath  -     albuterol nebulizer solution 2.5 mg  -     ipratropium 0.02 % nebulizer solution 0.5 mg    Cough          MDM: Calculated creatinine clearance is 48.61; 500 mg Levaquin prescribed instead of the 750mg.    Advised patient to perform albuterol neb treatments every 6 hours while at home.  Increase fluid intake.  Sleep with head of bed elevated.  Take antibiotics as prescribed.  Take 5mg Xyzal every evening before bed.  Follow up with PCP for follow up chest x-ray in the next two weeks.  Report to ER if extreme shortness of breath, dizziness, chest pain, O2 saturation 92% or less or if loss of consciousness occurs.

## 2020-02-03 NOTE — TELEPHONE ENCOUNTER
----- Message from Shauna Jasso MA sent at 2/3/2020 10:12 AM CST -----  Contact: Radiology      ----- Message -----  From: Lizbeth Goldsmith RT  Sent: 1/31/2020   7:50 AM CST  To: Shauna Jasso MA    Hey, please call patient and explain and just cancel his appt and put in a CT Scan and you can just schedule him at least 4-5 days out to allow insurance time to process CT, and let me know, Thanks     ----- Message -----  From: Shauna Jasso MA  Sent: 1/30/2020   4:59 PM CST  To: Lizbeth Goldsmith, RT, SHAYAN Alaniz Dr. said to switch this one out with a CT Scan because he knows it won't be approved even with a peer to peer.    Thanks    ----- Message -----  From: RT Sanam  Sent: 1/30/2020   4:16 PM CST  To: Cadence Christensen Staff    Patient is scheduled for a PET Scan, and insurance is still pending approval, they are requesting a peer to peer, and we need to know if provider will do a peer to peer prior to patient's appointment or we will have to move patient appointment to another day, or is test medically urgent?  if not, medically urgent we may need to re-schedule to allow Insurance time to approve test. If you have any questions call ext [phone].  Thanks  [facility]

## 2020-02-04 ENCOUNTER — TELEPHONE (OUTPATIENT)
Dept: URGENT CARE | Facility: CLINIC | Age: 80
End: 2020-02-04

## 2020-02-04 NOTE — TELEPHONE ENCOUNTER
Patient's wife stopped by clinic stating that pharmacy will not fill Levaquin or Clarithromycin due to drug interactions with Ranexa. Spoke with pharmacy tech and changes to Augmentin BID x 7 days.    Debbie Bragg PA-C

## 2020-02-05 ENCOUNTER — TELEPHONE (OUTPATIENT)
Dept: INTERNAL MEDICINE | Facility: CLINIC | Age: 80
End: 2020-02-05

## 2020-02-05 ENCOUNTER — TELEPHONE (OUTPATIENT)
Dept: URGENT CARE | Facility: CLINIC | Age: 80
End: 2020-02-05

## 2020-02-05 NOTE — TELEPHONE ENCOUNTER
"Patient stated that "restoril" is causing him to be very dizzy.  Stated Dr. Vila prescribed it. Patient stated he is losing his balance and falling.  Patient stated he read on the prescription "not to take this medication longer than 3 weeks." per patient.  Patient would like to know if he needs to stay on this medication.  Request call from Dr. Villalta nurse.    "

## 2020-02-05 NOTE — TELEPHONE ENCOUNTER
----- Message from Kyleigh Alfonso sent at 2/5/2020 12:06 PM CST -----  Contact: Pt  Pt asked that Vicenta return his call at ([phone])

## 2020-02-06 ENCOUNTER — CLINICAL SUPPORT (OUTPATIENT)
Dept: INTERNAL MEDICINE | Facility: CLINIC | Age: 80
End: 2020-02-06
Payer: MEDICARE

## 2020-02-06 ENCOUNTER — TELEPHONE (OUTPATIENT)
Dept: RADIOLOGY | Facility: HOSPITAL | Age: 80
End: 2020-02-06

## 2020-02-06 DIAGNOSIS — E29.1 HYPOGONADISM IN MALE: Primary | ICD-10-CM

## 2020-02-06 PROCEDURE — 99999 PR PBB SHADOW E&M-EST. PATIENT-LVL II: CPT | Mod: PBBFAC,,,

## 2020-02-06 PROCEDURE — 96372 THER/PROPH/DIAG INJ SC/IM: CPT | Mod: S$GLB,,, | Performed by: INTERNAL MEDICINE

## 2020-02-06 PROCEDURE — 99999 PR PBB SHADOW E&M-EST. PATIENT-LVL II: ICD-10-PCS | Mod: PBBFAC,,,

## 2020-02-06 PROCEDURE — 96372 PR INJECTION,THERAP/PROPH/DIAG2ST, IM OR SUBCUT: ICD-10-PCS | Mod: S$GLB,,, | Performed by: INTERNAL MEDICINE

## 2020-02-06 RX ADMIN — TESTOSTERONE CYPIONATE 200 MG: 200 INJECTION, SOLUTION INTRAMUSCULAR at 09:02

## 2020-02-07 ENCOUNTER — TELEPHONE (OUTPATIENT)
Dept: HEMATOLOGY/ONCOLOGY | Facility: CLINIC | Age: 80
End: 2020-02-07

## 2020-02-07 ENCOUNTER — HOSPITAL ENCOUNTER (OUTPATIENT)
Dept: RADIOLOGY | Facility: HOSPITAL | Age: 80
Discharge: HOME | End: 2020-02-07
Attending: INTERNAL MEDICINE
Payer: MEDICARE

## 2020-02-07 DIAGNOSIS — C34.12 MALIGNANT NEOPLASM OF UPPER LOBE OF LEFT LUNG: ICD-10-CM

## 2020-02-07 PROCEDURE — 71260 CT THORAX DX C+: CPT | Mod: 26,,, | Performed by: RADIOLOGY

## 2020-02-07 PROCEDURE — 71260 CT CHEST ABDOMEN PELVIS WITH CONTRAST (XPD): ICD-10-PCS | Mod: 26,,, | Performed by: RADIOLOGY

## 2020-02-07 PROCEDURE — 74177 CT CHEST ABDOMEN PELVIS WITH CONTRAST (XPD): ICD-10-PCS | Mod: 26,,, | Performed by: RADIOLOGY

## 2020-02-07 PROCEDURE — 74177 CT ABD & PELVIS W/CONTRAST: CPT | Mod: TC

## 2020-02-07 PROCEDURE — 25500020 PHARM REV CODE 255: Performed by: INTERNAL MEDICINE

## 2020-02-07 PROCEDURE — 74177 CT ABD & PELVIS W/CONTRAST: CPT | Mod: 26,,, | Performed by: RADIOLOGY

## 2020-02-07 RX ADMIN — IOHEXOL 30 ML: 350 INJECTION, SOLUTION INTRAVENOUS at 12:02

## 2020-02-07 RX ADMIN — IOHEXOL 100 ML: 350 INJECTION, SOLUTION INTRAVENOUS at 01:02

## 2020-02-09 NOTE — PROGRESS NOTES
Subjective:       Patient ID: Chai Murry is a 79 y.o. male.    Chief Complaint: Malignant neoplasm of upper lobe of left lung [C34.12]  HPI: We have an opportunity to see Mr. Chai Murry in Hematology Oncology clinic at [facility] on 02/09/2020.  Mr. Chai Murry is a 79 y.o.   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     He is s/p left pneumonectomy for a squamous cell carcinoma of the left lung.0n 4/12/2016  Prior to the surgery, the PET scan showed an FDG-avid mass in the left upper   lobe abutting the left hilum with an SUV of 11.6. There seemed to be a left   hilar adenopathy as well.  Radiologist felt that he was unable to discriminate between the mass and the   lymphadenopathy. The patient had had a bronchoscopy by Dr. Roel Ernandez on   03/04/2006 that showed a partially obstructing airway in the anterior segment of  the left upper lobe with an endobronchial lesion in the anterior segment of the  left upper lobe. The specimen from the biopsy at the time of bronchoscopy was   reported as being a squamous cell carcinoma.  At the time of the surgery after the left lung was removed, the pathologist   indicated that this was a squamous cell carcinoma. It measured 3.8 cm. The   pathology indicated that this is extending into the bronchial resection margin of the lobe. This lobe was later on removed to complete the pneumonectomy   There was one lymph node positive for metastatic squamous cell carcinoma at the   AP window.  The patient was told that we would prefer to treat him with post-op simultaneous chemo/radiation.  He had Medi-port. He started chemo/radiation therapy andreceived 6 weekly cycles of carbo/Taxol along with radiatioon.  After a month, he had a Ct scan and it showed stability   He then had 2 additional cyles of carbo/Taxol finishing in 9/27/2016.    He comes for follow up.   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 CT of the chest was non contributory, shows changes from surgery  PET showed a PET avid mass to the left of the trachea.  The case was discussed with dr annika Goldman and GI.  We discussed the possibility of doing a EUS or EBUs, and finally, because of the localization, it was decided to do a EUS with biopsy if possible.  Cytology shows recurrent squamous cell cancer.  I have asked Pathology to run a PD-L1 from his original pathology from 2016.  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  He has been discussed extensively with radiation oncology. We have decided to treat him with radiation therapy and Cisplatin as a chemo-sensitizer.  Dr Castro has reviewed the therapy ports from the previous radiation treatment and the area in question seems to be above the previously radiated fields.     Completed chemoradiation s/p 6 weekly cisplatin treatments with response.  Currently on maintenance Imfinzi.  Reports had dizziness when gets up at night, attributes to restoril.     Malignant neoplasm of upper lobe of left lung    4/12/2016 Initial Diagnosis     Malignant neoplasm of upper lobe of left lung      6/19/2019 Cancer Staged     Staging form: Lung, AJCC 8th Edition  - Clinical stage from 6/19/2019: Stage IIIB (rcT4, cN2, cM0) - Signed by Alejandro Castro II, MD on 6/19/2019 6/21/2019 Tumor Conference     Presenting Hospital / Clinic: [facility]  Virtual Tumor Board Conference: In person  Presenter: Dr. Chance Castro  Date Presented to Tumor Board: 06/21/19  Specialties Present: Medical Oncology;Radiation Oncology;Surgical Oncology;Pathology;Navigation;Plastic Surgery;Radiology;Gastrointestinal;Pulmonology  Presentation at Cancer Conference: Prospective  Cancer Type: Lung cancer  Recommended Plan: Additional screening  Recommended Plan Note: If PDL-1 positive, treat with immunotherapy  Send tissue for next generation sequencing.       6/28/2019 Tumor Conference     His case was discussed at the Multidisciplinary Head and Neck Team Planning Meeting.    Representatives from Medical Oncology, Radiation Oncology, Head and Neck Surgical Oncology, Psychosocial Oncology, and Speech and Language Pathology discussed the case with the following recommendations:    1) treat lung cancer             7/5/2019 - 8/14/2019 Radiation Therapy     Treatment Site Ref. ID Energy Dose/Fx (Gy) #Fx Dose Correction (Gy) Total Dose (Gy) Start Date End Date Elapsed Days   YflcgYYHF69.4:1 PTV LNs 6X 1.8 28 / 28 0 50.4 7/5/2019 8/14/2019 40             Lung cancer    6/11/2019 Initial Diagnosis     Lung cancer      7/8/2019 - 8/18/2019 Chemotherapy     Treatment Summary   Plan Name: OP HEAD NECK CISPLATIN WEEKLY + RADIOTHERAPY  Treatment Goal: Supportive  Status: Inactive  Start Date: 7/8/2019  End Date: 8/12/2019  Provider: Jorge L Patel MD  Chemotherapy: CISplatin (PLATINOL) 40 mg/m2 = 83 mg in sodium chloride 0.9% 583 mL chemo infusion, 40 mg/m2 = 83 mg (100 % of original dose 40 mg/m2), Intravenous, Clinic/HOD 1 time, 6 of 6 cycles  Dose modification: 70 mg (original dose 40 mg/m2, Cycle 1, Reason: MD Discretion), 40 mg/m2 (original dose 40 mg/m2, Cycle 1)  Administration: 83 mg (7/8/2019), 84 mg (7/15/2019), 83 mg (7/22/2019), 83 mg (7/29/2019), 83 mg (8/5/2019), 82 mg (8/12/2019)      9/30/2019 -  Chemotherapy     Treatment Summary   Plan Name: OP DURVALUMAB Q2W  Treatment Goal: Curative  Status: Active  Start Date: 10/3/2019  End Date: 10/5/2020 (Planned)  Provider: Fermin Vila MD  Chemotherapy: durvalumab (IMFINZI) 835 mg in sodium chloride 0.9% 266.7 mL chemo infusion, 10 mg/kg = 835 mg, Intravenous, Clinic/HOD 1 time, 8 of 26 cycles  Administration: 835 mg (10/3/2019), 835 mg (10/23/2019), 835 mg (11/6/2019), 835 mg (11/20/2019), 835 mg (12/4/2019), 835 mg (12/18/2019), 835 mg (1/13/2020), 835 mg (1/27/2020)       Past Medical History:   Diagnosis Date     Anemia     Arthritis     Atrial fibrillation     CAD (coronary artery disease)     Cataract     COPD (chronic obstructive pulmonary disease)     Diverticulosis     ED (erectile dysfunction)     HTN (hypertension)     Hyperlipidemia     Lung cancer     left    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 Courtney dwyer bx     Family History   Problem Relation Age of Onset    Esophageal cancer Sister     Cancer Sister     Lung cancer Mother     Cancer Mother     Cataracts Mother     Hypertension Mother     Pneumonia Father     Cataracts Father     Hypertension Father     Cancer Brother     Hypertension Brother     Blindness Neg Hx     Diabetes Neg Hx     Glaucoma Neg Hx     Macular degeneration Neg Hx     Retinal detachment Neg Hx     Strabismus Neg Hx     Stroke Neg Hx     Thyroid disease Neg Hx      Social History     Socioeconomic History    Marital status:      Spouse name: Not on file    Number of children: 3    Years of education: Not on file    Highest education level: Not on file   Occupational History    Occupation: retired   Social Needs    Financial resource strain: Not on file    Food insecurity:     Worry: Not on file     Inability: Not on file    Transportation needs:     Medical: Not on file     Non-medical: Not on file   Tobacco Use    Smoking status: Former Smoker     Packs/day: 1.00     Years: 50.00     Pack years: 50.00     Last attempt to quit: 2005     Years since quittin.5    Smokeless tobacco: Never Used   Substance and Sexual Activity    Alcohol use: No    Drug use: No    Sexual activity: Not Currently   Lifestyle    Physical activity:     Days per week: Not on file     Minutes per session: Not on file    Stress: Not on file   Relationships    Social connections:     Talks on phone: Not on file     Gets together: Not on file     Attends Church service: Not on file     Active member of club or  organization: Not on file     Attends meetings of clubs or organizations: Not on file     Relationship status: Not on file   Other Topics Concern    Not on file   Social History Narrative    Not on file     Past Surgical History:   Procedure Laterality Date    APPENDECTOMY      BRONCHOSCOPY Bilateral 6/21/2019    Procedure: Bronchoscopy;  Surgeon: Paresh Goldman MD;  Location: [facility] ENDO;  Service: Endoscopy;  Laterality: Bilateral;    CARDIAC CATHETERIZATION      cardiac stents      CATARACT EXTRACTION W/  INTRAOCULAR LENS IMPLANT Right 9-3-14    CHOLECYSTECTOMY      COLONOSCOPY N/A 4/17/2018    Procedure: COLONOSCOPY;  Surgeon: Dionne Doan MD;  Location: [facility] ENDO;  Service: Endoscopy;  Laterality: N/A;    COLONOSCOPY N/A 10/25/2018    Procedure: COLONOSCOPY;  Surgeon: Michael Hameed MD;  Location: [facility] ENDO;  Service: Endoscopy;  Laterality: N/A;    ENDOSCOPIC ULTRASOUND OF UPPER GASTROINTESTINAL TRACT N/A 6/11/2019    Procedure: ULTRASOUND, UPPER GI TRACT, ENDOSCOPIC;  Surgeon: Abhishek Knox MD;  Location: [facility] ENDO;  Service: Endoscopy;  Laterality: N/A;    ESOPHAGOGASTRODUODENOSCOPY N/A 6/11/2019    Procedure: EGD (ESOPHAGOGASTRODUODENOSCOPY);  Surgeon: Abhishek Knox MD;  Location: [facility] ENDO;  Service: Endoscopy;  Laterality: N/A;    infected stomach gland excision      INSERTION OF TUNNELED CENTRAL VENOUS CATHETER (CVC) WITH SUBCUTANEOUS PORT Right 7/3/2019    Procedure: [id number]-[id number];  Surgeon: Jean Carlos Constantino MD;  Location: [facility] OR;  Service: General;  Laterality: Right;    LUNG REMOVAL, TOTAL Left 04/2016    lung cancer left upper lobe    SKIN BIOPSY Left     arm     Current Outpatient Medications   Medication Sig Dispense Refill    albuterol (PROVENTIL) 2.5 mg /3 mL (0.083 %) nebulizer solution Take 3 mLs (2.5 mg total) by nebulization every 6 (six) hours while awake. 270 mL 11    amLODIPine (NORVASC) 5 MG tablet Take 1 tablet (5 mg total) by mouth once  daily.      apixaban (ELIQUIS) 5 mg Tab Take 1 tablet (5 mg total) by mouth 2 (two) times daily. 60 tablet 5    aspirin (ECOTRIN) 81 MG EC tablet Take 81 mg by mouth once daily.      benzonatate (TESSALON) 200 MG capsule Take 1 capsule (200 mg total) by mouth 3 (three) times daily as needed for Cough. 21 capsule 0    BREO ELLIPTA 100-25 mcg/dose diskus inhaler INHALE 1 PUFF INTO THE LUNGS ONCE DAILY  5    fenofibric acid (FIBRICOR) 135 mg CpDR TAKE 1 CAPSULE BY MOUTH EVERY DAY 90 capsule 3    fluticasone (FLONASE) 50 mcg/actuation nasal spray 2 sprays (100 mcg total) by Each Nare route once daily. (Patient taking differently: 2 sprays by Each Nare route as needed. ) 3 Bottle 3    furosemide (LASIX) 20 MG tablet TAKE 1 TABLET BY MOUTH EVERY DAY AS NEEDED 30 tablet 1    glycopyrrolate-formoterol (BEVESPI AEROSPHERE) 9-4.8 mcg HFAA Inhale 2 puffs into the lungs 2 (two) times daily. Controller 10.9 g 11    HYDROcodone-acetaminophen (NORCO) 5-325 mg per tablet Take 1 tablet by mouth every 6 (six) hours as needed for Pain. 40 tablet 0    ipratropium-albuterol (COMBIVENT RESPIMAT)  mcg/actuation inhaler Inhale 1 puff into the lungs every 6 (six) hours as needed for Shortness of Breath. 4 g 11    levocetirizine (XYZAL) 5 MG tablet Take 5 mg by mouth as needed.       lisinopril (PRINIVIL,ZESTRIL) 40 MG tablet TAKE 1 TABLET BY MOUTH DAILY 30 tablet 11    promethazine (PHENERGAN) 6.25 mg/5 mL syrup Take 20 mLs (25 mg total) by mouth nightly as needed. 240 mL 0    ranolazine (RANEXA) 500 MG Tb12 Take 1 tablet (500 mg total) by mouth 2 (two) times daily. 60 tablet 11    simvastatin (ZOCOR) 80 MG tablet Take 1 tablet (80 mg total) by mouth once daily. 90 tablet 3    SOTALOL AF 80 mg tablet TAKE 1 TABLET BY MOUTH TWICE A  tablet 3    temazepam (RESTORIL) 15 mg Cap TAKE 1 TO 2 CAPSULES BY MOUTH AT BEDTIME       Current Facility-Administered Medications   Medication Dose Route Frequency Provider Last  Rate Last Dose    testosterone cypionate injection 200 mg  200 mg Intramuscular Q21 Days Peter Teixeira MD   200 mg at 02/06/20 0906     Facility-Administered Medications Ordered in Other Visits   Medication Dose Route Frequency Provider Last Rate Last Dose    lactated ringers infusion   Intravenous Continuous Michael Hameed MD   Stopped at 07/03/19 0810       Labs:  Lab Results   Component Value Date    WBC 6.78 01/27/2020    HGB 12.4 (L) 01/27/2020    HCT 40.5 01/27/2020    MCV 93 01/27/2020     01/27/2020     BMP  Lab Results   Component Value Date     01/27/2020    K 4.5 01/27/2020     01/27/2020    CO2 30 (H) 01/27/2020    BUN 20 01/27/2020    CREATININE 1.5 (H) 01/27/2020    CALCIUM 9.1 01/27/2020    ANIONGAP 8 01/27/2020    ESTGFRAFRICA 51 (A) 01/27/2020    EGFRNONAA 44 (A) 01/27/2020     Lab Results   Component Value Date    ALT 14 01/27/2020    AST 15 01/27/2020    ALKPHOS 58 01/27/2020    BILITOT 0.4 01/27/2020       No results found for: IRON, TIBC, FERRITIN, SATURATEDIRO  No results found for: KMEYJSZG57  No results found for: FOLATE  Lab Results   Component Value Date    TSH 4.281 (H) 01/27/2020       I have reviewed the radiology reports and examined the scan/xray images.    Review of Systems   Constitutional: Negative.    HENT: Negative.    Eyes: Negative.    Respiratory: Negative.    Cardiovascular: Negative.    Gastrointestinal: Negative.    Endocrine: Negative.    Genitourinary: Negative.    Musculoskeletal: Negative.    Skin: Negative.    Allergic/Immunologic: Negative.    Neurological: Negative.    Hematological: Negative.    Psychiatric/Behavioral: Negative.      ECOG SCORE    0 - Fully active-able to carry on all pre-disease performance without restriction            Objective:     Vitals:    02/10/20 1121   BP: 116/69   Pulse: 77   Temp: 97.4 °F (36.3 °C)   Body mass index is 26.48 kg/m².  Physical Exam   Constitutional: He is oriented to person, place, and time. He  appears well-developed and well-nourished.   HENT:   Head: Normocephalic and atraumatic.   Eyes: Conjunctivae and EOM are normal.   Neck: Normal range of motion. Neck supple.   Cardiovascular: Normal rate and regular rhythm.   Pulmonary/Chest: Effort normal and breath sounds normal.   Abdominal: Soft. Bowel sounds are normal.   Musculoskeletal: Normal range of motion.   Neurological: He is alert and oriented to person, place, and time.   Skin: Skin is warm and dry.   Psychiatric: He has a normal mood and affect. His behavior is normal. Judgment and thought content normal.   Nursing note and vitals reviewed.        Assessment:      1. Malignant neoplasm of upper lobe of left lung           Plan:     Malignant neoplasm of upper lobe of left lung  CT showed continued CR. Continue on maintenance Imfinzi.  Decrease restoril to 7.5 mg night prn for sleep.  -     temazepam (RESTORIL) 7.5 MG Cap; Take 1 capsule (7.5 mg total) by mouth nightly as needed.  Dispense: 30 capsule; Refill: 1  -     CBC auto differential; Future; Expected date: 02/24/2020  -     Comprehensive metabolic panel; Future; Expected date: 02/24/2020  -     TSH; Future; Expected date: 02/24/2020

## 2020-02-10 ENCOUNTER — OFFICE VISIT (OUTPATIENT)
Dept: HEMATOLOGY/ONCOLOGY | Facility: CLINIC | Age: 80
End: 2020-02-10
Payer: MEDICARE

## 2020-02-10 ENCOUNTER — INFUSION (OUTPATIENT)
Dept: INFUSION THERAPY | Facility: HOSPITAL | Age: 80
End: 2020-02-10
Attending: INTERNAL MEDICINE
Payer: MEDICARE

## 2020-02-10 VITALS
HEIGHT: 70 IN | HEART RATE: 77 BPM | DIASTOLIC BLOOD PRESSURE: 69 MMHG | OXYGEN SATURATION: 94 % | BODY MASS INDEX: 26.41 KG/M2 | SYSTOLIC BLOOD PRESSURE: 116 MMHG | TEMPERATURE: 97 F | WEIGHT: 184.5 LBS

## 2020-02-10 DIAGNOSIS — Z85.118 HISTORY OF CANCER OF UPPER LOBE BRONCHUS OR LUNG: ICD-10-CM

## 2020-02-10 DIAGNOSIS — C34.12 MALIGNANT NEOPLASM OF UPPER LOBE OF LEFT LUNG: ICD-10-CM

## 2020-02-10 DIAGNOSIS — J39.8 TRACHEAL MASS: Primary | ICD-10-CM

## 2020-02-10 DIAGNOSIS — C34.12 MALIGNANT NEOPLASM OF UPPER LOBE OF LEFT LUNG: Primary | ICD-10-CM

## 2020-02-10 PROCEDURE — 1159F PR MEDICATION LIST DOCUMENTED IN MEDICAL RECORD: ICD-10-PCS | Mod: S$GLB,,, | Performed by: INTERNAL MEDICINE

## 2020-02-10 PROCEDURE — 99215 PR OFFICE/OUTPT VISIT, EST, LEVL V, 40-54 MIN: ICD-10-PCS | Mod: 25,S$GLB,, | Performed by: INTERNAL MEDICINE

## 2020-02-10 PROCEDURE — 1159F MED LIST DOCD IN RCRD: CPT | Mod: S$GLB,,, | Performed by: INTERNAL MEDICINE

## 2020-02-10 PROCEDURE — 3078F DIAST BP <80 MM HG: CPT | Mod: CPTII,S$GLB,, | Performed by: INTERNAL MEDICINE

## 2020-02-10 PROCEDURE — 3074F PR MOST RECENT SYSTOLIC BLOOD PRESSURE < 130 MM HG: ICD-10-PCS | Mod: CPTII,S$GLB,, | Performed by: INTERNAL MEDICINE

## 2020-02-10 PROCEDURE — 99999 PR PBB SHADOW E&M-EST. PATIENT-LVL IV: ICD-10-PCS | Mod: PBBFAC,,, | Performed by: INTERNAL MEDICINE

## 2020-02-10 PROCEDURE — 3288F FALL RISK ASSESSMENT DOCD: CPT | Mod: CPTII,S$GLB,, | Performed by: INTERNAL MEDICINE

## 2020-02-10 PROCEDURE — 3288F PR FALLS RISK ASSESSMENT DOCUMENTED: ICD-10-PCS | Mod: CPTII,S$GLB,, | Performed by: INTERNAL MEDICINE

## 2020-02-10 PROCEDURE — 1100F PR PT FALLS ASSESS DOC 2+ FALLS/FALL W/INJURY/YR: ICD-10-PCS | Mod: CPTII,S$GLB,, | Performed by: INTERNAL MEDICINE

## 2020-02-10 PROCEDURE — 63600175 PHARM REV CODE 636 W HCPCS: Performed by: INTERNAL MEDICINE

## 2020-02-10 PROCEDURE — 99999 PR PBB SHADOW E&M-EST. PATIENT-LVL IV: CPT | Mod: PBBFAC,,, | Performed by: INTERNAL MEDICINE

## 2020-02-10 PROCEDURE — 3074F SYST BP LT 130 MM HG: CPT | Mod: CPTII,S$GLB,, | Performed by: INTERNAL MEDICINE

## 2020-02-10 PROCEDURE — 96413 CHEMO IV INFUSION 1 HR: CPT

## 2020-02-10 PROCEDURE — 3078F PR MOST RECENT DIASTOLIC BLOOD PRESSURE < 80 MM HG: ICD-10-PCS | Mod: CPTII,S$GLB,, | Performed by: INTERNAL MEDICINE

## 2020-02-10 PROCEDURE — 1126F AMNT PAIN NOTED NONE PRSNT: CPT | Mod: S$GLB,,, | Performed by: INTERNAL MEDICINE

## 2020-02-10 PROCEDURE — 99215 OFFICE O/P EST HI 40 MIN: CPT | Mod: 25,S$GLB,, | Performed by: INTERNAL MEDICINE

## 2020-02-10 PROCEDURE — 1126F PR PAIN SEVERITY QUANTIFIED, NO PAIN PRESENT: ICD-10-PCS | Mod: S$GLB,,, | Performed by: INTERNAL MEDICINE

## 2020-02-10 PROCEDURE — 1100F PTFALLS ASSESS-DOCD GE2>/YR: CPT | Mod: CPTII,S$GLB,, | Performed by: INTERNAL MEDICINE

## 2020-02-10 RX ORDER — TEMAZEPAM 7.5 MG/1
7.5 CAPSULE ORAL NIGHTLY PRN
Qty: 30 CAPSULE | Refills: 1 | Status: SHIPPED | OUTPATIENT
Start: 2020-02-10 | End: 2020-04-02 | Stop reason: SDUPTHER

## 2020-02-10 RX ORDER — AMOXICILLIN AND CLAVULANATE POTASSIUM 875; 125 MG/1; MG/1
1 TABLET, FILM COATED ORAL 2 TIMES DAILY
COMMUNITY
Start: 2020-02-04 | End: 2020-06-24 | Stop reason: ALTCHOICE

## 2020-02-10 RX ORDER — HEPARIN 100 UNIT/ML
500 SYRINGE INTRAVENOUS
Status: DISCONTINUED | OUTPATIENT
Start: 2020-02-10 | End: 2020-02-10 | Stop reason: HOSPADM

## 2020-02-10 RX ORDER — HEPARIN 100 UNIT/ML
500 SYRINGE INTRAVENOUS
Status: CANCELLED | OUTPATIENT
Start: 2020-02-10

## 2020-02-10 RX ORDER — SODIUM CHLORIDE 0.9 % (FLUSH) 0.9 %
10 SYRINGE (ML) INJECTION
Status: CANCELLED | OUTPATIENT
Start: 2020-02-10

## 2020-02-10 RX ADMIN — HEPARIN 500 UNITS: 100 SYRINGE at 01:02

## 2020-02-10 RX ADMIN — DURVALUMAB 835 MG: 500 INJECTION, SOLUTION INTRAVENOUS at 12:02

## 2020-02-10 RX ADMIN — SODIUM CHLORIDE: 9 INJECTION, SOLUTION INTRAVENOUS at 12:02

## 2020-02-10 NOTE — DISCHARGE INSTRUCTIONS
[facility]  [address]  [address]  [phone] phone     [phone] fax  Hours of Operation: Monday- Friday 8:00am- 5:00pm  After hours phone  [phone]  Hematology / Oncology Physicians on call      JA Cameron Dr., Dr., Dr., Dr., NP Sydney Prescott, NP Tyesha Taylor, NP    Please call with any concerns regarding your appointment today.

## 2020-02-10 NOTE — NURSING
Pt tolerated Imfinzi well. No adverse reaction noted. Pt education reinforced on chemo regimen, side effects, what to expect, and when to call . Pt verbalized understanding. I reviewed pt calendar w/ pt and understanding verbalized. Right PAC deaccessed and flushed w/ NS and heparin per protocol.

## 2020-02-24 ENCOUNTER — OFFICE VISIT (OUTPATIENT)
Dept: OTOLARYNGOLOGY | Facility: CLINIC | Age: 80
End: 2020-02-24
Payer: MEDICARE

## 2020-02-24 VITALS
SYSTOLIC BLOOD PRESSURE: 124 MMHG | BODY MASS INDEX: 26.95 KG/M2 | HEART RATE: 61 BPM | DIASTOLIC BLOOD PRESSURE: 68 MMHG | WEIGHT: 187.81 LBS

## 2020-02-24 DIAGNOSIS — R49.0 DYSPHONIA: Primary | ICD-10-CM

## 2020-02-24 DIAGNOSIS — J38.01 UNILATERAL COMPLETE VOCAL FOLD PARALYSIS: ICD-10-CM

## 2020-02-24 PROCEDURE — 99999 PR PBB SHADOW E&M-EST. PATIENT-LVL III: CPT | Mod: PBBFAC,,, | Performed by: OTOLARYNGOLOGY

## 2020-02-24 PROCEDURE — 1126F PR PAIN SEVERITY QUANTIFIED, NO PAIN PRESENT: ICD-10-PCS | Mod: S$GLB,,, | Performed by: OTOLARYNGOLOGY

## 2020-02-24 PROCEDURE — 1126F AMNT PAIN NOTED NONE PRSNT: CPT | Mod: S$GLB,,, | Performed by: OTOLARYNGOLOGY

## 2020-02-24 PROCEDURE — 3074F PR MOST RECENT SYSTOLIC BLOOD PRESSURE < 130 MM HG: ICD-10-PCS | Mod: CPTII,S$GLB,, | Performed by: OTOLARYNGOLOGY

## 2020-02-24 PROCEDURE — 3074F SYST BP LT 130 MM HG: CPT | Mod: CPTII,S$GLB,, | Performed by: OTOLARYNGOLOGY

## 2020-02-24 PROCEDURE — 1101F PR PT FALLS ASSESS DOC 0-1 FALLS W/OUT INJ PAST YR: ICD-10-PCS | Mod: CPTII,S$GLB,, | Performed by: OTOLARYNGOLOGY

## 2020-02-24 PROCEDURE — 99213 OFFICE O/P EST LOW 20 MIN: CPT | Mod: 25,S$GLB,, | Performed by: OTOLARYNGOLOGY

## 2020-02-24 PROCEDURE — 3078F DIAST BP <80 MM HG: CPT | Mod: CPTII,S$GLB,, | Performed by: OTOLARYNGOLOGY

## 2020-02-24 PROCEDURE — 1101F PT FALLS ASSESS-DOCD LE1/YR: CPT | Mod: CPTII,S$GLB,, | Performed by: OTOLARYNGOLOGY

## 2020-02-24 PROCEDURE — 3078F PR MOST RECENT DIASTOLIC BLOOD PRESSURE < 80 MM HG: ICD-10-PCS | Mod: CPTII,S$GLB,, | Performed by: OTOLARYNGOLOGY

## 2020-02-24 PROCEDURE — 99213 PR OFFICE/OUTPT VISIT, EST, LEVL III, 20-29 MIN: ICD-10-PCS | Mod: 25,S$GLB,, | Performed by: OTOLARYNGOLOGY

## 2020-02-24 PROCEDURE — 1159F MED LIST DOCD IN RCRD: CPT | Mod: S$GLB,,, | Performed by: OTOLARYNGOLOGY

## 2020-02-24 PROCEDURE — 31579 LARYNGOSCOPY TELESCOPIC: CPT | Mod: S$GLB,,, | Performed by: OTOLARYNGOLOGY

## 2020-02-24 PROCEDURE — 99999 PR PBB SHADOW E&M-EST. PATIENT-LVL III: ICD-10-PCS | Mod: PBBFAC,,, | Performed by: OTOLARYNGOLOGY

## 2020-02-24 PROCEDURE — 31579 PR LARYNGOSCOPY, FLEX/RIGID TELESCOPIC, W/STROBOSCOPY: ICD-10-PCS | Mod: S$GLB,,, | Performed by: OTOLARYNGOLOGY

## 2020-02-24 PROCEDURE — 1159F PR MEDICATION LIST DOCUMENTED IN MEDICAL RECORD: ICD-10-PCS | Mod: S$GLB,,, | Performed by: OTOLARYNGOLOGY

## 2020-02-24 NOTE — PROGRESS NOTES
Subjective:       Patient ID: Chai Murry is a 79 y.o. male.    Chief Complaint: Malignant neoplasm of upper lobe of left lung [C34.12]  HPI: We have an opportunity to see Mr. Chai Murry in Hematology Oncology clinic at [facility] on 02/24/2020.  Mr. Chai Murry is a 79 y.o.   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     He is s/p left pneumonectomy for a squamous cell carcinoma of the left lung.0n 4/12/2016  Prior to the surgery, the PET scan showed an FDG-avid mass in the left upper   lobe abutting the left hilum with an SUV of 11.6. There seemed to be a left   hilar adenopathy as well.  Radiologist felt that he was unable to discriminate between the mass and the   lymphadenopathy. The patient had had a bronchoscopy by Dr. Roel Ernandez on   03/04/2006 that showed a partially obstructing airway in the anterior segment of  the left upper lobe with an endobronchial lesion in the anterior segment of the  left upper lobe. The specimen from the biopsy at the time of bronchoscopy was   reported as being a squamous cell carcinoma.  At the time of the surgery after the left lung was removed, the pathologist   indicated that this was a squamous cell carcinoma. It measured 3.8 cm. The   pathology indicated that this is extending into the bronchial resection margin of the lobe. This lobe was later on removed to complete the pneumonectomy   There was one lymph node positive for metastatic squamous cell carcinoma at the   AP window.  The patient was told that we would prefer to treat him with post-op simultaneous chemo/radiation.  He had Medi-port. He started chemo/radiation therapy andreceived 6 weekly cycles of carbo/Taxol along with radiatioon.  After a month, he had a Ct scan and it showed stability   He then had 2 additional cyles of carbo/Taxol finishing in 9/27/2016.    He comes for follow up.   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 CT of the chest was non contributory, shows changes from surgery  PET showed a PET avid mass to the left of the trachea.  The case was discussed with dr annika Goldman and GI.  We discussed the possibility of doing a EUS or EBUs, and finally, because of the localization, it was decided to do a EUS with biopsy if possible.  Cytology shows recurrent squamous cell cancer.  I have asked Pathology to run a PD-L1 from his original pathology from 2016.  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  He has been discussed extensively with radiation oncology. We have decided to treat him with radiation therapy and Cisplatin as a chemo-sensitizer.  Dr Castro has reviewed the therapy ports from the previous radiation treatment and the area in question seems to be above the previously radiated fields.     Completed chemoradiation s/p 6 weekly cisplatin treatments with response.  Currently on maintenance Imfinzi.  Reports had dizziness when gets up at night, attributes to restoril.     Malignant neoplasm of upper lobe of left lung    4/12/2016 Initial Diagnosis     Malignant neoplasm of upper lobe of left lung      6/19/2019 Cancer Staged     Staging form: Lung, AJCC 8th Edition  - Clinical stage from 6/19/2019: Stage IIIB (rcT4, cN2, cM0) - Signed by Alejandro Castro II, MD on 6/19/2019 6/21/2019 Tumor Conference     Presenting Hospital / Clinic: [facility]  Virtual Tumor Board Conference: In person  Presenter: Dr. Chance Castro  Date Presented to Tumor Board: 06/21/19  Specialties Present: Medical Oncology;Radiation Oncology;Surgical Oncology;Pathology;Navigation;Plastic Surgery;Radiology;Gastrointestinal;Pulmonology  Presentation at Cancer Conference: Prospective  Cancer Type: Lung cancer  Recommended Plan: Additional screening  Recommended Plan Note: If PDL-1 positive, treat with immunotherapy  Send tissue for next generation sequencing.       6/28/2019 Tumor Conference     His case was discussed at the Multidisciplinary Head and Neck Team Planning Meeting.    Representatives from Medical Oncology, Radiation Oncology, Head and Neck Surgical Oncology, Psychosocial Oncology, and Speech and Language Pathology discussed the case with the following recommendations:    1) treat lung cancer             7/5/2019 - 8/14/2019 Radiation Therapy     Treatment Site Ref. ID Energy Dose/Fx (Gy) #Fx Dose Correction (Gy) Total Dose (Gy) Start Date End Date Elapsed Days   [id number]:1 PTV LNs 6X 1.8 28 / 28 0 50.4 7/5/2019 8/14/2019 40             Lung cancer    6/11/2019 Initial Diagnosis     Lung cancer      7/8/2019 - 8/18/2019 Chemotherapy     Treatment Summary   Plan Name: OP HEAD NECK CISPLATIN WEEKLY + RADIOTHERAPY  Treatment Goal: Supportive  Status: Inactive  Start Date: 7/8/2019  End Date: 8/12/2019  Provider: Jorge L Patel MD  Chemotherapy: CISplatin (PLATINOL) 40 mg/m2 = 83 mg in sodium chloride 0.9% 583 mL chemo infusion, 40 mg/m2 = 83 mg (100 % of original dose 40 mg/m2), Intravenous, Clinic/HOD 1 time, 6 of 6 cycles  Dose modification: 70 mg (original dose 40 mg/m2, Cycle 1, Reason: MD Discretion), 40 mg/m2 (original dose 40 mg/m2, Cycle 1)  Administration: 83 mg (7/8/2019), 84 mg (7/15/2019), 83 mg (7/22/2019), 83 mg (7/29/2019), 83 mg (8/5/2019), 82 mg (8/12/2019)      9/30/2019 -  Chemotherapy     Treatment Summary   Plan Name: OP DURVALUMAB Q2W  Treatment Goal: Curative  Status: Active  Start Date: 10/3/2019  End Date: 10/5/2020 (Planned)  Provider: Fermin Vila MD  Chemotherapy: durvalumab (IMFINZI) 835 mg in sodium chloride 0.9% 266.7 mL chemo infusion, 10 mg/kg = 835 mg, Intravenous, Clinic/HOD 1 time, 9 of 26 cycles  Administration: 835 mg (10/3/2019), 835 mg (10/23/2019), 835 mg (11/6/2019), 835 mg (11/20/2019), 835 mg (12/4/2019), 835 mg (12/18/2019), 835 mg (1/13/2020), 835 mg (1/27/2020), 835 mg (2/10/2020)       Past Medical History:    Diagnosis Date    Anemia     Arthritis     Atrial fibrillation     CAD (coronary artery disease)     Cataract     COPD (chronic obstructive pulmonary disease)     Diverticulosis     ED (erectile dysfunction)     HTN (hypertension)     Hyperlipidemia     Lung cancer     left    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 Courtney dwyer bx     Family History   Problem Relation Age of Onset    Esophageal cancer Sister     Cancer Sister     Lung cancer Mother     Cancer Mother     Cataracts Mother     Hypertension Mother     Pneumonia Father     Cataracts Father     Hypertension Father     Cancer Brother     Hypertension Brother     Blindness Neg Hx     Diabetes Neg Hx     Glaucoma Neg Hx     Macular degeneration Neg Hx     Retinal detachment Neg Hx     Strabismus Neg Hx     Stroke Neg Hx     Thyroid disease Neg Hx      Social History     Socioeconomic History    Marital status:      Spouse name: Not on file    Number of children: 3    Years of education: Not on file    Highest education level: Not on file   Occupational History    Occupation: retired   Social Needs    Financial resource strain: Not on file    Food insecurity:     Worry: Not on file     Inability: Not on file    Transportation needs:     Medical: Not on file     Non-medical: Not on file   Tobacco Use    Smoking status: Former Smoker     Packs/day: 1.00     Years: 50.00     Pack years: 50.00     Last attempt to quit: 2005     Years since quittin.5    Smokeless tobacco: Never Used   Substance and Sexual Activity    Alcohol use: No    Drug use: No    Sexual activity: Not Currently   Lifestyle    Physical activity:     Days per week: Not on file     Minutes per session: Not on file    Stress: Not on file   Relationships    Social connections:     Talks on phone: Not on file     Gets together: Not on file     Attends Gnosticist service: Not on file     Active  member of club or organization: Not on file     Attends meetings of clubs or organizations: Not on file     Relationship status: Not on file   Other Topics Concern    Not on file   Social History Narrative    Not on file     Past Surgical History:   Procedure Laterality Date    APPENDECTOMY      BRONCHOSCOPY Bilateral 6/21/2019    Procedure: Bronchoscopy;  Surgeon: Paresh Goldman MD;  Location: [facility];  Service: Endoscopy;  Laterality: Bilateral;    CARDIAC CATHETERIZATION      cardiac stents      CATARACT EXTRACTION W/  INTRAOCULAR LENS IMPLANT Right 9-3-14    CHOLECYSTECTOMY      COLONOSCOPY N/A 4/17/2018    Procedure: COLONOSCOPY;  Surgeon: Dionne Doan MD;  Location: [facility];  Service: Endoscopy;  Laterality: N/A;    COLONOSCOPY N/A 10/25/2018    Procedure: COLONOSCOPY;  Surgeon: Michael Hameed MD;  Location: [facility];  Service: Endoscopy;  Laterality: N/A;    ENDOSCOPIC ULTRASOUND OF UPPER GASTROINTESTINAL TRACT N/A 6/11/2019    Procedure: ULTRASOUND, UPPER GI TRACT, ENDOSCOPIC;  Surgeon: Abhishek Knox MD;  Location: [facility];  Service: Endoscopy;  Laterality: N/A;    ESOPHAGOGASTRODUODENOSCOPY N/A 6/11/2019    Procedure: EGD (ESOPHAGOGASTRODUODENOSCOPY);  Surgeon: Abhishek Knox MD;  Location: [facility];  Service: Endoscopy;  Laterality: N/A;    infected stomach gland excision      INSERTION OF TUNNELED CENTRAL VENOUS CATHETER (CVC) WITH SUBCUTANEOUS PORT Right 7/3/2019    Procedure: [id number]-WARV-Y-CATH;  Surgeon: Jean Carlos Constantino MD;  Location: [facility] OR;  Service: General;  Laterality: Right;    LUNG REMOVAL, TOTAL Left 04/2016    lung cancer left upper lobe    SKIN BIOPSY Left     arm     Current Outpatient Medications   Medication Sig Dispense Refill    albuterol (PROVENTIL) 2.5 mg /3 mL (0.083 %) nebulizer solution Take 3 mLs (2.5 mg total) by nebulization every 6 (six) hours while awake. 270 mL 11    amLODIPine (NORVASC) 5 MG tablet Take 1 tablet (5 mg total)  by mouth once daily.      amoxicillin-clavulanate 875-125mg (AUGMENTIN) 875-125 mg per tablet Take 1 tablet by mouth 2 (two) times daily.      apixaban (ELIQUIS) 5 mg Tab Take 1 tablet (5 mg total) by mouth 2 (two) times daily. 60 tablet 5    aspirin (ECOTRIN) 81 MG EC tablet Take 81 mg by mouth once daily.      benzonatate (TESSALON) 200 MG capsule Take 1 capsule (200 mg total) by mouth 3 (three) times daily as needed for Cough. 21 capsule 0    BREO ELLIPTA 100-25 mcg/dose diskus inhaler INHALE 1 PUFF INTO THE LUNGS ONCE DAILY  5    fenofibric acid (FIBRICOR) 135 mg CpDR TAKE 1 CAPSULE BY MOUTH EVERY DAY 90 capsule 3    fluticasone (FLONASE) 50 mcg/actuation nasal spray 2 sprays (100 mcg total) by Each Nare route once daily. (Patient taking differently: 2 sprays by Each Nare route as needed. ) 3 Bottle 3    furosemide (LASIX) 20 MG tablet TAKE 1 TABLET BY MOUTH EVERY DAY AS NEEDED 30 tablet 1    glycopyrrolate-formoterol (BEVESPI AEROSPHERE) 9-4.8 mcg HFAA Inhale 2 puffs into the lungs 2 (two) times daily. Controller 10.9 g 11    HYDROcodone-acetaminophen (NORCO) 5-325 mg per tablet Take 1 tablet by mouth every 6 (six) hours as needed for Pain. 40 tablet 0    ipratropium-albuterol (COMBIVENT RESPIMAT)  mcg/actuation inhaler Inhale 1 puff into the lungs every 6 (six) hours as needed for Shortness of Breath. 4 g 11    levocetirizine (XYZAL) 5 MG tablet Take 5 mg by mouth as needed.       lisinopril (PRINIVIL,ZESTRIL) 40 MG tablet TAKE 1 TABLET BY MOUTH DAILY 30 tablet 11    promethazine (PHENERGAN) 6.25 mg/5 mL syrup Take 20 mLs (25 mg total) by mouth nightly as needed. 240 mL 0    ranolazine (RANEXA) 500 MG Tb12 Take 1 tablet (500 mg total) by mouth 2 (two) times daily. 60 tablet 11    simvastatin (ZOCOR) 80 MG tablet Take 1 tablet (80 mg total) by mouth once daily. 90 tablet 3    SOTALOL AF 80 mg tablet TAKE 1 TABLET BY MOUTH TWICE A  tablet 3    temazepam (RESTORIL) 7.5 MG Cap Take 1  capsule (7.5 mg total) by mouth nightly as needed. 30 capsule 1     Current Facility-Administered Medications   Medication Dose Route Frequency Provider Last Rate Last Dose    testosterone cypionate injection 200 mg  200 mg Intramuscular Q21 Days Peter Teixeira MD   200 mg at 02/06/20 0906     Facility-Administered Medications Ordered in Other Visits   Medication Dose Route Frequency Provider Last Rate Last Dose    lactated ringers infusion   Intravenous Continuous Michael Hameed MD   Stopped at 07/03/19 0810       Labs:  Lab Results   Component Value Date    WBC 6.90 02/10/2020    HGB 13.2 (L) 02/10/2020    HCT 43.6 02/10/2020    MCV 94 02/10/2020     02/10/2020     BMP  Lab Results   Component Value Date     02/10/2020    K 4.8 02/10/2020     02/10/2020    CO2 30 (H) 02/10/2020    BUN 20 02/10/2020    CREATININE 1.4 02/10/2020    CALCIUM 9.4 02/10/2020    ANIONGAP 8 02/10/2020    ESTGFRAFRICA 55 (A) 02/10/2020    EGFRNONAA 48 (A) 02/10/2020     Lab Results   Component Value Date    ALT 15 02/10/2020    AST 17 02/10/2020    ALKPHOS 56 02/10/2020    BILITOT 0.4 02/10/2020       No results found for: IRON, TIBC, FERRITIN, SATURATEDIRO  No results found for: OBLUSSYC38  No results found for: FOLATE  Lab Results   Component Value Date    TSH 5.029 (H) 02/10/2020       I have reviewed the radiology reports and examined the scan/xray images.    Review of Systems   Constitutional: Negative.    HENT: Negative.    Eyes: Negative.    Respiratory: Negative.    Cardiovascular: Negative.    Gastrointestinal: Negative.    Endocrine: Negative.    Genitourinary: Negative.    Musculoskeletal: Negative.    Skin: Negative.    Allergic/Immunologic: Negative.    Neurological: Negative.    Hematological: Negative.    Psychiatric/Behavioral: Negative.      ECOG SCORE    0 - Fully active-able to carry on all pre-disease performance without restriction            Objective:     Vitals:    02/25/20 1017   BP:  126/63   Pulse: (!) 56   Resp: 18   Temp: 97.1 °F (36.2 °C)   Body mass index is 27.05 kg/m².  Physical Exam   Constitutional: He is oriented to person, place, and time. He appears well-developed and well-nourished.   HENT:   Head: Normocephalic and atraumatic.   Eyes: Conjunctivae and EOM are normal.   Neck: Normal range of motion. Neck supple.   Cardiovascular: Normal rate and regular rhythm.   Pulmonary/Chest: Effort normal and breath sounds normal.   Abdominal: Soft. Bowel sounds are normal.   Musculoskeletal: Normal range of motion.   Neurological: He is alert and oriented to person, place, and time.   Skin: Skin is warm and dry.   Psychiatric: He has a normal mood and affect. His behavior is normal. Judgment and thought content normal.   Nursing note and vitals reviewed.        Assessment:      1. Malignant neoplasm of upper lobe of left lung    2. Anemia, unspecified type           Plan:     Malignant neoplasm of upper lobe of left lung  Continue on maintenance Imfinzi  -     CBC auto differential; Future; Expected date: 03/10/2020  -     Comprehensive metabolic panel; Future; Expected date: 03/10/2020  -     TSH; Future; Expected date: 03/10/2020    -     HYDROcodone-acetaminophen (NORCO) 5-325 mg per tablet; Take 1 tablet by mouth every 6 (six) hours as needed for Pain.  Dispense: 60 tablet; Refill: 0    Anemia, unspecified type

## 2020-02-24 NOTE — PROGRESS NOTES
[facility]  Department of Otorhinolaryngology and Communication Sciences    Subjective:      Chai Murry is a 79 y.o. male who presents for follow-up. He has left vocal fold immobility, low likelihood of recovery, 2/2 recurrent lung cancer with paratracheal disease.    TREATMENT HISTORY  11/15/2019 - injection aug - HA  1/10/2020 - injection aug - HA    His voice is doing well. Back to normal. Pleased with his progress.     The patient's medications, allergies, past medical, surgical, social and family histories were reviewed and updated as appropriate.    A detailed review of systems was obtained with pertinent positives as per the above HPI, and otherwise negative.      Objective:     /68   Pulse 61   Wt 85.2 kg (187 lb 13.3 oz)   BMI 26.95 kg/m²      Constitutional: comfortable, well dressed  Psychiatric: appropriate affect  Respiratory: comfortably breathing, symmetric chest rise, no stridor  Voice: minimal variable roughness  Head: normocephalic  Eyes: conjunctivae and sclerae clear  Indirect laryngoscopy is limited due to gag    Procedure  Flexible Laryngeal Videostroboscopy (35001): Laryngeal videostroboscopy is indicated to assess laryngeal vibratory biomechanics and vocal fold oscillation, which cannot be assessed with a plain light examination. This was carried out transnasally with a distal chip videoendoscope. After verbal consent was obtained, the patient was positioned and the nose was topically decongested with 1% phenylephrine and topically anesthetized with 4% lidocaine. The endoscope was passed through the most patent nasal cavity and positioned to image the nasopharynx, larynx, and hypopharynx in detail. The following features were examined: nasopharyngeal, laryngeal, hypopharyngeal masses; velopharyngeal strength, closure, and symmetry of motion; vocal fold range and symmetry of motion; laryngeal mucosal edema, erythema, inflammation, and hydration; salivary pooling; and gross  laryngeal sensation. During phonation, the vocal folds were assessed for glottal closure; mucosal wave; vocal fold lesions; vibratory periodicity, amplitude, and phase symmetry; and vertical height match. The equipment was removed. The patient tolerated the procedure well without complication. All findings were normal except:  - left vocal fold immobile, intermediate position; linear free edge  - complete glottal closure  - negligible vertical height mismatch, negligible posterior gap  - phonatory suprgalottic hyperfunction, mild      Assessment:     Chai Murry is a 79 y.o. male with left vocal fold immobility, low likelihood of recovery, 2/2 recurrent lung cancer with paratracheal disease. He has made good progress following injection augmentation.       Plan:     Reassurance was provided. He will follow up with me in 4 months, or sooner if needed.    All questions were answered, and the patient is in agreement with the plan.    Con Lofton M.D.  [facility]  Department of Otorhinolaryngology and Communication Sciences

## 2020-02-25 ENCOUNTER — INFUSION (OUTPATIENT)
Dept: INFUSION THERAPY | Facility: HOSPITAL | Age: 80
End: 2020-02-25
Attending: INTERNAL MEDICINE
Payer: MEDICARE

## 2020-02-25 ENCOUNTER — OFFICE VISIT (OUTPATIENT)
Dept: HEMATOLOGY/ONCOLOGY | Facility: CLINIC | Age: 80
End: 2020-02-25
Payer: MEDICARE

## 2020-02-25 VITALS
DIASTOLIC BLOOD PRESSURE: 63 MMHG | BODY MASS INDEX: 27.05 KG/M2 | HEIGHT: 70 IN | TEMPERATURE: 97 F | RESPIRATION RATE: 18 BRPM | OXYGEN SATURATION: 97 % | WEIGHT: 188.5 LBS | HEART RATE: 56 BPM | BODY MASS INDEX: 26.99 KG/M2 | SYSTOLIC BLOOD PRESSURE: 126 MMHG | WEIGHT: 188.5 LBS

## 2020-02-25 DIAGNOSIS — C34.12 MALIGNANT NEOPLASM OF UPPER LOBE OF LEFT LUNG: ICD-10-CM

## 2020-02-25 DIAGNOSIS — Z85.118 HISTORY OF CANCER OF UPPER LOBE BRONCHUS OR LUNG: ICD-10-CM

## 2020-02-25 DIAGNOSIS — E03.9 HYPOTHYROIDISM, UNSPECIFIED TYPE: Primary | ICD-10-CM

## 2020-02-25 DIAGNOSIS — C34.12 MALIGNANT NEOPLASM OF UPPER LOBE OF LEFT LUNG: Primary | ICD-10-CM

## 2020-02-25 DIAGNOSIS — J39.8 TRACHEAL MASS: Primary | ICD-10-CM

## 2020-02-25 DIAGNOSIS — D64.9 ANEMIA, UNSPECIFIED TYPE: ICD-10-CM

## 2020-02-25 PROCEDURE — 3078F DIAST BP <80 MM HG: CPT | Mod: CPTII,S$GLB,, | Performed by: INTERNAL MEDICINE

## 2020-02-25 PROCEDURE — 1159F PR MEDICATION LIST DOCUMENTED IN MEDICAL RECORD: ICD-10-PCS | Mod: S$GLB,,, | Performed by: INTERNAL MEDICINE

## 2020-02-25 PROCEDURE — 99215 OFFICE O/P EST HI 40 MIN: CPT | Mod: 25,S$GLB,, | Performed by: INTERNAL MEDICINE

## 2020-02-25 PROCEDURE — 99215 PR OFFICE/OUTPT VISIT, EST, LEVL V, 40-54 MIN: ICD-10-PCS | Mod: 25,S$GLB,, | Performed by: INTERNAL MEDICINE

## 2020-02-25 PROCEDURE — 96413 CHEMO IV INFUSION 1 HR: CPT

## 2020-02-25 PROCEDURE — 99999 PR PBB SHADOW E&M-EST. PATIENT-LVL III: ICD-10-PCS | Mod: PBBFAC,,, | Performed by: INTERNAL MEDICINE

## 2020-02-25 PROCEDURE — 99999 PR PBB SHADOW E&M-EST. PATIENT-LVL III: CPT | Mod: PBBFAC,,, | Performed by: INTERNAL MEDICINE

## 2020-02-25 PROCEDURE — 1159F MED LIST DOCD IN RCRD: CPT | Mod: S$GLB,,, | Performed by: INTERNAL MEDICINE

## 2020-02-25 PROCEDURE — 3074F SYST BP LT 130 MM HG: CPT | Mod: CPTII,S$GLB,, | Performed by: INTERNAL MEDICINE

## 2020-02-25 PROCEDURE — 1126F PR PAIN SEVERITY QUANTIFIED, NO PAIN PRESENT: ICD-10-PCS | Mod: S$GLB,,, | Performed by: INTERNAL MEDICINE

## 2020-02-25 PROCEDURE — 1126F AMNT PAIN NOTED NONE PRSNT: CPT | Mod: S$GLB,,, | Performed by: INTERNAL MEDICINE

## 2020-02-25 PROCEDURE — 1101F PR PT FALLS ASSESS DOC 0-1 FALLS W/OUT INJ PAST YR: ICD-10-PCS | Mod: CPTII,S$GLB,, | Performed by: INTERNAL MEDICINE

## 2020-02-25 PROCEDURE — 1101F PT FALLS ASSESS-DOCD LE1/YR: CPT | Mod: CPTII,S$GLB,, | Performed by: INTERNAL MEDICINE

## 2020-02-25 PROCEDURE — 3074F PR MOST RECENT SYSTOLIC BLOOD PRESSURE < 130 MM HG: ICD-10-PCS | Mod: CPTII,S$GLB,, | Performed by: INTERNAL MEDICINE

## 2020-02-25 PROCEDURE — 63600175 PHARM REV CODE 636 W HCPCS: Mod: JW,TB | Performed by: INTERNAL MEDICINE

## 2020-02-25 PROCEDURE — 3078F PR MOST RECENT DIASTOLIC BLOOD PRESSURE < 80 MM HG: ICD-10-PCS | Mod: CPTII,S$GLB,, | Performed by: INTERNAL MEDICINE

## 2020-02-25 RX ORDER — HEPARIN 100 UNIT/ML
500 SYRINGE INTRAVENOUS
Status: CANCELLED | OUTPATIENT
Start: 2020-02-25

## 2020-02-25 RX ORDER — HYDROCODONE BITARTRATE AND ACETAMINOPHEN 5; 325 MG/1; MG/1
1 TABLET ORAL EVERY 6 HOURS PRN
Qty: 60 TABLET | Refills: 0 | Status: SHIPPED | OUTPATIENT
Start: 2020-02-25 | End: 2020-07-01 | Stop reason: SDUPTHER

## 2020-02-25 RX ORDER — HEPARIN 100 UNIT/ML
500 SYRINGE INTRAVENOUS
Status: DISCONTINUED | OUTPATIENT
Start: 2020-02-25 | End: 2020-02-25 | Stop reason: HOSPADM

## 2020-02-25 RX ORDER — HYDROCODONE BITARTRATE AND ACETAMINOPHEN 5; 325 MG/1; MG/1
1 TABLET ORAL EVERY 6 HOURS PRN
Qty: 60 TABLET | Refills: 0 | Status: SHIPPED | OUTPATIENT
Start: 2020-02-25 | End: 2020-02-25

## 2020-02-25 RX ORDER — LEVOTHYROXINE SODIUM 25 UG/1
25 TABLET ORAL
Qty: 30 TABLET | Refills: 11 | Status: SHIPPED | OUTPATIENT
Start: 2020-02-25 | End: 2022-03-24

## 2020-02-25 RX ORDER — SODIUM CHLORIDE 0.9 % (FLUSH) 0.9 %
10 SYRINGE (ML) INJECTION
Status: CANCELLED | OUTPATIENT
Start: 2020-02-25

## 2020-02-25 RX ADMIN — DURVALUMAB 835 MG: 500 INJECTION, SOLUTION INTRAVENOUS at 11:02

## 2020-02-25 RX ADMIN — HEPARIN 500 UNITS: 100 SYRINGE at 12:02

## 2020-02-25 NOTE — PLAN OF CARE
Patient states he feels well today. No new signs/symptoms/complaints. Vitals stable. Labs reviewed. Treatment plan in process.

## 2020-02-25 NOTE — DISCHARGE INSTRUCTIONS
[facility]  [address] [address]  [address]  [phone] phone     [phone] fax  Hours of Operation: Monday- Friday 8:00am- 5:00pm  After hours phone  [phone]  Hematology / Oncology Physicians on call      Dr. Jose Vila      Please call with any concerns regarding your appointment today.    Pt educated on general chemo care which included: most common side effects, what to expect, and how to manage side effects, hand washing, hydration, food preparation, what foods to avoid, increase protein intake, when to call MD, and when to go to ER. Pt and... Verbalized understanding. Welcome to chemo infusion folder that includes understanding chemotherapy, chemotherapy side effects patient guide, side effects report early and take action given to pt and went over w/ pt.  Pt educated provided to pt via handout from [email or web address]. FALL PREVENTION   Falls often occur due to slipping, tripping or losing your balance. Here are ways to reduce your risk of falling again.   Was there anything that caused your fall that can be fixed, removed or replaced?   Make your home safe by keeping walkways clear of objects you may trip over.   Use non-slip pads under rugs.   Do not walk in poorly lit areas.   Do not stand on chairs or wobbly ladders.   Use caution when reaching overhead or looking upward. This position can cause a loss of balance.   Be sure your shoes fit properly, have non-slip bottoms and are in good condition.   Be cautious when going up and down stairs, curbs, and when walking on uneven sidewalks.   If your balance is poor, consider using a cane or walker.   If your fall was related to alcohol use, stop or limit alcohol intake.   If your fall was related to use of sleeping medicines, talk to your doctor about this. You may need to reduce your dosage at bedtime if you awaken during the night to go to the bathroom.   To  reduce the need for nighttime bathroom trips:   Avoid drinking fluids for several hours before going to bed   Empty your bladder before going to bed   Men can keep a urinal at the bedside   © 5988-1147 [employer], [address] [place] [address], [place] [address]. All rights reserved. This information is not intended as a substitute for professional medical care. Always follow your healthcare professional's instructions.

## 2020-02-26 ENCOUNTER — CLINICAL SUPPORT (OUTPATIENT)
Dept: INTERNAL MEDICINE | Facility: CLINIC | Age: 80
End: 2020-02-26
Payer: MEDICARE

## 2020-02-26 PROCEDURE — 99999 PR PBB SHADOW E&M-EST. PATIENT-LVL III: ICD-10-PCS | Mod: PBBFAC,,,

## 2020-02-26 PROCEDURE — 96372 PR INJECTION,THERAP/PROPH/DIAG2ST, IM OR SUBCUT: ICD-10-PCS | Mod: S$GLB,,, | Performed by: INTERNAL MEDICINE

## 2020-02-26 PROCEDURE — 99999 PR PBB SHADOW E&M-EST. PATIENT-LVL III: CPT | Mod: PBBFAC,,,

## 2020-02-26 PROCEDURE — 96372 THER/PROPH/DIAG INJ SC/IM: CPT | Mod: S$GLB,,, | Performed by: INTERNAL MEDICINE

## 2020-02-26 RX ADMIN — TESTOSTERONE CYPIONATE 200 MG: 200 INJECTION, SOLUTION INTRAMUSCULAR at 09:02

## 2020-02-26 NOTE — PROGRESS NOTES
Pt came in today for Depo Testosterone 200 mg Injection per RADHAMES Teixeira MD orders. After receiving consent,  injection given. Pt informed to wait in clinic 20 minutes after receiving injection and to report any adverse reaction to me immediately. Pt verbalized understanding.     RADHAMES Valera LPN

## 2020-03-09 NOTE — PROGRESS NOTES
Subjective:       Patient ID: Chai Murry is a 79 y.o. male.    Chief Complaint: Malignant neoplasm of upper lobe of left lung [C34.12]  HPI: We have an opportunity to see Mr. Chai Murry in Hematology Oncology clinic at [facility] on 03/09/2020.  Mr. Chai Murry is a 79 y.o. 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     He is s/p left pneumonectomy for a squamous cell carcinoma of the left lung.0n 4/12/2016  Prior to the surgery, the PET scan showed an FDG-avid mass in the left upper   lobe abutting the left hilum with an SUV of 11.6. There seemed to be a left   hilar adenopathy as well.  Radiologist felt that he was unable to discriminate between the mass and the   lymphadenopathy. The patient had had a bronchoscopy by Dr. Roel Ernandez on   03/04/2006 that showed a partially obstructing airway in the anterior segment of  the left upper lobe with an endobronchial lesion in the anterior segment of the  left upper lobe. The specimen from the biopsy at the time of bronchoscopy was   reported as being a squamous cell carcinoma.  At the time of the surgery after the left lung was removed, the pathologist   indicated that this was a squamous cell carcinoma. It measured 3.8 cm. The   pathology indicated that this is extending into the bronchial resection margin of the lobe. This lobe was later on removed to complete the pneumonectomy   There was one lymph node positive for metastatic squamous cell carcinoma at the   AP window.  The patient was told that we would prefer to treat him with post-op simultaneous chemo/radiation.  He had Medi-port. He started chemo/radiation therapy andreceived 6 weekly cycles of carbo/Taxol along with radiatioon.  After a month, he had a Ct scan and it showed stability   He then had 2 additional cyles of carbo/Taxol finishing in 9/27/2016.    He comes for follow up.   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 CT of the chest was non contributory, shows changes from surgery  PET showed a PET avid mass to the left of the trachea.  The case was discussed with dr annika Goldman and GI.  We discussed the possibility of doing a EUS or EBUs, and finally, because of the localization, it was decided to do a EUS with biopsy if possible.  Cytology shows recurrent squamous cell cancer.  I have asked Pathology to run a PD-L1 from his original pathology from 2016.  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  He has been discussed extensively with radiation oncology. We have decided to treat him with radiation therapy and Cisplatin as a chemo-sensitizer.  Dr Castro has reviewed the therapy ports from the previous radiation treatment and the area in question seems to be above the previously radiated fields.     Completed chemoradiation s/p 6 weekly cisplatin treatments with response.  Currently on maintenance Imfinzi.  Reports had dizziness when gets up at night, attributes to restoril.       Malignant neoplasm of upper lobe of left lung    4/12/2016 Initial Diagnosis     Malignant neoplasm of upper lobe of left lung      6/19/2019 Cancer Staged     Staging form: Lung, AJCC 8th Edition  - Clinical stage from 6/19/2019: Stage IIIB (rcT4, cN2, cM0) - Signed by Alejandro Castro II, MD on 6/19/2019 6/21/2019 Tumor Conference     Presenting Hospital / Clinic: [facility]  Virtual Tumor Board Conference: In person  Presenter: Dr. Chance Castro  Date Presented to Tumor Board: 06/21/19  Specialties Present: Medical Oncology;Radiation Oncology;Surgical Oncology;Pathology;Navigation;Plastic Surgery;Radiology;Gastrointestinal;Pulmonology  Presentation at Cancer Conference: Prospective  Cancer Type: Lung cancer  Recommended Plan: Additional screening  Recommended Plan Note: If PDL-1 positive, treat with immunotherapy  Send tissue for next generation  sequencing.      6/28/2019 Tumor Conference     His case was discussed at the Multidisciplinary Head and Neck Team Planning Meeting.    Representatives from Medical Oncology, Radiation Oncology, Head and Neck Surgical Oncology, Psychosocial Oncology, and Speech and Language Pathology discussed the case with the following recommendations:    1) treat lung cancer             7/5/2019 - 8/14/2019 Radiation Therapy     Treatment Site Ref. ID Energy Dose/Fx (Gy) #Fx Dose Correction (Gy) Total Dose (Gy) Start Date End Date Elapsed Days   [id number]:1 PTV LNs 6X 1.8 28 / 28 0 50.4 7/5/2019 8/14/2019 40             Lung cancer    6/11/2019 Initial Diagnosis     Lung cancer      7/8/2019 - 8/18/2019 Chemotherapy     Treatment Summary   Plan Name: OP HEAD NECK CISPLATIN WEEKLY + RADIOTHERAPY  Treatment Goal: Supportive  Status: Inactive  Start Date: 7/8/2019  End Date: 8/12/2019  Provider: Jorge L Patel MD  Chemotherapy: CISplatin (PLATINOL) 40 mg/m2 = 83 mg in sodium chloride 0.9% 583 mL chemo infusion, 40 mg/m2 = 83 mg (100 % of original dose 40 mg/m2), Intravenous, Clinic/HOD 1 time, 6 of 6 cycles  Dose modification: 70 mg (original dose 40 mg/m2, Cycle 1, Reason: MD Discretion), 40 mg/m2 (original dose 40 mg/m2, Cycle 1)  Administration: 83 mg (7/8/2019), 84 mg (7/15/2019), 83 mg (7/22/2019), 83 mg (7/29/2019), 83 mg (8/5/2019), 82 mg (8/12/2019)      9/30/2019 -  Chemotherapy     Treatment Summary   Plan Name: OP DURVALUMAB Q2W  Treatment Goal: Curative  Status: Active  Start Date: 10/3/2019  End Date: 10/6/2020 (Planned)  Provider: Fermin Vila MD  Chemotherapy: durvalumab (IMFINZI) 835 mg in sodium chloride 0.9% 266.7 mL chemo infusion, 10 mg/kg = 835 mg, Intravenous, Clinic/HOD 1 time, 10 of 26 cycles  Administration: 835 mg (10/3/2019), 835 mg (10/23/2019), 835 mg (11/6/2019), 835 mg (11/20/2019), 835 mg (12/4/2019), 835 mg (12/18/2019), 835 mg (1/13/2020), 835 mg (1/27/2020), 835 mg (2/10/2020), 835 mg  (2020)       Past Medical History:   Diagnosis Date    Anemia     Arthritis     Atrial fibrillation     CAD (coronary artery disease)     Cataract     COPD (chronic obstructive pulmonary disease)     Diverticulosis     ED (erectile dysfunction)     HTN (hypertension)     Hyperlipidemia     Lung cancer     left    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 Courtney dwyer bx     Family History   Problem Relation Age of Onset    Esophageal cancer Sister     Cancer Sister     Lung cancer Mother     Cancer Mother     Cataracts Mother     Hypertension Mother     Pneumonia Father     Cataracts Father     Hypertension Father     Cancer Brother     Hypertension Brother     Blindness Neg Hx     Diabetes Neg Hx     Glaucoma Neg Hx     Macular degeneration Neg Hx     Retinal detachment Neg Hx     Strabismus Neg Hx     Stroke Neg Hx     Thyroid disease Neg Hx      Social History     Socioeconomic History    Marital status:      Spouse name: Not on file    Number of children: 3    Years of education: Not on file    Highest education level: Not on file   Occupational History    Occupation: retired   Social Needs    Financial resource strain: Not on file    Food insecurity:     Worry: Not on file     Inability: Not on file    Transportation needs:     Medical: Not on file     Non-medical: Not on file   Tobacco Use    Smoking status: Former Smoker     Packs/day: 1.00     Years: 50.00     Pack years: 50.00     Last attempt to quit: 2005     Years since quittin.5    Smokeless tobacco: Never Used   Substance and Sexual Activity    Alcohol use: No    Drug use: No    Sexual activity: Not Currently   Lifestyle    Physical activity:     Days per week: Not on file     Minutes per session: Not on file    Stress: Not on file   Relationships    Social connections:     Talks on phone: Not on file     Gets together: Not on file     Attends  Rastafarian service: Not on file     Active member of club or organization: Not on file     Attends meetings of clubs or organizations: Not on file     Relationship status: Not on file   Other Topics Concern    Not on file   Social History Narrative    Not on file     Past Surgical History:   Procedure Laterality Date    APPENDECTOMY      BRONCHOSCOPY Bilateral 6/21/2019    Procedure: Bronchoscopy;  Surgeon: Paresh Goldman MD;  Location: [facility] ENDO;  Service: Endoscopy;  Laterality: Bilateral;    CARDIAC CATHETERIZATION      cardiac stents      CATARACT EXTRACTION W/  INTRAOCULAR LENS IMPLANT Right 9-3-14    CHOLECYSTECTOMY      COLONOSCOPY N/A 4/17/2018    Procedure: COLONOSCOPY;  Surgeon: Dionne Doan MD;  Location: [facility] ENDO;  Service: Endoscopy;  Laterality: N/A;    COLONOSCOPY N/A 10/25/2018    Procedure: COLONOSCOPY;  Surgeon: Michael Hameed MD;  Location: [facility] ENDO;  Service: Endoscopy;  Laterality: N/A;    ENDOSCOPIC ULTRASOUND OF UPPER GASTROINTESTINAL TRACT N/A 6/11/2019    Procedure: ULTRASOUND, UPPER GI TRACT, ENDOSCOPIC;  Surgeon: Abhishek Knox MD;  Location: [facility] ENDO;  Service: Endoscopy;  Laterality: N/A;    ESOPHAGOGASTRODUODENOSCOPY N/A 6/11/2019    Procedure: EGD (ESOPHAGOGASTRODUODENOSCOPY);  Surgeon: Abhishek Knox MD;  Location: [facility] ENDO;  Service: Endoscopy;  Laterality: N/A;    infected stomach gland excision      INSERTION OF TUNNELED CENTRAL VENOUS CATHETER (CVC) WITH SUBCUTANEOUS PORT Right 7/3/2019    Procedure: [id number];  Surgeon: Jean Carlos Constantino MD;  Location: [facility] OR;  Service: General;  Laterality: Right;    LUNG REMOVAL, TOTAL Left 04/2016    lung cancer left upper lobe    SKIN BIOPSY Left     arm     Current Outpatient Medications   Medication Sig Dispense Refill    albuterol (PROVENTIL) 2.5 mg /3 mL (0.083 %) nebulizer solution Take 3 mLs (2.5 mg total) by nebulization every 6 (six) hours while awake. 270 mL 11    amLODIPine  (NORVASC) 5 MG tablet Take 1 tablet (5 mg total) by mouth once daily.      amoxicillin-clavulanate 875-125mg (AUGMENTIN) 875-125 mg per tablet Take 1 tablet by mouth 2 (two) times daily.      apixaban (ELIQUIS) 5 mg Tab Take 1 tablet (5 mg total) by mouth 2 (two) times daily. 60 tablet 5    aspirin (ECOTRIN) 81 MG EC tablet Take 81 mg by mouth once daily.      benzonatate (TESSALON) 200 MG capsule Take 1 capsule (200 mg total) by mouth 3 (three) times daily as needed for Cough. 21 capsule 0    BREO ELLIPTA 100-25 mcg/dose diskus inhaler INHALE 1 PUFF INTO THE LUNGS ONCE DAILY  5    fenofibric acid (FIBRICOR) 135 mg CpDR TAKE 1 CAPSULE BY MOUTH EVERY DAY 90 capsule 3    fluticasone (FLONASE) 50 mcg/actuation nasal spray 2 sprays (100 mcg total) by Each Nare route once daily. (Patient taking differently: 2 sprays by Each Nare route as needed. ) 3 Bottle 3    furosemide (LASIX) 20 MG tablet TAKE 1 TABLET BY MOUTH EVERY DAY AS NEEDED 30 tablet 1    glycopyrrolate-formoterol (BEVESPI AEROSPHERE) 9-4.8 mcg HFAA Inhale 2 puffs into the lungs 2 (two) times daily. Controller 10.9 g 11    HYDROcodone-acetaminophen (NORCO) 5-325 mg per tablet Take 1 tablet by mouth every 6 (six) hours as needed for Pain. 60 tablet 0    ipratropium-albuterol (COMBIVENT RESPIMAT)  mcg/actuation inhaler Inhale 1 puff into the lungs every 6 (six) hours as needed for Shortness of Breath. 4 g 11    levocetirizine (XYZAL) 5 MG tablet Take 5 mg by mouth as needed.       levothyroxine (SYNTHROID) 25 MCG tablet Take 1 tablet (25 mcg total) by mouth before breakfast. 30 tablet 11    lisinopril (PRINIVIL,ZESTRIL) 40 MG tablet TAKE 1 TABLET BY MOUTH DAILY 30 tablet 11    promethazine (PHENERGAN) 6.25 mg/5 mL syrup Take 20 mLs (25 mg total) by mouth nightly as needed. 240 mL 0    ranolazine (RANEXA) 500 MG Tb12 Take 1 tablet (500 mg total) by mouth 2 (two) times daily. 60 tablet 11    simvastatin (ZOCOR) 80 MG tablet Take 1 tablet (80  mg total) by mouth once daily. 90 tablet 3    SOTALOL AF 80 mg tablet TAKE 1 TABLET BY MOUTH TWICE A  tablet 3    temazepam (RESTORIL) 7.5 MG Cap Take 1 capsule (7.5 mg total) by mouth nightly as needed. 30 capsule 1     Current Facility-Administered Medications   Medication Dose Route Frequency Provider Last Rate Last Dose    testosterone cypionate injection 200 mg  200 mg Intramuscular Q21 Days Peter Teixeira MD   200 mg at 02/26/20 0937     Facility-Administered Medications Ordered in Other Visits   Medication Dose Route Frequency Provider Last Rate Last Dose    lactated ringers infusion   Intravenous Continuous Michael Hameed MD   Stopped at 07/03/19 0810       Labs:  Lab Results   Component Value Date    WBC 6.47 02/25/2020    HGB 13.4 (L) 02/25/2020    HCT 43.2 02/25/2020    MCV 94 02/25/2020     02/25/2020     BMP  Lab Results   Component Value Date     02/25/2020    K 4.7 02/25/2020     02/25/2020    CO2 30 (H) 02/25/2020    BUN 23 02/25/2020    CREATININE 1.5 (H) 02/25/2020    CALCIUM 9.3 02/25/2020    ANIONGAP 8 02/25/2020    ESTGFRAFRICA 51 (A) 02/25/2020    EGFRNONAA 44 (A) 02/25/2020     Lab Results   Component Value Date    ALT 14 02/25/2020    AST 19 02/25/2020    ALKPHOS 57 02/25/2020    BILITOT 0.5 02/25/2020       No results found for: IRON, TIBC, FERRITIN, SATURATEDIRO  No results found for: PCDXCCMP54  No results found for: FOLATE  Lab Results   Component Value Date    TSH 10.053 (H) 02/25/2020       I have reviewed the radiology reports and examined the scan/xray images.    Review of Systems   Constitutional: Negative.    HENT: Negative.    Eyes: Negative.    Respiratory: Negative.    Cardiovascular: Negative.    Gastrointestinal: Negative.    Endocrine: Negative.    Genitourinary: Negative.    Musculoskeletal: Negative.    Skin: Negative.    Allergic/Immunologic: Negative.    Neurological: Negative.    Hematological: Negative.    Psychiatric/Behavioral:  Negative.      ECOG SCORE    0 - Fully active-able to carry on all pre-disease performance without restriction            Objective:     Vitals:    03/10/20 0901   BP: (!) 150/75   Pulse: 69   Temp: 98.1 °F (36.7 °C)   Body mass index is 27.93 kg/m².  Physical Exam   Constitutional: He is oriented to person, place, and time. He appears well-developed and well-nourished.   HENT:   Head: Normocephalic and atraumatic.   Eyes: Conjunctivae and EOM are normal.   Neck: Normal range of motion. Neck supple.   Cardiovascular: Normal rate and regular rhythm.   Pulmonary/Chest: Effort normal and breath sounds normal.   Abdominal: Soft. Bowel sounds are normal.   Musculoskeletal: Normal range of motion.   Neurological: He is alert and oriented to person, place, and time.   Skin: Skin is warm and dry.   Psychiatric: He has a normal mood and affect. His behavior is normal. Judgment and thought content normal.   Nursing note and vitals reviewed.        Assessment:      1. Malignant neoplasm of upper lobe of left lung    2. Hypothyroidism, unspecified type           Plan:     Malignant neoplasm of upper lobe of left lung  Continue on maintenance Imfinzi cycle 11 of 26 today.  -     CBC auto differential; Future; Expected date: 03/10/2020  -     Comprehensive metabolic panel; Future; Expected date: 03/10/2020    Hypothyroidism, unspecified type  Continue on synthroid  -     TSH; Future; Expected date: 03/10/2020

## 2020-03-10 ENCOUNTER — OFFICE VISIT (OUTPATIENT)
Dept: HEMATOLOGY/ONCOLOGY | Facility: CLINIC | Age: 80
End: 2020-03-10
Payer: MEDICARE

## 2020-03-10 ENCOUNTER — INFUSION (OUTPATIENT)
Dept: INFUSION THERAPY | Facility: HOSPITAL | Age: 80
End: 2020-03-10
Attending: INTERNAL MEDICINE
Payer: MEDICARE

## 2020-03-10 ENCOUNTER — LAB VISIT (OUTPATIENT)
Dept: LAB | Facility: HOSPITAL | Age: 80
End: 2020-03-10
Attending: INTERNAL MEDICINE
Payer: MEDICARE

## 2020-03-10 VITALS
BODY MASS INDEX: 27.87 KG/M2 | DIASTOLIC BLOOD PRESSURE: 75 MMHG | WEIGHT: 194.69 LBS | OXYGEN SATURATION: 95 % | TEMPERATURE: 98 F | HEIGHT: 70 IN | SYSTOLIC BLOOD PRESSURE: 150 MMHG | HEART RATE: 69 BPM

## 2020-03-10 DIAGNOSIS — C34.12 MALIGNANT NEOPLASM OF UPPER LOBE OF LEFT LUNG: ICD-10-CM

## 2020-03-10 DIAGNOSIS — Z85.118 HISTORY OF CANCER OF UPPER LOBE BRONCHUS OR LUNG: ICD-10-CM

## 2020-03-10 DIAGNOSIS — J39.8 TRACHEAL MASS: Primary | ICD-10-CM

## 2020-03-10 DIAGNOSIS — E03.9 HYPOTHYROIDISM, UNSPECIFIED TYPE: ICD-10-CM

## 2020-03-10 DIAGNOSIS — C34.12 MALIGNANT NEOPLASM OF UPPER LOBE OF LEFT LUNG: Primary | ICD-10-CM

## 2020-03-10 LAB
ALBUMIN SERPL BCP-MCNC: 3.5 G/DL (ref 3.5–5.2)
ALP SERPL-CCNC: 52 U/L (ref 55–135)
ALT SERPL W/O P-5'-P-CCNC: 12 U/L (ref 10–44)
ANION GAP SERPL CALC-SCNC: 8 MMOL/L (ref 8–16)
AST SERPL-CCNC: 15 U/L (ref 10–40)
BASOPHILS # BLD AUTO: 0.07 K/UL (ref 0–0.2)
BASOPHILS NFR BLD: 0.8 % (ref 0–1.9)
BILIRUB SERPL-MCNC: 0.3 MG/DL (ref 0.1–1)
BUN SERPL-MCNC: 20 MG/DL (ref 8–23)
CALCIUM SERPL-MCNC: 9 MG/DL (ref 8.7–10.5)
CHLORIDE SERPL-SCNC: 103 MMOL/L (ref 95–110)
CO2 SERPL-SCNC: 30 MMOL/L (ref 23–29)
CREAT SERPL-MCNC: 1.3 MG/DL (ref 0.5–1.4)
DIFFERENTIAL METHOD: ABNORMAL
EOSINOPHIL # BLD AUTO: 1.8 K/UL (ref 0–0.5)
EOSINOPHIL NFR BLD: 21.1 % (ref 0–8)
ERYTHROCYTE [DISTWIDTH] IN BLOOD BY AUTOMATED COUNT: 16.4 % (ref 11.5–14.5)
EST. GFR  (AFRICAN AMERICAN): >60 ML/MIN/1.73 M^2
EST. GFR  (NON AFRICAN AMERICAN): 52 ML/MIN/1.73 M^2
GLUCOSE SERPL-MCNC: 100 MG/DL (ref 70–110)
HCT VFR BLD AUTO: 42.3 % (ref 40–54)
HGB BLD-MCNC: 12.8 G/DL (ref 14–18)
IMM GRANULOCYTES # BLD AUTO: 0.07 K/UL (ref 0–0.04)
IMM GRANULOCYTES NFR BLD AUTO: 0.8 % (ref 0–0.5)
LYMPHOCYTES # BLD AUTO: 0.7 K/UL (ref 1–4.8)
LYMPHOCYTES NFR BLD: 8 % (ref 18–48)
MCH RBC QN AUTO: 28.8 PG (ref 27–31)
MCHC RBC AUTO-ENTMCNC: 30.3 G/DL (ref 32–36)
MCV RBC AUTO: 95 FL (ref 82–98)
MONOCYTES # BLD AUTO: 0.7 K/UL (ref 0.3–1)
MONOCYTES NFR BLD: 8.8 % (ref 4–15)
NEUTROPHILS # BLD AUTO: 5 K/UL (ref 1.8–7.7)
NEUTROPHILS NFR BLD: 60.5 % (ref 38–73)
NRBC BLD-RTO: 0 /100 WBC
PLATELET # BLD AUTO: 207 K/UL (ref 150–350)
PMV BLD AUTO: 9.9 FL (ref 9.2–12.9)
POTASSIUM SERPL-SCNC: 4.3 MMOL/L (ref 3.5–5.1)
PROT SERPL-MCNC: 7.1 G/DL (ref 6–8.4)
RBC # BLD AUTO: 4.44 M/UL (ref 4.6–6.2)
SODIUM SERPL-SCNC: 141 MMOL/L (ref 136–145)
T4 FREE SERPL-MCNC: 0.78 NG/DL (ref 0.71–1.51)
TSH SERPL DL<=0.005 MIU/L-ACNC: 6.32 UIU/ML (ref 0.4–4)
WBC # BLD AUTO: 8.33 K/UL (ref 3.9–12.7)

## 2020-03-10 PROCEDURE — 84439 ASSAY OF FREE THYROXINE: CPT

## 2020-03-10 PROCEDURE — 36415 COLL VENOUS BLD VENIPUNCTURE: CPT

## 2020-03-10 PROCEDURE — 1101F PT FALLS ASSESS-DOCD LE1/YR: CPT | Mod: CPTII,S$GLB,, | Performed by: INTERNAL MEDICINE

## 2020-03-10 PROCEDURE — 3077F SYST BP >= 140 MM HG: CPT | Mod: CPTII,S$GLB,, | Performed by: INTERNAL MEDICINE

## 2020-03-10 PROCEDURE — 84443 ASSAY THYROID STIM HORMONE: CPT

## 2020-03-10 PROCEDURE — 1159F MED LIST DOCD IN RCRD: CPT | Mod: S$GLB,,, | Performed by: INTERNAL MEDICINE

## 2020-03-10 PROCEDURE — 63600175 PHARM REV CODE 636 W HCPCS: Performed by: INTERNAL MEDICINE

## 2020-03-10 PROCEDURE — 99999 PR PBB SHADOW E&M-EST. PATIENT-LVL IV: ICD-10-PCS | Mod: PBBFAC,,, | Performed by: INTERNAL MEDICINE

## 2020-03-10 PROCEDURE — 1101F PR PT FALLS ASSESS DOC 0-1 FALLS W/OUT INJ PAST YR: ICD-10-PCS | Mod: CPTII,S$GLB,, | Performed by: INTERNAL MEDICINE

## 2020-03-10 PROCEDURE — 3077F PR MOST RECENT SYSTOLIC BLOOD PRESSURE >= 140 MM HG: ICD-10-PCS | Mod: CPTII,S$GLB,, | Performed by: INTERNAL MEDICINE

## 2020-03-10 PROCEDURE — 3078F DIAST BP <80 MM HG: CPT | Mod: CPTII,S$GLB,, | Performed by: INTERNAL MEDICINE

## 2020-03-10 PROCEDURE — 1126F PR PAIN SEVERITY QUANTIFIED, NO PAIN PRESENT: ICD-10-PCS | Mod: S$GLB,,, | Performed by: INTERNAL MEDICINE

## 2020-03-10 PROCEDURE — 96413 CHEMO IV INFUSION 1 HR: CPT

## 2020-03-10 PROCEDURE — 1159F PR MEDICATION LIST DOCUMENTED IN MEDICAL RECORD: ICD-10-PCS | Mod: S$GLB,,, | Performed by: INTERNAL MEDICINE

## 2020-03-10 PROCEDURE — 99215 PR OFFICE/OUTPT VISIT, EST, LEVL V, 40-54 MIN: ICD-10-PCS | Mod: 25,S$GLB,, | Performed by: INTERNAL MEDICINE

## 2020-03-10 PROCEDURE — 1126F AMNT PAIN NOTED NONE PRSNT: CPT | Mod: S$GLB,,, | Performed by: INTERNAL MEDICINE

## 2020-03-10 PROCEDURE — 99215 OFFICE O/P EST HI 40 MIN: CPT | Mod: 25,S$GLB,, | Performed by: INTERNAL MEDICINE

## 2020-03-10 PROCEDURE — 99999 PR PBB SHADOW E&M-EST. PATIENT-LVL IV: CPT | Mod: PBBFAC,,, | Performed by: INTERNAL MEDICINE

## 2020-03-10 PROCEDURE — 3078F PR MOST RECENT DIASTOLIC BLOOD PRESSURE < 80 MM HG: ICD-10-PCS | Mod: CPTII,S$GLB,, | Performed by: INTERNAL MEDICINE

## 2020-03-10 PROCEDURE — 80053 COMPREHEN METABOLIC PANEL: CPT

## 2020-03-10 RX ORDER — HEPARIN 100 UNIT/ML
500 SYRINGE INTRAVENOUS
Status: CANCELLED | OUTPATIENT
Start: 2020-03-10

## 2020-03-10 RX ORDER — SODIUM CHLORIDE 0.9 % (FLUSH) 0.9 %
10 SYRINGE (ML) INJECTION
Status: CANCELLED | OUTPATIENT
Start: 2020-03-10

## 2020-03-10 RX ORDER — SODIUM CHLORIDE 0.9 % (FLUSH) 0.9 %
10 SYRINGE (ML) INJECTION
Status: DISCONTINUED | OUTPATIENT
Start: 2020-03-10 | End: 2020-03-10 | Stop reason: HOSPADM

## 2020-03-10 RX ORDER — HEPARIN 100 UNIT/ML
500 SYRINGE INTRAVENOUS
Status: DISCONTINUED | OUTPATIENT
Start: 2020-03-10 | End: 2020-03-10 | Stop reason: HOSPADM

## 2020-03-10 RX ADMIN — DURVALUMAB 885 MG: 500 INJECTION, SOLUTION INTRAVENOUS at 09:03

## 2020-03-10 RX ADMIN — HEPARIN 500 UNITS: 100 SYRINGE at 11:03

## 2020-03-10 RX ADMIN — SODIUM CHLORIDE: 9 INJECTION, SOLUTION INTRAVENOUS at 09:03

## 2020-03-10 NOTE — DISCHARGE INSTRUCTIONS
[facility]  [address] [facility]  [address]  [phone] phone     [phone] fax  Hours of Operation: Monday- Friday 8:00am- 5:00pm  After hours phone  [phone]  Hematology / Oncology Physicians on call      Dr. Jose Fermin, JA [place] NP Tyesha Taylor, NP    Please call with any concerns regarding your appointment today.FALL PREVENTION   Falls often occur due to slipping, tripping or losing your balance. Here are ways to reduce your risk of falling again.   Was there anything that caused your fall that can be fixed, removed or replaced?   Make your home safe by keeping walkways clear of objects you may trip over.   Use non-slip pads under rugs.   Do not walk in poorly lit areas.   Do not stand on chairs or wobbly ladders.   Use caution when reaching overhead or looking upward. This position can cause a loss of balance.   Be sure your shoes fit properly, have non-slip bottoms and are in good condition.   Be cautious when going up and down stairs, curbs, and when walking on uneven sidewalks.   If your balance is poor, consider using a cane or walker.   If your fall was related to alcohol use, stop or limit alcohol intake.   If your fall was related to use of sleeping medicines, talk to your doctor about this. You may need to reduce your dosage at bedtime if you awaken during the night to go to the bathroom.   To reduce the need for nighttime bathroom trips:   Avoid drinking fluids for several hours before going to bed   Empty your bladder before going to bed   Men can keep a urinal at the bedside   © 9557-7939 [facility], [address] [place] [address], [place] [address]. All rights reserved. This information is not intended as a substitute for professional medical care. Always follow your healthcare professional's instructions.  Support Groups/Classes    Support groups  "and classes are being offered at the   [facility] and [facility]!!    "Cooking with Cancer" (Nutrition Class):  Second Wednesday of each month   at noon at the Cancer Center.  Metastatic Support Group:  Third Tuesday of each month   at noon at the Cancer Center.  Next Steps Class/Group: Second and fourth Thursday of each month at noon at the Cancer Center.  [facility] (Breast Cancer Support Group): First Tuesday of each month   at 5:30pm at the [facility] location.  [facility]: [facility]: Second and third Tuesday of each month from 7:30am - 2pm.  [facility]: First and fourth Tuesday of each month from 7:30am - 2pm    If you are interested in attending or would like more information please ask our social workers or your nurse!WAYS TO HELP PREVENT INFECTION         WASH YOUR HANDS OFTEN DURING THE DAY, ESPECIALLY BEFORE YOU EAT, AFTER USING THE BATHROOM, AND AFTER TOUCHING ANIMALS     STAY AWAY FROM PEOPLE WHO HAVE ILLNESSES YOU CAN CATCH; SUCH AS COLDS, FLU, CHICKEN POX     TRY TO AVOID CROWDS     STAY AWAY FROM CHILDREN WHO RECENTLY HAVE RECEIVED LIVE VIRUS VACCINES     MAINTAIN GOOD MOUTH CARE     DO NOT SQUEEZE OR SCRATCH PIMPLES     CLEAN CUTS & SCRAPES RIGHT AWAY AND DAILY UNTIL HEALED WITH WARM WATER, SOAP & AN ANTISEPTIC     AVOID CONTACT WITH LITTER BOXES, BIRD CAGES, & FISH TANKS     AVOID STANDING WATER, IE., BIRD BATHS, FLOWER POTS/VASES, OR HUMIDIFIERS     WEAR GLOVES WHEN GARDENING OR CLEANING UP AFTER OTHERS, ESPECIALLY BABIES & SMALL CHILDREN    DO NOT EAT RAW FISH, SEAFOOD, MEAT, OR EGGS  "

## 2020-03-10 NOTE — PLAN OF CARE
Problem: Nausea and Vomiting (Chemotherapy Effects)  Goal: Fluid and Electrolyte Balance  Outcome: Ongoing, Progressing   Pt stated a little nauseated today and he took zofran this morning. Pt denies any SOB and discomfort.

## 2020-03-19 ENCOUNTER — TELEPHONE (OUTPATIENT)
Dept: INFUSION THERAPY | Facility: HOSPITAL | Age: 80
End: 2020-03-19

## 2020-03-19 NOTE — TELEPHONE ENCOUNTER
I advised patient that he would get his chemo unless his blood counts are down. I advised no visitors policy and he would be screened at the door.

## 2020-03-19 NOTE — TELEPHONE ENCOUNTER
----- Message from Raegan Briones sent at 3/19/2020 10:17 AM CDT -----  Contact: pt  Will pt still have his infusion on Tues 3/24

## 2020-03-24 ENCOUNTER — INFUSION (OUTPATIENT)
Dept: INFUSION THERAPY | Facility: HOSPITAL | Age: 80
End: 2020-03-24
Attending: INTERNAL MEDICINE
Payer: MEDICARE

## 2020-03-24 ENCOUNTER — OFFICE VISIT (OUTPATIENT)
Dept: HEMATOLOGY/ONCOLOGY | Facility: CLINIC | Age: 80
End: 2020-03-24
Payer: MEDICARE

## 2020-03-24 VITALS
OXYGEN SATURATION: 94 % | HEART RATE: 55 BPM | TEMPERATURE: 97 F | BODY MASS INDEX: 27.36 KG/M2 | RESPIRATION RATE: 18 BRPM | WEIGHT: 191.13 LBS | HEIGHT: 70 IN | DIASTOLIC BLOOD PRESSURE: 62 MMHG | SYSTOLIC BLOOD PRESSURE: 120 MMHG

## 2020-03-24 VITALS
HEART RATE: 57 BPM | TEMPERATURE: 98 F | OXYGEN SATURATION: 95 % | SYSTOLIC BLOOD PRESSURE: 112 MMHG | RESPIRATION RATE: 16 BRPM | DIASTOLIC BLOOD PRESSURE: 60 MMHG

## 2020-03-24 DIAGNOSIS — C34.12 MALIGNANT NEOPLASM OF UPPER LOBE OF LEFT LUNG: Primary | ICD-10-CM

## 2020-03-24 DIAGNOSIS — Z51.11 CHEMOTHERAPY MANAGEMENT, ENCOUNTER FOR: ICD-10-CM

## 2020-03-24 DIAGNOSIS — C34.12 MALIGNANT NEOPLASM OF UPPER LOBE OF LEFT LUNG: ICD-10-CM

## 2020-03-24 DIAGNOSIS — Z85.118 HISTORY OF CANCER OF UPPER LOBE BRONCHUS OR LUNG: ICD-10-CM

## 2020-03-24 DIAGNOSIS — J39.8 TRACHEAL MASS: Primary | ICD-10-CM

## 2020-03-24 PROCEDURE — 1126F PR PAIN SEVERITY QUANTIFIED, NO PAIN PRESENT: ICD-10-PCS | Mod: S$GLB,,, | Performed by: INTERNAL MEDICINE

## 2020-03-24 PROCEDURE — 3074F SYST BP LT 130 MM HG: CPT | Mod: CPTII,S$GLB,, | Performed by: INTERNAL MEDICINE

## 2020-03-24 PROCEDURE — 96413 CHEMO IV INFUSION 1 HR: CPT

## 2020-03-24 PROCEDURE — 3074F PR MOST RECENT SYSTOLIC BLOOD PRESSURE < 130 MM HG: ICD-10-PCS | Mod: CPTII,S$GLB,, | Performed by: INTERNAL MEDICINE

## 2020-03-24 PROCEDURE — 99215 PR OFFICE/OUTPT VISIT, EST, LEVL V, 40-54 MIN: ICD-10-PCS | Mod: 25,S$GLB,, | Performed by: INTERNAL MEDICINE

## 2020-03-24 PROCEDURE — 1101F PT FALLS ASSESS-DOCD LE1/YR: CPT | Mod: CPTII,S$GLB,, | Performed by: INTERNAL MEDICINE

## 2020-03-24 PROCEDURE — 1101F PR PT FALLS ASSESS DOC 0-1 FALLS W/OUT INJ PAST YR: ICD-10-PCS | Mod: CPTII,S$GLB,, | Performed by: INTERNAL MEDICINE

## 2020-03-24 PROCEDURE — 1159F PR MEDICATION LIST DOCUMENTED IN MEDICAL RECORD: ICD-10-PCS | Mod: S$GLB,,, | Performed by: INTERNAL MEDICINE

## 2020-03-24 PROCEDURE — 63600175 PHARM REV CODE 636 W HCPCS: Mod: TB | Performed by: INTERNAL MEDICINE

## 2020-03-24 PROCEDURE — 99215 OFFICE O/P EST HI 40 MIN: CPT | Mod: 25,S$GLB,, | Performed by: INTERNAL MEDICINE

## 2020-03-24 PROCEDURE — 1126F AMNT PAIN NOTED NONE PRSNT: CPT | Mod: S$GLB,,, | Performed by: INTERNAL MEDICINE

## 2020-03-24 PROCEDURE — 1159F MED LIST DOCD IN RCRD: CPT | Mod: S$GLB,,, | Performed by: INTERNAL MEDICINE

## 2020-03-24 PROCEDURE — 3078F PR MOST RECENT DIASTOLIC BLOOD PRESSURE < 80 MM HG: ICD-10-PCS | Mod: CPTII,S$GLB,, | Performed by: INTERNAL MEDICINE

## 2020-03-24 PROCEDURE — 99999 PR PBB SHADOW E&M-EST. PATIENT-LVL III: CPT | Mod: PBBFAC,,, | Performed by: INTERNAL MEDICINE

## 2020-03-24 PROCEDURE — 99999 PR PBB SHADOW E&M-EST. PATIENT-LVL III: ICD-10-PCS | Mod: PBBFAC,,, | Performed by: INTERNAL MEDICINE

## 2020-03-24 PROCEDURE — 3078F DIAST BP <80 MM HG: CPT | Mod: CPTII,S$GLB,, | Performed by: INTERNAL MEDICINE

## 2020-03-24 RX ORDER — SODIUM CHLORIDE 0.9 % (FLUSH) 0.9 %
10 SYRINGE (ML) INJECTION
Status: CANCELLED | OUTPATIENT
Start: 2020-03-24

## 2020-03-24 RX ORDER — HEPARIN 100 UNIT/ML
500 SYRINGE INTRAVENOUS
Status: CANCELLED | OUTPATIENT
Start: 2020-03-24

## 2020-03-24 RX ORDER — HEPARIN 100 UNIT/ML
500 SYRINGE INTRAVENOUS
Status: DISCONTINUED | OUTPATIENT
Start: 2020-03-24 | End: 2020-03-24 | Stop reason: HOSPADM

## 2020-03-24 RX ADMIN — HEPARIN 500 UNITS: 100 SYRINGE at 10:03

## 2020-03-24 RX ADMIN — DURVALUMAB 860 MG: 500 INJECTION, SOLUTION INTRAVENOUS at 09:03

## 2020-03-24 NOTE — PROGRESS NOTES
Subjective:       Patient ID: Chai Murry is a 79 y.o. male.    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 Courtney dwyer bx     Family History   Problem Relation Age of Onset    Esophageal cancer Sister     Cancer Sister     Lung cancer Mother     Cancer Mother     Cataracts Mother     Hypertension Mother     Pneumonia Father     Cataracts Father     Hypertension Father     Cancer Brother     Hypertension Brother     Blindness Neg Hx     Diabetes Neg Hx     Glaucoma Neg Hx     Macular degeneration Neg Hx     Retinal detachment Neg Hx     Strabismus Neg Hx     Stroke Neg Hx     Thyroid disease Neg Hx      Social History     Socioeconomic History    Marital status:      Spouse name: Not on file    Number of children: 3    Years of education: Not on file    Highest education level: Not on file   Occupational History    Occupation: retired   Social Needs    Financial resource strain: Not on file    Food insecurity:     Worry: Not on file     Inability: Not on file    Transportation needs:     Medical: Not on file     Non-medical: Not on file   Tobacco Use    Smoking status: Former Smoker     Packs/day: 1.00     Years: 50.00     Pack years: 50.00     Last attempt to quit: 2005     Years since quittin.6    Smokeless tobacco: Never Used   Substance and Sexual Activity    Alcohol use: No    Drug  use: No    Sexual activity: Not Currently   Lifestyle    Physical activity:     Days per week: Not on file     Minutes per session: Not on file    Stress: Not on file   Relationships    Social connections:     Talks on phone: Not on file     Gets together: Not on file     Attends [employer] service: Not on file     Active member of club or organization: Not on file     Attends meetings of clubs or organizations: Not on file     Relationship status: Not on file   Other Topics Concern    Not on file   Social History Narrative    Not on file     Past Surgical History:   Procedure Laterality Date    APPENDECTOMY      BRONCHOSCOPY Bilateral 6/21/2019    Procedure: Bronchoscopy;  Surgeon: Paresh Goldman MD;  Location: [facility] ENDO;  Service: Endoscopy;  Laterality: Bilateral;    CARDIAC CATHETERIZATION      cardiac stents      CATARACT EXTRACTION W/  INTRAOCULAR LENS IMPLANT Right 9-3-14    CHOLECYSTECTOMY      COLONOSCOPY N/A 4/17/2018    Procedure: COLONOSCOPY;  Surgeon: Dionne Doan MD;  Location: [facility] ENDO;  Service: Endoscopy;  Laterality: N/A;    COLONOSCOPY N/A 10/25/2018    Procedure: COLONOSCOPY;  Surgeon: Michael Hameed MD;  Location: [facility] ENDO;  Service: Endoscopy;  Laterality: N/A;    ENDOSCOPIC ULTRASOUND OF UPPER GASTROINTESTINAL TRACT N/A 6/11/2019    Procedure: ULTRASOUND, UPPER GI TRACT, ENDOSCOPIC;  Surgeon: Abhishek Knox MD;  Location: [facility] ENDO;  Service: Endoscopy;  Laterality: N/A;    ESOPHAGOGASTRODUODENOSCOPY N/A 6/11/2019    Procedure: EGD (ESOPHAGOGASTRODUODENOSCOPY);  Surgeon: Abhishek Knox MD;  Location: [facility];  Service: Endoscopy;  Laterality: N/A;    infected stomach gland excision      INSERTION OF TUNNELED CENTRAL VENOUS CATHETER (CVC) WITH SUBCUTANEOUS PORT Right 7/3/2019    Procedure: [id number];  Surgeon: Jean Carlos Constantino MD;  Location: [facility];  Service: General;  Laterality: Right;    LUNG REMOVAL, TOTAL Left 04/2016    lung cancer left  upper lobe    SKIN BIOPSY Left     arm       Labs:  Lab Results   Component Value Date    WBC 7.86 03/24/2020    HGB 13.5 (L) 03/24/2020    HCT 43.1 03/24/2020    MCV 93 03/24/2020     03/24/2020     BMP  Lab Results   Component Value Date     03/24/2020    K 4.7 03/24/2020     03/24/2020    CO2 30 (H) 03/24/2020    BUN 27 (H) 03/24/2020    CREATININE 1.6 (H) 03/24/2020    CALCIUM 9.1 03/24/2020    ANIONGAP 8 03/24/2020    ESTGFRAFRICA 47 (A) 03/24/2020    EGFRNONAA 41 (A) 03/24/2020     Lab Results   Component Value Date    ALT 11 03/24/2020    AST 14 03/24/2020    ALKPHOS 55 03/24/2020    BILITOT 0.4 03/24/2020       No results found for: IRON, TIBC, FERRITIN, SATURATEDIRO  No results found for: [id number]  No results found for: FOLATE  Lab Results   Component Value Date    TSH 6.316 (H) 03/10/2020         Review of Systems   Constitutional: Negative for activity change, appetite change, chills, diaphoresis, fatigue, fever and unexpected weight change.   HENT: Negative for congestion, dental problem, drooling, ear discharge, ear pain, facial swelling, hearing loss, mouth sores, nosebleeds, postnasal drip, rhinorrhea, sinus pressure, sneezing, sore throat, tinnitus, trouble swallowing and voice change.    Eyes: Negative for photophobia, pain, discharge, redness, itching and visual disturbance.   Respiratory: Negative for apnea, cough, choking, chest tightness, shortness of breath, wheezing and stridor.    Cardiovascular: Negative for chest pain, palpitations and leg swelling.   Gastrointestinal: Negative for abdominal distention, abdominal pain, anal bleeding, blood in stool, constipation, diarrhea, nausea, rectal pain and vomiting.   Endocrine: Negative for cold intolerance, heat intolerance, polydipsia, polyphagia and polyuria.   Genitourinary: Negative for decreased urine volume, difficulty urinating, discharge, dysuria, enuresis, flank pain, frequency, genital sores, hematuria, penile pain,  penile swelling, scrotal swelling, testicular pain and urgency.   Musculoskeletal: Negative for arthralgias, back pain, gait problem, joint swelling, myalgias, neck pain and neck stiffness.   Skin: Negative for color change, pallor, rash and wound.   Allergic/Immunologic: Negative for environmental allergies, food allergies and immunocompromised state.   Neurological: Negative for dizziness, tremors, seizures, syncope, facial asymmetry, speech difficulty, weakness, light-headedness, numbness and headaches.   Hematological: Negative for adenopathy. Does not bruise/bleed easily.   Psychiatric/Behavioral: Negative for agitation, behavioral problems, confusion, decreased concentration, dysphoric mood, hallucinations, self-injury, sleep disturbance and suicidal ideas. The patient is not nervous/anxious and is not hyperactive.        Objective:      Physical Exam   Constitutional: He is oriented to person, place, and time. He appears well-developed and well-nourished. He appears distressed.   HENT:   Head: Normocephalic.   Right Ear: External ear normal.   Left Ear: External ear normal.   Nose: Nose normal. Right sinus exhibits no maxillary sinus tenderness and no frontal sinus tenderness. Left sinus exhibits no maxillary sinus tenderness and no frontal sinus tenderness.   Mouth/Throat: Oropharynx is clear and moist. No oropharyngeal exudate.   Eyes: Pupils are equal, round, and reactive to light. EOM and lids are normal. Right eye exhibits no discharge. Left eye exhibits no discharge. Right conjunctiva is not injected. Right conjunctiva has no hemorrhage. Left conjunctiva is not injected. Left conjunctiva has no hemorrhage. No scleral icterus. Right eye exhibits normal extraocular motion. Left eye exhibits normal extraocular motion.   Neck: Normal range of motion. Neck supple. No JVD present. No tracheal deviation present. No thyromegaly present.   Cardiovascular: Normal rate and regular rhythm.   Pulmonary/Chest: Effort  normal. No stridor. No respiratory distress.   Abdominal: Soft. He exhibits no mass. There is no hepatosplenomegaly, splenomegaly or hepatomegaly. There is no tenderness.   Musculoskeletal: Normal range of motion. He exhibits no edema or tenderness.   Lymphadenopathy:        Head (right side): No posterior auricular and no occipital adenopathy present.        Head (left side): No posterior auricular and no occipital adenopathy present.     He has no cervical adenopathy.        Right cervical: No superficial cervical, no deep cervical and no posterior cervical adenopathy present.       Left cervical: No superficial cervical, no deep cervical and no posterior cervical adenopathy present.     He has no axillary adenopathy.        Right: No supraclavicular adenopathy present.        Left: No supraclavicular adenopathy present.   Neurological: He is alert and oriented to person, place, and time. He has normal strength. No cranial nerve deficit. Coordination normal.   Skin: Skin is dry. No rash noted. He is not diaphoretic. No cyanosis or erythema. Nails show no clubbing.   Psychiatric: He has a normal mood and affect. His behavior is normal. Judgment and thought content normal. Cognition and memory are normal.   Vitals reviewed.          Assessment:      1. Malignant neoplasm of upper lobe of left lung    2. Chemotherapy management, encounter for           Plan:     Proceed with cycle 2/26 of maintenance immunotherapy tolerating therapy well laboratory studies acceptable for treatment proceed with immunotherapy as detailed return 2 weeks CBC CMP for cycle 3/26        Kyler Garcia Jr, MD FACP

## 2020-03-24 NOTE — NURSING
Dr. Garcia made aware of pt's BUN of 27 and Creatinine of 1.6. MD ok with proceeding with Imfinzi infusion. Pt instructed to increase fluid intake. Pt verbalized understanding.

## 2020-03-24 NOTE — DISCHARGE INSTRUCTIONS
[facility]  [address] [facility] [address] [facility]  [address] [address]  [phone] phone     [phone] fax  Hours of Operation: Monday- Friday 8:00am- 5:00pm  After hours phone  [phone]  Hematology / Oncology Physicians on call      JA [address] Dr., Dr., Dr., Dr., NP Sydney Prescott, NP Tyesha Taylor, NP    Please call with any concerns regarding your appointment today.    HOME CARE AFTER CHEMOTHERAPY   Meals   Many patients feel sick and lose their appetites during treatment. Eat small meals several times a day. Choose bland foods with little taste or smell if you have problems with nausea. Be sure to cook all food thoroughly. This kills bacteria and helps you avoid intestinal infection. Soft foods are easier to swallow and digest.   Activity   Exercise keeps you strong and keeps your heart and lungs active. Talk to your doctor about an appropriate exercise program for you.   Skin Care   To prevent a skin infection, bathe or shower once a day. Use a moisturizing soap and wash with warm water. Avoid very hot or cold water. Chemotherapy can make your skin dry . Apply moisturizing lotion to help relieve dry skin. Some drugs used in high doses can cause slight burns to appear (like sunburn). Ask for a special cream to help relieve the burn and protect your skin.   Prevent Mouth Sores   During chemotherapy, many people get mouth sores. Do the following to help prevent mouth sores or to ease discomfort.   Brush your teeth with a soft-bristle toothbrush after every meal.  Don't use dental floss if your platelet count is below 50,000. Your doctor or nurse will tell you if this is the case.  Use an oral swab or special soft toothbrush if your gums bleed during regular brushing.  Use mouthwash as directed. If you can't tolerate commercial mouthwash, use salt and baking soda to clean your mouth. Mix 1 teaspoon of salt and 1  teaspoon of baking soda into a glass of water. Swish and spit.  Call your doctor or return to this facility if you develop any of the following:   Sore throat   White patches in the mouth or throat   Fever of 100.4ºF (38ºC) or higher, or as directed by your healthcare provider  © 2000-2011 [facility], [address] [place] [address]. All rights reserved. This information is not intended as a substitute for professional medical care. Always follow your healthcare professional's   FALL PREVENTION   Falls often occur due to slipping, tripping or losing your balance. Here are ways to reduce your risk of falling again.   Was there anything that caused your fall that can be fixed, removed or replaced?   Make your home safe by keeping walkways clear of objects you may trip over.   Use non-slip pads under rugs.   Do not walk in poorly lit areas.   Do not stand on chairs or wobbly ladders.   Use caution when reaching overhead or looking upward. This position can cause a loss of balance.   Be sure your shoes fit properly, have non-slip bottoms and are in good condition.   Be cautious when going up and down stairs, curbs, and when walking on uneven sidewalks.   If your balance is poor, consider using a cane or walker.   If your fall was related to alcohol use, stop or limit alcohol intake.   If your fall was related to use of sleeping medicines, talk to your doctor about this. You may need to reduce your dosage at bedtime if you awaken during the night to go to the bathroom.   To reduce the need for nighttime bathroom trips:   Avoid drinking fluids for several hours before going to bed   Empty your bladder before going to bed   Men can keep a urinal at the bedside   © 2000-2011 [facility], [address] [place] [address]. All rights reserved. This information is not intended as a substitute for professional medical care. Always follow your healthcare professional's instructions.  WAYS TO HELP  PREVENT INFECTION         WASH YOUR HANDS OFTEN DURING THE DAY, ESPECIALLY BEFORE YOU EAT, AFTER USING THE BATHROOM, AND AFTER TOUCHING ANIMALS     STAY AWAY FROM PEOPLE WHO HAVE ILLNESSES YOU CAN CATCH; SUCH AS COLDS, FLU, CHICKEN POX     TRY TO AVOID CROWDS     STAY AWAY FROM CHILDREN WHO RECENTLY HAVE RECEIVED LIVE VIRUS VACCINES     MAINTAIN GOOD MOUTH CARE     DO NOT SQUEEZE OR SCRATCH PIMPLES     CLEAN CUTS & SCRAPES RIGHT AWAY AND DAILY UNTIL HEALED WITH WARM WATER, SOAP & AN ANTISEPTIC     AVOID CONTACT WITH LITTER BOXES, BIRD CAGES, & FISH TANKS     AVOID STANDING WATER, IE., BIRD BATHS, FLOWER POTS/VASES, OR HUMIDIFIERS     WEAR GLOVES WHEN GARDENING OR CLEANING UP AFTER OTHERS, ESPECIALLY BABIES & SMALL CHILDREN     DO NOT EAT RAW FISH, SEAFOOD, MEAT, OR EGGS

## 2020-04-02 DIAGNOSIS — C34.12 MALIGNANT NEOPLASM OF UPPER LOBE OF LEFT LUNG: ICD-10-CM

## 2020-04-02 RX ORDER — SOTALOL HYDROCHLORIDE 80 MG/1
80 TABLET ORAL 2 TIMES DAILY
Qty: 180 TABLET | Refills: 3 | Status: SHIPPED | OUTPATIENT
Start: 2020-04-02 | End: 2021-05-20

## 2020-04-02 RX ORDER — SIMVASTATIN 80 MG/1
80 TABLET, FILM COATED ORAL DAILY
Qty: 90 TABLET | Refills: 3 | Status: SHIPPED | OUTPATIENT
Start: 2020-04-02 | End: 2021-06-17

## 2020-04-02 RX ORDER — TEMAZEPAM 7.5 MG/1
7.5 CAPSULE ORAL NIGHTLY PRN
Qty: 30 CAPSULE | Refills: 1 | Status: SHIPPED | OUTPATIENT
Start: 2020-04-02 | End: 2020-05-05

## 2020-04-02 RX ORDER — LISINOPRIL 40 MG/1
40 TABLET ORAL DAILY
Qty: 90 TABLET | Refills: 3 | Status: SHIPPED | OUTPATIENT
Start: 2020-04-02 | End: 2020-07-14

## 2020-04-02 RX ORDER — FENOFIBRIC ACID 135 MG/1
1 CAPSULE, DELAYED RELEASE ORAL DAILY
Qty: 90 CAPSULE | Refills: 3 | Status: SHIPPED | OUTPATIENT
Start: 2020-04-02 | End: 2021-05-26

## 2020-04-06 ENCOUNTER — TELEPHONE (OUTPATIENT)
Dept: HEMATOLOGY/ONCOLOGY | Facility: CLINIC | Age: 80
End: 2020-04-06

## 2020-04-06 NOTE — PROGRESS NOTES
Subjective:       Patient ID: Chai Murry is a 80 y.o. male.    Chief Complaint: Malignant neoplasm of upper lobe of left lung [C34.12]  HPI: We have an opportunity to see Mr. Chai Murry in Hematology Oncology clinic at [facility] on 04/06/2020.  Mr. Chai Murry is a 80 y.o. 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   He is s/p left pneumonectomy for a squamous cell carcinoma of the left lung.0n 4/12/2016   Prior to the surgery, the PET scan showed an FDG-avid mass in the left upper   lobe abutting the left hilum with an SUV of 11.6. There seemed to be a left   hilar adenopathy as well.   Radiologist felt that he was unable to discriminate between the mass and the   lymphadenopathy. The patient had had a bronchoscopy by Dr. Roel Ernandez on   03/04/2006 that showed a partially obstructing airway in the anterior segment of   the left upper lobe with an endobronchial lesion in the anterior segment of the   left upper lobe. The specimen from the biopsy at the time of bronchoscopy was   reported as being a squamous cell carcinoma.   At the time of the surgery after the left lung was removed, the pathologist   indicated that this was a squamous cell carcinoma. It measured 3.8 cm. The   pathology indicated that this is extending into the bronchial resection margin of the lobe. This lobe was later on removed to complete the pneumonectomy   There was one lymph node positive for metastatic squamous cell carcinoma at the   AP window.   The patient was told that we would prefer to treat him with post-op simultaneous chemo/radiation.  He had Medi-port. He started chemo/radiation therapy andreceived 6 weekly cycles of carbo/Taxol along with radiatioon.   After a month, he had a Ct scan and it showed stability   He then had 2 additional cyles of carbo/Taxol finishing in 9/27/2016.   He comes for follow up.   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 CT of the chest was non contributory, shows changes from surgery   PET showed a PET avid mass to the left of the trachea.   The case was discussed with dr annika Goldman and GI.   We discussed the possibility of doing a EUS or EBUs, and finally, because of the localization, it was decided to do a EUS with biopsy if possible.   Cytology shows recurrent squamous cell cancer.   I have asked Pathology to run a PD-L1 from his original pathology from 2016.   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   He has been discussed extensively with radiation oncology. We have decided to treat him with radiation therapy and Cisplatin as a chemo-sensitizer.   Dr Castro has reviewed the therapy ports from the previous radiation treatment and the area in question seems to be above the previously radiated fields.   Completed chemoradiation s/p 6 weekly cisplatin treatments with response. Currently on maintenance Imfinzi. Reports had dizziness when gets up at night, attributes to restoril.         Malignant neoplasm of upper lobe of left lung    4/12/2016 Initial Diagnosis     Malignant neoplasm of upper lobe of left lung      6/19/2019 Cancer Staged     Staging form: Lung, AJCC 8th Edition  - Clinical stage from 6/19/2019: Stage IIIB (rcT4, cN2, cM0) - Signed by Alejandro Castro II, MD on 6/19/2019 6/21/2019 Tumor Conference     Presenting Hospital / Clinic: [facility] [place]  Virtual Tumor Board Conference: In person  Presenter: Dr. Chance Castro  Date Presented to Tumor Board: 06/21/19  Specialties Present: Medical Oncology;Radiation Oncology;Surgical Oncology;Pathology;Navigation;Plastic Surgery;Radiology;Gastrointestinal;Pulmonology  Presentation at Cancer Conference: Prospective  Cancer Type: Lung cancer  Recommended Plan: Additional screening  Recommended Plan Note: If PDL-1 positive, treat with immunotherapy  Send tissue for next generation  sequencing.      6/28/2019 Tumor Conference     His case was discussed at the Multidisciplinary Head and Neck Team Planning Meeting.    Representatives from Medical Oncology, Radiation Oncology, Head and Neck Surgical Oncology, Psychosocial Oncology, and Speech and Language Pathology discussed the case with the following recommendations:    1) treat lung cancer             7/5/2019 - 8/14/2019 Radiation Therapy     Treatment Site Ref. ID Energy Dose/Fx (Gy) #Fx Dose Correction (Gy) Total Dose (Gy) Start Date End Date Elapsed Days   [id number]:1 PTV LNs 6X 1.8 28 / 28 0 50.4 7/5/2019 8/14/2019 40             Lung cancer    6/11/2019 Initial Diagnosis     Lung cancer      7/8/2019 - 8/18/2019 Chemotherapy     Treatment Summary   Plan Name: OP HEAD NECK CISPLATIN WEEKLY + RADIOTHERAPY  Treatment Goal: Supportive  Status: Inactive  Start Date: 7/8/2019  End Date: 8/12/2019  Provider: Jorge L Patel MD  Chemotherapy: CISplatin (PLATINOL) 40 mg/m2 = 83 mg in sodium chloride 0.9% 583 mL chemo infusion, 40 mg/m2 = 83 mg (100 % of original dose 40 mg/m2), Intravenous, Clinic/HOD 1 time, 6 of 6 cycles  Dose modification: 70 mg (original dose 40 mg/m2, Cycle 1, Reason: MD Discretion), 40 mg/m2 (original dose 40 mg/m2, Cycle 1)  Administration: 83 mg (7/8/2019), 84 mg (7/15/2019), 83 mg (7/22/2019), 83 mg (7/29/2019), 83 mg (8/5/2019), 82 mg (8/12/2019)      9/30/2019 - 3/9/2020 Chemotherapy     Treatment Summary   Plan Name: OP DURVALUMAB Q2W  Treatment Goal: Curative  Status: Inactive  Start Date: 10/3/2019  End Date: 2/25/2020  Provider: Fermin Vila MD  Chemotherapy: durvalumab (IMFINZI) 835 mg in sodium chloride 0.9% 266.7 mL chemo infusion, 10 mg/kg = 835 mg, Intravenous, Clinic/HOD 1 time, 10 of 26 cycles  Administration: 835 mg (10/3/2019), 835 mg (10/23/2019), 835 mg (11/6/2019), 835 mg (11/20/2019), 835 mg (12/4/2019), 835 mg (12/18/2019), 835 mg (1/13/2020), 835 mg (1/27/2020), 835 mg (2/10/2020), 835 mg  (2/25/2020)      3/10/2020 -  Chemotherapy     Treatment Summary   Plan Name: OP DURVALUMAB Q2W  Treatment Goal: Maintenance  Status: Active  Start Date: 3/10/2020  End Date: 10/6/2020 (Planned)  Provider: Fermin Vila MD  Chemotherapy: durvalumab (IMFINZI) 885 mg in sodium chloride 0.9% 267.7 mL chemo infusion, 10 mg/kg = 885 mg, Intravenous, Clinic/HOD 1 time, 2 of 16 cycles  Administration: 885 mg (3/10/2020), 860 mg (3/24/2020)       Past Medical History:   Diagnosis Date    Anemia     Arthritis     Atrial fibrillation     CAD (coronary artery disease)     Cataract     COPD (chronic obstructive pulmonary disease)     Diverticulosis     ED (erectile dysfunction)     HTN (hypertension)     Hyperlipidemia     Lung cancer     left    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 Courtney dwyer bx     Family History   Problem Relation Age of Onset    Esophageal cancer Sister     Cancer Sister     Lung cancer Mother     Cancer Mother     Cataracts Mother     Hypertension Mother     Pneumonia Father     Cataracts Father     Hypertension Father     Cancer Brother     Hypertension Brother     Blindness Neg Hx     Diabetes Neg Hx     Glaucoma Neg Hx     Macular degeneration Neg Hx     Retinal detachment Neg Hx     Strabismus Neg Hx     Stroke Neg Hx     Thyroid disease Neg Hx      Social History     Socioeconomic History    Marital status:      Spouse name: Not on file    Number of children: 3    Years of education: Not on file    Highest education level: Not on file   Occupational History    Occupation: retired   Social Needs    Financial resource strain: Not on file    Food insecurity:     Worry: Not on file     Inability: Not on file    Transportation needs:     Medical: Not on file     Non-medical: Not on file   Tobacco Use    Smoking status: Former Smoker     Packs/day: 1.00     Years: 50.00     Pack years: 50.00     Last attempt to quit:  2005     Years since quittin.6    Smokeless tobacco: Never Used   Substance and Sexual Activity    Alcohol use: No    Drug use: No    Sexual activity: Not Currently   Lifestyle    Physical activity:     Days per week: Not on file     Minutes per session: Not on file    Stress: Not on file   Relationships    Social connections:     Talks on phone: Not on file     Gets together: Not on file     Attends Yarsani service: Not on file     Active member of club or organization: Not on file     Attends meetings of clubs or organizations: Not on file     Relationship status: Not on file   Other Topics Concern    Not on file   Social History Narrative    Not on file     Past Surgical History:   Procedure Laterality Date    APPENDECTOMY      BRONCHOSCOPY Bilateral 2019    Procedure: Bronchoscopy;  Surgeon: Paresh Goldman MD;  Location: [facility];  Service: Endoscopy;  Laterality: Bilateral;    CARDIAC CATHETERIZATION      cardiac stents      CATARACT EXTRACTION W/  INTRAOCULAR LENS IMPLANT Right 9-3-14    CHOLECYSTECTOMY      COLONOSCOPY N/A 2018    Procedure: COLONOSCOPY;  Surgeon: Dionne Doan MD;  Location: [facility];  Service: Endoscopy;  Laterality: N/A;    COLONOSCOPY N/A 10/25/2018    Procedure: COLONOSCOPY;  Surgeon: Michael Hameed MD;  Location: [facility];  Service: Endoscopy;  Laterality: N/A;    ENDOSCOPIC ULTRASOUND OF UPPER GASTROINTESTINAL TRACT N/A 2019    Procedure: ULTRASOUND, UPPER GI TRACT, ENDOSCOPIC;  Surgeon: Abhishek Knox MD;  Location: [facility];  Service: Endoscopy;  Laterality: N/A;    ESOPHAGOGASTRODUODENOSCOPY N/A 2019    Procedure: EGD (ESOPHAGOGASTRODUODENOSCOPY);  Surgeon: Abhishek Knox MD;  Location: [facility];  Service: Endoscopy;  Laterality: N/A;    infected stomach gland excision      INSERTION OF TUNNELED CENTRAL VENOUS CATHETER (CVC) WITH SUBCUTANEOUS PORT Right 7/3/2019    Procedure: [id number]-C-CATH;  Surgeon:  Jean Carlos Constantino MD;  Location: [facility] OR;  Service: General;  Laterality: Right;    LUNG REMOVAL, TOTAL Left 04/2016    lung cancer left upper lobe    SKIN BIOPSY Left     arm     Current Outpatient Medications   Medication Sig Dispense Refill    albuterol (PROVENTIL) 2.5 mg /3 mL (0.083 %) nebulizer solution Take 3 mLs (2.5 mg total) by nebulization every 6 (six) hours while awake. 270 mL 11    amLODIPine (NORVASC) 5 MG tablet Take 1 tablet (5 mg total) by mouth once daily.      amoxicillin-clavulanate 875-125mg (AUGMENTIN) 875-125 mg per tablet Take 1 tablet by mouth 2 (two) times daily.      apixaban (ELIQUIS) 5 mg Tab Take 1 tablet (5 mg total) by mouth 2 (two) times daily. 60 tablet 5    aspirin (ECOTRIN) 81 MG EC tablet Take 81 mg by mouth once daily.      benzonatate (TESSALON) 200 MG capsule Take 1 capsule (200 mg total) by mouth 3 (three) times daily as needed for Cough. 21 capsule 0    BREO ELLIPTA 100-25 mcg/dose diskus inhaler INHALE 1 PUFF INTO THE LUNGS ONCE DAILY  5    fenofibric acid (FIBRICOR) 135 mg CpDR Take 1 capsule (135 mg total) by mouth once daily. 90 capsule 3    fluticasone (FLONASE) 50 mcg/actuation nasal spray 2 sprays (100 mcg total) by Each Nare route once daily. (Patient taking differently: 2 sprays by Each Nare route as needed. ) 3 Bottle 3    furosemide (LASIX) 20 MG tablet TAKE 1 TABLET BY MOUTH EVERY DAY AS NEEDED 30 tablet 1    glycopyrrolate-formoterol (BEVESPI AEROSPHERE) 9-4.8 mcg HFAA Inhale 2 puffs into the lungs 2 (two) times daily. Controller 10.9 g 11    HYDROcodone-acetaminophen (NORCO) 5-325 mg per tablet Take 1 tablet by mouth every 6 (six) hours as needed for Pain. 60 tablet 0    ipratropium-albuterol (COMBIVENT RESPIMAT)  mcg/actuation inhaler Inhale 1 puff into the lungs every 6 (six) hours as needed for Shortness of Breath. 4 g 11    levocetirizine (XYZAL) 5 MG tablet Take 5 mg by mouth as needed.       levothyroxine (SYNTHROID) 25 MCG tablet  Take 1 tablet (25 mcg total) by mouth before breakfast. 30 tablet 11    lisinopriL (PRINIVIL,ZESTRIL) 40 MG tablet Take 1 tablet (40 mg total) by mouth once daily. 90 tablet 3    promethazine (PHENERGAN) 6.25 mg/5 mL syrup Take 20 mLs (25 mg total) by mouth nightly as needed. 240 mL 0    ranolazine (RANEXA) 500 MG Tb12 Take 1 tablet (500 mg total) by mouth 2 (two) times daily. 60 tablet 11    simvastatin (ZOCOR) 80 MG tablet Take 1 tablet (80 mg total) by mouth once daily. 90 tablet 3    sotaloL (SOTALOL AF) 80 MG tablet Take 1 tablet (80 mg total) by mouth 2 (two) times daily. 180 tablet 3    temazepam (RESTORIL) 7.5 MG Cap Take 1 capsule (7.5 mg total) by mouth nightly as needed. 30 capsule 1     Current Facility-Administered Medications   Medication Dose Route Frequency Provider Last Rate Last Dose    testosterone cypionate injection 200 mg  200 mg Intramuscular Q21 Days Peter Teixeira MD   200 mg at 02/26/20 0937     Facility-Administered Medications Ordered in Other Visits   Medication Dose Route Frequency Provider Last Rate Last Dose    lactated ringers infusion   Intravenous Continuous Michael Hameed MD   Stopped at 07/03/19 0810       Labs:  Lab Results   Component Value Date    WBC 7.86 03/24/2020    HGB 13.5 (L) 03/24/2020    HCT 43.1 03/24/2020    MCV 93 03/24/2020     03/24/2020     BMP  Lab Results   Component Value Date     03/24/2020    K 4.7 03/24/2020     03/24/2020    CO2 30 (H) 03/24/2020    BUN 27 (H) 03/24/2020    CREATININE 1.6 (H) 03/24/2020    CALCIUM 9.1 03/24/2020    ANIONGAP 8 03/24/2020    ESTGFRAFRICA 47 (A) 03/24/2020    EGFRNONAA 41 (A) 03/24/2020     Lab Results   Component Value Date    ALT 11 03/24/2020    AST 14 03/24/2020    ALKPHOS 55 03/24/2020    BILITOT 0.4 03/24/2020       No results found for: IRON, TIBC, FERRITIN, SATURATEDIRO  No results found for: [id number]  No results found for: FOLATE  Lab Results   Component Value Date    TSH 5.510  (H) 03/24/2020       I have reviewed the radiology reports and examined the scan/xray images.    Review of Systems   Constitutional: Negative.    HENT: Negative.    Eyes: Negative.    Respiratory: Negative.    Cardiovascular: Negative.    Gastrointestinal: Negative.    Endocrine: Negative.    Genitourinary: Negative.    Musculoskeletal: Negative.    Skin: Negative.    Allergic/Immunologic: Negative.    Neurological: Negative.    Hematological: Negative.    Psychiatric/Behavioral: Negative.      ECOG SCORE    0 - Fully active-able to carry on all pre-disease performance without restriction            Objective:     Vitals:    04/07/20 0754   BP: 121/67   Pulse: (!) 58   Temp: 97.1 °F (36.2 °C)   Body mass index is 27.43 kg/m².  Physical Exam   Constitutional: He is oriented to person, place, and time. He appears well-developed and well-nourished.   HENT:   Head: Normocephalic and atraumatic.   Eyes: Conjunctivae and EOM are normal.   Neck: Normal range of motion. Neck supple.   Cardiovascular: Normal rate and regular rhythm.   Pulmonary/Chest: Effort normal and breath sounds normal.   Abdominal: Soft. Bowel sounds are normal.   Musculoskeletal: Normal range of motion.   Neurological: He is alert and oriented to person, place, and time.   Skin: Skin is warm and dry.   Psychiatric: He has a normal mood and affect. His behavior is normal. Judgment and thought content normal.   Nursing note and vitals reviewed.        Assessment:      1. Malignant neoplasm of upper lobe of left lung    2. Chemotherapy management, encounter for           Plan:     Malignant neoplasm of upper lobe of left lung  Continue on Imfinzi maintenance cycle 13 of 26 today.  -     CBC auto differential; Future; Expected date: 04/21/2020  -     Comprehensive metabolic panel; Future; Expected date: 04/21/2020  -     TSH; Future; Expected date: 04/21/2020    Chemotherapy management, encounter for

## 2020-04-07 ENCOUNTER — OFFICE VISIT (OUTPATIENT)
Dept: HEMATOLOGY/ONCOLOGY | Facility: CLINIC | Age: 80
End: 2020-04-07
Payer: MEDICARE

## 2020-04-07 ENCOUNTER — INFUSION (OUTPATIENT)
Dept: INFUSION THERAPY | Facility: HOSPITAL | Age: 80
End: 2020-04-07
Attending: INTERNAL MEDICINE
Payer: MEDICARE

## 2020-04-07 VITALS
SYSTOLIC BLOOD PRESSURE: 116 MMHG | OXYGEN SATURATION: 95 % | TEMPERATURE: 97 F | WEIGHT: 191.13 LBS | RESPIRATION RATE: 16 BRPM | DIASTOLIC BLOOD PRESSURE: 59 MMHG | BODY MASS INDEX: 27.43 KG/M2 | HEART RATE: 56 BPM

## 2020-04-07 VITALS
HEIGHT: 70 IN | BODY MASS INDEX: 27.36 KG/M2 | SYSTOLIC BLOOD PRESSURE: 121 MMHG | DIASTOLIC BLOOD PRESSURE: 67 MMHG | WEIGHT: 191.13 LBS | TEMPERATURE: 97 F | OXYGEN SATURATION: 96 % | HEART RATE: 58 BPM

## 2020-04-07 DIAGNOSIS — C34.12 MALIGNANT NEOPLASM OF UPPER LOBE OF LEFT LUNG: Primary | ICD-10-CM

## 2020-04-07 DIAGNOSIS — C34.12 MALIGNANT NEOPLASM OF UPPER LOBE OF LEFT LUNG: ICD-10-CM

## 2020-04-07 DIAGNOSIS — J39.8 TRACHEAL MASS: Primary | ICD-10-CM

## 2020-04-07 DIAGNOSIS — Z85.118 HISTORY OF CANCER OF UPPER LOBE BRONCHUS OR LUNG: ICD-10-CM

## 2020-04-07 DIAGNOSIS — Z51.11 CHEMOTHERAPY MANAGEMENT, ENCOUNTER FOR: ICD-10-CM

## 2020-04-07 PROCEDURE — 1101F PR PT FALLS ASSESS DOC 0-1 FALLS W/OUT INJ PAST YR: ICD-10-PCS | Mod: CPTII,S$GLB,, | Performed by: INTERNAL MEDICINE

## 2020-04-07 PROCEDURE — 3078F PR MOST RECENT DIASTOLIC BLOOD PRESSURE < 80 MM HG: ICD-10-PCS | Mod: CPTII,S$GLB,, | Performed by: INTERNAL MEDICINE

## 2020-04-07 PROCEDURE — 1126F PR PAIN SEVERITY QUANTIFIED, NO PAIN PRESENT: ICD-10-PCS | Mod: S$GLB,,, | Performed by: INTERNAL MEDICINE

## 2020-04-07 PROCEDURE — 99215 PR OFFICE/OUTPT VISIT, EST, LEVL V, 40-54 MIN: ICD-10-PCS | Mod: 25,S$GLB,, | Performed by: INTERNAL MEDICINE

## 2020-04-07 PROCEDURE — 99999 PR PBB SHADOW E&M-EST. PATIENT-LVL IV: ICD-10-PCS | Mod: PBBFAC,,, | Performed by: INTERNAL MEDICINE

## 2020-04-07 PROCEDURE — 96413 CHEMO IV INFUSION 1 HR: CPT

## 2020-04-07 PROCEDURE — 1126F AMNT PAIN NOTED NONE PRSNT: CPT | Mod: S$GLB,,, | Performed by: INTERNAL MEDICINE

## 2020-04-07 PROCEDURE — 3078F DIAST BP <80 MM HG: CPT | Mod: CPTII,S$GLB,, | Performed by: INTERNAL MEDICINE

## 2020-04-07 PROCEDURE — 1159F MED LIST DOCD IN RCRD: CPT | Mod: S$GLB,,, | Performed by: INTERNAL MEDICINE

## 2020-04-07 PROCEDURE — 63600175 PHARM REV CODE 636 W HCPCS: Mod: TB | Performed by: INTERNAL MEDICINE

## 2020-04-07 PROCEDURE — 3074F SYST BP LT 130 MM HG: CPT | Mod: CPTII,S$GLB,, | Performed by: INTERNAL MEDICINE

## 2020-04-07 PROCEDURE — 25000003 PHARM REV CODE 250: Performed by: INTERNAL MEDICINE

## 2020-04-07 PROCEDURE — 1159F PR MEDICATION LIST DOCUMENTED IN MEDICAL RECORD: ICD-10-PCS | Mod: S$GLB,,, | Performed by: INTERNAL MEDICINE

## 2020-04-07 PROCEDURE — 99999 PR PBB SHADOW E&M-EST. PATIENT-LVL IV: CPT | Mod: PBBFAC,,, | Performed by: INTERNAL MEDICINE

## 2020-04-07 PROCEDURE — 99215 OFFICE O/P EST HI 40 MIN: CPT | Mod: 25,S$GLB,, | Performed by: INTERNAL MEDICINE

## 2020-04-07 PROCEDURE — 3074F PR MOST RECENT SYSTOLIC BLOOD PRESSURE < 130 MM HG: ICD-10-PCS | Mod: CPTII,S$GLB,, | Performed by: INTERNAL MEDICINE

## 2020-04-07 PROCEDURE — 1101F PT FALLS ASSESS-DOCD LE1/YR: CPT | Mod: CPTII,S$GLB,, | Performed by: INTERNAL MEDICINE

## 2020-04-07 RX ORDER — SODIUM CHLORIDE 0.9 % (FLUSH) 0.9 %
10 SYRINGE (ML) INJECTION
Status: CANCELLED | OUTPATIENT
Start: 2020-04-07

## 2020-04-07 RX ORDER — HEPARIN 100 UNIT/ML
500 SYRINGE INTRAVENOUS
Status: CANCELLED | OUTPATIENT
Start: 2020-04-07

## 2020-04-07 RX ORDER — HEPARIN 100 UNIT/ML
500 SYRINGE INTRAVENOUS
Status: DISCONTINUED | OUTPATIENT
Start: 2020-04-07 | End: 2020-04-07 | Stop reason: HOSPADM

## 2020-04-07 RX ADMIN — DURVALUMAB 860 MG: 500 INJECTION, SOLUTION INTRAVENOUS at 09:04

## 2020-04-07 RX ADMIN — HEPARIN 500 UNITS: 100 SYRINGE at 10:04

## 2020-04-15 ENCOUNTER — TELEPHONE (OUTPATIENT)
Dept: HEMATOLOGY/ONCOLOGY | Facility: CLINIC | Age: 80
End: 2020-04-15

## 2020-04-15 DIAGNOSIS — Z03.818 ENCOUNTER FOR OBSERVATION FOR SUSPECTED EXPOSURE TO OTHER BIOLOGICAL AGENTS RULED OUT: ICD-10-CM

## 2020-04-15 DIAGNOSIS — Z85.118 HISTORY OF CANCER OF UPPER LOBE BRONCHUS OR LUNG: Primary | ICD-10-CM

## 2020-04-15 NOTE — TELEPHONE ENCOUNTER
04/15/2020    Phoned the patient.    Discussed the following with the patient:  In an effort to protect our immunocompromised patients from potential exposure to COVID-19, [facility] will now require all patients receiving an infusion, an injection, and/or radiation therapy to be tested for COVID-19 prior to their appointment.  All patients currently under treatment will be tested immediately and patients initiating new treatment cycles or with one-time appointments (injections, transfusions, etc.) must be tested within 72 hours of their appointment.    Symptom Patient's Response   Fever YES/NO: no   Cough YES/NO: no   Shortness of breath  YES/NO: no   Difficulty breathing YES/NO: no   GI symptoms such as diarrhea or nausea YES/NO: no   Loss of taste YES/NO: no   Loss of smell YES/NO: no   Other Screening Patient's Response   Has the patient previously undergone COVID-19 testing? YES/NO: no;

## 2020-04-17 ENCOUNTER — LAB VISIT (OUTPATIENT)
Dept: LAB | Facility: HOSPITAL | Age: 80
End: 2020-04-17
Attending: INTERNAL MEDICINE
Payer: MEDICARE

## 2020-04-17 DIAGNOSIS — Z85.118 HISTORY OF CANCER OF UPPER LOBE BRONCHUS OR LUNG: ICD-10-CM

## 2020-04-17 DIAGNOSIS — Z03.818 ENCOUNTER FOR OBSERVATION FOR SUSPECTED EXPOSURE TO OTHER BIOLOGICAL AGENTS RULED OUT: ICD-10-CM

## 2020-04-17 LAB — SARS-COV-2 RNA RESP QL NAA+PROBE: NOT DETECTED

## 2020-04-17 PROCEDURE — U0002 COVID-19 LAB TEST NON-CDC: HCPCS

## 2020-04-21 ENCOUNTER — OFFICE VISIT (OUTPATIENT)
Dept: HEMATOLOGY/ONCOLOGY | Facility: CLINIC | Age: 80
End: 2020-04-21
Payer: MEDICARE

## 2020-04-21 ENCOUNTER — INFUSION (OUTPATIENT)
Dept: INFUSION THERAPY | Facility: HOSPITAL | Age: 80
End: 2020-04-21
Attending: INTERNAL MEDICINE
Payer: MEDICARE

## 2020-04-21 VITALS
SYSTOLIC BLOOD PRESSURE: 123 MMHG | OXYGEN SATURATION: 96 % | HEIGHT: 70 IN | WEIGHT: 190.69 LBS | TEMPERATURE: 97 F | DIASTOLIC BLOOD PRESSURE: 66 MMHG | HEART RATE: 59 BPM | BODY MASS INDEX: 27.3 KG/M2

## 2020-04-21 VITALS
TEMPERATURE: 97 F | SYSTOLIC BLOOD PRESSURE: 130 MMHG | DIASTOLIC BLOOD PRESSURE: 73 MMHG | RESPIRATION RATE: 98 BRPM | HEART RATE: 59 BPM

## 2020-04-21 DIAGNOSIS — J39.8 TRACHEAL MASS: Primary | ICD-10-CM

## 2020-04-21 DIAGNOSIS — C34.12 MALIGNANT NEOPLASM OF UPPER LOBE OF LEFT LUNG: ICD-10-CM

## 2020-04-21 DIAGNOSIS — Z03.818 ENCNTR FOR OBS FOR SUSP EXPSR TO OTH BIOLG AGENTS RULED OUT: ICD-10-CM

## 2020-04-21 DIAGNOSIS — C34.12 MALIGNANT NEOPLASM OF UPPER LOBE OF LEFT LUNG: Primary | ICD-10-CM

## 2020-04-21 DIAGNOSIS — Z85.118 HISTORY OF CANCER OF UPPER LOBE BRONCHUS OR LUNG: ICD-10-CM

## 2020-04-21 PROCEDURE — 96361 HYDRATE IV INFUSION ADD-ON: CPT

## 2020-04-21 PROCEDURE — 1159F PR MEDICATION LIST DOCUMENTED IN MEDICAL RECORD: ICD-10-PCS | Mod: S$GLB,,, | Performed by: NURSE PRACTITIONER

## 2020-04-21 PROCEDURE — 3078F DIAST BP <80 MM HG: CPT | Mod: CPTII,S$GLB,, | Performed by: NURSE PRACTITIONER

## 2020-04-21 PROCEDURE — 1126F PR PAIN SEVERITY QUANTIFIED, NO PAIN PRESENT: ICD-10-PCS | Mod: S$GLB,,, | Performed by: NURSE PRACTITIONER

## 2020-04-21 PROCEDURE — 3078F PR MOST RECENT DIASTOLIC BLOOD PRESSURE < 80 MM HG: ICD-10-PCS | Mod: CPTII,S$GLB,, | Performed by: NURSE PRACTITIONER

## 2020-04-21 PROCEDURE — 99999 PR PBB SHADOW E&M-EST. PATIENT-LVL III: ICD-10-PCS | Mod: PBBFAC,,, | Performed by: NURSE PRACTITIONER

## 2020-04-21 PROCEDURE — 99214 PR OFFICE/OUTPT VISIT, EST, LEVL IV, 30-39 MIN: ICD-10-PCS | Mod: 25,S$GLB,, | Performed by: NURSE PRACTITIONER

## 2020-04-21 PROCEDURE — 1126F AMNT PAIN NOTED NONE PRSNT: CPT | Mod: S$GLB,,, | Performed by: NURSE PRACTITIONER

## 2020-04-21 PROCEDURE — 99214 OFFICE O/P EST MOD 30 MIN: CPT | Mod: 25,S$GLB,, | Performed by: NURSE PRACTITIONER

## 2020-04-21 PROCEDURE — 96413 CHEMO IV INFUSION 1 HR: CPT

## 2020-04-21 PROCEDURE — 1101F PT FALLS ASSESS-DOCD LE1/YR: CPT | Mod: CPTII,S$GLB,, | Performed by: NURSE PRACTITIONER

## 2020-04-21 PROCEDURE — 3074F SYST BP LT 130 MM HG: CPT | Mod: CPTII,S$GLB,, | Performed by: NURSE PRACTITIONER

## 2020-04-21 PROCEDURE — 1101F PR PT FALLS ASSESS DOC 0-1 FALLS W/OUT INJ PAST YR: ICD-10-PCS | Mod: CPTII,S$GLB,, | Performed by: NURSE PRACTITIONER

## 2020-04-21 PROCEDURE — 99999 PR PBB SHADOW E&M-EST. PATIENT-LVL III: CPT | Mod: PBBFAC,,, | Performed by: NURSE PRACTITIONER

## 2020-04-21 PROCEDURE — 3074F PR MOST RECENT SYSTOLIC BLOOD PRESSURE < 130 MM HG: ICD-10-PCS | Mod: CPTII,S$GLB,, | Performed by: NURSE PRACTITIONER

## 2020-04-21 PROCEDURE — 63600175 PHARM REV CODE 636 W HCPCS: Performed by: INTERNAL MEDICINE

## 2020-04-21 PROCEDURE — 25000003 PHARM REV CODE 250: Performed by: INTERNAL MEDICINE

## 2020-04-21 PROCEDURE — 1159F MED LIST DOCD IN RCRD: CPT | Mod: S$GLB,,, | Performed by: NURSE PRACTITIONER

## 2020-04-21 RX ORDER — SODIUM CHLORIDE 9 MG/ML
500 INJECTION, SOLUTION INTRAVENOUS ONCE
Status: COMPLETED | OUTPATIENT
Start: 2020-04-21 | End: 2020-04-21

## 2020-04-21 RX ORDER — SODIUM CHLORIDE 0.9 % (FLUSH) 0.9 %
10 SYRINGE (ML) INJECTION
Status: CANCELLED | OUTPATIENT
Start: 2020-04-21

## 2020-04-21 RX ORDER — HEPARIN 100 UNIT/ML
500 SYRINGE INTRAVENOUS
Status: CANCELLED | OUTPATIENT
Start: 2020-04-21

## 2020-04-21 RX ORDER — SODIUM CHLORIDE 9 MG/ML
INJECTION, SOLUTION INTRAVENOUS ONCE
Status: CANCELLED
Start: 2020-04-21

## 2020-04-21 RX ORDER — HEPARIN 100 UNIT/ML
500 SYRINGE INTRAVENOUS
Status: DISCONTINUED | OUTPATIENT
Start: 2020-04-21 | End: 2020-04-21 | Stop reason: HOSPADM

## 2020-04-21 RX ADMIN — HEPARIN 500 UNITS: 100 SYRINGE at 12:04

## 2020-04-21 RX ADMIN — SODIUM CHLORIDE 860 MG: 9 INJECTION, SOLUTION INTRAVENOUS at 10:04

## 2020-04-21 RX ADMIN — SODIUM CHLORIDE 500 ML: 0.9 INJECTION, SOLUTION INTRAVENOUS at 10:04

## 2020-04-21 NOTE — ASSESSMENT & PLAN NOTE
Labs reviewed by Dr. Vila and myself. Lab review stable to proceed with treatment. Patient reports tolerating treatments well overall    Proceed with IMFINZI treatment today. CMP shows BUN/creatinine elevation. Add 500 ml normal saline over 1.5 to 2 hours to treatment plan. F/u 2 weeks with labs for next IMFINZI treatment. Discussed S&S to report

## 2020-04-21 NOTE — PROGRESS NOTES
Subjective:       Patient ID: Chai Murry is a 80 y.o. male.    Chief Complaint: Lab results. Maintenance Imfinzi infusion    HPI: 80 y.o male with HTN, HLD, COPD, recurrent lung cancer.     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 Prior to the surgery, the PET scan showed an FDG-avid mass in the left upper lobe abutting the left hilum with an SUV of 11.6. There seemed to be a left hilar adenopathy as well. Radiologist felt that he was unable to discriminate between the mass and the lymphadenopathy. The patient had had a bronchoscopy by Dr. Roel Ernandez on 03/04/2006 that showed a partially obstructing airway in the anterior segment of the left upper lobe with an endobronchial lesion in the anterior segment of the left upper lobe. The specimen from the biopsy at the time of bronchoscopy was reported as being a squamous cell carcinoma. At the time of the surgery after the left lung was removed, the pathologist   indicated that this was a squamous cell carcinoma. It measured 3.8 cm. The pathology indicated that this is extending into the bronchial resection margin of the lobe. This lobe was later on removed to complete the pneumonectomy. There was one lymph node positive for metastatic squamous cell carcinoma at the AP window.     The patient was told that we would prefer to treat him with post-op simultaneous chemo/radiation. He had Medi-port. He started chemo/radiation therapy andreceived 6 weekly cycles of carbo/Taxol along with radiatioon. After a month, he had a Ct scan and it showed stability. He then had 2 additional cyles of carbo/Taxol finishing in 9/27/2016. He developed hoarseness on good Friday 2019 and has been found to have a left vocal cord paralysis by EENT exam. CT of the chest was non contributory, shows changes from surgery   PET showed a PET avid mass to the left of the trachea.     The case was discussed with dr annika Goldman and GI.  We  discussed the possibility of doing a EUS or EBUs, and finally, because of the localization, it was decided to do a EUS with biopsy if possible. Cytology shows recurrent squamous cell cancer. I have asked Pathology to run a PD-L1 from his original pathology from 2016. 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 He has been discussed extensively with radiation oncology. We have decided to treat him with radiation therapy and Cisplatin as a chemo-sensitizer. Dr Castro has reviewed the therapy ports from the previous radiation treatment and the area in question seems to be above the previously radiated fields. Completed chemoradiation s/p 6 weekly cisplatin treatments with response.     Currently on maintenance Imfinzi. He reports tolerating this treatment well overall without any significant side effects    Patient presents today for lab results to continue maintenance dosing of IMFINZI. Franklin any CP, SOB, dizziness, lightheadedness, cough, bowel or urinary complaints. He does report noting skin abnormality of left hand which he plans to follow up with his Dermatologist for. Reports tenderness and pruritis to area intermittently.  Social History     Socioeconomic History    Marital status:      Spouse name: Not on file    Number of children: 3    Years of education: Not on file    Highest education level: Not on file   Occupational History    Occupation: retired   Social Needs    Financial resource strain: Not on file    Food insecurity:     Worry: Not on file     Inability: Not on file    Transportation needs:     Medical: Not on file     Non-medical: Not on file   Tobacco Use    Smoking status: Former Smoker     Packs/day: 1.00     Years: 50.00     Pack years: 50.00     Last attempt to quit: 2005     Years since quittin.7    Smokeless tobacco: Never Used   Substance and Sexual Activity    Alcohol use: No    Drug use: No    Sexual activity: Not  Currently   Lifestyle    Physical activity:     Days per week: Not on file     Minutes per session: Not on file    Stress: Not on file   Relationships    Social connections:     Talks on phone: Not on file     Gets together: Not on file     Attends [employer] service: Not on file     Active member of club or organization: Not on file     Attends meetings of clubs or organizations: Not on file     Relationship status: Not on file   Other Topics Concern    Not on file   Social History Narrative    Not on file       Past Medical History:   Diagnosis Date    Anemia     Arthritis     Atrial fibrillation     CAD (coronary artery disease)     Cataract     COPD (chronic obstructive pulmonary disease)     Diverticulosis     ED (erectile dysfunction)     HTN (hypertension)     Hyperlipidemia     Lung cancer     left    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 Courtney dwyer bx       Family History   Problem Relation Age of Onset    Esophageal cancer Sister     Cancer Sister     Lung cancer Mother     Cancer Mother     Cataracts Mother     Hypertension Mother     Pneumonia Father     Cataracts Father     Hypertension Father     Cancer Brother     Hypertension Brother     Blindness Neg Hx     Diabetes Neg Hx     Glaucoma Neg Hx     Macular degeneration Neg Hx     Retinal detachment Neg Hx     Strabismus Neg Hx     Stroke Neg Hx     Thyroid disease Neg Hx        Past Surgical History:   Procedure Laterality Date    APPENDECTOMY      BRONCHOSCOPY Bilateral 6/21/2019    Procedure: Bronchoscopy;  Surgeon: Paresh Goldman MD;  Location: [facility];  Service: Endoscopy;  Laterality: Bilateral;    CARDIAC CATHETERIZATION      cardiac stents      CATARACT EXTRACTION W/  INTRAOCULAR LENS IMPLANT Right 9-3-14    CHOLECYSTECTOMY      COLONOSCOPY N/A 4/17/2018    Procedure: COLONOSCOPY;  Surgeon: Dionne Doan MD;  Location: [facility];  Service:  Endoscopy;  Laterality: N/A;    COLONOSCOPY N/A 10/25/2018    Procedure: COLONOSCOPY;  Surgeon: Michael Hameed MD;  Location: [facility] ENDO;  Service: Endoscopy;  Laterality: N/A;    ENDOSCOPIC ULTRASOUND OF UPPER GASTROINTESTINAL TRACT N/A 6/11/2019    Procedure: ULTRASOUND, UPPER GI TRACT, ENDOSCOPIC;  Surgeon: Abhishek Knox MD;  Location: [facility] ENDO;  Service: Endoscopy;  Laterality: N/A;    ESOPHAGOGASTRODUODENOSCOPY N/A 6/11/2019    Procedure: EGD (ESOPHAGOGASTRODUODENOSCOPY);  Surgeon: Abhishek Knox MD;  Location: [facility] ENDO;  Service: Endoscopy;  Laterality: N/A;    infected stomach gland excision      INSERTION OF TUNNELED CENTRAL VENOUS CATHETER (CVC) WITH SUBCUTANEOUS PORT Right 7/3/2019    Procedure: [id number]-CENX-L-CATH;  Surgeon: Jean Carlos Constantino MD;  Location: [facility] OR;  Service: General;  Laterality: Right;    LUNG REMOVAL, TOTAL Left 04/2016    lung cancer left upper lobe    SKIN BIOPSY Left     arm       Review of Systems   Constitutional: Negative for activity change, appetite change, chills, diaphoresis, fatigue, fever and unexpected weight change.   HENT: Negative for congestion, mouth sores, nosebleeds, sore throat, trouble swallowing and voice change.    Eyes: Negative for photophobia and visual disturbance.   Respiratory: Negative for cough, chest tightness, shortness of breath and wheezing.    Cardiovascular: Negative for chest pain, palpitations and leg swelling.   Gastrointestinal: Negative for abdominal distention, abdominal pain, anal bleeding, blood in stool, constipation, diarrhea, nausea and vomiting.   Genitourinary: Negative for difficulty urinating, dysuria and hematuria.   Musculoskeletal: Negative for arthralgias, back pain and myalgias.   Skin: Negative for pallor, rash and wound.   Neurological: Negative for dizziness, syncope, weakness and headaches.   Hematological: Negative for adenopathy. Does not bruise/bleed easily.   Psychiatric/Behavioral: The patient is not  nervous/anxious.          Medication List with Changes/Refills   Current Medications    ALBUTEROL (PROVENTIL) 2.5 MG /3 ML (0.083 %) NEBULIZER SOLUTION    Take 3 mLs (2.5 mg total) by nebulization every 6 (six) hours while awake.    AMLODIPINE (NORVASC) 5 MG TABLET    Take 1 tablet (5 mg total) by mouth once daily.    AMOXICILLIN-CLAVULANATE 875-125MG (AUGMENTIN) 875-125 MG PER TABLET    Take 1 tablet by mouth 2 (two) times daily.    APIXABAN (ELIQUIS) 5 MG TAB    Take 1 tablet (5 mg total) by mouth 2 (two) times daily.    ASPIRIN (ECOTRIN) 81 MG EC TABLET    Take 81 mg by mouth once daily.    BENZONATATE (TESSALON) 200 MG CAPSULE    Take 1 capsule (200 mg total) by mouth 3 (three) times daily as needed for Cough.    BREO ELLIPTA 100-25 MCG/DOSE DISKUS INHALER    INHALE 1 PUFF INTO THE LUNGS ONCE DAILY    FENOFIBRIC ACID (FIBRICOR) 135 MG CPDR    Take 1 capsule (135 mg total) by mouth once daily.    FLUTICASONE (FLONASE) 50 MCG/ACTUATION NASAL SPRAY    2 sprays (100 mcg total) by Each Nare route once daily.    FUROSEMIDE (LASIX) 20 MG TABLET    TAKE 1 TABLET BY MOUTH EVERY DAY AS NEEDED    GLYCOPYRROLATE-FORMOTEROL (BEVESPI AEROSPHERE) 9-4.8 MCG HFAA    Inhale 2 puffs into the lungs 2 (two) times daily. Controller    HYDROCODONE-ACETAMINOPHEN (NORCO) 5-325 MG PER TABLET    Take 1 tablet by mouth every 6 (six) hours as needed for Pain.    IPRATROPIUM-ALBUTEROL (COMBIVENT RESPIMAT)  MCG/ACTUATION INHALER    Inhale 1 puff into the lungs every 6 (six) hours as needed for Shortness of Breath.    LEVOCETIRIZINE (XYZAL) 5 MG TABLET    Take 5 mg by mouth as needed.     LEVOTHYROXINE (SYNTHROID) 25 MCG TABLET    Take 1 tablet (25 mcg total) by mouth before breakfast.    LISINOPRIL (PRINIVIL,ZESTRIL) 40 MG TABLET    Take 1 tablet (40 mg total) by mouth once daily.    PROMETHAZINE (PHENERGAN) 6.25 MG/5 ML SYRUP    Take 20 mLs (25 mg total) by mouth nightly as needed.    RANOLAZINE (RANEXA) 500 MG TB12    Take 1 tablet  (500 mg total) by mouth 2 (two) times daily.    SIMVASTATIN (ZOCOR) 80 MG TABLET    Take 1 tablet (80 mg total) by mouth once daily.    SOTALOL (SOTALOL AF) 80 MG TABLET    Take 1 tablet (80 mg total) by mouth 2 (two) times daily.    TEMAZEPAM (RESTORIL) 7.5 MG CAP    Take 1 capsule (7.5 mg total) by mouth nightly as needed.     Objective:     Vitals:    04/21/20 0913   BP: 123/66   Pulse: (!) 59   Temp: 97.1 °F (36.2 °C)     Lab Results   Component Value Date    WBC 7.70 04/21/2020    HGB 13.0 (L) 04/21/2020    HCT 42.4 04/21/2020    MCV 95 04/21/2020     04/21/2020       BMP  Lab Results   Component Value Date     04/21/2020    K 4.4 04/21/2020     04/21/2020    CO2 30 (H) 04/21/2020    BUN 31 (H) 04/21/2020    CREATININE 1.6 (H) 04/21/2020    CALCIUM 9.4 04/21/2020    ANIONGAP 9 04/21/2020    ESTGFRAFRICA 47 (A) 04/21/2020    EGFRNONAA 40 (A) 04/21/2020     Lab Results   Component Value Date    ALT 16 04/21/2020    AST 18 04/21/2020    ALKPHOS 57 04/21/2020    BILITOT 0.5 04/21/2020         Physical Exam   Constitutional: He is oriented to person, place, and time. He appears well-developed and well-nourished. He is cooperative.   HENT:   Head: Normocephalic.   Right Ear: Hearing normal.   Left Ear: Hearing normal.   Nose: Nose normal.   Mouth/Throat: Oropharynx is clear and moist.   Eyes: Conjunctivae, EOM and lids are normal. Right eye exhibits no discharge. Left eye exhibits no discharge. No scleral icterus.   Neck: Normal range of motion. No thyroid mass present.   Cardiovascular: Regular rhythm and normal heart sounds. Bradycardia present.   No murmur heard.  Pulmonary/Chest: Effort normal and breath sounds normal. No respiratory distress. He has no wheezes. He has no rhonchi. He has no rales.   Abdominal: Soft. Bowel sounds are normal. He exhibits no distension. There is no tenderness.   Genitourinary:   Genitourinary Comments: deferred   Musculoskeletal: Normal range of motion. He  exhibits no edema (trace RLE).   Lymphadenopathy:        Head (right side): No submandibular, no preauricular and no posterior auricular adenopathy present.        Head (left side): No submandibular, no preauricular and no posterior auricular adenopathy present.        Right cervical: No superficial cervical adenopathy present.       Left cervical: No superficial cervical adenopathy present.   Neurological: He is alert and oriented to person, place, and time.   Skin: Skin is warm, dry and intact.   Psychiatric: He has a normal mood and affect. His speech is normal and behavior is normal. Thought content normal.   Vitals reviewed.       Assessment:     Problem List Items Addressed This Visit        Oncology    Malignant neoplasm of upper lobe of left lung - Primary     Labs reviewed by Dr. Vila and myself. Lab review stable to proceed with treatment. Patient reports tolerating treatments well overall    Proceed with IMFINZI treatment today. CMP shows BUN/creatinine elevation. Add 500 ml normal saline over 1.5 to 2 hours to treatment plan. F/u 2 weeks with labs for next IMFINZI treatment. Discussed S&S to report         Relevant Orders    CBC auto differential    Comprehensive metabolic panel    TSH            Plan:     Malignant neoplasm of upper lobe of left lung  -     CBC auto differential; Future; Expected date: 04/21/2020  -     Comprehensive metabolic panel; Future; Expected date: 04/21/2020  -     TSH; Future; Expected date: 04/21/2020              BALJEET Cardenas

## 2020-04-21 NOTE — PLAN OF CARE
"  Problem: Adult Inpatient Plan of Care  Goal: Plan of Care Review  Outcome: Ongoing, Progressing  Flowsheets (Taken 4/21/2020 1100)  Plan of Care Reviewed With: patient  Goal: Patient-Specific Goal (Individualization)  Outcome: Ongoing, Progressing  Flowsheets (Taken 4/21/2020 1100)  Individualized Care Needs: Pt likes feet up, pillow, and blanket; no ice on port  Anxieties, Fears or Concerns: None  Patient-Specific Goals (Include Timeframe): patient will tolerate treatment today.       Patient states, "I don't have any pain today."  "

## 2020-04-21 NOTE — DISCHARGE INSTRUCTIONS
[facility]  [address] [address]  [address]  [phone] phone     [phone] fax  Hours of Operation: Monday- Friday 8:00am- 5:00pm  After hours phone  [phone]  Hematology / Oncology Physicians on call      Dr. Jose Fermin, JA [place] NP Tyesha Taylor, NP    Please call with any concerns regarding your appointment today.      FALL PREVENTION   Falls often occur due to slipping, tripping or losing your balance. Here are ways to reduce your risk of falling again.   Was there anything that caused your fall that can be fixed, removed or replaced?   Make your home safe by keeping walkways clear of objects you may trip over.   Use non-slip pads under rugs.   Do not walk in poorly lit areas.   Do not stand on chairs or wobbly ladders.   Use caution when reaching overhead or looking upward. This position can cause a loss of balance.   Be sure your shoes fit properly, have non-slip bottoms and are in good condition.   Be cautious when going up and down stairs, curbs, and when walking on uneven sidewalks.   If your balance is poor, consider using a cane or walker.   If your fall was related to alcohol use, stop or limit alcohol intake.   If your fall was related to use of sleeping medicines, talk to your doctor about this. You may need to reduce your dosage at bedtime if you awaken during the night to go to the bathroom.   To reduce the need for nighttime bathroom trips:   Avoid drinking fluids for several hours before going to bed   Empty your bladder before going to bed   Men can keep a urinal at the bedside   © 6681-2249 [employer] [facility], [address] [place] [address], [place] [address]. All rights reserved. This information is not intended as a substitute for professional medical care. Always follow your healthcare professional's instructions.      HOME CARE AFTER CHEMOTHERAPY    Meals   Many patients feel sick and lose their appetites during treatment. Eat small meals several times a day. Choose bland foods with little taste or smell if you have problems with nausea. Be sure to cook all food thoroughly. This kills bacteria and helps you avoid intestinal infection. Soft foods are easier to swallow and digest.   Activity   Exercise keeps you strong and keeps your heart and lungs active. Talk to your doctor about an appropriate exercise program for you.   Skin Care   To prevent a skin infection, bathe or shower once a day. Use a moisturizing soap and wash with warm water. Avoid very hot or cold water. Chemotherapy can make your skin dry . Apply moisturizing lotion to help relieve dry skin. Some drugs used in high doses can cause slight burns to appear (like sunburn). Ask for a special cream to help relieve the burn and protect your skin.   Prevent Mouth Sores   During chemotherapy, many people get mouth sores. Do the following to help prevent mouth sores or to ease discomfort.   Brush your teeth with a soft-bristle toothbrush after every meal.  Don't use dental floss if your platelet count is below 50,000. Your doctor or nurse will tell you if this is the case.  Use an oral swab or special soft toothbrush if your gums bleed during regular brushing.  Use mouthwash as directed. If you can't tolerate commercial mouthwash, use salt and baking soda to clean your mouth. Mix 1 teaspoon of salt and 1 teaspoon of baking soda into a glass of water. Swish and spit.  Call your doctor or return to this facility if you develop any of the following:   Sore throat   White patches in the mouth or throat   Fever of 100.4ºF (38ºC) or higher, or as directed by your healthcare provider  © 6919-4497 [employer], [address] [place] [address], [place] [address]. All rights reserved. This information is not intended as a substitute for professional medical care. Always follow your healthcare professional's  instructions.      DEHYDRATION [Adult]   Dehydration occurs when the body loses too much fluid. This may be the result of vomiting a lot or from diarrhea, profuse sweating or a high fever. It may also occur if you dont drink enough fluid when youre sick. Misuse of diuretics (water pills) can also be a cause.   Symptoms include thirst and feeling dizzy, weak, fatigued, or very drowsy. The diet described below is usually enough to treat most cases. Sometimes medicine is also needed.   HOME CARE:   Adults should drink at least 12 eight-ounce glasses of fluid per day to correct dehydration. The fluid may include water, orange juice and lemonade, apple, grape and cranberry juice, clear fruit drinks, electrolyte replacement and sports drinks, and teas and coffee without caffeine.   If you have fever, muscle aching or headache from a viral syndrome, you may use Tylenol (acetaminophen) or ibuprofen (Motrin, Advil), unless another medicine was prescribed for this. [ NOTE : If you have chronic liver or kidney disease or ever had a stomach ulcer or GI bleeding, talk with your doctor before using these medicines.] (If under 18 years old, do not use aspirin for fever. There is a chance of severe liver injury.)  FOLLOW UP with your doctor or this facility if you do not improve over the next 24-48 hours.   GET PROMPT MEDICAL ATTENTION if any of the following occur:   Continued vomiting (cant keep liquids down)   Frequent diarrhea (more than 5 times a day); blood (red or black color) or mucus in diarrhea   Blood in vomit or stool   Swollen abdomen or increasing abdominal pain   Weakness, dizziness or fainting   Unusually drowsy or confused   Reduced urine output or extreme thirst   Fever of 100.4°F (38°C) oral or higher, not better with fever medication  © 4625-6041 [employer] [facility], [address] [place] [address], [place] [address]. All rights reserved. This information is not intended as a substitute for professional medical care.  Always follow your healthcare professional's instructions.

## 2020-04-30 ENCOUNTER — LAB VISIT (OUTPATIENT)
Dept: OTOLARYNGOLOGY | Facility: CLINIC | Age: 80
End: 2020-04-30
Payer: MEDICARE

## 2020-04-30 DIAGNOSIS — Z03.818 ENCNTR FOR OBS FOR SUSP EXPSR TO OTH BIOLG AGENTS RULED OUT: ICD-10-CM

## 2020-04-30 PROCEDURE — U0002 COVID-19 LAB TEST NON-CDC: HCPCS

## 2020-05-01 LAB — SARS-COV-2 RNA RESP QL NAA+PROBE: NOT DETECTED

## 2020-05-05 ENCOUNTER — INFUSION (OUTPATIENT)
Dept: INFUSION THERAPY | Facility: HOSPITAL | Age: 80
End: 2020-05-05
Attending: INTERNAL MEDICINE
Payer: MEDICARE

## 2020-05-05 ENCOUNTER — OFFICE VISIT (OUTPATIENT)
Dept: HEMATOLOGY/ONCOLOGY | Facility: CLINIC | Age: 80
End: 2020-05-05
Payer: MEDICARE

## 2020-05-05 ENCOUNTER — LAB VISIT (OUTPATIENT)
Dept: LAB | Facility: HOSPITAL | Age: 80
End: 2020-05-05
Attending: INTERNAL MEDICINE
Payer: MEDICARE

## 2020-05-05 VITALS
OXYGEN SATURATION: 98 % | WEIGHT: 189.81 LBS | BODY MASS INDEX: 27.17 KG/M2 | DIASTOLIC BLOOD PRESSURE: 65 MMHG | SYSTOLIC BLOOD PRESSURE: 129 MMHG | HEIGHT: 70 IN | HEART RATE: 63 BPM | TEMPERATURE: 97 F

## 2020-05-05 VITALS
RESPIRATION RATE: 18 BRPM | HEART RATE: 58 BPM | SYSTOLIC BLOOD PRESSURE: 112 MMHG | TEMPERATURE: 98 F | DIASTOLIC BLOOD PRESSURE: 61 MMHG | OXYGEN SATURATION: 94 %

## 2020-05-05 DIAGNOSIS — D49.89 NEOPLASM OF HEAD: ICD-10-CM

## 2020-05-05 DIAGNOSIS — C34.12 MALIGNANT NEOPLASM OF UPPER LOBE OF LEFT LUNG: Primary | ICD-10-CM

## 2020-05-05 DIAGNOSIS — F51.01 PRIMARY INSOMNIA: ICD-10-CM

## 2020-05-05 DIAGNOSIS — Z03.818 ENCNTR FOR OBS FOR SUSP EXPSR TO OTH BIOLG AGENTS RULED OUT: ICD-10-CM

## 2020-05-05 DIAGNOSIS — Z95.828 PORT-A-CATH IN PLACE: ICD-10-CM

## 2020-05-05 DIAGNOSIS — Z51.11 CHEMOTHERAPY MANAGEMENT, ENCOUNTER FOR: ICD-10-CM

## 2020-05-05 DIAGNOSIS — C34.12 MALIGNANT NEOPLASM OF UPPER LOBE OF LEFT LUNG: ICD-10-CM

## 2020-05-05 PROBLEM — Z85.118 HX OF CANCER OF LUNG: Status: RESOLVED | Noted: 2017-02-09 | Resolved: 2020-05-05

## 2020-05-05 LAB
ALBUMIN SERPL BCP-MCNC: 3.5 G/DL (ref 3.5–5.2)
ALP SERPL-CCNC: 51 U/L (ref 55–135)
ALT SERPL W/O P-5'-P-CCNC: 13 U/L (ref 10–44)
ANION GAP SERPL CALC-SCNC: 10 MMOL/L (ref 8–16)
AST SERPL-CCNC: 20 U/L (ref 10–40)
BASOPHILS # BLD AUTO: 0.04 K/UL (ref 0–0.2)
BASOPHILS NFR BLD: 0.6 % (ref 0–1.9)
BILIRUB SERPL-MCNC: 0.3 MG/DL (ref 0.1–1)
BUN SERPL-MCNC: 26 MG/DL (ref 8–23)
CALCIUM SERPL-MCNC: 9.5 MG/DL (ref 8.7–10.5)
CHLORIDE SERPL-SCNC: 105 MMOL/L (ref 95–110)
CO2 SERPL-SCNC: 27 MMOL/L (ref 23–29)
CREAT SERPL-MCNC: 1.5 MG/DL (ref 0.5–1.4)
DIFFERENTIAL METHOD: ABNORMAL
EOSINOPHIL # BLD AUTO: 1.5 K/UL (ref 0–0.5)
EOSINOPHIL NFR BLD: 23.4 % (ref 0–8)
ERYTHROCYTE [DISTWIDTH] IN BLOOD BY AUTOMATED COUNT: 15 % (ref 11.5–14.5)
EST. GFR  (AFRICAN AMERICAN): 50 ML/MIN/1.73 M^2
EST. GFR  (NON AFRICAN AMERICAN): 44 ML/MIN/1.73 M^2
GLUCOSE SERPL-MCNC: 125 MG/DL (ref 70–110)
HCT VFR BLD AUTO: 37.3 % (ref 40–54)
HGB BLD-MCNC: 11.7 G/DL (ref 14–18)
IMM GRANULOCYTES # BLD AUTO: 0.03 K/UL (ref 0–0.04)
IMM GRANULOCYTES NFR BLD AUTO: 0.5 % (ref 0–0.5)
LYMPHOCYTES # BLD AUTO: 0.7 K/UL (ref 1–4.8)
LYMPHOCYTES NFR BLD: 11 % (ref 18–48)
MCH RBC QN AUTO: 29.8 PG (ref 27–31)
MCHC RBC AUTO-ENTMCNC: 31.4 G/DL (ref 32–36)
MCV RBC AUTO: 95 FL (ref 82–98)
MONOCYTES # BLD AUTO: 0.5 K/UL (ref 0.3–1)
MONOCYTES NFR BLD: 7.9 % (ref 4–15)
NEUTROPHILS # BLD AUTO: 3.7 K/UL (ref 1.8–7.7)
NEUTROPHILS NFR BLD: 56.6 % (ref 38–73)
NRBC BLD-RTO: 0 /100 WBC
PLATELET # BLD AUTO: 229 K/UL (ref 150–350)
PMV BLD AUTO: 10.4 FL (ref 9.2–12.9)
POTASSIUM SERPL-SCNC: 4.4 MMOL/L (ref 3.5–5.1)
PROT SERPL-MCNC: 7.3 G/DL (ref 6–8.4)
RBC # BLD AUTO: 3.92 M/UL (ref 4.6–6.2)
SODIUM SERPL-SCNC: 142 MMOL/L (ref 136–145)
TSH SERPL DL<=0.005 MIU/L-ACNC: 3.04 UIU/ML (ref 0.4–4)
WBC # BLD AUTO: 6.57 K/UL (ref 3.9–12.7)

## 2020-05-05 PROCEDURE — 1159F MED LIST DOCD IN RCRD: CPT | Mod: S$GLB,,, | Performed by: INTERNAL MEDICINE

## 2020-05-05 PROCEDURE — 99999 PR PBB SHADOW E&M-EST. PATIENT-LVL III: ICD-10-PCS | Mod: PBBFAC,,, | Performed by: INTERNAL MEDICINE

## 2020-05-05 PROCEDURE — 36415 COLL VENOUS BLD VENIPUNCTURE: CPT

## 2020-05-05 PROCEDURE — 96413 CHEMO IV INFUSION 1 HR: CPT

## 2020-05-05 PROCEDURE — 80053 COMPREHEN METABOLIC PANEL: CPT

## 2020-05-05 PROCEDURE — 1159F PR MEDICATION LIST DOCUMENTED IN MEDICAL RECORD: ICD-10-PCS | Mod: S$GLB,,, | Performed by: INTERNAL MEDICINE

## 2020-05-05 PROCEDURE — 1126F AMNT PAIN NOTED NONE PRSNT: CPT | Mod: S$GLB,,, | Performed by: INTERNAL MEDICINE

## 2020-05-05 PROCEDURE — 3074F PR MOST RECENT SYSTOLIC BLOOD PRESSURE < 130 MM HG: ICD-10-PCS | Mod: CPTII,S$GLB,, | Performed by: INTERNAL MEDICINE

## 2020-05-05 PROCEDURE — 99999 PR PBB SHADOW E&M-EST. PATIENT-LVL III: CPT | Mod: PBBFAC,,, | Performed by: INTERNAL MEDICINE

## 2020-05-05 PROCEDURE — 84443 ASSAY THYROID STIM HORMONE: CPT

## 2020-05-05 PROCEDURE — 3078F DIAST BP <80 MM HG: CPT | Mod: CPTII,S$GLB,, | Performed by: INTERNAL MEDICINE

## 2020-05-05 PROCEDURE — 85025 COMPLETE CBC W/AUTO DIFF WBC: CPT

## 2020-05-05 PROCEDURE — 25000003 PHARM REV CODE 250: Performed by: INTERNAL MEDICINE

## 2020-05-05 PROCEDURE — 3078F PR MOST RECENT DIASTOLIC BLOOD PRESSURE < 80 MM HG: ICD-10-PCS | Mod: CPTII,S$GLB,, | Performed by: INTERNAL MEDICINE

## 2020-05-05 PROCEDURE — 1101F PR PT FALLS ASSESS DOC 0-1 FALLS W/OUT INJ PAST YR: ICD-10-PCS | Mod: CPTII,S$GLB,, | Performed by: INTERNAL MEDICINE

## 2020-05-05 PROCEDURE — 3074F SYST BP LT 130 MM HG: CPT | Mod: CPTII,S$GLB,, | Performed by: INTERNAL MEDICINE

## 2020-05-05 PROCEDURE — 1126F PR PAIN SEVERITY QUANTIFIED, NO PAIN PRESENT: ICD-10-PCS | Mod: S$GLB,,, | Performed by: INTERNAL MEDICINE

## 2020-05-05 PROCEDURE — 99215 PR OFFICE/OUTPT VISIT, EST, LEVL V, 40-54 MIN: ICD-10-PCS | Mod: S$GLB,,, | Performed by: INTERNAL MEDICINE

## 2020-05-05 PROCEDURE — 99215 OFFICE O/P EST HI 40 MIN: CPT | Mod: S$GLB,,, | Performed by: INTERNAL MEDICINE

## 2020-05-05 PROCEDURE — 1101F PT FALLS ASSESS-DOCD LE1/YR: CPT | Mod: CPTII,S$GLB,, | Performed by: INTERNAL MEDICINE

## 2020-05-05 PROCEDURE — 63600175 PHARM REV CODE 636 W HCPCS: Mod: TB | Performed by: INTERNAL MEDICINE

## 2020-05-05 RX ORDER — HEPARIN 100 UNIT/ML
500 SYRINGE INTRAVENOUS
Status: DISCONTINUED | OUTPATIENT
Start: 2020-05-05 | End: 2020-05-05 | Stop reason: HOSPADM

## 2020-05-05 RX ORDER — ZOLPIDEM TARTRATE 5 MG/1
5 TABLET ORAL NIGHTLY PRN
Qty: 30 TABLET | Refills: 1 | Status: SHIPPED | OUTPATIENT
Start: 2020-05-05 | End: 2020-05-08 | Stop reason: SDUPTHER

## 2020-05-05 RX ORDER — SODIUM CHLORIDE 0.9 % (FLUSH) 0.9 %
10 SYRINGE (ML) INJECTION
Status: CANCELLED | OUTPATIENT
Start: 2020-05-05

## 2020-05-05 RX ORDER — HEPARIN 100 UNIT/ML
500 SYRINGE INTRAVENOUS
Status: CANCELLED | OUTPATIENT
Start: 2020-05-05

## 2020-05-05 RX ADMIN — HEPARIN 500 UNITS: 100 SYRINGE at 03:05

## 2020-05-05 RX ADMIN — SODIUM CHLORIDE: 9 INJECTION, SOLUTION INTRAVENOUS at 02:05

## 2020-05-05 RX ADMIN — SODIUM CHLORIDE 885 MG: 9 INJECTION, SOLUTION INTRAVENOUS at 02:05

## 2020-05-05 NOTE — PLAN OF CARE
"  Problem: Adult Inpatient Plan of Care  Goal: Plan of Care Review  Outcome: Ongoing, Progressing  Flowsheets (Taken 5/5/2020 1606)  Plan of Care Reviewed With: patient  Goal: Patient-Specific Goal (Individualization)  Outcome: Ongoing, Progressing  Flowsheets (Taken 5/5/2020 1606)  Individualized Care Needs: Pt likes feet up, pillow, and blanket; no ice on port  Anxieties, Fears or Concerns: None  Patient-Specific Goals (Include Timeframe): patient will tolerate Imfinzi today         Patient states, "I feel okay." Patient denies pain or other concerns. NAD noted.   "

## 2020-05-05 NOTE — PROGRESS NOTES
Subjective:       Patient ID: Chai Murry is a 80 y.o. male.    Chief Complaint: Results; Chemotherapy; and Lung Cancer    HPI 80-year-old male with history of recurrent non-small cell lung carcinoma status post combined chemoradiation currently on maintenance immunotherapy.  Patient reports increasing dizziness.  States that this is been getting progressively worse ECOG status 1.  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 Courtney dwyer bx     Family History   Problem Relation Age of Onset    Esophageal cancer Sister     Cancer Sister     Lung cancer Mother     Cancer Mother     Cataracts Mother     Hypertension Mother     Pneumonia Father     Cataracts Father     Hypertension Father     Cancer Brother     Hypertension Brother     Blindness Neg Hx     Diabetes Neg Hx     Glaucoma Neg Hx     Macular degeneration Neg Hx     Retinal detachment Neg Hx     Strabismus Neg Hx     Stroke Neg Hx     Thyroid disease Neg Hx      Social History     Socioeconomic History    Marital status:      Spouse name: Not on file    Number of children: 3    Years of education: Not on file    Highest education level: Not on file   Occupational History    Occupation: retired   Social Needs    Financial resource strain: Not on file    Food insecurity:     Worry: Not on file     Inability: Not on file    Transportation needs:     Medical: Not on file     Non-medical: Not on file   Tobacco Use    Smoking status: Former Smoker     Packs/day: 1.00     Years: 50.00     Pack years: 50.00      Last attempt to quit: 2005     Years since quittin.7    Smokeless tobacco: Never Used   Substance and Sexual Activity    Alcohol use: No    Drug use: No    Sexual activity: Not Currently   Lifestyle    Physical activity:     Days per week: Not on file     Minutes per session: Not on file    Stress: Not on file   Relationships    Social connections:     Talks on phone: Not on file     Gets together: Not on file     Attends Cheondo[employer] service: Not on file     Active member of club or organization: Not on file     Attends meetings of clubs or organizations: Not on file     Relationship status: Not on file   Other Topics Concern    Not on file   Social History Narrative    Not on file     Past Surgical History:   Procedure Laterality Date    APPENDECTOMY      BRONCHOSCOPY Bilateral 2019    Procedure: Bronchoscopy;  Surgeon: Paresh Goldman MD;  Location: [facility];  Service: Endoscopy;  Laterality: Bilateral;    CARDIAC CATHETERIZATION      cardiac stents      CATARACT EXTRACTION W/  INTRAOCULAR LENS IMPLANT Right 9-3-14    CHOLECYSTECTOMY      COLONOSCOPY N/A 2018    Procedure: COLONOSCOPY;  Surgeon: Dionne Doan MD;  Location: [facility];  Service: Endoscopy;  Laterality: N/A;    COLONOSCOPY N/A 10/25/2018    Procedure: COLONOSCOPY;  Surgeon: Michael Hameed MD;  Location: [facility];  Service: Endoscopy;  Laterality: N/A;    ENDOSCOPIC ULTRASOUND OF UPPER GASTROINTESTINAL TRACT N/A 2019    Procedure: ULTRASOUND, UPPER GI TRACT, ENDOSCOPIC;  Surgeon: Abhishek Knox MD;  Location: [facility];  Service: Endoscopy;  Laterality: N/A;    ESOPHAGOGASTRODUODENOSCOPY N/A 2019    Procedure: EGD (ESOPHAGOGASTRODUODENOSCOPY);  Surgeon: Abhishek Knox MD;  Location: [facility];  Service: Endoscopy;  Laterality: N/A;    infected stomach gland excision      INSERTION OF TUNNELED CENTRAL VENOUS CATHETER (CVC) WITH SUBCUTANEOUS PORT Right 7/3/2019    Procedure:  [id number];  Surgeon: Jean Carlos Constantino MD;  Location: [facility];  Service: General;  Laterality: Right;    LUNG REMOVAL, TOTAL Left 04/2016    lung cancer left upper lobe    SKIN BIOPSY Left     arm       Labs:  Lab Results   Component Value Date    WBC 6.57 05/05/2020    HGB 11.7 (L) 05/05/2020    HCT 37.3 (L) 05/05/2020    MCV 95 05/05/2020     05/05/2020     BMP  Lab Results   Component Value Date     04/21/2020    K 4.4 04/21/2020     04/21/2020    CO2 30 (H) 04/21/2020    BUN 31 (H) 04/21/2020    CREATININE 1.6 (H) 04/21/2020    CALCIUM 9.4 04/21/2020    ANIONGAP 9 04/21/2020    ESTGFRAFRICA 47 (A) 04/21/2020    EGFRNONAA 40 (A) 04/21/2020     Lab Results   Component Value Date    ALT 16 04/21/2020    AST 18 04/21/2020    ALKPHOS 57 04/21/2020    BILITOT 0.5 04/21/2020       No results found for: IRON, TIBC, FERRITIN, SATURATEDIRO  No results found for: KDETZAUI64  No results found for: FOLATE  Lab Results   Component Value Date    TSH 5.228 (H) 04/21/2020         Review of Systems   Constitutional: Negative for activity change, appetite change, chills, diaphoresis, fatigue, fever and unexpected weight change.   HENT: Negative for congestion, dental problem, drooling, ear discharge, ear pain, facial swelling, hearing loss, mouth sores, nosebleeds, postnasal drip, rhinorrhea, sinus pressure, sneezing, sore throat, tinnitus, trouble swallowing and voice change.    Eyes: Negative for photophobia, pain, discharge, redness, itching and visual disturbance.   Respiratory: Negative for apnea, cough, choking, chest tightness, shortness of breath, wheezing and stridor.    Cardiovascular: Negative for chest pain, palpitations and leg swelling.   Gastrointestinal: Negative for abdominal distention, abdominal pain, anal bleeding, blood in stool, constipation, diarrhea, nausea, rectal pain and vomiting.   Endocrine: Negative for cold intolerance, heat intolerance, polydipsia, polyphagia and  polyuria.   Genitourinary: Negative for decreased urine volume, difficulty urinating, discharge, dysuria, enuresis, flank pain, frequency, genital sores, hematuria, penile pain, penile swelling, scrotal swelling, testicular pain and urgency.   Musculoskeletal: Negative for arthralgias, back pain, gait problem, joint swelling, myalgias, neck pain and neck stiffness.   Skin: Negative for color change, pallor, rash and wound.   Allergic/Immunologic: Negative for environmental allergies, food allergies and immunocompromised state.   Neurological: Positive for dizziness. Negative for tremors, seizures, syncope, facial asymmetry, speech difficulty, weakness, light-headedness, numbness and headaches.   Hematological: Negative for adenopathy. Does not bruise/bleed easily.   Psychiatric/Behavioral: Negative for agitation, behavioral problems, confusion, decreased concentration, dysphoric mood, hallucinations, self-injury, sleep disturbance and suicidal ideas. The patient is not nervous/anxious and is not hyperactive.        Objective:      Physical Exam   Constitutional: He is oriented to person, place, and time. He appears well-developed and well-nourished. He appears distressed.   HENT:   Head: Normocephalic.   Right Ear: Tympanic membrane and external ear normal.   Left Ear: Tympanic membrane and external ear normal.   Nose: Nose normal. Right sinus exhibits no maxillary sinus tenderness and no frontal sinus tenderness. Left sinus exhibits no maxillary sinus tenderness and no frontal sinus tenderness.   Mouth/Throat: Oropharynx is clear and moist. No oropharyngeal exudate.   Eyes: Pupils are equal, round, and reactive to light. EOM and lids are normal. Right eye exhibits no discharge. Left eye exhibits no discharge. Right conjunctiva is not injected. Right conjunctiva has no hemorrhage. Left conjunctiva is not injected. Left conjunctiva has no hemorrhage. No scleral icterus. Right eye exhibits normal extraocular motion.  Left eye exhibits normal extraocular motion.   Neck: Normal range of motion. Neck supple. No JVD present. No tracheal deviation present. No thyromegaly present.   Cardiovascular: Normal rate, regular rhythm and normal heart sounds.   Pulmonary/Chest: Effort normal and breath sounds normal. No stridor. No respiratory distress.   Abdominal: Soft. Bowel sounds are normal. He exhibits no mass. There is no hepatosplenomegaly, splenomegaly or hepatomegaly. There is no tenderness.   Musculoskeletal: Normal range of motion. He exhibits no edema or tenderness.   Lymphadenopathy:        Head (right side): No posterior auricular and no occipital adenopathy present.        Head (left side): No posterior auricular and no occipital adenopathy present.     He has no cervical adenopathy.        Right cervical: No superficial cervical, no deep cervical and no posterior cervical adenopathy present.       Left cervical: No superficial cervical, no deep cervical and no posterior cervical adenopathy present.     He has no axillary adenopathy.        Right: No supraclavicular adenopathy present.        Left: No supraclavicular adenopathy present.   Neurological: He is alert and oriented to person, place, and time. He has normal strength. No cranial nerve deficit. Coordination normal.   Skin: Skin is dry. No rash noted. He is not diaphoretic. No cyanosis or erythema. Nails show no clubbing.   Psychiatric: He has a normal mood and affect. His behavior is normal. Judgment and thought content normal. Cognition and memory are normal.   Vitals reviewed.          Assessment:      1. Malignant neoplasm of upper lobe of left lung    2. Neoplasm of head    3. Primary insomnia    4. Chemotherapy management, encounter for    5. CT compatible an MRI safe Port-A-Cath in place           Plan:     History of locally advanced recurrent non-small cell lung carcinoma.  At this point the patient returns for review I will continue to treat with maintenance  immunotherapy because of his new onset of dizziness recommended that MRI of his brain be done to make sure no evidence of intracranial disease.  Laboratory studies pending proceed with maintenance immunotherapy in follow-up in 2 weeks communicate results of MRI to him change sleeping medicines to Ambien 5 mg q.day no evidence of orthostasis on blood pressure        Kyler Garcia Jr, MD FACP

## 2020-05-05 NOTE — DISCHARGE INSTRUCTIONS
[facility]  [id number]  [address]  [phone] phone     [phone] fax  Hours of Operation: Monday- Friday 8:00am- 5:00pm  After hours phone  [phone]  Hematology / Oncology Physicians on call      JA [address] Dr., Dr., Dr., Dr., NP Sydney Prescott, NP Tyesha Taylor, NP    Please call with any concerns regarding your appointment today.          FALL PREVENTION   Falls often occur due to slipping, tripping or losing your balance. Here are ways to reduce your risk of falling again.   Was there anything that caused your fall that can be fixed, removed or replaced?   Make your home safe by keeping walkways clear of objects you may trip over.   Use non-slip pads under rugs.   Do not walk in poorly lit areas.   Do not stand on chairs or wobbly ladders.   Use caution when reaching overhead or looking upward. This position can cause a loss of balance.   Be sure your shoes fit properly, have non-slip bottoms and are in good condition.   Be cautious when going up and down stairs, curbs, and when walking on uneven sidewalks.   If your balance is poor, consider using a cane or walker.   If your fall was related to alcohol use, stop or limit alcohol intake.   If your fall was related to use of sleeping medicines, talk to your doctor about this. You may need to reduce your dosage at bedtime if you awaken during the night to go to the bathroom.   To reduce the need for nighttime bathroom trips:   Avoid drinking fluids for several hours before going to bed   Empty your bladder before going to bed   Men can keep a urinal at the bedside   © 9634-8924 [facility] [facility], [address] [place] [address], [place] [address]. All rights reserved. This information is not intended as a substitute for professional medical care. Always follow your healthcare professional's instructions.              HOME CARE AFTER  CHEMOTHERAPY   Meals   Many patients feel sick and lose their appetites during treatment. Eat small meals several times a day. Choose bland foods with little taste or smell if you have problems with nausea. Be sure to cook all food thoroughly. This kills bacteria and helps you avoid intestinal infection. Soft foods are easier to swallow and digest.   Activity   Exercise keeps you strong and keeps your heart and lungs active. Talk to your doctor about an appropriate exercise program for you.   Skin Care   To prevent a skin infection, bathe or shower once a day. Use a moisturizing soap and wash with warm water. Avoid very hot or cold water. Chemotherapy can make your skin dry . Apply moisturizing lotion to help relieve dry skin. Some drugs used in high doses can cause slight burns to appear (like sunburn). Ask for a special cream to help relieve the burn and protect your skin.   Prevent Mouth Sores   During chemotherapy, many people get mouth sores. Do the following to help prevent mouth sores or to ease discomfort.   Brush your teeth with a soft-bristle toothbrush after every meal.  Don't use dental floss if your platelet count is below 50,000. Your doctor or nurse will tell you if this is the case.  Use an oral swab or special soft toothbrush if your gums bleed during regular brushing.  Use mouthwash as directed. If you can't tolerate commercial mouthwash, use salt and baking soda to clean your mouth. Mix 1 teaspoon of salt and 1 teaspoon of baking soda into a glass of water. Swish and spit.  Call your doctor or return to this facility if you develop any of the following:   Sore throat   White patches in the mouth or throat   Fever of 100.4ºF (38ºC) or higher, or as directed by your healthcare provider  © 6074-3756 [employer], [address] [place] [address], [place] [address]. All rights reserved. This information is not intended as a substitute for professional medical care. Always follow your healthcare  professional's

## 2020-05-06 RX ORDER — AMLODIPINE BESYLATE 5 MG/1
TABLET ORAL
Qty: 90 TABLET | Refills: 3 | Status: SHIPPED | OUTPATIENT
Start: 2020-05-06 | End: 2020-07-14

## 2020-05-08 ENCOUNTER — TELEPHONE (OUTPATIENT)
Dept: INFUSION THERAPY | Facility: HOSPITAL | Age: 80
End: 2020-05-08

## 2020-05-08 DIAGNOSIS — F51.01 PRIMARY INSOMNIA: ICD-10-CM

## 2020-05-08 RX ORDER — ZOLPIDEM TARTRATE 5 MG/1
5 TABLET ORAL NIGHTLY PRN
Qty: 30 TABLET | Refills: 1 | Status: SHIPPED | OUTPATIENT
Start: 2020-05-08 | End: 2020-08-11

## 2020-05-08 RX ORDER — ZOLPIDEM TARTRATE 5 MG/1
5 TABLET ORAL NIGHTLY PRN
Qty: 30 TABLET | Refills: 1 | Status: SHIPPED | OUTPATIENT
Start: 2020-05-08 | End: 2020-05-08 | Stop reason: SDUPTHER

## 2020-05-11 ENCOUNTER — TELEPHONE (OUTPATIENT)
Dept: RADIOLOGY | Facility: HOSPITAL | Age: 80
End: 2020-05-11

## 2020-05-11 ENCOUNTER — CLINICAL SUPPORT (OUTPATIENT)
Dept: HEMATOLOGY/ONCOLOGY | Facility: CLINIC | Age: 80
End: 2020-05-11
Payer: MEDICARE

## 2020-05-11 ENCOUNTER — OFFICE VISIT (OUTPATIENT)
Dept: RADIATION ONCOLOGY | Facility: CLINIC | Age: 80
End: 2020-05-11
Payer: MEDICARE

## 2020-05-11 VITALS
BODY MASS INDEX: 27.18 KG/M2 | HEART RATE: 57 BPM | TEMPERATURE: 98 F | WEIGHT: 189.88 LBS | RESPIRATION RATE: 18 BRPM | OXYGEN SATURATION: 97 % | DIASTOLIC BLOOD PRESSURE: 72 MMHG | HEIGHT: 70 IN | SYSTOLIC BLOOD PRESSURE: 121 MMHG

## 2020-05-11 DIAGNOSIS — Z85.118 HISTORY OF CANCER OF UPPER LOBE BRONCHUS OR LUNG: ICD-10-CM

## 2020-05-11 DIAGNOSIS — Z03.818 ENCNTR FOR OBS FOR SUSP EXPSR TO OTH BIOLG AGENTS RULED OUT: ICD-10-CM

## 2020-05-11 DIAGNOSIS — C34.12 MALIGNANT NEOPLASM OF UPPER LOBE OF LEFT LUNG: Primary | ICD-10-CM

## 2020-05-11 PROCEDURE — 1159F MED LIST DOCD IN RCRD: CPT | Mod: S$GLB,,, | Performed by: RADIOLOGY

## 2020-05-11 PROCEDURE — U0002 COVID-19 LAB TEST NON-CDC: HCPCS

## 2020-05-11 PROCEDURE — 99999 PR PBB SHADOW E&M-EST. PATIENT-LVL IV: ICD-10-PCS | Mod: PBBFAC,,, | Performed by: RADIOLOGY

## 2020-05-11 PROCEDURE — 3078F DIAST BP <80 MM HG: CPT | Mod: CPTII,S$GLB,, | Performed by: RADIOLOGY

## 2020-05-11 PROCEDURE — 3074F PR MOST RECENT SYSTOLIC BLOOD PRESSURE < 130 MM HG: ICD-10-PCS | Mod: CPTII,S$GLB,, | Performed by: RADIOLOGY

## 2020-05-11 PROCEDURE — 1126F PR PAIN SEVERITY QUANTIFIED, NO PAIN PRESENT: ICD-10-PCS | Mod: S$GLB,,, | Performed by: RADIOLOGY

## 2020-05-11 PROCEDURE — 99999 PR PBB SHADOW E&M-EST. PATIENT-LVL IV: CPT | Mod: PBBFAC,,, | Performed by: RADIOLOGY

## 2020-05-11 PROCEDURE — 99213 PR OFFICE/OUTPT VISIT, EST, LEVL III, 20-29 MIN: ICD-10-PCS | Mod: S$GLB,,, | Performed by: RADIOLOGY

## 2020-05-11 PROCEDURE — 1101F PT FALLS ASSESS-DOCD LE1/YR: CPT | Mod: CPTII,S$GLB,, | Performed by: RADIOLOGY

## 2020-05-11 PROCEDURE — 1101F PR PT FALLS ASSESS DOC 0-1 FALLS W/OUT INJ PAST YR: ICD-10-PCS | Mod: CPTII,S$GLB,, | Performed by: RADIOLOGY

## 2020-05-11 PROCEDURE — 99213 OFFICE O/P EST LOW 20 MIN: CPT | Mod: S$GLB,,, | Performed by: RADIOLOGY

## 2020-05-11 PROCEDURE — 1126F AMNT PAIN NOTED NONE PRSNT: CPT | Mod: S$GLB,,, | Performed by: RADIOLOGY

## 2020-05-11 PROCEDURE — 3074F SYST BP LT 130 MM HG: CPT | Mod: CPTII,S$GLB,, | Performed by: RADIOLOGY

## 2020-05-11 PROCEDURE — 1159F PR MEDICATION LIST DOCUMENTED IN MEDICAL RECORD: ICD-10-PCS | Mod: S$GLB,,, | Performed by: RADIOLOGY

## 2020-05-11 PROCEDURE — 3078F PR MOST RECENT DIASTOLIC BLOOD PRESSURE < 80 MM HG: ICD-10-PCS | Mod: CPTII,S$GLB,, | Performed by: RADIOLOGY

## 2020-05-11 NOTE — PROGRESS NOTES
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 16 left pneumonectomy Dr. Wall returned moderately differentiated 3.8 cm squamous cell carcinoma with a positive AP window lymph node. A total of 3 nodes were sampled. Indeterminate lymph vascular invasion was seen There was a positive bronchial resection margin but in my discussion with Dr. Garza, the margin was apparently cleared in discussion with the surgeon.   2. He completed adjuvant radiation at [facility] and weekly carboplatin paclitaxel x 6.   3. 19 CT chest, abdomen and pelvis showed slight growth in a soft tissue nodule in the superior mediastinum since , and the nodule was not present in 2016. An additional subcentimeter short axis lymph node in the right paratracheal space was also slightly enlarged. There was also nodular mucosal thickening along the posterior lateral left intrathoracic tracheal airway adherent secretions versus a polypoid lesion.   4. 5/15/19 PET showed 12 SUV along the left and posterior trachea with soft tissue invasion of the left side of the trachea just above T1. It was unclear if this was a lymph node extrinsic to the trachea versus a mass in the trachea or esophagus. There was also a left paratracheal lymph node 6.4 SUV.   5.  19 EUS showed esophagitis in the upper 3rd esophagus. There was a moderate intrinsic stenosis in the upper 3rd of the esophagus. There was an irregular mediastinal mass 25 cm from the incisors measuring 2.5 x 3 cm cross-sectional. Pathology returned poorly differentiated carcinoma, favor squamous cell carcinoma.   6. 19 bronchoscopy left local cord paralysis, non obstructing mass at the orifice in the subglottic area,  "endobronchial, friable, infiltrative and ulcerative. Pathology squamous cell carcinoma.   7. 8/14/19 completed 50.4 Gy radiation with cisplatin mediastinal and tracheal recurrence   8. 9/25/19 PET decreased SUV in size of paratracheal lymph nodes, resolution of tracheal mass.  9. 11/15/19 transnasal flexible magnified laryngoscopy with left vocal fold injection augmentation with hyaluronic acid    INTERVAL HISTORY: Chai Murry is a pleasant 80 y.o. male who presents today for follow-up.  He last saw Dr. Castro on 11/19/19.   Most recent imaging CT 2/7/20 showed stable overall appearance of lungs, no new adenopathy.  He continues on maintenance immunotherapy with medical oncology.    He noted worsening dizziness so MRI brain is scheduled tomorrow. Dizziness is worse when he doesn't drink as much water and when he rises quickly from seated position.  Today, he is doing well overall. He denies any dysphagia, odynophagia, hemoptysis, or worsening dyspnea. Still with easy fatigue but walks daily. Can eat all foods but needs to watch portion size and swallowing.    PHYSICAL EXAMINATION:  Constitutional/Vitals: well appearing, no acute distress, ECOG 0 - Fully Active, /72   Pulse (!) 57   Temp 97.5 °F (36.4 °C)   Resp 18   Ht 5' 10" (1.778 m)   Wt 86.1 kg (189 lb 14.4 oz)   SpO2 97%   BMI 27.25 kg/m²   Lymphatic: no cervical, supraclavicular adenopathy  Cardiovascular: regular rate, no murmurs, no edema of the upper or lower extremities, radial pulse 2+  Respiratory: unlabored effort, clear to auscultation, no wheezes    IMAGING AND LABORATORY FINDINGS: As per HPI; images, labs and medical records reviewed personally in EPIC.    ASSESSMENT:  No evidence of disease    PLAN:  Mr. Murry is doing well. I will follow up MRI brain. If negative, he can continue on systemic therapy and I will see him again in 6 months; imaging can be dictated by medical oncology.     I spent approximately 15 minutes reviewing the " available records and evaluating the patient, out of which over 50% of the time was spent face to face with the patient in counseling and coordinating this patient's care.    Katelyn Vuong M.D.  Radiation Oncology  [facility] [address] [place] [address]  Ph: [phone]  [email or web address]

## 2020-05-12 ENCOUNTER — HOSPITAL ENCOUNTER (OUTPATIENT)
Dept: RADIOLOGY | Facility: HOSPITAL | Age: 80
Discharge: HOME | End: 2020-05-12
Attending: INTERNAL MEDICINE
Payer: MEDICARE

## 2020-05-12 DIAGNOSIS — C34.12 MALIGNANT NEOPLASM OF UPPER LOBE OF LEFT LUNG: ICD-10-CM

## 2020-05-12 DIAGNOSIS — C34.12 MALIGNANT NEOPLASM OF UPPER LOBE OF LEFT LUNG: Primary | ICD-10-CM

## 2020-05-12 DIAGNOSIS — D49.89 NEOPLASM OF HEAD: ICD-10-CM

## 2020-05-12 LAB — SARS-COV-2 RNA RESP QL NAA+PROBE: NOT DETECTED

## 2020-05-12 PROCEDURE — 70470 CT HEAD WITH AND WITHOUT: ICD-10-PCS | Mod: 26,,, | Performed by: RADIOLOGY

## 2020-05-12 PROCEDURE — 70470 CT HEAD/BRAIN W/O & W/DYE: CPT | Mod: 26,,, | Performed by: RADIOLOGY

## 2020-05-12 PROCEDURE — 25500020 PHARM REV CODE 255: Performed by: INTERNAL MEDICINE

## 2020-05-12 PROCEDURE — 70470 CT HEAD/BRAIN W/O & W/DYE: CPT | Mod: TC

## 2020-05-12 RX ADMIN — IOHEXOL 75 ML: 350 INJECTION, SOLUTION INTRAVENOUS at 01:05

## 2020-05-13 ENCOUNTER — TELEPHONE (OUTPATIENT)
Dept: HEMATOLOGY/ONCOLOGY | Facility: CLINIC | Age: 80
End: 2020-05-13

## 2020-05-13 DIAGNOSIS — J44.9 CHRONIC OBSTRUCTIVE PULMONARY DISEASE, UNSPECIFIED COPD TYPE: ICD-10-CM

## 2020-05-13 DIAGNOSIS — F51.01 PRIMARY INSOMNIA: Primary | ICD-10-CM

## 2020-05-13 RX ORDER — TEMAZEPAM 15 MG/1
15 CAPSULE ORAL NIGHTLY PRN
Qty: 30 CAPSULE | Refills: 1 | Status: SHIPPED | OUTPATIENT
Start: 2020-05-13 | End: 2020-05-22 | Stop reason: SDUPTHER

## 2020-05-13 NOTE — TELEPHONE ENCOUNTER
----- Message from Lilibeth Pham sent at 5/13/2020  9:24 AM CDT -----  Contact: self  Type:  RX Refill Request    Who Called: pt  Refill or New Rx:refill  RX Name and Strength:gemazepam  How is the patient currently taking it? (ex. 1XDay):1xnight  Is this a 30 day or 90 day RX:30  Preferred Pharmacy with phone number:  [id number] - [place] - [address] [place] [address] [address]  Phone: [phone] Fax: [phone]  Local or Mail Order:local  Ordering Provider:braden  Would the patient rather a call back or a response via [facility]? Call back  Best Call Back Number:[phone]  Additional Information: pt wants dosage increased

## 2020-05-13 NOTE — TELEPHONE ENCOUNTER
----- Message from Clary Monica sent at 5/13/2020  1:05 PM CDT -----  Type:  RX Refill Request    Who Called:  Pt  Chai   Refill or New Rx: refill                                                                                                                     RX Name and Strength: Tamazepam ??? 15mg  How is the patient currently taking it? (ex. 1XDay):  Take one capsule at bedtime  Is this a 30 day or 90 day RX:  30 day  Preferred Pharmacy with phone number:  [facility] on [address] in [place], [place]  Local or Mail Order:  Local   Ordering Provider:  Dr Vila  Would the patient rather a call back or a response via [facility]?   Call back  Best Call Back Number:  [phone] or [phone]  Additional Information:  Please call when sent in//noni/olga

## 2020-05-18 DIAGNOSIS — Z03.818 ENCOUNTER FOR OBSERVATION FOR SUSPECTED EXPOSURE TO OTHER BIOLOGICAL AGENTS RULED OUT: Primary | ICD-10-CM

## 2020-05-18 NOTE — PROGRESS NOTES
Subjective:       Patient ID: Chai Murry is a 80 y.o. male.    Chief Complaint: Malignant neoplasm of upper lobe of left lung [C34.12]  HPI: We have an opportunity to see Mr. Chai Murry in Hematology Oncology clinic at [facility] on 05/18/2020.  Mr. Chai Murry is a 80 y.o. 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   He is s/p left pneumonectomy for a squamous cell carcinoma of the left lung.0n 4/12/2016   Prior to the surgery, the PET scan showed an FDG-avid mass in the left upper   lobe abutting the left hilum with an SUV of 11.6. There seemed to be a left   hilar adenopathy as well.   Radiologist felt that he was unable to discriminate between the mass and the   lymphadenopathy. The patient had had a bronchoscopy by Dr. Roel Ernandez on   03/04/2006 that showed a partially obstructing airway in the anterior segment of   the left upper lobe with an endobronchial lesion in the anterior segment of the   left upper lobe. The specimen from the biopsy at the time of bronchoscopy was   reported as being a squamous cell carcinoma.   At the time of the surgery after the left lung was removed, the pathologist   indicated that this was a squamous cell carcinoma. It measured 3.8 cm. The   pathology indicated that this is extending into the bronchial resection margin of the lobe. This lobe was later on removed to complete the pneumonectomy   There was one lymph node positive for metastatic squamous cell carcinoma at the   AP window.   The patient was told that we would prefer to treat him with post-op simultaneous chemo/radiation.  He had Medi-port. He started chemo/radiation therapy andreceived 6 weekly cycles of carbo/Taxol along with radiatioon.   After a month, he had a Ct scan and it showed stability   He then had 2 additional cyles of carbo/Taxol finishing in 9/27/2016.   He comes for follow up.   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 CT of the chest was non contributory, shows changes from surgery   PET showed a PET avid mass to the left of the trachea.   The case was discussed with dr annika Goldman and GI.   We discussed the possibility of doing a EUS or EBUs, and finally, because of the localization, it was decided to do a EUS with biopsy if possible.   Cytology shows recurrent squamous cell cancer.   I have asked Pathology to run a PD-L1 from his original pathology from 2016.   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   He has been discussed extensively with radiation oncology. We have decided to treat him with radiation therapy and Cisplatin as a chemo-sensitizer.   Dr Castro has reviewed the therapy ports from the previous radiation treatment and the area in question seems to be above the previously radiated fields.   Completed chemoradiation s/p 6 weekly cisplatin treatments with response. Currently on maintenance Imfinzi. Reports had dizziness when gets up at night, attributes to restoril.       Malignant neoplasm of upper lobe of left lung    4/12/2016 Initial Diagnosis     Malignant neoplasm of upper lobe of left lung      6/19/2019 Cancer Staged     Staging form: Lung, AJCC 8th Edition  - Clinical stage from 6/19/2019: Stage IIIB (rcT4, cN2, cM0) - Signed by Alejandro Castro II, MD on 6/19/2019 6/21/2019 Tumor Conference     Presenting Hospital / Clinic: [facility]  Virtual Tumor Board Conference: In person  Presenter: Dr. Chance Castro  Date Presented to Tumor Board: 06/21/19  Specialties Present: Medical Oncology;Radiation Oncology;Surgical Oncology;Pathology;Navigation;Plastic Surgery;Radiology;Gastrointestinal;Pulmonology  Presentation at Cancer Conference: Prospective  Cancer Type: Lung cancer  Recommended Plan: Additional screening  Recommended Plan Note: If PDL-1 positive, treat with immunotherapy  Send tissue for next generation  sequencing.      6/28/2019 Tumor Conference     His case was discussed at the Multidisciplinary Head and Neck Team Planning Meeting.    Representatives from Medical Oncology, Radiation Oncology, Head and Neck Surgical Oncology, Psychosocial Oncology, and Speech and Language Pathology discussed the case with the following recommendations:    1) treat lung cancer             7/5/2019 - 8/14/2019 Radiation Therapy     Treatment Site Ref. ID Energy Dose/Fx (Gy) #Fx Dose Correction (Gy) Total Dose (Gy) Start Date End Date Elapsed Days   [id number]:1 PTV LNs 6X 1.8 28 / 28 0 50.4 7/5/2019 8/14/2019 40           3/10/2020 -  Chemotherapy     Treatment Summary   Plan Name: OP DURVALUMAB Q2W  Treatment Goal: Maintenance  Status: Active  Start Date: 3/10/2020  End Date: 9/8/2020 (Planned)  Provider: Fermin Vila MD  Chemotherapy: durvalumab (IMFINZI) 885 mg in sodium chloride 0.9% 267.7 mL chemo infusion, 10 mg/kg = 885 mg, Intravenous, Clinic/HOD 1 time, 5 of 14 cycles  Administration: 885 mg (3/10/2020), 860 mg (3/24/2020), 860 mg (4/7/2020), 860 mg (4/21/2020), 885 mg (5/5/2020)       Past Medical History:   Diagnosis Date    Anemia     Arthritis     Atrial fibrillation     CAD (coronary artery disease)     Cataract     COPD (chronic obstructive pulmonary disease)     Diverticulosis     ED (erectile dysfunction)     HTN (hypertension)     Hyperlipidemia     Lung cancer     left    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 Courtney dwyer bx     Family History   Problem Relation Age of Onset    Esophageal cancer Sister     Cancer Sister     Lung cancer Mother     Cancer Mother     Cataracts Mother     Hypertension Mother     Pneumonia Father     Cataracts Father     Hypertension Father     Cancer Brother     Hypertension Brother     Blindness Neg Hx     Diabetes Neg Hx     Glaucoma Neg Hx     Macular degeneration Neg Hx     Retinal detachment Neg Hx      Strabismus Neg Hx     Stroke Neg Hx     Thyroid disease Neg Hx      Social History     Socioeconomic History    Marital status:      Spouse name: Not on file    Number of children: 3    Years of education: Not on file    Highest education level: Not on file   Occupational History    Occupation: retired   Social Needs    Financial resource strain: Not on file    Food insecurity:     Worry: Not on file     Inability: Not on file    Transportation needs:     Medical: Not on file     Non-medical: Not on file   Tobacco Use    Smoking status: Former Smoker     Packs/day: 1.00     Years: 50.00     Pack years: 50.00     Last attempt to quit: 2005     Years since quittin.7    Smokeless tobacco: Never Used   Substance and Sexual Activity    Alcohol use: No    Drug use: No    Sexual activity: Not Currently   Lifestyle    Physical activity:     Days per week: Not on file     Minutes per session: Not on file    Stress: Not on file   Relationships    Social connections:     Talks on phone: Not on file     Gets together: Not on file     Attends Christianity service: Not on file     Active member of club or organization: Not on file     Attends meetings of clubs or organizations: Not on file     Relationship status: Not on file   Other Topics Concern    Not on file   Social History Narrative    Not on file     Past Surgical History:   Procedure Laterality Date    APPENDECTOMY      BRONCHOSCOPY Bilateral 2019    Procedure: Bronchoscopy;  Surgeon: Paresh Goldman MD;  Location: [facility] ENDO;  Service: Endoscopy;  Laterality: Bilateral;    CARDIAC CATHETERIZATION      cardiac stents      CATARACT EXTRACTION W/  INTRAOCULAR LENS IMPLANT Right 9-3-14    CHOLECYSTECTOMY      COLONOSCOPY N/A 2018    Procedure: COLONOSCOPY;  Surgeon: Dionne Doan MD;  Location: [facility] ENDO;  Service: Endoscopy;  Laterality: N/A;    COLONOSCOPY N/A 10/25/2018    Procedure: COLONOSCOPY;  Surgeon:  Michael Hameed MD;  Location: [facility] ENDO;  Service: Endoscopy;  Laterality: N/A;    ENDOSCOPIC ULTRASOUND OF UPPER GASTROINTESTINAL TRACT N/A 6/11/2019    Procedure: ULTRASOUND, UPPER GI TRACT, ENDOSCOPIC;  Surgeon: Abhishek Knox MD;  Location: [facility] ENDO;  Service: Endoscopy;  Laterality: N/A;    ESOPHAGOGASTRODUODENOSCOPY N/A 6/11/2019    Procedure: EGD (ESOPHAGOGASTRODUODENOSCOPY);  Surgeon: Abhishek Knox MD;  Location: [facility] ENDO;  Service: Endoscopy;  Laterality: N/A;    infected stomach gland excision      INSERTION OF TUNNELED CENTRAL VENOUS CATHETER (CVC) WITH SUBCUTANEOUS PORT Right 7/3/2019    Procedure: [id number];  Surgeon: Jean Carlos Constantino MD;  Location: [facility] OR;  Service: General;  Laterality: Right;    LUNG REMOVAL, TOTAL Left 04/2016    lung cancer left upper lobe    SKIN BIOPSY Left     arm     Current Outpatient Medications   Medication Sig Dispense Refill    albuterol (PROVENTIL) 2.5 mg /3 mL (0.083 %) nebulizer solution Take 3 mLs (2.5 mg total) by nebulization every 6 (six) hours while awake. 270 mL 11    amLODIPine (NORVASC) 5 MG tablet TAKE 1 TABLET BY MOUTH EVERYDAY AT BEDTIME 90 tablet 3    amoxicillin-clavulanate 875-125mg (AUGMENTIN) 875-125 mg per tablet Take 1 tablet by mouth 2 (two) times daily.      apixaban (ELIQUIS) 5 mg Tab Take 1 tablet (5 mg total) by mouth 2 (two) times daily. 60 tablet 5    aspirin (ECOTRIN) 81 MG EC tablet Take 81 mg by mouth once daily.      benzonatate (TESSALON) 200 MG capsule Take 1 capsule (200 mg total) by mouth 3 (three) times daily as needed for Cough. 21 capsule 0    BREO ELLIPTA 100-25 mcg/dose diskus inhaler INHALE 1 PUFF INTO THE LUNGS ONCE DAILY  5    fenofibric acid (FIBRICOR) 135 mg CpDR Take 1 capsule (135 mg total) by mouth once daily. 90 capsule 3    fluticasone (FLONASE) 50 mcg/actuation nasal spray 2 sprays (100 mcg total) by Each Nare route once daily. (Patient taking differently: 2 sprays by Each Nare route as  needed. ) 3 Bottle 3    furosemide (LASIX) 20 MG tablet TAKE 1 TABLET BY MOUTH EVERY DAY AS NEEDED 30 tablet 1    glycopyrrolate-formoterol (BEVESPI AEROSPHERE) 9-4.8 mcg HFAA Inhale 2 puffs into the lungs 2 (two) times daily. Controller 10.9 g 11    HYDROcodone-acetaminophen (NORCO) 5-325 mg per tablet Take 1 tablet by mouth every 6 (six) hours as needed for Pain. 60 tablet 0    ipratropium-albuterol (COMBIVENT RESPIMAT)  mcg/actuation inhaler Inhale 1 puff into the lungs every 6 (six) hours as needed for Shortness of Breath. 4 g 11    levocetirizine (XYZAL) 5 MG tablet Take 5 mg by mouth as needed.       levothyroxine (SYNTHROID) 25 MCG tablet Take 1 tablet (25 mcg total) by mouth before breakfast. 30 tablet 11    lisinopriL (PRINIVIL,ZESTRIL) 40 MG tablet Take 1 tablet (40 mg total) by mouth once daily. 90 tablet 3    promethazine (PHENERGAN) 6.25 mg/5 mL syrup Take 20 mLs (25 mg total) by mouth nightly as needed. 240 mL 0    ranolazine (RANEXA) 500 MG Tb12 Take 1 tablet (500 mg total) by mouth 2 (two) times daily. 60 tablet 11    simvastatin (ZOCOR) 80 MG tablet Take 1 tablet (80 mg total) by mouth once daily. 90 tablet 3    sotaloL (SOTALOL AF) 80 MG tablet Take 1 tablet (80 mg total) by mouth 2 (two) times daily. 180 tablet 3    temazepam (RESTORIL) 15 mg Cap Take 1 capsule (15 mg total) by mouth nightly as needed. 30 capsule 1    zolpidem (AMBIEN) 5 MG Tab Take 1 tablet (5 mg total) by mouth nightly as needed. 30 tablet 1     Current Facility-Administered Medications   Medication Dose Route Frequency Provider Last Rate Last Dose    testosterone cypionate injection 200 mg  200 mg Intramuscular Q21 Days Peter Teixeira MD   200 mg at 02/26/20 0937     Facility-Administered Medications Ordered in Other Visits   Medication Dose Route Frequency Provider Last Rate Last Dose    lactated ringers infusion   Intravenous Continuous Michael Hameed MD   Stopped at 07/03/19 0810       Labs:  Lab  Results   Component Value Date    WBC 6.57 05/05/2020    HGB 11.7 (L) 05/05/2020    HCT 37.3 (L) 05/05/2020    MCV 95 05/05/2020     05/05/2020     BMP  Lab Results   Component Value Date     05/05/2020    K 4.4 05/05/2020     05/05/2020    CO2 27 05/05/2020    BUN 26 (H) 05/05/2020    CREATININE 1.5 (H) 05/05/2020    CALCIUM 9.5 05/05/2020    ANIONGAP 10 05/05/2020    ESTGFRAFRICA 50 (A) 05/05/2020    EGFRNONAA 44 (A) 05/05/2020     Lab Results   Component Value Date    ALT 13 05/05/2020    AST 20 05/05/2020    ALKPHOS 51 (L) 05/05/2020    BILITOT 0.3 05/05/2020       No results found for: IRON, TIBC, FERRITIN, SATURATEDIRO  No results found for: [id number]  No results found for: FOLATE  Lab Results   Component Value Date    TSH 3.042 05/05/2020       I have reviewed the radiology reports and examined the scan/xray images.    Review of Systems   Constitutional: Negative.    HENT: Negative.    Eyes: Negative.    Respiratory: Negative.    Cardiovascular: Negative.    Gastrointestinal: Negative.    Endocrine: Negative.    Genitourinary: Negative.    Musculoskeletal: Negative.    Skin: Negative.    Allergic/Immunologic: Negative.    Neurological: Negative.    Hematological: Negative.    Psychiatric/Behavioral: Negative.      ECOG SCORE    0 - Fully active-able to carry on all pre-disease performance without restriction            Objective:     Vitals:    05/19/20 0821   BP: (!) 99/56   Pulse: (!) 56   Resp: 18   Temp: 97.4 °F (36.3 °C)   There is no height or weight on file to calculate BMI.  Physical Exam   Constitutional: He is oriented to person, place, and time. He appears well-developed and well-nourished.   HENT:   Head: Normocephalic and atraumatic.   Eyes: Conjunctivae and EOM are normal.   Neck: Normal range of motion. Neck supple.   Cardiovascular: Normal rate and regular rhythm.   Pulmonary/Chest: Effort normal and breath sounds normal.   Abdominal: Soft. Bowel sounds are normal.    Musculoskeletal: Normal range of motion.   Neurological: He is alert and oriented to person, place, and time.   Skin: Skin is warm and dry.   Psychiatric: He has a normal mood and affect. His behavior is normal. Judgment and thought content normal.   Nursing note and vitals reviewed.        Assessment:      1. Malignant neoplasm of upper lobe of left lung    2. Chemotherapy management, encounter for           Plan:     Malignant neoplasm of upper lobe of left lung  Has dizziness, likely due to canaliths. Will refer to see ENT when Mr. Murry would like.  Continue on Imfinzi.  -     CBC auto differential; Future; Expected date: 05/19/2020  -     Comprehensive metabolic panel; Future; Expected date: 05/19/2020  -     TSH; Future; Expected date: 05/19/2020    Chemotherapy management, encounter for

## 2020-05-19 ENCOUNTER — OFFICE VISIT (OUTPATIENT)
Dept: HEMATOLOGY/ONCOLOGY | Facility: CLINIC | Age: 80
End: 2020-05-19
Payer: MEDICARE

## 2020-05-19 ENCOUNTER — INFUSION (OUTPATIENT)
Dept: INFUSION THERAPY | Facility: HOSPITAL | Age: 80
End: 2020-05-19
Attending: INTERNAL MEDICINE
Payer: MEDICARE

## 2020-05-19 VITALS
TEMPERATURE: 97 F | BODY MASS INDEX: 27.42 KG/M2 | OXYGEN SATURATION: 96 % | RESPIRATION RATE: 18 BRPM | DIASTOLIC BLOOD PRESSURE: 56 MMHG | HEART RATE: 53 BPM | SYSTOLIC BLOOD PRESSURE: 116 MMHG | BODY MASS INDEX: 27.35 KG/M2 | OXYGEN SATURATION: 95 % | RESPIRATION RATE: 18 BRPM | HEIGHT: 70 IN | TEMPERATURE: 97 F | WEIGHT: 191.56 LBS | DIASTOLIC BLOOD PRESSURE: 64 MMHG | WEIGHT: 191 LBS | HEIGHT: 70 IN | HEART RATE: 56 BPM | SYSTOLIC BLOOD PRESSURE: 99 MMHG

## 2020-05-19 DIAGNOSIS — C34.12 MALIGNANT NEOPLASM OF UPPER LOBE OF LEFT LUNG: Primary | ICD-10-CM

## 2020-05-19 DIAGNOSIS — Z51.11 CHEMOTHERAPY MANAGEMENT, ENCOUNTER FOR: ICD-10-CM

## 2020-05-19 PROCEDURE — 99215 OFFICE O/P EST HI 40 MIN: CPT | Mod: 25,S$GLB,, | Performed by: INTERNAL MEDICINE

## 2020-05-19 PROCEDURE — 1126F PR PAIN SEVERITY QUANTIFIED, NO PAIN PRESENT: ICD-10-PCS | Mod: S$GLB,,, | Performed by: INTERNAL MEDICINE

## 2020-05-19 PROCEDURE — 3078F PR MOST RECENT DIASTOLIC BLOOD PRESSURE < 80 MM HG: ICD-10-PCS | Mod: CPTII,S$GLB,, | Performed by: INTERNAL MEDICINE

## 2020-05-19 PROCEDURE — 99999 PR PBB SHADOW E&M-EST. PATIENT-LVL IV: CPT | Mod: PBBFAC,,, | Performed by: INTERNAL MEDICINE

## 2020-05-19 PROCEDURE — 1126F AMNT PAIN NOTED NONE PRSNT: CPT | Mod: S$GLB,,, | Performed by: INTERNAL MEDICINE

## 2020-05-19 PROCEDURE — 25000003 PHARM REV CODE 250: Performed by: INTERNAL MEDICINE

## 2020-05-19 PROCEDURE — 1159F MED LIST DOCD IN RCRD: CPT | Mod: S$GLB,,, | Performed by: INTERNAL MEDICINE

## 2020-05-19 PROCEDURE — 63600175 PHARM REV CODE 636 W HCPCS: Mod: TB | Performed by: INTERNAL MEDICINE

## 2020-05-19 PROCEDURE — 96413 CHEMO IV INFUSION 1 HR: CPT

## 2020-05-19 PROCEDURE — 3074F SYST BP LT 130 MM HG: CPT | Mod: CPTII,S$GLB,, | Performed by: INTERNAL MEDICINE

## 2020-05-19 PROCEDURE — 3078F DIAST BP <80 MM HG: CPT | Mod: CPTII,S$GLB,, | Performed by: INTERNAL MEDICINE

## 2020-05-19 PROCEDURE — 99999 PR PBB SHADOW E&M-EST. PATIENT-LVL IV: ICD-10-PCS | Mod: PBBFAC,,, | Performed by: INTERNAL MEDICINE

## 2020-05-19 PROCEDURE — 1101F PT FALLS ASSESS-DOCD LE1/YR: CPT | Mod: CPTII,S$GLB,, | Performed by: INTERNAL MEDICINE

## 2020-05-19 PROCEDURE — 3074F PR MOST RECENT SYSTOLIC BLOOD PRESSURE < 130 MM HG: ICD-10-PCS | Mod: CPTII,S$GLB,, | Performed by: INTERNAL MEDICINE

## 2020-05-19 PROCEDURE — 99215 PR OFFICE/OUTPT VISIT, EST, LEVL V, 40-54 MIN: ICD-10-PCS | Mod: 25,S$GLB,, | Performed by: INTERNAL MEDICINE

## 2020-05-19 PROCEDURE — 1159F PR MEDICATION LIST DOCUMENTED IN MEDICAL RECORD: ICD-10-PCS | Mod: S$GLB,,, | Performed by: INTERNAL MEDICINE

## 2020-05-19 PROCEDURE — 1101F PR PT FALLS ASSESS DOC 0-1 FALLS W/OUT INJ PAST YR: ICD-10-PCS | Mod: CPTII,S$GLB,, | Performed by: INTERNAL MEDICINE

## 2020-05-19 RX ORDER — HEPARIN 100 UNIT/ML
500 SYRINGE INTRAVENOUS
Status: DISCONTINUED | OUTPATIENT
Start: 2020-05-19 | End: 2020-05-19 | Stop reason: HOSPADM

## 2020-05-19 RX ORDER — HEPARIN 100 UNIT/ML
500 SYRINGE INTRAVENOUS
Status: CANCELLED | OUTPATIENT
Start: 2020-05-19

## 2020-05-19 RX ORDER — SODIUM CHLORIDE 0.9 % (FLUSH) 0.9 %
10 SYRINGE (ML) INJECTION
Status: CANCELLED | OUTPATIENT
Start: 2020-05-19

## 2020-05-19 RX ADMIN — HEPARIN SODIUM (PORCINE) LOCK FLUSH IV SOLN 100 UNIT/ML 500 UNITS: 100 SOLUTION at 10:05

## 2020-05-19 RX ADMIN — DURVALUMAB 860 MG: 500 INJECTION, SOLUTION INTRAVENOUS at 09:05

## 2020-05-19 NOTE — DISCHARGE INSTRUCTIONS
.[facility]  [address] [address]  [address]  [phone] phone     [phone] fax  Hours of Operation: Monday- Friday 8:00am- 5:00pm  After hours phone  [phone]  Hematology / Oncology Physicians on call      Dr. Jose Fermin, JA [place] NP Tyesha Taylor, NP    Please call with any concerns regarding your appointment today.  .FALL PREVENTION   Falls often occur due to slipping, tripping or losing your balance. Here are ways to reduce your risk of falling again.   Was there anything that caused your fall that can be fixed, removed or replaced?   Make your home safe by keeping walkways clear of objects you may trip over.   Use non-slip pads under rugs.   Do not walk in poorly lit areas.   Do not stand on chairs or wobbly ladders.   Use caution when reaching overhead or looking upward. This position can cause a loss of balance.   Be sure your shoes fit properly, have non-slip bottoms and are in good condition.   Be cautious when going up and down stairs, curbs, and when walking on uneven sidewalks.   If your balance is poor, consider using a cane or walker.   If your fall was related to alcohol use, stop or limit alcohol intake.   If your fall was related to use of sleeping medicines, talk to your doctor about this. You may need to reduce your dosage at bedtime if you awaken during the night to go to the bathroom.   To reduce the need for nighttime bathroom trips:   Avoid drinking fluids for several hours before going to bed   Empty your bladder before going to bed   Men can keep a urinal at the bedside   © 1776-7514 [facility] [facility], [address] [place] [address], [place] [address]. All rights reserved. This information is not intended as a substitute for professional medical care. Always follow your healthcare professional's instructions.    .WAYS TO HELP PREVENT  INFECTION         WASH YOUR HANDS OFTEN DURING THE DAY, ESPECIALLY BEFORE YOU EAT, AFTER USING THE BATHROOM, AND AFTER TOUCHING ANIMALS     STAY AWAY FROM PEOPLE WHO HAVE ILLNESSES YOU CAN CATCH; SUCH AS COLDS, FLU, CHICKEN POX     TRY TO AVOID CROWDS     STAY AWAY FROM CHILDREN WHO RECENTLY HAVE RECEIVED LIVE VIRUS VACCINES     MAINTAIN GOOD MOUTH CARE     DO NOT SQUEEZE OR SCRATCH PIMPLES     CLEAN CUTS & SCRAPES RIGHT AWAY AND DAILY UNTIL HEALED WITH WARM WATER, SOAP & AN ANTISEPTIC     AVOID CONTACT WITH LITTER BOXES, BIRD CAGES, & FISH TANKS     AVOID STANDING WATER, IE., BIRD BATHS, FLOWER POTS/VASES, OR HUMIDIFIERS     WEAR GLOVES WHEN GARDENING OR CLEANING UP AFTER OTHERS, ESPECIALLY BABIES & SMALL CHILDREN     DO NOT EAT RAW FISH, SEAFOOD, MEAT, OR EGGS  .HOME CARE AFTER CHEMOTHERAPY   Meals   Many patients feel sick and lose their appetites during treatment. Eat small meals several times a day. Choose bland foods with little taste or smell if you have problems with nausea. Be sure to cook all food thoroughly. This kills bacteria and helps you avoid intestinal infection. Soft foods are easier to swallow and digest.   Activity   Exercise keeps you strong and keeps your heart and lungs active. Talk to your doctor about an appropriate exercise program for you.   Skin Care   To prevent a skin infection, bathe or shower once a day. Use a moisturizing soap and wash with warm water. Avoid very hot or cold water. Chemotherapy can make your skin dry . Apply moisturizing lotion to help relieve dry skin. Some drugs used in high doses can cause slight burns to appear (like sunburn). Ask for a special cream to help relieve the burn and protect your skin.   Prevent Mouth Sores   During chemotherapy, many people get mouth sores. Do the following to help prevent mouth sores or to ease discomfort.   Brush your teeth with a soft-bristle toothbrush after every meal.  Don't use dental floss if your platelet count  "is below 50,000. Your doctor or nurse will tell you if this is the case.  Use an oral swab or special soft toothbrush if your gums bleed during regular brushing.  Use mouthwash as directed. If you can't tolerate commercial mouthwash, use salt and baking soda to clean your mouth. Mix 1 teaspoon of salt and 1 teaspoon of baking soda into a glass of water. Swish and spit.  Call your doctor or return to this facility if you develop any of the following:   Sore throat   White patches in the mouth or throat   Fever of 100.4ºF (38ºC) or higher, or as directed by your healthcare provider  © 8681-7918 [employer], [address] [place] [address], [place] [id number]. All rights reserved. This information is not intended as a substitute for professional medical care. Always follow your healthcare professional's     .Support Groups/Classes    Support groups and classes are being offered at the   [facility] and [facility]!!    "Cooking with Cancer" (Nutrition Class):  Second Wednesday of each month   at noon at the [facility].  Metastatic Support Group:  Third Tuesday of each month   at noon at the [facility].  Next Steps Class/Group: Second and fourth Thursday of each month at noon at the [facility].  [facility] (Breast Cancer Support Group): First Tuesday of each month   at 5:30pm at the [facility] location.  [facility]: [facility]: Second and third Tuesday of each month from 7:30am - 2pm.  [facility]: First and fourth Tuesday of each month from 7:30am - 2pm    If you are interested in attending or would like more information please ask our social workers or your nurse!    "

## 2020-05-22 DIAGNOSIS — I48.20 CHRONIC ATRIAL FIBRILLATION: Primary | ICD-10-CM

## 2020-05-22 DIAGNOSIS — F51.01 PRIMARY INSOMNIA: ICD-10-CM

## 2020-05-22 RX ORDER — RANOLAZINE 500 MG/1
500 TABLET, EXTENDED RELEASE ORAL 2 TIMES DAILY
Qty: 60 TABLET | Refills: 11 | Status: SHIPPED | OUTPATIENT
Start: 2020-05-22 | End: 2021-07-18

## 2020-05-22 RX ORDER — TEMAZEPAM 15 MG/1
15 CAPSULE ORAL NIGHTLY PRN
Qty: 30 CAPSULE | Refills: 1 | Status: SHIPPED | OUTPATIENT
Start: 2020-05-22 | End: 2020-06-21

## 2020-05-25 DIAGNOSIS — J44.89 ASTHMA WITH COPD: ICD-10-CM

## 2020-06-02 ENCOUNTER — INFUSION (OUTPATIENT)
Dept: INFUSION THERAPY | Facility: HOSPITAL | Age: 80
End: 2020-06-02
Attending: INTERNAL MEDICINE
Payer: MEDICARE

## 2020-06-02 ENCOUNTER — OFFICE VISIT (OUTPATIENT)
Dept: HEMATOLOGY/ONCOLOGY | Facility: CLINIC | Age: 80
End: 2020-06-02
Payer: MEDICARE

## 2020-06-02 VITALS
TEMPERATURE: 98 F | RESPIRATION RATE: 18 BRPM | DIASTOLIC BLOOD PRESSURE: 60 MMHG | OXYGEN SATURATION: 96 % | BODY MASS INDEX: 27.35 KG/M2 | HEIGHT: 70 IN | HEIGHT: 70 IN | HEART RATE: 52 BPM | TEMPERATURE: 98 F | HEART RATE: 55 BPM | WEIGHT: 191.13 LBS | SYSTOLIC BLOOD PRESSURE: 113 MMHG | BODY MASS INDEX: 27.36 KG/M2 | DIASTOLIC BLOOD PRESSURE: 66 MMHG | SYSTOLIC BLOOD PRESSURE: 106 MMHG | OXYGEN SATURATION: 99 % | RESPIRATION RATE: 19 BRPM | WEIGHT: 191 LBS

## 2020-06-02 DIAGNOSIS — R42 VERTIGO: ICD-10-CM

## 2020-06-02 DIAGNOSIS — E03.9 HYPOTHYROIDISM, UNSPECIFIED TYPE: ICD-10-CM

## 2020-06-02 DIAGNOSIS — H61.23 BILATERAL IMPACTED CERUMEN: ICD-10-CM

## 2020-06-02 DIAGNOSIS — Z03.818 ENCOUNTER FOR OBSERVATION FOR SUSPECTED EXPOSURE TO OTHER BIOLOGICAL AGENTS RULED OUT: Primary | ICD-10-CM

## 2020-06-02 DIAGNOSIS — C34.12 MALIGNANT NEOPLASM OF UPPER LOBE OF LEFT LUNG: Primary | ICD-10-CM

## 2020-06-02 DIAGNOSIS — C34.12 MALIGNANT NEOPLASM OF UPPER LOBE OF LEFT LUNG: ICD-10-CM

## 2020-06-02 LAB — SARS-COV-2 RDRP RESP QL NAA+PROBE: NEGATIVE

## 2020-06-02 PROCEDURE — 3078F DIAST BP <80 MM HG: CPT | Mod: CPTII,S$GLB,, | Performed by: INTERNAL MEDICINE

## 2020-06-02 PROCEDURE — 25000003 PHARM REV CODE 250: Performed by: INTERNAL MEDICINE

## 2020-06-02 PROCEDURE — 1101F PT FALLS ASSESS-DOCD LE1/YR: CPT | Mod: CPTII,S$GLB,, | Performed by: INTERNAL MEDICINE

## 2020-06-02 PROCEDURE — U0002 COVID-19 LAB TEST NON-CDC: HCPCS

## 2020-06-02 PROCEDURE — 1126F AMNT PAIN NOTED NONE PRSNT: CPT | Mod: S$GLB,,, | Performed by: INTERNAL MEDICINE

## 2020-06-02 PROCEDURE — 1159F PR MEDICATION LIST DOCUMENTED IN MEDICAL RECORD: ICD-10-PCS | Mod: S$GLB,,, | Performed by: INTERNAL MEDICINE

## 2020-06-02 PROCEDURE — 96413 CHEMO IV INFUSION 1 HR: CPT

## 2020-06-02 PROCEDURE — 63600175 PHARM REV CODE 636 W HCPCS: Performed by: INTERNAL MEDICINE

## 2020-06-02 PROCEDURE — 3074F SYST BP LT 130 MM HG: CPT | Mod: CPTII,S$GLB,, | Performed by: INTERNAL MEDICINE

## 2020-06-02 PROCEDURE — 99999 PR PBB SHADOW E&M-EST. PATIENT-LVL IV: ICD-10-PCS | Mod: PBBFAC,,, | Performed by: INTERNAL MEDICINE

## 2020-06-02 PROCEDURE — 1126F PR PAIN SEVERITY QUANTIFIED, NO PAIN PRESENT: ICD-10-PCS | Mod: S$GLB,,, | Performed by: INTERNAL MEDICINE

## 2020-06-02 PROCEDURE — 3078F PR MOST RECENT DIASTOLIC BLOOD PRESSURE < 80 MM HG: ICD-10-PCS | Mod: CPTII,S$GLB,, | Performed by: INTERNAL MEDICINE

## 2020-06-02 PROCEDURE — 99215 OFFICE O/P EST HI 40 MIN: CPT | Mod: 25,S$GLB,, | Performed by: INTERNAL MEDICINE

## 2020-06-02 PROCEDURE — 1159F MED LIST DOCD IN RCRD: CPT | Mod: S$GLB,,, | Performed by: INTERNAL MEDICINE

## 2020-06-02 PROCEDURE — 3074F PR MOST RECENT SYSTOLIC BLOOD PRESSURE < 130 MM HG: ICD-10-PCS | Mod: CPTII,S$GLB,, | Performed by: INTERNAL MEDICINE

## 2020-06-02 PROCEDURE — 99215 PR OFFICE/OUTPT VISIT, EST, LEVL V, 40-54 MIN: ICD-10-PCS | Mod: 25,S$GLB,, | Performed by: INTERNAL MEDICINE

## 2020-06-02 PROCEDURE — 99999 PR PBB SHADOW E&M-EST. PATIENT-LVL IV: CPT | Mod: PBBFAC,,, | Performed by: INTERNAL MEDICINE

## 2020-06-02 PROCEDURE — 1101F PR PT FALLS ASSESS DOC 0-1 FALLS W/OUT INJ PAST YR: ICD-10-PCS | Mod: CPTII,S$GLB,, | Performed by: INTERNAL MEDICINE

## 2020-06-02 RX ORDER — HEPARIN 100 UNIT/ML
500 SYRINGE INTRAVENOUS
Status: DISCONTINUED | OUTPATIENT
Start: 2020-06-02 | End: 2020-06-02 | Stop reason: HOSPADM

## 2020-06-02 RX ORDER — HEPARIN 100 UNIT/ML
500 SYRINGE INTRAVENOUS
Status: CANCELLED | OUTPATIENT
Start: 2020-06-02

## 2020-06-02 RX ORDER — SODIUM CHLORIDE 0.9 % (FLUSH) 0.9 %
10 SYRINGE (ML) INJECTION
Status: CANCELLED | OUTPATIENT
Start: 2020-06-02

## 2020-06-02 RX ADMIN — DURVALUMAB 865 MG: 500 INJECTION, SOLUTION INTRAVENOUS at 10:06

## 2020-06-02 RX ADMIN — HEPARIN SODIUM (PORCINE) LOCK FLUSH IV SOLN 100 UNIT/ML 500 UNITS: 100 SOLUTION at 11:06

## 2020-06-02 NOTE — PROGRESS NOTES
Subjective:       Patient ID: Chai Murry is a 80 y.o. male.    Chief Complaint: Malignant neoplasm of upper lobe of left lung [C34.12]  HPI: We have an opportunity to see Mr. Chai Murry in Hematology Oncology clinic at [facility] on 06/02/2020.  Mr. Chai Murry is a 80 y.o. 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 He is s/p left pneumonectomy for a squamous cell carcinoma of the left lung.0n 4/12/2016. Prior to the surgery, the PET scan showed an FDG-avid mass in the left upper lobe abutting the left hilum with an SUV of 11.6. There seemed to be a left hilar adenopathy as well.   Radiologist felt that he was unable to discriminate between the mass and the lymphadenopathy. The patient had had a bronchoscopy by Dr. Roel Ernandez on 03/04/2006 that showed a partially obstructing airway in the anterior segment of the left upper lobe with an endobronchial lesion in the anterior segment of the left upper lobe. The specimen from the biopsy at the time of bronchoscopy was reported as being a squamous cell carcinoma.   At the time of the surgery after the left lung was removed, the pathologist   indicated that this was a squamous cell carcinoma. It measured 3.8 cm. The pathology indicated that this is extending into the bronchial resection margin of the lobe. This lobe was later on removed to complete the pneumonectomy There was one lymph node positive for metastatic squamous cell carcinoma at the AP window. The patient was told that we would prefer to treat him with post-op simultaneous chemo/radiation.  He had Medi-port. He started chemo/radiation therapy andreceived 6 weekly cycles of carbo/Taxol along with radiatioon. After a month, he had a Ct scan and it showed stability He then had 2 additional cyles of carbo/Taxol finishing in 9/27/2016. He comes for follow up. He developed hoarseness on good Friday 2019 and has been found to have a left vocal cord paralysis by  EENT exam. CT of the chest was non contributory, shows changes from surgery   PET showed a PET avid mass to the left of the trachea. The case was discussed with dr annika Goldman and GI. We discussed the possibility of doing a EUS or EBUs, and finally, because of the localization, it was decided to do a EUS with biopsy if possible. Cytology shows recurrent squamous cell cancer. I have asked Pathology to run a PD-L1 from his original pathology from 2016. 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 He has been discussed extensively with radiation oncology. We have decided to treat him with radiation therapy and Cisplatin as a chemo-sensitizer. Dr Castro has reviewed the therapy ports from the previous radiation treatment and the area in question seems to be above the previously radiated fields. Completed chemoradiation s/p 6 weekly cisplatin treatments with response.    Interval history:  80-year-old male with stage III non-small cell lung cancer status post chemoradiation therapy and currently on maintenance with durvalumab.  Recently has been complaining of dizziness/vertigo with change in position.  Denies any other symptoms.  Recent CT scan of the head was unremarkable for any evidence of metastatic disease.    Denies worsening shortness of breath, chest pain, nausea vomiting, headaches, lightheadedness, diarrhea or dysuria.        Malignant neoplasm of upper lobe of left lung    4/12/2016 Initial Diagnosis     Malignant neoplasm of upper lobe of left lung      6/19/2019 Cancer Staged     Staging form: Lung, AJCC 8th Edition  - Clinical stage from 6/19/2019: Stage IIIB (rcT4, cN2, cM0) - Signed by Alejandro Castro II, MD on 6/19/2019 6/21/2019 Tumor Conference     Presenting Hospital / Clinic: [facility]  Virtual Tumor Board Conference: In person  Presenter: Dr. Chance Castro  Date Presented to Tumor Board: 06/21/19  Specialties Present: Medical Oncology;Radiation  Oncology;Surgical Oncology;Pathology;Navigation;Plastic Surgery;Radiology;Gastrointestinal;Pulmonology  Presentation at Cancer Conference: Prospective  Cancer Type: Lung cancer  Recommended Plan: Additional screening  Recommended Plan Note: If PDL-1 positive, treat with immunotherapy  Send tissue for next generation sequencing.      6/28/2019 Tumor Conference     His case was discussed at the Multidisciplinary Head and Neck Team Planning Meeting.    Representatives from Medical Oncology, Radiation Oncology, Head and Neck Surgical Oncology, Psychosocial Oncology, and Speech and Language Pathology discussed the case with the following recommendations:    1) treat lung cancer             7/5/2019 - 8/14/2019 Radiation Therapy     Treatment Site Ref. ID Energy Dose/Fx (Gy) #Fx Dose Correction (Gy) Total Dose (Gy) Start Date End Date Elapsed Days   VsyriVGGR49.4:1 PTV LNs 6X 1.8 28 / 28 0 50.4 7/5/2019 8/14/2019 40           3/10/2020 -  Chemotherapy     Treatment Summary   Plan Name: OP DURVALUMAB Q2W  Treatment Goal: Maintenance  Status: Active  Start Date: 3/10/2020  End Date: 9/8/2020 (Planned)  Provider: Fermin Vila MD  Chemotherapy: durvalumab (IMFINZI) 885 mg in sodium chloride 0.9% 267.7 mL chemo infusion, 10 mg/kg = 885 mg, Intravenous, Clinic/HOD 1 time, 6 of 14 cycles  Administration: 885 mg (3/10/2020), 860 mg (3/24/2020), 860 mg (4/7/2020), 860 mg (4/21/2020), 885 mg (5/5/2020), 860 mg (5/19/2020)       Past Medical History:   Diagnosis Date    Anemia     Arthritis     Atrial fibrillation     CAD (coronary artery disease)     Cataract     COPD (chronic obstructive pulmonary disease)     Diverticulosis     ED (erectile dysfunction)     HTN (hypertension)     Hyperlipidemia     Lung cancer     left    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 Courtney dwyer bx     Family History   Problem Relation Age of Onset    Esophageal cancer Sister     Cancer Sister      Lung cancer Mother     Cancer Mother     Cataracts Mother     Hypertension Mother     Pneumonia Father     Cataracts Father     Hypertension Father     Cancer Brother     Hypertension Brother     Blindness Neg Hx     Diabetes Neg Hx     Glaucoma Neg Hx     Macular degeneration Neg Hx     Retinal detachment Neg Hx     Strabismus Neg Hx     Stroke Neg Hx     Thyroid disease Neg Hx      Social History     Socioeconomic History    Marital status:      Spouse name: Not on file    Number of children: 3    Years of education: Not on file    Highest education level: Not on file   Occupational History    Occupation: retired   Social Needs    Financial resource strain: Not on file    Food insecurity:     Worry: Not on file     Inability: Not on file    Transportation needs:     Medical: Not on file     Non-medical: Not on file   Tobacco Use    Smoking status: Former Smoker     Packs/day: 1.00     Years: 50.00     Pack years: 50.00     Last attempt to quit: 2005     Years since quittin.8    Smokeless tobacco: Never Used   Substance and Sexual Activity    Alcohol use: No    Drug use: No    Sexual activity: Not Currently   Lifestyle    Physical activity:     Days per week: Not on file     Minutes per session: Not on file    Stress: Not on file   Relationships    Social connections:     Talks on phone: Not on file     Gets together: Not on file     Attends [employer] service: Not on file     Active member of club or organization: Not on file     Attends meetings of clubs or organizations: Not on file     Relationship status: Not on file   Other Topics Concern    Not on file   Social History Narrative    Not on file     Past Surgical History:   Procedure Laterality Date    APPENDECTOMY      BRONCHOSCOPY Bilateral 2019    Procedure: Bronchoscopy;  Surgeon: Paresh Goldman MD;  Location: [facility];  Service: Endoscopy;  Laterality: Bilateral;    CARDIAC CATHETERIZATION       cardiac stents      CATARACT EXTRACTION W/  INTRAOCULAR LENS IMPLANT Right 9-3-14    CHOLECYSTECTOMY      COLONOSCOPY N/A 4/17/2018    Procedure: COLONOSCOPY;  Surgeon: Dionne Doan MD;  Location: [facility] ENDO;  Service: Endoscopy;  Laterality: N/A;    COLONOSCOPY N/A 10/25/2018    Procedure: COLONOSCOPY;  Surgeon: Michael Hameed MD;  Location: [facility] ENDO;  Service: Endoscopy;  Laterality: N/A;    ENDOSCOPIC ULTRASOUND OF UPPER GASTROINTESTINAL TRACT N/A 6/11/2019    Procedure: ULTRASOUND, UPPER GI TRACT, ENDOSCOPIC;  Surgeon: Abhishek Knox MD;  Location: [facility] ENDO;  Service: Endoscopy;  Laterality: N/A;    ESOPHAGOGASTRODUODENOSCOPY N/A 6/11/2019    Procedure: EGD (ESOPHAGOGASTRODUODENOSCOPY);  Surgeon: Abhishek Knox MD;  Location: [facility] ENDO;  Service: Endoscopy;  Laterality: N/A;    infected stomach gland excision      INSERTION OF TUNNELED CENTRAL VENOUS CATHETER (CVC) WITH SUBCUTANEOUS PORT Right 7/3/2019    Procedure: [id number];  Surgeon: Jean Carlos Constantino MD;  Location: [facility] OR;  Service: General;  Laterality: Right;    LUNG REMOVAL, TOTAL Left 04/2016    lung cancer left upper lobe    SKIN BIOPSY Left     arm     Current Outpatient Medications   Medication Sig Dispense Refill    albuterol (PROVENTIL) 2.5 mg /3 mL (0.083 %) nebulizer solution Take 3 mLs (2.5 mg total) by nebulization every 6 (six) hours while awake. 270 mL 11    amLODIPine (NORVASC) 5 MG tablet TAKE 1 TABLET BY MOUTH EVERYDAY AT BEDTIME 90 tablet 3    amoxicillin-clavulanate 875-125mg (AUGMENTIN) 875-125 mg per tablet Take 1 tablet by mouth 2 (two) times daily.      apixaban (ELIQUIS) 5 mg Tab Take 1 tablet (5 mg total) by mouth 2 (two) times daily. 60 tablet 5    apixaban (ELIQUIS) 5 mg Tab Take 1 tablet (5 mg total) by mouth 2 (two) times daily. 60 tablet 5    apixaban (ELIQUIS) 5 mg Tab Take 1 tablet (5 mg total) by mouth 2 (two) times daily. 60 tablet 5    aspirin (ECOTRIN) 81 MG EC tablet Take 81  mg by mouth once daily.      benzonatate (TESSALON) 200 MG capsule Take 1 capsule (200 mg total) by mouth 3 (three) times daily as needed for Cough. 21 capsule 0    BREO ELLIPTA 100-25 mcg/dose diskus inhaler INHALE 1 PUFF INTO THE LUNGS ONCE DAILY  5    fenofibric acid (FIBRICOR) 135 mg CpDR Take 1 capsule (135 mg total) by mouth once daily. 90 capsule 3    fluticasone (FLONASE) 50 mcg/actuation nasal spray 2 sprays (100 mcg total) by Each Nare route once daily. (Patient taking differently: 2 sprays by Each Nare route as needed. ) 3 Bottle 3    furosemide (LASIX) 20 MG tablet TAKE 1 TABLET BY MOUTH EVERY DAY AS NEEDED 30 tablet 1    glycopyrrolate-formoterol (BEVESPI AEROSPHERE) 9-4.8 mcg HFAA Inhale 2 puffs into the lungs 2 (two) times daily. Controller 10.9 g 11    HYDROcodone-acetaminophen (NORCO) 5-325 mg per tablet Take 1 tablet by mouth every 6 (six) hours as needed for Pain. 60 tablet 0    ipratropium-albuteroL (COMBIVENT RESPIMAT)  mcg/actuation inhaler Inhale 1 puff into the lungs every 6 (six) hours as needed for Shortness of Breath. 4 g 11    levocetirizine (XYZAL) 5 MG tablet Take 5 mg by mouth as needed.       levothyroxine (SYNTHROID) 25 MCG tablet Take 1 tablet (25 mcg total) by mouth before breakfast. 30 tablet 11    lisinopriL (PRINIVIL,ZESTRIL) 40 MG tablet Take 1 tablet (40 mg total) by mouth once daily. 90 tablet 3    promethazine (PHENERGAN) 6.25 mg/5 mL syrup Take 20 mLs (25 mg total) by mouth nightly as needed. 240 mL 0    ranolazine (RANEXA) 500 MG Tb12 Take 1 tablet (500 mg total) by mouth 2 (two) times daily. 60 tablet 11    simvastatin (ZOCOR) 80 MG tablet Take 1 tablet (80 mg total) by mouth once daily. 90 tablet 3    sotaloL (SOTALOL AF) 80 MG tablet Take 1 tablet (80 mg total) by mouth 2 (two) times daily. 180 tablet 3    temazepam (RESTORIL) 15 mg Cap Take 1 capsule (15 mg total) by mouth nightly as needed. 30 capsule 1    zolpidem (AMBIEN) 5 MG Tab Take 1  tablet (5 mg total) by mouth nightly as needed. 30 tablet 1    carbamide peroxide (DEBROX) 6.5 % otic solution Place 5 drops into the left ear 2 (two) times daily. 15 mL 2     Current Facility-Administered Medications   Medication Dose Route Frequency Provider Last Rate Last Dose    testosterone cypionate injection 200 mg  200 mg Intramuscular Q21 Days Peter Teixeira MD   200 mg at 02/26/20 0937     Facility-Administered Medications Ordered in Other Visits   Medication Dose Route Frequency Provider Last Rate Last Dose    lactated ringers infusion   Intravenous Continuous Michael Hameed MD   Stopped at 07/03/19 0810       Labs:  Lab Results   Component Value Date    WBC 7.31 06/02/2020    HGB 12.9 (L) 06/02/2020    HCT 41.9 06/02/2020    MCV 98 06/02/2020     06/02/2020     BMP  Lab Results   Component Value Date     06/02/2020    K 4.8 06/02/2020     06/02/2020    CO2 29 06/02/2020    BUN 28 (H) 06/02/2020    CREATININE 1.5 (H) 06/02/2020    CALCIUM 9.3 06/02/2020    ANIONGAP 9 06/02/2020    ESTGFRAFRICA 50 (A) 06/02/2020    EGFRNONAA 44 (A) 06/02/2020     Lab Results   Component Value Date    ALT 13 06/02/2020    AST 17 06/02/2020    ALKPHOS 52 (L) 06/02/2020    BILITOT 0.3 06/02/2020       No results found for: IRON, TIBC, FERRITIN, SATURATEDIRO  No results found for: [id number]  No results found for: FOLATE  Lab Results   Component Value Date    TSH 7.372 (H) 05/19/2020       I have reviewed the radiology reports and examined the scan/xray images.    Review of Systems   Constitutional: Negative.    HENT: Negative.    Eyes: Negative.    Respiratory: Negative.    Cardiovascular: Negative.    Gastrointestinal: Negative.    Endocrine: Negative.    Genitourinary: Negative.    Musculoskeletal: Negative.    Skin: Negative.    Allergic/Immunologic: Negative.    Neurological: Negative.    Hematological: Negative.    Psychiatric/Behavioral: Negative.      ECOG SCORE    1 - Restricted in  strenuous activity-ambulatory and able to carry out work of a light nature            Objective:     Vitals:    06/02/20 0903   BP: 113/66   Pulse: (!) 55   Resp: 19   Temp: 98 °F (36.7 °C)   Body mass index is 27.43 kg/m².  Physical Exam   Constitutional: He is oriented to person, place, and time. He appears well-developed and well-nourished.   HENT:   Head: Normocephalic and atraumatic.   Bilateral ear wax, worse on the left than the right ear.   Eyes: Conjunctivae and EOM are normal.   Neck: Normal range of motion. Neck supple.   Cardiovascular: Normal rate and regular rhythm.   Pulmonary/Chest: Effort normal and breath sounds normal.   Abdominal: Soft. Bowel sounds are normal.   Musculoskeletal: Normal range of motion.   Neurological: He is alert and oriented to person, place, and time.   Skin: Skin is warm and dry.   Psychiatric: He has a normal mood and affect. His behavior is normal. Judgment and thought content normal.   Nursing note and vitals reviewed.        Assessment:      1. Malignant neoplasm of upper lobe of left lung    2. Bilateral impacted cerumen    3. Hypothyroidism, unspecified type    4. Vertigo           Plan:     Malignant neoplasm of upper lobe of left lung  Stage IIIB non-small cell lung cancer, status post concurrent chemoradiation and currently on immunotherapy with durvalumab.  Overall tolerating well.  Reviewed labs today no concerning cytopenia will proceed with treatment.  Plan to see him back in 2 weeks with repeat labs or sooner if needed.    Reviewed most recent imaging in February which showed stable disease.  We will obtain restaging imaging in the coming weeks.    Hypothyroid  On Synthroid 25 mcg daily.    Vertigo/dizziness:  Unknown etiology likely due to benign positional vertigo.  Performed [place]-Hallpike maneuver in the office and was positive.  Given extensive earwax noted on physical assessment, I will prescribe Debrox OTIC and then re-evaluate for resolution of symptoms.   Did explain to patient that if no resolution is noted in the coming week and, we could attempt to initiate meclizine or referred to ENT.  Patient is to follow-up in 2 weeks with his primary oncologist-Dr. Vila.    Kobe Fuentes MD

## 2020-06-02 NOTE — ASSESSMENT & PLAN NOTE
Stage IIIB non-small cell lung cancer, status post concurrent chemoradiation and currently on immunotherapy with durvalumab.  Overall tolerating well.  Reviewed labs today no concerning cytopenia will proceed with treatment.  Plan to see him back in 2 weeks with repeat labs or sooner if needed.    Reviewed most recent imaging in February which showed stable disease.  We will obtain restaging imaging in the coming weeks.

## 2020-06-02 NOTE — DISCHARGE INSTRUCTIONS
.[facility]  [address]  [address]  [phone] phone     [phone] fax  Hours of Operation: Monday- Friday 8:00am- 5:00pm  After hours phone  [phone]  Hematology / Oncology Physicians on call      Dr. Jose Fermin, JA [place] NP Tyesha Taylor, NP    Please call with any concerns regarding your appointment today.  .FALL PREVENTION   Falls often occur due to slipping, tripping or losing your balance. Here are ways to reduce your risk of falling again.   Was there anything that caused your fall that can be fixed, removed or replaced?   Make your home safe by keeping walkways clear of objects you may trip over.   Use non-slip pads under rugs.   Do not walk in poorly lit areas.   Do not stand on chairs or wobbly ladders.   Use caution when reaching overhead or looking upward. This position can cause a loss of balance.   Be sure your shoes fit properly, have non-slip bottoms and are in good condition.   Be cautious when going up and down stairs, curbs, and when walking on uneven sidewalks.   If your balance is poor, consider using a cane or walker.   If your fall was related to alcohol use, stop or limit alcohol intake.   If your fall was related to use of sleeping medicines, talk to your doctor about this. You may need to reduce your dosage at bedtime if you awaken during the night to go to the bathroom.   To reduce the need for nighttime bathroom trips:   Avoid drinking fluids for several hours before going to bed   Empty your bladder before going to bed   Men can keep a urinal at the bedside   © 0743-1028 [facility], [address] [place] [address], [place] [address]. All rights reserved. This information is not intended as a substitute for professional medical care. Always follow your healthcare professional's instructions.    .WAYS TO HELP PREVENT  INFECTION         WASH YOUR HANDS OFTEN DURING THE DAY, ESPECIALLY BEFORE YOU EAT, AFTER USING THE BATHROOM, AND AFTER TOUCHING ANIMALS     STAY AWAY FROM PEOPLE WHO HAVE ILLNESSES YOU CAN CATCH; SUCH AS COLDS, FLU, CHICKEN POX     TRY TO AVOID CROWDS     STAY AWAY FROM CHILDREN WHO RECENTLY HAVE RECEIVED LIVE VIRUS VACCINES     MAINTAIN GOOD MOUTH CARE     DO NOT SQUEEZE OR SCRATCH PIMPLES     CLEAN CUTS & SCRAPES RIGHT AWAY AND DAILY UNTIL HEALED WITH WARM WATER, SOAP & AN ANTISEPTIC     AVOID CONTACT WITH LITTER BOXES, BIRD CAGES, & FISH TANKS     AVOID STANDING WATER, IE., BIRD BATHS, FLOWER POTS/VASES, OR HUMIDIFIERS     WEAR GLOVES WHEN GARDENING OR CLEANING UP AFTER OTHERS, ESPECIALLY BABIES & SMALL CHILDREN     DO NOT EAT RAW FISH, SEAFOOD, MEAT, OR EGGS  .HOME CARE AFTER CHEMOTHERAPY   Meals   Many patients feel sick and lose their appetites during treatment. Eat small meals several times a day. Choose bland foods with little taste or smell if you have problems with nausea. Be sure to cook all food thoroughly. This kills bacteria and helps you avoid intestinal infection. Soft foods are easier to swallow and digest.   Activity   Exercise keeps you strong and keeps your heart and lungs active. Talk to your doctor about an appropriate exercise program for you.   Skin Care   To prevent a skin infection, bathe or shower once a day. Use a moisturizing soap and wash with warm water. Avoid very hot or cold water. Chemotherapy can make your skin dry . Apply moisturizing lotion to help relieve dry skin. Some drugs used in high doses can cause slight burns to appear (like sunburn). Ask for a special cream to help relieve the burn and protect your skin.   Prevent Mouth Sores   During chemotherapy, many people get mouth sores. Do the following to help prevent mouth sores or to ease discomfort.   Brush your teeth with a soft-bristle toothbrush after every meal.  Don't use dental floss if your platelet count  "is below 50,000. Your doctor or nurse will tell you if this is the case.  Use an oral swab or special soft toothbrush if your gums bleed during regular brushing.  Use mouthwash as directed. If you can't tolerate commercial mouthwash, use salt and baking soda to clean your mouth. Mix 1 teaspoon of salt and 1 teaspoon of baking soda into a glass of water. Swish and spit.  Call your doctor or return to this facility if you develop any of the following:   Sore throat   White patches in the mouth or throat   Fever of 100.4ºF (38ºC) or higher, or as directed by your healthcare provider  © 3143-8714 [facility], [address] [place] [address], [place] [id number]. All rights reserved. This information is not intended as a substitute for professional medical care. Always follow your healthcare professional's     .Support Groups/Classes    Support groups and classes are being offered at the   [facility] and [facility]!!    "Cooking with Cancer" (Nutrition Class):  Second Wednesday of each month   at noon at the [facility].  Metastatic Support Group:  Third Tuesday of each month   at noon at the [facility].  Next Steps Class/Group: Second and fourth Thursday of each month at noon at the [facility].  [facility] (Breast Cancer Support Group): First Tuesday of each month   at 5:30pm at the [facility] location.  [facility]: [facility]: Second and third Tuesday of each month from 7:30am - 2pm.  [facility]: First and fourth Tuesday of each month from 7:30am - 2pm    If you are interested in attending or would like more information please ask our social workers or your nurse!    "

## 2020-06-15 NOTE — PROGRESS NOTES
[facility]  Department of Otorhinolaryngology and Communication Sciences    Subjective:      Chai Murry is a 80 y.o. male who presents for follow-up. He has left vocal fold immobility 2/2 recurrent lung cancer with paratracheal disease. His symptoms began in Spring 2019.    TREATMENT HISTORY  11/15/2019 - injection aug - HA  1/10/2020 - injection aug - HA     Overall he has done well since his last visit.  Recent PET-CT has been reassuring.  He has ongoing chemotherapy treatments.  He does report some hoarseness, usually toward the end of the day, as well as some ongoing mild difficulty swallowing--he is prone to cough when swallowing liquids.  There have been no aspiration related illnesses.    Last MBSS 5/2019 - normal; no pen/asp    The patient's medications, allergies, past medical, surgical, social and family histories were reviewed and updated as appropriate.    A detailed review of systems was obtained with pertinent positives as per the above HPI, and otherwise negative.      Objective:     BP (!) 153/71 (Patient Position: Sitting)   Pulse 66   Wt 86.2 kg (190 lb)   BMI 27.26 kg/m²      Constitutional: comfortable, well dressed  Psychiatric: appropriate affect  Respiratory: comfortably breathing, symmetric chest rise, no stridor  Voice: minimal variable asthenia; mild instability; impaired projection; truncated MPT  Head: normocephalic  Eyes: conjunctivae and sclerae clear  Indirect laryngoscopy is limited due to gag    Procedure  Flexible Laryngeal Videostroboscopy (42509): Laryngeal videostroboscopy is indicated to assess laryngeal vibratory biomechanics and vocal fold oscillation, which cannot be assessed with a plain light examination. This was carried out transnasally with a distal chip videoendoscope. After verbal consent was obtained, the patient was positioned and the nose was topically decongested with 1% phenylephrine and topically anesthetized with 4% lidocaine. The endoscope was  passed through the most patent nasal cavity and positioned to image the nasopharynx, larynx, and hypopharynx in detail. The following features were examined: nasopharyngeal, laryngeal, hypopharyngeal masses; velopharyngeal strength, closure, and symmetry of motion; vocal fold range and symmetry of motion; laryngeal mucosal edema, erythema, inflammation, and hydration; salivary pooling; and gross laryngeal sensation. During phonation, the vocal folds were assessed for glottal closure; mucosal wave; vocal fold lesions; vibratory periodicity, amplitude, and phase symmetry; and vertical height match. The equipment was removed. The patient tolerated the procedure well without complication. All findings were normal except:  - left vocal fold immobile, intermediate position; linear free edge  - touch glottal closure with truncated phonation time and increased open phase of vibration  - negligible vertical height mismatch, negligible posterior gap  - phonatory suprgalottic hyperfunction, mild      Assessment:     Chai Murry is a 80 y.o. male with left vocal fold paralysis, at this point irrecoverable, 2/2 recurrent lung cancer with paratracheal disease.      Plan:     Further treatment options we discussed included repeating injection augmentation (potentially with calcium hydroxyapatite) for laryngeal framework surgery.  I discussed with him the risks and benefits of each option.  At present, he is inclined to pursue laryngeal framework surgery. He would benefit from left laryngeal framework surgery - medialization laryngoplasty, possible arytenoid repositioning procedure, possible cricothyroid procedure -  for the treatment of his condition. I discussed the risks, benefits and alternatives to surgery with the patient, as well as the expected postoperative course, with placement of a closed suction drain and overnight observation. Risks included but were not limited to pain; bleeding, hematoma; infection; reaction to the  implant; scarring (skin, vocal fold); worsening of voice; implant migration and/or extrusion; need for additional and/or revision procedures; pharyngeal leak, fistula, mediastinitis; and airway obstruction which may necessitate tracheotomy. Also inherent in the procedure are the risks of anesthesia, including but not limited to cardiovascular complications (heart attack, arrhythmia); pulmonary (respiratory failure); neurologic (stroke); and death.    I gave him the opportunity to ask questions and I answered all of them.  He will contact me in the near future with his decision.    All questions were answered, and the patient is in agreement with the plan.    Con Lofton M.D.  [facility]  Department of Otorhinolaryngology and Communication Sciences

## 2020-06-16 ENCOUNTER — INFUSION (OUTPATIENT)
Dept: INFUSION THERAPY | Facility: HOSPITAL | Age: 80
End: 2020-06-16
Attending: INTERNAL MEDICINE
Payer: MEDICARE

## 2020-06-16 ENCOUNTER — TELEPHONE (OUTPATIENT)
Dept: RADIOLOGY | Facility: HOSPITAL | Age: 80
End: 2020-06-16

## 2020-06-16 ENCOUNTER — OFFICE VISIT (OUTPATIENT)
Dept: HEMATOLOGY/ONCOLOGY | Facility: CLINIC | Age: 80
End: 2020-06-16
Payer: MEDICARE

## 2020-06-16 VITALS
RESPIRATION RATE: 18 BRPM | DIASTOLIC BLOOD PRESSURE: 72 MMHG | OXYGEN SATURATION: 98 % | HEART RATE: 55 BPM | TEMPERATURE: 98 F | SYSTOLIC BLOOD PRESSURE: 125 MMHG | BODY MASS INDEX: 27.2 KG/M2 | HEIGHT: 70 IN | WEIGHT: 190 LBS

## 2020-06-16 VITALS
TEMPERATURE: 96 F | OXYGEN SATURATION: 95 % | RESPIRATION RATE: 18 BRPM | SYSTOLIC BLOOD PRESSURE: 125 MMHG | DIASTOLIC BLOOD PRESSURE: 46 MMHG | HEIGHT: 70 IN | BODY MASS INDEX: 27.27 KG/M2 | WEIGHT: 190.5 LBS | HEART RATE: 58 BPM

## 2020-06-16 DIAGNOSIS — C34.12 MALIGNANT NEOPLASM OF UPPER LOBE OF LEFT LUNG: Primary | ICD-10-CM

## 2020-06-16 DIAGNOSIS — Z51.11 CHEMOTHERAPY MANAGEMENT, ENCOUNTER FOR: ICD-10-CM

## 2020-06-16 PROCEDURE — 99999 PR PBB SHADOW E&M-EST. PATIENT-LVL V: CPT | Mod: PBBFAC,,, | Performed by: INTERNAL MEDICINE

## 2020-06-16 PROCEDURE — 99215 OFFICE O/P EST HI 40 MIN: CPT | Mod: 25,S$GLB,, | Performed by: INTERNAL MEDICINE

## 2020-06-16 PROCEDURE — 25000003 PHARM REV CODE 250: Performed by: INTERNAL MEDICINE

## 2020-06-16 PROCEDURE — 99215 PR OFFICE/OUTPT VISIT, EST, LEVL V, 40-54 MIN: ICD-10-PCS | Mod: 25,S$GLB,, | Performed by: INTERNAL MEDICINE

## 2020-06-16 PROCEDURE — 63600175 PHARM REV CODE 636 W HCPCS: Performed by: INTERNAL MEDICINE

## 2020-06-16 PROCEDURE — 3074F SYST BP LT 130 MM HG: CPT | Mod: CPTII,S$GLB,, | Performed by: INTERNAL MEDICINE

## 2020-06-16 PROCEDURE — 96413 CHEMO IV INFUSION 1 HR: CPT

## 2020-06-16 PROCEDURE — 1101F PT FALLS ASSESS-DOCD LE1/YR: CPT | Mod: CPTII,S$GLB,, | Performed by: INTERNAL MEDICINE

## 2020-06-16 PROCEDURE — 1101F PR PT FALLS ASSESS DOC 0-1 FALLS W/OUT INJ PAST YR: ICD-10-PCS | Mod: CPTII,S$GLB,, | Performed by: INTERNAL MEDICINE

## 2020-06-16 PROCEDURE — 1126F PR PAIN SEVERITY QUANTIFIED, NO PAIN PRESENT: ICD-10-PCS | Mod: S$GLB,,, | Performed by: INTERNAL MEDICINE

## 2020-06-16 PROCEDURE — 3078F DIAST BP <80 MM HG: CPT | Mod: CPTII,S$GLB,, | Performed by: INTERNAL MEDICINE

## 2020-06-16 PROCEDURE — 1159F MED LIST DOCD IN RCRD: CPT | Mod: S$GLB,,, | Performed by: INTERNAL MEDICINE

## 2020-06-16 PROCEDURE — 1159F PR MEDICATION LIST DOCUMENTED IN MEDICAL RECORD: ICD-10-PCS | Mod: S$GLB,,, | Performed by: INTERNAL MEDICINE

## 2020-06-16 PROCEDURE — 3078F PR MOST RECENT DIASTOLIC BLOOD PRESSURE < 80 MM HG: ICD-10-PCS | Mod: CPTII,S$GLB,, | Performed by: INTERNAL MEDICINE

## 2020-06-16 PROCEDURE — 1126F AMNT PAIN NOTED NONE PRSNT: CPT | Mod: S$GLB,,, | Performed by: INTERNAL MEDICINE

## 2020-06-16 PROCEDURE — 3074F PR MOST RECENT SYSTOLIC BLOOD PRESSURE < 130 MM HG: ICD-10-PCS | Mod: CPTII,S$GLB,, | Performed by: INTERNAL MEDICINE

## 2020-06-16 PROCEDURE — 99999 PR PBB SHADOW E&M-EST. PATIENT-LVL V: ICD-10-PCS | Mod: PBBFAC,,, | Performed by: INTERNAL MEDICINE

## 2020-06-16 RX ORDER — SODIUM CHLORIDE 0.9 % (FLUSH) 0.9 %
10 SYRINGE (ML) INJECTION
Status: CANCELLED | OUTPATIENT
Start: 2020-06-16

## 2020-06-16 RX ORDER — HEPARIN 100 UNIT/ML
500 SYRINGE INTRAVENOUS
Status: CANCELLED | OUTPATIENT
Start: 2020-06-16

## 2020-06-16 RX ORDER — HEPARIN 100 UNIT/ML
500 SYRINGE INTRAVENOUS
Status: DISCONTINUED | OUTPATIENT
Start: 2020-06-16 | End: 2020-06-16 | Stop reason: HOSPADM

## 2020-06-16 RX ADMIN — DURVALUMAB 860 MG: 500 INJECTION, SOLUTION INTRAVENOUS at 09:06

## 2020-06-16 RX ADMIN — HEPARIN SODIUM (PORCINE) LOCK FLUSH IV SOLN 100 UNIT/ML 500 UNITS: 100 SOLUTION at 10:06

## 2020-06-16 NOTE — DISCHARGE INSTRUCTIONS
.[facility]  [address] [facility]  [address]  [phone] phone     [phone] fax  Hours of Operation: Monday- Friday 8:00am- 5:00pm  After hours phone  [phone]  Hematology / Oncology Physicians on call      Dr. Jose Fermin, JA Hemphill NP Tyesha Taylor, NP    Please call with any concerns regarding your appointment today.  .FALL PREVENTION   Falls often occur due to slipping, tripping or losing your balance. Here are ways to reduce your risk of falling again.   Was there anything that caused your fall that can be fixed, removed or replaced?   Make your home safe by keeping walkways clear of objects you may trip over.   Use non-slip pads under rugs.   Do not walk in poorly lit areas.   Do not stand on chairs or wobbly ladders.   Use caution when reaching overhead or looking upward. This position can cause a loss of balance.   Be sure your shoes fit properly, have non-slip bottoms and are in good condition.   Be cautious when going up and down stairs, curbs, and when walking on uneven sidewalks.   If your balance is poor, consider using a cane or walker.   If your fall was related to alcohol use, stop or limit alcohol intake.   If your fall was related to use of sleeping medicines, talk to your doctor about this. You may need to reduce your dosage at bedtime if you awaken during the night to go to the bathroom.   To reduce the need for nighttime bathroom trips:   Avoid drinking fluids for several hours before going to bed   Empty your bladder before going to bed   Men can keep a urinal at the bedside   © 2610-2259 [facility], [address] [place] [address], [place] [address]. All rights reserved. This information is not intended as a substitute for professional medical care. Always follow your healthcare professional's instructions.    .WAYS TO HELP PREVENT  INFECTION         WASH YOUR HANDS OFTEN DURING THE DAY, ESPECIALLY BEFORE YOU EAT, AFTER USING THE BATHROOM, AND AFTER TOUCHING ANIMALS     STAY AWAY FROM PEOPLE WHO HAVE ILLNESSES YOU CAN CATCH; SUCH AS COLDS, FLU, CHICKEN POX     TRY TO AVOID CROWDS     STAY AWAY FROM CHILDREN WHO RECENTLY HAVE RECEIVED LIVE VIRUS VACCINES     MAINTAIN GOOD MOUTH CARE     DO NOT SQUEEZE OR SCRATCH PIMPLES     CLEAN CUTS & SCRAPES RIGHT AWAY AND DAILY UNTIL HEALED WITH WARM WATER, SOAP & AN ANTISEPTIC     AVOID CONTACT WITH LITTER BOXES, BIRD CAGES, & FISH TANKS     AVOID STANDING WATER, IE., BIRD BATHS, FLOWER POTS/VASES, OR HUMIDIFIERS     WEAR GLOVES WHEN GARDENING OR CLEANING UP AFTER OTHERS, ESPECIALLY BABIES & SMALL CHILDREN     DO NOT EAT RAW FISH, SEAFOOD, MEAT, OR EGGS  .[facility]  [id number] [place] [address] Drive  [phone] phone     [phone] fax  Hours of Operation: Monday- Friday 8:00am- 5:00pm  After hours phone  [phone]  Hematology / Oncology Physicians on call      Dr. Jose Fermin, JA Hemphill NP Tyesha Taylor, NP    Please call with any concerns regarding your appointment today.  .HOME CARE AFTER CHEMOTHERAPY   Meals   Many patients feel sick and lose their appetites during treatment. Eat small meals several times a day. Choose bland foods with little taste or smell if you have problems with nausea. Be sure to cook all food thoroughly. This kills bacteria and helps you avoid intestinal infection. Soft foods are easier to swallow and digest.   Activity   Exercise keeps you strong and keeps your heart and lungs active. Talk to your doctor about an appropriate exercise program for you.   Skin Care   To prevent a skin infection, bathe or shower once a day. Use a moisturizing soap and wash with warm water. Avoid very hot or cold water. Chemotherapy can make  "your skin dry . Apply moisturizing lotion to help relieve dry skin. Some drugs used in high doses can cause slight burns to appear (like sunburn). Ask for a special cream to help relieve the burn and protect your skin.   Prevent Mouth Sores   During chemotherapy, many people get mouth sores. Do the following to help prevent mouth sores or to ease discomfort.   Brush your teeth with a soft-bristle toothbrush after every meal.  Don't use dental floss if your platelet count is below 50,000. Your doctor or nurse will tell you if this is the case.  Use an oral swab or special soft toothbrush if your gums bleed during regular brushing.  Use mouthwash as directed. If you can't tolerate commercial mouthwash, use salt and baking soda to clean your mouth. Mix 1 teaspoon of salt and 1 teaspoon of baking soda into a glass of water. Swish and spit.  Call your doctor or return to this facility if you develop any of the following:   Sore throat   White patches in the mouth or throat   Fever of 100.4ºF (38ºC) or higher, or as directed by your healthcare provider  © 1786-1131 [place] [address]. All rights reserved. This information is not intended as a substitute for professional medical care. Always follow your healthcare professional's     .Support Groups/Classes    Support groups and classes are being offered at the   [facility] and [facility]!!    "Cooking with Cancer" (Nutrition Class):  Second Wednesday of each month   at noon at the Cancer Center.  Metastatic Support Group:  Third Tuesday of each month   at noon at the [facility] [[email or web address]].  Next Steps Class/Group: Second and fourth Thursday of each month at noon at the [facility] [[email or web address]].  [facility] (Breast Cancer Support Group): First Tuesday of each month   at 5:30pm at the [facility] location.  [facility]: [facility] [[email or web address]]: Second and third Tuesday of each month from 7:30am - 2pm.  [facility]: First and fourth Tuesday of each " month from 7:30am - 2pm    If you are interested in attending or would like more information please ask our social workers or your nurse!

## 2020-06-16 NOTE — PROGRESS NOTES
Subjective:       Patient ID: Chai Murry is a 80 y.o. male.    Chief Complaint: Follow-up, Results, Lung Cancer, and Chemotherapy    HPI 80-year-old male stage III non-small cell lung carcinoma presents for follow-up cycle  maintenance immunotherapy patient tolerating therapy well denies any nausea vomiting fevers chills or night sweats scheduled for PET scan tomorrow ECOG status 1    Past Medical History:   Diagnosis Date    Anemia     Arthritis     Atrial fibrillation     CAD (coronary artery disease)     Cataract     COPD (chronic obstructive pulmonary disease)     Diverticulosis     ED (erectile dysfunction)     HTN (hypertension)     Hyperlipidemia     Lung cancer     left    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 Courtney dwyer bx     Family History   Problem Relation Age of Onset    Esophageal cancer Sister     Cancer Sister     Lung cancer Mother     Cancer Mother     Cataracts Mother     Hypertension Mother     Pneumonia Father     Cataracts Father     Hypertension Father     Cancer Brother     Hypertension Brother     Blindness Neg Hx     Diabetes Neg Hx     Glaucoma Neg Hx     Macular degeneration Neg Hx     Retinal detachment Neg Hx     Strabismus Neg Hx     Stroke Neg Hx     Thyroid disease Neg Hx      Social History     Socioeconomic History    Marital status:      Spouse name: Not on file    Number of children: 3    Years of education: Not on file    Highest education level: Not on file   Occupational History    Occupation: retired   Social Needs    Financial resource strain: Not on file    Food insecurity     Worry: Not on file     Inability: Not on file    Transportation needs     Medical: Not on file     Non-medical: Not on file   Tobacco Use    Smoking status: Former Smoker     Packs/day: 1.00     Years: 50.00     Pack years: 50.00     Quit date: 2005     Years since quittin.8    Smokeless  tobacco: Never Used   Substance and Sexual Activity    Alcohol use: No    Drug use: No    Sexual activity: Not Currently   Lifestyle    Physical activity     Days per week: Not on file     Minutes per session: Not on file    Stress: Not on file   Relationships    Social connections     Talks on phone: Not on file     Gets together: Not on file     Attends [employer] service: Not on file     Active member of club or organization: Not on file     Attends meetings of clubs or organizations: Not on file     Relationship status: Not on file   Other Topics Concern    Not on file   Social History Narrative    Not on file     Past Surgical History:   Procedure Laterality Date    APPENDECTOMY      BRONCHOSCOPY Bilateral 6/21/2019    Procedure: Bronchoscopy;  Surgeon: Paresh Goldman MD;  Location: [facility];  Service: Endoscopy;  Laterality: Bilateral;    CARDIAC CATHETERIZATION      cardiac stents      CATARACT EXTRACTION W/  INTRAOCULAR LENS IMPLANT Right 9-3-14    CHOLECYSTECTOMY      COLONOSCOPY N/A 4/17/2018    Procedure: COLONOSCOPY;  Surgeon: Dionne Doan MD;  Location: [facility];  Service: Endoscopy;  Laterality: N/A;    COLONOSCOPY N/A 10/25/2018    Procedure: COLONOSCOPY;  Surgeon: Michael Hameed MD;  Location: [facility];  Service: Endoscopy;  Laterality: N/A;    ENDOSCOPIC ULTRASOUND OF UPPER GASTROINTESTINAL TRACT N/A 6/11/2019    Procedure: ULTRASOUND, UPPER GI TRACT, ENDOSCOPIC;  Surgeon: Abhishek Knox MD;  Location: [facility];  Service: Endoscopy;  Laterality: N/A;    ESOPHAGOGASTRODUODENOSCOPY N/A 6/11/2019    Procedure: EGD (ESOPHAGOGASTRODUODENOSCOPY);  Surgeon: Abhishek Knox MD;  Location: [facility];  Service: Endoscopy;  Laterality: N/A;    infected stomach gland excision      INSERTION OF TUNNELED CENTRAL VENOUS CATHETER (CVC) WITH SUBCUTANEOUS PORT Right 7/3/2019    Procedure: [id number];  Surgeon: Jean Carlos Constantino MD;  Location: [facility] OR;  Service:  General;  Laterality: Right;    LUNG REMOVAL, TOTAL Left 04/2016    lung cancer left upper lobe    SKIN BIOPSY Left     arm       Labs:  Lab Results   Component Value Date    WBC 6.24 06/16/2020    HGB 12.0 (L) 06/16/2020    HCT 38.7 (L) 06/16/2020    MCV 99 (H) 06/16/2020     06/16/2020     BMP  Lab Results   Component Value Date     06/16/2020    K 4.7 06/16/2020     06/16/2020    CO2 29 06/16/2020    BUN 35 (H) 06/16/2020    CREATININE 1.7 (H) 06/16/2020    CALCIUM 9.4 06/16/2020    ANIONGAP 8 06/16/2020    ESTGFRAFRICA 43 (A) 06/16/2020    EGFRNONAA 38 (A) 06/16/2020     Lab Results   Component Value Date    ALT 12 06/16/2020    AST 18 06/16/2020    ALKPHOS 51 (L) 06/16/2020    BILITOT 0.4 06/16/2020       No results found for: IRON, TIBC, FERRITIN, SATURATEDIRO  No results found for: [id number]  No results found for: FOLATE  Lab Results   Component Value Date    TSH 5.263 (H) 06/02/2020         Review of Systems   Constitutional: Negative for activity change, appetite change, chills, diaphoresis, fatigue, fever and unexpected weight change.   HENT: Negative for congestion, dental problem, drooling, ear discharge, ear pain, facial swelling, hearing loss, mouth sores, nosebleeds, postnasal drip, rhinorrhea, sinus pressure, sneezing, sore throat, tinnitus, trouble swallowing and voice change.    Eyes: Negative for photophobia, pain, discharge, redness, itching and visual disturbance.   Respiratory: Negative for apnea, cough, choking, chest tightness, shortness of breath, wheezing and stridor.    Cardiovascular: Negative for chest pain, palpitations and leg swelling.   Gastrointestinal: Negative for abdominal distention, abdominal pain, anal bleeding, blood in stool, constipation, diarrhea, nausea, rectal pain and vomiting.   Endocrine: Negative for cold intolerance, heat intolerance, polydipsia, polyphagia and polyuria.   Genitourinary: Negative for decreased urine volume, difficulty  urinating, discharge, dysuria, enuresis, flank pain, frequency, genital sores, hematuria, penile pain, penile swelling, scrotal swelling, testicular pain and urgency.   Musculoskeletal: Negative for arthralgias, back pain, gait problem, joint swelling, myalgias, neck pain and neck stiffness.   Skin: Negative for color change, pallor, rash and wound.   Allergic/Immunologic: Negative for environmental allergies, food allergies and immunocompromised state.   Neurological: Negative for dizziness, tremors, seizures, syncope, facial asymmetry, speech difficulty, weakness, light-headedness, numbness and headaches.   Hematological: Negative for adenopathy. Does not bruise/bleed easily.   Psychiatric/Behavioral: Positive for dysphoric mood. Negative for agitation, behavioral problems, confusion, decreased concentration, hallucinations, self-injury, sleep disturbance and suicidal ideas. The patient is nervous/anxious. The patient is not hyperactive.        Objective:      Physical Exam  Vitals signs reviewed.   Constitutional:       General: He is not in acute distress.     Appearance: He is well-developed. He is not diaphoretic.   HENT:      Head: Normocephalic.      Right Ear: External ear normal.      Left Ear: External ear normal.      Nose: Nose normal.      Right Sinus: No maxillary sinus tenderness or frontal sinus tenderness.      Left Sinus: No maxillary sinus tenderness or frontal sinus tenderness.      Mouth/Throat:      Pharynx: No oropharyngeal exudate.   Eyes:      General: Lids are normal. No scleral icterus.        Right eye: No discharge.         Left eye: No discharge.      Extraocular Movements:      Right eye: Normal extraocular motion.      Left eye: Normal extraocular motion.      Conjunctiva/sclera:      Right eye: Right conjunctiva is not injected. No hemorrhage.     Left eye: Left conjunctiva is not injected. No hemorrhage.     Pupils: Pupils are equal, round, and reactive to light.   Neck:       Musculoskeletal: Normal range of motion and neck supple.      Thyroid: No thyromegaly.      Vascular: No JVD.      Trachea: No tracheal deviation.   Cardiovascular:      Rate and Rhythm: Normal rate.   Pulmonary:      Effort: Pulmonary effort is normal. No respiratory distress.      Breath sounds: No stridor.   Abdominal:      General: Bowel sounds are normal.      Palpations: Abdomen is soft. There is no hepatomegaly, splenomegaly or mass.      Tenderness: There is no abdominal tenderness.   Musculoskeletal: Normal range of motion.         General: No tenderness.   Lymphadenopathy:      Head:      Right side of head: No posterior auricular or occipital adenopathy.      Left side of head: No posterior auricular or occipital adenopathy.      Cervical: No cervical adenopathy.      Right cervical: No superficial, deep or posterior cervical adenopathy.     Left cervical: No superficial, deep or posterior cervical adenopathy.      Upper Body:      Right upper body: No supraclavicular adenopathy.      Left upper body: No supraclavicular adenopathy.   Skin:     General: Skin is dry.      Findings: No erythema or rash.      Nails: There is no clubbing.     Neurological:      Mental Status: He is alert and oriented to person, place, and time.      Cranial Nerves: No cranial nerve deficit.      Coordination: Coordination normal.   Psychiatric:         Behavior: Behavior normal.         Thought Content: Thought content normal.         Judgment: Judgment normal.             Assessment:      1. Malignant neoplasm of upper lobe of left lung    2. Chemotherapy management, encounter for           Plan:     Patient is scheduled for cycle 8/26 maintenance immunotherapy stage III non-small cell lung cancer.  PET scan tomorrow I have asked that my name be added so that I can communicate the results to him.  Will be seen back in 2 weeks assuming PET scan demonstrates responsive and no evidence of progression with nurse practitioner CBC  CMP return in 4 weeks to be seen by primary oncologist with repeat CBC CMP TSH discussed implications with him answered questions 40 min face-to-face time coordination of care greater 50% time face-to-face with patient        Kyler Garcia Jr, MD FACP

## 2020-06-17 ENCOUNTER — HOSPITAL ENCOUNTER (OUTPATIENT)
Dept: RADIOLOGY | Facility: HOSPITAL | Age: 80
Discharge: HOME | End: 2020-06-17
Attending: INTERNAL MEDICINE
Payer: MEDICARE

## 2020-06-17 DIAGNOSIS — C34.12 MALIGNANT NEOPLASM OF UPPER LOBE OF LEFT LUNG: ICD-10-CM

## 2020-06-17 PROCEDURE — 78815 PET IMAGE W/CT SKULL-THIGH: CPT | Mod: TC

## 2020-06-17 PROCEDURE — 78815 NM PET CT ROUTINE: ICD-10-PCS | Mod: 26,PS,, | Performed by: RADIOLOGY

## 2020-06-17 PROCEDURE — A9552 F18 FDG: HCPCS

## 2020-06-17 PROCEDURE — 78815 PET IMAGE W/CT SKULL-THIGH: CPT | Mod: 26,PS,, | Performed by: RADIOLOGY

## 2020-06-18 ENCOUNTER — TELEPHONE (OUTPATIENT)
Dept: PULMONOLOGY | Facility: CLINIC | Age: 80
End: 2020-06-18

## 2020-06-19 ENCOUNTER — LAB VISIT (OUTPATIENT)
Dept: URGENT CARE | Facility: CLINIC | Age: 80
End: 2020-06-19
Payer: MEDICARE

## 2020-06-19 VITALS — TEMPERATURE: 97 F | OXYGEN SATURATION: 98 % | HEART RATE: 60 BPM | RESPIRATION RATE: 16 BRPM

## 2020-06-19 PROCEDURE — U0003 INFECTIOUS AGENT DETECTION BY NUCLEIC ACID (DNA OR RNA); SEVERE ACUTE RESPIRATORY SYNDROME CORONAVIRUS 2 (SARS-COV-2) (CORONAVIRUS DISEASE [COVID-19]), AMPLIFIED PROBE TECHNIQUE, MAKING USE OF HIGH THROUGHPUT TECHNOLOGIES AS DESCRIBED BY CMS-2020-01-R: HCPCS

## 2020-06-20 LAB — SARS-COV-2 RNA RESP QL NAA+PROBE: NOT DETECTED

## 2020-06-22 ENCOUNTER — OFFICE VISIT (OUTPATIENT)
Dept: OTOLARYNGOLOGY | Facility: CLINIC | Age: 80
End: 2020-06-22
Payer: MEDICARE

## 2020-06-22 VITALS
DIASTOLIC BLOOD PRESSURE: 71 MMHG | SYSTOLIC BLOOD PRESSURE: 153 MMHG | BODY MASS INDEX: 27.26 KG/M2 | WEIGHT: 190 LBS | HEART RATE: 66 BPM

## 2020-06-22 DIAGNOSIS — R13.14 DYSPHAGIA, PHARYNGOESOPHAGEAL: ICD-10-CM

## 2020-06-22 DIAGNOSIS — R49.0 DYSPHONIA: ICD-10-CM

## 2020-06-22 DIAGNOSIS — J38.01 PARALYSIS OF VOCAL CORDS AND LARYNX, UNILATERAL: Primary | ICD-10-CM

## 2020-06-22 PROCEDURE — 1159F PR MEDICATION LIST DOCUMENTED IN MEDICAL RECORD: ICD-10-PCS | Mod: S$GLB,,, | Performed by: OTOLARYNGOLOGY

## 2020-06-22 PROCEDURE — 99999 PR PBB SHADOW E&M-EST. PATIENT-LVL V: ICD-10-PCS | Mod: PBBFAC,,, | Performed by: OTOLARYNGOLOGY

## 2020-06-22 PROCEDURE — 1126F PR PAIN SEVERITY QUANTIFIED, NO PAIN PRESENT: ICD-10-PCS | Mod: S$GLB,,, | Performed by: OTOLARYNGOLOGY

## 2020-06-22 PROCEDURE — 99999 PR PBB SHADOW E&M-EST. PATIENT-LVL V: CPT | Mod: PBBFAC,,, | Performed by: OTOLARYNGOLOGY

## 2020-06-22 PROCEDURE — 3078F PR MOST RECENT DIASTOLIC BLOOD PRESSURE < 80 MM HG: ICD-10-PCS | Mod: CPTII,S$GLB,, | Performed by: OTOLARYNGOLOGY

## 2020-06-22 PROCEDURE — 31579 LARYNGOSCOPY TELESCOPIC: CPT | Mod: S$GLB,,, | Performed by: OTOLARYNGOLOGY

## 2020-06-22 PROCEDURE — 31579 PR LARYNGOSCOPY, FLEX/RIGID TELESCOPIC, W/STROBOSCOPY: ICD-10-PCS | Mod: S$GLB,,, | Performed by: OTOLARYNGOLOGY

## 2020-06-22 PROCEDURE — 99214 OFFICE O/P EST MOD 30 MIN: CPT | Mod: 25,S$GLB,, | Performed by: OTOLARYNGOLOGY

## 2020-06-22 PROCEDURE — 3078F DIAST BP <80 MM HG: CPT | Mod: CPTII,S$GLB,, | Performed by: OTOLARYNGOLOGY

## 2020-06-22 PROCEDURE — 3077F PR MOST RECENT SYSTOLIC BLOOD PRESSURE >= 140 MM HG: ICD-10-PCS | Mod: CPTII,S$GLB,, | Performed by: OTOLARYNGOLOGY

## 2020-06-22 PROCEDURE — 1126F AMNT PAIN NOTED NONE PRSNT: CPT | Mod: S$GLB,,, | Performed by: OTOLARYNGOLOGY

## 2020-06-22 PROCEDURE — 3077F SYST BP >= 140 MM HG: CPT | Mod: CPTII,S$GLB,, | Performed by: OTOLARYNGOLOGY

## 2020-06-22 PROCEDURE — 1101F PT FALLS ASSESS-DOCD LE1/YR: CPT | Mod: CPTII,S$GLB,, | Performed by: OTOLARYNGOLOGY

## 2020-06-22 PROCEDURE — 99214 PR OFFICE/OUTPT VISIT, EST, LEVL IV, 30-39 MIN: ICD-10-PCS | Mod: 25,S$GLB,, | Performed by: OTOLARYNGOLOGY

## 2020-06-22 PROCEDURE — 1159F MED LIST DOCD IN RCRD: CPT | Mod: S$GLB,,, | Performed by: OTOLARYNGOLOGY

## 2020-06-22 PROCEDURE — 1101F PR PT FALLS ASSESS DOC 0-1 FALLS W/OUT INJ PAST YR: ICD-10-PCS | Mod: CPTII,S$GLB,, | Performed by: OTOLARYNGOLOGY

## 2020-06-22 NOTE — PATIENT INSTRUCTIONS
Consider repeating the injection or surgery (medialization laryngoplasty, also known as thyroplasty)  Call with questions

## 2020-06-24 ENCOUNTER — OFFICE VISIT (OUTPATIENT)
Dept: PULMONOLOGY | Facility: CLINIC | Age: 80
End: 2020-06-24
Payer: MEDICARE

## 2020-06-24 VITALS
OXYGEN SATURATION: 96 % | SYSTOLIC BLOOD PRESSURE: 108 MMHG | HEART RATE: 68 BPM | HEIGHT: 70 IN | DIASTOLIC BLOOD PRESSURE: 72 MMHG | BODY MASS INDEX: 27.42 KG/M2 | WEIGHT: 191.56 LBS | RESPIRATION RATE: 17 BRPM

## 2020-06-24 DIAGNOSIS — J39.8 TRACHEAL MASS: ICD-10-CM

## 2020-06-24 DIAGNOSIS — C34.12 MALIGNANT NEOPLASM OF UPPER LOBE OF LEFT LUNG: ICD-10-CM

## 2020-06-24 DIAGNOSIS — Z90.2 STATUS POST PNEUMONECTOMY: ICD-10-CM

## 2020-06-24 DIAGNOSIS — J44.9 COPD, MODERATE: Primary | ICD-10-CM

## 2020-06-24 PROCEDURE — 3078F DIAST BP <80 MM HG: CPT | Mod: CPTII,S$GLB,, | Performed by: INTERNAL MEDICINE

## 2020-06-24 PROCEDURE — 1159F MED LIST DOCD IN RCRD: CPT | Mod: S$GLB,,, | Performed by: INTERNAL MEDICINE

## 2020-06-24 PROCEDURE — 1101F PR PT FALLS ASSESS DOC 0-1 FALLS W/OUT INJ PAST YR: ICD-10-PCS | Mod: CPTII,S$GLB,, | Performed by: INTERNAL MEDICINE

## 2020-06-24 PROCEDURE — 99999 PR PBB SHADOW E&M-EST. PATIENT-LVL III: ICD-10-PCS | Mod: PBBFAC,,, | Performed by: INTERNAL MEDICINE

## 2020-06-24 PROCEDURE — 3074F PR MOST RECENT SYSTOLIC BLOOD PRESSURE < 130 MM HG: ICD-10-PCS | Mod: CPTII,S$GLB,, | Performed by: INTERNAL MEDICINE

## 2020-06-24 PROCEDURE — 1159F PR MEDICATION LIST DOCUMENTED IN MEDICAL RECORD: ICD-10-PCS | Mod: S$GLB,,, | Performed by: INTERNAL MEDICINE

## 2020-06-24 PROCEDURE — 99214 OFFICE O/P EST MOD 30 MIN: CPT | Mod: S$GLB,,, | Performed by: INTERNAL MEDICINE

## 2020-06-24 PROCEDURE — 1101F PT FALLS ASSESS-DOCD LE1/YR: CPT | Mod: CPTII,S$GLB,, | Performed by: INTERNAL MEDICINE

## 2020-06-24 PROCEDURE — 3078F PR MOST RECENT DIASTOLIC BLOOD PRESSURE < 80 MM HG: ICD-10-PCS | Mod: CPTII,S$GLB,, | Performed by: INTERNAL MEDICINE

## 2020-06-24 PROCEDURE — 3074F SYST BP LT 130 MM HG: CPT | Mod: CPTII,S$GLB,, | Performed by: INTERNAL MEDICINE

## 2020-06-24 PROCEDURE — 99214 PR OFFICE/OUTPT VISIT, EST, LEVL IV, 30-39 MIN: ICD-10-PCS | Mod: S$GLB,,, | Performed by: INTERNAL MEDICINE

## 2020-06-24 PROCEDURE — 99999 PR PBB SHADOW E&M-EST. PATIENT-LVL III: CPT | Mod: PBBFAC,,, | Performed by: INTERNAL MEDICINE

## 2020-06-24 NOTE — PROGRESS NOTES
Subjective:       Patient ID: Chai Murry is a 80 y.o. male.    Chief Complaint: He  80-year-old gentleman with a history of chronic obstructive pulmonary disease, status post left pneumonectomy for lung cancer.  Seen back in the pulmonary office for follow-up..    COPD    COPD  Associated symptoms include arthralgias, congestion, coughing, fatigue and weakness. Pertinent negatives include no abdominal pain, chest pain, fever, nausea or rash.      He has complaints of shortness of breath and dyspnea.  He is status post left pneumonectomy for lung cancer.  He has received chemotherapy and has mild anemia.  When he stands up quickly is lightheaded and he has difficulty performing some daily activities such as picking up to a box etc.  He has a nonproductive cough.  He reports that his sleep is disrupted.  He was to be re-evaluated for oxygen therapy.  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 Courtney dwyer bx     Past Surgical History:   Procedure Laterality Date    APPENDECTOMY      BRONCHOSCOPY Bilateral 6/21/2019    Procedure: Bronchoscopy;  Surgeon: Paresh Goldman MD;  Location: [facility];  Service: Endoscopy;  Laterality: Bilateral;    CARDIAC CATHETERIZATION      cardiac stents      CATARACT EXTRACTION W/  INTRAOCULAR LENS IMPLANT Right 9-3-14    CHOLECYSTECTOMY      COLONOSCOPY N/A 4/17/2018    Procedure: COLONOSCOPY;  Surgeon: Dionne Doan MD;  Location: [facility];  Service: Endoscopy;  Laterality: N/A;    COLONOSCOPY N/A 10/25/2018    Procedure: COLONOSCOPY;  Surgeon: Michael Hameed MD;  Location:  [facility] ENDO;  Service: Endoscopy;  Laterality: N/A;    ENDOSCOPIC ULTRASOUND OF UPPER GASTROINTESTINAL TRACT N/A 2019    Procedure: ULTRASOUND, UPPER GI TRACT, ENDOSCOPIC;  Surgeon: Abhishek Knox MD;  Location: [facility] ENDO;  Service: Endoscopy;  Laterality: N/A;    ESOPHAGOGASTRODUODENOSCOPY N/A 2019    Procedure: EGD (ESOPHAGOGASTRODUODENOSCOPY);  Surgeon: Abhishek Knox MD;  Location: [facility];  Service: Endoscopy;  Laterality: N/A;    infected stomach gland excision      INSERTION OF TUNNELED CENTRAL VENOUS CATHETER (CVC) WITH SUBCUTANEOUS PORT Right 7/3/2019    Procedure: WKAITGNUZ-IEHM-L-CATH;  Surgeon: Jean Carlos Constantino MD;  Location: [facility] OR;  Service: General;  Laterality: Right;    LUNG REMOVAL, TOTAL Left 2016    lung cancer left upper lobe    SKIN BIOPSY Left     arm     Social History     Socioeconomic History    Marital status:      Spouse name: Not on file    Number of children: 3    Years of education: Not on file    Highest education level: Not on file   Occupational History    Occupation: retired   Social Needs    Financial resource strain: Not on file    Food insecurity     Worry: Not on file     Inability: Not on file    Transportation needs     Medical: Not on file     Non-medical: Not on file   Tobacco Use    Smoking status: Former Smoker     Packs/day: 1.00     Years: 50.00     Pack years: 50.00     Quit date: 2005     Years since quittin.8    Smokeless tobacco: Never Used   Substance and Sexual Activity    Alcohol use: No    Drug use: No    Sexual activity: Not Currently   Lifestyle    Physical activity     Days per week: Not on file     Minutes per session: Not on file    Stress: Not on file   Relationships    Social connections     Talks on phone: Not on file     Gets together: Not on file     Attends Voodoo service: Not on file     Active member of club or organization: Not on file     Attends meetings of clubs or organizations: Not on  "file     Relationship status: Not on file   Other Topics Concern    Not on file   Social History Narrative    Not on file     Review of Systems   Constitutional: Positive for fatigue. Negative for fever.   HENT: Positive for postnasal drip, rhinorrhea and congestion.    Eyes: Negative for redness and itching.   Respiratory: Positive for cough, sputum production, shortness of breath, dyspnea on extertion, use of rescue inhaler and Paroxysmal Nocturnal Dyspnea.    Cardiovascular: Negative for chest pain, palpitations and leg swelling.   Genitourinary: Negative for difficulty urinating and hematuria.   Endocrine: Positive for cold intolerance. Negative for heat intolerance.    Musculoskeletal: Positive for arthralgias.   Skin: Negative.  Negative for rash.   Gastrointestinal: Negative.  Negative for nausea and abdominal pain.   Neurological: Positive for dizziness, syncope, weakness and light-headedness.   Hematological: Negative for adenopathy. Does not bruise/bleed easily.   Psychiatric/Behavioral: Positive for sleep disturbance. The patient is nervous/anxious.        Objective:      /72   Pulse 68   Resp 17   Ht 5' 10" (1.778 m)   Wt 86.9 kg (191 lb 9.3 oz)   SpO2 96%   BMI 27.49 kg/m²   Physical Exam  Vitals signs and nursing note reviewed.   Constitutional:       Appearance: He is well-developed.      Comments: Chronically ill-appearing   HENT:      Head: Normocephalic and atraumatic.      Mouth/Throat:      Pharynx: Oropharyngeal exudate present.   Eyes:      Conjunctiva/sclera: Conjunctivae normal.      Pupils: Pupils are equal, round, and reactive to light.   Neck:      Musculoskeletal: Neck supple.      Thyroid: No thyromegaly.      Vascular: No JVD.      Trachea: No tracheal deviation.   Cardiovascular:      Rate and Rhythm: Normal rate. Rhythm irregularly irregular.      Heart sounds: Normal heart sounds. No murmur.   Pulmonary:      Effort: Pulmonary effort is normal.      Breath sounds: " Examination of the left-upper field reveals decreased breath sounds. Examination of the left-middle field reveals decreased breath sounds. Examination of the right-lower field reveals wheezing. Examination of the left-lower field reveals decreased breath sounds. Decreased breath sounds and wheezing present. No rhonchi or rales.   Abdominal:      General: Bowel sounds are normal.      Palpations: Abdomen is soft.   Musculoskeletal: Normal range of motion.         General: No tenderness.   Lymphadenopathy:      Cervical: No cervical adenopathy.   Skin:     General: Skin is warm and dry.   Neurological:      Mental Status: He is alert and oriented to person, place, and time.       Personal Diagnostic Review  Chest x-ray: left pneumonectomy  NM PET CT Routine FDG  Narrative: EXAMINATION:  NM PET CT ROUTINE    CLINICAL HISTORY:  NSCLC;  Malignant neoplasm of upper lobe, left bronchus or lung    TECHNIQUE:  Segmented attenuation corrected 3-D PET imaging was obtained from the skull base through the mid thighs utilizing 13.39 mCi F-18-FDG.  Noncontrast CT imaging was performed for attenuation correction, diagnosis, and anatomical fusion with PET.    COMPARISON:  09/25/2019.    FINDINGS:  Head/neck: There is normal physiologic FDG uptake noted within the visualized brain parenchyma. No FDG avid lymphadenopathy within the neck.    Chest: Patient is status post prior left pneumonectomy with chronic volume loss of the left hemithorax and stable fluid within the pleural space.  No hypermetabolic pulmonary nodules or masses are identified.  Very weakly FDG avid nodular opacity previously seen in the posterior right lower lobe is no longer present.  Prior hypermetabolic mass or lymphadenopathy in the superior mediastinum along the left side of the intrathoracic trachea and esophagus has resolved.  No FDG avid lymph nodes are currently identified.    Abdomen/Pelvis: Normal physiologic FDG uptake noted within the liver, spleen,  urinary tract, and bowel.No FDG avid retroperitoneal lymph nodes.  Stable small non FDG avid hypodense right adrenal nodule consistent with adenoma.  Bilateral renal cysts.  Diverticulosis coli.    Skeletal: No FDG avid osseus lesions identified.  Impression: 1. Interim resolution of prior FDG avid left paratracheal mass or lymphadenopathy.  2. Prior subcentimeter weakly FDG avid right lower lobe lung nodule no longer identified.    Electronically signed by: ORESTES Alex MD  Date:    06/17/2020  Time:    15:45      Office Spirometry Results:     No flowsheet data found.  Pulmonary Studies Review 6/24/2020   SpO2 96   Ordering Provider -   Interpreting Provider -   Performing nurse/tech/RT -   Diagnosis -   Height 70   Weight 3065.28   BMI (Calculated) 27.5   Predicted Distance 284.47   Patient Race -   6MWT Status -   Patient Reported -   Was O2 used? -   Delivery Method -   6MW Distance walked (feet) -   Distance walked (meters) -   Did patient stop? -   How many times? -   Stop Time 1 -   Restart Time 1 -   Did patient restart? -   Type of assistive device(s) used? -   Is extra documentation required for this patient? -   Oxygen Saturation -   Supplemental Oxygen -   Heart Rate -   Blood Pressure -   Jimenez Dyspnea Rating  -   Oxygen Saturation -   Supplemental Oxygen -   Heart Rate -   Blood Pressure -   Jimenez Dyspnea Rating  -   Recovery Time (seconds) -   Oxygen Saturation -   Supplemental Oxygen -   Heart Rate -   Blood Pressure -   Jimenez Dyspnea Rating  -   Is procedure ready for interpretation? -   Did the patient stop or pause? -   How many times did the patient stop or pause? -   Stop Time 1 -   Restart Time 1 -   Total Time Walked (Calculated) -   Total Laps Walked -   Final Partial Lap Distance (feet) -   Total Distance Feet (Calculated) -   Total Distance Meters (Calculated) -   Predicted Distance Meters (Calculated) 487.42   Percentage of Predicted (Calculated) -   Peak VO2 (Calculated) -   Mets -    Has The Patient Had a Previous Six Minute Walk Test? -   Comments -   Oxygen Qualification? -   Oxygen Saturation -   Supplemental Oxygen -   Heart Rate -   Blood Pressure -   Jimenez Dyspnea Rating  -   Oxygen Saturation -   Supplemental Oxygen -   Heart Rate -   Blood Pressure -   Recovery Time (seconds) -   Supplemental Oxygen -       A 6 min walk was attempted but when the patient walked down the hallway is oxygen saturation fell precipitously to 80% while on room air.  Patient had a prolonged period of recovery was given supplemental oxygen and remainder of the 6 min walk was canceled.     Oxygen Qualification Test  Conditions of testing: Stable outpatient  Oxygen saturation at rest on room air:  98 %  Oxygen saturation with exercise on room air: 80 %  Oxygen saturation with exercise on   2  liters oxygen  is  90%       Roel Ernandez MD  Pulmonary / Critical Care Medicine          Assessment:       COPD, moderate  -     X-Ray Chest PA And Lateral; Future; Expected date: 06/24/2020    s/p left pnuemonectomy for KAYLI [facility]  -     X-Ray Chest PA And Lateral; Future; Expected date: 06/24/2020    Tracheal mass: 3 cm BELOW VOCAL CORDS: SUBGLOTIC    Malignant neoplasm of upper lobe of left lung          Outpatient Encounter Medications as of 6/24/2020   Medication Sig Dispense Refill    albuterol (PROVENTIL) 2.5 mg /3 mL (0.083 %) nebulizer solution Take 3 mLs (2.5 mg total) by nebulization every 6 (six) hours while awake. 270 mL 11    amLODIPine (NORVASC) 5 MG tablet TAKE 1 TABLET BY MOUTH EVERYDAY AT BEDTIME 90 tablet 3    apixaban (ELIQUIS) 5 mg Tab Take 1 tablet (5 mg total) by mouth 2 (two) times daily. 60 tablet 5    apixaban (ELIQUIS) 5 mg Tab Take 1 tablet (5 mg total) by mouth 2 (two) times daily. 60 tablet 5    apixaban (ELIQUIS) 5 mg Tab Take 1 tablet (5 mg total) by mouth 2 (two) times daily. 60 tablet 5    aspirin (ECOTRIN) 81 MG EC tablet Take 81 mg by mouth once daily.      benzonatate  (TESSALON) 200 MG capsule Take 1 capsule (200 mg total) by mouth 3 (three) times daily as needed for Cough. 21 capsule 0    carbamide peroxide (DEBROX) 6.5 % otic solution Place 5 drops into the left ear 2 (two) times daily. 15 mL 2    fenofibric acid (FIBRICOR) 135 mg CpDR Take 1 capsule (135 mg total) by mouth once daily. 90 capsule 3    fluticasone (FLONASE) 50 mcg/actuation nasal spray 2 sprays (100 mcg total) by Each Nare route once daily. (Patient taking differently: 2 sprays by Each Nare route as needed. ) 3 Bottle 3    furosemide (LASIX) 20 MG tablet TAKE 1 TABLET BY MOUTH EVERY DAY AS NEEDED 30 tablet 1    glycopyrrolate-formoterol (BEVESPI AEROSPHERE) 9-4.8 mcg HFAA Inhale 2 puffs into the lungs 2 (two) times daily. Controller 10.9 g 11    HYDROcodone-acetaminophen (NORCO) 5-325 mg per tablet Take 1 tablet by mouth every 6 (six) hours as needed for Pain. 60 tablet 0    ipratropium-albuteroL (COMBIVENT RESPIMAT)  mcg/actuation inhaler Inhale 1 puff into the lungs every 6 (six) hours as needed for Shortness of Breath. 4 g 11    levocetirizine (XYZAL) 5 MG tablet Take 5 mg by mouth as needed.       levothyroxine (SYNTHROID) 25 MCG tablet Take 1 tablet (25 mcg total) by mouth before breakfast. 30 tablet 11    lisinopriL (PRINIVIL,ZESTRIL) 40 MG tablet Take 1 tablet (40 mg total) by mouth once daily. 90 tablet 3    promethazine (PHENERGAN) 6.25 mg/5 mL syrup Take 20 mLs (25 mg total) by mouth nightly as needed. 240 mL 0    ranolazine (RANEXA) 500 MG Tb12 Take 1 tablet (500 mg total) by mouth 2 (two) times daily. 60 tablet 11    simvastatin (ZOCOR) 80 MG tablet Take 1 tablet (80 mg total) by mouth once daily. 90 tablet 3    sotaloL (SOTALOL AF) 80 MG tablet Take 1 tablet (80 mg total) by mouth 2 (two) times daily. 180 tablet 3    zolpidem (AMBIEN) 5 MG Tab Take 1 tablet (5 mg total) by mouth nightly as needed. 30 tablet 1    [DISCONTINUED] amoxicillin-clavulanate 875-125mg (AUGMENTIN)  875-125 mg per tablet Take 1 tablet by mouth 2 (two) times daily.      [DISCONTINUED] BREO ELLIPTA 100-25 mcg/dose diskus inhaler INHALE 1 PUFF INTO THE LUNGS ONCE DAILY  5     Facility-Administered Encounter Medications as of 6/24/2020   Medication Dose Route Frequency Provider Last Rate Last Dose    lactated ringers infusion   Intravenous Continuous Michael Hameed MD   Stopped at 07/03/19 0810    testosterone cypionate injection 200 mg  200 mg Intramuscular Q21 Days Peter Teixeira MD   200 mg at 02/26/20 0937     Plan:       Requested Prescriptions      No prescriptions requested or ordered in this encounter     Problem List Items Addressed This Visit     Malignant neoplasm of upper lobe of left lung    Overview     He had   Resection of a left upper lobe mass in 4/2016. .It was a squamous cell carcinoma.   It measured 3.8 cm. The   pathology indicated that this was extending into the bronchial resection margin of the lobe. This lobe was later on removed to complete the pneumonectomy   There was one lymph node positive for metastatic squamous cell carcinoma at the   AP window.  He had post op   chemo/radiation. ( 4 cycles of carboTaxol).  Last chemo at the end of 9/2016         s/p left pnuemonectomy for KAYLI [facility]    Relevant Orders    X-Ray Chest PA And Lateral    Tracheal mass: 3 cm BELOW VOCAL CORDS: SUBGLOTIC      Other Visit Diagnoses     COPD, moderate    -  Primary    Relevant Orders    X-Ray Chest PA And Lateral             Follow up in about 6 months (around 12/24/2020) for Review CXR - on return.    MEDICAL DECISION MAKING: Moderate to high complexity.  Overall, the multiple problems listed are of moderate to high severity that may impact quality of life and activities of daily living. Side effects of medications, treatment plan as well as options and alternatives reviewed and discussed with patient. There was counseling of patient concerning these issues.    Total time spent in face to face  counseling and coordination of care - 45  minutes over 50% of time was used in discussion of prognosis, risks, benefits of treatment, instructions and compliance with regimen . Discussion with other physicians or health care providers (DME, NP, pharmacy, respiratory therapy) occurred.

## 2020-06-30 NOTE — PROGRESS NOTES
Subjective:       Patient ID: Chai Murry is a 80 y.o. male.    Chief Complaint: Malignant neoplasm of upper lobe of left lung [C34.12]  HPI: We have an opportunity to see Mr. Chai Murry in Hematology Oncology clinic at [facility] on 06/30/2020.  Mr. Chai Murry is a 80 y.o.  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   He is s/p left pneumonectomy for a squamous cell carcinoma of the left lung.0n 4/12/2016   Prior to the surgery, the PET scan showed an FDG-avid mass in the left upper   lobe abutting the left hilum with an SUV of 11.6. There seemed to be a left   hilar adenopathy as well.   Radiologist felt that he was unable to discriminate between the mass and the   lymphadenopathy. The patient had had a bronchoscopy by Dr. Roel Ernandez on   03/04/2006 that showed a partially obstructing airway in the anterior segment of   the left upper lobe with an endobronchial lesion in the anterior segment of the   left upper lobe. The specimen from the biopsy at the time of bronchoscopy was   reported as being a squamous cell carcinoma.   At the time of the surgery after the left lung was removed, the pathologist   indicated that this was a squamous cell carcinoma. It measured 3.8 cm. The   pathology indicated that this is extending into the bronchial resection margin of the lobe. This lobe was later on removed to complete the pneumonectomy   There was one lymph node positive for metastatic squamous cell carcinoma at the   AP window.   The patient was told that we would prefer to treat him with post-op simultaneous chemo/radiation.  He had Medi-port. He started chemo/radiation therapy andreceived 6 weekly cycles of carbo/Taxol along with radiatioon.   After a month, he had a Ct scan and it showed stability   He then had 2 additional cyles of carbo/Taxol finishing in 9/27/2016.   He comes for follow up.   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 CT of the chest was non contributory, shows changes from surgery   PET showed a PET avid mass to the left of the trachea.   The case was discussed with dr annika Goldman and GI.   We discussed the possibility of doing a EUS or EBUs, and finally, because of the localization, it was decided to do a EUS with biopsy if possible.   Cytology shows recurrent squamous cell cancer.   I have asked Pathology to run a PD-L1 from his original pathology from 2016.   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   He has been discussed extensively with radiation oncology. We have decided to treat him with radiation therapy and Cisplatin as a chemo-sensitizer.   Dr Castro has reviewed the therapy ports from the previous radiation treatment and the area in question seems to be above the previously radiated fields.   Completed chemoradiation s/p 6 weekly cisplatin treatments with response. Currently on maintenance Imfinzi. Reports had dizziness when gets up at night, attributes to restoril.    Oncology History   Malignant neoplasm of upper lobe of left lung   4/12/2016 Initial Diagnosis    Malignant neoplasm of upper lobe of left lung     6/19/2019 Cancer Staged    Staging form: Lung, AJCC 8th Edition  - Clinical stage from 6/19/2019: Stage IIIB (rcT4, cN2, cM0) - Signed by Alejandro Castro II, MD on 6/19/2019 6/21/2019 Tumor Conference    Presenting Hospital / Clinic: [facility]  Virtual Tumor Board Conference: In person  Presenter: Dr. Chance Castro  Date Presented to Tumor Board: 06/21/19  Specialties Present: Medical Oncology;Radiation Oncology;Surgical Oncology;Pathology;Navigation;Plastic Surgery;Radiology;Gastrointestinal;Pulmonology  Presentation at Cancer Conference: Prospective  Cancer Type: Lung cancer  Recommended Plan: Additional screening  Recommended Plan Note: If PDL-1 positive, treat with immunotherapy  Send tissue for next  generation sequencing.     6/28/2019 Tumor Conference    His case was discussed at the Multidisciplinary Head and Neck Team Planning Meeting.    Representatives from Medical Oncology, Radiation Oncology, Head and Neck Surgical Oncology, Psychosocial Oncology, and Speech and Language Pathology discussed the case with the following recommendations:    1) treat lung cancer            7/5/2019 - 8/14/2019 Radiation Therapy    Treatment Site Ref. ID Energy Dose/Fx (Gy) #Fx Dose Correction (Gy) Total Dose (Gy) Start Date End Date Elapsed Days   [id number]:1 PTV LNs 6X 1.8 28 / 28 0 50.4 7/5/2019 8/14/2019 40          3/10/2020 -  Chemotherapy    Treatment Summary   Plan Name: OP DURVALUMAB Q2W  Treatment Goal: Maintenance  Status: Active  Start Date: 3/10/2020  End Date: 9/8/2020 (Planned)  Provider: Fermin Vila MD  Chemotherapy: durvalumab (IMFINZI) 885 mg in sodium chloride 0.9% 267.7 mL chemo infusion, 10 mg/kg = 885 mg, Intravenous, Clinic/HOD 1 time, 8 of 14 cycles  Administration: 885 mg (3/10/2020), 860 mg (3/24/2020), 860 mg (4/7/2020), 860 mg (4/21/2020), 885 mg (5/5/2020), 860 mg (5/19/2020), 865 mg (6/2/2020), 860 mg (6/16/2020)       Past Medical History:   Diagnosis Date    Anemia     Arthritis     Atrial fibrillation     CAD (coronary artery disease)     Cataract     COPD (chronic obstructive pulmonary disease)     Diverticulosis     ED (erectile dysfunction)     HTN (hypertension)     Hyperlipidemia     Lung cancer     left    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 Courtney dwyer bx     Family History   Problem Relation Age of Onset    Esophageal cancer Sister     Cancer Sister     Lung cancer Mother     Cancer Mother     Cataracts Mother     Hypertension Mother     Pneumonia Father     Cataracts Father     Hypertension Father     Cancer Brother     Hypertension Brother     Blindness Neg Hx     Diabetes Neg Hx     Glaucoma Neg Hx      Macular degeneration Neg Hx     Retinal detachment Neg Hx     Strabismus Neg Hx     Stroke Neg Hx     Thyroid disease Neg Hx      Social History     Socioeconomic History    Marital status:      Spouse name: Not on file    Number of children: 3    Years of education: Not on file    Highest education level: Not on file   Occupational History    Occupation: retired   Social Needs    Financial resource strain: Not on file    Food insecurity     Worry: Not on file     Inability: Not on file    Transportation needs     Medical: Not on file     Non-medical: Not on file   Tobacco Use    Smoking status: Former Smoker     Packs/day: 1.00     Years: 50.00     Pack years: 50.00     Quit date: 2005     Years since quittin.8    Smokeless tobacco: Never Used   Substance and Sexual Activity    Alcohol use: No    Drug use: No    Sexual activity: Not Currently   Lifestyle    Physical activity     Days per week: Not on file     Minutes per session: Not on file    Stress: Not on file   Relationships    Social connections     Talks on phone: Not on file     Gets together: Not on file     Attends Hoahaoism service: Not on file     Active member of club or organization: Not on file     Attends meetings of clubs or organizations: Not on file     Relationship status: Not on file   Other Topics Concern    Not on file   Social History Narrative    Not on file     Past Surgical History:   Procedure Laterality Date    APPENDECTOMY      BRONCHOSCOPY Bilateral 2019    Procedure: Bronchoscopy;  Surgeon: Paresh Goldman MD;  Location: [facility] ENDO;  Service: Endoscopy;  Laterality: Bilateral;    CARDIAC CATHETERIZATION      cardiac stents      CATARACT EXTRACTION W/  INTRAOCULAR LENS IMPLANT Right 9-3-14    CHOLECYSTECTOMY      COLONOSCOPY N/A 2018    Procedure: COLONOSCOPY;  Surgeon: Dionne Doan MD;  Location: [facility] ENDO;  Service: Endoscopy;  Laterality: N/A;    COLONOSCOPY N/A  10/25/2018    Procedure: COLONOSCOPY;  Surgeon: Michael Hameed MD;  Location: [facility] ENDO;  Service: Endoscopy;  Laterality: N/A;    ENDOSCOPIC ULTRASOUND OF UPPER GASTROINTESTINAL TRACT N/A 6/11/2019    Procedure: ULTRASOUND, UPPER GI TRACT, ENDOSCOPIC;  Surgeon: Abhishek Knox MD;  Location: [facility] ENDO;  Service: Endoscopy;  Laterality: N/A;    ESOPHAGOGASTRODUODENOSCOPY N/A 6/11/2019    Procedure: EGD (ESOPHAGOGASTRODUODENOSCOPY);  Surgeon: Abhishek Knox MD;  Location: [facility] ENDO;  Service: Endoscopy;  Laterality: N/A;    infected stomach gland excision      INSERTION OF TUNNELED CENTRAL VENOUS CATHETER (CVC) WITH SUBCUTANEOUS PORT Right 7/3/2019    Procedure: [id number];  Surgeon: Jean Carlos Constantino MD;  Location: [facility] OR;  Service: General;  Laterality: Right;    LUNG REMOVAL, TOTAL Left 04/2016    lung cancer left upper lobe    SKIN BIOPSY Left     arm     Current Outpatient Medications   Medication Sig Dispense Refill    albuterol (PROVENTIL) 2.5 mg /3 mL (0.083 %) nebulizer solution Take 3 mLs (2.5 mg total) by nebulization every 6 (six) hours while awake. 270 mL 11    amLODIPine (NORVASC) 5 MG tablet TAKE 1 TABLET BY MOUTH EVERYDAY AT BEDTIME 90 tablet 3    apixaban (ELIQUIS) 5 mg Tab Take 1 tablet (5 mg total) by mouth 2 (two) times daily. 60 tablet 5    apixaban (ELIQUIS) 5 mg Tab Take 1 tablet (5 mg total) by mouth 2 (two) times daily. 60 tablet 5    apixaban (ELIQUIS) 5 mg Tab Take 1 tablet (5 mg total) by mouth 2 (two) times daily. 60 tablet 5    aspirin (ECOTRIN) 81 MG EC tablet Take 81 mg by mouth once daily.      benzonatate (TESSALON) 200 MG capsule Take 1 capsule (200 mg total) by mouth 3 (three) times daily as needed for Cough. 21 capsule 0    carbamide peroxide (DEBROX) 6.5 % otic solution Place 5 drops into the left ear 2 (two) times daily. 15 mL 2    fenofibric acid (FIBRICOR) 135 mg CpDR Take 1 capsule (135 mg total) by mouth once daily. 90 capsule 3    fluticasone  (FLONASE) 50 mcg/actuation nasal spray 2 sprays (100 mcg total) by Each Nare route once daily. (Patient taking differently: 2 sprays by Each Nare route as needed. ) 3 Bottle 3    furosemide (LASIX) 20 MG tablet TAKE 1 TABLET BY MOUTH EVERY DAY AS NEEDED 30 tablet 1    glycopyrrolate-formoterol (BEVESPI AEROSPHERE) 9-4.8 mcg HFAA Inhale 2 puffs into the lungs 2 (two) times daily. Controller 10.9 g 11    HYDROcodone-acetaminophen (NORCO) 5-325 mg per tablet Take 1 tablet by mouth every 6 (six) hours as needed for Pain. 60 tablet 0    ipratropium-albuteroL (COMBIVENT RESPIMAT)  mcg/actuation inhaler Inhale 1 puff into the lungs every 6 (six) hours as needed for Shortness of Breath. 4 g 11    levocetirizine (XYZAL) 5 MG tablet Take 5 mg by mouth as needed.       levothyroxine (SYNTHROID) 25 MCG tablet Take 1 tablet (25 mcg total) by mouth before breakfast. 30 tablet 11    lisinopriL (PRINIVIL,ZESTRIL) 40 MG tablet Take 1 tablet (40 mg total) by mouth once daily. 90 tablet 3    promethazine (PHENERGAN) 6.25 mg/5 mL syrup Take 20 mLs (25 mg total) by mouth nightly as needed. 240 mL 0    ranolazine (RANEXA) 500 MG Tb12 Take 1 tablet (500 mg total) by mouth 2 (two) times daily. 60 tablet 11    simvastatin (ZOCOR) 80 MG tablet Take 1 tablet (80 mg total) by mouth once daily. 90 tablet 3    sotaloL (SOTALOL AF) 80 MG tablet Take 1 tablet (80 mg total) by mouth 2 (two) times daily. 180 tablet 3    zolpidem (AMBIEN) 5 MG Tab Take 1 tablet (5 mg total) by mouth nightly as needed. 30 tablet 1     Current Facility-Administered Medications   Medication Dose Route Frequency Provider Last Rate Last Dose    testosterone cypionate injection 200 mg  200 mg Intramuscular Q21 Days Peter Teixeira MD   200 mg at 02/26/20 0937     Facility-Administered Medications Ordered in Other Visits   Medication Dose Route Frequency Provider Last Rate Last Dose    lactated ringers infusion   Intravenous Continuous Michael LÓPEZ  MD Zari   Stopped at 07/03/19 0810       Labs:  Lab Results   Component Value Date    WBC 6.24 06/16/2020    HGB 12.0 (L) 06/16/2020    HCT 38.7 (L) 06/16/2020    MCV 99 (H) 06/16/2020     06/16/2020     BMP  Lab Results   Component Value Date     06/16/2020    K 4.7 06/16/2020     06/16/2020    CO2 29 06/16/2020    BUN 35 (H) 06/16/2020    CREATININE 1.7 (H) 06/16/2020    CALCIUM 9.4 06/16/2020    ANIONGAP 8 06/16/2020    ESTGFRAFRICA 43 (A) 06/16/2020    EGFRNONAA 38 (A) 06/16/2020     Lab Results   Component Value Date    ALT 12 06/16/2020    AST 18 06/16/2020    ALKPHOS 51 (L) 06/16/2020    BILITOT 0.4 06/16/2020       No results found for: IRON, TIBC, FERRITIN, SATURATEDIRO  No results found for: [id number]  No results found for: FOLATE  Lab Results   Component Value Date    TSH 4.309 (H) 06/16/2020       I have reviewed the radiology reports and examined the scan/xray images.    Review of Systems   Constitutional: Negative.    HENT: Negative.    Eyes: Negative.    Respiratory: Negative.    Cardiovascular: Negative.    Gastrointestinal: Negative.    Endocrine: Negative.    Genitourinary: Negative.    Musculoskeletal: Negative.    Skin: Negative.    Allergic/Immunologic: Negative.    Neurological: Negative.    Hematological: Negative.    Psychiatric/Behavioral: Negative.      ECOG SCORE    0 - Fully active-able to carry on all pre-disease performance without restriction            Objective:     Vitals:    07/01/20 0855   BP: 104/60   Pulse: (!) 55   Resp: 16   Temp: 98.2 °F (36.8 °C)   Body mass index is 27.17 kg/m².  Physical Exam  Vitals signs and nursing note reviewed.   Constitutional:       Appearance: He is well-developed.   HENT:      Head: Normocephalic and atraumatic.   Eyes:      Conjunctiva/sclera: Conjunctivae normal.   Neck:      Musculoskeletal: Normal range of motion and neck supple.   Cardiovascular:      Rate and Rhythm: Normal rate and regular rhythm.   Pulmonary:       Effort: Pulmonary effort is normal.      Breath sounds: Normal breath sounds.   Abdominal:      General: Bowel sounds are normal.      Palpations: Abdomen is soft.   Musculoskeletal: Normal range of motion.   Skin:     General: Skin is warm and dry.   Neurological:      Mental Status: He is alert and oriented to person, place, and time.   Psychiatric:         Behavior: Behavior normal.         Thought Content: Thought content normal.         Judgment: Judgment normal.           Assessment:      1. Malignant neoplasm of upper lobe of left lung           Plan:     Malignant neoplasm of upper lobe of left lung  Continue on maintenance Imfinzi  -     CBC auto differential; Future; Expected date: 07/01/2020  -     Comprehensive metabolic panel; Future; Expected date: 07/01/2020  -     TSH; Future; Expected date: 07/01/2020  -     HYDROcodone-acetaminophen (NORCO) 5-325 mg per tablet; Take 1 tablet by mouth every 6 (six) hours as needed for Pain.  Dispense: 60 tablet; Refill: 0

## 2020-07-01 ENCOUNTER — INFUSION (OUTPATIENT)
Dept: INFUSION THERAPY | Facility: HOSPITAL | Age: 80
End: 2020-07-01
Attending: INTERNAL MEDICINE
Payer: MEDICARE

## 2020-07-01 ENCOUNTER — OFFICE VISIT (OUTPATIENT)
Dept: HEMATOLOGY/ONCOLOGY | Facility: CLINIC | Age: 80
End: 2020-07-01
Payer: MEDICARE

## 2020-07-01 VITALS
TEMPERATURE: 98 F | WEIGHT: 189.38 LBS | OXYGEN SATURATION: 98 % | DIASTOLIC BLOOD PRESSURE: 63 MMHG | HEIGHT: 70 IN | HEART RATE: 56 BPM | BODY MASS INDEX: 27.11 KG/M2 | SYSTOLIC BLOOD PRESSURE: 110 MMHG | RESPIRATION RATE: 18 BRPM

## 2020-07-01 VITALS
TEMPERATURE: 98 F | RESPIRATION RATE: 16 BRPM | SYSTOLIC BLOOD PRESSURE: 104 MMHG | BODY MASS INDEX: 27.17 KG/M2 | HEART RATE: 55 BPM | DIASTOLIC BLOOD PRESSURE: 60 MMHG | WEIGHT: 189.38 LBS

## 2020-07-01 DIAGNOSIS — C34.12 MALIGNANT NEOPLASM OF UPPER LOBE OF LEFT LUNG: Primary | ICD-10-CM

## 2020-07-01 PROCEDURE — 99215 PR OFFICE/OUTPT VISIT, EST, LEVL V, 40-54 MIN: ICD-10-PCS | Mod: S$GLB,,, | Performed by: INTERNAL MEDICINE

## 2020-07-01 PROCEDURE — 1126F PR PAIN SEVERITY QUANTIFIED, NO PAIN PRESENT: ICD-10-PCS | Mod: S$GLB,,, | Performed by: INTERNAL MEDICINE

## 2020-07-01 PROCEDURE — 25000003 PHARM REV CODE 250: Performed by: INTERNAL MEDICINE

## 2020-07-01 PROCEDURE — 63600175 PHARM REV CODE 636 W HCPCS: Performed by: INTERNAL MEDICINE

## 2020-07-01 PROCEDURE — 1159F MED LIST DOCD IN RCRD: CPT | Mod: S$GLB,,, | Performed by: INTERNAL MEDICINE

## 2020-07-01 PROCEDURE — 99999 PR PBB SHADOW E&M-EST. PATIENT-LVL III: CPT | Mod: PBBFAC,,, | Performed by: INTERNAL MEDICINE

## 2020-07-01 PROCEDURE — 99215 OFFICE O/P EST HI 40 MIN: CPT | Mod: S$GLB,,, | Performed by: INTERNAL MEDICINE

## 2020-07-01 PROCEDURE — 1159F PR MEDICATION LIST DOCUMENTED IN MEDICAL RECORD: ICD-10-PCS | Mod: S$GLB,,, | Performed by: INTERNAL MEDICINE

## 2020-07-01 PROCEDURE — 3074F PR MOST RECENT SYSTOLIC BLOOD PRESSURE < 130 MM HG: ICD-10-PCS | Mod: CPTII,S$GLB,, | Performed by: INTERNAL MEDICINE

## 2020-07-01 PROCEDURE — A4216 STERILE WATER/SALINE, 10 ML: HCPCS | Performed by: INTERNAL MEDICINE

## 2020-07-01 PROCEDURE — 1126F AMNT PAIN NOTED NONE PRSNT: CPT | Mod: S$GLB,,, | Performed by: INTERNAL MEDICINE

## 2020-07-01 PROCEDURE — 96413 CHEMO IV INFUSION 1 HR: CPT

## 2020-07-01 PROCEDURE — 3074F SYST BP LT 130 MM HG: CPT | Mod: CPTII,S$GLB,, | Performed by: INTERNAL MEDICINE

## 2020-07-01 PROCEDURE — 3078F PR MOST RECENT DIASTOLIC BLOOD PRESSURE < 80 MM HG: ICD-10-PCS | Mod: CPTII,S$GLB,, | Performed by: INTERNAL MEDICINE

## 2020-07-01 PROCEDURE — 1101F PT FALLS ASSESS-DOCD LE1/YR: CPT | Mod: CPTII,S$GLB,, | Performed by: INTERNAL MEDICINE

## 2020-07-01 PROCEDURE — 3078F DIAST BP <80 MM HG: CPT | Mod: CPTII,S$GLB,, | Performed by: INTERNAL MEDICINE

## 2020-07-01 PROCEDURE — 99999 PR PBB SHADOW E&M-EST. PATIENT-LVL III: ICD-10-PCS | Mod: PBBFAC,,, | Performed by: INTERNAL MEDICINE

## 2020-07-01 PROCEDURE — 1101F PR PT FALLS ASSESS DOC 0-1 FALLS W/OUT INJ PAST YR: ICD-10-PCS | Mod: CPTII,S$GLB,, | Performed by: INTERNAL MEDICINE

## 2020-07-01 RX ORDER — HYDROCODONE BITARTRATE AND ACETAMINOPHEN 5; 325 MG/1; MG/1
1 TABLET ORAL EVERY 6 HOURS PRN
Qty: 60 TABLET | Refills: 0 | Status: SHIPPED | OUTPATIENT
Start: 2020-07-01 | End: 2020-10-06 | Stop reason: SDUPTHER

## 2020-07-01 RX ORDER — SODIUM CHLORIDE 0.9 % (FLUSH) 0.9 %
10 SYRINGE (ML) INJECTION
Status: DISCONTINUED | OUTPATIENT
Start: 2020-07-01 | End: 2020-07-01 | Stop reason: HOSPADM

## 2020-07-01 RX ORDER — HEPARIN 100 UNIT/ML
500 SYRINGE INTRAVENOUS
Status: CANCELLED | OUTPATIENT
Start: 2020-07-01

## 2020-07-01 RX ORDER — HEPARIN 100 UNIT/ML
500 SYRINGE INTRAVENOUS
Status: DISCONTINUED | OUTPATIENT
Start: 2020-07-01 | End: 2020-07-01 | Stop reason: HOSPADM

## 2020-07-01 RX ORDER — SODIUM CHLORIDE 0.9 % (FLUSH) 0.9 %
10 SYRINGE (ML) INJECTION
Status: CANCELLED | OUTPATIENT
Start: 2020-07-01

## 2020-07-01 RX ADMIN — HEPARIN 500 UNITS: 100 SYRINGE at 10:07

## 2020-07-01 RX ADMIN — SODIUM CHLORIDE, PRESERVATIVE FREE 10 ML: 5 INJECTION INTRAVENOUS at 10:07

## 2020-07-01 RX ADMIN — DURVALUMAB 860 MG: 500 INJECTION, SOLUTION INTRAVENOUS at 09:07

## 2020-07-01 RX ADMIN — SODIUM CHLORIDE: 9 INJECTION, SOLUTION INTRAVENOUS at 09:07

## 2020-07-01 NOTE — PLAN OF CARE
Problem: Adult Inpatient Plan of Care  Goal: Plan of Care Review  Outcome: Ongoing, Progressing  Flowsheets (Taken 7/1/2020 0953)  Plan of Care Reviewed With: patient  Goal: Patient-Specific Goal (Individualization)  Outcome: Ongoing, Progressing  Goal: Absence of Hospital-Acquired Illness or Injury  Outcome: Ongoing, Progressing  Goal: Optimal Comfort and Wellbeing  Outcome: Ongoing, Progressing  Goal: Readiness for Transition of Care  Outcome: Ongoing, Progressing  Goal: Rounds/Family Conference  Outcome: Ongoing, Progressing   Pt reported feeling great and no complaints. He stated feeling a little tired. He denies any pain or discomfort .

## 2020-07-01 NOTE — DISCHARGE INSTRUCTIONS
[facility]  [address] [address]  [address]  [phone] phone     [phone] fax  Hours of Operation: Monday- Friday 8:00am- 5:00pm  After hours phone  [phone]  Hematology / Oncology Physicians on call      JA [address] Dr., Dr., Dr., Dr., NP Sydney Prescott, NP Tyesha Taylor, NP    Please call with any concerns regarding your appointment today.FALL PREVENTION   Falls often occur due to slipping, tripping or losing your balance. Here are ways to reduce your risk of falling again.   Was there anything that caused your fall that can be fixed, removed or replaced?   Make your home safe by keeping walkways clear of objects you may trip over.   Use non-slip pads under rugs.   Do not walk in poorly lit areas.   Do not stand on chairs or wobbly ladders.   Use caution when reaching overhead or looking upward. This position can cause a loss of balance.   Be sure your shoes fit properly, have non-slip bottoms and are in good condition.   Be cautious when going up and down stairs, curbs, and when walking on uneven sidewalks.   If your balance is poor, consider using a cane or walker.   If your fall was related to alcohol use, stop or limit alcohol intake.   If your fall was related to use of sleeping medicines, talk to your doctor about this. You may need to reduce your dosage at bedtime if you awaken during the night to go to the bathroom.   To reduce the need for nighttime bathroom trips:   Avoid drinking fluids for several hours before going to bed   Empty your bladder before going to bed   Men can keep a urinal at the bedside   © 4682-9778 [employer], [address] [place] [address], [place] [address]. All rights reserved. This information is not intended as a substitute for professional medical care. Always follow your healthcare professional's instructions.  WAYS TO HELP PREVENT  "INFECTION         WASH YOUR HANDS OFTEN DURING THE DAY, ESPECIALLY BEFORE YOU EAT, AFTER USING THE BATHROOM, AND AFTER TOUCHING ANIMALS     STAY AWAY FROM PEOPLE WHO HAVE ILLNESSES YOU CAN CATCH; SUCH AS COLDS, FLU, CHICKEN POX     TRY TO AVOID CROWDS     STAY AWAY FROM CHILDREN WHO RECENTLY HAVE RECEIVED LIVE VIRUS VACCINES     MAINTAIN GOOD MOUTH CARE     DO NOT SQUEEZE OR SCRATCH PIMPLES     CLEAN CUTS & SCRAPES RIGHT AWAY AND DAILY UNTIL HEALED WITH WARM WATER, SOAP & AN ANTISEPTIC     AVOID CONTACT WITH LITTER BOXES, BIRD CAGES, & FISH TANKS     AVOID STANDING WATER, IE., BIRD BATHS, FLOWER POTS/VASES, OR HUMIDIFIERS     WEAR GLOVES WHEN GARDENING OR CLEANING UP AFTER OTHERS, ESPECIALLY BABIES & SMALL CHILDREN    DO NOT EAT RAW FISH, SEAFOOD, MEAT, OR EGGSWAYS TO HELP PREVENT INFECTION        WASH YOUR HANDS OFTEN DURING THE DAY, ESPECIALLY BEFORE YOU EAT, AFTER USING THE BATHROOM, AND AFTER TOUCHING ANIMALS    STAY AWAY FROM PEOPLE WHO HAVE ILLNESSES YOU CAN CATCH; SUCH AS COLDS, FLU, CHICKEN POX    TRY TO AVOID CROWDS    STAY AWAY FROM CHILDREN WHO RECENTLY HAVE RECEIVED LIVE VIRUS VACCINES    MAINTAIN GOOD MOUTH CARE    DO NOT SQUEEZE OR SCRATCH PIMPLES    CLEAN CUTS & SCRAPES RIGHT AWAY AND DAILY UNTIL HEALED WITH WARM WATER, SOAP & AN ANTISEPTIC    AVOID CONTACT WITH LITTER BOXES, BIRD CAGES, & FISH TANKS    AVOID STANDING WATER, IE., BIRD BATHS, FLOWER POTS/VASES, OR HUMIDIFIERS    WEAR GLOVES WHEN GARDENING OR CLEANING UP AFTER OTHERS, ESPECIALLY BABIES & SMALL CHILDREN    DO NOT EAT RAW FISH, SEAFOOD, MEAT, OR EGGSSupport Groups/Classes    Support groups and classes are being offered at the   [facility] and [facility]!!    "Cooking with Cancer" (Nutrition Class):  Second Wednesday of each month   at noon at the Cancer Center.  Metastatic Support Group:  Third Tuesday of each month   at noon at the Cancer Center.  Next Steps Class/Group: Second and fourth Thursday of each month at " noon at the Cancer Center.  [facility] (Breast Cancer Support Group): First Tuesday of each month   at 5:30pm at the [facility] location.  [facility]: [facility]: Second and third Tuesday of each month from 7:30am - 2pm.  [facility]: First and fourth Tuesday of each month from 7:30am - 2pm     If you are interested in attending or would like more information please ask our social workers or your nurse!

## 2020-07-13 NOTE — PROGRESS NOTES
Subjective:       Patient ID: Chai Murry is a 80 y.o. male.    Chief Complaint: Malignant neoplasm of upper lobe of left lung [C34.12]  HPI: We have an opportunity to see Mr. Chai Murry in Hematology Oncology clinic at [facility] on 07/13/2020.  Mr. Chai Murry is a 80 y.o.  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   He is s/p left pneumonectomy for a squamous cell carcinoma of the left lung.0n 4/12/2016   Prior to the surgery, the PET scan showed an FDG-avid mass in the left upper   lobe abutting the left hilum with an SUV of 11.6. There seemed to be a left   hilar adenopathy as well.   Radiologist felt that he was unable to discriminate between the mass and the   lymphadenopathy. The patient had had a bronchoscopy by Dr. Roel Ernandez on   03/04/2006 that showed a partially obstructing airway in the anterior segment of   the left upper lobe with an endobronchial lesion in the anterior segment of the   left upper lobe. The specimen from the biopsy at the time of bronchoscopy was   reported as being a squamous cell carcinoma.   At the time of the surgery after the left lung was removed, the pathologist   indicated that this was a squamous cell carcinoma. It measured 3.8 cm. The   pathology indicated that this is extending into the bronchial resection margin of the lobe. This lobe was later on removed to complete the pneumonectomy   There was one lymph node positive for metastatic squamous cell carcinoma at the   AP window.   The patient was told that we would prefer to treat him with post-op simultaneous chemo/radiation.  He had Medi-port. He started chemo/radiation therapy andreceived 6 weekly cycles of carbo/Taxol along with radiatioon.   After a month, he had a Ct scan and it showed stability   He then had 2 additional cyles of carbo/Taxol finishing in 9/27/2016.   He comes for follow up.   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 CT of the chest was non contributory, shows changes from surgery   PET showed a PET avid mass to the left of the trachea.   The case was discussed with dr annika Goldman and GI.   We discussed the possibility of doing a EUS or EBUs, and finally, because of the localization, it was decided to do a EUS with biopsy if possible.   Cytology shows recurrent squamous cell cancer.   I have asked Pathology to run a PD-L1 from his original pathology from 2016.   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   He has been discussed extensively with radiation oncology. We have decided to treat him with radiation therapy and Cisplatin as a chemo-sensitizer.   Dr Castro has reviewed the therapy ports from the previous radiation treatment and the area in question seems to be above the previously radiated fields.   Completed chemoradiation s/p 6 weekly cisplatin treatments with response. Currently on maintenance Imfinzi. Reports had dizziness when gets up at night, attributes to restoril.    Oncology History   Malignant neoplasm of upper lobe of left lung   4/12/2016 Initial Diagnosis    Malignant neoplasm of upper lobe of left lung     6/19/2019 Cancer Staged    Staging form: Lung, AJCC 8th Edition  - Clinical stage from 6/19/2019: Stage IIIB (rcT4, cN2, cM0) - Signed by Alejandro Castro II, MD on 6/19/2019 6/21/2019 Tumor Conference    Presenting Hospital / Clinic: [facility]  Virtual Tumor Board Conference: In person  Presenter: Dr. Chance Castro  Date Presented to Tumor Board: 06/21/19  Specialties Present: Medical Oncology;Radiation Oncology;Surgical Oncology;Pathology;Navigation;Plastic Surgery;Radiology;Gastrointestinal;Pulmonology  Presentation at Cancer Conference: Prospective  Cancer Type: Lung cancer  Recommended Plan: Additional screening  Recommended Plan Note: If PDL-1 positive, treat with immunotherapy  Send tissue for next  generation sequencing.     6/28/2019 Tumor Conference    His case was discussed at the Multidisciplinary Head and Neck Team Planning Meeting.    Representatives from Medical Oncology, Radiation Oncology, Head and Neck Surgical Oncology, Psychosocial Oncology, and Speech and Language Pathology discussed the case with the following recommendations:    1) treat lung cancer            7/5/2019 - 8/14/2019 Radiation Therapy    Treatment Site Ref. ID Energy Dose/Fx (Gy) #Fx Dose Correction (Gy) Total Dose (Gy) Start Date End Date Elapsed Days   [id number]:1 PTV LNs 6X 1.8 28 / 28 0 50.4 7/5/2019 8/14/2019 40          3/10/2020 -  Chemotherapy    Treatment Summary   Plan Name: OP DURVALUMAB Q2W  Treatment Goal: Maintenance  Status: Active  Start Date: 3/10/2020  End Date: 9/8/2020 (Planned)  Provider: Fermin Vila MD  Chemotherapy: durvalumab (IMFINZI) 885 mg in sodium chloride 0.9% 267.7 mL chemo infusion, 10 mg/kg = 885 mg, Intravenous, Clinic/HOD 1 time, 9 of 14 cycles  Administration: 885 mg (3/10/2020), 860 mg (3/24/2020), 860 mg (4/7/2020), 860 mg (4/21/2020), 885 mg (5/5/2020), 860 mg (5/19/2020), 865 mg (6/2/2020), 860 mg (6/16/2020), 860 mg (7/1/2020)       Past Medical History:   Diagnosis Date    Anemia     Arthritis     Atrial fibrillation     CAD (coronary artery disease)     Cataract     COPD (chronic obstructive pulmonary disease)     Diverticulosis     ED (erectile dysfunction)     HTN (hypertension)     Hyperlipidemia     Lung cancer     left    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 Courtney dwyer bx     Family History   Problem Relation Age of Onset    Esophageal cancer Sister     Cancer Sister     Lung cancer Mother     Cancer Mother     Cataracts Mother     Hypertension Mother     Pneumonia Father     Cataracts Father     Hypertension Father     Cancer Brother     Hypertension Brother     Blindness Neg Hx     Diabetes Neg Hx      Glaucoma Neg Hx     Macular degeneration Neg Hx     Retinal detachment Neg Hx     Strabismus Neg Hx     Stroke Neg Hx     Thyroid disease Neg Hx      Social History     Socioeconomic History    Marital status:      Spouse name: Not on file    Number of children: 3    Years of education: Not on file    Highest education level: Not on file   Occupational History    Occupation: retired   Social Needs    Financial resource strain: Not on file    Food insecurity     Worry: Not on file     Inability: Not on file    Transportation needs     Medical: Not on file     Non-medical: Not on file   Tobacco Use    Smoking status: Former Smoker     Packs/day: 1.00     Years: 50.00     Pack years: 50.00     Quit date: 2005     Years since quittin.9    Smokeless tobacco: Never Used   Substance and Sexual Activity    Alcohol use: No    Drug use: No    Sexual activity: Not Currently   Lifestyle    Physical activity     Days per week: Not on file     Minutes per session: Not on file    Stress: Not on file   Relationships    Social connections     Talks on phone: Not on file     Gets together: Not on file     Attends Jain service: Not on file     Active member of club or organization: Not on file     Attends meetings of clubs or organizations: Not on file     Relationship status: Not on file   Other Topics Concern    Not on file   Social History Narrative    Not on file     Past Surgical History:   Procedure Laterality Date    APPENDECTOMY      BRONCHOSCOPY Bilateral 2019    Procedure: Bronchoscopy;  Surgeon: Paresh Goldman MD;  Location: [facility] ENDO;  Service: Endoscopy;  Laterality: Bilateral;    CARDIAC CATHETERIZATION      cardiac stents      CATARACT EXTRACTION W/  INTRAOCULAR LENS IMPLANT Right 9-3-14    CHOLECYSTECTOMY      COLONOSCOPY N/A 2018    Procedure: COLONOSCOPY;  Surgeon: Dionne Doan MD;  Location: [facility] ENDO;  Service: Endoscopy;  Laterality: N/A;     COLONOSCOPY N/A 10/25/2018    Procedure: COLONOSCOPY;  Surgeon: Michael Hameed MD;  Location: [facility] ENDO;  Service: Endoscopy;  Laterality: N/A;    ENDOSCOPIC ULTRASOUND OF UPPER GASTROINTESTINAL TRACT N/A 6/11/2019    Procedure: ULTRASOUND, UPPER GI TRACT, ENDOSCOPIC;  Surgeon: Abhishek Knox MD;  Location: [facility] ENDO;  Service: Endoscopy;  Laterality: N/A;    ESOPHAGOGASTRODUODENOSCOPY N/A 6/11/2019    Procedure: EGD (ESOPHAGOGASTRODUODENOSCOPY);  Surgeon: Abhishek Knox MD;  Location: [facility] ENDO;  Service: Endoscopy;  Laterality: N/A;    infected stomach gland excision      INSERTION OF TUNNELED CENTRAL VENOUS CATHETER (CVC) WITH SUBCUTANEOUS PORT Right 7/3/2019    Procedure: [id number];  Surgeon: Jean Carlos Constantino MD;  Location: [facility] OR;  Service: General;  Laterality: Right;    LUNG REMOVAL, TOTAL Left 04/2016    lung cancer left upper lobe    SKIN BIOPSY Left     arm     Current Outpatient Medications   Medication Sig Dispense Refill    albuterol (PROVENTIL) 2.5 mg /3 mL (0.083 %) nebulizer solution Take 3 mLs (2.5 mg total) by nebulization every 6 (six) hours while awake. 270 mL 11    amLODIPine (NORVASC) 5 MG tablet TAKE 1 TABLET BY MOUTH EVERYDAY AT BEDTIME 90 tablet 3    apixaban (ELIQUIS) 5 mg Tab Take 1 tablet (5 mg total) by mouth 2 (two) times daily. 60 tablet 5    apixaban (ELIQUIS) 5 mg Tab Take 1 tablet (5 mg total) by mouth 2 (two) times daily. 60 tablet 5    apixaban (ELIQUIS) 5 mg Tab Take 1 tablet (5 mg total) by mouth 2 (two) times daily. 60 tablet 5    aspirin (ECOTRIN) 81 MG EC tablet Take 81 mg by mouth once daily.      benzonatate (TESSALON) 200 MG capsule Take 1 capsule (200 mg total) by mouth 3 (three) times daily as needed for Cough. 21 capsule 0    carbamide peroxide (DEBROX) 6.5 % otic solution Place 5 drops into the left ear 2 (two) times daily. 15 mL 2    fenofibric acid (FIBRICOR) 135 mg CpDR Take 1 capsule (135 mg total) by mouth once daily. 90  capsule 3    fluticasone (FLONASE) 50 mcg/actuation nasal spray 2 sprays (100 mcg total) by Each Nare route once daily. (Patient taking differently: 2 sprays by Each Nare route as needed. ) 3 Bottle 3    furosemide (LASIX) 20 MG tablet TAKE 1 TABLET BY MOUTH EVERY DAY AS NEEDED 30 tablet 1    glycopyrrolate-formoterol (BEVESPI AEROSPHERE) 9-4.8 mcg HFAA Inhale 2 puffs into the lungs 2 (two) times daily. Controller 10.9 g 11    HYDROcodone-acetaminophen (NORCO) 5-325 mg per tablet Take 1 tablet by mouth every 6 (six) hours as needed for Pain. 60 tablet 0    ipratropium-albuteroL (COMBIVENT RESPIMAT)  mcg/actuation inhaler Inhale 1 puff into the lungs every 6 (six) hours as needed for Shortness of Breath. 4 g 11    levocetirizine (XYZAL) 5 MG tablet Take 5 mg by mouth as needed.       levothyroxine (SYNTHROID) 25 MCG tablet Take 1 tablet (25 mcg total) by mouth before breakfast. 30 tablet 11    lisinopriL (PRINIVIL,ZESTRIL) 40 MG tablet Take 1 tablet (40 mg total) by mouth once daily. 90 tablet 3    promethazine (PHENERGAN) 6.25 mg/5 mL syrup Take 20 mLs (25 mg total) by mouth nightly as needed. 240 mL 0    ranolazine (RANEXA) 500 MG Tb12 Take 1 tablet (500 mg total) by mouth 2 (two) times daily. 60 tablet 11    simvastatin (ZOCOR) 80 MG tablet Take 1 tablet (80 mg total) by mouth once daily. 90 tablet 3    sotaloL (SOTALOL AF) 80 MG tablet Take 1 tablet (80 mg total) by mouth 2 (two) times daily. 180 tablet 3    zolpidem (AMBIEN) 5 MG Tab Take 1 tablet (5 mg total) by mouth nightly as needed. 30 tablet 1     No current facility-administered medications for this visit.      Facility-Administered Medications Ordered in Other Visits   Medication Dose Route Frequency Provider Last Rate Last Dose    lactated ringers infusion   Intravenous Continuous Michael Hameed MD   Stopped at 07/03/19 0810       Labs:  Lab Results   Component Value Date    WBC 7.01 07/01/2020    HGB 12.1 (L) 07/01/2020    HCT  39.3 (L) 07/01/2020     (H) 07/01/2020     07/01/2020     BMP  Lab Results   Component Value Date     07/01/2020    K 4.9 07/01/2020     07/01/2020    CO2 29 07/01/2020    BUN 26 (H) 07/01/2020    CREATININE 1.6 (H) 07/01/2020    CALCIUM 9.6 07/01/2020    ANIONGAP 8 07/01/2020    ESTGFRAFRICA 47 (A) 07/01/2020    EGFRNONAA 40 (A) 07/01/2020     Lab Results   Component Value Date    ALT 15 07/01/2020    AST 16 07/01/2020    ALKPHOS 52 (L) 07/01/2020    BILITOT 0.3 07/01/2020       No results found for: IRON, TIBC, FERRITIN, SATURATEDIRO  No results found for: [id number]  No results found for: FOLATE  Lab Results   Component Value Date    TSH 4.738 (H) 07/01/2020       I have reviewed the radiology reports and examined the scan/xray images.    Review of Systems   Constitutional: Negative.    HENT: Negative.    Eyes: Negative.    Respiratory: Negative.    Cardiovascular: Negative.    Gastrointestinal: Negative.    Endocrine: Negative.    Genitourinary: Negative.    Musculoskeletal: Negative.    Skin: Negative.    Allergic/Immunologic: Negative.    Neurological: Negative.    Hematological: Negative.    Psychiatric/Behavioral: Negative.      ECOG SCORE    0 - Fully active-able to carry on all pre-disease performance without restriction            Objective:     Vitals:    07/14/20 0856   BP: 97/63   Pulse: 63   Temp: 96.8 °F (36 °C)   Body mass index is 27.77 kg/m².  Physical Exam  Vitals signs and nursing note reviewed.   Constitutional:       Appearance: He is well-developed.   HENT:      Head: Normocephalic and atraumatic.   Eyes:      Conjunctiva/sclera: Conjunctivae normal.   Neck:      Musculoskeletal: Normal range of motion and neck supple.   Cardiovascular:      Rate and Rhythm: Normal rate and regular rhythm.   Pulmonary:      Effort: Pulmonary effort is normal.      Breath sounds: Normal breath sounds.   Abdominal:      General: Bowel sounds are normal.      Palpations: Abdomen is  soft.   Musculoskeletal: Normal range of motion.   Skin:     General: Skin is warm and dry.   Neurological:      Mental Status: He is alert and oriented to person, place, and time.   Psychiatric:         Behavior: Behavior normal.         Thought Content: Thought content normal.         Judgment: Judgment normal.           Assessment:      1. Malignant neoplasm of upper lobe of left lung    2. Dizziness    3. Palpitations           Plan:     Malignant neoplasm of upper lobe of left lung  Continue on Imfinzi  -     Comprehensive metabolic panel; Future; Expected date: 07/28/2020  -     CBC auto differential; Future; Expected date: 07/28/2020  -     TSH; Future; Expected date: 07/28/2020    Dizziness  -     EKG 12-lead; Future; Expected date: 07/14/2020  -     Echo Color Flow Doppler? Yes; Future  -     Ambulatory referral/consult to Cardiology; Future; Expected date: 07/21/2020    Palpitations  -     EKG 12-lead; Future; Expected date: 07/14/2020  -     Echo Color Flow Doppler? Yes; Future  -     Ambulatory referral/consult to Cardiology; Future; Expected date: 07/21/2020

## 2020-07-14 ENCOUNTER — OFFICE VISIT (OUTPATIENT)
Dept: CARDIOLOGY | Facility: CLINIC | Age: 80
End: 2020-07-14
Payer: MEDICARE

## 2020-07-14 ENCOUNTER — HOSPITAL ENCOUNTER (OUTPATIENT)
Dept: CARDIOLOGY | Facility: HOSPITAL | Age: 80
Discharge: HOME | End: 2020-07-14
Attending: INTERNAL MEDICINE
Payer: MEDICARE

## 2020-07-14 ENCOUNTER — OFFICE VISIT (OUTPATIENT)
Dept: HEMATOLOGY/ONCOLOGY | Facility: CLINIC | Age: 80
End: 2020-07-14
Payer: MEDICARE

## 2020-07-14 ENCOUNTER — INFUSION (OUTPATIENT)
Dept: INFUSION THERAPY | Facility: HOSPITAL | Age: 80
End: 2020-07-14
Attending: INTERNAL MEDICINE
Payer: MEDICARE

## 2020-07-14 VITALS
OXYGEN SATURATION: 96 % | WEIGHT: 195.56 LBS | SYSTOLIC BLOOD PRESSURE: 98 MMHG | DIASTOLIC BLOOD PRESSURE: 64 MMHG | HEART RATE: 62 BPM | BODY MASS INDEX: 28.06 KG/M2

## 2020-07-14 VITALS
DIASTOLIC BLOOD PRESSURE: 57 MMHG | TEMPERATURE: 97 F | SYSTOLIC BLOOD PRESSURE: 101 MMHG | HEART RATE: 58 BPM | OXYGEN SATURATION: 94 % | RESPIRATION RATE: 18 BRPM | WEIGHT: 193 LBS | BODY MASS INDEX: 27.63 KG/M2 | HEIGHT: 70 IN

## 2020-07-14 VITALS
HEIGHT: 70 IN | OXYGEN SATURATION: 96 % | HEART RATE: 63 BPM | TEMPERATURE: 97 F | SYSTOLIC BLOOD PRESSURE: 97 MMHG | WEIGHT: 193.56 LBS | BODY MASS INDEX: 27.71 KG/M2 | DIASTOLIC BLOOD PRESSURE: 63 MMHG

## 2020-07-14 DIAGNOSIS — R42 DIZZINESS: ICD-10-CM

## 2020-07-14 DIAGNOSIS — I48.0 PAROXYSMAL ATRIAL FIBRILLATION: ICD-10-CM

## 2020-07-14 DIAGNOSIS — I25.118 CORONARY ARTERY DISEASE OF NATIVE ARTERY OF NATIVE HEART WITH STABLE ANGINA PECTORIS: Primary | ICD-10-CM

## 2020-07-14 DIAGNOSIS — C34.12 MALIGNANT NEOPLASM OF UPPER LOBE OF LEFT LUNG: Primary | ICD-10-CM

## 2020-07-14 DIAGNOSIS — R00.2 PALPITATIONS: ICD-10-CM

## 2020-07-14 DIAGNOSIS — I73.9 PVD (PERIPHERAL VASCULAR DISEASE): ICD-10-CM

## 2020-07-14 DIAGNOSIS — E78.00 PURE HYPERCHOLESTEROLEMIA: ICD-10-CM

## 2020-07-14 DIAGNOSIS — I10 ESSENTIAL HYPERTENSION: ICD-10-CM

## 2020-07-14 PROCEDURE — 99999 PR PBB SHADOW E&M-EST. PATIENT-LVL IV: CPT | Mod: PBBFAC,,, | Performed by: INTERNAL MEDICINE

## 2020-07-14 PROCEDURE — 1126F PR PAIN SEVERITY QUANTIFIED, NO PAIN PRESENT: ICD-10-PCS | Mod: S$GLB,,, | Performed by: INTERNAL MEDICINE

## 2020-07-14 PROCEDURE — 3074F SYST BP LT 130 MM HG: CPT | Mod: CPTII,S$GLB,, | Performed by: INTERNAL MEDICINE

## 2020-07-14 PROCEDURE — 1126F AMNT PAIN NOTED NONE PRSNT: CPT | Mod: S$GLB,,, | Performed by: INTERNAL MEDICINE

## 2020-07-14 PROCEDURE — 1101F PT FALLS ASSESS-DOCD LE1/YR: CPT | Mod: CPTII,S$GLB,, | Performed by: INTERNAL MEDICINE

## 2020-07-14 PROCEDURE — 99999 PR PBB SHADOW E&M-EST. PATIENT-LVL IV: ICD-10-PCS | Mod: PBBFAC,,, | Performed by: INTERNAL MEDICINE

## 2020-07-14 PROCEDURE — 99214 OFFICE O/P EST MOD 30 MIN: CPT | Mod: S$GLB,,, | Performed by: INTERNAL MEDICINE

## 2020-07-14 PROCEDURE — 3078F DIAST BP <80 MM HG: CPT | Mod: CPTII,S$GLB,, | Performed by: INTERNAL MEDICINE

## 2020-07-14 PROCEDURE — 1101F PR PT FALLS ASSESS DOC 0-1 FALLS W/OUT INJ PAST YR: ICD-10-PCS | Mod: CPTII,S$GLB,, | Performed by: INTERNAL MEDICINE

## 2020-07-14 PROCEDURE — 99215 OFFICE O/P EST HI 40 MIN: CPT | Mod: 25,S$GLB,, | Performed by: INTERNAL MEDICINE

## 2020-07-14 PROCEDURE — 1159F PR MEDICATION LIST DOCUMENTED IN MEDICAL RECORD: ICD-10-PCS | Mod: S$GLB,,, | Performed by: INTERNAL MEDICINE

## 2020-07-14 PROCEDURE — 3078F PR MOST RECENT DIASTOLIC BLOOD PRESSURE < 80 MM HG: ICD-10-PCS | Mod: CPTII,S$GLB,, | Performed by: INTERNAL MEDICINE

## 2020-07-14 PROCEDURE — 25000003 PHARM REV CODE 250: Performed by: INTERNAL MEDICINE

## 2020-07-14 PROCEDURE — 63600175 PHARM REV CODE 636 W HCPCS: Performed by: INTERNAL MEDICINE

## 2020-07-14 PROCEDURE — 3074F PR MOST RECENT SYSTOLIC BLOOD PRESSURE < 130 MM HG: ICD-10-PCS | Mod: CPTII,S$GLB,, | Performed by: INTERNAL MEDICINE

## 2020-07-14 PROCEDURE — 93010 EKG 12-LEAD: ICD-10-PCS | Mod: ,,, | Performed by: INTERNAL MEDICINE

## 2020-07-14 PROCEDURE — 1159F MED LIST DOCD IN RCRD: CPT | Mod: S$GLB,,, | Performed by: INTERNAL MEDICINE

## 2020-07-14 PROCEDURE — 99215 PR OFFICE/OUTPT VISIT, EST, LEVL V, 40-54 MIN: ICD-10-PCS | Mod: 25,S$GLB,, | Performed by: INTERNAL MEDICINE

## 2020-07-14 PROCEDURE — 93005 ELECTROCARDIOGRAM TRACING: CPT

## 2020-07-14 PROCEDURE — 96413 CHEMO IV INFUSION 1 HR: CPT

## 2020-07-14 PROCEDURE — 99214 PR OFFICE/OUTPT VISIT, EST, LEVL IV, 30-39 MIN: ICD-10-PCS | Mod: S$GLB,,, | Performed by: INTERNAL MEDICINE

## 2020-07-14 PROCEDURE — 93010 ELECTROCARDIOGRAM REPORT: CPT | Mod: ,,, | Performed by: INTERNAL MEDICINE

## 2020-07-14 RX ORDER — SODIUM CHLORIDE 0.9 % (FLUSH) 0.9 %
10 SYRINGE (ML) INJECTION
Status: CANCELLED | OUTPATIENT
Start: 2020-07-14

## 2020-07-14 RX ORDER — LISINOPRIL 10 MG/1
10 TABLET ORAL DAILY
Qty: 90 TABLET | Refills: 3 | Status: SHIPPED | OUTPATIENT
Start: 2020-07-14 | End: 2020-07-30

## 2020-07-14 RX ORDER — HEPARIN 100 UNIT/ML
500 SYRINGE INTRAVENOUS
Status: CANCELLED | OUTPATIENT
Start: 2020-07-14

## 2020-07-14 RX ORDER — HEPARIN 100 UNIT/ML
500 SYRINGE INTRAVENOUS
Status: DISCONTINUED | OUTPATIENT
Start: 2020-07-14 | End: 2020-07-14 | Stop reason: HOSPADM

## 2020-07-14 RX ADMIN — DURVALUMAB 860 MG: 500 INJECTION, SOLUTION INTRAVENOUS at 09:07

## 2020-07-14 RX ADMIN — HEPARIN 500 UNITS: 100 SYRINGE at 10:07

## 2020-07-14 NOTE — DISCHARGE INSTRUCTIONS
.[facility]  [address] [address]  [address]  [phone] phone     [phone] fax  Hours of Operation: Monday- Friday 8:00am- 5:00pm  After hours phone  [phone]  Hematology / Oncology Physicians on call      Dr. Jose Fermin, JA [place] NP Tyesha Taylor, NP    Please call with any concerns regarding your appointment today.  .FALL PREVENTION   Falls often occur due to slipping, tripping or losing your balance. Here are ways to reduce your risk of falling again.   Was there anything that caused your fall that can be fixed, removed or replaced?   Make your home safe by keeping walkways clear of objects you may trip over.   Use non-slip pads under rugs.   Do not walk in poorly lit areas.   Do not stand on chairs or wobbly ladders.   Use caution when reaching overhead or looking upward. This position can cause a loss of balance.   Be sure your shoes fit properly, have non-slip bottoms and are in good condition.   Be cautious when going up and down stairs, curbs, and when walking on uneven sidewalks.   If your balance is poor, consider using a cane or walker.   If your fall was related to alcohol use, stop or limit alcohol intake.   If your fall was related to use of sleeping medicines, talk to your doctor about this. You may need to reduce your dosage at bedtime if you awaken during the night to go to the bathroom.   To reduce the need for nighttime bathroom trips:   Avoid drinking fluids for several hours before going to bed   Empty your bladder before going to bed   Men can keep a urinal at the bedside   © 7977-8964 [facility] [facility], [address] [place] [address], [place] [address]. All rights reserved. This information is not intended as a substitute for professional medical care. Always follow your healthcare professional's instructions.    .WAYS TO HELP PREVENT  INFECTION         WASH YOUR HANDS OFTEN DURING THE DAY, ESPECIALLY BEFORE YOU EAT, AFTER USING THE BATHROOM, AND AFTER TOUCHING ANIMALS     STAY AWAY FROM PEOPLE WHO HAVE ILLNESSES YOU CAN CATCH; SUCH AS COLDS, FLU, CHICKEN POX     TRY TO AVOID CROWDS     STAY AWAY FROM CHILDREN WHO RECENTLY HAVE RECEIVED LIVE VIRUS VACCINES     MAINTAIN GOOD MOUTH CARE     DO NOT SQUEEZE OR SCRATCH PIMPLES     CLEAN CUTS & SCRAPES RIGHT AWAY AND DAILY UNTIL HEALED WITH WARM WATER, SOAP & AN ANTISEPTIC     AVOID CONTACT WITH LITTER BOXES, BIRD CAGES, & FISH TANKS     AVOID STANDING WATER, IE., BIRD BATHS, FLOWER POTS/VASES, OR HUMIDIFIERS     WEAR GLOVES WHEN GARDENING OR CLEANING UP AFTER OTHERS, ESPECIALLY BABIES & SMALL CHILDREN     DO NOT EAT RAW FISH, SEAFOOD, MEAT, OR EGGS  .HOME CARE AFTER CHEMOTHERAPY   Meals   Many patients feel sick and lose their appetites during treatment. Eat small meals several times a day. Choose bland foods with little taste or smell if you have problems with nausea. Be sure to cook all food thoroughly. This kills bacteria and helps you avoid intestinal infection. Soft foods are easier to swallow and digest.   Activity   Exercise keeps you strong and keeps your heart and lungs active. Talk to your doctor about an appropriate exercise program for you.   Skin Care   To prevent a skin infection, bathe or shower once a day. Use a moisturizing soap and wash with warm water. Avoid very hot or cold water. Chemotherapy can make your skin dry . Apply moisturizing lotion to help relieve dry skin. Some drugs used in high doses can cause slight burns to appear (like sunburn). Ask for a special cream to help relieve the burn and protect your skin.   Prevent Mouth Sores   During chemotherapy, many people get mouth sores. Do the following to help prevent mouth sores or to ease discomfort.   Brush your teeth with a soft-bristle toothbrush after every meal.  Don't use dental floss if your platelet count  "is below 50,000. Your doctor or nurse will tell you if this is the case.  Use an oral swab or special soft toothbrush if your gums bleed during regular brushing.  Use mouthwash as directed. If you can't tolerate commercial mouthwash, use salt and baking soda to clean your mouth. Mix 1 teaspoon of salt and 1 teaspoon of baking soda into a glass of water. Swish and spit.  Call your doctor or return to this facility if you develop any of the following:   Sore throat   White patches in the mouth or throat   Fever of 100.4ºF (38ºC) or higher, or as directed by your healthcare provider  © 2246-2171 Formerly [facility], [address] [place] [address], [place] [id number]. All rights reserved. This information is not intended as a substitute for professional medical care. Always follow your healthcare professional's     .Support Groups/Classes    Support groups and classes are being offered at the   [facility] and [facility]!!    "Cooking with Cancer" (Nutrition Class):  Second Wednesday of each month   at noon at the [facility].  Metastatic Support Group:  Third Tuesday of each month   at noon at the [facility].  Next Steps Class/Group: Second and fourth Thursday of each month at noon at the [facility].  [facility] (Breast Cancer Support Group): First Tuesday of each month   at 5:30pm at the [facility] location.  [facility]: [facility]: Second and third Tuesday of each month from 7:30am - 2pm.  [facility]: First and fourth Tuesday of each month from 7:30am - 2pm    If you are interested in attending or would like more information please ask our social workers or your nurse!    "

## 2020-07-14 NOTE — PROGRESS NOTES
Subjective:   Patient ID:  Chai Murry is a 80 y.o. male who presents for follow up of No chief complaint on file.      81 yo male 6 months f/u  PMH HTN, CAD s/p PCI remote HLH., DM lung caner h/o chemo in 2016 and now durvalumab (IMFINZI) march to august of 2020, copd, remote smoker  CARVAJAL chronic if moving fast  Dizziness if standing up quickly or got up in the morning  Weight stable  Sleeps on 1 pillow and no leg swelling. No diarrhea  Good appetite  Per uptodate Imfinzi cardiac side effect >10%: Cardiovascular: Peripheral edema (15%)  Echo in 2019 normal EF, mild AI/AS/MR.   ekg today NSR  BP 98/64 mmHG. 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 Courtney dwyer bx       Past Surgical History:   Procedure Laterality Date    APPENDECTOMY      BRONCHOSCOPY Bilateral 6/21/2019    Procedure: Bronchoscopy;  Surgeon: Paresh Goldman MD;  Location: [facility];  Service: Endoscopy;  Laterality: Bilateral;    CARDIAC CATHETERIZATION      cardiac stents      CATARACT EXTRACTION W/  INTRAOCULAR LENS IMPLANT Right 9-3-14    CHOLECYSTECTOMY      COLONOSCOPY N/A 4/17/2018    Procedure: COLONOSCOPY;  Surgeon: Dionne Doan MD;  Location: [facility];  Service: Endoscopy;  Laterality: N/A;    COLONOSCOPY N/A 10/25/2018    Procedure: COLONOSCOPY;  Surgeon: Michael Hameed MD;  Location: [facility];  Service: Endoscopy;  Laterality: N/A;    ENDOSCOPIC ULTRASOUND OF UPPER GASTROINTESTINAL TRACT N/A 6/11/2019    Procedure: ULTRASOUND, UPPER GI TRACT, ENDOSCOPIC;  Surgeon: Abhishek Knox  MD;  Location: [facility] ENDO;  Service: Endoscopy;  Laterality: N/A;    ESOPHAGOGASTRODUODENOSCOPY N/A 2019    Procedure: EGD (ESOPHAGOGASTRODUODENOSCOPY);  Surgeon: Abhishek Knox MD;  Location: [facility] ENDO;  Service: Endoscopy;  Laterality: N/A;    infected stomach gland excision      INSERTION OF TUNNELED CENTRAL VENOUS CATHETER (CVC) WITH SUBCUTANEOUS PORT Right 7/3/2019    Procedure: [id number];  Surgeon: Jean Carlos Constantino MD;  Location: [facility] OR;  Service: General;  Laterality: Right;    LUNG REMOVAL, TOTAL Left 2016    lung cancer left upper lobe    SKIN BIOPSY Left     arm       Social History     Tobacco Use    Smoking status: Former Smoker     Packs/day: 1.00     Years: 50.00     Pack years: 50.00     Quit date: 2005     Years since quittin.9    Smokeless tobacco: Never Used   Substance Use Topics    Alcohol use: No    Drug use: No       Family History   Problem Relation Age of Onset    Esophageal cancer Sister     Cancer Sister     Lung cancer Mother     Cancer Mother     Cataracts Mother     Hypertension Mother     Pneumonia Father     Cataracts Father     Hypertension Father     Cancer Brother     Hypertension Brother     Blindness Neg Hx     Diabetes Neg Hx     Glaucoma Neg Hx     Macular degeneration Neg Hx     Retinal detachment Neg Hx     Strabismus Neg Hx     Stroke Neg Hx     Thyroid disease Neg Hx          Review of Systems   Constitution: Negative. Negative for weight gain.   HENT: Negative.    Eyes: Negative.    Cardiovascular: Positive for dyspnea on exertion. Negative for chest pain, leg swelling and palpitations.   Respiratory: Positive for shortness of breath.    Endocrine: Negative.    Hematologic/Lymphatic: Negative.    Skin: Negative.    Musculoskeletal: Negative for muscle weakness.   Gastrointestinal: Negative.    Genitourinary: Negative.    Neurological: Positive for dizziness and light-headedness.   Psychiatric/Behavioral: Negative.     Allergic/Immunologic: Negative.        Objective:   Physical Exam   Constitutional: He is oriented to person, place, and time. He appears well-nourished.   HENT:   Head: Normocephalic.   Eyes: Pupils are equal, round, and reactive to light.   Neck: Normal carotid pulses and no JVD present. Carotid bruit is not present. No thyromegaly present.   Cardiovascular: Normal rate, regular rhythm, normal heart sounds and normal pulses.  No extrasystoles are present. PMI is not displaced. Exam reveals no gallop and no S3.   No murmur heard.  Pulmonary/Chest: Breath sounds normal. No stridor. No respiratory distress.   Abdominal: Soft. Bowel sounds are normal. There is no abdominal tenderness. There is no rebound.   Musculoskeletal: Normal range of motion.   Neurological: He is alert and oriented to person, place, and time.   Skin: Skin is intact. No rash noted.   Psychiatric: His behavior is normal.       Lab Results   Component Value Date    CHOL 150 08/24/2019    CHOL 165 09/10/2018    CHOL 169 05/03/2018     Lab Results   Component Value Date    HDL 34 (L) 08/24/2019    HDL 48 09/10/2018    HDL 43 05/03/2018     Lab Results   Component Value Date    LDLCALC 78.8 08/24/2019    LDLCALC 103.2 09/10/2018    LDLCALC 107.2 05/03/2018     Lab Results   Component Value Date    TRIG 186 (H) 08/24/2019    TRIG 69 09/10/2018    TRIG 94 05/03/2018     Lab Results   Component Value Date    CHOLHDL 22.7 08/24/2019    CHOLHDL 29.1 09/10/2018    CHOLHDL 25.4 05/03/2018       Chemistry        Component Value Date/Time     07/14/2020 0844    K 4.7 07/14/2020 0844     07/14/2020 0844    CO2 29 07/14/2020 0844    BUN 35 (H) 07/14/2020 0844    CREATININE 1.8 (H) 07/14/2020 0844     07/14/2020 0844        Component Value Date/Time    CALCIUM 9.4 07/14/2020 0844    ALKPHOS 52 (L) 07/14/2020 0844    AST 16 07/14/2020 0844    ALT 17 07/14/2020 0844    BILITOT 0.3 07/14/2020 0844    ESTGFRAFRICA 40 (A) 07/14/2020 0844     EGFRNONAA 35 (A) 07/14/2020 0844          Lab Results   Component Value Date    HGBA1C 6.3 (H) 08/24/2019     Lab Results   Component Value Date    TSH 4.738 (H) 07/01/2020     Lab Results   Component Value Date    INR 1.1 08/23/2019    INR 1.1 03/04/2016     Lab Results   Component Value Date    WBC 7.56 07/14/2020    HGB 11.8 (L) 07/14/2020    HCT 38.5 (L) 07/14/2020     (H) 07/14/2020     07/14/2020     BMP  Sodium   Date Value Ref Range Status   07/14/2020 142 136 - 145 mmol/L Final     Potassium   Date Value Ref Range Status   07/14/2020 4.7 3.5 - 5.1 mmol/L Final     Chloride   Date Value Ref Range Status   07/14/2020 105 95 - 110 mmol/L Final     CO2   Date Value Ref Range Status   07/14/2020 29 23 - 29 mmol/L Final     BUN, Bld   Date Value Ref Range Status   07/14/2020 35 (H) 8 - 23 mg/dL Final     Creatinine   Date Value Ref Range Status   07/14/2020 1.8 (H) 0.5 - 1.4 mg/dL Final     Calcium   Date Value Ref Range Status   07/14/2020 9.4 8.7 - 10.5 mg/dL Final     Anion Gap   Date Value Ref Range Status   07/14/2020 8 8 - 16 mmol/L Final     eGFR if    Date Value Ref Range Status   07/14/2020 40 (A) >60 mL/min/1.73 m^2 Final     eGFR if non    Date Value Ref Range Status   07/14/2020 35 (A) >60 mL/min/1.73 m^2 Final     Comment:     Calculation used to obtain the estimated glomerular filtration  rate (eGFR) is the CKD-EPI equation.        BNP  @LABRCNTIP(BNP,BNPTRIAGEBLO)@  @LABRCNTIP(troponini)@  Estimated Creatinine Clearance: 37.3 mL/min (A) (based on SCr of 1.8 mg/dL (H)).  No results found in the last 24 hours.  No results found in the last 24 hours.  No results found in the last 24 hours.    Assessment:      1. Coronary artery disease of native artery of native heart with stable angina pectoris    2. Paroxysmal atrial fibrillation    3. Essential hypertension    4. Pure hypercholesterolemia    5. PVD (peripheral vascular disease)      BP low and postural  dizziness  PAF on sinus   Plan:   D/c amlodipime and Decrease lisinopril from 40 mg to 10 mg daiy due to soft BP>  Check BP at home  Echo as scheduled  RTC in 2 months  Continue ASA Leiquis 5 mg bid and Sotolol 80 mg bid.

## 2020-07-16 ENCOUNTER — HOSPITAL ENCOUNTER (OUTPATIENT)
Dept: CARDIOLOGY | Facility: HOSPITAL | Age: 80
Discharge: HOME | End: 2020-07-16
Attending: INTERNAL MEDICINE
Payer: MEDICARE

## 2020-07-16 VITALS
HEIGHT: 70 IN | WEIGHT: 195 LBS | BODY MASS INDEX: 27.92 KG/M2 | SYSTOLIC BLOOD PRESSURE: 100 MMHG | DIASTOLIC BLOOD PRESSURE: 60 MMHG

## 2020-07-16 DIAGNOSIS — R00.2 PALPITATIONS: ICD-10-CM

## 2020-07-16 DIAGNOSIS — R42 DIZZINESS: ICD-10-CM

## 2020-07-16 LAB
AORTIC ROOT ANNULUS: 3.05 CM
AV INDEX (PROSTH): 0.85
AV MEAN GRADIENT: 6 MMHG
AV PEAK GRADIENT: 13 MMHG
AV VALVE AREA: 2.71 CM2
AV VELOCITY RATIO: 0.9
BSA FOR ECHO PROCEDURE: 2.09 M2
CV ECHO LV RWT: 0.53 CM
DOP CALC AO PEAK VEL: 1.79 M/S
DOP CALC AO VTI: 41.66 CM
DOP CALC LVOT AREA: 3.2 CM2
DOP CALC LVOT DIAMETER: 2.02 CM
DOP CALC LVOT PEAK VEL: 1.61 M/S
DOP CALC LVOT STROKE VOLUME: 112.85 CM3
DOP CALC RVOT PEAK VEL: 0.83 M/S
DOP CALC RVOT VTI: 22.35 CM
DOP CALCLVOT PEAK VEL VTI: 35.23 CM
E WAVE DECELERATION TIME: 226.16 MSEC
E/A RATIO: 0.84
E/E' RATIO: 10.24 M/S
ECHO LV POSTERIOR WALL: 1.1 CM (ref 0.6–1.1)
FRACTIONAL SHORTENING: 24 % (ref 28–44)
INTERVENTRICULAR SEPTUM: 1.33 CM (ref 0.6–1.1)
IVRT: 94.2 MSEC
LA MAJOR: 4.9 CM
LA MINOR: 4.78 CM
LA WIDTH: 3.09 CM
LEFT ATRIUM SIZE: 3.38 CM
LEFT ATRIUM VOLUME INDEX: 20.8 ML/M2
LEFT ATRIUM VOLUME: 42.96 CM3
LEFT INTERNAL DIMENSION IN SYSTOLE: 3.18 CM (ref 2.1–4)
LEFT VENTRICLE DIASTOLIC VOLUME INDEX: 37.87 ML/M2
LEFT VENTRICLE DIASTOLIC VOLUME: 78.2 ML
LEFT VENTRICLE MASS INDEX: 88 G/M2
LEFT VENTRICLE SYSTOLIC VOLUME INDEX: 19.5 ML/M2
LEFT VENTRICLE SYSTOLIC VOLUME: 40.23 ML
LEFT VENTRICULAR INTERNAL DIMENSION IN DIASTOLE: 4.19 CM (ref 3.5–6)
LEFT VENTRICULAR MASS: 180.78 G
LV LATERAL E/E' RATIO: 9.67 M/S
LV SEPTAL E/E' RATIO: 10.88 M/S
MV PEAK A VEL: 1.04 M/S
MV PEAK E VEL: 0.87 M/S
PISA TR MAX VEL: 2.75 M/S
PV MEAN GRADIENT: 1.69 MMHG
PV PEAK VELOCITY: 1.06 CM/S
RA MAJOR: 4.02 CM
RA PRESSURE: 3 MMHG
RA WIDTH: 3.12 CM
SINUS: 2.41 CM
STJ: 2.15 CM
TDI LATERAL: 0.09 M/S
TDI SEPTAL: 0.08 M/S
TDI: 0.09 M/S
TR MAX PG: 30 MMHG
TRICUSPID ANNULAR PLANE SYSTOLIC EXCURSION: 2.91 CM
TV REST PULMONARY ARTERY PRESSURE: 33 MMHG

## 2020-07-16 PROCEDURE — 93306 ECHO (CUPID ONLY): ICD-10-PCS | Mod: 26,,, | Performed by: INTERNAL MEDICINE

## 2020-07-16 PROCEDURE — 93306 TTE W/DOPPLER COMPLETE: CPT | Mod: 26,,, | Performed by: INTERNAL MEDICINE

## 2020-07-16 PROCEDURE — 93306 TTE W/DOPPLER COMPLETE: CPT

## 2020-07-21 ENCOUNTER — NURSE TRIAGE (OUTPATIENT)
Dept: ADMINISTRATIVE | Facility: CLINIC | Age: 80
End: 2020-07-21

## 2020-07-21 NOTE — TELEPHONE ENCOUNTER
Patient would like to speak with his doctor, states he lowered his blood pressure medication due to dizziness, now his pressure is 152/86 , states this has been going on since Tuesday,and he feels that is too high,denies any other s/s, actually thought he was calling his doctor, reviewed messages he left, assured I would send high priority message to his doctor, urged to call back for any changed in status or concerns, verb understanding,    Reason for Disposition   [1] Systolic BP  >= 130 OR Diastolic >= 80 AND [2] taking BP medications    Additional Information   Negative: Difficult to awaken or acting confused (e.g., disoriented, slurred speech)   Negative: Severe difficulty breathing (e.g., struggling for each breath, speaks in single words)   Negative: [1] Weakness of the face, arm or leg on one side of the body AND [2] new onset   Negative: [1] Numbness (i.e., loss of sensation) of the face, arm or leg on one side of the body AND [2] new onset   Negative: [1] Chest pain lasts > 5 minutes AND [2] history of heart disease  (i.e., heart attack, bypass surgery, angina, angioplasty, CHF)   Negative: [1] Chest pain AND [2] took nitrogylcerin AND [3] pain was not relieved   Negative: Sounds like a life-threatening emergency to the triager   Negative: Symptom is main concern  (e.g., headache, chest pain)   Negative: Low blood pressure is main concern   Negative: [1] Systolic BP  >= 160 OR Diastolic >= 100 AND [2] cardiac or neurologic symptoms (e.g., chest pain, difficulty breathing, unsteady gait, blurred vision)   Negative: [1] Pregnant > 20 weeks (or postpartum < 6 weeks) AND [2] new hand or face swelling   Negative: [1] Pregnant > 20 weeks AND [2] BP Systolic BP  >= 140 OR Diastolic >= 90   Negative: [1] Systolic BP  >= 200 OR Diastolic >= 120  AND [2] having NO cardiac or neurologic symptoms   Negative: [1] Postpartum < 6 weeks AND [2] BP Systolic BP  >= 140 OR Diastolic >= 90   Negative: [1]  Systolic BP  >= 180 OR Diastolic >= 110 AND [2] missed most recent dose of blood pressure medication   Negative: Systolic BP  >= 180 OR Diastolic >= 110   Negative: Ran out of BP medications   Negative: Systolic BP  >= 160 OR Diastolic >= 100   Negative: [1] Taking BP medications AND [2] feels is having side effects (e.g., impotence, cough, dizzy upon standing)   Negative: [1] Systolic BP  >= 130 OR Diastolic >= 80 AND [2] pregnant    Protocols used: HIGH BLOOD PRESSURE-A-AH

## 2020-07-22 RX ORDER — AMLODIPINE BESYLATE 5 MG/1
5 TABLET ORAL DAILY
Qty: 30 TABLET | Refills: 11
Start: 2020-07-22 | End: 2020-07-30 | Stop reason: SDUPTHER

## 2020-07-22 RX ORDER — AMLODIPINE BESYLATE 5 MG/1
5 TABLET ORAL DAILY
Qty: 30 TABLET | Refills: 11
Start: 2020-07-22 | End: 2020-07-22 | Stop reason: SDUPTHER

## 2020-07-22 NOTE — TELEPHONE ENCOUNTER
The patient was contacted and was advised to  medication from the pharmacy. The patient wanted the medication sent to another pharmacy. The pharmacy has been updated. The patient stated understanding with no questions or concern.  Dr. Calhoun recommendations:  Resume amlodipine 5 mg daily  Continue to check BP

## 2020-07-23 ENCOUNTER — TELEPHONE (OUTPATIENT)
Dept: CARDIOLOGY | Facility: CLINIC | Age: 80
End: 2020-07-23

## 2020-07-23 NOTE — TELEPHONE ENCOUNTER
Patient wanted to let you know he has been taking 10 mg of lisinopril twice a day. He stated his pressure has been rising. The patient was reminded to eat low salt diet. The patient stated understanding with no questions or concerns.      ----- Message from Amy Olmstead sent at 7/23/2020 12:02 PM CDT -----  Contact: pt  Patient states he was Dr Calhoun was suppose to send in lisinopriL 20 MG tablet once a day. Patient uses [facility] on [address]. Patient call back is [phone]. Patient states he is double up on 10 mg at the moment. Last ready was 156/86.

## 2020-07-24 ENCOUNTER — TELEPHONE (OUTPATIENT)
Dept: CARDIOLOGY | Facility: CLINIC | Age: 80
End: 2020-07-24

## 2020-07-24 NOTE — TELEPHONE ENCOUNTER
Patient contacted and was advised to keep his BP log till his next appointment with Dr. Palencia on   07/30/2020. The patient stated understanding with no questions or concerns.

## 2020-07-27 ENCOUNTER — LAB VISIT (OUTPATIENT)
Dept: LAB | Facility: HOSPITAL | Age: 80
End: 2020-07-27
Attending: INTERNAL MEDICINE
Payer: MEDICARE

## 2020-07-27 DIAGNOSIS — C34.12 MALIGNANT NEOPLASM OF UPPER LOBE OF LEFT LUNG: ICD-10-CM

## 2020-07-27 LAB
ALBUMIN SERPL BCP-MCNC: 3.7 G/DL (ref 3.5–5.2)
ALP SERPL-CCNC: 49 U/L (ref 55–135)
ALT SERPL W/O P-5'-P-CCNC: 24 U/L (ref 10–44)
ANION GAP SERPL CALC-SCNC: 9 MMOL/L (ref 8–16)
AST SERPL-CCNC: 19 U/L (ref 10–40)
BASOPHILS # BLD AUTO: 0.07 K/UL (ref 0–0.2)
BASOPHILS NFR BLD: 1.1 % (ref 0–1.9)
BILIRUB SERPL-MCNC: 0.3 MG/DL (ref 0.1–1)
BUN SERPL-MCNC: 25 MG/DL (ref 8–23)
CALCIUM SERPL-MCNC: 9.5 MG/DL (ref 8.7–10.5)
CHLORIDE SERPL-SCNC: 102 MMOL/L (ref 95–110)
CO2 SERPL-SCNC: 28 MMOL/L (ref 23–29)
CREAT SERPL-MCNC: 1.5 MG/DL (ref 0.5–1.4)
DIFFERENTIAL METHOD: ABNORMAL
EOSINOPHIL # BLD AUTO: 1.2 K/UL (ref 0–0.5)
EOSINOPHIL NFR BLD: 19.9 % (ref 0–8)
ERYTHROCYTE [DISTWIDTH] IN BLOOD BY AUTOMATED COUNT: 13.8 % (ref 11.5–14.5)
EST. GFR  (AFRICAN AMERICAN): 50 ML/MIN/1.73 M^2
EST. GFR  (NON AFRICAN AMERICAN): 44 ML/MIN/1.73 M^2
GLUCOSE SERPL-MCNC: 105 MG/DL (ref 70–110)
HCT VFR BLD AUTO: 39.8 % (ref 40–54)
HGB BLD-MCNC: 12.1 G/DL (ref 14–18)
IMM GRANULOCYTES # BLD AUTO: 0.04 K/UL (ref 0–0.04)
IMM GRANULOCYTES NFR BLD AUTO: 0.6 % (ref 0–0.5)
LYMPHOCYTES # BLD AUTO: 0.7 K/UL (ref 1–4.8)
LYMPHOCYTES NFR BLD: 11.6 % (ref 18–48)
MCH RBC QN AUTO: 30.6 PG (ref 27–31)
MCHC RBC AUTO-ENTMCNC: 30.4 G/DL (ref 32–36)
MCV RBC AUTO: 101 FL (ref 82–98)
MONOCYTES # BLD AUTO: 0.5 K/UL (ref 0.3–1)
MONOCYTES NFR BLD: 8.4 % (ref 4–15)
NEUTROPHILS # BLD AUTO: 3.6 K/UL (ref 1.8–7.7)
NEUTROPHILS NFR BLD: 58.4 % (ref 38–73)
NRBC BLD-RTO: 0 /100 WBC
PLATELET # BLD AUTO: 214 K/UL (ref 150–350)
PMV BLD AUTO: 10.8 FL (ref 9.2–12.9)
POTASSIUM SERPL-SCNC: 4.5 MMOL/L (ref 3.5–5.1)
PROT SERPL-MCNC: 7.2 G/DL (ref 6–8.4)
RBC # BLD AUTO: 3.95 M/UL (ref 4.6–6.2)
SODIUM SERPL-SCNC: 139 MMOL/L (ref 136–145)
T4 FREE SERPL-MCNC: 0.89 NG/DL (ref 0.71–1.51)
TSH SERPL DL<=0.005 MIU/L-ACNC: 6.44 UIU/ML (ref 0.4–4)
WBC # BLD AUTO: 6.19 K/UL (ref 3.9–12.7)

## 2020-07-27 PROCEDURE — 80053 COMPREHEN METABOLIC PANEL: CPT

## 2020-07-27 PROCEDURE — 84443 ASSAY THYROID STIM HORMONE: CPT

## 2020-07-27 PROCEDURE — 36415 COLL VENOUS BLD VENIPUNCTURE: CPT | Mod: PO

## 2020-07-27 PROCEDURE — 85025 COMPLETE CBC W/AUTO DIFF WBC: CPT

## 2020-07-27 PROCEDURE — 84439 ASSAY OF FREE THYROXINE: CPT

## 2020-07-27 NOTE — PROGRESS NOTES
Subjective:       Patient ID: Chai Murry is a 80 y.o. male.    Chief Complaint: Malignant neoplasm of upper lobe of left lung [C34.12]  HPI: We have an opportunity to see Mr. Chai Murry in Hematology Oncology clinic at [facility] on 07/28/2020.  Mr. Chai Murry is a 80 y.o. 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 He is s/p left pneumonectomy for a squamous cell carcinoma of the left lung.0n 4/12/2016. Prior to the surgery, the PET scan showed an FDG-avid mass in the left upper lobe abutting the left hilum with an SUV of 11.6. There seemed to be a left hilar adenopathy as well.   Radiologist felt that he was unable to discriminate between the mass and the lymphadenopathy. The patient had had a bronchoscopy by Dr. Roel Ernandez on 03/04/2006 that showed a partially obstructing airway in the anterior segment of the left upper lobe with an endobronchial lesion in the anterior segment of the left upper lobe. The specimen from the biopsy at the time of bronchoscopy was reported as being a squamous cell carcinoma.   At the time of the surgery after the left lung was removed, the pathologist   indicated that this was a squamous cell carcinoma. It measured 3.8 cm. The pathology indicated that this is extending into the bronchial resection margin of the lobe. This lobe was later on removed to complete the pneumonectomy There was one lymph node positive for metastatic squamous cell carcinoma at the AP window. The patient was told that we would prefer to treat him with post-op simultaneous chemo/radiation.  He had Medi-port. He started chemo/radiation therapy andreceived 6 weekly cycles of carbo/Taxol along with radiatioon. After a month, he had a Ct scan and it showed stability He then had 2 additional cyles of carbo/Taxol finishing in 9/27/2016. He comes for follow up. He developed hoarseness on good Friday 2019 and has been found to have a left vocal cord paralysis by  EENT exam. CT of the chest was non contributory, shows changes from surgery   PET showed a PET avid mass to the left of the trachea. The case was discussed with dr annika Goldman and GI. We discussed the possibility of doing a EUS or EBUs, and finally, because of the localization, it was decided to do a EUS with biopsy if possible. Cytology shows recurrent squamous cell cancer. I have asked Pathology to run a PD-L1 from his original pathology from 2016. 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 He has been discussed extensively with radiation oncology. We have decided to treat him with radiation therapy and Cisplatin as a chemo-sensitizer. Dr Castro has reviewed the therapy ports from the previous radiation treatment and the area in question seems to be above the previously radiated fields. Completed chemoradiation s/p 6 weekly cisplatin treatments with response.    Interval history:  80-year-old male with stage III non-small cell lung cancer status post chemoradiation therapy and currently on maintenance with durvalumab.  Recently has been complaining of dizziness/vertigo with change in position.  Denies any other symptoms.  Recent CT scan of the head was unremarkable for any evidence of metastatic disease.    Denies worsening shortness of breath, chest pain, nausea vomiting, headaches, lightheadedness, diarrhea or dysuria.     Oncology History   Malignant neoplasm of upper lobe of left lung   4/12/2016 Initial Diagnosis    Malignant neoplasm of upper lobe of left lung     6/19/2019 Cancer Staged    Staging form: Lung, AJCC 8th Edition  - Clinical stage from 6/19/2019: Stage IIIB (rcT4, cN2, cM0) - Signed by Alejandro Castro II, MD on 6/19/2019 6/21/2019 Tumor Conference    Presenting Hospital / Clinic: [facility] [place]  Virtual Tumor Board Conference: In person  Presenter: Dr. Chance Castro  Date Presented to Tumor Board: 06/21/19  Specialties Present: Medical  Oncology;Radiation Oncology;Surgical Oncology;Pathology;Navigation;Plastic Surgery;Radiology;Gastrointestinal;Pulmonology  Presentation at Cancer Conference: Prospective  Cancer Type: Lung cancer  Recommended Plan: Additional screening  Recommended Plan Note: If PDL-1 positive, treat with immunotherapy  Send tissue for next generation sequencing.     6/28/2019 Tumor Conference    His case was discussed at the Multidisciplinary Head and Neck Team Planning Meeting.    Representatives from Medical Oncology, Radiation Oncology, Head and Neck Surgical Oncology, Psychosocial Oncology, and Speech and Language Pathology discussed the case with the following recommendations:    1) treat lung cancer            7/5/2019 - 8/14/2019 Radiation Therapy    Treatment Site Ref. ID Energy Dose/Fx (Gy) #Fx Dose Correction (Gy) Total Dose (Gy) Start Date End Date Elapsed Days   [id number]:1 PTV LNs 6X 1.8 28 / 28 0 50.4 7/5/2019 8/14/2019 40          3/10/2020 -  Chemotherapy    Treatment Summary   Plan Name: OP DURVALUMAB Q2W  Treatment Goal: Maintenance  Status: Active  Start Date: 3/10/2020  End Date: 9/8/2020 (Planned)  Provider: Fermin Vila MD  Chemotherapy: durvalumab (IMFINZI) 885 mg in sodium chloride 0.9% 267.7 mL chemo infusion, 10 mg/kg = 885 mg, Intravenous, Clinic/HOD 1 time, 11 of 14 cycles  Administration: 885 mg (3/10/2020), 860 mg (3/24/2020), 860 mg (4/7/2020), 860 mg (4/21/2020), 885 mg (5/5/2020), 860 mg (5/19/2020), 865 mg (6/2/2020), 860 mg (6/16/2020), 860 mg (7/1/2020), 860 mg (7/14/2020)       Past Medical History:   Diagnosis Date    Anemia     Arthritis     Atrial fibrillation     CAD (coronary artery disease)     Cataract     COPD (chronic obstructive pulmonary disease)     Diverticulosis     ED (erectile dysfunction)     HTN (hypertension)     Hyperlipidemia     Lung cancer     left    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 Courtney dwyer      Family  History   Problem Relation Age of Onset    Esophageal cancer Sister     Cancer Sister     Lung cancer Mother     Cancer Mother     Cataracts Mother     Hypertension Mother     Pneumonia Father     Cataracts Father     Hypertension Father     Cancer Brother     Hypertension Brother     Blindness Neg Hx     Diabetes Neg Hx     Glaucoma Neg Hx     Macular degeneration Neg Hx     Retinal detachment Neg Hx     Strabismus Neg Hx     Stroke Neg Hx     Thyroid disease Neg Hx      Social History     Socioeconomic History    Marital status:      Spouse name: Not on file    Number of children: 3    Years of education: Not on file    Highest education level: Not on file   Occupational History    Occupation: retired   Social Needs    Financial resource strain: Not on file    Food insecurity     Worry: Not on file     Inability: Not on file    Transportation needs     Medical: Not on file     Non-medical: Not on file   Tobacco Use    Smoking status: Former Smoker     Packs/day: 1.00     Years: 50.00     Pack years: 50.00     Quit date: 2005     Years since quittin.9    Smokeless tobacco: Never Used   Substance and Sexual Activity    Alcohol use: No    Drug use: No    Sexual activity: Not Currently   Lifestyle    Physical activity     Days per week: Not on file     Minutes per session: Not on file    Stress: Not on file   Relationships    Social connections     Talks on phone: Not on file     Gets together: Not on file     Attends Alevism service: Not on file     Active member of club or organization: Not on file     Attends meetings of clubs or organizations: Not on file     Relationship status: Not on file   Other Topics Concern    Not on file   Social History Narrative    Not on file     Past Surgical History:   Procedure Laterality Date    APPENDECTOMY      BRONCHOSCOPY Bilateral 2019    Procedure: Bronchoscopy;  Surgeon: Paresh Goldmna MD;  Location: [facility]  ENDO;  Service: Endoscopy;  Laterality: Bilateral;    CARDIAC CATHETERIZATION      cardiac stents      CATARACT EXTRACTION W/  INTRAOCULAR LENS IMPLANT Right 9-3-14    CHOLECYSTECTOMY      COLONOSCOPY N/A 4/17/2018    Procedure: COLONOSCOPY;  Surgeon: Dionne Doan MD;  Location: [facility] ENDO;  Service: Endoscopy;  Laterality: N/A;    COLONOSCOPY N/A 10/25/2018    Procedure: COLONOSCOPY;  Surgeon: Michael Hameed MD;  Location: [facility] ENDO;  Service: Endoscopy;  Laterality: N/A;    ENDOSCOPIC ULTRASOUND OF UPPER GASTROINTESTINAL TRACT N/A 6/11/2019    Procedure: ULTRASOUND, UPPER GI TRACT, ENDOSCOPIC;  Surgeon: Abhishek Knox MD;  Location: [facility] END[facility];  Service: Endoscopy;  Laterality: N/A;    ESOPHAGOGASTRODUODENOSCOPY N/A 6/11/2019    Procedure: EGD (ESOPHAGOGASTRODUODENOSCOPY);  Surgeon: Abhishek Knox MD;  Location: [facility] END[facility];  Service: Endoscopy;  Laterality: N/A;    infected stomach gland excision      INSERTION OF TUNNELED CENTRAL VENOUS CATHETER (CVC) WITH SUBCUTANEOUS PORT Right 7/3/2019    Procedure: [id number]-[id number];  Surgeon: Jean Carlos Constantino MD;  Location: [facility] OR;  Service: General;  Laterality: Right;    LUNG REMOVAL, TOTAL Left 04/2016    lung cancer left upper lobe    SKIN BIOPSY Left     arm     Current Outpatient Medications   Medication Sig Dispense Refill    albuterol (PROVENTIL) 2.5 mg /3 mL (0.083 %) nebulizer solution Take 3 mLs (2.5 mg total) by nebulization every 6 (six) hours while awake. 270 mL 11    amLODIPine (NORVASC) 5 MG tablet Take 1 tablet (5 mg total) by mouth once daily. 30 tablet 11    apixaban (ELIQUIS) 5 mg Tab Take 1 tablet (5 mg total) by mouth 2 (two) times daily. 60 tablet 5    apixaban (ELIQUIS) 5 mg Tab Take 1 tablet (5 mg total) by mouth 2 (two) times daily. 60 tablet 5    apixaban (ELIQUIS) 5 mg Tab Take 1 tablet (5 mg total) by mouth 2 (two) times daily. 60 tablet 5    aspirin (ECOTRIN) 81 MG EC tablet Take 81 mg by mouth once daily.       benzonatate (TESSALON) 200 MG capsule Take 1 capsule (200 mg total) by mouth 3 (three) times daily as needed for Cough. 21 capsule 0    carbamide peroxide (DEBROX) 6.5 % otic solution Place 5 drops into the left ear 2 (two) times daily. 15 mL 2    fenofibric acid (FIBRICOR) 135 mg CpDR Take 1 capsule (135 mg total) by mouth once daily. 90 capsule 3    fluticasone (FLONASE) 50 mcg/actuation nasal spray 2 sprays (100 mcg total) by Each Nare route once daily. (Patient taking differently: 2 sprays by Each Nare route as needed. ) 3 Bottle 3    furosemide (LASIX) 20 MG tablet TAKE 1 TABLET BY MOUTH EVERY DAY AS NEEDED 30 tablet 1    glycopyrrolate-formoterol (BEVESPI AEROSPHERE) 9-4.8 mcg HFAA Inhale 2 puffs into the lungs 2 (two) times daily. Controller 10.9 g 11    HYDROcodone-acetaminophen (NORCO) 5-325 mg per tablet Take 1 tablet by mouth every 6 (six) hours as needed for Pain. 60 tablet 0    ipratropium-albuteroL (COMBIVENT RESPIMAT)  mcg/actuation inhaler Inhale 1 puff into the lungs every 6 (six) hours as needed for Shortness of Breath. 4 g 11    levocetirizine (XYZAL) 5 MG tablet Take 5 mg by mouth as needed.       levothyroxine (SYNTHROID) 25 MCG tablet Take 1 tablet (25 mcg total) by mouth before breakfast. 30 tablet 11    lisinopriL 10 MG tablet Take 1 tablet (10 mg total) by mouth once daily. 90 tablet 3    promethazine (PHENERGAN) 6.25 mg/5 mL syrup Take 20 mLs (25 mg total) by mouth nightly as needed. 240 mL 0    ranolazine (RANEXA) 500 MG Tb12 Take 1 tablet (500 mg total) by mouth 2 (two) times daily. 60 tablet 11    simvastatin (ZOCOR) 80 MG tablet Take 1 tablet (80 mg total) by mouth once daily. 90 tablet 3    sotaloL (SOTALOL AF) 80 MG tablet Take 1 tablet (80 mg total) by mouth 2 (two) times daily. 180 tablet 3    zolpidem (AMBIEN) 5 MG Tab Take 1 tablet (5 mg total) by mouth nightly as needed. 30 tablet 1     No current facility-administered medications for this visit.       Facility-Administered Medications Ordered in Other Visits   Medication Dose Route Frequency Provider Last Rate Last Dose    durvalumab (IMFINZI) 860 mg in sodium chloride 0.9% 267.2 mL chemo infusion  860 mg Intravenous 1 time in Clinic/HOD Kobe Fuentes .2 mL/hr at 07/28/20 1022 860 mg at 07/28/20 1022    heparin, porcine (PF) 100 unit/mL injection flush 500 Units  500 Units Intravenous PRN Kobe Fuentes MD        lactated ringers infusion   Intravenous Continuous Michael Hameed MD   Stopped at 07/03/19 0810       Labs:  Lab Results   Component Value Date    WBC 6.19 07/27/2020    HGB 12.1 (L) 07/27/2020    HCT 39.8 (L) 07/27/2020     (H) 07/27/2020     07/27/2020     BMP  Lab Results   Component Value Date     07/27/2020    K 4.5 07/27/2020     07/27/2020    CO2 28 07/27/2020    BUN 25 (H) 07/27/2020    CREATININE 1.5 (H) 07/27/2020    CALCIUM 9.5 07/27/2020    ANIONGAP 9 07/27/2020    ESTGFRAFRICA 50 (A) 07/27/2020    EGFRNONAA 44 (A) 07/27/2020     Lab Results   Component Value Date    ALT 24 07/27/2020    AST 19 07/27/2020    ALKPHOS 49 (L) 07/27/2020    BILITOT 0.3 07/27/2020       No results found for: IRON, TIBC, FERRITIN, SATURATEDIRO  No results found for: [id number]  No results found for: FOLATE  Lab Results   Component Value Date    TSH 6.438 (H) 07/27/2020       I have reviewed the radiology reports and examined the scan/xray images.    Review of Systems   Constitutional: Negative.    HENT: Negative.    Eyes: Negative.    Respiratory: Negative.    Cardiovascular: Negative.    Gastrointestinal: Negative.    Endocrine: Negative.    Genitourinary: Negative.    Musculoskeletal: Negative.    Skin: Negative.    Allergic/Immunologic: Negative.    Neurological: Negative.    Hematological: Negative.    Psychiatric/Behavioral: Negative.      ECOG SCORE    1 - Restricted in strenuous activity-ambulatory and able to carry out work of a light nature             Objective:     Vitals:    07/28/20 0942   BP: (!) 141/71   Pulse: 75   Temp: 97.8 °F (36.6 °C)   Body mass index is 27.9 kg/m².  Physical Exam  Vitals signs and nursing note reviewed.   Constitutional:       Appearance: He is well-developed.   HENT:      Head: Normocephalic and atraumatic.   Eyes:      Conjunctiva/sclera: Conjunctivae normal.   Neck:      Musculoskeletal: Normal range of motion and neck supple.   Cardiovascular:      Rate and Rhythm: Normal rate and regular rhythm.   Pulmonary:      Effort: Pulmonary effort is normal.      Breath sounds: Normal breath sounds.   Abdominal:      General: Bowel sounds are normal.      Palpations: Abdomen is soft.   Musculoskeletal: Normal range of motion.   Skin:     General: Skin is warm and dry.   Neurological:      Mental Status: He is alert and oriented to person, place, and time.   Psychiatric:         Behavior: Behavior normal.         Thought Content: Thought content normal.         Judgment: Judgment normal.           Assessment:      1. Malignant neoplasm of upper lobe of left lung    2. Hypothyroidism, unspecified type           Plan:     Malignant neoplasm of upper lobe of left lung  Stage IIIB non-small cell lung cancer, on maintenance durvalumab after completing concurrent chemotherapy and radiation.  Overall tolerating well.  Reviewed labs, will proceed with treatment.  Plan to see him back in 2 weeks with repeat labs.  Recent restaging PET-CT scan showed into an resolution of prior FDG avid mass.    Hypothyroid  Noted elevated TSH.  Patient is noncompliant with medication.  Advised that he takes Synthroid 30 min to 1 hr prior to any meal.  He verbalized understanding.  Recommend monitoring this value more closely and possibly make an adjustment to the dose of Synthroid.  Currently on 25 mcg daily.      Kobe Fuentes MD

## 2020-07-28 ENCOUNTER — INFUSION (OUTPATIENT)
Dept: INFUSION THERAPY | Facility: HOSPITAL | Age: 80
End: 2020-07-28
Attending: INTERNAL MEDICINE
Payer: MEDICARE

## 2020-07-28 ENCOUNTER — OFFICE VISIT (OUTPATIENT)
Dept: HEMATOLOGY/ONCOLOGY | Facility: CLINIC | Age: 80
End: 2020-07-28
Payer: MEDICARE

## 2020-07-28 VITALS
HEART RATE: 75 BPM | OXYGEN SATURATION: 95 % | SYSTOLIC BLOOD PRESSURE: 141 MMHG | WEIGHT: 194.44 LBS | DIASTOLIC BLOOD PRESSURE: 71 MMHG | HEIGHT: 70 IN | TEMPERATURE: 98 F | BODY MASS INDEX: 27.84 KG/M2

## 2020-07-28 VITALS
DIASTOLIC BLOOD PRESSURE: 58 MMHG | HEART RATE: 61 BPM | SYSTOLIC BLOOD PRESSURE: 115 MMHG | RESPIRATION RATE: 16 BRPM | OXYGEN SATURATION: 95 % | TEMPERATURE: 98 F

## 2020-07-28 DIAGNOSIS — C34.12 MALIGNANT NEOPLASM OF UPPER LOBE OF LEFT LUNG: Primary | ICD-10-CM

## 2020-07-28 DIAGNOSIS — E03.9 HYPOTHYROIDISM, UNSPECIFIED TYPE: ICD-10-CM

## 2020-07-28 PROCEDURE — 3074F PR MOST RECENT SYSTOLIC BLOOD PRESSURE < 130 MM HG: ICD-10-PCS | Mod: CPTII,S$GLB,, | Performed by: INTERNAL MEDICINE

## 2020-07-28 PROCEDURE — 63600175 PHARM REV CODE 636 W HCPCS: Mod: TB | Performed by: INTERNAL MEDICINE

## 2020-07-28 PROCEDURE — 3074F SYST BP LT 130 MM HG: CPT | Mod: CPTII,S$GLB,, | Performed by: INTERNAL MEDICINE

## 2020-07-28 PROCEDURE — 1101F PT FALLS ASSESS-DOCD LE1/YR: CPT | Mod: CPTII,S$GLB,, | Performed by: INTERNAL MEDICINE

## 2020-07-28 PROCEDURE — 3078F PR MOST RECENT DIASTOLIC BLOOD PRESSURE < 80 MM HG: ICD-10-PCS | Mod: CPTII,S$GLB,, | Performed by: INTERNAL MEDICINE

## 2020-07-28 PROCEDURE — 1101F PR PT FALLS ASSESS DOC 0-1 FALLS W/OUT INJ PAST YR: ICD-10-PCS | Mod: CPTII,S$GLB,, | Performed by: INTERNAL MEDICINE

## 2020-07-28 PROCEDURE — 99999 PR PBB SHADOW E&M-EST. PATIENT-LVL IV: ICD-10-PCS | Mod: PBBFAC,,, | Performed by: INTERNAL MEDICINE

## 2020-07-28 PROCEDURE — 99215 PR OFFICE/OUTPT VISIT, EST, LEVL V, 40-54 MIN: ICD-10-PCS | Mod: 25,S$GLB,, | Performed by: INTERNAL MEDICINE

## 2020-07-28 PROCEDURE — 1126F AMNT PAIN NOTED NONE PRSNT: CPT | Mod: S$GLB,,, | Performed by: INTERNAL MEDICINE

## 2020-07-28 PROCEDURE — 1126F PR PAIN SEVERITY QUANTIFIED, NO PAIN PRESENT: ICD-10-PCS | Mod: S$GLB,,, | Performed by: INTERNAL MEDICINE

## 2020-07-28 PROCEDURE — 99215 OFFICE O/P EST HI 40 MIN: CPT | Mod: 25,S$GLB,, | Performed by: INTERNAL MEDICINE

## 2020-07-28 PROCEDURE — 1159F PR MEDICATION LIST DOCUMENTED IN MEDICAL RECORD: ICD-10-PCS | Mod: S$GLB,,, | Performed by: INTERNAL MEDICINE

## 2020-07-28 PROCEDURE — 99999 PR PBB SHADOW E&M-EST. PATIENT-LVL IV: CPT | Mod: PBBFAC,,, | Performed by: INTERNAL MEDICINE

## 2020-07-28 PROCEDURE — 1159F MED LIST DOCD IN RCRD: CPT | Mod: S$GLB,,, | Performed by: INTERNAL MEDICINE

## 2020-07-28 PROCEDURE — 25000003 PHARM REV CODE 250: Performed by: INTERNAL MEDICINE

## 2020-07-28 PROCEDURE — 96413 CHEMO IV INFUSION 1 HR: CPT

## 2020-07-28 PROCEDURE — 3078F DIAST BP <80 MM HG: CPT | Mod: CPTII,S$GLB,, | Performed by: INTERNAL MEDICINE

## 2020-07-28 RX ORDER — HEPARIN 100 UNIT/ML
500 SYRINGE INTRAVENOUS
Status: CANCELLED | OUTPATIENT
Start: 2020-07-28

## 2020-07-28 RX ORDER — HEPARIN 100 UNIT/ML
500 SYRINGE INTRAVENOUS
Status: DISCONTINUED | OUTPATIENT
Start: 2020-07-28 | End: 2020-07-28 | Stop reason: HOSPADM

## 2020-07-28 RX ORDER — SODIUM CHLORIDE 0.9 % (FLUSH) 0.9 %
10 SYRINGE (ML) INJECTION
Status: CANCELLED | OUTPATIENT
Start: 2020-07-28

## 2020-07-28 RX ADMIN — HEPARIN 500 UNITS: 100 SYRINGE at 11:07

## 2020-07-28 RX ADMIN — DURVALUMAB 860 MG: 500 INJECTION, SOLUTION INTRAVENOUS at 10:07

## 2020-07-28 NOTE — ASSESSMENT & PLAN NOTE
Stage IIIB non-small cell lung cancer, on maintenance durvalumab after completing concurrent chemotherapy and radiation.  Overall tolerating well.  Reviewed labs, will proceed with treatment.  Plan to see him back in 2 weeks with repeat labs.  Recent restaging PET-CT scan showed into an resolution of prior FDG avid mass.

## 2020-07-28 NOTE — ASSESSMENT & PLAN NOTE
Noted elevated TSH.  Patient is noncompliant with medication.  Advised that he takes Synthroid 30 min to 1 hr prior to any meal.  He verbalized understanding.  Recommend monitoring this value more closely and possibly make an adjustment to the dose of Synthroid.  Currently on 25 mcg daily.

## 2020-07-28 NOTE — DISCHARGE INSTRUCTIONS
[facility]  [address] [address]  [address]  [phone] phone     [phone] fax  Hours of Operation: Monday- Friday 8:00am- 5:00pm  After hours phone  [phone]  Hematology / Oncology Physicians on call      JA [address] Dr., Dr., Dr., Dr., NP Sydney Prescott, NP Tyesha Taylor, NP    Please call with any concerns regarding your appointment today.    HOME CARE AFTER CHEMOTHERAPY   Meals   Many patients feel sick and lose their appetites during treatment. Eat small meals several times a day. Choose bland foods with little taste or smell if you have problems with nausea. Be sure to cook all food thoroughly. This kills bacteria and helps you avoid intestinal infection. Soft foods are easier to swallow and digest.   Activity   Exercise keeps you strong and keeps your heart and lungs active. Talk to your doctor about an appropriate exercise program for you.   Skin Care   To prevent a skin infection, bathe or shower once a day. Use a moisturizing soap and wash with warm water. Avoid very hot or cold water. Chemotherapy can make your skin dry . Apply moisturizing lotion to help relieve dry skin. Some drugs used in high doses can cause slight burns to appear (like sunburn). Ask for a special cream to help relieve the burn and protect your skin.   Prevent Mouth Sores   During chemotherapy, many people get mouth sores. Do the following to help prevent mouth sores or to ease discomfort.   Brush your teeth with a soft-bristle toothbrush after every meal.  Don't use dental floss if your platelet count is below 50,000. Your doctor or nurse will tell you if this is the case.  Use an oral swab or special soft toothbrush if your gums bleed during regular brushing.  Use mouthwash as directed. If you can't tolerate commercial mouthwash, use salt and baking soda to clean your mouth. Mix 1 teaspoon of salt and 1  teaspoon of baking soda into a glass of water. Swish and spit.  Call your doctor or return to this facility if you develop any of the following:   Sore throat   White patches in the mouth or throat   Fever of 100.4ºF (38ºC) or higher, or as directed by your healthcare provider  © 2000-2011 [facility], [address] [place] [address]. All rights reserved. This information is not intended as a substitute for professional medical care. Always follow your healthcare professional's   FALL PREVENTION   Falls often occur due to slipping, tripping or losing your balance. Here are ways to reduce your risk of falling again.   Was there anything that caused your fall that can be fixed, removed or replaced?   Make your home safe by keeping walkways clear of objects you may trip over.   Use non-slip pads under rugs.   Do not walk in poorly lit areas.   Do not stand on chairs or wobbly ladders.   Use caution when reaching overhead or looking upward. This position can cause a loss of balance.   Be sure your shoes fit properly, have non-slip bottoms and are in good condition.   Be cautious when going up and down stairs, curbs, and when walking on uneven sidewalks.   If your balance is poor, consider using a cane or walker.   If your fall was related to alcohol use, stop or limit alcohol intake.   If your fall was related to use of sleeping medicines, talk to your doctor about this. You may need to reduce your dosage at bedtime if you awaken during the night to go to the bathroom.   To reduce the need for nighttime bathroom trips:   Avoid drinking fluids for several hours before going to bed   Empty your bladder before going to bed   Men can keep a urinal at the bedside   © 2000-2011 [facility], [address] [place] [address]. All rights reserved. This information is not intended as a substitute for professional medical care. Always follow your healthcare professional's instructions.  WAYS TO HELP  PREVENT INFECTION         WASH YOUR HANDS OFTEN DURING THE DAY, ESPECIALLY BEFORE YOU EAT, AFTER USING THE BATHROOM, AND AFTER TOUCHING ANIMALS     STAY AWAY FROM PEOPLE WHO HAVE ILLNESSES YOU CAN CATCH; SUCH AS COLDS, FLU, CHICKEN POX     TRY TO AVOID CROWDS     STAY AWAY FROM CHILDREN WHO RECENTLY HAVE RECEIVED LIVE VIRUS VACCINES     MAINTAIN GOOD MOUTH CARE     DO NOT SQUEEZE OR SCRATCH PIMPLES     CLEAN CUTS & SCRAPES RIGHT AWAY AND DAILY UNTIL HEALED WITH WARM WATER, SOAP & AN ANTISEPTIC     AVOID CONTACT WITH LITTER BOXES, BIRD CAGES, & FISH TANKS     AVOID STANDING WATER, IE., BIRD BATHS, FLOWER POTS/VASES, OR HUMIDIFIERS     WEAR GLOVES WHEN GARDENING OR CLEANING UP AFTER OTHERS, ESPECIALLY BABIES & SMALL CHILDREN     DO NOT EAT RAW FISH, SEAFOOD, MEAT, OR EGGS

## 2020-07-30 ENCOUNTER — OFFICE VISIT (OUTPATIENT)
Dept: CARDIOLOGY | Facility: CLINIC | Age: 80
End: 2020-07-30
Payer: MEDICARE

## 2020-07-30 VITALS
HEART RATE: 88 BPM | SYSTOLIC BLOOD PRESSURE: 98 MMHG | HEIGHT: 70 IN | DIASTOLIC BLOOD PRESSURE: 70 MMHG | WEIGHT: 191.13 LBS | OXYGEN SATURATION: 97 % | BODY MASS INDEX: 27.36 KG/M2

## 2020-07-30 DIAGNOSIS — I25.10 CORONARY ARTERY DISEASE INVOLVING NATIVE CORONARY ARTERY OF NATIVE HEART WITHOUT ANGINA PECTORIS: Primary | ICD-10-CM

## 2020-07-30 DIAGNOSIS — I48.20 CHRONIC ATRIAL FIBRILLATION: ICD-10-CM

## 2020-07-30 DIAGNOSIS — I10 ESSENTIAL HYPERTENSION: ICD-10-CM

## 2020-07-30 DIAGNOSIS — E78.00 PURE HYPERCHOLESTEROLEMIA: ICD-10-CM

## 2020-07-30 PROCEDURE — 99999 PR PBB SHADOW E&M-EST. PATIENT-LVL IV: ICD-10-PCS | Mod: PBBFAC,,, | Performed by: INTERNAL MEDICINE

## 2020-07-30 PROCEDURE — 99214 OFFICE O/P EST MOD 30 MIN: CPT | Mod: S$GLB,,, | Performed by: INTERNAL MEDICINE

## 2020-07-30 PROCEDURE — 1101F PT FALLS ASSESS-DOCD LE1/YR: CPT | Mod: CPTII,S$GLB,, | Performed by: INTERNAL MEDICINE

## 2020-07-30 PROCEDURE — 1159F MED LIST DOCD IN RCRD: CPT | Mod: S$GLB,,, | Performed by: INTERNAL MEDICINE

## 2020-07-30 PROCEDURE — 1101F PR PT FALLS ASSESS DOC 0-1 FALLS W/OUT INJ PAST YR: ICD-10-PCS | Mod: CPTII,S$GLB,, | Performed by: INTERNAL MEDICINE

## 2020-07-30 PROCEDURE — 1126F PR PAIN SEVERITY QUANTIFIED, NO PAIN PRESENT: ICD-10-PCS | Mod: S$GLB,,, | Performed by: INTERNAL MEDICINE

## 2020-07-30 PROCEDURE — 99999 PR PBB SHADOW E&M-EST. PATIENT-LVL IV: CPT | Mod: PBBFAC,,, | Performed by: INTERNAL MEDICINE

## 2020-07-30 PROCEDURE — 99214 PR OFFICE/OUTPT VISIT, EST, LEVL IV, 30-39 MIN: ICD-10-PCS | Mod: S$GLB,,, | Performed by: INTERNAL MEDICINE

## 2020-07-30 PROCEDURE — 1159F PR MEDICATION LIST DOCUMENTED IN MEDICAL RECORD: ICD-10-PCS | Mod: S$GLB,,, | Performed by: INTERNAL MEDICINE

## 2020-07-30 PROCEDURE — 3078F DIAST BP <80 MM HG: CPT | Mod: CPTII,S$GLB,, | Performed by: INTERNAL MEDICINE

## 2020-07-30 PROCEDURE — 3078F PR MOST RECENT DIASTOLIC BLOOD PRESSURE < 80 MM HG: ICD-10-PCS | Mod: CPTII,S$GLB,, | Performed by: INTERNAL MEDICINE

## 2020-07-30 PROCEDURE — 1126F AMNT PAIN NOTED NONE PRSNT: CPT | Mod: S$GLB,,, | Performed by: INTERNAL MEDICINE

## 2020-07-30 PROCEDURE — 3074F PR MOST RECENT SYSTOLIC BLOOD PRESSURE < 130 MM HG: ICD-10-PCS | Mod: CPTII,S$GLB,, | Performed by: INTERNAL MEDICINE

## 2020-07-30 PROCEDURE — 3074F SYST BP LT 130 MM HG: CPT | Mod: CPTII,S$GLB,, | Performed by: INTERNAL MEDICINE

## 2020-07-30 RX ORDER — LISINOPRIL 40 MG/1
40 TABLET ORAL DAILY
Qty: 90 TABLET | Refills: 3 | Status: SHIPPED | OUTPATIENT
Start: 2020-07-30 | End: 2021-09-07

## 2020-07-30 RX ORDER — AMLODIPINE BESYLATE 5 MG/1
5 TABLET ORAL DAILY
Qty: 30 TABLET | Refills: 11
Start: 2020-07-30 | End: 2021-05-02

## 2020-07-30 NOTE — PROGRESS NOTES
Subjective:   Patient ID:  Chai Murry is a 80 y.o. male who presents for follow-up of Follow-up  BP high with decreased lisinopril- SBP 180s    HPI    ROS  Family History   Problem Relation Age of Onset    Esophageal cancer Sister     Cancer Sister     Lung cancer Mother     Cancer Mother     Cataracts Mother     Hypertension Mother     Pneumonia Father     Cataracts Father     Hypertension Father     Cancer Brother     Hypertension Brother     Blindness Neg Hx     Diabetes Neg Hx     Glaucoma Neg Hx     Macular degeneration Neg Hx     Retinal detachment Neg Hx     Strabismus Neg Hx     Stroke Neg Hx     Thyroid disease Neg Hx      Past Medical History:   Diagnosis Date    Anemia     Arthritis     Atrial fibrillation     CAD (coronary artery disease)     Cataract     COPD (chronic obstructive pulmonary disease)     Diverticulosis     ED (erectile dysfunction)     HTN (hypertension)     Hyperlipidemia     Lung cancer     left    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 Courtney dwyer bx     Social History     Socioeconomic History    Marital status:      Spouse name: Not on file    Number of children: 3    Years of education: Not on file    Highest education level: Not on file   Occupational History    Occupation: retired   Social Needs    Financial resource strain: Not on file    Food insecurity     Worry: Not on file     Inability: Not on file    Transportation needs     Medical: Not on file     Non-medical: Not on file   Tobacco Use    Smoking status: Former Smoker     Packs/day: 1.00     Years: 50.00     Pack years: 50.00     Quit date: 2005     Years since quittin.9    Smokeless tobacco: Never Used   Substance and Sexual Activity    Alcohol use: No    Drug use: No    Sexual activity: Not Currently   Lifestyle    Physical activity     Days per week: Not on file     Minutes per session: Not on file    Stress: Not  on file   Relationships    Social connections     Talks on phone: Not on file     Gets together: Not on file     Attends [employer] service: Not on file     Active member of club or organization: Not on file     Attends meetings of clubs or organizations: Not on file     Relationship status: Not on file   Other Topics Concern    Not on file   Social History Narrative    Not on file     Current Outpatient Medications on File Prior to Visit   Medication Sig Dispense Refill    albuterol (PROVENTIL) 2.5 mg /3 mL (0.083 %) nebulizer solution Take 3 mLs (2.5 mg total) by nebulization every 6 (six) hours while awake. 270 mL 11    amLODIPine (NORVASC) 5 MG tablet Take 1 tablet (5 mg total) by mouth once daily. 30 tablet 11    apixaban (ELIQUIS) 5 mg Tab Take 1 tablet (5 mg total) by mouth 2 (two) times daily. 60 tablet 5    apixaban (ELIQUIS) 5 mg Tab Take 1 tablet (5 mg total) by mouth 2 (two) times daily. 60 tablet 5    apixaban (ELIQUIS) 5 mg Tab Take 1 tablet (5 mg total) by mouth 2 (two) times daily. 60 tablet 5    aspirin (ECOTRIN) 81 MG EC tablet Take 81 mg by mouth once daily.      benzonatate (TESSALON) 200 MG capsule Take 1 capsule (200 mg total) by mouth 3 (three) times daily as needed for Cough. 21 capsule 0    carbamide peroxide (DEBROX) 6.5 % otic solution Place 5 drops into the left ear 2 (two) times daily. 15 mL 2    fenofibric acid (FIBRICOR) 135 mg CpDR Take 1 capsule (135 mg total) by mouth once daily. 90 capsule 3    fluticasone (FLONASE) 50 mcg/actuation nasal spray 2 sprays (100 mcg total) by Each Nare route once daily. (Patient taking differently: 2 sprays by Each Nare route as needed. ) 3 Bottle 3    furosemide (LASIX) 20 MG tablet TAKE 1 TABLET BY MOUTH EVERY DAY AS NEEDED 30 tablet 1    glycopyrrolate-formoterol (BEVESPI AEROSPHERE) 9-4.8 mcg HFAA Inhale 2 puffs into the lungs 2 (two) times daily. Controller 10.9 g 11    HYDROcodone-acetaminophen (NORCO) 5-325 mg per tablet Take 1  tablet by mouth every 6 (six) hours as needed for Pain. 60 tablet 0    ipratropium-albuteroL (COMBIVENT RESPIMAT)  mcg/actuation inhaler Inhale 1 puff into the lungs every 6 (six) hours as needed for Shortness of Breath. 4 g 11    levocetirizine (XYZAL) 5 MG tablet Take 5 mg by mouth as needed.       levothyroxine (SYNTHROID) 25 MCG tablet Take 1 tablet (25 mcg total) by mouth before breakfast. 30 tablet 11    lisinopriL 10 MG tablet Take 1 tablet (10 mg total) by mouth once daily. 90 tablet 3    promethazine (PHENERGAN) 6.25 mg/5 mL syrup Take 20 mLs (25 mg total) by mouth nightly as needed. 240 mL 0    ranolazine (RANEXA) 500 MG Tb12 Take 1 tablet (500 mg total) by mouth 2 (two) times daily. 60 tablet 11    simvastatin (ZOCOR) 80 MG tablet Take 1 tablet (80 mg total) by mouth once daily. 90 tablet 3    sotaloL (SOTALOL AF) 80 MG tablet Take 1 tablet (80 mg total) by mouth 2 (two) times daily. 180 tablet 3    zolpidem (AMBIEN) 5 MG Tab Take 1 tablet (5 mg total) by mouth nightly as needed. 30 tablet 1     Current Facility-Administered Medications on File Prior to Visit   Medication Dose Route Frequency Provider Last Rate Last Dose    lactated ringers infusion   Intravenous Continuous Michael Hameed MD   Stopped at 07/03/19 0810     Review of patient's allergies indicates:   Allergen Reactions    Atorvastatin      Other reaction(s): Muscle pain    Bactrim [sulfamethoxazole-trimethoprim]      Lip swelling       Objective:     Physical Exam    Assessment:     1. Coronary artery disease involving native coronary artery of native heart without angina pectoris    2. Pure hypercholesterolemia    3. Essential hypertension    4. Chronic atrial fibrillation        Plan:     Coronary artery disease involving native coronary artery of native heart without angina pectoris    Pure hypercholesterolemia    Essential hypertension    Chronic atrial fibrillation      Increase back lisinopril

## 2020-08-11 ENCOUNTER — OFFICE VISIT (OUTPATIENT)
Dept: HEMATOLOGY/ONCOLOGY | Facility: CLINIC | Age: 80
End: 2020-08-11
Payer: MEDICARE

## 2020-08-11 ENCOUNTER — INFUSION (OUTPATIENT)
Dept: INFUSION THERAPY | Facility: HOSPITAL | Age: 80
End: 2020-08-11
Attending: INTERNAL MEDICINE
Payer: MEDICARE

## 2020-08-11 VITALS
SYSTOLIC BLOOD PRESSURE: 117 MMHG | DIASTOLIC BLOOD PRESSURE: 66 MMHG | HEART RATE: 54 BPM | TEMPERATURE: 98 F | WEIGHT: 193 LBS | HEIGHT: 70 IN | OXYGEN SATURATION: 95 % | BODY MASS INDEX: 27.63 KG/M2 | RESPIRATION RATE: 18 BRPM

## 2020-08-11 VITALS
RESPIRATION RATE: 18 BRPM | TEMPERATURE: 98 F | HEART RATE: 54 BPM | OXYGEN SATURATION: 97 % | DIASTOLIC BLOOD PRESSURE: 58 MMHG | SYSTOLIC BLOOD PRESSURE: 96 MMHG | WEIGHT: 193.13 LBS | HEIGHT: 70 IN | BODY MASS INDEX: 27.65 KG/M2

## 2020-08-11 DIAGNOSIS — C34.12 MALIGNANT NEOPLASM OF UPPER LOBE OF LEFT LUNG: Primary | ICD-10-CM

## 2020-08-11 PROCEDURE — 99999 PR PBB SHADOW E&M-EST. PATIENT-LVL IV: ICD-10-PCS | Mod: PBBFAC,,, | Performed by: INTERNAL MEDICINE

## 2020-08-11 PROCEDURE — 1101F PT FALLS ASSESS-DOCD LE1/YR: CPT | Mod: CPTII,S$GLB,, | Performed by: INTERNAL MEDICINE

## 2020-08-11 PROCEDURE — 1126F PR PAIN SEVERITY QUANTIFIED, NO PAIN PRESENT: ICD-10-PCS | Mod: S$GLB,,, | Performed by: INTERNAL MEDICINE

## 2020-08-11 PROCEDURE — 96413 CHEMO IV INFUSION 1 HR: CPT

## 2020-08-11 PROCEDURE — 3074F SYST BP LT 130 MM HG: CPT | Mod: CPTII,S$GLB,, | Performed by: INTERNAL MEDICINE

## 2020-08-11 PROCEDURE — 99999 PR PBB SHADOW E&M-EST. PATIENT-LVL IV: CPT | Mod: PBBFAC,,, | Performed by: INTERNAL MEDICINE

## 2020-08-11 PROCEDURE — 1159F MED LIST DOCD IN RCRD: CPT | Mod: S$GLB,,, | Performed by: INTERNAL MEDICINE

## 2020-08-11 PROCEDURE — 3078F PR MOST RECENT DIASTOLIC BLOOD PRESSURE < 80 MM HG: ICD-10-PCS | Mod: CPTII,S$GLB,, | Performed by: INTERNAL MEDICINE

## 2020-08-11 PROCEDURE — 3078F DIAST BP <80 MM HG: CPT | Mod: CPTII,S$GLB,, | Performed by: INTERNAL MEDICINE

## 2020-08-11 PROCEDURE — 99215 PR OFFICE/OUTPT VISIT, EST, LEVL V, 40-54 MIN: ICD-10-PCS | Mod: 25,S$GLB,, | Performed by: INTERNAL MEDICINE

## 2020-08-11 PROCEDURE — 1126F AMNT PAIN NOTED NONE PRSNT: CPT | Mod: S$GLB,,, | Performed by: INTERNAL MEDICINE

## 2020-08-11 PROCEDURE — 63600175 PHARM REV CODE 636 W HCPCS: Performed by: INTERNAL MEDICINE

## 2020-08-11 PROCEDURE — 3074F PR MOST RECENT SYSTOLIC BLOOD PRESSURE < 130 MM HG: ICD-10-PCS | Mod: CPTII,S$GLB,, | Performed by: INTERNAL MEDICINE

## 2020-08-11 PROCEDURE — 99215 OFFICE O/P EST HI 40 MIN: CPT | Mod: 25,S$GLB,, | Performed by: INTERNAL MEDICINE

## 2020-08-11 PROCEDURE — 25000003 PHARM REV CODE 250: Performed by: INTERNAL MEDICINE

## 2020-08-11 PROCEDURE — 1159F PR MEDICATION LIST DOCUMENTED IN MEDICAL RECORD: ICD-10-PCS | Mod: S$GLB,,, | Performed by: INTERNAL MEDICINE

## 2020-08-11 PROCEDURE — 1101F PR PT FALLS ASSESS DOC 0-1 FALLS W/OUT INJ PAST YR: ICD-10-PCS | Mod: CPTII,S$GLB,, | Performed by: INTERNAL MEDICINE

## 2020-08-11 RX ORDER — HEPARIN 100 UNIT/ML
500 SYRINGE INTRAVENOUS
Status: CANCELLED | OUTPATIENT
Start: 2020-08-11

## 2020-08-11 RX ORDER — SODIUM CHLORIDE 0.9 % (FLUSH) 0.9 %
10 SYRINGE (ML) INJECTION
Status: CANCELLED | OUTPATIENT
Start: 2020-08-11

## 2020-08-11 RX ORDER — TEMAZEPAM 15 MG/1
15 CAPSULE ORAL NIGHTLY PRN
Qty: 90 CAPSULE | Refills: 1 | Status: SHIPPED | OUTPATIENT
Start: 2020-08-11 | End: 2020-10-06 | Stop reason: SDUPTHER

## 2020-08-11 RX ORDER — TEMAZEPAM 15 MG/1
1 CAPSULE ORAL NIGHTLY PRN
COMMUNITY
Start: 2020-07-11 | End: 2020-08-11 | Stop reason: SDUPTHER

## 2020-08-11 RX ORDER — HEPARIN 100 UNIT/ML
500 SYRINGE INTRAVENOUS
Status: DISCONTINUED | OUTPATIENT
Start: 2020-08-11 | End: 2020-08-11 | Stop reason: HOSPADM

## 2020-08-11 RX ADMIN — HEPARIN 500 UNITS: 100 SYRINGE at 11:08

## 2020-08-11 RX ADMIN — SODIUM CHLORIDE 860 MG: 9 INJECTION, SOLUTION INTRAVENOUS at 10:08

## 2020-08-11 NOTE — DISCHARGE INSTRUCTIONS
.[facility]  [address] [address]  [address] [address]  [phone] phone     [phone] fax  Hours of Operation: Monday- Friday 8:00am- 5:00pm  After hours phone  [phone]  Hematology / Oncology Physicians on call      Dr. Jose Fermin, JA [place] NP Tyesha Taylor, NP    Please call with any concerns regarding your appointment today.  .FALL PREVENTION   Falls often occur due to slipping, tripping or losing your balance. Here are ways to reduce your risk of falling again.   Was there anything that caused your fall that can be fixed, removed or replaced?   Make your home safe by keeping walkways clear of objects you may trip over.   Use non-slip pads under rugs.   Do not walk in poorly lit areas.   Do not stand on chairs or wobbly ladders.   Use caution when reaching overhead or looking upward. This position can cause a loss of balance.   Be sure your shoes fit properly, have non-slip bottoms and are in good condition.   Be cautious when going up and down stairs, curbs, and when walking on uneven sidewalks.   If your balance is poor, consider using a cane or walker.   If your fall was related to alcohol use, stop or limit alcohol intake.   If your fall was related to use of sleeping medicines, talk to your doctor about this. You may need to reduce your dosage at bedtime if you awaken during the night to go to the bathroom.   To reduce the need for nighttime bathroom trips:   Avoid drinking fluids for several hours before going to bed   Empty your bladder before going to bed   Men can keep a urinal at the bedside   © 0216-9165 [employer] [facility], [address] [place] [address], [place] [address]. All rights reserved. This information is not intended as a substitute for professional medical care. Always follow your healthcare professional's instructions.    .WAYS TO HELP PREVENT  INFECTION         WASH YOUR HANDS OFTEN DURING THE DAY, ESPECIALLY BEFORE YOU EAT, AFTER USING THE BATHROOM, AND AFTER TOUCHING ANIMALS     STAY AWAY FROM PEOPLE WHO HAVE ILLNESSES YOU CAN CATCH; SUCH AS COLDS, FLU, CHICKEN POX     TRY TO AVOID CROWDS     STAY AWAY FROM CHILDREN WHO RECENTLY HAVE RECEIVED LIVE VIRUS VACCINES     MAINTAIN GOOD MOUTH CARE     DO NOT SQUEEZE OR SCRATCH PIMPLES     CLEAN CUTS & SCRAPES RIGHT AWAY AND DAILY UNTIL HEALED WITH WARM WATER, SOAP & AN ANTISEPTIC     AVOID CONTACT WITH LITTER BOXES, BIRD CAGES, & FISH TANKS     AVOID STANDING WATER, IE., BIRD BATHS, FLOWER POTS/VASES, OR HUMIDIFIERS     WEAR GLOVES WHEN GARDENING OR CLEANING UP AFTER OTHERS, ESPECIALLY BABIES & SMALL CHILDREN     DO NOT EAT RAW FISH, SEAFOOD, MEAT, OR EGGS  .HOME CARE AFTER CHEMOTHERAPY   Meals   Many patients feel sick and lose their appetites during treatment. Eat small meals several times a day. Choose bland foods with little taste or smell if you have problems with nausea. Be sure to cook all food thoroughly. This kills bacteria and helps you avoid intestinal infection. Soft foods are easier to swallow and digest.   Activity   Exercise keeps you strong and keeps your heart and lungs active. Talk to your doctor about an appropriate exercise program for you.   Skin Care   To prevent a skin infection, bathe or shower once a day. Use a moisturizing soap and wash with warm water. Avoid very hot or cold water. Chemotherapy can make your skin dry . Apply moisturizing lotion to help relieve dry skin. Some drugs used in high doses can cause slight burns to appear (like sunburn). Ask for a special cream to help relieve the burn and protect your skin.   Prevent Mouth Sores   During chemotherapy, many people get mouth sores. Do the following to help prevent mouth sores or to ease discomfort.   Brush your teeth with a soft-bristle toothbrush after every meal.  Don't use dental floss if your platelet count  "is below 50,000. Your doctor or nurse will tell you if this is the case.  Use an oral swab or special soft toothbrush if your gums bleed during regular brushing.  Use mouthwash as directed. If you can't tolerate commercial mouthwash, use salt and baking soda to clean your mouth. Mix 1 teaspoon of salt and 1 teaspoon of baking soda into a glass of water. Swish and spit.  Call your doctor or return to this facility if you develop any of the following:   Sore throat   White patches in the mouth or throat   Fever of 100.4ºF (38ºC) or higher, or as directed by your healthcare provider  © 8158-8836 [facility], [address] [place] [address], [place] [id number]. All rights reserved. This information is not intended as a substitute for professional medical care. Always follow your healthcare professional's     .Support Groups/Classes    Support groups and classes are being offered at the   [facility] and [facility]!!    "Cooking with Cancer" (Nutrition Class):  Second Wednesday of each month   at noon at the [facility].  Metastatic Support Group:  Third Tuesday of each month   at noon at the [facility].  Next Steps Class/Group: Second and fourth Thursday of each month at noon at the [facility].  [facility] (Breast Cancer Support Group): First Tuesday of each month   at 5:30pm at the [facility] ER location.  [facility]: [facility]: Second and third Tuesday of each month from 7:30am - 2pm.  [facility] ER: First and fourth Tuesday of each month from 7:30am - 2pm    If you are interested in attending or would like more information please ask our social workers or your nurse!    "

## 2020-08-25 ENCOUNTER — INFUSION (OUTPATIENT)
Dept: INFUSION THERAPY | Facility: HOSPITAL | Age: 80
End: 2020-08-25
Attending: INTERNAL MEDICINE
Payer: MEDICARE

## 2020-08-25 ENCOUNTER — OFFICE VISIT (OUTPATIENT)
Dept: HEMATOLOGY/ONCOLOGY | Facility: CLINIC | Age: 80
End: 2020-08-25
Payer: MEDICARE

## 2020-08-25 VITALS
HEIGHT: 70 IN | OXYGEN SATURATION: 95 % | SYSTOLIC BLOOD PRESSURE: 116 MMHG | HEART RATE: 59 BPM | DIASTOLIC BLOOD PRESSURE: 70 MMHG | WEIGHT: 194.44 LBS | BODY MASS INDEX: 27.84 KG/M2 | TEMPERATURE: 98 F | RESPIRATION RATE: 14 BRPM

## 2020-08-25 VITALS
HEART RATE: 59 BPM | SYSTOLIC BLOOD PRESSURE: 117 MMHG | OXYGEN SATURATION: 95 % | TEMPERATURE: 98 F | DIASTOLIC BLOOD PRESSURE: 61 MMHG | RESPIRATION RATE: 16 BRPM

## 2020-08-25 DIAGNOSIS — D64.9 ANEMIA, UNSPECIFIED TYPE: ICD-10-CM

## 2020-08-25 DIAGNOSIS — I10 ESSENTIAL HYPERTENSION: ICD-10-CM

## 2020-08-25 DIAGNOSIS — C34.12 MALIGNANT NEOPLASM OF UPPER LOBE OF LEFT LUNG: Primary | ICD-10-CM

## 2020-08-25 DIAGNOSIS — E03.9 HYPOTHYROIDISM, UNSPECIFIED TYPE: ICD-10-CM

## 2020-08-25 PROCEDURE — 99999 PR PBB SHADOW E&M-EST. PATIENT-LVL IV: CPT | Mod: PBBFAC,,, | Performed by: INTERNAL MEDICINE

## 2020-08-25 PROCEDURE — 3074F SYST BP LT 130 MM HG: CPT | Mod: CPTII,S$GLB,, | Performed by: INTERNAL MEDICINE

## 2020-08-25 PROCEDURE — 1101F PT FALLS ASSESS-DOCD LE1/YR: CPT | Mod: CPTII,S$GLB,, | Performed by: INTERNAL MEDICINE

## 2020-08-25 PROCEDURE — 1126F AMNT PAIN NOTED NONE PRSNT: CPT | Mod: S$GLB,,, | Performed by: INTERNAL MEDICINE

## 2020-08-25 PROCEDURE — 63600175 PHARM REV CODE 636 W HCPCS: Performed by: INTERNAL MEDICINE

## 2020-08-25 PROCEDURE — 25000003 PHARM REV CODE 250: Performed by: INTERNAL MEDICINE

## 2020-08-25 PROCEDURE — 3078F PR MOST RECENT DIASTOLIC BLOOD PRESSURE < 80 MM HG: ICD-10-PCS | Mod: CPTII,S$GLB,, | Performed by: INTERNAL MEDICINE

## 2020-08-25 PROCEDURE — 3078F DIAST BP <80 MM HG: CPT | Mod: CPTII,S$GLB,, | Performed by: INTERNAL MEDICINE

## 2020-08-25 PROCEDURE — 99215 OFFICE O/P EST HI 40 MIN: CPT | Mod: 25,S$GLB,, | Performed by: INTERNAL MEDICINE

## 2020-08-25 PROCEDURE — 96413 CHEMO IV INFUSION 1 HR: CPT

## 2020-08-25 PROCEDURE — 1101F PR PT FALLS ASSESS DOC 0-1 FALLS W/OUT INJ PAST YR: ICD-10-PCS | Mod: CPTII,S$GLB,, | Performed by: INTERNAL MEDICINE

## 2020-08-25 PROCEDURE — 99215 PR OFFICE/OUTPT VISIT, EST, LEVL V, 40-54 MIN: ICD-10-PCS | Mod: 25,S$GLB,, | Performed by: INTERNAL MEDICINE

## 2020-08-25 PROCEDURE — 1126F PR PAIN SEVERITY QUANTIFIED, NO PAIN PRESENT: ICD-10-PCS | Mod: S$GLB,,, | Performed by: INTERNAL MEDICINE

## 2020-08-25 PROCEDURE — 3074F PR MOST RECENT SYSTOLIC BLOOD PRESSURE < 130 MM HG: ICD-10-PCS | Mod: CPTII,S$GLB,, | Performed by: INTERNAL MEDICINE

## 2020-08-25 PROCEDURE — 1159F PR MEDICATION LIST DOCUMENTED IN MEDICAL RECORD: ICD-10-PCS | Mod: S$GLB,,, | Performed by: INTERNAL MEDICINE

## 2020-08-25 PROCEDURE — 1159F MED LIST DOCD IN RCRD: CPT | Mod: S$GLB,,, | Performed by: INTERNAL MEDICINE

## 2020-08-25 PROCEDURE — 99999 PR PBB SHADOW E&M-EST. PATIENT-LVL IV: ICD-10-PCS | Mod: PBBFAC,,, | Performed by: INTERNAL MEDICINE

## 2020-08-25 RX ORDER — HEPARIN 100 UNIT/ML
500 SYRINGE INTRAVENOUS
Status: CANCELLED | OUTPATIENT
Start: 2020-08-25

## 2020-08-25 RX ORDER — HEPARIN 100 UNIT/ML
500 SYRINGE INTRAVENOUS
Status: DISCONTINUED | OUTPATIENT
Start: 2020-08-25 | End: 2020-08-25 | Stop reason: HOSPADM

## 2020-08-25 RX ORDER — SODIUM CHLORIDE 0.9 % (FLUSH) 0.9 %
10 SYRINGE (ML) INJECTION
Status: CANCELLED | OUTPATIENT
Start: 2020-08-25

## 2020-08-25 RX ORDER — INFLUENZA A VIRUS A/MICHIGAN/45/2015 X-275 (H1N1) ANTIGEN (FORMALDEHYDE INACTIVATED), INFLUENZA A VIRUS A/SINGAPORE/INFIMH-16-0019/2016 IVR-186 (H3N2) ANTIGEN (FORMALDEHYDE INACTIVATED), INFLUENZA B VIRUS B/PHUKET/3073/2013 ANTIGEN (FORMALDEHYDE INACTIVATED), AND INFLUENZA B VIRUS B/MARYLAND/15/2016 BX-69A ANTIGEN (FORMALDEHYDE INACTIVATED) 60; 60; 60; 60 UG/.7ML; UG/.7ML; UG/.7ML; UG/.7ML
INJECTION, SUSPENSION INTRAMUSCULAR
COMMUNITY
Start: 2020-08-18 | End: 2021-06-10 | Stop reason: ALTCHOICE

## 2020-08-25 RX ADMIN — HEPARIN 500 UNITS: 100 SYRINGE at 12:08

## 2020-08-25 RX ADMIN — DURVALUMAB 860 MG: 500 INJECTION, SOLUTION INTRAVENOUS at 10:08

## 2020-08-25 NOTE — PROGRESS NOTES
Subjective:       Patient ID: Chai Murry is a 80 y.o. male.    Chief Complaint: Malignant neoplasm of upper lobe of left lung [C34.12]  HPI: We have an opportunity to see Mr. Chai Murry in Hematology Oncology clinic at [facility] on 08/25/2020.  Mr. Chai Murry is a 80 y.o. 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 He is s/p left pneumonectomy for a squamous cell carcinoma of the left lung.0n 4/12/2016. Prior to the surgery, the PET scan showed an FDG-avid mass in the left upper lobe abutting the left hilum with an SUV of 11.6. There seemed to be a left hilar adenopathy as well.   Radiologist felt that he was unable to discriminate between the mass and the lymphadenopathy. The patient had had a bronchoscopy by Dr. Roel Ernandez on 03/04/2006 that showed a partially obstructing airway in the anterior segment of the left upper lobe with an endobronchial lesion in the anterior segment of the left upper lobe. The specimen from the biopsy at the time of bronchoscopy was reported as being a squamous cell carcinoma.   At the time of the surgery after the left lung was removed, the pathologist   indicated that this was a squamous cell carcinoma. It measured 3.8 cm. The pathology indicated that this is extending into the bronchial resection margin of the lobe. This lobe was later on removed to complete the pneumonectomy There was one lymph node positive for metastatic squamous cell carcinoma at the AP window. The patient was told that we would prefer to treat him with post-op simultaneous chemo/radiation.  He had Medi-port. He started chemo/radiation therapy andreceived 6 weekly cycles of carbo/Taxol along with radiatioon. After a month, he had a Ct scan and it showed stability He then had 2 additional cyles of carbo/Taxol finishing in 9/27/2016. He comes for follow up. He developed hoarseness on good Friday 2019 and has been found to have a left vocal cord paralysis by  EENT exam. CT of the chest was non contributory, shows changes from surgery   PET showed a PET avid mass to the left of the trachea. The case was discussed with dr annika Goldman and GI. We discussed the possibility of doing a EUS or EBUs, and finally, because of the localization, it was decided to do a EUS with biopsy if possible. Cytology shows recurrent squamous cell cancer. I have asked Pathology to run a PD-L1 from his original pathology from 2016. 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 He has been discussed extensively with radiation oncology. We have decided to treat him with radiation therapy and Cisplatin as a chemo-sensitizer. Dr Castro has reviewed the therapy ports from the previous radiation treatment and the area in question seems to be above the previously radiated fields. Completed chemoradiation s/p 6 weekly cisplatin treatments with response.    Interval history:  80-year-old male with stage III non-small cell lung cancer status post chemoradiation therapy and currently on maintenance with durvalumab.  Recently has been complaining of dizziness/vertigo with change in position.  Denies any other symptoms.  Recent CT scan of the head was unremarkable for any evidence of metastatic disease.    Denies worsening shortness of breath, chest pain, nausea vomiting, headaches, lightheadedness, diarrhea or dysuria.     Oncology History   Malignant neoplasm of upper lobe of left lung   4/12/2016 Initial Diagnosis    Malignant neoplasm of upper lobe of left lung     6/19/2019 Cancer Staged    Staging form: Lung, AJCC 8th Edition  - Clinical stage from 6/19/2019: Stage IIIB (rcT4, cN2, cM0) - Signed by Alejandro Castro II, MD on 6/19/2019 6/21/2019 Tumor Conference    Presenting Hospital / Clinic: [facility] [place]  Virtual Tumor Board Conference: In person  Presenter: Dr. Chance Castro  Date Presented to Tumor Board: 06/21/19  Specialties Present: Medical  Oncology;Radiation Oncology;Surgical Oncology;Pathology;Navigation;Plastic Surgery;Radiology;Gastrointestinal;Pulmonology  Presentation at Cancer Conference: Prospective  Cancer Type: Lung cancer  Recommended Plan: Additional screening  Recommended Plan Note: If PDL-1 positive, treat with immunotherapy  Send tissue for next generation sequencing.     6/28/2019 Tumor Conference    His case was discussed at the Multidisciplinary Head and Neck Team Planning Meeting.    Representatives from Medical Oncology, Radiation Oncology, Head and Neck Surgical Oncology, Psychosocial Oncology, and Speech and Language Pathology discussed the case with the following recommendations:    1) treat lung cancer            7/5/2019 - 8/14/2019 Radiation Therapy    Treatment Site Ref. ID Energy Dose/Fx (Gy) #Fx Dose Correction (Gy) Total Dose (Gy) Start Date End Date Elapsed Days   NujxiDKOT23.4:1 PTV LNs 6X 1.8 28 / 28 0 50.4 7/5/2019 8/14/2019 40          3/10/2020 -  Chemotherapy    Treatment Summary   Plan Name: OP DURVALUMAB Q2W  Treatment Goal: Maintenance  Status: Active  Start Date: 3/10/2020  End Date: 10/6/2020 (Planned)  Provider: Fermin Vila MD  Chemotherapy: durvalumab (IMFINZI) 885 mg in sodium chloride 0.9% 267.7 mL chemo infusion, 10 mg/kg = 885 mg, Intravenous, Clinic/HOD 1 time, 13 of 16 cycles  Administration: 885 mg (3/10/2020), 860 mg (3/24/2020), 860 mg (4/7/2020), 860 mg (4/21/2020), 885 mg (5/5/2020), 860 mg (5/19/2020), 865 mg (6/2/2020), 860 mg (6/16/2020), 860 mg (7/1/2020), 860 mg (7/14/2020), 860 mg (7/28/2020), 860 mg (8/11/2020), 860 mg (8/25/2020)       Past Medical History:   Diagnosis Date    Anemia     Arthritis     Atrial fibrillation     CAD (coronary artery disease)     Cataract     COPD (chronic obstructive pulmonary disease)     Diverticulosis     ED (erectile dysfunction)     HTN (hypertension)     Hyperlipidemia     Lung cancer     left    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 Courtney dwyer bx     Family History   Problem Relation Age of Onset    Esophageal cancer Sister     Cancer Sister     Lung cancer Mother     Cancer Mother     Cataracts Mother     Hypertension Mother     Pneumonia Father     Cataracts Father     Hypertension Father     Cancer Brother     Hypertension Brother     Blindness Neg Hx     Diabetes Neg Hx     Glaucoma Neg Hx     Macular degeneration Neg Hx     Retinal detachment Neg Hx     Strabismus Neg Hx     Stroke Neg Hx     Thyroid disease Neg Hx      Social History     Socioeconomic History    Marital status:      Spouse name: Not on file    Number of children: 3    Years of education: Not on file    Highest education level: Not on file   Occupational History    Occupation: retired   Social Needs    Financial resource strain: Not on file    Food insecurity     Worry: Not on file     Inability: Not on file    Transportation needs     Medical: Not on file     Non-medical: Not on file   Tobacco Use    Smoking status: Former Smoker     Packs/day: 1.00     Years: 50.00     Pack years: 50.00     Quit date: 8/12/2005     Years since quitting: 15.0    Smokeless tobacco: Never Used   Substance and Sexual Activity    Alcohol use: No    Drug use: No    Sexual activity: Not Currently   Lifestyle    Physical activity     Days per week: Not on file     Minutes per session: Not on file    Stress: Not on file   Relationships    Social connections     Talks on phone: Not on file     Gets together: Not on file     Attends Yazdanism service: Not on file     Active member of club or organization: Not on file     Attends meetings of clubs or organizations: Not on file     Relationship status: Not on file   Other Topics Concern    Not on file   Social History Narrative    Not on file     Past Surgical History:   Procedure Laterality Date    APPENDECTOMY      BRONCHOSCOPY Bilateral 6/21/2019    Procedure:  Bronchoscopy;  Surgeon: Paresh Goldman MD;  Location: [facility] ENDO;  Service: Endoscopy;  Laterality: Bilateral;    CARDIAC CATHETERIZATION      cardiac stents      CATARACT EXTRACTION W/  INTRAOCULAR LENS IMPLANT Right 9-3-14    CHOLECYSTECTOMY      COLONOSCOPY N/A 4/17/2018    Procedure: COLONOSCOPY;  Surgeon: Dionne Doan MD;  Location: [facility] ENDO;  Service: Endoscopy;  Laterality: N/A;    COLONOSCOPY N/A 10/25/2018    Procedure: COLONOSCOPY;  Surgeon: Michael Hameed MD;  Location: [facility] ENDO;  Service: Endoscopy;  Laterality: N/A;    ENDOSCOPIC ULTRASOUND OF UPPER GASTROINTESTINAL TRACT N/A 6/11/2019    Procedure: ULTRASOUND, UPPER GI TRACT, ENDOSCOPIC;  Surgeon: Abhishek Knox MD;  Location: [facility] ENDO;  Service: Endoscopy;  Laterality: N/A;    ESOPHAGOGASTRODUODENOSCOPY N/A 6/11/2019    Procedure: EGD (ESOPHAGOGASTRODUODENOSCOPY);  Surgeon: Abhishek Knox MD;  Location: [facility] ENDO;  Service: Endoscopy;  Laterality: N/A;    infected stomach gland excision      INSERTION OF TUNNELED CENTRAL VENOUS CATHETER (CVC) WITH SUBCUTANEOUS PORT Right 7/3/2019    Procedure: [id number];  Surgeon: Jean Carlos Constantino MD;  Location: [facility] OR;  Service: General;  Laterality: Right;    LUNG REMOVAL, TOTAL Left 04/2016    lung cancer left upper lobe    SKIN BIOPSY Left     arm     Current Outpatient Medications   Medication Sig Dispense Refill    amLODIPine (NORVASC) 5 MG tablet Take 1 tablet (5 mg total) by mouth once daily. 30 tablet 11    apixaban (ELIQUIS) 5 mg Tab Take 1 tablet (5 mg total) by mouth 2 (two) times daily. 60 tablet 5    aspirin (ECOTRIN) 81 MG EC tablet Take 81 mg by mouth once daily.      benzonatate (TESSALON) 200 MG capsule Take 1 capsule (200 mg total) by mouth 3 (three) times daily as needed for Cough. 21 capsule 0    carbamide peroxide (DEBROX) 6.5 % otic solution Place 5 drops into the left ear 2 (two) times daily. 15 mL 2    fenofibric acid (FIBRICOR) 135 mg CpDR  Take 1 capsule (135 mg total) by mouth once daily. 90 capsule 3    fluticasone (FLONASE) 50 mcg/actuation nasal spray 2 sprays (100 mcg total) by Each Nare route once daily. (Patient taking differently: 2 sprays by Each Nare route as needed. ) 3 Bottle 3    FLUZONE HIGHDOSE QUAD 20-21  mcg/0.7 mL Syrg TO BE ADMINISTERED BY PHARMACIST FOR IMMUNIZATION      furosemide (LASIX) 20 MG tablet TAKE 1 TABLET BY MOUTH EVERY DAY AS NEEDED 30 tablet 1    glycopyrrolate-formoterol (BEVESPI AEROSPHERE) 9-4.8 mcg HFAA Inhale 2 puffs into the lungs 2 (two) times daily. Controller 10.9 g 11    HYDROcodone-acetaminophen (NORCO) 5-325 mg per tablet Take 1 tablet by mouth every 6 (six) hours as needed for Pain. 60 tablet 0    ipratropium-albuteroL (COMBIVENT RESPIMAT)  mcg/actuation inhaler Inhale 1 puff into the lungs every 6 (six) hours as needed for Shortness of Breath. 4 g 11    levocetirizine (XYZAL) 5 MG tablet Take 5 mg by mouth as needed.       levothyroxine (SYNTHROID) 25 MCG tablet Take 1 tablet (25 mcg total) by mouth before breakfast. 30 tablet 11    lisinopriL (PRINIVIL,ZESTRIL) 40 MG tablet Take 1 tablet (40 mg total) by mouth once daily. 90 tablet 3    promethazine (PHENERGAN) 6.25 mg/5 mL syrup Take 20 mLs (25 mg total) by mouth nightly as needed. 240 mL 0    ranolazine (RANEXA) 500 MG Tb12 Take 1 tablet (500 mg total) by mouth 2 (two) times daily. 60 tablet 11    simvastatin (ZOCOR) 80 MG tablet Take 1 tablet (80 mg total) by mouth once daily. 90 tablet 3    sotaloL (SOTALOL AF) 80 MG tablet Take 1 tablet (80 mg total) by mouth 2 (two) times daily. 180 tablet 3    temazepam (RESTORIL) 15 mg Cap Take 1 capsule (15 mg total) by mouth nightly as needed (insomnia). 90 capsule 1    albuterol (PROVENTIL) 2.5 mg /3 mL (0.083 %) nebulizer solution Take 3 mLs (2.5 mg total) by nebulization every 6 (six) hours while awake. 270 mL 11     No current facility-administered medications for this visit.       Facility-Administered Medications Ordered in Other Visits   Medication Dose Route Frequency Provider Last Rate Last Dose    lactated ringers infusion   Intravenous Continuous Michael Hameed MD   Stopped at 07/03/19 0810       Labs:  Lab Results   Component Value Date    WBC 7.63 08/25/2020    HGB 12.4 (L) 08/25/2020    HCT 39.1 (L) 08/25/2020    MCV 99 (H) 08/25/2020     08/25/2020     BMP  Lab Results   Component Value Date     08/25/2020    K 4.5 08/25/2020     08/25/2020    CO2 29 08/25/2020    BUN 24 (H) 08/25/2020    CREATININE 1.6 (H) 08/25/2020    CALCIUM 9.7 08/25/2020    ANIONGAP 9 08/25/2020    ESTGFRAFRICA 47 (A) 08/25/2020    EGFRNONAA 40 (A) 08/25/2020     Lab Results   Component Value Date    ALT 22 08/25/2020    AST 22 08/25/2020    ALKPHOS 53 (L) 08/25/2020    BILITOT 0.3 08/25/2020       No results found for: IRON, TIBC, FERRITIN, SATURATEDIRO  No results found for: LSSFPFPX06  No results found for: FOLATE  Lab Results   Component Value Date    TSH 4.388 (H) 08/11/2020       I have reviewed the radiology reports and examined the scan/xray images.    Review of Systems   Constitutional: Negative.    HENT: Negative.    Eyes: Negative.    Respiratory: Negative.    Cardiovascular: Negative.    Gastrointestinal: Negative.    Endocrine: Negative.    Genitourinary: Negative.    Musculoskeletal: Negative.    Skin: Negative.    Allergic/Immunologic: Negative.    Neurological: Negative.    Hematological: Negative.    Psychiatric/Behavioral: Negative.      ECOG SCORE              Objective:     Vitals:    08/25/20 0953   BP: 116/70   Pulse: (!) 59   Resp: 14   Temp: 97.9 °F (36.6 °C)   Body mass index is 27.9 kg/m².  Physical Exam  Vitals signs and nursing note reviewed.   Constitutional:       Appearance: He is well-developed.   HENT:      Head: Normocephalic and atraumatic.   Eyes:      Conjunctiva/sclera: Conjunctivae normal.   Neck:      Musculoskeletal: Normal range of motion  and neck supple.   Cardiovascular:      Rate and Rhythm: Normal rate and regular rhythm.   Pulmonary:      Effort: Pulmonary effort is normal.      Breath sounds: Normal breath sounds.   Abdominal:      General: Bowel sounds are normal.      Palpations: Abdomen is soft.   Musculoskeletal: Normal range of motion.   Skin:     General: Skin is warm and dry.   Neurological:      Mental Status: He is alert and oriented to person, place, and time.   Psychiatric:         Behavior: Behavior normal.         Thought Content: Thought content normal.         Judgment: Judgment normal.           Assessment:      1. Malignant neoplasm of upper lobe of left lung    2. Anemia, unspecified type    3. Hypothyroidism, unspecified type    4. Essential hypertension           Plan:     Malignant neoplasm of upper lobe of left lung  Stage IIIB non-small cell lung cancer, on maintenance durvalumab after completing concurrent chemotherapy and radiation.  Overall tolerating well. Reviewed labs, will proceed with treatment.  Plan to see him back in 2 weeks with repeat labs.  Recent restaging PET-CT scan showed into an resolution of prior FDG avid mass.    Hypothyroid  Noted elevated TSH.  Patient is noncompliant with medication.  Advised that he takes Synthroid 30 min to 1 hr prior to any meal.  He verbalized understanding. Recommend monitoring this value more closely and possibly make an adjustment to the dose of Synthroid. Currently on 25 mcg daily.      Essential hypertension  Management per PCP.  On Ace inhibitors and beta-blocker.    Anemia  Macrocytic anemia is likely multifactorial including hypothyroidism.  In light of worsening renal insufficiency since January of 2020, will initiate workup to rule out plasma cell dyscrasia.      Kobe Fuentes MD

## 2020-08-25 NOTE — DISCHARGE INSTRUCTIONS
[facility]  [address] [address]  [address]  [phone] phone     [phone] fax  Hours of Operation: Monday- Friday 8:00am- 5:00pm  After hours phone  [phone]  Hematology / Oncology Physicians on call      JA [address] Dr., Dr., Dr., Dr., NP Sydney Prescott, NP Tyesha Taylor, NP    Please call with any concerns regarding your appointment today.    HOME CARE AFTER CHEMOTHERAPY   Meals   Many patients feel sick and lose their appetites during treatment. Eat small meals several times a day. Choose bland foods with little taste or smell if you have problems with nausea. Be sure to cook all food thoroughly. This kills bacteria and helps you avoid intestinal infection. Soft foods are easier to swallow and digest.   Activity   Exercise keeps you strong and keeps your heart and lungs active. Talk to your doctor about an appropriate exercise program for you.   Skin Care   To prevent a skin infection, bathe or shower once a day. Use a moisturizing soap and wash with warm water. Avoid very hot or cold water. Chemotherapy can make your skin dry . Apply moisturizing lotion to help relieve dry skin. Some drugs used in high doses can cause slight burns to appear (like sunburn). Ask for a special cream to help relieve the burn and protect your skin.   Prevent Mouth Sores   During chemotherapy, many people get mouth sores. Do the following to help prevent mouth sores or to ease discomfort.   Brush your teeth with a soft-bristle toothbrush after every meal.  Don't use dental floss if your platelet count is below 50,000. Your doctor or nurse will tell you if this is the case.  Use an oral swab or special soft toothbrush if your gums bleed during regular brushing.  Use mouthwash as directed. If you can't tolerate commercial mouthwash, use salt and baking soda to clean your mouth. Mix 1 teaspoon of salt and 1  teaspoon of baking soda into a glass of water. Swish and spit.  Call your doctor or return to this facility if you develop any of the following:   Sore throat   White patches in the mouth or throat   Fever of 100.4ºF (38ºC) or higher, or as directed by your healthcare provider  © 2000-2011 [facility] [place], [address] [place] [address]. All rights reserved. This information is not intended as a substitute for professional medical care. Always follow your healthcare professional's   FALL PREVENTION   Falls often occur due to slipping, tripping or losing your balance. Here are ways to reduce your risk of falling again.   Was there anything that caused your fall that can be fixed, removed or replaced?   Make your home safe by keeping walkways clear of objects you may trip over.   Use non-slip pads under rugs.   Do not walk in poorly lit areas.   Do not stand on chairs or wobbly ladders.   Use caution when reaching overhead or looking upward. This position can cause a loss of balance.   Be sure your shoes fit properly, have non-slip bottoms and are in good condition.   Be cautious when going up and down stairs, curbs, and when walking on uneven sidewalks.   If your balance is poor, consider using a cane or walker.   If your fall was related to alcohol use, stop or limit alcohol intake.   If your fall was related to use of sleeping medicines, talk to your doctor about this. You may need to reduce your dosage at bedtime if you awaken during the night to go to the bathroom.   To reduce the need for nighttime bathroom trips:   Avoid drinking fluids for several hours before going to bed   Empty your bladder before going to bed   Men can keep a urinal at the bedside   © 2000-2011 [facility] [place], [address] [place] [address]. All rights reserved. This information is not intended as a substitute for professional medical care. Always follow your healthcare professional's instructions.  WAYS TO HELP  PREVENT INFECTION         WASH YOUR HANDS OFTEN DURING THE DAY, ESPECIALLY BEFORE YOU EAT, AFTER USING THE BATHROOM, AND AFTER TOUCHING ANIMALS     STAY AWAY FROM PEOPLE WHO HAVE ILLNESSES YOU CAN CATCH; SUCH AS COLDS, FLU, CHICKEN POX     TRY TO AVOID CROWDS     STAY AWAY FROM CHILDREN WHO RECENTLY HAVE RECEIVED LIVE VIRUS VACCINES     MAINTAIN GOOD MOUTH CARE     DO NOT SQUEEZE OR SCRATCH PIMPLES     CLEAN CUTS & SCRAPES RIGHT AWAY AND DAILY UNTIL HEALED WITH WARM WATER, SOAP & AN ANTISEPTIC     AVOID CONTACT WITH LITTER BOXES, BIRD CAGES, & FISH TANKS     AVOID STANDING WATER, IE., BIRD BATHS, FLOWER POTS/VASES, OR HUMIDIFIERS     WEAR GLOVES WHEN GARDENING OR CLEANING UP AFTER OTHERS, ESPECIALLY BABIES & SMALL CHILDREN     DO NOT EAT RAW FISH, SEAFOOD, MEAT, OR EGGS

## 2020-08-25 NOTE — ASSESSMENT & PLAN NOTE
Macrocytic anemia is likely multifactorial including hypothyroidism.  In light of worsening renal insufficiency since January of 2020, will initiate workup to rule out plasma cell dyscrasia.

## 2020-09-06 NOTE — PROGRESS NOTES
Subjective:       Patient ID: Chai Murry is a 80 y.o. male.    Chief Complaint: Malignant neoplasm of upper lobe of left lung [C34.12]  HPI: We have an opportunity to see Mr. Chai Murry in Hematology Oncology clinic at [facility] on 09/05/2020.  Mr. Chai Murry is a 80 y.o.  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   He is s/p left pneumonectomy for a squamous cell carcinoma of the left lung.0n 4/12/2016   Prior to the surgery, the PET scan showed an FDG-avid mass in the left upper   lobe abutting the left hilum with an SUV of 11.6. There seemed to be a left   hilar adenopathy as well.   Radiologist felt that he was unable to discriminate between the mass and the   lymphadenopathy. The patient had had a bronchoscopy by Dr. Roel Ernandez on   03/04/2006 that showed a partially obstructing airway in the anterior segment of   the left upper lobe with an endobronchial lesion in the anterior segment of the   left upper lobe. The specimen from the biopsy at the time of bronchoscopy was   reported as being a squamous cell carcinoma.   At the time of the surgery after the left lung was removed, the pathologist   indicated that this was a squamous cell carcinoma. It measured 3.8 cm. The   pathology indicated that this is extending into the bronchial resection margin of the lobe. This lobe was later on removed to complete the pneumonectomy   There was one lymph node positive for metastatic squamous cell carcinoma at the   AP window.   The patient was told that we would prefer to treat him with post-op simultaneous chemo/radiation.  He had Medi-port. He started chemo/radiation therapy andreceived 6 weekly cycles of carbo/Taxol along with radiatioon.   After a month, he had a Ct scan and it showed stability   He then had 2 additional cyles of carbo/Taxol finishing in 9/27/2016.   He comes for follow up.   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 CT of the chest was non contributory, shows changes from surgery   PET showed a PET avid mass to the left of the trachea.   The case was discussed with dr annika Goldman and GI.   We discussed the possibility of doing a EUS or EBUs, and finally, because of the localization, it was decided to do a EUS with biopsy if possible.   Cytology shows recurrent squamous cell cancer.   I have asked Pathology to run a PD-L1 from his original pathology from 2016.   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   He has been discussed extensively with radiation oncology. We have decided to treat him with radiation therapy and Cisplatin as a chemo-sensitizer.   Dr Castro has reviewed the therapy ports from the previous radiation treatment and the area in question seems to be above the previously radiated fields.   Completed chemoradiation s/p 6 weekly cisplatin treatments with response. Currently on maintenance Imfinzi. Reports had dizziness when gets up at night, attributes to restoril.       Oncology History   Malignant neoplasm of upper lobe of left lung   4/12/2016 Initial Diagnosis    Malignant neoplasm of upper lobe of left lung     6/19/2019 Cancer Staged    Staging form: Lung, AJCC 8th Edition  - Clinical stage from 6/19/2019: Stage IIIB (rcT4, cN2, cM0) - Signed by Alejandro Castro II, MD on 6/19/2019 6/21/2019 Tumor Conference    Presenting Hospital / Clinic: [facility]  Virtual Tumor Board Conference: In person  Presenter: Dr. Chance Castro  Date Presented to Tumor Board: 06/21/19  Specialties Present: Medical Oncology;Radiation Oncology;Surgical Oncology;Pathology;Navigation;Plastic Surgery;Radiology;Gastrointestinal;Pulmonology  Presentation at Cancer Conference: Prospective  Cancer Type: Lung cancer  Recommended Plan: Additional screening  Recommended Plan Note: If PDL-1 positive, treat with immunotherapy  Send tissue for next  generation sequencing.     6/28/2019 Tumor Conference    His case was discussed at the Multidisciplinary Head and Neck Team Planning Meeting.    Representatives from Medical Oncology, Radiation Oncology, Head and Neck Surgical Oncology, Psychosocial Oncology, and Speech and Language Pathology discussed the case with the following recommendations:    1) treat lung cancer            7/5/2019 - 8/14/2019 Radiation Therapy    Treatment Site Ref. ID Energy Dose/Fx (Gy) #Fx Dose Correction (Gy) Total Dose (Gy) Start Date End Date Elapsed Days   [id number]:1 PTV LNs 6X 1.8 28 / 28 0 50.4 7/5/2019 8/14/2019 40          3/10/2020 -  Chemotherapy    Treatment Summary   Plan Name: OP DURVALUMAB Q2W  Treatment Goal: Maintenance  Status: Active  Start Date: 3/10/2020  End Date: 10/6/2020 (Planned)  Provider: Fermin Vila MD  Chemotherapy: durvalumab (IMFINZI) 885 mg in sodium chloride 0.9% 267.7 mL chemo infusion, 10 mg/kg = 885 mg, Intravenous, Clinic/HOD 1 time, 13 of 16 cycles  Administration: 885 mg (3/10/2020), 860 mg (3/24/2020), 860 mg (4/7/2020), 860 mg (4/21/2020), 885 mg (5/5/2020), 860 mg (5/19/2020), 865 mg (6/2/2020), 860 mg (6/16/2020), 860 mg (7/1/2020), 860 mg (7/14/2020), 860 mg (7/28/2020), 860 mg (8/11/2020), 860 mg (8/25/2020)       Past Medical History:   Diagnosis Date    Anemia     Arthritis     Atrial fibrillation     CAD (coronary artery disease)     Cataract     COPD (chronic obstructive pulmonary disease)     Diverticulosis     ED (erectile dysfunction)     HTN (hypertension)     Hyperlipidemia     Lung cancer     left    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 Courtney dwyer bx     Family History   Problem Relation Age of Onset    Esophageal cancer Sister     Cancer Sister     Lung cancer Mother     Cancer Mother     Cataracts Mother     Hypertension Mother     Pneumonia Father     Cataracts Father     Hypertension Father     Cancer  Brother     Hypertension Brother     Blindness Neg Hx     Diabetes Neg Hx     Glaucoma Neg Hx     Macular degeneration Neg Hx     Retinal detachment Neg Hx     Strabismus Neg Hx     Stroke Neg Hx     Thyroid disease Neg Hx      Social History     Socioeconomic History    Marital status:      Spouse name: Not on file    Number of children: 3    Years of education: Not on file    Highest education level: Not on file   Occupational History    Occupation: retired   Social Needs    Financial resource strain: Not on file    Food insecurity     Worry: Not on file     Inability: Not on file    Transportation needs     Medical: Not on file     Non-medical: Not on file   Tobacco Use    Smoking status: Former Smoker     Packs/day: 1.00     Years: 50.00     Pack years: 50.00     Quit date: 8/12/2005     Years since quitting: 15.0    Smokeless tobacco: Never Used   Substance and Sexual Activity    Alcohol use: No    Drug use: No    Sexual activity: Not Currently   Lifestyle    Physical activity     Days per week: Not on file     Minutes per session: Not on file    Stress: Not on file   Relationships    Social connections     Talks on phone: Not on file     Gets together: Not on file     Attends Taoist service: Not on file     Active member of club or organization: Not on file     Attends meetings of clubs or organizations: Not on file     Relationship status: Not on file   Other Topics Concern    Not on file   Social History Narrative    Not on file     Past Surgical History:   Procedure Laterality Date    APPENDECTOMY      BRONCHOSCOPY Bilateral 6/21/2019    Procedure: Bronchoscopy;  Surgeon: Paresh Goldman MD;  Location: [facility];  Service: Endoscopy;  Laterality: Bilateral;    CARDIAC CATHETERIZATION      cardiac stents      CATARACT EXTRACTION W/  INTRAOCULAR LENS IMPLANT Right 9-3-14    CHOLECYSTECTOMY      COLONOSCOPY N/A 4/17/2018    Procedure: COLONOSCOPY;  Surgeon:  Dionne Doan MD;  Location: [facility] ENDO;  Service: Endoscopy;  Laterality: N/A;    COLONOSCOPY N/A 10/25/2018    Procedure: COLONOSCOPY;  Surgeon: Michael Hameed MD;  Location: [facility] ENDO;  Service: Endoscopy;  Laterality: N/A;    ENDOSCOPIC ULTRASOUND OF UPPER GASTROINTESTINAL TRACT N/A 6/11/2019    Procedure: ULTRASOUND, UPPER GI TRACT, ENDOSCOPIC;  Surgeon: Abhishek Knox MD;  Location: [facility] ENDO;  Service: Endoscopy;  Laterality: N/A;    ESOPHAGOGASTRODUODENOSCOPY N/A 6/11/2019    Procedure: EGD (ESOPHAGOGASTRODUODENOSCOPY);  Surgeon: Abhishek Knox MD;  Location: [facility] ENDO;  Service: Endoscopy;  Laterality: N/A;    infected stomach gland excision      INSERTION OF TUNNELED CENTRAL VENOUS CATHETER (CVC) WITH SUBCUTANEOUS PORT Right 7/3/2019    Procedure: [id number]-AOPJ-R-CATH;  Surgeon: Jean Carlos Constantino MD;  Location: [facility] OR;  Service: General;  Laterality: Right;    LUNG REMOVAL, TOTAL Left 04/2016    lung cancer left upper lobe    SKIN BIOPSY Left     arm     Current Outpatient Medications   Medication Sig Dispense Refill    albuterol (PROVENTIL) 2.5 mg /3 mL (0.083 %) nebulizer solution Take 3 mLs (2.5 mg total) by nebulization every 6 (six) hours while awake. 270 mL 11    amLODIPine (NORVASC) 5 MG tablet Take 1 tablet (5 mg total) by mouth once daily. 30 tablet 11    apixaban (ELIQUIS) 5 mg Tab Take 1 tablet (5 mg total) by mouth 2 (two) times daily. 60 tablet 5    aspirin (ECOTRIN) 81 MG EC tablet Take 81 mg by mouth once daily.      benzonatate (TESSALON) 200 MG capsule Take 1 capsule (200 mg total) by mouth 3 (three) times daily as needed for Cough. 21 capsule 0    carbamide peroxide (DEBROX) 6.5 % otic solution Place 5 drops into the left ear 2 (two) times daily. 15 mL 2    fenofibric acid (FIBRICOR) 135 mg CpDR Take 1 capsule (135 mg total) by mouth once daily. 90 capsule 3    fluticasone (FLONASE) 50 mcg/actuation nasal spray 2 sprays (100 mcg total) by Each Nare route once  daily. (Patient taking differently: 2 sprays by Each Nare route as needed. ) 3 Bottle 3    FLUZONE HIGHDOSE QUAD 20-21  mcg/0.7 mL Syrg TO BE ADMINISTERED BY PHARMACIST FOR IMMUNIZATION      furosemide (LASIX) 20 MG tablet TAKE 1 TABLET BY MOUTH EVERY DAY AS NEEDED 30 tablet 1    glycopyrrolate-formoterol (BEVESPI AEROSPHERE) 9-4.8 mcg HFAA Inhale 2 puffs into the lungs 2 (two) times daily. Controller 10.9 g 11    HYDROcodone-acetaminophen (NORCO) 5-325 mg per tablet Take 1 tablet by mouth every 6 (six) hours as needed for Pain. 60 tablet 0    ipratropium-albuteroL (COMBIVENT RESPIMAT)  mcg/actuation inhaler Inhale 1 puff into the lungs every 6 (six) hours as needed for Shortness of Breath. 4 g 11    levocetirizine (XYZAL) 5 MG tablet Take 5 mg by mouth as needed.       levothyroxine (SYNTHROID) 25 MCG tablet Take 1 tablet (25 mcg total) by mouth before breakfast. 30 tablet 11    lisinopriL (PRINIVIL,ZESTRIL) 40 MG tablet Take 1 tablet (40 mg total) by mouth once daily. 90 tablet 3    promethazine (PHENERGAN) 6.25 mg/5 mL syrup Take 20 mLs (25 mg total) by mouth nightly as needed. 240 mL 0    ranolazine (RANEXA) 500 MG Tb12 Take 1 tablet (500 mg total) by mouth 2 (two) times daily. 60 tablet 11    simvastatin (ZOCOR) 80 MG tablet Take 1 tablet (80 mg total) by mouth once daily. 90 tablet 3    sotaloL (SOTALOL AF) 80 MG tablet Take 1 tablet (80 mg total) by mouth 2 (two) times daily. 180 tablet 3    temazepam (RESTORIL) 15 mg Cap Take 1 capsule (15 mg total) by mouth nightly as needed (insomnia). 90 capsule 1     No current facility-administered medications for this visit.      Facility-Administered Medications Ordered in Other Visits   Medication Dose Route Frequency Provider Last Rate Last Dose    lactated ringers infusion   Intravenous Continuous Michael Hameed MD   Stopped at 07/03/19 0810       Labs:  Lab Results   Component Value Date    WBC 7.63 08/25/2020    HGB 12.4 (L)  08/25/2020    HCT 39.1 (L) 08/25/2020    MCV 99 (H) 08/25/2020     08/25/2020     BMP  Lab Results   Component Value Date     08/25/2020    K 4.5 08/25/2020     08/25/2020    CO2 29 08/25/2020    BUN 24 (H) 08/25/2020    CREATININE 1.6 (H) 08/25/2020    CALCIUM 9.7 08/25/2020    ANIONGAP 9 08/25/2020    ESTGFRAFRICA 47 (A) 08/25/2020    EGFRNONAA 40 (A) 08/25/2020     Lab Results   Component Value Date    ALT 22 08/25/2020    AST 22 08/25/2020    ALKPHOS 53 (L) 08/25/2020    BILITOT 0.3 08/25/2020       No results found for: IRON, TIBC, FERRITIN, SATURATEDIRO  No results found for: GTGRYSYE19  No results found for: FOLATE  Lab Results   Component Value Date    TSH 4.107 (H) 08/25/2020       I have reviewed the radiology reports and examined the scan/xray images.    Review of Systems   Constitutional: Negative.    HENT: Negative.    Eyes: Negative.    Respiratory: Negative.    Cardiovascular: Negative.    Gastrointestinal: Negative.    Endocrine: Negative.    Genitourinary: Negative.    Musculoskeletal: Negative.    Skin: Negative.    Allergic/Immunologic: Negative.    Neurological: Negative.    Hematological: Negative.    Psychiatric/Behavioral: Negative.      ECOG SCORE    0 - Fully active-able to carry on all pre-disease performance without restriction            Objective:     Vitals:    09/08/20 1203   BP: (!) 108/58   Pulse: 65   Resp: 18   Temp: 97.8 °F (36.6 °C)   Body mass index is 28.31 kg/m².  Physical Exam  Vitals signs and nursing note reviewed.   Constitutional:       Appearance: He is well-developed.   HENT:      Head: Normocephalic and atraumatic.   Eyes:      Conjunctiva/sclera: Conjunctivae normal.   Neck:      Musculoskeletal: Normal range of motion and neck supple.   Cardiovascular:      Rate and Rhythm: Normal rate and regular rhythm.   Pulmonary:      Effort: Pulmonary effort is normal.      Breath sounds: Normal breath sounds.   Abdominal:      General: Bowel sounds are  normal.      Palpations: Abdomen is soft.   Musculoskeletal: Normal range of motion.   Skin:     General: Skin is warm and dry.   Neurological:      Mental Status: He is alert and oriented to person, place, and time.   Psychiatric:         Behavior: Behavior normal.         Thought Content: Thought content normal.         Judgment: Judgment normal.           Assessment:      1. Malignant neoplasm of upper lobe of left lung           Plan:     Malignant neoplasm of upper lobe of left lung  Continue on maintenance Imfinzi, has 3 more treatments.  Will restage with PET CT after completion of Imfinzi prior to remove port.  -     CBC auto differential; Future; Expected date: 09/08/2020  -     Comprehensive metabolic panel; Future; Expected date: 09/08/2020  -     TSH; Future; Expected date: 09/08/2020

## 2020-09-08 ENCOUNTER — OFFICE VISIT (OUTPATIENT)
Dept: HEMATOLOGY/ONCOLOGY | Facility: CLINIC | Age: 80
End: 2020-09-08
Payer: MEDICARE

## 2020-09-08 ENCOUNTER — INFUSION (OUTPATIENT)
Dept: INFUSION THERAPY | Facility: HOSPITAL | Age: 80
End: 2020-09-08
Attending: INTERNAL MEDICINE
Payer: MEDICARE

## 2020-09-08 VITALS
DIASTOLIC BLOOD PRESSURE: 58 MMHG | HEIGHT: 70 IN | RESPIRATION RATE: 18 BRPM | WEIGHT: 197.31 LBS | OXYGEN SATURATION: 96 % | TEMPERATURE: 98 F | TEMPERATURE: 98 F | WEIGHT: 197.31 LBS | HEART RATE: 58 BPM | OXYGEN SATURATION: 97 % | BODY MASS INDEX: 28.25 KG/M2 | SYSTOLIC BLOOD PRESSURE: 97 MMHG | DIASTOLIC BLOOD PRESSURE: 58 MMHG | HEIGHT: 70 IN | RESPIRATION RATE: 18 BRPM | SYSTOLIC BLOOD PRESSURE: 108 MMHG | BODY MASS INDEX: 28.25 KG/M2 | HEART RATE: 65 BPM

## 2020-09-08 DIAGNOSIS — C34.12 MALIGNANT NEOPLASM OF UPPER LOBE OF LEFT LUNG: Primary | ICD-10-CM

## 2020-09-08 PROCEDURE — 1126F PR PAIN SEVERITY QUANTIFIED, NO PAIN PRESENT: ICD-10-PCS | Mod: S$GLB,,, | Performed by: INTERNAL MEDICINE

## 2020-09-08 PROCEDURE — 99999 PR PBB SHADOW E&M-EST. PATIENT-LVL IV: CPT | Mod: PBBFAC,,, | Performed by: INTERNAL MEDICINE

## 2020-09-08 PROCEDURE — 99215 PR OFFICE/OUTPT VISIT, EST, LEVL V, 40-54 MIN: ICD-10-PCS | Mod: 25,S$GLB,, | Performed by: INTERNAL MEDICINE

## 2020-09-08 PROCEDURE — 1101F PT FALLS ASSESS-DOCD LE1/YR: CPT | Mod: CPTII,S$GLB,, | Performed by: INTERNAL MEDICINE

## 2020-09-08 PROCEDURE — 63600175 PHARM REV CODE 636 W HCPCS: Mod: TB | Performed by: INTERNAL MEDICINE

## 2020-09-08 PROCEDURE — 1159F PR MEDICATION LIST DOCUMENTED IN MEDICAL RECORD: ICD-10-PCS | Mod: S$GLB,,, | Performed by: INTERNAL MEDICINE

## 2020-09-08 PROCEDURE — 96413 CHEMO IV INFUSION 1 HR: CPT

## 2020-09-08 PROCEDURE — 3074F PR MOST RECENT SYSTOLIC BLOOD PRESSURE < 130 MM HG: ICD-10-PCS | Mod: CPTII,S$GLB,, | Performed by: INTERNAL MEDICINE

## 2020-09-08 PROCEDURE — 1159F MED LIST DOCD IN RCRD: CPT | Mod: S$GLB,,, | Performed by: INTERNAL MEDICINE

## 2020-09-08 PROCEDURE — 3074F SYST BP LT 130 MM HG: CPT | Mod: CPTII,S$GLB,, | Performed by: INTERNAL MEDICINE

## 2020-09-08 PROCEDURE — 3078F PR MOST RECENT DIASTOLIC BLOOD PRESSURE < 80 MM HG: ICD-10-PCS | Mod: CPTII,S$GLB,, | Performed by: INTERNAL MEDICINE

## 2020-09-08 PROCEDURE — 1101F PR PT FALLS ASSESS DOC 0-1 FALLS W/OUT INJ PAST YR: ICD-10-PCS | Mod: CPTII,S$GLB,, | Performed by: INTERNAL MEDICINE

## 2020-09-08 PROCEDURE — 99215 OFFICE O/P EST HI 40 MIN: CPT | Mod: 25,S$GLB,, | Performed by: INTERNAL MEDICINE

## 2020-09-08 PROCEDURE — 3078F DIAST BP <80 MM HG: CPT | Mod: CPTII,S$GLB,, | Performed by: INTERNAL MEDICINE

## 2020-09-08 PROCEDURE — 1126F AMNT PAIN NOTED NONE PRSNT: CPT | Mod: S$GLB,,, | Performed by: INTERNAL MEDICINE

## 2020-09-08 PROCEDURE — 99999 PR PBB SHADOW E&M-EST. PATIENT-LVL IV: ICD-10-PCS | Mod: PBBFAC,,, | Performed by: INTERNAL MEDICINE

## 2020-09-08 PROCEDURE — 25000003 PHARM REV CODE 250: Performed by: INTERNAL MEDICINE

## 2020-09-08 RX ORDER — SODIUM CHLORIDE 0.9 % (FLUSH) 0.9 %
10 SYRINGE (ML) INJECTION
Status: DISCONTINUED | OUTPATIENT
Start: 2020-09-08 | End: 2020-09-08 | Stop reason: HOSPADM

## 2020-09-08 RX ORDER — SODIUM CHLORIDE 0.9 % (FLUSH) 0.9 %
10 SYRINGE (ML) INJECTION
Status: CANCELLED | OUTPATIENT
Start: 2020-09-08

## 2020-09-08 RX ORDER — HEPARIN 100 UNIT/ML
500 SYRINGE INTRAVENOUS
Status: DISCONTINUED | OUTPATIENT
Start: 2020-09-08 | End: 2020-09-08 | Stop reason: HOSPADM

## 2020-09-08 RX ORDER — HEPARIN 100 UNIT/ML
500 SYRINGE INTRAVENOUS
Status: CANCELLED | OUTPATIENT
Start: 2020-09-08

## 2020-09-08 RX ADMIN — DURVALUMAB 860 MG: 500 INJECTION, SOLUTION INTRAVENOUS at 01:09

## 2020-09-08 RX ADMIN — HEPARIN 500 UNITS: 100 SYRINGE at 02:09

## 2020-09-08 NOTE — DISCHARGE INSTRUCTIONS
.[facility]  [address] [address]  [address]  [phone] phone     [phone] fax  Hours of Operation: Monday- Friday 8:00am- 5:00pm  After hours phone  [phone]  Hematology / Oncology Physicians on call      Dr. Jose Fermin, JA [place] NP Tyesha Taylor, NP    Please call with any concerns regarding your appointment today.  .FALL PREVENTION   Falls often occur due to slipping, tripping or losing your balance. Here are ways to reduce your risk of falling again.   Was there anything that caused your fall that can be fixed, removed or replaced?   Make your home safe by keeping walkways clear of objects you may trip over.   Use non-slip pads under rugs.   Do not walk in poorly lit areas.   Do not stand on chairs or wobbly ladders.   Use caution when reaching overhead or looking upward. This position can cause a loss of balance.   Be sure your shoes fit properly, have non-slip bottoms and are in good condition.   Be cautious when going up and down stairs, curbs, and when walking on uneven sidewalks.   If your balance is poor, consider using a cane or walker.   If your fall was related to alcohol use, stop or limit alcohol intake.   If your fall was related to use of sleeping medicines, talk to your doctor about this. You may need to reduce your dosage at bedtime if you awaken during the night to go to the bathroom.   To reduce the need for nighttime bathroom trips:   Avoid drinking fluids for several hours before going to bed   Empty your bladder before going to bed   Men can keep a urinal at the bedside   © 2754-4474 [facility], [address] [place] [address], [place] [address]. All rights reserved. This information is not intended as a substitute for professional medical care. Always follow your healthcare professional's instructions.    .WAYS TO HELP PREVENT  "INFECTION         WASH YOUR HANDS OFTEN DURING THE DAY, ESPECIALLY BEFORE YOU EAT, AFTER USING THE BATHROOM, AND AFTER TOUCHING ANIMALS     STAY AWAY FROM PEOPLE WHO HAVE ILLNESSES YOU CAN CATCH; SUCH AS COLDS, FLU, CHICKEN POX     TRY TO AVOID CROWDS     STAY AWAY FROM CHILDREN WHO RECENTLY HAVE RECEIVED LIVE VIRUS VACCINES     MAINTAIN GOOD MOUTH CARE     DO NOT SQUEEZE OR SCRATCH PIMPLES     CLEAN CUTS & SCRAPES RIGHT AWAY AND DAILY UNTIL HEALED WITH WARM WATER, SOAP & AN ANTISEPTIC     AVOID CONTACT WITH LITTER BOXES, BIRD CAGES, & FISH TANKS     AVOID STANDING WATER, IE., BIRD BATHS, FLOWER POTS/VASES, OR HUMIDIFIERS     WEAR GLOVES WHEN GARDENING OR CLEANING UP AFTER OTHERS, ESPECIALLY BABIES & SMALL CHILDREN     DO NOT EAT RAW FISH, SEAFOOD, MEAT, OR EGGS  .WAYS TO HELP PREVENT INFECTION         WASH YOUR HANDS OFTEN DURING THE DAY, ESPECIALLY BEFORE YOU EAT, AFTER USING THE BATHROOM, AND AFTER TOUCHING ANIMALS     STAY AWAY FROM PEOPLE WHO HAVE ILLNESSES YOU CAN CATCH; SUCH AS COLDS, FLU, CHICKEN POX     TRY TO AVOID CROWDS     STAY AWAY FROM CHILDREN WHO RECENTLY HAVE RECEIVED LIVE VIRUS VACCINES     MAINTAIN GOOD MOUTH CARE     DO NOT SQUEEZE OR SCRATCH PIMPLES     CLEAN CUTS & SCRAPES RIGHT AWAY AND DAILY UNTIL HEALED WITH WARM WATER, SOAP & AN ANTISEPTIC     AVOID CONTACT WITH LITTER BOXES, BIRD CAGES, & FISH TANKS     AVOID STANDING WATER, IE., BIRD BATHS, FLOWER POTS/VASES, OR HUMIDIFIERS     WEAR GLOVES WHEN GARDENING OR CLEANING UP AFTER OTHERS, ESPECIALLY BABIES & SMALL CHILDREN     DO NOT EAT RAW FISH, SEAFOOD, MEAT, OR EGGS  .Support Groups/Classes    Support groups and classes are being offered at the   [facility] and [facility]!!    "Cooking with Cancer" (Nutrition Class):  Second Wednesday of each month   at noon at the Cancer Center.  Metastatic Support Group:  Third Tuesday of each month   at noon at the Cancer Center.  Next Steps Class/Group: Second and " fourth Thursday of each month at noon at the Cancer Center.  [facility] (Breast Cancer Support Group): First Tuesday of each month   at 5:30pm at the [facility] location.  Kapil Campo Mobile: [facility]: Second and third Tuesday of each month from 7:30am - 2pm.  [facility]: First and fourth Tuesday of each month from 7:30am - 2pm    If you are interested in attending or would like more information please ask our social workers or your nurse!

## 2020-09-22 ENCOUNTER — INFUSION (OUTPATIENT)
Dept: INFUSION THERAPY | Facility: HOSPITAL | Age: 80
End: 2020-09-22
Attending: INTERNAL MEDICINE
Payer: MEDICARE

## 2020-09-22 ENCOUNTER — OFFICE VISIT (OUTPATIENT)
Dept: HEMATOLOGY/ONCOLOGY | Facility: CLINIC | Age: 80
End: 2020-09-22
Payer: MEDICARE

## 2020-09-22 VITALS
HEIGHT: 70 IN | WEIGHT: 198 LBS | SYSTOLIC BLOOD PRESSURE: 127 MMHG | TEMPERATURE: 98 F | RESPIRATION RATE: 16 BRPM | DIASTOLIC BLOOD PRESSURE: 67 MMHG | HEART RATE: 57 BPM | HEART RATE: 62 BPM | SYSTOLIC BLOOD PRESSURE: 136 MMHG | OXYGEN SATURATION: 97 % | TEMPERATURE: 98 F | BODY MASS INDEX: 28.35 KG/M2 | DIASTOLIC BLOOD PRESSURE: 70 MMHG | OXYGEN SATURATION: 96 %

## 2020-09-22 DIAGNOSIS — D53.9 MACROCYTIC ANEMIA: ICD-10-CM

## 2020-09-22 DIAGNOSIS — C34.12 MALIGNANT NEOPLASM OF UPPER LOBE OF LEFT LUNG: Primary | ICD-10-CM

## 2020-09-22 DIAGNOSIS — N18.30 CKD (CHRONIC KIDNEY DISEASE) STAGE 3, GFR 30-59 ML/MIN: ICD-10-CM

## 2020-09-22 PROCEDURE — 99215 OFFICE O/P EST HI 40 MIN: CPT | Mod: S$GLB,,, | Performed by: INTERNAL MEDICINE

## 2020-09-22 PROCEDURE — 1101F PR PT FALLS ASSESS DOC 0-1 FALLS W/OUT INJ PAST YR: ICD-10-PCS | Mod: CPTII,S$GLB,, | Performed by: INTERNAL MEDICINE

## 2020-09-22 PROCEDURE — 63600175 PHARM REV CODE 636 W HCPCS: Performed by: INTERNAL MEDICINE

## 2020-09-22 PROCEDURE — 3078F PR MOST RECENT DIASTOLIC BLOOD PRESSURE < 80 MM HG: ICD-10-PCS | Mod: CPTII,S$GLB,, | Performed by: INTERNAL MEDICINE

## 2020-09-22 PROCEDURE — 25000003 PHARM REV CODE 250: Performed by: INTERNAL MEDICINE

## 2020-09-22 PROCEDURE — 99999 PR PBB SHADOW E&M-EST. PATIENT-LVL IV: ICD-10-PCS | Mod: PBBFAC,,, | Performed by: INTERNAL MEDICINE

## 2020-09-22 PROCEDURE — 1159F PR MEDICATION LIST DOCUMENTED IN MEDICAL RECORD: ICD-10-PCS | Mod: S$GLB,,, | Performed by: INTERNAL MEDICINE

## 2020-09-22 PROCEDURE — 3074F SYST BP LT 130 MM HG: CPT | Mod: CPTII,S$GLB,, | Performed by: INTERNAL MEDICINE

## 2020-09-22 PROCEDURE — 1126F PR PAIN SEVERITY QUANTIFIED, NO PAIN PRESENT: ICD-10-PCS | Mod: S$GLB,,, | Performed by: INTERNAL MEDICINE

## 2020-09-22 PROCEDURE — 1101F PT FALLS ASSESS-DOCD LE1/YR: CPT | Mod: CPTII,S$GLB,, | Performed by: INTERNAL MEDICINE

## 2020-09-22 PROCEDURE — 96413 CHEMO IV INFUSION 1 HR: CPT

## 2020-09-22 PROCEDURE — 3074F PR MOST RECENT SYSTOLIC BLOOD PRESSURE < 130 MM HG: ICD-10-PCS | Mod: CPTII,S$GLB,, | Performed by: INTERNAL MEDICINE

## 2020-09-22 PROCEDURE — 99215 PR OFFICE/OUTPT VISIT, EST, LEVL V, 40-54 MIN: ICD-10-PCS | Mod: S$GLB,,, | Performed by: INTERNAL MEDICINE

## 2020-09-22 PROCEDURE — 99999 PR PBB SHADOW E&M-EST. PATIENT-LVL IV: CPT | Mod: PBBFAC,,, | Performed by: INTERNAL MEDICINE

## 2020-09-22 PROCEDURE — 1126F AMNT PAIN NOTED NONE PRSNT: CPT | Mod: S$GLB,,, | Performed by: INTERNAL MEDICINE

## 2020-09-22 PROCEDURE — 1159F MED LIST DOCD IN RCRD: CPT | Mod: S$GLB,,, | Performed by: INTERNAL MEDICINE

## 2020-09-22 PROCEDURE — 3078F DIAST BP <80 MM HG: CPT | Mod: CPTII,S$GLB,, | Performed by: INTERNAL MEDICINE

## 2020-09-22 RX ORDER — HEPARIN 100 UNIT/ML
500 SYRINGE INTRAVENOUS
Status: DISCONTINUED | OUTPATIENT
Start: 2020-09-22 | End: 2020-09-22 | Stop reason: HOSPADM

## 2020-09-22 RX ORDER — HEPARIN 100 UNIT/ML
500 SYRINGE INTRAVENOUS
Status: CANCELLED | OUTPATIENT
Start: 2020-09-22

## 2020-09-22 RX ORDER — SODIUM CHLORIDE 0.9 % (FLUSH) 0.9 %
10 SYRINGE (ML) INJECTION
Status: CANCELLED | OUTPATIENT
Start: 2020-09-22

## 2020-09-22 RX ADMIN — DURVALUMAB 860 MG: 500 INJECTION, SOLUTION INTRAVENOUS at 11:09

## 2020-09-22 RX ADMIN — HEPARIN 500 UNITS: 100 SYRINGE at 12:09

## 2020-09-22 NOTE — DISCHARGE INSTRUCTIONS
[facility]  [address] [address]  [address]  [phone] phone     [phone] fax  Hours of Operation: Monday- Friday 8:00am- 5:00pm  After hours phone  [phone]  Hematology / Oncology Physicians on call      JA Cameron Dr., Dr., Dr., Dr., NP Sydney Prescott, NP Tyesha Taylor, NP    Please call with any concerns regarding your appointment today.    HOME CARE AFTER CHEMOTHERAPY   Meals   Many patients feel sick and lose their appetites during treatment. Eat small meals several times a day. Choose bland foods with little taste or smell if you have problems with nausea. Be sure to cook all food thoroughly. This kills bacteria and helps you avoid intestinal infection. Soft foods are easier to swallow and digest.   Activity   Exercise keeps you strong and keeps your heart and lungs active. Talk to your doctor about an appropriate exercise program for you.   Skin Care   To prevent a skin infection, bathe or shower once a day. Use a moisturizing soap and wash with warm water. Avoid very hot or cold water. Chemotherapy can make your skin dry . Apply moisturizing lotion to help relieve dry skin. Some drugs used in high doses can cause slight burns to appear (like sunburn). Ask for a special cream to help relieve the burn and protect your skin.   Prevent Mouth Sores   During chemotherapy, many people get mouth sores. Do the following to help prevent mouth sores or to ease discomfort.   Brush your teeth with a soft-bristle toothbrush after every meal.  Don't use dental floss if your platelet count is below 50,000. Your doctor or nurse will tell you if this is the case.  Use an oral swab or special soft toothbrush if your gums bleed during regular brushing.  Use mouthwash as directed. If you can't tolerate commercial mouthwash, use salt and baking soda to clean your mouth. Mix 1 teaspoon of salt and 1  teaspoon of baking soda into a glass of water. Swish and spit.  Call your doctor or return to this facility if you develop any of the following:   Sore throat   White patches in the mouth or throat   Fever of 100.4ºF (38ºC) or higher, or as directed by your healthcare provider  © 2000-2011 [facility], [address] [place] [address]. All rights reserved. This information is not intended as a substitute for professional medical care. Always follow your healthcare professional's   FALL PREVENTION   Falls often occur due to slipping, tripping or losing your balance. Here are ways to reduce your risk of falling again.   Was there anything that caused your fall that can be fixed, removed or replaced?   Make your home safe by keeping walkways clear of objects you may trip over.   Use non-slip pads under rugs.   Do not walk in poorly lit areas.   Do not stand on chairs or wobbly ladders.   Use caution when reaching overhead or looking upward. This position can cause a loss of balance.   Be sure your shoes fit properly, have non-slip bottoms and are in good condition.   Be cautious when going up and down stairs, curbs, and when walking on uneven sidewalks.   If your balance is poor, consider using a cane or walker.   If your fall was related to alcohol use, stop or limit alcohol intake.   If your fall was related to use of sleeping medicines, talk to your doctor about this. You may need to reduce your dosage at bedtime if you awaken during the night to go to the bathroom.   To reduce the need for nighttime bathroom trips:   Avoid drinking fluids for several hours before going to bed   Empty your bladder before going to bed   Men can keep a urinal at the bedside   © 2000-2011 [facility], [address] [place] [address]. All rights reserved. This information is not intended as a substitute for professional medical care. Always follow your healthcare professional's instructions.  WAYS TO HELP  PREVENT INFECTION         WASH YOUR HANDS OFTEN DURING THE DAY, ESPECIALLY BEFORE YOU EAT, AFTER USING THE BATHROOM, AND AFTER TOUCHING ANIMALS     STAY AWAY FROM PEOPLE WHO HAVE ILLNESSES YOU CAN CATCH; SUCH AS COLDS, FLU, CHICKEN POX     TRY TO AVOID CROWDS     STAY AWAY FROM CHILDREN WHO RECENTLY HAVE RECEIVED LIVE VIRUS VACCINES     MAINTAIN GOOD MOUTH CARE     DO NOT SQUEEZE OR SCRATCH PIMPLES     CLEAN CUTS & SCRAPES RIGHT AWAY AND DAILY UNTIL HEALED WITH WARM WATER, SOAP & AN ANTISEPTIC     AVOID CONTACT WITH LITTER BOXES, BIRD CAGES, & FISH TANKS     AVOID STANDING WATER, IE., BIRD BATHS, FLOWER POTS/VASES, OR HUMIDIFIERS     WEAR GLOVES WHEN GARDENING OR CLEANING UP AFTER OTHERS, ESPECIALLY BABIES & SMALL CHILDREN     DO NOT EAT RAW FISH, SEAFOOD, MEAT, OR EGGS

## 2020-09-22 NOTE — PROGRESS NOTES
Subjective:      DATE OF VISIT: 9/22/2020   ?   ?   Patient ID:?Chai Murry is a 80 y.o. male.?? MR#: [id number]   ?   PRIMARY PROVIDER:  Dr. Vila  ?   CHIEF COMPLAINT:  Follow-up?????   ?   ONCOLOGIC DIAGNOSIS:  Recurrent squamous cell carcinoma, with metastatic disease involving the trachea  ?   CURRENT TREATMENT:  Maintenance durvalumab    PAST TREATMENT:  Chemoradiation     HPI    Today the pleasure meeting for the 1st time Mr. Murry a 79-year-old being treated for recurrent in/metastatic squamous cell carcinoma currently on maintenance therapy, tolerating well.      He notes some weight gain, stable dyspnea on exertion and cough mildly productive.  No hemoptysis, hematemesis, fever, chills or other new concerns.    Review of Systems    ?   A comprehensive 14-point review of systems was reviewed with patient and was negative other than as specified above.   ?     Objective:      Physical Exam       ?   Vitals:    09/22/20 1017   BP: 127/67   Pulse: 62   Temp: 97.7 °F (36.5 °C)      ?   ECOG:?  0   General appearance: Generally well appearing, in no acute distress.   Head, eyes, ears, nose, and throat: Oropharynx clear with moist mucous membranes.   Cardiovascular: Regular rate and rhythm, S1, S2, no audible murmurs.   Respiratory: Lungs clear to auscultation bilaterally.   Abdomen: nontender, nondistended.   Extremities: Warm, without edema.   Neurologic: Alert and oriented. Grossly normal strength, coordination, and gait.   Skin: No rashes, ecchymoses or petechial lesion.   Psychiatric: normal mood and affect, conversant and appropriate    ?   Laboratory:  ?   Lab Visit on 09/22/2020   Component Date Value Ref Range Status    WBC 09/22/2020 5.89  3.90 - 12.70 K/uL Final    RBC 09/22/2020 3.76* 4.60 - 6.20 M/uL Final    Hemoglobin 09/22/2020 11.7* 14.0 - 18.0 g/dL Final    Hematocrit 09/22/2020 37.4* 40.0 - 54.0 % Final    Mean Corpuscular Volume 09/22/2020 100* 82 - 98 fL Final    Mean Corpuscular  Hemoglobin 09/22/2020 31.1* 27.0 - 31.0 pg Final    Mean Corpuscular Hemoglobin Conc 09/22/2020 31.3* 32.0 - 36.0 g/dL Final    RDW 09/22/2020 13.4  11.5 - 14.5 % Final    Platelets 09/22/2020 188  150 - 350 K/uL Final    MPV 09/22/2020 10.1  9.2 - 12.9 fL Final    Immature Granulocytes 09/22/2020 0.3  0.0 - 0.5 % Final    Gran # (ANC) 09/22/2020 3.3  1.8 - 7.7 K/uL Final    Immature Grans (Abs) 09/22/2020 0.02  0.00 - 0.04 K/uL Final    Lymph # 09/22/2020 0.6* 1.0 - 4.8 K/uL Final    Mono # 09/22/2020 0.6  0.3 - 1.0 K/uL Final    Eos # 09/22/2020 1.4* 0.0 - 0.5 K/uL Final    Baso # 09/22/2020 0.06  0.00 - 0.20 K/uL Final    nRBC 09/22/2020 0  0 /100 WBC Final    Gran% 09/22/2020 56.2  38.0 - 73.0 % Final    Lymph% 09/22/2020 10.2* 18.0 - 48.0 % Final    Mono% 09/22/2020 9.5  4.0 - 15.0 % Final    Eosinophil% 09/22/2020 23.1* 0.0 - 8.0 % Final    Basophil% 09/22/2020 1.0  0.0 - 1.9 % Final    Differential Method 09/22/2020 Automated   Final    Sodium 09/22/2020 144  136 - 145 mmol/L Final    Potassium 09/22/2020 4.7  3.5 - 5.1 mmol/L Final    Chloride 09/22/2020 105  95 - 110 mmol/L Final    CO2 09/22/2020 32* 23 - 29 mmol/L Final    Glucose 09/22/2020 106  70 - 110 mg/dL Final    BUN, Bld 09/22/2020 25* 8 - 23 mg/dL Final    Creatinine 09/22/2020 1.5* 0.5 - 1.4 mg/dL Final    Calcium 09/22/2020 9.1  8.7 - 10.5 mg/dL Final    Total Protein 09/22/2020 6.9  6.0 - 8.4 g/dL Final    Albumin 09/22/2020 3.5  3.5 - 5.2 g/dL Final    Total Bilirubin 09/22/2020 0.3  0.1 - 1.0 mg/dL Final    Alkaline Phosphatase 09/22/2020 45* 55 - 135 U/L Final    AST 09/22/2020 18  10 - 40 U/L Final    ALT 09/22/2020 20  10 - 44 U/L Final    Anion Gap 09/22/2020 7* 8 - 16 mmol/L Final    eGFR if African American 09/22/2020 50* >60 mL/min/1.73 m^2 Final    eGFR if non African American 09/22/2020 44* >60 mL/min/1.73 m^2 Final      ? No results found. However, due to the size of the patient record, not all  encounters were searched. Please check Results Review for a complete set of results.    ?   Assessment/Plan:       1. Malignant neoplasm of upper lobe of left lung    2. CKD (chronic kidney disease) stage 3, GFR 30-59 ml/min    3. Macrocytic anemia          Plan:     # recurrent/metastatic squamous cell carcinoma:  In status post chemoradiation now on maintenance durvalumab, tolerating well, labs reviewed and will proceed as planned with treatment today and will return in 2 weeks which will be his final treatment planned  Last PET 06/20/2020 and defer to primary oncologist Dr. Vila regarding restaging imaging at conclusion of maintenance immunotherapy.    # anemia:  Mild macrocytosis, relatively stable, continue to monitor.    # CKD stage 3:  Relatively stable renal impairment, continue monitor.  Continue follow-up primary provider.    Follow-Up:   - immunotherapy today and RV in 2 weeks with labs for final treatment planned

## 2020-09-30 ENCOUNTER — PATIENT OUTREACH (OUTPATIENT)
Dept: ADMINISTRATIVE | Facility: OTHER | Age: 80
End: 2020-09-30

## 2020-10-05 NOTE — PROGRESS NOTES
Subjective:       Patient ID: Chai Murry is a 80 y.o. male.    Chief Complaint: Malignant neoplasm of upper lobe of left lung [C34.12]  HPI: We have an opportunity to see Mr. Chai Murry in Hematology Oncology clinic at [facility] on 10/05/2020.  Mr. Chai Murry is a 80 y.o. 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   He is s/p left pneumonectomy for a squamous cell carcinoma of the left lung.0n 4/12/2016   Prior to the surgery, the PET scan showed an FDG-avid mass in the left upper   lobe abutting the left hilum with an SUV of 11.6. There seemed to be a left   hilar adenopathy as well.   Radiologist felt that he was unable to discriminate between the mass and the   lymphadenopathy. The patient had had a bronchoscopy by Dr. Roel Ernandez on   03/04/2006 that showed a partially obstructing airway in the anterior segment of   the left upper lobe with an endobronchial lesion in the anterior segment of the   left upper lobe. The specimen from the biopsy at the time of bronchoscopy was   reported as being a squamous cell carcinoma.   At the time of the surgery after the left lung was removed, the pathologist   indicated that this was a squamous cell carcinoma. It measured 3.8 cm. The   pathology indicated that this is extending into the bronchial resection margin of the lobe. This lobe was later on removed to complete the pneumonectomy   There was one lymph node positive for metastatic squamous cell carcinoma at the   AP window.   The patient was told that we would prefer to treat him with post-op simultaneous chemo/radiation.  He had Medi-port. He started chemo/radiation therapy andreceived 6 weekly cycles of carbo/Taxol along with radiatioon.   After a month, he had a Ct scan and it showed stability   He then had 2 additional cyles of carbo/Taxol finishing in 9/27/2016.   He comes for follow up.   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 CT of the chest was non contributory, shows changes from surgery   PET showed a PET avid mass to the left of the trachea.   The case was discussed with dr annika Goldman and GI.   We discussed the possibility of doing a EUS or EBUs, and finally, because of the localization, it was decided to do a EUS with biopsy if possible.   Cytology shows recurrent squamous cell cancer.   I have asked Pathology to run a PD-L1 from his original pathology from 2016.   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   He has been discussed extensively with radiation oncology. We have decided to treat him with radiation therapy and Cisplatin as a chemo-sensitizer.   Dr Castro has reviewed the therapy ports from the previous radiation treatment and the area in question seems to be above the previously radiated fields.   Completed chemoradiation s/p 6 weekly cisplatin treatments with response. Currently on maintenance Imfinzi. Reports had dizziness when gets up at night, attributes to restoril.    Oncology History   Malignant neoplasm of upper lobe of left lung   4/12/2016 Initial Diagnosis    Malignant neoplasm of upper lobe of left lung     6/19/2019 Cancer Staged    Staging form: Lung, AJCC 8th Edition  - Clinical stage from 6/19/2019: Stage IIIB (rcT4, cN2, cM0) - Signed by Alejandro Castro II, MD on 6/19/2019 6/21/2019 Tumor Conference    Presenting Hospital / Clinic: [facility] [place]  Virtual Tumor Board Conference: In person  Presenter: Dr. Chance Castro  Date Presented to Tumor Board: 06/21/19  Specialties Present: Medical Oncology;Radiation Oncology;Surgical Oncology;Pathology;Navigation;Plastic Surgery;Radiology;Gastrointestinal;Pulmonology  Presentation at Cancer Conference: Prospective  Cancer Type: Lung cancer  Recommended Plan: Additional screening  Recommended Plan Note: If PDL-1 positive, treat with immunotherapy  Send tissue for next  generation sequencing.     6/28/2019 Tumor Conference    His case was discussed at the Multidisciplinary Head and Neck Team Planning Meeting.    Representatives from Medical Oncology, Radiation Oncology, Head and Neck Surgical Oncology, Psychosocial Oncology, and Speech and Language Pathology discussed the case with the following recommendations:    1) treat lung cancer            7/5/2019 - 8/14/2019 Radiation Therapy    Treatment Site Ref. ID Energy Dose/Fx (Gy) #Fx Dose Correction (Gy) Total Dose (Gy) Start Date End Date Elapsed Days   [id number]:1 PTV LNs 6X 1.8 28 / 28 0 50.4 7/5/2019 8/14/2019 40          3/10/2020 -  Chemotherapy    Treatment Summary   Plan Name: OP DURVALUMAB Q2W  Treatment Goal: Maintenance  Status: Active  Start Date: 3/10/2020  End Date: 10/6/2020 (Planned)  Provider: Fermin Vila MD  Chemotherapy: durvalumab (IMFINZI) 885 mg in sodium chloride 0.9% 267.7 mL chemo infusion, 10 mg/kg = 885 mg, Intravenous, Clinic/HOD 1 time, 15 of 16 cycles  Administration: 885 mg (3/10/2020), 860 mg (3/24/2020), 860 mg (4/7/2020), 860 mg (4/21/2020), 885 mg (5/5/2020), 860 mg (5/19/2020), 865 mg (6/2/2020), 860 mg (6/16/2020), 860 mg (7/1/2020), 860 mg (7/14/2020), 860 mg (7/28/2020), 860 mg (8/11/2020), 860 mg (8/25/2020), 860 mg (9/22/2020), 860 mg (9/8/2020)       Past Medical History:   Diagnosis Date    Anemia     Arthritis     Atrial fibrillation     CAD (coronary artery disease)     Cataract     COPD (chronic obstructive pulmonary disease)     Diverticulosis     ED (erectile dysfunction)     HTN (hypertension)     Hyperlipidemia     Lung cancer     left    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 Courtney dwyer bx     Family History   Problem Relation Age of Onset    Esophageal cancer Sister     Cancer Sister     Lung cancer Mother     Cancer Mother     Cataracts Mother     Hypertension Mother     Pneumonia Father     Cataracts Father      Hypertension Father     Cancer Brother     Hypertension Brother     Blindness Neg Hx     Diabetes Neg Hx     Glaucoma Neg Hx     Macular degeneration Neg Hx     Retinal detachment Neg Hx     Strabismus Neg Hx     Stroke Neg Hx     Thyroid disease Neg Hx      Social History     Socioeconomic History    Marital status:      Spouse name: Not on file    Number of children: 3    Years of education: Not on file    Highest education level: Not on file   Occupational History    Occupation: retired   Social Needs    Financial resource strain: Not on file    Food insecurity     Worry: Not on file     Inability: Not on file    Transportation needs     Medical: Not on file     Non-medical: Not on file   Tobacco Use    Smoking status: Former Smoker     Packs/day: 1.00     Years: 50.00     Pack years: 50.00     Quit date: 8/12/2005     Years since quitting: 15.1    Smokeless tobacco: Never Used   Substance and Sexual Activity    Alcohol use: No    Drug use: No    Sexual activity: Not Currently   Lifestyle    Physical activity     Days per week: Not on file     Minutes per session: Not on file    Stress: Not on file   Relationships    Social connections     Talks on phone: Not on file     Gets together: Not on file     Attends Sikh service: Not on file     Active member of club or organization: Not on file     Attends meetings of clubs or organizations: Not on file     Relationship status: Not on file   Other Topics Concern    Not on file   Social History Narrative    Not on file     Past Surgical History:   Procedure Laterality Date    APPENDECTOMY      BRONCHOSCOPY Bilateral 6/21/2019    Procedure: Bronchoscopy;  Surgeon: Paresh Goldman MD;  Location[facility];  Service: Endoscopy;  Laterality: Bilateral;    CARDIAC CATHETERIZATION      cardiac stents      CATARACT EXTRACTION W/  INTRAOCULAR LENS IMPLANT Right 9-3-14    CHOLECYSTECTOMY      COLONOSCOPY N/A 4/17/2018     Procedure: COLONOSCOPY;  Surgeon: Dionne Doan MD;  Location: [facility] ENDO;  Service: Endoscopy;  Laterality: N/A;    COLONOSCOPY N/A 10/25/2018    Procedure: COLONOSCOPY;  Surgeon: Michael Hameed MD;  Location: [facility] ENDO;  Service: Endoscopy;  Laterality: N/A;    ENDOSCOPIC ULTRASOUND OF UPPER GASTROINTESTINAL TRACT N/A 6/11/2019    Procedure: ULTRASOUND, UPPER GI TRACT, ENDOSCOPIC;  Surgeon: Abhishek Knox MD;  Location: [facility] ENDO;  Service: Endoscopy;  Laterality: N/A;    ESOPHAGOGASTRODUODENOSCOPY N/A 6/11/2019    Procedure: EGD (ESOPHAGOGASTRODUODENOSCOPY);  Surgeon: Abhishek Knox MD;  Location: [facility] ENDO;  Service: Endoscopy;  Laterality: N/A;    infected stomach gland excision      INSERTION OF TUNNELED CENTRAL VENOUS CATHETER (CVC) WITH SUBCUTANEOUS PORT Right 7/3/2019    Procedure: [id number];  Surgeon: Jean Carlos Constantino MD;  Location: [facility] OR;  Service: General;  Laterality: Right;    LUNG REMOVAL, TOTAL Left 04/2016    lung cancer left upper lobe    SKIN BIOPSY Left     arm     Current Outpatient Medications   Medication Sig Dispense Refill    albuterol (PROVENTIL) 2.5 mg /3 mL (0.083 %) nebulizer solution Take 3 mLs (2.5 mg total) by nebulization every 6 (six) hours while awake. 270 mL 11    amLODIPine (NORVASC) 5 MG tablet Take 1 tablet (5 mg total) by mouth once daily. 30 tablet 11    apixaban (ELIQUIS) 5 mg Tab Take 1 tablet (5 mg total) by mouth 2 (two) times daily. 60 tablet 5    aspirin (ECOTRIN) 81 MG EC tablet Take 81 mg by mouth once daily.      benzonatate (TESSALON) 200 MG capsule Take 1 capsule (200 mg total) by mouth 3 (three) times daily as needed for Cough. 21 capsule 0    carbamide peroxide (DEBROX) 6.5 % otic solution Place 5 drops into the left ear 2 (two) times daily. 15 mL 2    fenofibric acid (FIBRICOR) 135 mg CpDR Take 1 capsule (135 mg total) by mouth once daily. 90 capsule 3    fluticasone (FLONASE) 50 mcg/actuation nasal spray 2 sprays (100 mcg  total) by Each Nare route once daily. (Patient taking differently: 2 sprays by Each Nare route as needed. ) 3 Bottle 3    FLUZONE HIGHDOSE QUAD 20-21  mcg/0.7 mL Syrg TO BE ADMINISTERED BY PHARMACIST FOR IMMUNIZATION      furosemide (LASIX) 20 MG tablet TAKE 1 TABLET BY MOUTH EVERY DAY AS NEEDED 30 tablet 1    glycopyrrolate-formoterol (BEVESPI AEROSPHERE) 9-4.8 mcg HFAA Inhale 2 puffs into the lungs 2 (two) times daily. Controller 10.9 g 11    HYDROcodone-acetaminophen (NORCO) 5-325 mg per tablet Take 1 tablet by mouth every 6 (six) hours as needed for Pain. 60 tablet 0    ipratropium-albuteroL (COMBIVENT RESPIMAT)  mcg/actuation inhaler Inhale 1 puff into the lungs every 6 (six) hours as needed for Shortness of Breath. 4 g 11    levocetirizine (XYZAL) 5 MG tablet Take 5 mg by mouth as needed.       levothyroxine (SYNTHROID) 25 MCG tablet Take 1 tablet (25 mcg total) by mouth before breakfast. 30 tablet 11    lisinopriL (PRINIVIL,ZESTRIL) 40 MG tablet Take 1 tablet (40 mg total) by mouth once daily. 90 tablet 3    promethazine (PHENERGAN) 6.25 mg/5 mL syrup Take 20 mLs (25 mg total) by mouth nightly as needed. 240 mL 0    ranolazine (RANEXA) 500 MG Tb12 Take 1 tablet (500 mg total) by mouth 2 (two) times daily. 60 tablet 11    simvastatin (ZOCOR) 80 MG tablet Take 1 tablet (80 mg total) by mouth once daily. 90 tablet 3    sotaloL (SOTALOL AF) 80 MG tablet Take 1 tablet (80 mg total) by mouth 2 (two) times daily. 180 tablet 3    temazepam (RESTORIL) 15 mg Cap Take 1 capsule (15 mg total) by mouth nightly as needed (insomnia). 90 capsule 1     No current facility-administered medications for this visit.      Facility-Administered Medications Ordered in Other Visits   Medication Dose Route Frequency Provider Last Rate Last Dose    lactated ringers infusion   Intravenous Continuous Michael Hameed MD   Stopped at 07/03/19 0810       Labs:  Lab Results   Component Value Date    WBC 5.89  09/22/2020    HGB 11.7 (L) 09/22/2020    HCT 37.4 (L) 09/22/2020     (H) 09/22/2020     09/22/2020     BMP  Lab Results   Component Value Date     09/22/2020    K 4.7 09/22/2020     09/22/2020    CO2 32 (H) 09/22/2020    BUN 25 (H) 09/22/2020    CREATININE 1.5 (H) 09/22/2020    CALCIUM 9.1 09/22/2020    ANIONGAP 7 (L) 09/22/2020    ESTGFRAFRICA 50 (A) 09/22/2020    EGFRNONAA 44 (A) 09/22/2020     Lab Results   Component Value Date    ALT 20 09/22/2020    AST 18 09/22/2020    ALKPHOS 45 (L) 09/22/2020    BILITOT 0.3 09/22/2020       No results found for: IRON, TIBC, FERRITIN, SATURATEDIRO  No results found for: DTMVDRCP37  No results found for: FOLATE  Lab Results   Component Value Date    TSH 4.421 (H) 09/22/2020       I have reviewed the radiology reports and examined the scan/xray images.    Review of Systems   Constitutional: Negative.    HENT: Negative.    Eyes: Negative.    Respiratory: Negative.    Cardiovascular: Negative.    Gastrointestinal: Negative.    Endocrine: Negative.    Genitourinary: Negative.    Musculoskeletal: Negative.    Skin: Negative.    Allergic/Immunologic: Negative.    Neurological: Negative.    Hematological: Negative.    Psychiatric/Behavioral: Negative.      ECOG SCORE    0 - Fully active-able to carry on all pre-disease performance without restriction            Objective:     Vitals:    10/06/20 1052   BP: 138/71   Pulse: 68   Temp: 98 °F (36.7 °C)   Body mass index is 28.22 kg/m².  Physical Exam  Vitals signs and nursing note reviewed.   Constitutional:       Appearance: He is well-developed.   HENT:      Head: Normocephalic and atraumatic.   Eyes:      Conjunctiva/sclera: Conjunctivae normal.   Neck:      Musculoskeletal: Normal range of motion and neck supple.   Cardiovascular:      Rate and Rhythm: Normal rate and regular rhythm.   Pulmonary:      Effort: Pulmonary effort is normal.      Breath sounds: Normal breath sounds.   Abdominal:      General:  Bowel sounds are normal.      Palpations: Abdomen is soft.   Musculoskeletal: Normal range of motion.   Skin:     General: Skin is warm and dry.   Neurological:      Mental Status: He is alert and oriented to person, place, and time.   Psychiatric:         Behavior: Behavior normal.         Thought Content: Thought content normal.         Judgment: Judgment normal.           Assessment:      1. Malignant neoplasm of upper lobe of left lung           Plan:     Malignant neoplasm of upper lobe of left lung  Complete maintenance Imfinzi today.  Will restage with PET Ct in 11/30.  If negative, would remove port.  -     HYDROcodone-acetaminophen (NORCO) 5-325 mg per tablet; Take 1 tablet by mouth every 6 (six) hours as needed for Pain.  Dispense: 60 tablet; Refill: 0  -     temazepam (RESTORIL) 15 mg Cap; Take 1 capsule (15 mg total) by mouth nightly as needed (insomnia).  Dispense: 90 capsule; Refill: 1  -     Ambulatory referral/consult to Cardiology; Future; Expected date: 10/13/2020

## 2020-10-06 ENCOUNTER — OFFICE VISIT (OUTPATIENT)
Dept: HEMATOLOGY/ONCOLOGY | Facility: CLINIC | Age: 80
End: 2020-10-06
Payer: MEDICARE

## 2020-10-06 ENCOUNTER — INFUSION (OUTPATIENT)
Dept: INFUSION THERAPY | Facility: HOSPITAL | Age: 80
End: 2020-10-06
Attending: INTERNAL MEDICINE
Payer: MEDICARE

## 2020-10-06 VITALS
HEIGHT: 70 IN | HEART RATE: 68 BPM | SYSTOLIC BLOOD PRESSURE: 138 MMHG | WEIGHT: 196.63 LBS | TEMPERATURE: 98 F | DIASTOLIC BLOOD PRESSURE: 71 MMHG | OXYGEN SATURATION: 97 % | BODY MASS INDEX: 28.15 KG/M2

## 2020-10-06 VITALS
RESPIRATION RATE: 18 BRPM | SYSTOLIC BLOOD PRESSURE: 150 MMHG | HEIGHT: 70 IN | BODY MASS INDEX: 28.06 KG/M2 | WEIGHT: 196 LBS | DIASTOLIC BLOOD PRESSURE: 72 MMHG | TEMPERATURE: 98 F | HEART RATE: 59 BPM | OXYGEN SATURATION: 96 %

## 2020-10-06 DIAGNOSIS — C34.12 MALIGNANT NEOPLASM OF UPPER LOBE OF LEFT LUNG: Primary | ICD-10-CM

## 2020-10-06 PROCEDURE — 3075F SYST BP GE 130 - 139MM HG: CPT | Mod: CPTII,S$GLB,, | Performed by: INTERNAL MEDICINE

## 2020-10-06 PROCEDURE — 1126F PR PAIN SEVERITY QUANTIFIED, NO PAIN PRESENT: ICD-10-PCS | Mod: S$GLB,,, | Performed by: INTERNAL MEDICINE

## 2020-10-06 PROCEDURE — 99215 PR OFFICE/OUTPT VISIT, EST, LEVL V, 40-54 MIN: ICD-10-PCS | Mod: 25,S$GLB,, | Performed by: INTERNAL MEDICINE

## 2020-10-06 PROCEDURE — 63600175 PHARM REV CODE 636 W HCPCS: Mod: TB | Performed by: INTERNAL MEDICINE

## 2020-10-06 PROCEDURE — 99215 OFFICE O/P EST HI 40 MIN: CPT | Mod: 25,S$GLB,, | Performed by: INTERNAL MEDICINE

## 2020-10-06 PROCEDURE — 1101F PT FALLS ASSESS-DOCD LE1/YR: CPT | Mod: CPTII,S$GLB,, | Performed by: INTERNAL MEDICINE

## 2020-10-06 PROCEDURE — 3078F PR MOST RECENT DIASTOLIC BLOOD PRESSURE < 80 MM HG: ICD-10-PCS | Mod: CPTII,S$GLB,, | Performed by: INTERNAL MEDICINE

## 2020-10-06 PROCEDURE — 99999 PR PBB SHADOW E&M-EST. PATIENT-LVL IV: ICD-10-PCS | Mod: PBBFAC,,, | Performed by: INTERNAL MEDICINE

## 2020-10-06 PROCEDURE — 1101F PR PT FALLS ASSESS DOC 0-1 FALLS W/OUT INJ PAST YR: ICD-10-PCS | Mod: CPTII,S$GLB,, | Performed by: INTERNAL MEDICINE

## 2020-10-06 PROCEDURE — 1159F MED LIST DOCD IN RCRD: CPT | Mod: S$GLB,,, | Performed by: INTERNAL MEDICINE

## 2020-10-06 PROCEDURE — 3078F DIAST BP <80 MM HG: CPT | Mod: CPTII,S$GLB,, | Performed by: INTERNAL MEDICINE

## 2020-10-06 PROCEDURE — 1126F AMNT PAIN NOTED NONE PRSNT: CPT | Mod: S$GLB,,, | Performed by: INTERNAL MEDICINE

## 2020-10-06 PROCEDURE — 96413 CHEMO IV INFUSION 1 HR: CPT

## 2020-10-06 PROCEDURE — 25000003 PHARM REV CODE 250: Performed by: INTERNAL MEDICINE

## 2020-10-06 PROCEDURE — 1159F PR MEDICATION LIST DOCUMENTED IN MEDICAL RECORD: ICD-10-PCS | Mod: S$GLB,,, | Performed by: INTERNAL MEDICINE

## 2020-10-06 PROCEDURE — 99999 PR PBB SHADOW E&M-EST. PATIENT-LVL IV: CPT | Mod: PBBFAC,,, | Performed by: INTERNAL MEDICINE

## 2020-10-06 PROCEDURE — 3075F PR MOST RECENT SYSTOLIC BLOOD PRESS GE 130-139MM HG: ICD-10-PCS | Mod: CPTII,S$GLB,, | Performed by: INTERNAL MEDICINE

## 2020-10-06 RX ORDER — TEMAZEPAM 15 MG/1
15 CAPSULE ORAL NIGHTLY PRN
Qty: 90 CAPSULE | Refills: 1 | Status: SHIPPED | OUTPATIENT
Start: 2020-10-06 | End: 2021-05-19 | Stop reason: SDUPTHER

## 2020-10-06 RX ORDER — SODIUM CHLORIDE 0.9 % (FLUSH) 0.9 %
10 SYRINGE (ML) INJECTION
Status: CANCELLED | OUTPATIENT
Start: 2020-10-06

## 2020-10-06 RX ORDER — HEPARIN 100 UNIT/ML
500 SYRINGE INTRAVENOUS
Status: DISCONTINUED | OUTPATIENT
Start: 2020-10-06 | End: 2020-10-06 | Stop reason: HOSPADM

## 2020-10-06 RX ORDER — HEPARIN 100 UNIT/ML
500 SYRINGE INTRAVENOUS
Status: CANCELLED | OUTPATIENT
Start: 2020-10-06

## 2020-10-06 RX ORDER — HYDROCODONE BITARTRATE AND ACETAMINOPHEN 5; 325 MG/1; MG/1
1 TABLET ORAL EVERY 6 HOURS PRN
Qty: 60 TABLET | Refills: 0 | Status: SHIPPED | OUTPATIENT
Start: 2020-10-06 | End: 2022-03-21 | Stop reason: SDUPTHER

## 2020-10-06 RX ADMIN — HEPARIN 500 UNITS: 100 SYRINGE at 12:10

## 2020-10-06 RX ADMIN — DURVALUMAB 860 MG: 500 INJECTION, SOLUTION INTRAVENOUS at 11:10

## 2020-10-06 NOTE — DISCHARGE INSTRUCTIONS
.[facility]  [address] [address]  [address] [facility] [address]  [phone] phone     [phone] fax  Hours of Operation: Monday- Friday 8:00am- 5:00pm  After hours phone  [phone]  Hematology / Oncology Physicians on call      Dr. Jose Fermin, JA [place] NP Tyesha Taylor, NP    Please call with any concerns regarding your appointment today.  .FALL PREVENTION   Falls often occur due to slipping, tripping or losing your balance. Here are ways to reduce your risk of falling again.   Was there anything that caused your fall that can be fixed, removed or replaced?   Make your home safe by keeping walkways clear of objects you may trip over.   Use non-slip pads under rugs.   Do not walk in poorly lit areas.   Do not stand on chairs or wobbly ladders.   Use caution when reaching overhead or looking upward. This position can cause a loss of balance.   Be sure your shoes fit properly, have non-slip bottoms and are in good condition.   Be cautious when going up and down stairs, curbs, and when walking on uneven sidewalks.   If your balance is poor, consider using a cane or walker.   If your fall was related to alcohol use, stop or limit alcohol intake.   If your fall was related to use of sleeping medicines, talk to your doctor about this. You may need to reduce your dosage at bedtime if you awaken during the night to go to the bathroom.   To reduce the need for nighttime bathroom trips:   Avoid drinking fluids for several hours before going to bed   Empty your bladder before going to bed   Men can keep a urinal at the bedside   © 1713-8701 [facility], [address] [place] [address], [place] [address]. All rights reserved. This information is not intended as a substitute for professional medical care. Always follow your healthcare professional's instructions.    .WAYS TO HELP PREVENT  INFECTION         WASH YOUR HANDS OFTEN DURING THE DAY, ESPECIALLY BEFORE YOU EAT, AFTER USING THE BATHROOM, AND AFTER TOUCHING ANIMALS     STAY AWAY FROM PEOPLE WHO HAVE ILLNESSES YOU CAN CATCH; SUCH AS COLDS, FLU, CHICKEN POX     TRY TO AVOID CROWDS     STAY AWAY FROM CHILDREN WHO RECENTLY HAVE RECEIVED LIVE VIRUS VACCINES     MAINTAIN GOOD MOUTH CARE     DO NOT SQUEEZE OR SCRATCH PIMPLES     CLEAN CUTS & SCRAPES RIGHT AWAY AND DAILY UNTIL HEALED WITH WARM WATER, SOAP & AN ANTISEPTIC     AVOID CONTACT WITH LITTER BOXES, BIRD CAGES, & FISH TANKS     AVOID STANDING WATER, IE., BIRD BATHS, FLOWER POTS/VASES, OR HUMIDIFIERS     WEAR GLOVES WHEN GARDENING OR CLEANING UP AFTER OTHERS, ESPECIALLY BABIES & SMALL CHILDREN     DO NOT EAT RAW FISH, SEAFOOD, MEAT, OR EGGS  .HOME CARE AFTER CHEMOTHERAPY   Meals   Many patients feel sick and lose their appetites during treatment. Eat small meals several times a day. Choose bland foods with little taste or smell if you have problems with nausea. Be sure to cook all food thoroughly. This kills bacteria and helps you avoid intestinal infection. Soft foods are easier to swallow and digest.   Activity   Exercise keeps you strong and keeps your heart and lungs active. Talk to your doctor about an appropriate exercise program for you.   Skin Care   To prevent a skin infection, bathe or shower once a day. Use a moisturizing soap and wash with warm water. Avoid very hot or cold water. Chemotherapy can make your skin dry . Apply moisturizing lotion to help relieve dry skin. Some drugs used in high doses can cause slight burns to appear (like sunburn). Ask for a special cream to help relieve the burn and protect your skin.   Prevent Mouth Sores   During chemotherapy, many people get mouth sores. Do the following to help prevent mouth sores or to ease discomfort.   Brush your teeth with a soft-bristle toothbrush after every meal.  Don't use dental floss if your platelet count  "is below 50,000. Your doctor or nurse will tell you if this is the case.  Use an oral swab or special soft toothbrush if your gums bleed during regular brushing.  Use mouthwash as directed. If you can't tolerate commercial mouthwash, use salt and baking soda to clean your mouth. Mix 1 teaspoon of salt and 1 teaspoon of baking soda into a glass of water. Swish and spit.  Call your doctor or return to this facility if you develop any of the following:   Sore throat   White patches in the mouth or throat   Fever of 100.4ºF (38ºC) or higher, or as directed by your healthcare provider  © 6691-8551 [facility], [address] [place] [address], [place] [id number]. All rights reserved. This information is not intended as a substitute for professional medical care. Always follow your healthcare professional's     .Support Groups/Classes    Support groups and classes are being offered at the   [facility] and [facility]!!    "Cooking with Cancer" (Nutrition Class):  Second Wednesday of each month   at noon at the [facility].  Metastatic Support Group:  Third Tuesday of each month   at noon at the [facility].  Next Steps Class/Group: Second and fourth Thursday of each month at noon at the [facility].  [facility] (Breast Cancer Support Group): First Tuesday of each month   at 5:30pm at the [facility] location.  [facility]: [facility]: Second and third Tuesday of each month from 7:30am - 2pm.  [facility]: First and fourth Tuesday of each month from 7:30am - 2pm    If you are interested in attending or would like more information please ask our social workers or your nurse!    "

## 2020-10-16 ENCOUNTER — PATIENT MESSAGE (OUTPATIENT)
Dept: INTERNAL MEDICINE | Facility: CLINIC | Age: 80
End: 2020-10-16

## 2020-11-12 ENCOUNTER — OFFICE VISIT (OUTPATIENT)
Dept: RADIATION ONCOLOGY | Facility: CLINIC | Age: 80
End: 2020-11-12
Payer: MEDICARE

## 2020-11-12 VITALS
TEMPERATURE: 98 F | RESPIRATION RATE: 18 BRPM | WEIGHT: 194.81 LBS | BODY MASS INDEX: 27.89 KG/M2 | HEIGHT: 70 IN | SYSTOLIC BLOOD PRESSURE: 140 MMHG | DIASTOLIC BLOOD PRESSURE: 69 MMHG | HEART RATE: 63 BPM | OXYGEN SATURATION: 97 %

## 2020-11-12 DIAGNOSIS — C34.12 MALIGNANT NEOPLASM OF UPPER LOBE OF LEFT LUNG: Primary | ICD-10-CM

## 2020-11-12 PROCEDURE — 3077F PR MOST RECENT SYSTOLIC BLOOD PRESSURE >= 140 MM HG: ICD-10-PCS | Mod: CPTII,S$GLB,, | Performed by: RADIOLOGY

## 2020-11-12 PROCEDURE — 3078F DIAST BP <80 MM HG: CPT | Mod: CPTII,S$GLB,, | Performed by: RADIOLOGY

## 2020-11-12 PROCEDURE — 99213 PR OFFICE/OUTPT VISIT, EST, LEVL III, 20-29 MIN: ICD-10-PCS | Mod: S$GLB,,, | Performed by: RADIOLOGY

## 2020-11-12 PROCEDURE — 1126F PR PAIN SEVERITY QUANTIFIED, NO PAIN PRESENT: ICD-10-PCS | Mod: S$GLB,,, | Performed by: RADIOLOGY

## 2020-11-12 PROCEDURE — 99213 OFFICE O/P EST LOW 20 MIN: CPT | Mod: S$GLB,,, | Performed by: RADIOLOGY

## 2020-11-12 PROCEDURE — 3078F PR MOST RECENT DIASTOLIC BLOOD PRESSURE < 80 MM HG: ICD-10-PCS | Mod: CPTII,S$GLB,, | Performed by: RADIOLOGY

## 2020-11-12 PROCEDURE — 1101F PT FALLS ASSESS-DOCD LE1/YR: CPT | Mod: CPTII,S$GLB,, | Performed by: RADIOLOGY

## 2020-11-12 PROCEDURE — 3288F PR FALLS RISK ASSESSMENT DOCUMENTED: ICD-10-PCS | Mod: CPTII,S$GLB,, | Performed by: RADIOLOGY

## 2020-11-12 PROCEDURE — 1159F MED LIST DOCD IN RCRD: CPT | Mod: S$GLB,,, | Performed by: RADIOLOGY

## 2020-11-12 PROCEDURE — 1126F AMNT PAIN NOTED NONE PRSNT: CPT | Mod: S$GLB,,, | Performed by: RADIOLOGY

## 2020-11-12 PROCEDURE — 3077F SYST BP >= 140 MM HG: CPT | Mod: CPTII,S$GLB,, | Performed by: RADIOLOGY

## 2020-11-12 PROCEDURE — 1159F PR MEDICATION LIST DOCUMENTED IN MEDICAL RECORD: ICD-10-PCS | Mod: S$GLB,,, | Performed by: RADIOLOGY

## 2020-11-12 PROCEDURE — 3288F FALL RISK ASSESSMENT DOCD: CPT | Mod: CPTII,S$GLB,, | Performed by: RADIOLOGY

## 2020-11-12 PROCEDURE — 99999 PR PBB SHADOW E&M-EST. PATIENT-LVL IV: ICD-10-PCS | Mod: PBBFAC,,, | Performed by: RADIOLOGY

## 2020-11-12 PROCEDURE — 99999 PR PBB SHADOW E&M-EST. PATIENT-LVL IV: CPT | Mod: PBBFAC,,, | Performed by: RADIOLOGY

## 2020-11-12 PROCEDURE — 1101F PR PT FALLS ASSESS DOC 0-1 FALLS W/OUT INJ PAST YR: ICD-10-PCS | Mod: CPTII,S$GLB,, | Performed by: RADIOLOGY

## 2020-11-12 NOTE — PROGRESS NOTES
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 16 left pneumonectomy Dr. Wall returned moderately differentiated 3.8 cm squamous cell carcinoma with a positive AP window lymph node. A total of 3 nodes were sampled. Indeterminate lymph vascular invasion was seen There was a positive bronchial resection margin but in my discussion with Dr. Garza, the margin was apparently cleared in discussion with the surgeon.   2. He completed adjuvant radiation at [facility] and weekly carboplatin paclitaxel x 6.   3. 19 CT chest, abdomen and pelvis showed slight growth in a soft tissue nodule in the superior mediastinum since , and the nodule was not present in 2016. An additional subcentimeter short axis lymph node in the right paratracheal space was also slightly enlarged. There was also nodular mucosal thickening along the posterior lateral left intrathoracic tracheal airway adherent secretions versus a polypoid lesion.   4. 5/15/19 PET showed 12 SUV along the left and posterior trachea with soft tissue invasion of the left side of the trachea just above T1. It was unclear if this was a lymph node extrinsic to the trachea versus a mass in the trachea or esophagus. There was also a left paratracheal lymph node 6.4 SUV.   5.  19 EUS showed esophagitis in the upper 3rd esophagus. There was a moderate intrinsic stenosis in the upper 3rd of the esophagus. There was an irregular mediastinal mass 25 cm from the incisors measuring 2.5 x 3 cm cross-sectional. Pathology returned poorly differentiated carcinoma, favor squamous cell carcinoma.   6. 19 bronchoscopy left local cord paralysis, non obstructing mass at the orifice in the subglottic area,  "endobronchial, friable, infiltrative and ulcerative. Pathology squamous cell carcinoma.   7. 8/14/19 completed 50.4 Gy radiation with cisplatin mediastinal and tracheal recurrence   8. 9/25/19 PET decreased SUV in size of paratracheal lymph nodes, resolution of tracheal mass.  9. Maintenance Imfinzi recently completed    INTERVAL HISTORY: Chai Murry is a pleasant 80 y.o. male who presents today for follow-up.  He was last seen 5/11/20.  Most recent imaging PET 6/17/20 showed interim resolution of prior FDG avid L paratracheal mass and prior weakly avid RLL nodule no longer seen.  He has not had immunotherapy in some time now.  Today, he is doing well overall. He denies any dysphagia, odynophagia, hemoptysis, or worsening dyspnea.  Can eat all foods but needs to watch portion size and swallowing as coughs easily with liquids.    PHYSICAL EXAMINATION:  Constitutional/Vitals: well appearing, no acute distress, ECOG 0 - Fully Active, BP (!) 140/69   Pulse 63   Temp 97.5 °F (36.4 °C)   Resp 18   Ht 5' 10" (1.778 m)   Wt 88.4 kg (194 lb 12.8 oz)   SpO2 97%   BMI 27.95 kg/m²   Lymphatic: no cervical, supraclavicular adenopathy  Cardiovascular: regular rate, no murmurs, no edema of the upper or lower extremities, radial pulse 2+  Respiratory: unlabored effort, clear to auscultation, no wheezes    IMAGING AND LABORATORY FINDINGS: As per HPI; images, labs and medical records reviewed personally in EPIC.    ASSESSMENT:  No evidence of disease on most recent imaging    PLAN:  Mr. Murry is doing well. Imaging can be dictated by medical oncology.  He will get PET end of this month. If stable, f/u in 6 months.    I spent approximately 15 minutes reviewing the available records and evaluating the patient, out of which over 50% of the time was spent face to face with the patient in counseling and coordinating this patient's care.    Katelyn Vuong M.D.  Radiation Oncology  [facility]  [address] [place] [address] Dr, " NIYAH Tompkins II [id number]

## 2020-11-27 ENCOUNTER — TELEPHONE (OUTPATIENT)
Dept: RADIOLOGY | Facility: HOSPITAL | Age: 80
End: 2020-11-27

## 2020-11-28 NOTE — PROGRESS NOTES
Subjective:       Patient ID: Chai Murry is a 80 y.o. male.    Chief Complaint: Malignant neoplasm of upper lobe of left lung [C34.12]  HPI: We have an opportunity to see Mr. Chai Murry in Hematology Oncology clinic at [facility] on 11/27/2020.  Mr. Chai Murry is a 80 y.o.  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   He is s/p left pneumonectomy for a squamous cell carcinoma of the left lung.0n 4/12/2016   Prior to the surgery, the PET scan showed an FDG-avid mass in the left upper   lobe abutting the left hilum with an SUV of 11.6. There seemed to be a left   hilar adenopathy as well.   Radiologist felt that he was unable to discriminate between the mass and the   lymphadenopathy. The patient had had a bronchoscopy by Dr. Roel Ernandez on   03/04/2006 that showed a partially obstructing airway in the anterior segment of   the left upper lobe with an endobronchial lesion in the anterior segment of the   left upper lobe. The specimen from the biopsy at the time of bronchoscopy was   reported as being a squamous cell carcinoma.   At the time of the surgery after the left lung was removed, the pathologist   indicated that this was a squamous cell carcinoma. It measured 3.8 cm. The   pathology indicated that this is extending into the bronchial resection margin of the lobe. This lobe was later on removed to complete the pneumonectomy   There was one lymph node positive for metastatic squamous cell carcinoma at the   AP window.   The patient was told that we would prefer to treat him with post-op simultaneous chemo/radiation.  He had Medi-port. He started chemo/radiation therapy andreceived 6 weekly cycles of carbo/Taxol along with radiatioon.   After a month, he had a Ct scan and it showed stability   He then had 2 additional cyles of carbo/Taxol finishing in 9/27/2016.   He comes for follow up.   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 CT of the chest was non contributory, shows changes from surgery   PET showed a PET avid mass to the left of the trachea.   The case was discussed with dr annika Goldman and GI.   We discussed the possibility of doing a EUS or EBUs, and finally, because of the localization, it was decided to do a EUS with biopsy if possible.   Cytology shows recurrent squamous cell cancer.   I have asked Pathology to run a PD-L1 from his original pathology from 2016.   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   He has been discussed extensively with radiation oncology. We have decided to treat him with radiation therapy and Cisplatin as a chemo-sensitizer.   Dr Castro has reviewed the therapy ports from the previous radiation treatment and the area in question seems to be above the previously radiated fields.   Completed chemoradiation s/p 6 weekly cisplatin treatments with response. Currently on maintenance Imfinzi. Reports had dizziness when gets up at night, attributes to restor    Oncology History   Malignant neoplasm of upper lobe of left lung   4/12/2016 Initial Diagnosis    Malignant neoplasm of upper lobe of left lung     6/19/2019 Cancer Staged    Staging form: Lung, AJCC 8th Edition  - Clinical stage from 6/19/2019: Stage IIIB (rcT4, cN2, cM0) - Signed by Alejandro Castro II, MD on 6/19/2019 6/21/2019 Tumor Conference    Presenting Hospital / Clinic: [facility] [place] [facility]  Virtual Tumor Board Conference: In person  Presenter: Dr. Chance Castro  Date Presented to Tumor Board: 06/21/19  Specialties Present: Medical Oncology;Radiation Oncology;Surgical Oncology;Pathology;Navigation;Plastic Surgery;Radiology;Gastrointestinal;Pulmonology  Presentation at Cancer Conference: Prospective  Cancer Type: Lung cancer  Recommended Plan: Additional screening  Recommended Plan Note: If PDL-1 positive, treat with immunotherapy  Send tissue for next generation  sequencing.     6/28/2019 Tumor Conference    His case was discussed at the Multidisciplinary Head and Neck Team Planning Meeting.    Representatives from Medical Oncology, Radiation Oncology, Head and Neck Surgical Oncology, Psychosocial Oncology, and Speech and Language Pathology discussed the case with the following recommendations:    1) treat lung cancer            7/5/2019 - 8/14/2019 Radiation Therapy    Treatment Site Ref. ID Energy Dose/Fx (Gy) #Fx Dose Correction (Gy) Total Dose (Gy) Start Date End Date Elapsed Days   [id number]:1 PTV LNs 6X 1.8 28 / 28 0 50.4 7/5/2019 8/14/2019 40          3/10/2020 -  Chemotherapy    Treatment Summary   Plan Name: OP DURVALUMAB Q2W  Treatment Goal: Maintenance  Status: Active  Start Date: 3/10/2020  End Date: 10/6/2020  Provider: Fermin Vila MD  Chemotherapy: durvalumab (IMFINZI) 885 mg in sodium chloride 0.9% 267.7 mL chemo infusion, 10 mg/kg = 885 mg, Intravenous, Clinic/HOD 1 time, 16 of 16 cycles  Administration: 885 mg (3/10/2020), 860 mg (3/24/2020), 860 mg (4/7/2020), 860 mg (4/21/2020), 885 mg (5/5/2020), 860 mg (5/19/2020), 865 mg (6/2/2020), 860 mg (6/16/2020), 860 mg (7/1/2020), 860 mg (7/14/2020), 860 mg (7/28/2020), 860 mg (8/11/2020), 860 mg (8/25/2020), 860 mg (9/22/2020), 860 mg (10/6/2020), 860 mg (9/8/2020)       Past Medical History:   Diagnosis Date    Anemia     Arthritis     Atrial fibrillation     CAD (coronary artery disease)     Cataract     COPD (chronic obstructive pulmonary disease)     Diverticulosis     ED (erectile dysfunction)     HTN (hypertension)     Hyperlipidemia     Lung cancer     left    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 Courtney dwyer bx     Family History   Problem Relation Age of Onset    Esophageal cancer Sister     Cancer Sister     Lung cancer Mother     Cancer Mother     Cataracts Mother     Hypertension Mother     Pneumonia Father     Cataracts Father      Hypertension Father     Cancer Brother     Hypertension Brother     Blindness Neg Hx     Diabetes Neg Hx     Glaucoma Neg Hx     Macular degeneration Neg Hx     Retinal detachment Neg Hx     Strabismus Neg Hx     Stroke Neg Hx     Thyroid disease Neg Hx      Social History     Socioeconomic History    Marital status:      Spouse name: Not on file    Number of children: 3    Years of education: Not on file    Highest education level: Not on file   Occupational History    Occupation: retired   Social Needs    Financial resource strain: Not on file    Food insecurity     Worry: Not on file     Inability: Not on file    Transportation needs     Medical: Not on file     Non-medical: Not on file   Tobacco Use    Smoking status: Former Smoker     Packs/day: 1.00     Years: 50.00     Pack years: 50.00     Quit date: 8/12/2005     Years since quitting: 15.3    Smokeless tobacco: Never Used   Substance and Sexual Activity    Alcohol use: No    Drug use: No    Sexual activity: Not Currently   Lifestyle    Physical activity     Days per week: Not on file     Minutes per session: Not on file    Stress: Not on file   Relationships    Social connections     Talks on phone: Not on file     Gets together: Not on file     Attends [employer] service: Not on file     Active member of club or organization: Not on file     Attends meetings of clubs or organizations: Not on file     Relationship status: Not on file   Other Topics Concern    Not on file   Social History Narrative    Not on file     Past Surgical History:   Procedure Laterality Date    APPENDECTOMY      BRONCHOSCOPY Bilateral 6/21/2019    Procedure: Bronchoscopy;  Surgeon: Paresh Goldman MD;  Location: [facility];  Service: Endoscopy;  Laterality: Bilateral;    CARDIAC CATHETERIZATION      cardiac stents      CATARACT EXTRACTION W/  INTRAOCULAR LENS IMPLANT Right 9-3-14    CHOLECYSTECTOMY      COLONOSCOPY N/A 4/17/2018     Procedure: COLONOSCOPY;  Surgeon: Dionne Doan MD;  Location: [facility];  Service: Endoscopy;  Laterality: N/A;    COLONOSCOPY N/A 10/25/2018    Procedure: COLONOSCOPY;  Surgeon: Michael Hameed MD;  Location: [facility] END[facility];  Service: Endoscopy;  Laterality: N/A;    ENDOSCOPIC ULTRASOUND OF UPPER GASTROINTESTINAL TRACT N/A 6/11/2019    Procedure: ULTRASOUND, UPPER GI TRACT, ENDOSCOPIC;  Surgeon: Abhishek Knox MD;  Location: [facility] END[facility];  Service: Endoscopy;  Laterality: N/A;    ESOPHAGOGASTRODUODENOSCOPY N/A 6/11/2019    Procedure: EGD (ESOPHAGOGASTRODUODENOSCOPY);  Surgeon: Abhishek Knox MD;  Location: [facility];  Service: Endoscopy;  Laterality: N/A;    infected stomach gland excision      INSERTION OF TUNNELED CENTRAL VENOUS CATHETER (CVC) WITH SUBCUTANEOUS PORT Right 7/3/2019    Procedure: [id number];  Surgeon: Jean Carlos Constantino MD;  Location: [facility] OR;  Service: General;  Laterality: Right;    LUNG REMOVAL, TOTAL Left 04/2016    lung cancer left upper lobe    SKIN BIOPSY Left     arm     Current Outpatient Medications   Medication Sig Dispense Refill    albuterol (PROVENTIL) 2.5 mg /3 mL (0.083 %) nebulizer solution Take 3 mLs (2.5 mg total) by nebulization every 6 (six) hours while awake. 270 mL 11    amLODIPine (NORVASC) 5 MG tablet Take 1 tablet (5 mg total) by mouth once daily. 30 tablet 11    apixaban (ELIQUIS) 5 mg Tab Take 1 tablet (5 mg total) by mouth 2 (two) times daily. 60 tablet 5    aspirin (ECOTRIN) 81 MG EC tablet Take 81 mg by mouth once daily.      benzonatate (TESSALON) 200 MG capsule Take 1 capsule (200 mg total) by mouth 3 (three) times daily as needed for Cough. 21 capsule 0    carbamide peroxide (DEBROX) 6.5 % otic solution Place 5 drops into the left ear 2 (two) times daily. 15 mL 2    doxepin (SINEQUAN) 10 MG capsule TAKE 1 CAPSULE (10 MG TOTAL) BY MOUTH EVERY EVENING. 90 capsule 3    fenofibric acid (FIBRICOR) 135 mg CpDR Take 1 capsule (135 mg total) by  mouth once daily. 90 capsule 3    fluticasone (FLONASE) 50 mcg/actuation nasal spray 2 sprays (100 mcg total) by Each Nare route once daily. (Patient taking differently: 2 sprays by Each Nare route as needed. ) 3 Bottle 3    FLUZONE HIGHDOSE QUAD 20-21  mcg/0.7 mL Syrg TO BE ADMINISTERED BY PHARMACIST FOR IMMUNIZATION      furosemide (LASIX) 20 MG tablet TAKE 1 TABLET BY MOUTH EVERY DAY AS NEEDED 30 tablet 1    glycopyrrolate-formoterol (BEVESPI AEROSPHERE) 9-4.8 mcg HFAA Inhale 2 puffs into the lungs 2 (two) times daily. Controller 10.9 g 11    HYDROcodone-acetaminophen (NORCO) 5-325 mg per tablet Take 1 tablet by mouth every 6 (six) hours as needed for Pain. 60 tablet 0    ipratropium-albuteroL (COMBIVENT RESPIMAT)  mcg/actuation inhaler Inhale 1 puff into the lungs every 6 (six) hours as needed for Shortness of Breath. 4 g 11    levocetirizine (XYZAL) 5 MG tablet Take 5 mg by mouth as needed.       levothyroxine (SYNTHROID) 25 MCG tablet Take 1 tablet (25 mcg total) by mouth before breakfast. 30 tablet 11    lisinopriL (PRINIVIL,ZESTRIL) 40 MG tablet Take 1 tablet (40 mg total) by mouth once daily. 90 tablet 3    promethazine (PHENERGAN) 6.25 mg/5 mL syrup Take 20 mLs (25 mg total) by mouth nightly as needed. 240 mL 0    ranolazine (RANEXA) 500 MG Tb12 Take 1 tablet (500 mg total) by mouth 2 (two) times daily. 60 tablet 11    simvastatin (ZOCOR) 80 MG tablet Take 1 tablet (80 mg total) by mouth once daily. 90 tablet 3    sotaloL (SOTALOL AF) 80 MG tablet Take 1 tablet (80 mg total) by mouth 2 (two) times daily. 180 tablet 3    temazepam (RESTORIL) 15 mg Cap Take 1 capsule (15 mg total) by mouth nightly as needed (insomnia). 90 capsule 1     No current facility-administered medications for this visit.      Facility-Administered Medications Ordered in Other Visits   Medication Dose Route Frequency Provider Last Rate Last Dose    lactated ringers infusion   Intravenous Continuous Michael  RENE Hameed MD   Stopped at 07/03/19 0810       Labs:  Lab Results   Component Value Date    WBC 6.54 10/06/2020    HGB 12.1 (L) 10/06/2020    HCT 39.7 (L) 10/06/2020     (H) 10/06/2020     10/06/2020     BMP  Lab Results   Component Value Date     10/06/2020    K 4.5 10/06/2020     10/06/2020    CO2 28 10/06/2020    BUN 21 10/06/2020    CREATININE 1.4 10/06/2020    CALCIUM 9.2 10/06/2020    ANIONGAP 9 10/06/2020    ESTGFRAFRICA 55 (A) 10/06/2020    EGFRNONAA 47 (A) 10/06/2020     Lab Results   Component Value Date    ALT 19 10/06/2020    AST 20 10/06/2020    ALKPHOS 55 10/06/2020    BILITOT 0.3 10/06/2020       No results found for: IRON, TIBC, FERRITIN, SATURATEDIRO  No results found for: [id number]  No results found for: FOLATE  Lab Results   Component Value Date    TSH 3.139 10/06/2020       I have reviewed the radiology reports and examined the scan/xray images.    Review of Systems   Constitutional: Negative.    HENT: Negative.    Eyes: Negative.    Respiratory: Negative.    Cardiovascular: Negative.    Gastrointestinal: Negative.    Endocrine: Negative.    Genitourinary: Negative.    Musculoskeletal: Negative.    Skin: Negative.    Allergic/Immunologic: Negative.    Neurological: Negative.    Hematological: Negative.    Psychiatric/Behavioral: Negative.      ECOG SCORE    0 - Fully active-able to carry on all pre-disease performance without restriction            Objective:     Vitals:    11/30/20 1227   BP: 114/73   Pulse: 61   Temp: 97.3 °F (36.3 °C)   Body mass index is 27.9 kg/m².  Physical Exam  Vitals signs and nursing note reviewed.   Constitutional:       Appearance: He is well-developed.   HENT:      Head: Normocephalic and atraumatic.   Eyes:      Conjunctiva/sclera: Conjunctivae normal.   Neck:      Musculoskeletal: Normal range of motion and neck supple.   Cardiovascular:      Rate and Rhythm: Normal rate and regular rhythm.   Pulmonary:      Effort: Pulmonary effort is  normal.      Breath sounds: Normal breath sounds.   Abdominal:      General: Bowel sounds are normal.      Palpations: Abdomen is soft.   Musculoskeletal: Normal range of motion.   Skin:     General: Skin is warm and dry.   Neurological:      Mental Status: He is alert and oriented to person, place, and time.   Psychiatric:         Behavior: Behavior normal.         Thought Content: Thought content normal.         Judgment: Judgment normal.           Assessment:      1. Malignant neoplasm of upper lobe of left lung           Plan:     Malignant neoplasm of upper lobe of left lung  PET CT showed TERESA.  Will set up for port removal  -     CBC Auto Differential; Future; Expected date: 11/30/2020  -     Comprehensive Metabolic Panel; Future; Expected date: 11/30/2020  -     TSH; Future; Expected date: 11/30/2020  -     NM PET CT Routine Skull to Mid Thigh; Future; Expected date: 03/26/2021    -     IR Tunneled Cath Removal w/o Port; Future; Expected date: 11/30/2020    Nutritional anemia, unspecified   -     TSH; Future; Expected date: 11/30/2020

## 2020-11-30 ENCOUNTER — HOSPITAL ENCOUNTER (OUTPATIENT)
Dept: RADIOLOGY | Facility: HOSPITAL | Age: 80
Discharge: HOME | End: 2020-11-30
Attending: INTERNAL MEDICINE
Payer: MEDICARE

## 2020-11-30 ENCOUNTER — OFFICE VISIT (OUTPATIENT)
Dept: HEMATOLOGY/ONCOLOGY | Facility: CLINIC | Age: 80
End: 2020-11-30
Payer: MEDICARE

## 2020-11-30 VITALS
BODY MASS INDEX: 27.84 KG/M2 | DIASTOLIC BLOOD PRESSURE: 73 MMHG | OXYGEN SATURATION: 98 % | WEIGHT: 194.44 LBS | HEIGHT: 70 IN | SYSTOLIC BLOOD PRESSURE: 114 MMHG | TEMPERATURE: 97 F | HEART RATE: 61 BPM

## 2020-11-30 DIAGNOSIS — D53.9 NUTRITIONAL ANEMIA, UNSPECIFIED: ICD-10-CM

## 2020-11-30 DIAGNOSIS — C34.12 MALIGNANT NEOPLASM OF UPPER LOBE OF LEFT LUNG: ICD-10-CM

## 2020-11-30 DIAGNOSIS — C34.12 MALIGNANT NEOPLASM OF UPPER LOBE OF LEFT LUNG: Primary | ICD-10-CM

## 2020-11-30 PROCEDURE — 99999 PR PBB SHADOW E&M-EST. PATIENT-LVL V: CPT | Mod: PBBFAC,,, | Performed by: INTERNAL MEDICINE

## 2020-11-30 PROCEDURE — 1159F MED LIST DOCD IN RCRD: CPT | Mod: S$GLB,,, | Performed by: INTERNAL MEDICINE

## 2020-11-30 PROCEDURE — 1126F PR PAIN SEVERITY QUANTIFIED, NO PAIN PRESENT: ICD-10-PCS | Mod: S$GLB,,, | Performed by: INTERNAL MEDICINE

## 2020-11-30 PROCEDURE — 78815 PET IMAGE W/CT SKULL-THIGH: CPT | Mod: TC

## 2020-11-30 PROCEDURE — 3074F SYST BP LT 130 MM HG: CPT | Mod: CPTII,S$GLB,, | Performed by: INTERNAL MEDICINE

## 2020-11-30 PROCEDURE — 99212 PR OFFICE/OUTPT VISIT, EST, LEVL II, 10-19 MIN: ICD-10-PCS | Mod: S$GLB,,, | Performed by: INTERNAL MEDICINE

## 2020-11-30 PROCEDURE — 3288F FALL RISK ASSESSMENT DOCD: CPT | Mod: CPTII,S$GLB,, | Performed by: INTERNAL MEDICINE

## 2020-11-30 PROCEDURE — 78815 PET IMAGE W/CT SKULL-THIGH: CPT | Mod: 26,PS,, | Performed by: RADIOLOGY

## 2020-11-30 PROCEDURE — 3078F PR MOST RECENT DIASTOLIC BLOOD PRESSURE < 80 MM HG: ICD-10-PCS | Mod: CPTII,S$GLB,, | Performed by: INTERNAL MEDICINE

## 2020-11-30 PROCEDURE — 99999 PR PBB SHADOW E&M-EST. PATIENT-LVL V: ICD-10-PCS | Mod: PBBFAC,,, | Performed by: INTERNAL MEDICINE

## 2020-11-30 PROCEDURE — 1126F AMNT PAIN NOTED NONE PRSNT: CPT | Mod: S$GLB,,, | Performed by: INTERNAL MEDICINE

## 2020-11-30 PROCEDURE — 3288F PR FALLS RISK ASSESSMENT DOCUMENTED: ICD-10-PCS | Mod: CPTII,S$GLB,, | Performed by: INTERNAL MEDICINE

## 2020-11-30 PROCEDURE — 3078F DIAST BP <80 MM HG: CPT | Mod: CPTII,S$GLB,, | Performed by: INTERNAL MEDICINE

## 2020-11-30 PROCEDURE — 3074F PR MOST RECENT SYSTOLIC BLOOD PRESSURE < 130 MM HG: ICD-10-PCS | Mod: CPTII,S$GLB,, | Performed by: INTERNAL MEDICINE

## 2020-11-30 PROCEDURE — 1159F PR MEDICATION LIST DOCUMENTED IN MEDICAL RECORD: ICD-10-PCS | Mod: S$GLB,,, | Performed by: INTERNAL MEDICINE

## 2020-11-30 PROCEDURE — 1101F PT FALLS ASSESS-DOCD LE1/YR: CPT | Mod: CPTII,S$GLB,, | Performed by: INTERNAL MEDICINE

## 2020-11-30 PROCEDURE — 99212 OFFICE O/P EST SF 10 MIN: CPT | Mod: S$GLB,,, | Performed by: INTERNAL MEDICINE

## 2020-11-30 PROCEDURE — 78815 NM PET CT ROUTINE: ICD-10-PCS | Mod: 26,PS,, | Performed by: RADIOLOGY

## 2020-11-30 PROCEDURE — 1101F PR PT FALLS ASSESS DOC 0-1 FALLS W/OUT INJ PAST YR: ICD-10-PCS | Mod: CPTII,S$GLB,, | Performed by: INTERNAL MEDICINE

## 2020-12-28 ENCOUNTER — PATIENT OUTREACH (OUTPATIENT)
Dept: ADMINISTRATIVE | Facility: OTHER | Age: 80
End: 2020-12-28

## 2020-12-28 NOTE — PROGRESS NOTES
Health Maintenance Due   Topic Date Due    Hepatitis C Screening  1940    Shingles Vaccine (1 of 2) 04/06/1991    Influenza Vaccine (1) 08/01/2020    Lipid Panel  08/24/2020     Updates were requested from care everywhere.  Chart was reviewed for overdue Proactive [facility] Encounters ([facility]) topics (CRS, Breast Cancer Screening, Eye exam)  Health Maintenance has been updated.  LINKS immunization registry triggered.  Immunizations were reconciled.

## 2020-12-29 ENCOUNTER — TELEPHONE (OUTPATIENT)
Dept: PULMONOLOGY | Facility: CLINIC | Age: 80
End: 2020-12-29

## 2021-02-08 ENCOUNTER — CLINICAL SUPPORT (OUTPATIENT)
Dept: URGENT CARE | Facility: CLINIC | Age: 81
End: 2021-02-08
Payer: MEDICARE

## 2021-02-08 DIAGNOSIS — Z20.822 EXPOSURE TO COVID-19 VIRUS: Primary | ICD-10-CM

## 2021-02-08 DIAGNOSIS — U07.1 COVID-19 VIRUS DETECTED: ICD-10-CM

## 2021-02-08 LAB
CTP QC/QA: YES
SARS-COV-2 RDRP RESP QL NAA+PROBE: POSITIVE

## 2021-02-08 PROCEDURE — U0002 COVID-19 LAB TEST NON-CDC: HCPCS | Mod: QW,S$GLB,, | Performed by: NURSE PRACTITIONER

## 2021-02-08 PROCEDURE — U0002: ICD-10-PCS | Mod: QW,S$GLB,, | Performed by: NURSE PRACTITIONER

## 2021-03-02 DIAGNOSIS — R60.0 LEG EDEMA: ICD-10-CM

## 2021-03-02 RX ORDER — FUROSEMIDE 20 MG/1
20 TABLET ORAL DAILY PRN
Qty: 30 TABLET | Refills: 11 | Status: SHIPPED | OUTPATIENT
Start: 2021-03-02 | End: 2021-03-04 | Stop reason: SDUPTHER

## 2021-03-04 DIAGNOSIS — R60.0 LEG EDEMA: ICD-10-CM

## 2021-03-04 RX ORDER — FUROSEMIDE 20 MG/1
20 TABLET ORAL DAILY PRN
Qty: 30 TABLET | Refills: 11 | Status: SHIPPED | OUTPATIENT
Start: 2021-03-04 | End: 2021-03-16

## 2021-03-16 ENCOUNTER — TELEPHONE (OUTPATIENT)
Dept: INTERNAL MEDICINE | Facility: CLINIC | Age: 81
End: 2021-03-16

## 2021-03-18 ENCOUNTER — LAB VISIT (OUTPATIENT)
Dept: LAB | Facility: HOSPITAL | Age: 81
End: 2021-03-18
Attending: INTERNAL MEDICINE
Payer: MEDICARE

## 2021-03-18 ENCOUNTER — OFFICE VISIT (OUTPATIENT)
Dept: INTERNAL MEDICINE | Facility: CLINIC | Age: 81
End: 2021-03-18
Payer: MEDICARE

## 2021-03-18 VITALS
HEIGHT: 70 IN | SYSTOLIC BLOOD PRESSURE: 118 MMHG | HEART RATE: 68 BPM | TEMPERATURE: 99 F | WEIGHT: 195.31 LBS | DIASTOLIC BLOOD PRESSURE: 62 MMHG | BODY MASS INDEX: 27.96 KG/M2

## 2021-03-18 DIAGNOSIS — R41.3 MEMORY LOSS: ICD-10-CM

## 2021-03-18 DIAGNOSIS — N18.32 STAGE 3B CHRONIC KIDNEY DISEASE: ICD-10-CM

## 2021-03-18 DIAGNOSIS — I48.20 CHRONIC ATRIAL FIBRILLATION: ICD-10-CM

## 2021-03-18 DIAGNOSIS — I73.9 PVD (PERIPHERAL VASCULAR DISEASE): ICD-10-CM

## 2021-03-18 DIAGNOSIS — R41.3 OTHER AMNESIA: ICD-10-CM

## 2021-03-18 DIAGNOSIS — J44.9 COPD, MODERATE: ICD-10-CM

## 2021-03-18 DIAGNOSIS — I25.118 CORONARY ARTERY DISEASE OF NATIVE ARTERY OF NATIVE HEART WITH STABLE ANGINA PECTORIS: ICD-10-CM

## 2021-03-18 DIAGNOSIS — R41.3 MEMORY LOSS: Primary | ICD-10-CM

## 2021-03-18 LAB
ALBUMIN SERPL BCP-MCNC: 3.6 G/DL (ref 3.5–5.2)
ALP SERPL-CCNC: 65 U/L (ref 55–135)
ALT SERPL W/O P-5'-P-CCNC: 26 U/L (ref 10–44)
ANION GAP SERPL CALC-SCNC: 6 MMOL/L (ref 8–16)
AST SERPL-CCNC: 21 U/L (ref 10–40)
BILIRUB SERPL-MCNC: 0.4 MG/DL (ref 0.1–1)
BUN SERPL-MCNC: 23 MG/DL (ref 8–23)
CALCIUM SERPL-MCNC: 9 MG/DL (ref 8.7–10.5)
CHLORIDE SERPL-SCNC: 105 MMOL/L (ref 95–110)
CO2 SERPL-SCNC: 30 MMOL/L (ref 23–29)
CREAT SERPL-MCNC: 1.7 MG/DL (ref 0.5–1.4)
ERYTHROCYTE [DISTWIDTH] IN BLOOD BY AUTOMATED COUNT: 14.6 % (ref 11.5–14.5)
EST. GFR  (AFRICAN AMERICAN): 43.4 ML/MIN/1.73 M^2
EST. GFR  (NON AFRICAN AMERICAN): 37.5 ML/MIN/1.73 M^2
GLUCOSE SERPL-MCNC: 106 MG/DL (ref 70–110)
HCT VFR BLD AUTO: 38.5 % (ref 40–54)
HGB BLD-MCNC: 11.9 G/DL (ref 14–18)
MCH RBC QN AUTO: 30.7 PG (ref 27–31)
MCHC RBC AUTO-ENTMCNC: 30.9 G/DL (ref 32–36)
MCV RBC AUTO: 99 FL (ref 82–98)
PLATELET # BLD AUTO: 172 K/UL (ref 150–350)
PMV BLD AUTO: 10.8 FL (ref 9.2–12.9)
POTASSIUM SERPL-SCNC: 4.2 MMOL/L (ref 3.5–5.1)
PROT SERPL-MCNC: 6.8 G/DL (ref 6–8.4)
RBC # BLD AUTO: 3.88 M/UL (ref 4.6–6.2)
SODIUM SERPL-SCNC: 141 MMOL/L (ref 136–145)
TSH SERPL DL<=0.005 MIU/L-ACNC: 3.17 UIU/ML (ref 0.4–4)
WBC # BLD AUTO: 6.74 K/UL (ref 3.9–12.7)

## 2021-03-18 PROCEDURE — 84443 ASSAY THYROID STIM HORMONE: CPT | Performed by: INTERNAL MEDICINE

## 2021-03-18 PROCEDURE — 1126F AMNT PAIN NOTED NONE PRSNT: CPT | Mod: S$GLB,,, | Performed by: INTERNAL MEDICINE

## 2021-03-18 PROCEDURE — 3078F PR MOST RECENT DIASTOLIC BLOOD PRESSURE < 80 MM HG: ICD-10-PCS | Mod: CPTII,S$GLB,, | Performed by: INTERNAL MEDICINE

## 2021-03-18 PROCEDURE — 3074F PR MOST RECENT SYSTOLIC BLOOD PRESSURE < 130 MM HG: ICD-10-PCS | Mod: CPTII,S$GLB,, | Performed by: INTERNAL MEDICINE

## 2021-03-18 PROCEDURE — 1159F PR MEDICATION LIST DOCUMENTED IN MEDICAL RECORD: ICD-10-PCS | Mod: S$GLB,,, | Performed by: INTERNAL MEDICINE

## 2021-03-18 PROCEDURE — 3078F DIAST BP <80 MM HG: CPT | Mod: CPTII,S$GLB,, | Performed by: INTERNAL MEDICINE

## 2021-03-18 PROCEDURE — 36415 COLL VENOUS BLD VENIPUNCTURE: CPT | Mod: PO | Performed by: INTERNAL MEDICINE

## 2021-03-18 PROCEDURE — 3074F SYST BP LT 130 MM HG: CPT | Mod: CPTII,S$GLB,, | Performed by: INTERNAL MEDICINE

## 2021-03-18 PROCEDURE — 1126F PR PAIN SEVERITY QUANTIFIED, NO PAIN PRESENT: ICD-10-PCS | Mod: S$GLB,,, | Performed by: INTERNAL MEDICINE

## 2021-03-18 PROCEDURE — 82746 ASSAY OF FOLIC ACID SERUM: CPT | Performed by: INTERNAL MEDICINE

## 2021-03-18 PROCEDURE — 80053 COMPREHEN METABOLIC PANEL: CPT | Performed by: INTERNAL MEDICINE

## 2021-03-18 PROCEDURE — 99214 PR OFFICE/OUTPT VISIT, EST, LEVL IV, 30-39 MIN: ICD-10-PCS | Mod: S$GLB,,, | Performed by: INTERNAL MEDICINE

## 2021-03-18 PROCEDURE — 84425 ASSAY OF VITAMIN B-1: CPT | Performed by: INTERNAL MEDICINE

## 2021-03-18 PROCEDURE — 86592 SYPHILIS TEST NON-TREP QUAL: CPT | Performed by: INTERNAL MEDICINE

## 2021-03-18 PROCEDURE — 85027 COMPLETE CBC AUTOMATED: CPT | Performed by: INTERNAL MEDICINE

## 2021-03-18 PROCEDURE — 99999 PR PBB SHADOW E&M-EST. PATIENT-LVL IV: ICD-10-PCS | Mod: PBBFAC,,, | Performed by: INTERNAL MEDICINE

## 2021-03-18 PROCEDURE — 99214 OFFICE O/P EST MOD 30 MIN: CPT | Mod: S$GLB,,, | Performed by: INTERNAL MEDICINE

## 2021-03-18 PROCEDURE — 82607 VITAMIN B-12: CPT | Performed by: INTERNAL MEDICINE

## 2021-03-18 PROCEDURE — 99999 PR PBB SHADOW E&M-EST. PATIENT-LVL IV: CPT | Mod: PBBFAC,,, | Performed by: INTERNAL MEDICINE

## 2021-03-18 PROCEDURE — 1159F MED LIST DOCD IN RCRD: CPT | Mod: S$GLB,,, | Performed by: INTERNAL MEDICINE

## 2021-03-19 ENCOUNTER — PATIENT MESSAGE (OUTPATIENT)
Dept: INTERNAL MEDICINE | Facility: CLINIC | Age: 81
End: 2021-03-19

## 2021-03-19 DIAGNOSIS — E53.8 B12 DEFICIENCY: Primary | ICD-10-CM

## 2021-03-19 LAB
FOLATE SERPL-MCNC: 4.5 NG/ML (ref 4–24)
RPR SER QL: NORMAL
VIT B12 SERPL-MCNC: <146 PG/ML (ref 210–950)

## 2021-03-19 RX ORDER — CYANOCOBALAMIN 1000 UG/ML
1000 INJECTION, SOLUTION INTRAMUSCULAR; SUBCUTANEOUS
Status: ACTIVE | OUTPATIENT
Start: 2021-03-22 | End: 2022-03-17

## 2021-03-23 LAB — VIT B1 BLD-MCNC: 65 UG/L (ref 38–122)

## 2021-03-25 ENCOUNTER — PATIENT OUTREACH (OUTPATIENT)
Dept: ADMINISTRATIVE | Facility: OTHER | Age: 81
End: 2021-03-25

## 2021-03-25 ENCOUNTER — TELEPHONE (OUTPATIENT)
Dept: RADIOLOGY | Facility: HOSPITAL | Age: 81
End: 2021-03-25

## 2021-03-26 ENCOUNTER — HOSPITAL ENCOUNTER (OUTPATIENT)
Dept: RADIOLOGY | Facility: HOSPITAL | Age: 81
Discharge: HOME | End: 2021-03-26
Attending: INTERNAL MEDICINE
Payer: MEDICARE

## 2021-03-26 DIAGNOSIS — R41.3 OTHER AMNESIA: ICD-10-CM

## 2021-03-26 PROCEDURE — 70470 CT HEAD/BRAIN W/O & W/DYE: CPT | Mod: 26,,, | Performed by: RADIOLOGY

## 2021-03-26 PROCEDURE — 70470 CT HEAD/BRAIN W/O & W/DYE: CPT | Mod: TC

## 2021-03-26 PROCEDURE — 25500020 PHARM REV CODE 255: Performed by: INTERNAL MEDICINE

## 2021-03-26 PROCEDURE — 70470 CT HEAD WITH AND WITHOUT: ICD-10-PCS | Mod: 26,,, | Performed by: RADIOLOGY

## 2021-03-26 RX ADMIN — IOHEXOL 75 ML: 350 INJECTION, SOLUTION INTRAVENOUS at 11:03

## 2021-03-29 ENCOUNTER — OFFICE VISIT (OUTPATIENT)
Dept: CARDIOLOGY | Facility: CLINIC | Age: 81
End: 2021-03-29
Payer: MEDICARE

## 2021-03-29 VITALS
HEART RATE: 59 BPM | WEIGHT: 198.19 LBS | BODY MASS INDEX: 28.37 KG/M2 | HEIGHT: 70 IN | SYSTOLIC BLOOD PRESSURE: 126 MMHG | OXYGEN SATURATION: 94 % | RESPIRATION RATE: 16 BRPM | DIASTOLIC BLOOD PRESSURE: 64 MMHG

## 2021-03-29 DIAGNOSIS — I25.118 CORONARY ARTERY DISEASE OF NATIVE ARTERY OF NATIVE HEART WITH STABLE ANGINA PECTORIS: ICD-10-CM

## 2021-03-29 DIAGNOSIS — J42 CHRONIC BRONCHITIS, UNSPECIFIED CHRONIC BRONCHITIS TYPE: Primary | ICD-10-CM

## 2021-03-29 DIAGNOSIS — N18.32 STAGE 3B CHRONIC KIDNEY DISEASE: ICD-10-CM

## 2021-03-29 DIAGNOSIS — I10 ESSENTIAL HYPERTENSION: ICD-10-CM

## 2021-03-29 DIAGNOSIS — I73.9 PVD (PERIPHERAL VASCULAR DISEASE): ICD-10-CM

## 2021-03-29 DIAGNOSIS — E78.00 PURE HYPERCHOLESTEROLEMIA: ICD-10-CM

## 2021-03-29 DIAGNOSIS — I48.20 CHRONIC ATRIAL FIBRILLATION: ICD-10-CM

## 2021-03-29 PROCEDURE — 3288F PR FALLS RISK ASSESSMENT DOCUMENTED: ICD-10-PCS | Mod: CPTII,S$GLB,, | Performed by: INTERNAL MEDICINE

## 2021-03-29 PROCEDURE — 3074F PR MOST RECENT SYSTOLIC BLOOD PRESSURE < 130 MM HG: ICD-10-PCS | Mod: CPTII,S$GLB,, | Performed by: INTERNAL MEDICINE

## 2021-03-29 PROCEDURE — 3074F SYST BP LT 130 MM HG: CPT | Mod: CPTII,S$GLB,, | Performed by: INTERNAL MEDICINE

## 2021-03-29 PROCEDURE — 99214 OFFICE O/P EST MOD 30 MIN: CPT | Mod: S$GLB,,, | Performed by: INTERNAL MEDICINE

## 2021-03-29 PROCEDURE — 1101F PT FALLS ASSESS-DOCD LE1/YR: CPT | Mod: CPTII,S$GLB,, | Performed by: INTERNAL MEDICINE

## 2021-03-29 PROCEDURE — 3078F PR MOST RECENT DIASTOLIC BLOOD PRESSURE < 80 MM HG: ICD-10-PCS | Mod: CPTII,S$GLB,, | Performed by: INTERNAL MEDICINE

## 2021-03-29 PROCEDURE — 1159F MED LIST DOCD IN RCRD: CPT | Mod: S$GLB,,, | Performed by: INTERNAL MEDICINE

## 2021-03-29 PROCEDURE — 99214 PR OFFICE/OUTPT VISIT, EST, LEVL IV, 30-39 MIN: ICD-10-PCS | Mod: S$GLB,,, | Performed by: INTERNAL MEDICINE

## 2021-03-29 PROCEDURE — 1159F PR MEDICATION LIST DOCUMENTED IN MEDICAL RECORD: ICD-10-PCS | Mod: S$GLB,,, | Performed by: INTERNAL MEDICINE

## 2021-03-29 PROCEDURE — 1101F PR PT FALLS ASSESS DOC 0-1 FALLS W/OUT INJ PAST YR: ICD-10-PCS | Mod: CPTII,S$GLB,, | Performed by: INTERNAL MEDICINE

## 2021-03-29 PROCEDURE — 99999 PR PBB SHADOW E&M-EST. PATIENT-LVL V: ICD-10-PCS | Mod: PBBFAC,,, | Performed by: INTERNAL MEDICINE

## 2021-03-29 PROCEDURE — 3288F FALL RISK ASSESSMENT DOCD: CPT | Mod: CPTII,S$GLB,, | Performed by: INTERNAL MEDICINE

## 2021-03-29 PROCEDURE — 3078F DIAST BP <80 MM HG: CPT | Mod: CPTII,S$GLB,, | Performed by: INTERNAL MEDICINE

## 2021-03-29 PROCEDURE — 99999 PR PBB SHADOW E&M-EST. PATIENT-LVL V: CPT | Mod: PBBFAC,,, | Performed by: INTERNAL MEDICINE

## 2021-03-31 ENCOUNTER — IMMUNIZATION (OUTPATIENT)
Dept: INTERNAL MEDICINE | Facility: CLINIC | Age: 81
End: 2021-03-31
Payer: MEDICARE

## 2021-03-31 ENCOUNTER — PATIENT MESSAGE (OUTPATIENT)
Dept: INTERNAL MEDICINE | Facility: CLINIC | Age: 81
End: 2021-03-31

## 2021-03-31 DIAGNOSIS — Z23 NEED FOR VACCINATION: Primary | ICD-10-CM

## 2021-03-31 PROCEDURE — 91300 COVID-19, MRNA, LNP-S, PF, 30 MCG/0.3 ML DOSE VACCINE: CPT | Mod: PBBFAC | Performed by: FAMILY MEDICINE

## 2021-04-14 ENCOUNTER — OFFICE VISIT (OUTPATIENT)
Dept: HEMATOLOGY/ONCOLOGY | Facility: CLINIC | Age: 81
End: 2021-04-14
Payer: MEDICARE

## 2021-04-14 VITALS
DIASTOLIC BLOOD PRESSURE: 70 MMHG | OXYGEN SATURATION: 95 % | BODY MASS INDEX: 28.21 KG/M2 | WEIGHT: 197.06 LBS | HEART RATE: 70 BPM | SYSTOLIC BLOOD PRESSURE: 151 MMHG | HEIGHT: 70 IN

## 2021-04-14 DIAGNOSIS — E53.8 VITAMIN B12 DEFICIENCY: ICD-10-CM

## 2021-04-14 DIAGNOSIS — Z12.89 ENCOUNTER FOR SCREENING FOR MALIGNANT NEOPLASM OF OTHER SITES: ICD-10-CM

## 2021-04-14 DIAGNOSIS — C34.12 MALIGNANT NEOPLASM OF UPPER LOBE OF LEFT LUNG: Primary | ICD-10-CM

## 2021-04-14 DIAGNOSIS — D53.9 MACROCYTIC ANEMIA: ICD-10-CM

## 2021-04-14 DIAGNOSIS — N18.32 STAGE 3B CHRONIC KIDNEY DISEASE: ICD-10-CM

## 2021-04-14 PROCEDURE — 1159F PR MEDICATION LIST DOCUMENTED IN MEDICAL RECORD: ICD-10-PCS | Mod: S$GLB,,, | Performed by: INTERNAL MEDICINE

## 2021-04-14 PROCEDURE — 3288F FALL RISK ASSESSMENT DOCD: CPT | Mod: CPTII,S$GLB,, | Performed by: INTERNAL MEDICINE

## 2021-04-14 PROCEDURE — 1126F PR PAIN SEVERITY QUANTIFIED, NO PAIN PRESENT: ICD-10-PCS | Mod: S$GLB,,, | Performed by: INTERNAL MEDICINE

## 2021-04-14 PROCEDURE — 3288F PR FALLS RISK ASSESSMENT DOCUMENTED: ICD-10-PCS | Mod: CPTII,S$GLB,, | Performed by: INTERNAL MEDICINE

## 2021-04-14 PROCEDURE — 1101F PT FALLS ASSESS-DOCD LE1/YR: CPT | Mod: CPTII,S$GLB,, | Performed by: INTERNAL MEDICINE

## 2021-04-14 PROCEDURE — 1101F PR PT FALLS ASSESS DOC 0-1 FALLS W/OUT INJ PAST YR: ICD-10-PCS | Mod: CPTII,S$GLB,, | Performed by: INTERNAL MEDICINE

## 2021-04-14 PROCEDURE — 99215 PR OFFICE/OUTPT VISIT, EST, LEVL V, 40-54 MIN: ICD-10-PCS | Mod: S$GLB,,, | Performed by: INTERNAL MEDICINE

## 2021-04-14 PROCEDURE — 99999 PR PBB SHADOW E&M-EST. PATIENT-LVL V: ICD-10-PCS | Mod: PBBFAC,,, | Performed by: INTERNAL MEDICINE

## 2021-04-14 PROCEDURE — 99215 OFFICE O/P EST HI 40 MIN: CPT | Mod: S$GLB,,, | Performed by: INTERNAL MEDICINE

## 2021-04-14 PROCEDURE — 1159F MED LIST DOCD IN RCRD: CPT | Mod: S$GLB,,, | Performed by: INTERNAL MEDICINE

## 2021-04-14 PROCEDURE — 99999 PR PBB SHADOW E&M-EST. PATIENT-LVL V: CPT | Mod: PBBFAC,,, | Performed by: INTERNAL MEDICINE

## 2021-04-14 PROCEDURE — 1126F AMNT PAIN NOTED NONE PRSNT: CPT | Mod: S$GLB,,, | Performed by: INTERNAL MEDICINE

## 2021-04-21 ENCOUNTER — IMMUNIZATION (OUTPATIENT)
Dept: INTERNAL MEDICINE | Facility: CLINIC | Age: 81
End: 2021-04-21
Payer: MEDICARE

## 2021-04-21 DIAGNOSIS — Z23 NEED FOR VACCINATION: Primary | ICD-10-CM

## 2021-04-21 PROCEDURE — 91300 COVID-19, MRNA, LNP-S, PF, 30 MCG/0.3 ML DOSE VACCINE: ICD-10-PCS | Mod: ,,, | Performed by: FAMILY MEDICINE

## 2021-04-21 PROCEDURE — 0002A COVID-19, MRNA, LNP-S, PF, 30 MCG/0.3 ML DOSE VACCINE: ICD-10-PCS | Mod: CV19,,, | Performed by: FAMILY MEDICINE

## 2021-04-21 PROCEDURE — 91300 COVID-19, MRNA, LNP-S, PF, 30 MCG/0.3 ML DOSE VACCINE: CPT | Mod: ,,, | Performed by: FAMILY MEDICINE

## 2021-04-21 PROCEDURE — 0002A COVID-19, MRNA, LNP-S, PF, 30 MCG/0.3 ML DOSE VACCINE: CPT | Mod: CV19,,, | Performed by: FAMILY MEDICINE

## 2021-04-28 ENCOUNTER — TELEPHONE (OUTPATIENT)
Dept: INTERNAL MEDICINE | Facility: CLINIC | Age: 81
End: 2021-04-28

## 2021-04-28 ENCOUNTER — OFFICE VISIT (OUTPATIENT)
Dept: URGENT CARE | Facility: CLINIC | Age: 81
End: 2021-04-28
Payer: MEDICARE

## 2021-04-28 VITALS
TEMPERATURE: 98 F | BODY MASS INDEX: 28.2 KG/M2 | OXYGEN SATURATION: 96 % | HEART RATE: 61 BPM | HEIGHT: 70 IN | RESPIRATION RATE: 20 BRPM | DIASTOLIC BLOOD PRESSURE: 70 MMHG | SYSTOLIC BLOOD PRESSURE: 152 MMHG | WEIGHT: 197 LBS

## 2021-04-28 DIAGNOSIS — T15.11XA FOREIGN BODY IN CONJUNCTIVAL SAC, RIGHT EYE, INITIAL ENCOUNTER: ICD-10-CM

## 2021-04-28 DIAGNOSIS — S05.01XA ABRASION OF RIGHT CORNEA, INITIAL ENCOUNTER: Primary | ICD-10-CM

## 2021-04-28 PROCEDURE — 99213 PR OFFICE/OUTPT VISIT, EST, LEVL III, 20-29 MIN: ICD-10-PCS | Mod: S$GLB,,, | Performed by: STUDENT IN AN ORGANIZED HEALTH CARE EDUCATION/TRAINING PROGRAM

## 2021-04-28 PROCEDURE — 99213 OFFICE O/P EST LOW 20 MIN: CPT | Mod: S$GLB,,, | Performed by: STUDENT IN AN ORGANIZED HEALTH CARE EDUCATION/TRAINING PROGRAM

## 2021-04-28 PROCEDURE — 1126F AMNT PAIN NOTED NONE PRSNT: CPT | Mod: S$GLB,,, | Performed by: STUDENT IN AN ORGANIZED HEALTH CARE EDUCATION/TRAINING PROGRAM

## 2021-04-28 PROCEDURE — 1126F PR PAIN SEVERITY QUANTIFIED, NO PAIN PRESENT: ICD-10-PCS | Mod: S$GLB,,, | Performed by: STUDENT IN AN ORGANIZED HEALTH CARE EDUCATION/TRAINING PROGRAM

## 2021-04-28 RX ORDER — ERYTHROMYCIN 5 MG/G
OINTMENT OPHTHALMIC EVERY 6 HOURS
Qty: 3.5 G | Refills: 0 | Status: SHIPPED | OUTPATIENT
Start: 2021-04-28 | End: 2021-05-05

## 2021-05-01 ENCOUNTER — TELEPHONE (OUTPATIENT)
Dept: URGENT CARE | Facility: CLINIC | Age: 81
End: 2021-05-01

## 2021-05-05 ENCOUNTER — OFFICE VISIT (OUTPATIENT)
Dept: URGENT CARE | Facility: CLINIC | Age: 81
End: 2021-05-05
Payer: MEDICARE

## 2021-05-05 VITALS
HEART RATE: 60 BPM | SYSTOLIC BLOOD PRESSURE: 141 MMHG | WEIGHT: 197 LBS | HEIGHT: 70 IN | BODY MASS INDEX: 28.2 KG/M2 | OXYGEN SATURATION: 95 % | DIASTOLIC BLOOD PRESSURE: 67 MMHG | TEMPERATURE: 96 F | RESPIRATION RATE: 12 BRPM

## 2021-05-05 DIAGNOSIS — M54.2 NECK PAIN ON LEFT SIDE: Primary | ICD-10-CM

## 2021-05-05 PROCEDURE — 1125F PR PAIN SEVERITY QUANTIFIED, PAIN PRESENT: ICD-10-PCS | Mod: S$GLB,,, | Performed by: PHYSICIAN ASSISTANT

## 2021-05-05 PROCEDURE — 99214 PR OFFICE/OUTPT VISIT, EST, LEVL IV, 30-39 MIN: ICD-10-PCS | Mod: S$GLB,,, | Performed by: PHYSICIAN ASSISTANT

## 2021-05-05 PROCEDURE — 99214 OFFICE O/P EST MOD 30 MIN: CPT | Mod: S$GLB,,, | Performed by: PHYSICIAN ASSISTANT

## 2021-05-05 PROCEDURE — 1125F AMNT PAIN NOTED PAIN PRSNT: CPT | Mod: S$GLB,,, | Performed by: PHYSICIAN ASSISTANT

## 2021-05-05 RX ORDER — METHOCARBAMOL 500 MG/1
500 TABLET, FILM COATED ORAL 4 TIMES DAILY PRN
Qty: 20 TABLET | Refills: 0 | Status: SHIPPED | OUTPATIENT
Start: 2021-05-05 | End: 2021-05-10

## 2021-05-07 ENCOUNTER — TELEPHONE (OUTPATIENT)
Dept: RADIOLOGY | Facility: HOSPITAL | Age: 81
End: 2021-05-07

## 2021-05-08 ENCOUNTER — TELEPHONE (OUTPATIENT)
Dept: URGENT CARE | Facility: CLINIC | Age: 81
End: 2021-05-08

## 2021-05-10 ENCOUNTER — TELEPHONE (OUTPATIENT)
Dept: RADIATION ONCOLOGY | Facility: CLINIC | Age: 81
End: 2021-05-10

## 2021-05-19 DIAGNOSIS — C34.12 MALIGNANT NEOPLASM OF UPPER LOBE OF LEFT LUNG: ICD-10-CM

## 2021-05-19 RX ORDER — TEMAZEPAM 15 MG/1
15 CAPSULE ORAL NIGHTLY PRN
Qty: 90 CAPSULE | Refills: 1 | Status: SHIPPED | OUTPATIENT
Start: 2021-05-19 | End: 2021-08-16 | Stop reason: SDUPTHER

## 2021-05-25 ENCOUNTER — OFFICE VISIT (OUTPATIENT)
Dept: RADIATION ONCOLOGY | Facility: CLINIC | Age: 81
End: 2021-05-25
Payer: MEDICARE

## 2021-05-25 VITALS
RESPIRATION RATE: 18 BRPM | TEMPERATURE: 97 F | HEIGHT: 70 IN | SYSTOLIC BLOOD PRESSURE: 166 MMHG | HEART RATE: 76 BPM | DIASTOLIC BLOOD PRESSURE: 80 MMHG | BODY MASS INDEX: 28.42 KG/M2 | WEIGHT: 198.5 LBS | OXYGEN SATURATION: 92 %

## 2021-05-25 DIAGNOSIS — Z85.118 HISTORY OF CANCER OF UPPER LOBE BRONCHUS OR LUNG: ICD-10-CM

## 2021-05-25 DIAGNOSIS — C34.12 MALIGNANT NEOPLASM OF UPPER LOBE OF LEFT LUNG: Primary | ICD-10-CM

## 2021-05-25 PROCEDURE — 99213 OFFICE O/P EST LOW 20 MIN: CPT | Mod: S$GLB,,, | Performed by: RADIOLOGY

## 2021-05-25 PROCEDURE — 1101F PR PT FALLS ASSESS DOC 0-1 FALLS W/OUT INJ PAST YR: ICD-10-PCS | Mod: CPTII,S$GLB,, | Performed by: RADIOLOGY

## 2021-05-25 PROCEDURE — 99999 PR PBB SHADOW E&M-EST. PATIENT-LVL IV: ICD-10-PCS | Mod: PBBFAC,,, | Performed by: RADIOLOGY

## 2021-05-25 PROCEDURE — 1159F MED LIST DOCD IN RCRD: CPT | Mod: S$GLB,,, | Performed by: RADIOLOGY

## 2021-05-25 PROCEDURE — 1159F PR MEDICATION LIST DOCUMENTED IN MEDICAL RECORD: ICD-10-PCS | Mod: S$GLB,,, | Performed by: RADIOLOGY

## 2021-05-25 PROCEDURE — 1126F PR PAIN SEVERITY QUANTIFIED, NO PAIN PRESENT: ICD-10-PCS | Mod: S$GLB,,, | Performed by: RADIOLOGY

## 2021-05-25 PROCEDURE — 3288F PR FALLS RISK ASSESSMENT DOCUMENTED: ICD-10-PCS | Mod: CPTII,S$GLB,, | Performed by: RADIOLOGY

## 2021-05-25 PROCEDURE — 1101F PT FALLS ASSESS-DOCD LE1/YR: CPT | Mod: CPTII,S$GLB,, | Performed by: RADIOLOGY

## 2021-05-25 PROCEDURE — 99213 PR OFFICE/OUTPT VISIT, EST, LEVL III, 20-29 MIN: ICD-10-PCS | Mod: S$GLB,,, | Performed by: RADIOLOGY

## 2021-05-25 PROCEDURE — 3288F FALL RISK ASSESSMENT DOCD: CPT | Mod: CPTII,S$GLB,, | Performed by: RADIOLOGY

## 2021-05-25 PROCEDURE — 1126F AMNT PAIN NOTED NONE PRSNT: CPT | Mod: S$GLB,,, | Performed by: RADIOLOGY

## 2021-05-25 PROCEDURE — 99999 PR PBB SHADOW E&M-EST. PATIENT-LVL IV: CPT | Mod: PBBFAC,,, | Performed by: RADIOLOGY

## 2021-06-08 ENCOUNTER — OFFICE VISIT (OUTPATIENT)
Dept: INTERNAL MEDICINE | Facility: CLINIC | Age: 81
End: 2021-06-08
Payer: MEDICARE

## 2021-06-08 ENCOUNTER — TELEPHONE (OUTPATIENT)
Dept: INTERNAL MEDICINE | Facility: CLINIC | Age: 81
End: 2021-06-08

## 2021-06-08 ENCOUNTER — HOSPITAL ENCOUNTER (OUTPATIENT)
Dept: RADIOLOGY | Facility: HOSPITAL | Age: 81
Discharge: HOME | End: 2021-06-08
Attending: NURSE PRACTITIONER
Payer: MEDICARE

## 2021-06-08 VITALS
DIASTOLIC BLOOD PRESSURE: 60 MMHG | OXYGEN SATURATION: 95 % | HEART RATE: 64 BPM | BODY MASS INDEX: 28.31 KG/M2 | TEMPERATURE: 98 F | SYSTOLIC BLOOD PRESSURE: 110 MMHG | WEIGHT: 197.31 LBS

## 2021-06-08 DIAGNOSIS — R06.02 SOB (SHORTNESS OF BREATH): ICD-10-CM

## 2021-06-08 DIAGNOSIS — J44.9 CHRONIC OBSTRUCTIVE PULMONARY DISEASE, UNSPECIFIED COPD TYPE: ICD-10-CM

## 2021-06-08 DIAGNOSIS — J44.89 ASTHMA WITH COPD: ICD-10-CM

## 2021-06-08 DIAGNOSIS — Z85.118 HISTORY OF LUNG CANCER: ICD-10-CM

## 2021-06-08 DIAGNOSIS — R06.02 SOB (SHORTNESS OF BREATH): Primary | ICD-10-CM

## 2021-06-08 PROCEDURE — 3288F PR FALLS RISK ASSESSMENT DOCUMENTED: ICD-10-PCS | Mod: CPTII,S$GLB,, | Performed by: NURSE PRACTITIONER

## 2021-06-08 PROCEDURE — 1101F PT FALLS ASSESS-DOCD LE1/YR: CPT | Mod: CPTII,S$GLB,, | Performed by: NURSE PRACTITIONER

## 2021-06-08 PROCEDURE — 3288F FALL RISK ASSESSMENT DOCD: CPT | Mod: CPTII,S$GLB,, | Performed by: NURSE PRACTITIONER

## 2021-06-08 PROCEDURE — 1126F PR PAIN SEVERITY QUANTIFIED, NO PAIN PRESENT: ICD-10-PCS | Mod: S$GLB,,, | Performed by: NURSE PRACTITIONER

## 2021-06-08 PROCEDURE — 99999 PR PBB SHADOW E&M-EST. PATIENT-LVL V: CPT | Mod: PBBFAC,,, | Performed by: NURSE PRACTITIONER

## 2021-06-08 PROCEDURE — 99214 OFFICE O/P EST MOD 30 MIN: CPT | Mod: S$GLB,,, | Performed by: NURSE PRACTITIONER

## 2021-06-08 PROCEDURE — 1159F PR MEDICATION LIST DOCUMENTED IN MEDICAL RECORD: ICD-10-PCS | Mod: S$GLB,,, | Performed by: NURSE PRACTITIONER

## 2021-06-08 PROCEDURE — 1159F MED LIST DOCD IN RCRD: CPT | Mod: S$GLB,,, | Performed by: NURSE PRACTITIONER

## 2021-06-08 PROCEDURE — 71046 X-RAY EXAM CHEST 2 VIEWS: CPT | Mod: TC,FY,PO

## 2021-06-08 PROCEDURE — 1126F AMNT PAIN NOTED NONE PRSNT: CPT | Mod: S$GLB,,, | Performed by: NURSE PRACTITIONER

## 2021-06-08 PROCEDURE — 99214 PR OFFICE/OUTPT VISIT, EST, LEVL IV, 30-39 MIN: ICD-10-PCS | Mod: S$GLB,,, | Performed by: NURSE PRACTITIONER

## 2021-06-08 PROCEDURE — 1101F PR PT FALLS ASSESS DOC 0-1 FALLS W/OUT INJ PAST YR: ICD-10-PCS | Mod: CPTII,S$GLB,, | Performed by: NURSE PRACTITIONER

## 2021-06-08 PROCEDURE — 99999 PR PBB SHADOW E&M-EST. PATIENT-LVL V: ICD-10-PCS | Mod: PBBFAC,,, | Performed by: NURSE PRACTITIONER

## 2021-06-08 PROCEDURE — 71046 X-RAY EXAM CHEST 2 VIEWS: CPT | Mod: 26,,, | Performed by: RADIOLOGY

## 2021-06-08 PROCEDURE — 71046 XR CHEST PA AND LATERAL: ICD-10-PCS | Mod: 26,,, | Performed by: RADIOLOGY

## 2021-06-08 RX ORDER — IPRATROPIUM BROMIDE AND ALBUTEROL 20; 100 UG/1; UG/1
1 SPRAY, METERED RESPIRATORY (INHALATION) EVERY 6 HOURS PRN
Qty: 4 G | Refills: 11 | Status: SHIPPED | OUTPATIENT
Start: 2021-06-08 | End: 2022-03-14 | Stop reason: SDUPTHER

## 2021-06-10 ENCOUNTER — OFFICE VISIT (OUTPATIENT)
Dept: INTERNAL MEDICINE | Facility: CLINIC | Age: 81
End: 2021-06-10
Payer: MEDICARE

## 2021-06-10 ENCOUNTER — LAB VISIT (OUTPATIENT)
Dept: LAB | Facility: HOSPITAL | Age: 81
End: 2021-06-10
Attending: PHYSICIAN ASSISTANT
Payer: MEDICARE

## 2021-06-10 ENCOUNTER — TELEPHONE (OUTPATIENT)
Dept: HEMATOLOGY/ONCOLOGY | Facility: CLINIC | Age: 81
End: 2021-06-10

## 2021-06-10 VITALS
BODY MASS INDEX: 28.09 KG/M2 | OXYGEN SATURATION: 96 % | SYSTOLIC BLOOD PRESSURE: 120 MMHG | WEIGHT: 196.19 LBS | TEMPERATURE: 98 F | HEART RATE: 64 BPM | HEIGHT: 70 IN | RESPIRATION RATE: 16 BRPM | DIASTOLIC BLOOD PRESSURE: 64 MMHG

## 2021-06-10 DIAGNOSIS — R06.09 DOE (DYSPNEA ON EXERTION): ICD-10-CM

## 2021-06-10 DIAGNOSIS — R73.9 ELEVATED BLOOD SUGAR: ICD-10-CM

## 2021-06-10 DIAGNOSIS — I95.1 ORTHOSTATIC HYPOTENSION: ICD-10-CM

## 2021-06-10 DIAGNOSIS — R73.9 ELEVATED BLOOD SUGAR: Primary | ICD-10-CM

## 2021-06-10 LAB
ESTIMATED AVG GLUCOSE: 128 MG/DL (ref 68–131)
HBA1C MFR BLD: 6.1 % (ref 4–5.6)

## 2021-06-10 PROCEDURE — 1126F AMNT PAIN NOTED NONE PRSNT: CPT | Mod: S$GLB,,, | Performed by: PHYSICIAN ASSISTANT

## 2021-06-10 PROCEDURE — 99214 PR OFFICE/OUTPT VISIT, EST, LEVL IV, 30-39 MIN: ICD-10-PCS | Mod: S$GLB,,, | Performed by: PHYSICIAN ASSISTANT

## 2021-06-10 PROCEDURE — 36415 COLL VENOUS BLD VENIPUNCTURE: CPT | Mod: PO | Performed by: PHYSICIAN ASSISTANT

## 2021-06-10 PROCEDURE — 99999 PR PBB SHADOW E&M-EST. PATIENT-LVL V: CPT | Mod: PBBFAC,,, | Performed by: PHYSICIAN ASSISTANT

## 2021-06-10 PROCEDURE — 99214 OFFICE O/P EST MOD 30 MIN: CPT | Mod: S$GLB,,, | Performed by: PHYSICIAN ASSISTANT

## 2021-06-10 PROCEDURE — 1159F MED LIST DOCD IN RCRD: CPT | Mod: S$GLB,,, | Performed by: PHYSICIAN ASSISTANT

## 2021-06-10 PROCEDURE — 3288F PR FALLS RISK ASSESSMENT DOCUMENTED: ICD-10-PCS | Mod: CPTII,S$GLB,, | Performed by: PHYSICIAN ASSISTANT

## 2021-06-10 PROCEDURE — 1159F PR MEDICATION LIST DOCUMENTED IN MEDICAL RECORD: ICD-10-PCS | Mod: S$GLB,,, | Performed by: PHYSICIAN ASSISTANT

## 2021-06-10 PROCEDURE — 1126F PR PAIN SEVERITY QUANTIFIED, NO PAIN PRESENT: ICD-10-PCS | Mod: S$GLB,,, | Performed by: PHYSICIAN ASSISTANT

## 2021-06-10 PROCEDURE — 1101F PT FALLS ASSESS-DOCD LE1/YR: CPT | Mod: CPTII,S$GLB,, | Performed by: PHYSICIAN ASSISTANT

## 2021-06-10 PROCEDURE — 1101F PR PT FALLS ASSESS DOC 0-1 FALLS W/OUT INJ PAST YR: ICD-10-PCS | Mod: CPTII,S$GLB,, | Performed by: PHYSICIAN ASSISTANT

## 2021-06-10 PROCEDURE — 99999 PR PBB SHADOW E&M-EST. PATIENT-LVL V: ICD-10-PCS | Mod: PBBFAC,,, | Performed by: PHYSICIAN ASSISTANT

## 2021-06-10 PROCEDURE — 83036 HEMOGLOBIN GLYCOSYLATED A1C: CPT | Performed by: PHYSICIAN ASSISTANT

## 2021-06-10 PROCEDURE — 3288F FALL RISK ASSESSMENT DOCD: CPT | Mod: CPTII,S$GLB,, | Performed by: PHYSICIAN ASSISTANT

## 2021-06-11 ENCOUNTER — TELEPHONE (OUTPATIENT)
Dept: HEMATOLOGY/ONCOLOGY | Facility: CLINIC | Age: 81
End: 2021-06-11

## 2021-06-11 ENCOUNTER — PATIENT MESSAGE (OUTPATIENT)
Dept: INTERNAL MEDICINE | Facility: CLINIC | Age: 81
End: 2021-06-11

## 2021-06-11 ENCOUNTER — LAB VISIT (OUTPATIENT)
Dept: LAB | Facility: HOSPITAL | Age: 81
End: 2021-06-11
Attending: INTERNAL MEDICINE
Payer: MEDICARE

## 2021-06-11 DIAGNOSIS — E53.8 VITAMIN B12 DEFICIENCY: Primary | ICD-10-CM

## 2021-06-11 DIAGNOSIS — C34.12 MALIGNANT NEOPLASM OF UPPER LOBE OF LEFT LUNG: ICD-10-CM

## 2021-06-11 LAB
CREAT SERPL-MCNC: 1.4 MG/DL (ref 0.5–1.4)
EST. GFR  (AFRICAN AMERICAN): 54 ML/MIN/1.73 M^2
EST. GFR  (NON AFRICAN AMERICAN): 47.1 ML/MIN/1.73 M^2

## 2021-06-11 PROCEDURE — 36415 COLL VENOUS BLD VENIPUNCTURE: CPT | Mod: PO | Performed by: INTERNAL MEDICINE

## 2021-06-11 PROCEDURE — 82565 ASSAY OF CREATININE: CPT | Performed by: INTERNAL MEDICINE

## 2021-06-14 ENCOUNTER — TELEPHONE (OUTPATIENT)
Dept: RADIOLOGY | Facility: HOSPITAL | Age: 81
End: 2021-06-14

## 2021-06-14 ENCOUNTER — HOSPITAL ENCOUNTER (OUTPATIENT)
Dept: RADIOLOGY | Facility: HOSPITAL | Age: 81
Discharge: HOME | End: 2021-06-14
Attending: INTERNAL MEDICINE
Payer: MEDICARE

## 2021-06-14 PROCEDURE — 70553 MRI BRAIN STEM W/O & W/DYE: CPT | Mod: 26,,, | Performed by: RADIOLOGY

## 2021-06-14 PROCEDURE — 70553 MRI BRAIN W WO CONTRAST: ICD-10-PCS | Mod: 26,,, | Performed by: RADIOLOGY

## 2021-06-14 PROCEDURE — 25500020 PHARM REV CODE 255: Mod: PO | Performed by: INTERNAL MEDICINE

## 2021-06-14 PROCEDURE — 70553 MRI BRAIN STEM W/O & W/DYE: CPT | Mod: TC,PO

## 2021-06-14 PROCEDURE — A9585 GADOBUTROL INJECTION: HCPCS | Mod: PO | Performed by: INTERNAL MEDICINE

## 2021-06-14 RX ORDER — GADOBUTROL 604.72 MG/ML
9 INJECTION INTRAVENOUS
Status: COMPLETED | OUTPATIENT
Start: 2021-06-14 | End: 2021-06-14

## 2021-06-14 RX ADMIN — GADOBUTROL 9 ML: 604.72 INJECTION INTRAVENOUS at 08:06

## 2021-06-15 ENCOUNTER — TELEPHONE (OUTPATIENT)
Dept: RADIOLOGY | Facility: HOSPITAL | Age: 81
End: 2021-06-15

## 2021-06-16 ENCOUNTER — HOSPITAL ENCOUNTER (OUTPATIENT)
Dept: RADIOLOGY | Facility: HOSPITAL | Age: 81
Discharge: HOME | End: 2021-06-16
Attending: INTERNAL MEDICINE
Payer: MEDICARE

## 2021-06-16 DIAGNOSIS — C34.12 MALIGNANT NEOPLASM OF UPPER LOBE OF LEFT LUNG: ICD-10-CM

## 2021-06-16 PROCEDURE — 78815 PET IMAGE W/CT SKULL-THIGH: CPT | Mod: 26,PS,, | Performed by: RADIOLOGY

## 2021-06-16 PROCEDURE — 78815 NM PET CT ROUTINE: ICD-10-PCS | Mod: 26,PS,, | Performed by: RADIOLOGY

## 2021-06-16 PROCEDURE — 78815 PET IMAGE W/CT SKULL-THIGH: CPT | Mod: TC,PS

## 2021-06-17 ENCOUNTER — OFFICE VISIT (OUTPATIENT)
Dept: HEMATOLOGY/ONCOLOGY | Facility: CLINIC | Age: 81
End: 2021-06-17
Payer: MEDICARE

## 2021-06-17 ENCOUNTER — TELEPHONE (OUTPATIENT)
Dept: INTERNAL MEDICINE | Facility: CLINIC | Age: 81
End: 2021-06-17

## 2021-06-17 VITALS
DIASTOLIC BLOOD PRESSURE: 67 MMHG | HEART RATE: 70 BPM | HEIGHT: 70 IN | SYSTOLIC BLOOD PRESSURE: 144 MMHG | OXYGEN SATURATION: 92 % | TEMPERATURE: 98 F | BODY MASS INDEX: 28.44 KG/M2 | WEIGHT: 198.63 LBS

## 2021-06-17 DIAGNOSIS — R06.09 DOE (DYSPNEA ON EXERTION): ICD-10-CM

## 2021-06-17 DIAGNOSIS — J44.9 CHRONIC OBSTRUCTIVE PULMONARY DISEASE, UNSPECIFIED COPD TYPE: ICD-10-CM

## 2021-06-17 DIAGNOSIS — C34.12 MALIGNANT NEOPLASM OF UPPER LOBE OF LEFT LUNG: Primary | ICD-10-CM

## 2021-06-17 PROCEDURE — 1126F PR PAIN SEVERITY QUANTIFIED, NO PAIN PRESENT: ICD-10-PCS | Mod: S$GLB,,, | Performed by: INTERNAL MEDICINE

## 2021-06-17 PROCEDURE — 99214 OFFICE O/P EST MOD 30 MIN: CPT | Mod: S$GLB,,, | Performed by: INTERNAL MEDICINE

## 2021-06-17 PROCEDURE — 1159F MED LIST DOCD IN RCRD: CPT | Mod: S$GLB,,, | Performed by: INTERNAL MEDICINE

## 2021-06-17 PROCEDURE — 1101F PR PT FALLS ASSESS DOC 0-1 FALLS W/OUT INJ PAST YR: ICD-10-PCS | Mod: CPTII,S$GLB,, | Performed by: INTERNAL MEDICINE

## 2021-06-17 PROCEDURE — 3288F PR FALLS RISK ASSESSMENT DOCUMENTED: ICD-10-PCS | Mod: CPTII,S$GLB,, | Performed by: INTERNAL MEDICINE

## 2021-06-17 PROCEDURE — 99999 PR PBB SHADOW E&M-EST. PATIENT-LVL V: CPT | Mod: PBBFAC,,, | Performed by: INTERNAL MEDICINE

## 2021-06-17 PROCEDURE — 1101F PT FALLS ASSESS-DOCD LE1/YR: CPT | Mod: CPTII,S$GLB,, | Performed by: INTERNAL MEDICINE

## 2021-06-17 PROCEDURE — 1126F AMNT PAIN NOTED NONE PRSNT: CPT | Mod: S$GLB,,, | Performed by: INTERNAL MEDICINE

## 2021-06-17 PROCEDURE — 99214 PR OFFICE/OUTPT VISIT, EST, LEVL IV, 30-39 MIN: ICD-10-PCS | Mod: S$GLB,,, | Performed by: INTERNAL MEDICINE

## 2021-06-17 PROCEDURE — 3288F FALL RISK ASSESSMENT DOCD: CPT | Mod: CPTII,S$GLB,, | Performed by: INTERNAL MEDICINE

## 2021-06-17 PROCEDURE — 99999 PR PBB SHADOW E&M-EST. PATIENT-LVL V: ICD-10-PCS | Mod: PBBFAC,,, | Performed by: INTERNAL MEDICINE

## 2021-06-17 PROCEDURE — 1159F PR MEDICATION LIST DOCUMENTED IN MEDICAL RECORD: ICD-10-PCS | Mod: S$GLB,,, | Performed by: INTERNAL MEDICINE

## 2021-06-21 ENCOUNTER — TELEPHONE (OUTPATIENT)
Dept: PULMONOLOGY | Facility: CLINIC | Age: 81
End: 2021-06-21

## 2021-06-24 ENCOUNTER — OFFICE VISIT (OUTPATIENT)
Dept: INTERNAL MEDICINE | Facility: CLINIC | Age: 81
End: 2021-06-24
Payer: MEDICARE

## 2021-06-24 VITALS
HEART RATE: 100 BPM | HEIGHT: 70 IN | TEMPERATURE: 98 F | DIASTOLIC BLOOD PRESSURE: 70 MMHG | SYSTOLIC BLOOD PRESSURE: 140 MMHG | WEIGHT: 198.19 LBS | BODY MASS INDEX: 28.37 KG/M2 | OXYGEN SATURATION: 94 %

## 2021-06-24 DIAGNOSIS — Z90.2 STATUS POST PNEUMONECTOMY: ICD-10-CM

## 2021-06-24 DIAGNOSIS — J41.0 SIMPLE CHRONIC BRONCHITIS: Primary | ICD-10-CM

## 2021-06-24 PROCEDURE — 1101F PR PT FALLS ASSESS DOC 0-1 FALLS W/OUT INJ PAST YR: ICD-10-PCS | Mod: CPTII,S$GLB,, | Performed by: PHYSICIAN ASSISTANT

## 2021-06-24 PROCEDURE — 99213 OFFICE O/P EST LOW 20 MIN: CPT | Mod: S$GLB,,, | Performed by: PHYSICIAN ASSISTANT

## 2021-06-24 PROCEDURE — 1126F AMNT PAIN NOTED NONE PRSNT: CPT | Mod: S$GLB,,, | Performed by: PHYSICIAN ASSISTANT

## 2021-06-24 PROCEDURE — 1159F PR MEDICATION LIST DOCUMENTED IN MEDICAL RECORD: ICD-10-PCS | Mod: S$GLB,,, | Performed by: PHYSICIAN ASSISTANT

## 2021-06-24 PROCEDURE — 99213 PR OFFICE/OUTPT VISIT, EST, LEVL III, 20-29 MIN: ICD-10-PCS | Mod: S$GLB,,, | Performed by: PHYSICIAN ASSISTANT

## 2021-06-24 PROCEDURE — 1126F PR PAIN SEVERITY QUANTIFIED, NO PAIN PRESENT: ICD-10-PCS | Mod: S$GLB,,, | Performed by: PHYSICIAN ASSISTANT

## 2021-06-24 PROCEDURE — 3288F PR FALLS RISK ASSESSMENT DOCUMENTED: ICD-10-PCS | Mod: CPTII,S$GLB,, | Performed by: PHYSICIAN ASSISTANT

## 2021-06-24 PROCEDURE — 99999 PR PBB SHADOW E&M-EST. PATIENT-LVL V: CPT | Mod: PBBFAC,,, | Performed by: PHYSICIAN ASSISTANT

## 2021-06-24 PROCEDURE — 1101F PT FALLS ASSESS-DOCD LE1/YR: CPT | Mod: CPTII,S$GLB,, | Performed by: PHYSICIAN ASSISTANT

## 2021-06-24 PROCEDURE — 1159F MED LIST DOCD IN RCRD: CPT | Mod: S$GLB,,, | Performed by: PHYSICIAN ASSISTANT

## 2021-06-24 PROCEDURE — 3288F FALL RISK ASSESSMENT DOCD: CPT | Mod: CPTII,S$GLB,, | Performed by: PHYSICIAN ASSISTANT

## 2021-06-24 PROCEDURE — 99999 PR PBB SHADOW E&M-EST. PATIENT-LVL V: ICD-10-PCS | Mod: PBBFAC,,, | Performed by: PHYSICIAN ASSISTANT

## 2021-06-30 ENCOUNTER — TELEPHONE (OUTPATIENT)
Dept: SURGERY | Facility: CLINIC | Age: 81
End: 2021-06-30

## 2021-07-22 ENCOUNTER — PATIENT MESSAGE (OUTPATIENT)
Dept: PULMONOLOGY | Facility: CLINIC | Age: 81
End: 2021-07-22

## 2021-07-22 ENCOUNTER — TELEPHONE (OUTPATIENT)
Dept: PULMONOLOGY | Facility: CLINIC | Age: 81
End: 2021-07-22

## 2021-07-22 ENCOUNTER — OFFICE VISIT (OUTPATIENT)
Dept: PULMONOLOGY | Facility: CLINIC | Age: 81
End: 2021-07-22
Payer: MEDICARE

## 2021-07-22 VITALS
DIASTOLIC BLOOD PRESSURE: 76 MMHG | BODY MASS INDEX: 28.75 KG/M2 | HEIGHT: 70 IN | SYSTOLIC BLOOD PRESSURE: 120 MMHG | HEART RATE: 60 BPM | RESPIRATION RATE: 17 BRPM | WEIGHT: 200.81 LBS | OXYGEN SATURATION: 95 %

## 2021-07-22 DIAGNOSIS — R06.02 SOB (SHORTNESS OF BREATH): Primary | ICD-10-CM

## 2021-07-22 DIAGNOSIS — R06.09 DOE (DYSPNEA ON EXERTION): ICD-10-CM

## 2021-07-22 DIAGNOSIS — J44.9 CHRONIC OBSTRUCTIVE PULMONARY DISEASE, UNSPECIFIED COPD TYPE: ICD-10-CM

## 2021-07-22 DIAGNOSIS — C34.12 MALIGNANT NEOPLASM OF UPPER LOBE OF LEFT LUNG: ICD-10-CM

## 2021-07-22 PROCEDURE — 1101F PR PT FALLS ASSESS DOC 0-1 FALLS W/OUT INJ PAST YR: ICD-10-PCS | Mod: CPTII,S$GLB,, | Performed by: NURSE PRACTITIONER

## 2021-07-22 PROCEDURE — 99214 OFFICE O/P EST MOD 30 MIN: CPT | Mod: S$GLB,,, | Performed by: NURSE PRACTITIONER

## 2021-07-22 PROCEDURE — 3288F PR FALLS RISK ASSESSMENT DOCUMENTED: ICD-10-PCS | Mod: CPTII,S$GLB,, | Performed by: NURSE PRACTITIONER

## 2021-07-22 PROCEDURE — 1101F PT FALLS ASSESS-DOCD LE1/YR: CPT | Mod: CPTII,S$GLB,, | Performed by: NURSE PRACTITIONER

## 2021-07-22 PROCEDURE — 99999 PR PBB SHADOW E&M-EST. PATIENT-LVL V: CPT | Mod: PBBFAC,,, | Performed by: NURSE PRACTITIONER

## 2021-07-22 PROCEDURE — 99214 PR OFFICE/OUTPT VISIT, EST, LEVL IV, 30-39 MIN: ICD-10-PCS | Mod: S$GLB,,, | Performed by: NURSE PRACTITIONER

## 2021-07-22 PROCEDURE — 1159F MED LIST DOCD IN RCRD: CPT | Mod: CPTII,S$GLB,, | Performed by: NURSE PRACTITIONER

## 2021-07-22 PROCEDURE — 3078F DIAST BP <80 MM HG: CPT | Mod: CPTII,S$GLB,, | Performed by: NURSE PRACTITIONER

## 2021-07-22 PROCEDURE — 1159F PR MEDICATION LIST DOCUMENTED IN MEDICAL RECORD: ICD-10-PCS | Mod: CPTII,S$GLB,, | Performed by: NURSE PRACTITIONER

## 2021-07-22 PROCEDURE — 3074F PR MOST RECENT SYSTOLIC BLOOD PRESSURE < 130 MM HG: ICD-10-PCS | Mod: CPTII,S$GLB,, | Performed by: NURSE PRACTITIONER

## 2021-07-22 PROCEDURE — 3288F FALL RISK ASSESSMENT DOCD: CPT | Mod: CPTII,S$GLB,, | Performed by: NURSE PRACTITIONER

## 2021-07-22 PROCEDURE — 3078F PR MOST RECENT DIASTOLIC BLOOD PRESSURE < 80 MM HG: ICD-10-PCS | Mod: CPTII,S$GLB,, | Performed by: NURSE PRACTITIONER

## 2021-07-22 PROCEDURE — 3074F SYST BP LT 130 MM HG: CPT | Mod: CPTII,S$GLB,, | Performed by: NURSE PRACTITIONER

## 2021-07-22 PROCEDURE — 99999 PR PBB SHADOW E&M-EST. PATIENT-LVL V: ICD-10-PCS | Mod: PBBFAC,,, | Performed by: NURSE PRACTITIONER

## 2021-07-29 ENCOUNTER — PATIENT OUTREACH (OUTPATIENT)
Dept: ADMINISTRATIVE | Facility: HOSPITAL | Age: 81
End: 2021-07-29

## 2021-08-11 ENCOUNTER — PROCEDURE VISIT (OUTPATIENT)
Dept: SURGERY | Facility: CLINIC | Age: 81
End: 2021-08-11
Payer: MEDICARE

## 2021-08-11 VITALS
BODY MASS INDEX: 29.1 KG/M2 | TEMPERATURE: 98 F | SYSTOLIC BLOOD PRESSURE: 109 MMHG | DIASTOLIC BLOOD PRESSURE: 58 MMHG | WEIGHT: 202.81 LBS | HEART RATE: 60 BPM

## 2021-08-11 DIAGNOSIS — C34.12 MALIGNANT NEOPLASM OF UPPER LOBE OF LEFT LUNG: Primary | ICD-10-CM

## 2021-08-11 PROCEDURE — 36590 REMOVAL TUNNELED CV CATH: CPT | Mod: S$GLB,,, | Performed by: SURGERY

## 2021-08-11 PROCEDURE — 36590 REMOVAL, PORTACATH: ICD-10-PCS | Mod: S$GLB,,, | Performed by: SURGERY

## 2021-08-16 DIAGNOSIS — C34.12 MALIGNANT NEOPLASM OF UPPER LOBE OF LEFT LUNG: ICD-10-CM

## 2021-08-16 RX ORDER — TEMAZEPAM 15 MG/1
15 CAPSULE ORAL NIGHTLY PRN
Qty: 90 CAPSULE | Refills: 1 | Status: SHIPPED | OUTPATIENT
Start: 2021-08-16 | End: 2022-03-14

## 2021-09-15 ENCOUNTER — TELEPHONE (OUTPATIENT)
Dept: INTERNAL MEDICINE | Facility: CLINIC | Age: 81
End: 2021-09-15

## 2021-09-27 ENCOUNTER — PATIENT OUTREACH (OUTPATIENT)
Dept: ADMINISTRATIVE | Facility: HOSPITAL | Age: 81
End: 2021-09-27

## 2021-09-29 ENCOUNTER — PATIENT OUTREACH (OUTPATIENT)
Dept: ADMINISTRATIVE | Facility: OTHER | Age: 81
End: 2021-09-29

## 2021-09-30 DIAGNOSIS — I10 ESSENTIAL HYPERTENSION: Primary | ICD-10-CM

## 2022-01-03 ENCOUNTER — HOSPITAL ENCOUNTER (OUTPATIENT)
Dept: RADIOLOGY | Facility: HOSPITAL | Age: 82
Discharge: HOME | End: 2022-01-03
Attending: INTERNAL MEDICINE
Payer: MEDICARE

## 2022-01-03 DIAGNOSIS — C34.12 MALIGNANT NEOPLASM OF UPPER LOBE OF LEFT LUNG: ICD-10-CM

## 2022-01-03 PROCEDURE — 78815 PET IMAGE W/CT SKULL-THIGH: CPT | Mod: TC

## 2022-01-03 PROCEDURE — 78815 PET IMAGE W/CT SKULL-THIGH: CPT | Mod: 26,PS,, | Performed by: RADIOLOGY

## 2022-01-03 PROCEDURE — 78815 NM PET CT ROUTINE: ICD-10-PCS | Mod: 26,PS,, | Performed by: RADIOLOGY

## 2022-01-03 NOTE — PROGRESS NOTES
Subjective:       Patient ID: Chai Murry is a 81 y.o. male.    Chief Complaint: Results (PET scan results)    Primary Oncologist/Hematologist: Dr. Vila--> Dr. Casper    Oncologic Diagnosis: Recurrent squamous cell carcinoma, with metastatic disease involving the trachea    Past Treatment: Chemoradiation 2016; then with recurrence chemoradiation and immunotherapy durvalumab, completed 9/2020    HPI: He is here today for results of his PET scan on 1/3/22.Pet scan from 1/3/22 unremarkable except partially healed fracture deformity seen involving the right lateral 11th rib near the costochondral junction with associated elevated FDG uptake, possibly physiologic in nature. Patient is here today by himself. He is unaware of any falls in the past and doesn't remember hitting anything in his side in the past. He is unable to remember if he was in pain recently. He states it is possible but he doesn't remember if he has broken anything or fallen in the past. This is patients baseline. He lives with his wife at home. He states he is still able to do some stuff around the house but gets short winded since his pneumectomy. He states his hoarseness is unchanged and he continues to take b12 supplements due to def, but has ran out and needs more.   He denies any pain, N/V/D/C, fatigue out of ordinary, headaches, weight loss, aspiration, pain with swallowing, dysphagia, hemoptysis, blood in stool, CP.    Cancer History: His medical history is notable for stage IIA squamous cell carcinoma of the lung status post left pneumonectomy and adjuvant chemoradiation completed 09/27/2016.  He developed hoarseness in 2019 found to have PET avid mass left of trachea consistent with recurrent squamous cell carcinoma.  He underwent chemoradiation with cisplatin and completed 6 weekly treatments  with good response then immunotherapy maintenance with durvalumab completed 09/08/2020.  Restaging scan 11/30/2020 with his primary doctor Cadence  showed no FDG avidity to suggest recurrent or metastatic disease. MRI of brain 21 with no evidence of mets to brain. PET 21 stable with no evidence of mets.   Other Medical History: patient follows up with pulmonology for COPD management and dyspnea on exertion. He also follows up with cardiology and internal medicine for his BP.       Social History     Socioeconomic History    Marital status:     Number of children: 3   Occupational History    Occupation: retired   Tobacco Use    Smoking status: Former Smoker     Packs/day: 1.00     Years: 50.00     Pack years: 50.00     Quit date: 2005     Years since quittin.4    Smokeless tobacco: Never Used   Substance and Sexual Activity    Alcohol use: No    Drug use: No    Sexual activity: Not Currently       Past Medical History:   Diagnosis Date    Anemia     Arthritis     Atrial fibrillation     CAD (coronary artery disease)     Cataract     COPD (chronic obstructive pulmonary disease)     Diverticulosis     ED (erectile dysfunction)     HTN (hypertension)     Hyperlipidemia     Lung cancer     left    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 Courtney dwyer bx       Family History   Problem Relation Age of Onset    Esophageal cancer Sister     Cancer Sister     Lung cancer Mother     Cancer Mother     Cataracts Mother     Hypertension Mother     Pneumonia Father     Cataracts Father     Hypertension Father     Cancer Brother     Hypertension Brother     Blindness Neg Hx     Diabetes Neg Hx     Glaucoma Neg Hx     Macular degeneration Neg Hx     Retinal detachment Neg Hx     Strabismus Neg Hx     Stroke Neg Hx     Thyroid disease Neg Hx        Past Surgical History:   Procedure Laterality Date    APPENDECTOMY      BRONCHOSCOPY Bilateral 2019    Procedure: Bronchoscopy;  Surgeon: Paresh Goldman MD;  Location: [facility];  Service: Endoscopy;  Laterality:  Bilateral;    CARDIAC CATHETERIZATION      cardiac stents      CATARACT EXTRACTION W/  INTRAOCULAR LENS IMPLANT Right 9-3-14    CHOLECYSTECTOMY      COLONOSCOPY N/A 4/17/2018    Procedure: COLONOSCOPY;  Surgeon: Dionne Doan MD;  Location: [facility] ENDO;  Service: Endoscopy;  Laterality: N/A;    COLONOSCOPY N/A 10/25/2018    Procedure: COLONOSCOPY;  Surgeon: Michael Hameed MD;  Location: [facility] ENDO;  Service: Endoscopy;  Laterality: N/A;    ENDOSCOPIC ULTRASOUND OF UPPER GASTROINTESTINAL TRACT N/A 6/11/2019    Procedure: ULTRASOUND, UPPER GI TRACT, ENDOSCOPIC;  Surgeon: Abhishek Konx MD;  Location: [facility] ENDO;  Service: Endoscopy;  Laterality: N/A;    ESOPHAGOGASTRODUODENOSCOPY N/A 6/11/2019    Procedure: EGD (ESOPHAGOGASTRODUODENOSCOPY);  Surgeon: Abhishek Knox MD;  Location: [facility];  Service: Endoscopy;  Laterality: N/A;    infected stomach gland excision      INSERTION OF TUNNELED CENTRAL VENOUS CATHETER (CVC) WITH SUBCUTANEOUS PORT Right 7/3/2019    Procedure: [id number];  Surgeon: Jean Carlos Constantino MD;  Location: [facility];  Service: General;  Laterality: Right;    LUNG REMOVAL, TOTAL Left 04/2016    lung cancer left upper lobe    SKIN BIOPSY Left     arm       Review of Systems   Constitutional: Negative for activity change, appetite change, chills, diaphoresis, fatigue, fever and unexpected weight change.   HENT: Negative for congestion, facial swelling, mouth sores, sore throat, trouble swallowing and voice change.    Respiratory: Positive for shortness of breath.    Cardiovascular: Negative for chest pain, palpitations and leg swelling.   Gastrointestinal: Negative for abdominal pain, blood in stool, constipation, diarrhea, nausea and vomiting.   Genitourinary: Negative for hematuria.   Musculoskeletal: Positive for arthralgias. Negative for gait problem, myalgias and neck stiffness.   Skin: Negative for color change, rash and wound.   Neurological: Negative for dizziness,  syncope, facial asymmetry, speech difficulty, weakness, light-headedness and headaches.   Hematological: Negative for adenopathy. Does not bruise/bleed easily.   Psychiatric/Behavioral: Positive for confusion. Negative for decreased concentration.         Medication List with Changes/Refills   Current Medications    ALBUTEROL (PROVENTIL) 2.5 MG /3 ML (0.083 %) NEBULIZER SOLUTION    Take 3 mLs (2.5 mg total) by nebulization every 6 (six) hours while awake.    AMLODIPINE (NORVASC) 5 MG TABLET    TAKE 1 TABLET BY MOUTH EVERYDAY AT BEDTIME    ASPIRIN (ECOTRIN) 81 MG EC TABLET    Take 81 mg by mouth once daily.    BENZONATATE (TESSALON) 200 MG CAPSULE    Take 1 capsule (200 mg total) by mouth 3 (three) times daily as needed for Cough.    DOXEPIN (SINEQUAN) 10 MG CAPSULE    Take 1 capsule (10 mg total) by mouth nightly as needed (insomnia).    FENOFIBRIC ACID (FIBRICOR) 135 MG CPDR    TAKE 1 CAPSULE (135 MG TOTAL) BY MOUTH ONCE DAILY.    FLUTICASONE (FLONASE) 50 MCG/ACTUATION NASAL SPRAY    2 sprays (100 mcg total) by Each Nare route once daily.    FUROSEMIDE (LASIX) 20 MG TABLET    TAKE 1 TABLET BY MOUTH EVERY DAY AS NEEDED    HYDROCODONE-ACETAMINOPHEN (NORCO) 5-325 MG PER TABLET    Take 1 tablet by mouth every 6 (six) hours as needed for Pain.    IPRATROPIUM-ALBUTEROL (COMBIVENT RESPIMAT)  MCG/ACTUATION INHALER    Inhale 1 puff into the lungs every 6 (six) hours as needed for Shortness of Breath.    LEVOCETIRIZINE (XYZAL) 5 MG TABLET    Take 5 mg by mouth as needed.     LEVOTHYROXINE (SYNTHROID) 25 MCG TABLET    Take 1 tablet (25 mcg total) by mouth before breakfast.    LISINOPRIL (PRINIVIL,ZESTRIL) 40 MG TABLET    TAKE 1 TABLET BY MOUTH EVERY DAY    PROMETHAZINE (PHENERGAN) 6.25 MG/5 ML SYRUP    Take 20 mLs (25 mg total) by mouth nightly as needed.    RANOLAZINE (RANEXA) 500 MG TB12    TAKE 1 TABLET BY MOUTH TWICE A DAY    SIMVASTATIN (ZOCOR) 80 MG TABLET    Take 1 tablet (80 mg total) by mouth once daily.     SOTALOL (BETAPACE) 80 MG TABLET    TAKE 1 TABLET (80 MG TOTAL) BY MOUTH 2 (TWO) TIMES DAILY.    TEMAZEPAM (RESTORIL) 15 MG CAP    Take 1 capsule (15 mg total) by mouth nightly as needed (insomnia).    UMECLIDINIUM-VILANTEROL (ANORO ELLIPTA) 62.5-25 MCG/ACTUATION DSDV    Inhale 1 puff into the lungs once daily.     Objective:     Vitals:    01/10/22 0903   BP: (!) 141/67   Pulse: (!) 57   Temp: 97.1 °F (36.2 °C)   O2 sat 96%    Physical Exam  Constitutional:       General: He is not in acute distress.     Appearance: He is not ill-appearing, toxic-appearing or diaphoretic.   HENT:      Head: Normocephalic and atraumatic.      Nose: Nose normal.      Mouth/Throat:      Mouth: Mucous membranes are moist. No injury or oral lesions.      Tongue: No lesions. Tongue does not deviate from midline.      Palate: No mass and lesions.      Pharynx: Oropharynx is clear. Uvula midline. No oropharyngeal exudate.   Eyes:      Conjunctiva/sclera: Conjunctivae normal.   Cardiovascular:      Rate and Rhythm: Normal rate and regular rhythm.      Heart sounds: Normal heart sounds.   Pulmonary:      Breath sounds: Normal breath sounds and air entry.   Abdominal:      General: Bowel sounds are normal. There is no distension.      Palpations: Abdomen is soft. There is no mass.      Tenderness: There is no abdominal tenderness.   Musculoskeletal:         General: No swelling, tenderness or signs of injury. Normal range of motion.      Cervical back: Normal range of motion.      Right lower leg: No edema.      Left lower leg: No edema.   Skin:     General: Skin is warm.      Capillary Refill: Capillary refill takes less than 2 seconds.      Coloration: Skin is not jaundiced.      Findings: No bruising or erythema.   Neurological:      Mental Status: He is alert and oriented to person, place, and time. Mental status is at baseline.      Motor: No weakness.   Psychiatric:         Mood and Affect: Mood normal.         Behavior: Behavior normal.             Labs/Results:  Lab Results   Component Value Date    WBC 6.33 01/03/2022    RBC 4.33 (L) 01/03/2022    HGB 13.2 (L) 01/03/2022    HCT 43.3 01/03/2022     (H) 01/03/2022    MCH 30.5 01/03/2022    MCHC 30.5 (L) 01/03/2022    RDW 13.2 01/03/2022     01/03/2022    MPV 10.6 01/03/2022    GRAN 3.6 01/03/2022    GRAN 57.5 01/03/2022    LYMPH 1.0 01/03/2022    LYMPH 16.0 (L) 01/03/2022    MONO 0.5 01/03/2022    MONO 7.4 01/03/2022    EOS 1.1 (H) 01/03/2022    BASO 0.06 01/03/2022    EOSINOPHIL 17.7 (H) 01/03/2022    BASOPHIL 0.9 01/03/2022     CMP  Sodium   Date Value Ref Range Status   01/03/2022 141 136 - 145 mmol/L Final     Potassium   Date Value Ref Range Status   01/03/2022 4.3 3.5 - 5.1 mmol/L Final     Chloride   Date Value Ref Range Status   01/03/2022 101 95 - 110 mmol/L Final     CO2   Date Value Ref Range Status   01/03/2022 29 23 - 29 mmol/L Final     Glucose   Date Value Ref Range Status   01/03/2022 116 (H) 70 - 110 mg/dL Final     BUN   Date Value Ref Range Status   01/03/2022 23 8 - 23 mg/dL Final     Creatinine   Date Value Ref Range Status   01/03/2022 1.4 0.5 - 1.4 mg/dL Final     Calcium   Date Value Ref Range Status   01/03/2022 9.7 8.7 - 10.5 mg/dL Final     Total Protein   Date Value Ref Range Status   01/03/2022 7.9 6.0 - 8.4 g/dL Final     Albumin   Date Value Ref Range Status   01/03/2022 4.0 3.5 - 5.2 g/dL Final     Total Bilirubin   Date Value Ref Range Status   01/03/2022 0.3 0.1 - 1.0 mg/dL Final     Comment:     For infants and newborns, interpretation of results should be based  on gestational age, weight and in agreement with clinical  observations.    Premature Infant recommended reference ranges:  Up to 24 hours.............<8.0 mg/dL  Up to 48 hours............<12.0 mg/dL  3-5 days..................<15.0 mg/dL  6-29 days.................<15.0 mg/dL       Alkaline Phosphatase   Date Value Ref Range Status   01/03/2022 71 55 - 135 U/L Final     AST   Date Value Ref  Range Status   01/03/2022 22 10 - 40 U/L Final     ALT   Date Value Ref Range Status   01/03/2022 30 10 - 44 U/L Final     Anion Gap   Date Value Ref Range Status   01/03/2022 11 8 - 16 mmol/L Final     eGFR if    Date Value Ref Range Status   01/03/2022 54 (A) >60 mL/min/1.73 m^2 Final     eGFR if non    Date Value Ref Range Status   01/03/2022 47 (A) >60 mL/min/1.73 m^2 Final     Comment:     Calculation used to obtain the estimated glomerular filtration  rate (eGFR) is the CKD-EPI equation.        8/25/20   Ref. Range 8/25/2020 12:36   Protein, Serum Latest Ref Range: 6.0 - 8.4 g/dL 6.7   Albumin grams/dl Latest Ref Range: 3.35 - 5.55 g/dL 3.85   Alpha-1 grams/dl Latest Ref Range: 0.17 - 0.41 g/dL 0.32   Alpha-2 Latest Ref Range: 0.43 - 0.99 g/dL 0.75   Beta Latest Ref Range: 0.50 - 1.10 g/dL 0.80   Gamma Latest Ref Range: 0.67 - 1.58 g/dL 0.97   Kappa Free Light Chains Latest Ref Range: 0.33 - 1.94 mg/dL 2.16 (H)   Lambda Free Light Chains Latest Ref Range: 0.57 - 2.63 mg/dL 4.48 (H)   Kappa/Lambda FLC Ratio Latest Ref Range: 0.26 - 1.65  0.48   No monoclonal peaks identified.   Normal total protein, normal pattern.     PET Scan 1/3/22  Impression:  1. Partially healed fracture deformity seen involving the right lateral 11th rib near the costochondral junction with associated elevated FDG uptake, possibly physiologic in nature.  No definite associated lytic or blastic osseous changes or extraosseous soft tissue mass to suggest pathologic nature fracture however clinical correlation and follow-up is recommended.  2. Other stable findings as above with no definite evidence of recurrent or metastatic disease.    PET Scan 6/16/21  Impression:  Stable examination.  No FDG PET/CT findings to suggest recurrent or metastatic disease.    Chest XRAY 6/08/21  Impression:  Status post left pneumonectomy with volume loss of the left hemithorax and shift of the mediastinum and heart to the  left.  Venous Port-A-Cath in good position with tip ending in the superior vena cava.  Clear right lung.    MRI Brain 4/14/21  Impression:  1. No evidence of metastatic disease or other acute intracranial findings.  2. Moderate to extensive degree of nonspecific T2/FLAIR hyperintense signal throughout the supratentorial white matter which most likely represents sequela of chronic microvascular ischemic disease.  3. Generalized parenchymal atrophy.       Assessment:     Problem List Items Addressed This Visit        Oncology    Anemia    Malignant neoplasm of upper lobe of left lung - Primary          Plan:     Malignant neoplasm of upper lobe of left lung  -status post chemoradiation in November 2020 completed maintenance durvalumab. November 2020 PET-CT with Dr. Vila without evidence of recurrent/metastatic disease resolution of prior avidity in left trachea. Staging MRI brain with chronic microvascular changes/atrophy no evidence of metastatic disease. PET from 6/16/21 stable with no evidence of mets.   - Restaging PET scan 1/3/22 unremarkable except for partially healed fracture deformity seen involving the right lateral 11th rib near the costochondral junction with associated elevated FDG uptake, possibly physiologic in nature.  -port removed via general surgery on 8/11/2021.  -repeat myeloma labs   -per nccn guidelines chest CT q3- 6 months for 3 years and then q 6 months for 2 years and then low dose-non contrast enhanced chest CT annually. Originally diagnosed in 2016, competed tx 9/2016, then recurrence 2019, completed treatment 9/2020.    continue to follow up with pulmonology as scheduled.    Anemia, unspecified type  -hgb: 13.2g/dL: better than previous.   -MCV: 100fl: macrocytosis has been present on and off, getting to 101fl highest.   -03/2021: folate WNL, vitamin B12 low- take B12 supplement.  -continue to follow up with PCP as scheduled.       Follow-Up: 6 months with labs (light chains, SNOW, spep,  cbc, cmp, b12) with Dr. Tremayne Moore PA-C

## 2022-01-04 ENCOUNTER — PATIENT MESSAGE (OUTPATIENT)
Dept: INTERNAL MEDICINE | Facility: CLINIC | Age: 82
End: 2022-01-04
Payer: MEDICARE

## 2022-01-05 ENCOUNTER — PATIENT MESSAGE (OUTPATIENT)
Dept: INTERNAL MEDICINE | Facility: CLINIC | Age: 82
End: 2022-01-05
Payer: MEDICARE

## 2022-01-10 ENCOUNTER — OFFICE VISIT (OUTPATIENT)
Dept: HEMATOLOGY/ONCOLOGY | Facility: CLINIC | Age: 82
End: 2022-01-10
Payer: MEDICARE

## 2022-01-10 VITALS
SYSTOLIC BLOOD PRESSURE: 141 MMHG | HEART RATE: 57 BPM | WEIGHT: 204.81 LBS | DIASTOLIC BLOOD PRESSURE: 67 MMHG | BODY MASS INDEX: 29.39 KG/M2 | OXYGEN SATURATION: 96 % | TEMPERATURE: 97 F

## 2022-01-10 DIAGNOSIS — C34.12 MALIGNANT NEOPLASM OF UPPER LOBE OF LEFT LUNG: Primary | ICD-10-CM

## 2022-01-10 DIAGNOSIS — D64.9 ANEMIA, UNSPECIFIED TYPE: ICD-10-CM

## 2022-01-10 DIAGNOSIS — D53.9 NUTRITIONAL ANEMIA, UNSPECIFIED: ICD-10-CM

## 2022-01-10 PROCEDURE — 99999 PR PBB SHADOW E&M-EST. PATIENT-LVL IV: ICD-10-PCS | Mod: PBBFAC,,,

## 2022-01-10 PROCEDURE — 99999 PR PBB SHADOW E&M-EST. PATIENT-LVL IV: CPT | Mod: PBBFAC,,,

## 2022-01-10 PROCEDURE — 99214 PR OFFICE/OUTPT VISIT, EST, LEVL IV, 30-39 MIN: ICD-10-PCS | Mod: S$GLB,,,

## 2022-01-10 PROCEDURE — 99214 OFFICE O/P EST MOD 30 MIN: CPT | Mod: S$GLB,,,

## 2022-03-11 ENCOUNTER — PATIENT OUTREACH (OUTPATIENT)
Dept: ADMINISTRATIVE | Facility: OTHER | Age: 82
End: 2022-03-11
Payer: MEDICARE

## 2022-03-11 NOTE — PROGRESS NOTES
Health Maintenance Due   Topic Date Due    Shingles Vaccine (1 of 2) Never done    Lipid Panel  08/24/2020     Updates were requested from care everywhere.  Chart was reviewed for overdue Proactive [facility] Encounters ([facility]) topics (CRS, Breast Cancer Screening, Eye exam)  Health Maintenance has been updated.  LINKS immunization registry triggered.  Immunizations were reconciled.

## 2022-03-14 ENCOUNTER — CLINICAL SUPPORT (OUTPATIENT)
Dept: PULMONOLOGY | Facility: CLINIC | Age: 82
End: 2022-03-14
Payer: MEDICARE

## 2022-03-14 ENCOUNTER — OFFICE VISIT (OUTPATIENT)
Dept: PULMONOLOGY | Facility: CLINIC | Age: 82
End: 2022-03-14
Payer: MEDICARE

## 2022-03-14 VITALS
RESPIRATION RATE: 18 BRPM | BODY MASS INDEX: 30.85 KG/M2 | DIASTOLIC BLOOD PRESSURE: 60 MMHG | BODY MASS INDEX: 30.85 KG/M2 | WEIGHT: 208.31 LBS | HEART RATE: 57 BPM | OXYGEN SATURATION: 99 % | SYSTOLIC BLOOD PRESSURE: 128 MMHG | HEIGHT: 69 IN | HEIGHT: 69 IN | WEIGHT: 208.31 LBS

## 2022-03-14 DIAGNOSIS — J44.89 ASTHMA WITH COPD: ICD-10-CM

## 2022-03-14 DIAGNOSIS — R09.02 EXERCISE HYPOXEMIA: Primary | ICD-10-CM

## 2022-03-14 DIAGNOSIS — Z90.2 STATUS POST PNEUMONECTOMY: ICD-10-CM

## 2022-03-14 DIAGNOSIS — C34.12 MALIGNANT NEOPLASM OF UPPER LOBE OF LEFT LUNG: ICD-10-CM

## 2022-03-14 DIAGNOSIS — E66.09 CLASS 1 OBESITY DUE TO EXCESS CALORIES WITH SERIOUS COMORBIDITY AND BODY MASS INDEX (BMI) OF 31.0 TO 31.9 IN ADULT: ICD-10-CM

## 2022-03-14 DIAGNOSIS — J39.8 TRACHEAL MASS: ICD-10-CM

## 2022-03-14 DIAGNOSIS — J44.9 CHRONIC OBSTRUCTIVE PULMONARY DISEASE, UNSPECIFIED COPD TYPE: ICD-10-CM

## 2022-03-14 DIAGNOSIS — R06.09 DOE (DYSPNEA ON EXERTION): ICD-10-CM

## 2022-03-14 PROBLEM — E66.811 CLASS 1 OBESITY DUE TO EXCESS CALORIES WITH SERIOUS COMORBIDITY AND BODY MASS INDEX (BMI) OF 31.0 TO 31.9 IN ADULT: Status: ACTIVE | Noted: 2022-03-14

## 2022-03-14 PROCEDURE — 99999 PR PBB SHADOW E&M-EST. PATIENT-LVL IV: ICD-10-PCS | Mod: PBBFAC,,, | Performed by: INTERNAL MEDICINE

## 2022-03-14 PROCEDURE — 99999 PR PBB SHADOW E&M-EST. PATIENT-LVL I: CPT | Mod: PBBFAC,,,

## 2022-03-14 PROCEDURE — 1159F MED LIST DOCD IN RCRD: CPT | Mod: CPTII,S$GLB,, | Performed by: INTERNAL MEDICINE

## 2022-03-14 PROCEDURE — 99214 OFFICE O/P EST MOD 30 MIN: CPT | Mod: 25,S$GLB,, | Performed by: INTERNAL MEDICINE

## 2022-03-14 PROCEDURE — 3078F PR MOST RECENT DIASTOLIC BLOOD PRESSURE < 80 MM HG: ICD-10-PCS | Mod: CPTII,S$GLB,, | Performed by: INTERNAL MEDICINE

## 2022-03-14 PROCEDURE — 99999 PR PBB SHADOW E&M-EST. PATIENT-LVL IV: CPT | Mod: PBBFAC,,, | Performed by: INTERNAL MEDICINE

## 2022-03-14 PROCEDURE — 99999 PR PBB SHADOW E&M-EST. PATIENT-LVL I: ICD-10-PCS | Mod: PBBFAC,,,

## 2022-03-14 PROCEDURE — 3074F PR MOST RECENT SYSTOLIC BLOOD PRESSURE < 130 MM HG: ICD-10-PCS | Mod: CPTII,S$GLB,, | Performed by: INTERNAL MEDICINE

## 2022-03-14 PROCEDURE — 94618 PULMONARY STRESS TESTING: CPT | Mod: S$GLB,,, | Performed by: INTERNAL MEDICINE

## 2022-03-14 PROCEDURE — 3074F SYST BP LT 130 MM HG: CPT | Mod: CPTII,S$GLB,, | Performed by: INTERNAL MEDICINE

## 2022-03-14 PROCEDURE — 1159F PR MEDICATION LIST DOCUMENTED IN MEDICAL RECORD: ICD-10-PCS | Mod: CPTII,S$GLB,, | Performed by: INTERNAL MEDICINE

## 2022-03-14 PROCEDURE — 3078F DIAST BP <80 MM HG: CPT | Mod: CPTII,S$GLB,, | Performed by: INTERNAL MEDICINE

## 2022-03-14 PROCEDURE — 99214 PR OFFICE/OUTPT VISIT, EST, LEVL IV, 30-39 MIN: ICD-10-PCS | Mod: 25,S$GLB,, | Performed by: INTERNAL MEDICINE

## 2022-03-14 PROCEDURE — 94618 PULMONARY STRESS TESTING: ICD-10-PCS | Mod: S$GLB,,, | Performed by: INTERNAL MEDICINE

## 2022-03-14 RX ORDER — ALBUTEROL SULFATE 0.83 MG/ML
2.5 SOLUTION RESPIRATORY (INHALATION)
Qty: 270 ML | Refills: 11 | Status: SHIPPED | OUTPATIENT
Start: 2022-03-14 | End: 2023-09-18 | Stop reason: SDUPTHER

## 2022-03-14 RX ORDER — DOXEPIN HYDROCHLORIDE 10 MG/1
10 CAPSULE ORAL NIGHTLY
Qty: 90 CAPSULE | Refills: 3 | Status: SHIPPED | OUTPATIENT
Start: 2022-03-14 | End: 2023-03-16 | Stop reason: SDUPTHER

## 2022-03-14 RX ORDER — FLUTICASONE FUROATE, UMECLIDINIUM BROMIDE AND VILANTEROL TRIFENATATE 200; 62.5; 25 UG/1; UG/1; UG/1
1 POWDER RESPIRATORY (INHALATION) DAILY
Qty: 180 EACH | Refills: 3 | Status: SHIPPED | OUTPATIENT
Start: 2022-03-14 | End: 2023-03-16 | Stop reason: SDUPTHER

## 2022-03-14 RX ORDER — IPRATROPIUM BROMIDE AND ALBUTEROL 20; 100 UG/1; UG/1
1 SPRAY, METERED RESPIRATORY (INHALATION) EVERY 6 HOURS PRN
Qty: 4 G | Refills: 11 | Status: SHIPPED | OUTPATIENT
Start: 2022-03-14 | End: 2022-03-24

## 2022-03-14 NOTE — PROCEDURES
"[facility]  Six Minute Walk     SUMMARY   Ordering Provider: Elizabeth Lejeune, NP   Interpreting Provider: Roel Ernandez MD  Performing nurse/tech/RT: V. T., RT  Diagnosis: COPD (Malignant neoplasm of upper lobe of left lung; CARVAJAL)  Height: 5' 8.5" (174 cm)  Weight: 94.5 kg (208 lb 5.4 oz)  BMI (Calculated): 31.2   Patient Race:     Phase Oxygen Assessment Supplemental O2 Heart   Rate Blood Pressure Jimenez Dyspnea Scale Rating   Resting 97 % Room Air 57 bpm 137/64 4   Exercise        Minute        1 92 % Room Air 62 bpm     2 89 % Room Air 86 bpm     3 89 % Room Air 90 bpm     4 88 % (placed on NC 2L) Room Air 93 bpm     5 93 % 2 L/M 86 bpm     6  92 % 2 L/M 88 bpm 155/66 5-6   Recovery        Minute        1 96 % 2 L/M 64 bpm     2 99 % 2 L/M 59 bpm     3 99 % 2 L/M 58 bpm     4 99 % 2 L/M 57 bpm 128/60 2     Six Minute Walk Summary  6MWT Status: completed without stopping  Patient Reported: Dyspnea     Interpretation:  Did the patient stop or pause?: No                                         Total Time Walked (Calculated): 360 seconds  Final Partial Lap Distance (feet): 100 feet  Total Distance Meters (Calculated): 335.28 meters  Predicted Distance Meters (Calculated): 440.26 meters  Percentage of Predicted (Calculated): 76.15  Peak VO2 (Calculated): 14.04  Mets: 4.01  Has The Patient Had a Previous Six Minute Walk Test?: Yes       Previous 6MWT Results  Has The Patient Had a Previous Six Minute Walk Test?: Yes  Date of Previous Test: 01/03/20  Total Time Walked: 360 seconds  Total Distance (meters): 343.34  Predicted Distance (meters): 489.8 meters  Percentage of Predicted: 88.68  Percent Change (Calculated): 0.02      Interpretation:  Total distance walked in six minutes is mildly reduced indicating an mild reduction in functional capacity.  There was significant severe oxygen desaturation to 88% with exercise on room air (oxygen saturation less than 89%). Supplemental oxygen " was administered for the remainder of the test as noted above. Clinical correlation suggested.  Patient met criteria for oxygen prescription.  [] Mild exercise-induced hypoxemia described as an arterial oxygen saturation of 93-95% (with a fall of 3-4% with exercise),   [] Moderate exercise-induced hypoxemia as a fall in oxygen saturation to  89-93% (with a fall of 3-4 % with exercise)  [x] Severe exercise induced hypoxemia as < 89% O2 saturation (88% and below).  Medicare Criteria for Oxygen prescription comments: When arterial oxygen saturation is at or below 88% during exercise (severe exercise induced hypoxemia) then the patient meets criteria for oxygen prescription.  Details about Medicare Group Criteria coverage can be found at http://[email or web address]/manuals/downloads/     Roel Ernandez MD

## 2022-03-14 NOTE — PROGRESS NOTES
Subjective:     Patient ID: Chai Murry is a 81 y.o. male.    Chief Complaint:  shortness of breath     HPI  COPD  He presents for evaluation and treatment of COPD. The patient is currently having symptoms / an exacerbation. Current symptoms include chronic dyspnea and cough productive of yellow sputum in small amounts. Symptoms have been present since several months ago and have been gradually worsening. He denies chills and fever. Associated symptoms include poor exercise tolerance and shortness of breath.  This episode appears to have been triggered by exercise. Treatments tried for the current exacerbation: albuterol inhaler. The patient has been having similar episodes for approximately many years. He uses 1 pillows at night. Patient currently is on oxygen at 2 L/min per nasal cannula.. The patient is having no constitutional symptoms, denying fever, chills, anorexia, or weight loss. The patient has been hospitalized for this condition before. He has a history of 50 pack years. The patient is experiencing exercise intolerance (difficulty walking 3 blocks on flat ground).    Patient with chronic obstructive pulmonary disease. More SOB with the heat. He has ReSPIMAT prescribed but not typically taking the medication.. Has oxygen and benefits from use.   Squamous cell carcinoma of the lung status post left pneumonectomy and adjuvant chemoradiation completed 09/27/2016. Noted voice hoarseness in 2019 found to have PET avid mass left of trachea consistent with recurrent squamous cell carcinoma. Following the oncology.     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 Valdez  shave bx     Past Surgical History:   Procedure Laterality Date    APPENDECTOMY      BRONCHOSCOPY Bilateral 6/21/2019    Procedure: Bronchoscopy;  Surgeon: Paresh Goldman MD;  Location: [facility] ENDO;  Service: Endoscopy;  Laterality: Bilateral;    CARDIAC CATHETERIZATION      cardiac stents      CATARACT EXTRACTION W/  INTRAOCULAR LENS IMPLANT Right 9-3-14    CHOLECYSTECTOMY      COLONOSCOPY N/A 4/17/2018    Procedure: COLONOSCOPY;  Surgeon: Dionne Doan MD;  Location: [facility] ENDO;  Service: Endoscopy;  Laterality: N/A;    COLONOSCOPY N/A 10/25/2018    Procedure: COLONOSCOPY;  Surgeon: Michael Hameed MD;  Location: [facility] ENDO;  Service: Endoscopy;  Laterality: N/A;    ENDOSCOPIC ULTRASOUND OF UPPER GASTROINTESTINAL TRACT N/A 6/11/2019    Procedure: ULTRASOUND, UPPER GI TRACT, ENDOSCOPIC;  Surgeon: Abhishek Knox MD;  Location: [facility];  Service: Endoscopy;  Laterality: N/A;    ESOPHAGOGASTRODUODENOSCOPY N/A 6/11/2019    Procedure: EGD (ESOPHAGOGASTRODUODENOSCOPY);  Surgeon: Abhishek Knox MD;  Location: [facility];  Service: Endoscopy;  Laterality: N/A;    infected stomach gland excision      INSERTION OF TUNNELED CENTRAL VENOUS CATHETER (CVC) WITH SUBCUTANEOUS PORT Right 7/3/2019    Procedure: [id number];  Surgeon: Jean Carlos Constantino MD;  Location: [facility];  Service: General;  Laterality: Right;    LUNG REMOVAL, TOTAL Left 04/2016    lung cancer left upper lobe    SKIN BIOPSY Left     arm     Review of patient's allergies indicates:   Allergen Reactions    Atorvastatin      Other reaction(s): Muscle pain    Bactrim [sulfamethoxazole-trimethoprim]      Lip swelling     Current Outpatient Medications on File Prior to Visit   Medication Sig Dispense Refill    amLODIPine (NORVASC) 5 MG tablet TAKE 1 TABLET BY MOUTH EVERYDAY AT BEDTIME 90 tablet 3    aspirin (ECOTRIN) 81 MG EC tablet Take 81 mg by mouth once daily.      benzonatate (TESSALON) 200 MG capsule Take 1 capsule (200 mg  total) by mouth 3 (three) times daily as needed for Cough. 21 capsule 0    fenofibric acid (FIBRICOR) 135 mg CpDR TAKE 1 CAPSULE (135 MG TOTAL) BY MOUTH ONCE DAILY. 90 capsule 3    fluticasone (FLONASE) 50 mcg/actuation nasal spray 2 sprays (100 mcg total) by Each Nare route once daily. (Patient taking differently: 2 sprays by Each Nostril route as needed.) 3 Bottle 3    furosemide (LASIX) 20 MG tablet TAKE 1 TABLET BY MOUTH EVERY DAY AS NEEDED 90 tablet 1    HYDROcodone-acetaminophen (NORCO) 5-325 mg per tablet Take 1 tablet by mouth every 6 (six) hours as needed for Pain. 60 tablet 0    levocetirizine (XYZAL) 5 MG tablet Take 5 mg by mouth as needed.       lisinopriL (PRINIVIL,ZESTRIL) 40 MG tablet TAKE 1 TABLET BY MOUTH EVERY DAY 90 tablet 3    promethazine (PHENERGAN) 6.25 mg/5 mL syrup Take 20 mLs (25 mg total) by mouth nightly as needed. 240 mL 0    ranolazine (RANEXA) 500 MG Tb12 TAKE 1 TABLET BY MOUTH TWICE A  tablet 3    simvastatin (ZOCOR) 80 MG tablet Take 1 tablet (80 mg total) by mouth once daily. 90 tablet 3    sotaloL (BETAPACE) 80 MG tablet TAKE 1 TABLET (80 MG TOTAL) BY MOUTH 2 (TWO) TIMES DAILY. 180 tablet 3    [DISCONTINUED] doxepin (SINEQUAN) 10 MG capsule Take 1 capsule (10 mg total) by mouth nightly as needed (insomnia). 90 capsule 3    [DISCONTINUED] ipratropium-albuteroL (COMBIVENT RESPIMAT)  mcg/actuation inhaler Inhale 1 puff into the lungs every 6 (six) hours as needed for Shortness of Breath. 4 g 11    [DISCONTINUED] temazepam (RESTORIL) 15 mg Cap Take 1 capsule (15 mg total) by mouth nightly as needed (insomnia). 90 capsule 1    [DISCONTINUED] umeclidinium-vilanteroL (ANORO ELLIPTA) 62.5-25 mcg/actuation DsDv Inhale 1 puff into the lungs once daily. 60 each 11    levothyroxine (SYNTHROID) 25 MCG tablet Take 1 tablet (25 mcg total) by mouth before breakfast. (Patient not taking: Reported on 6/10/2021) 30 tablet 11    [DISCONTINUED] albuterol (PROVENTIL) 2.5 mg  /3 mL (0.083 %) nebulizer solution Take 3 mLs (2.5 mg total) by nebulization every 6 (six) hours while awake. 270 mL 11     Current Facility-Administered Medications on File Prior to Visit   Medication Dose Route Frequency Provider Last Rate Last Admin    cyanocobalamin injection 1,000 mcg  1,000 mcg Intramuscular Q30 Days Peter Teixeira MD         Social History     Socioeconomic History    Marital status:     Number of children: 3   Occupational History    Occupation: retired   Tobacco Use    Smoking status: Former Smoker     Packs/day: 1.00     Years: 50.00     Pack years: 50.00     Quit date: 2005     Years since quittin.5    Smokeless tobacco: Never Used   Substance and Sexual Activity    Alcohol use: No    Drug use: No    Sexual activity: Not Currently     Family History   Problem Relation Age of Onset    Esophageal cancer Sister     Cancer Sister     Lung cancer Mother     Cancer Mother     Cataracts Mother     Hypertension Mother     Pneumonia Father     Cataracts Father     Hypertension Father     Cancer Brother     Hypertension Brother     Blindness Neg Hx     Diabetes Neg Hx     Glaucoma Neg Hx     Macular degeneration Neg Hx     Retinal detachment Neg Hx     Strabismus Neg Hx     Stroke Neg Hx     Thyroid disease Neg Hx        Review of Systems   Constitutional: Positive for fatigue. Negative for fever.   HENT: Positive for postnasal drip, rhinorrhea and congestion.    Eyes: Negative for redness and itching.   Respiratory: Positive for cough, sputum production, shortness of breath, dyspnea on extertion, use of rescue inhaler and Paroxysmal Nocturnal Dyspnea.    Cardiovascular: Negative for chest pain, palpitations and leg swelling.   Genitourinary: Negative for difficulty urinating and hematuria.   Endocrine: Negative for cold intolerance and heat intolerance.    Skin: Negative for rash.   Gastrointestinal: Negative for nausea and abdominal pain.  "  Neurological: Negative for dizziness, syncope, weakness and light-headedness.   Hematological: Negative for adenopathy. Does not bruise/bleed easily.   Psychiatric/Behavioral: Negative for sleep disturbance. The patient is not nervous/anxious.        Objective:      /60   Pulse (!) 57   Resp 18   Ht 5' 8.5" (1.74 m)   Wt 94.5 kg (208 lb 5.4 oz)   SpO2 99%   BMI 31.22 kg/m²   Physical Exam  Vitals and nursing note reviewed.   Constitutional:       Appearance: He is well-developed. He is obese.   HENT:      Head: Normocephalic and atraumatic.      Nose: Congestion present.   Eyes:      Conjunctiva/sclera: Conjunctivae normal.      Pupils: Pupils are equal, round, and reactive to light.   Neck:      Thyroid: No thyromegaly.      Vascular: No JVD.      Trachea: No tracheal deviation.   Cardiovascular:      Rate and Rhythm: Normal rate and regular rhythm.      Heart sounds: No murmur heard.  Pulmonary:      Breath sounds: Examination of the left-upper field reveals decreased breath sounds. Examination of the left-middle field reveals decreased breath sounds. Examination of the right-lower field reveals wheezing. Examination of the left-lower field reveals decreased breath sounds and wheezing. Decreased breath sounds and wheezing present. No rhonchi or rales.   Abdominal:      General: Bowel sounds are normal.      Palpations: Abdomen is soft.   Musculoskeletal:         General: No tenderness. Normal range of motion.      Cervical back: Neck supple.   Lymphadenopathy:      Cervical: No cervical adenopathy.   Skin:     General: Skin is warm and dry.   Neurological:      Mental Status: He is alert and oriented to person, place, and time.       Personal Diagnostic Review  Chest x-ray: left Pneumonectoy    Pulmonary Studies Review 3/14/2022   SpO2 99   Ordering Provider -   Interpreting Provider -   Performing nurse/tech/RT -   Diagnosis -   Height 68.5   Weight 3333.36   BMI (Calculated) 31.2   Predicted " Distance 256.77   Patient Race -   6MWT Status -   Patient Reported -   Was O2 used? -   Delivery Method -   6MW Distance walked (feet) -   Distance walked (meters) -   Did patient stop? -   How many times? -   Stop Time 1 -   Restart Time 1 -   Did patient restart? -   Type of assistive device(s) used? -   Is extra documentation required for this patient? -   Oxygen Saturation -   Supplemental Oxygen -   Heart Rate -   Blood Pressure -   Jimenez Dyspnea Rating  -   Oxygen Saturation -   Supplemental Oxygen -   Heart Rate -   Blood Pressure -   Jimenez Dyspnea Rating  -   Recovery Time (seconds) -   Oxygen Saturation -   Supplemental Oxygen -   Heart Rate -   Blood Pressure -   Jimenez Dyspnea Rating  -   Is procedure ready for interpretation? -   Did the patient stop or pause? -   How many times did the patient stop or pause? -   Stop Time 1 -   Restart Time 1 -   Total Time Walked (Calculated) -   Total Laps Walked -   Final Partial Lap Distance (feet) -   Total Distance Feet (Calculated) -   Total Distance Meters (Calculated) -   Predicted Distance Meters (Calculated) 440.26   Percentage of Predicted (Calculated) -   Peak VO2 (Calculated) -   Mets -   Has The Patient Had a Previous Six Minute Walk Test? -   Comments -   Oxygen Qualification? -   Oxygen Saturation -   Supplemental Oxygen -   Heart Rate -   Blood Pressure -   Jimenez Dyspnea Rating  -   Oxygen Saturation -   Supplemental Oxygen -   Heart Rate -   Blood Pressure -   Jimenez Dyspnea Rating  -   Recovery Time (seconds) -   Oxygen Saturation -   Supplemental Oxygen -   Heart Rate -   Blood Pressure -   Jimenez Dyspnea Rating  -       NM PET CT Routine FDG  Narrative: EXAMINATION:  NM PET CT ROUTINE    CLINICAL HISTORY:  restaging head and neck/lung cancer; Malignant neoplasm of upper lobe, left bronchus or lung    TECHNIQUE:  12.2 mCi of F18-FDG was administered intravenously in the right forearm.  After an approximately 60 min distribution time, PET/CT images were  acquired from the skull base to the mid thigh. Transmission images were acquired to correct for attenuation using a whole body low-dose CT scan without contrast with the arms positioned above the head.    COMPARISON:  06/16/2021    FINDINGS:  Quality of the study: Adequate.    Head neck: No abnormal foci of increased tracer uptake are present.    Chest: No abnormal foci of increased tracer uptake are present.    Abdomen and pelvis: No abnormal foci of increased tracer uptake are present.    Skeletal: There is a partially healed fracture deformity seen near the costochondral junction of the right lateral 11th rib which is new from prior.  There is some associated localized tracer uptake with SUV max of 5.1.  No associated lytic or blastic osseous changes.  No other FDG avid osseous lesions demonstrated.    Physiologic uptake of the tracer is present within the brain, salivary glands, myocardium, GI and  tracts.    Incidental CT findings: Postoperative changes from prior left-sided pneumonectomy are again noted.  Fluid in the pleural space on the left is unchanged.  Non FDG avid somewhat nodular ground-glass opacities in the superior segment of the right lower lobe remain unchanged.  Prior cholecystectomy noted.  Bilateral non FDG avid renal cysts remain unchanged.  Numerous diverticula are seen throughout the sigmoid colon with no evidence of acute diverticulitis.  Impression: 1. Partially healed fracture deformity seen involving the right lateral 11th rib near the costochondral junction with associated elevated FDG uptake, possibly physiologic in nature.  No definite associated lytic or blastic osseous changes or extraosseous soft tissue mass to suggest pathologic nature fracture however clinical correlation and follow-up is recommended.  2. Other stable findings as above with no definite evidence of recurrent or metastatic disease.    Electronically signed by: Michael Degroot  MD  Date:    01/03/2022  Time:    10:19      Office Spirometry Results:     No flowsheet data found.  Pulmonary Studies Review 3/14/2022   SpO2 99   Ordering Provider -   Interpreting Provider -   Performing nurse/tech/RT -   Diagnosis -   Height 68.5   Weight 3333.36   BMI (Calculated) 31.2   Predicted Distance 256.77   Patient Race -   6MWT Status -   Patient Reported -   Was O2 used? -   Delivery Method -   6MW Distance walked (feet) -   Distance walked (meters) -   Did patient stop? -   How many times? -   Stop Time 1 -   Restart Time 1 -   Did patient restart? -   Type of assistive device(s) used? -   Is extra documentation required for this patient? -   Oxygen Saturation -   Supplemental Oxygen -   Heart Rate -   Blood Pressure -   Jimenez Dyspnea Rating  -   Oxygen Saturation -   Supplemental Oxygen -   Heart Rate -   Blood Pressure -   Jimenez Dyspnea Rating  -   Recovery Time (seconds) -   Oxygen Saturation -   Supplemental Oxygen -   Heart Rate -   Blood Pressure -   Jimenez Dyspnea Rating  -   Is procedure ready for interpretation? -   Did the patient stop or pause? -   How many times did the patient stop or pause? -   Stop Time 1 -   Restart Time 1 -   Total Time Walked (Calculated) -   Total Laps Walked -   Final Partial Lap Distance (feet) -   Total Distance Feet (Calculated) -   Total Distance Meters (Calculated) -   Predicted Distance Meters (Calculated) 440.26   Percentage of Predicted (Calculated) -   Peak VO2 (Calculated) -   Mets -   Has The Patient Had a Previous Six Minute Walk Test? -   Comments -   Oxygen Qualification? -   Oxygen Saturation -   Supplemental Oxygen -   Heart Rate -   Blood Pressure -   Jimenez Dyspnea Rating  -   Oxygen Saturation -   Supplemental Oxygen -   Heart Rate -   Blood Pressure -   Jimenez Dyspnea Rating  -   Recovery Time (seconds) -   Oxygen Saturation -   Supplemental Oxygen -   Heart Rate -   Blood Pressure -   Jimenez Dyspnea Rating  -         Assessment:       Chronic  obstructive pulmonary disease  Trial of TRELEGY    Class 1 obesity due to excess calories with serious comorbidity and body mass index (BMI) of 31.0 to 31.9 in adult    BMI noted to be elevated. Changes in weight over time reviewed in chart (if available) and by patient recollection. Patient advised and counseled concerning the adverse medical consequences of obesity. Treatment options discussed - calorie restriction, increasing calorie expenditure, medical and surgical options noted.  The patient's severe obesity was monitored, evaluated, addressed and/or treated.       Exercise hypoxemia  -     Six Minute Walk Test to qualify for Home Oxygen; Future  -     HME - OTHER    Asthma with COPD  -     ipratropium-albuteroL (COMBIVENT RESPIMAT)  mcg/actuation inhaler; Inhale 1 puff into the lungs every 6 (six) hours as needed for Shortness of Breath.  Dispense: 4 g; Refill: 11  -     fluticasone-umeclidin-vilanter (TRELEGY ELLIPTA) 200-62.5-25 mcg inhaler; Inhale 1 puff into the lungs once daily.  Dispense: 180 each; Refill: 3  -     X-Ray Chest PA And Lateral; Future; Expected date: 03/14/2022  -     Six Minute Walk Test to qualify for Home Oxygen; Future  -     HME - OTHER    Chronic obstructive pulmonary disease, unspecified COPD type  -     albuterol (PROVENTIL) 2.5 mg /3 mL (0.083 %) nebulizer solution; Take 3 mLs (2.5 mg total) by nebulization every 6 (six) hours while awake.  Dispense: 270 mL; Refill: 11  -     X-Ray Chest PA And Lateral; Future; Expected date: 03/14/2022    Status post pneumonectomy  -     X-Ray Chest PA And Lateral; Future; Expected date: 03/14/2022    Tracheal mass  -     X-Ray Chest PA And Lateral; Future; Expected date: 03/14/2022    Class 1 obesity due to excess calories with serious comorbidity and body mass index (BMI) of 31.0 to 31.9 in adult    Other orders  -     doxepin (SINEQUAN) 10 MG capsule; Take 1 capsule (10 mg total) by mouth every evening. For insomnia  Dispense: 90  capsule; Refill: 3          Outpatient Encounter Medications as of 3/14/2022   Medication Sig Dispense Refill    amLODIPine (NORVASC) 5 MG tablet TAKE 1 TABLET BY MOUTH EVERYDAY AT BEDTIME 90 tablet 3    aspirin (ECOTRIN) 81 MG EC tablet Take 81 mg by mouth once daily.      benzonatate (TESSALON) 200 MG capsule Take 1 capsule (200 mg total) by mouth 3 (three) times daily as needed for Cough. 21 capsule 0    fenofibric acid (FIBRICOR) 135 mg CpDR TAKE 1 CAPSULE (135 MG TOTAL) BY MOUTH ONCE DAILY. 90 capsule 3    fluticasone (FLONASE) 50 mcg/actuation nasal spray 2 sprays (100 mcg total) by Each Nare route once daily. (Patient taking differently: 2 sprays by Each Nostril route as needed.) 3 Bottle 3    furosemide (LASIX) 20 MG tablet TAKE 1 TABLET BY MOUTH EVERY DAY AS NEEDED 90 tablet 1    HYDROcodone-acetaminophen (NORCO) 5-325 mg per tablet Take 1 tablet by mouth every 6 (six) hours as needed for Pain. 60 tablet 0    levocetirizine (XYZAL) 5 MG tablet Take 5 mg by mouth as needed.       lisinopriL (PRINIVIL,ZESTRIL) 40 MG tablet TAKE 1 TABLET BY MOUTH EVERY DAY 90 tablet 3    promethazine (PHENERGAN) 6.25 mg/5 mL syrup Take 20 mLs (25 mg total) by mouth nightly as needed. 240 mL 0    ranolazine (RANEXA) 500 MG Tb12 TAKE 1 TABLET BY MOUTH TWICE A  tablet 3    simvastatin (ZOCOR) 80 MG tablet Take 1 tablet (80 mg total) by mouth once daily. 90 tablet 3    sotaloL (BETAPACE) 80 MG tablet TAKE 1 TABLET (80 MG TOTAL) BY MOUTH 2 (TWO) TIMES DAILY. 180 tablet 3    [DISCONTINUED] doxepin (SINEQUAN) 10 MG capsule Take 1 capsule (10 mg total) by mouth nightly as needed (insomnia). 90 capsule 3    [DISCONTINUED] ipratropium-albuteroL (COMBIVENT RESPIMAT)  mcg/actuation inhaler Inhale 1 puff into the lungs every 6 (six) hours as needed for Shortness of Breath. 4 g 11    [DISCONTINUED] temazepam (RESTORIL) 15 mg Cap Take 1 capsule (15 mg total) by mouth nightly as needed (insomnia). 90 capsule 1     [DISCONTINUED] umeclidinium-vilanteroL (ANORO ELLIPTA) 62.5-25 mcg/actuation DsDv Inhale 1 puff into the lungs once daily. 60 each 11    albuterol (PROVENTIL) 2.5 mg /3 mL (0.083 %) nebulizer solution Take 3 mLs (2.5 mg total) by nebulization every 6 (six) hours while awake. 270 mL 11    doxepin (SINEQUAN) 10 MG capsule Take 1 capsule (10 mg total) by mouth every evening. For insomnia 90 capsule 3    fluticasone-umeclidin-vilanter (TRELEGY ELLIPTA) 200-62.5-25 mcg inhaler Inhale 1 puff into the lungs once daily. 180 each 3    ipratropium-albuteroL (COMBIVENT RESPIMAT)  mcg/actuation inhaler Inhale 1 puff into the lungs every 6 (six) hours as needed for Shortness of Breath. 4 g 11    levothyroxine (SYNTHROID) 25 MCG tablet Take 1 tablet (25 mcg total) by mouth before breakfast. (Patient not taking: Reported on 6/10/2021) 30 tablet 11    [DISCONTINUED] albuterol (PROVENTIL) 2.5 mg /3 mL (0.083 %) nebulizer solution Take 3 mLs (2.5 mg total) by nebulization every 6 (six) hours while awake. 270 mL 11     Facility-Administered Encounter Medications as of 3/14/2022   Medication Dose Route Frequency Provider Last Rate Last Admin    cyanocobalamin injection 1,000 mcg  1,000 mcg Intramuscular Q30 Days Peter Teixeira MD         Plan:       Requested Prescriptions     Signed Prescriptions Disp Refills    ipratropium-albuteroL (COMBIVENT RESPIMAT)  mcg/actuation inhaler 4 g 11     Sig: Inhale 1 puff into the lungs every 6 (six) hours as needed for Shortness of Breath.    fluticasone-umeclidin-vilanter (TRELEGY ELLIPTA) 200-62.5-25 mcg inhaler 180 each 3     Sig: Inhale 1 puff into the lungs once daily.    albuterol (PROVENTIL) 2.5 mg /3 mL (0.083 %) nebulizer solution 270 mL 11     Sig: Take 3 mLs (2.5 mg total) by nebulization every 6 (six) hours while awake.    doxepin (SINEQUAN) 10 MG capsule 90 capsule 3     Sig: Take 1 capsule (10 mg total) by mouth every evening. For insomnia     Problem List  Items Addressed This Visit     Chronic obstructive pulmonary disease    Current Assessment & Plan     Trial of TRELEGY           Relevant Medications    albuterol (PROVENTIL) 2.5 mg /3 mL (0.083 %) nebulizer solution    Other Relevant Orders    X-Ray Chest PA And Lateral    Class 1 obesity due to excess calories with serious comorbidity and body mass index (BMI) of 31.0 to 31.9 in adult    Current Assessment & Plan       BMI noted to be elevated. Changes in weight over time reviewed in chart (if available) and by patient recollection. Patient advised and counseled concerning the adverse medical consequences of obesity. Treatment options discussed - calorie restriction, increasing calorie expenditure, medical and surgical options noted.  The patient's severe obesity was monitored, evaluated, addressed and/or treated.              s/p left pnuemonectomy for KAYLI L[place]    Relevant Orders    X-Ray Chest PA And Lateral    Tracheal mass: 3 cm BELOW VOCAL CORDS: SUBGLOTIC    Relevant Orders    X-Ray Chest PA And Lateral      Other Visit Diagnoses     Exercise hypoxemia    -  Primary    Relevant Orders    Six Minute Walk Test to qualify for Home Oxygen    HME - OTHER    Asthma with COPD        Relevant Medications    ipratropium-albuteroL (COMBIVENT RESPIMAT)  mcg/actuation inhaler    fluticasone-umeclidin-vilanter (TRELEGY ELLIPTA) 200-62.5-25 mcg inhaler    Other Relevant Orders    X-Ray Chest PA And Lateral    Six Minute Walk Test to qualify for Home Oxygen    HME - OTHER             Follow up in about 6 months (around 9/14/2022) for Review CXR - on return, 6 min walk - on return.    MEDICAL DECISION MAKING: Moderate to high complexity.  Overall, the multiple problems listed are of moderate to high severity that may impact quality of life and activities of daily living. Side effects of medications, treatment plan as well as options and alternatives reviewed and discussed with patient. There was counseling of  patient concerning these issues.    Total time spent in counseling and coordination of care - 30  minutes of total time spent on the encounter, which includes face to face time and non-face to face time preparing to see the patient (eg, review of tests), Obtaining and/or reviewing separately obtained history, Documenting clinical information in the electronic or other health record, Independently interpreting results (not separately reported) and communicating results to the patient/family/caregiver, or Care coordination (not separately reported).    Time was used in discussion of prognosis, risks, benefits of treatment, instructions and compliance with regimen . Discussion with other physicians and/or health care providers - home health or for use of durable medical equipment (oxygen, nebulizers, CPAP, BiPAP) occurred.

## 2022-03-21 ENCOUNTER — OFFICE VISIT (OUTPATIENT)
Dept: URGENT CARE | Facility: CLINIC | Age: 82
End: 2022-03-21
Payer: MEDICARE

## 2022-03-21 VITALS
OXYGEN SATURATION: 100 % | WEIGHT: 200 LBS | HEART RATE: 61 BPM | RESPIRATION RATE: 16 BRPM | DIASTOLIC BLOOD PRESSURE: 64 MMHG | TEMPERATURE: 98 F | BODY MASS INDEX: 28.63 KG/M2 | SYSTOLIC BLOOD PRESSURE: 137 MMHG | HEIGHT: 70 IN

## 2022-03-21 DIAGNOSIS — C34.12 MALIGNANT NEOPLASM OF UPPER LOBE OF LEFT LUNG: ICD-10-CM

## 2022-03-21 DIAGNOSIS — J44.1 COPD EXACERBATION: Primary | ICD-10-CM

## 2022-03-21 DIAGNOSIS — M54.50 ACUTE BILATERAL LOW BACK PAIN WITHOUT SCIATICA: ICD-10-CM

## 2022-03-21 PROCEDURE — 3075F SYST BP GE 130 - 139MM HG: CPT | Mod: CPTII,S$GLB,, | Performed by: PHYSICIAN ASSISTANT

## 2022-03-21 PROCEDURE — 3075F PR MOST RECENT SYSTOLIC BLOOD PRESS GE 130-139MM HG: ICD-10-PCS | Mod: CPTII,S$GLB,, | Performed by: PHYSICIAN ASSISTANT

## 2022-03-21 PROCEDURE — 99214 OFFICE O/P EST MOD 30 MIN: CPT | Mod: S$GLB,,, | Performed by: PHYSICIAN ASSISTANT

## 2022-03-21 PROCEDURE — 1160F RVW MEDS BY RX/DR IN RCRD: CPT | Mod: CPTII,S$GLB,, | Performed by: PHYSICIAN ASSISTANT

## 2022-03-21 PROCEDURE — 1159F MED LIST DOCD IN RCRD: CPT | Mod: CPTII,S$GLB,, | Performed by: PHYSICIAN ASSISTANT

## 2022-03-21 PROCEDURE — 1160F PR REVIEW ALL MEDS BY PRESCRIBER/CLIN PHARMACIST DOCUMENTED: ICD-10-PCS | Mod: CPTII,S$GLB,, | Performed by: PHYSICIAN ASSISTANT

## 2022-03-21 PROCEDURE — 99214 PR OFFICE/OUTPT VISIT, EST, LEVL IV, 30-39 MIN: ICD-10-PCS | Mod: S$GLB,,, | Performed by: PHYSICIAN ASSISTANT

## 2022-03-21 PROCEDURE — 1126F AMNT PAIN NOTED NONE PRSNT: CPT | Mod: CPTII,S$GLB,, | Performed by: PHYSICIAN ASSISTANT

## 2022-03-21 PROCEDURE — 1159F PR MEDICATION LIST DOCUMENTED IN MEDICAL RECORD: ICD-10-PCS | Mod: CPTII,S$GLB,, | Performed by: PHYSICIAN ASSISTANT

## 2022-03-21 PROCEDURE — 1126F PR PAIN SEVERITY QUANTIFIED, NO PAIN PRESENT: ICD-10-PCS | Mod: CPTII,S$GLB,, | Performed by: PHYSICIAN ASSISTANT

## 2022-03-21 PROCEDURE — 3078F DIAST BP <80 MM HG: CPT | Mod: CPTII,S$GLB,, | Performed by: PHYSICIAN ASSISTANT

## 2022-03-21 PROCEDURE — 3078F PR MOST RECENT DIASTOLIC BLOOD PRESSURE < 80 MM HG: ICD-10-PCS | Mod: CPTII,S$GLB,, | Performed by: PHYSICIAN ASSISTANT

## 2022-03-21 RX ORDER — HYDROCODONE BITARTRATE AND ACETAMINOPHEN 5; 325 MG/1; MG/1
1 TABLET ORAL EVERY 6 HOURS PRN
Qty: 60 TABLET | Refills: 0 | Status: SHIPPED | OUTPATIENT
Start: 2022-03-21 | End: 2022-09-26

## 2022-03-21 RX ORDER — METHOCARBAMOL 500 MG/1
500 TABLET, FILM COATED ORAL 4 TIMES DAILY PRN
Qty: 20 TABLET | Refills: 0 | Status: SHIPPED | OUTPATIENT
Start: 2022-03-21 | End: 2022-03-26

## 2022-03-21 RX ORDER — DOXYCYCLINE 100 MG/1
100 CAPSULE ORAL EVERY 12 HOURS
Qty: 14 CAPSULE | Refills: 0 | Status: SHIPPED | OUTPATIENT
Start: 2022-03-21 | End: 2022-03-28

## 2022-03-21 RX ORDER — PREDNISONE 20 MG/1
TABLET ORAL
Qty: 7 TABLET | Refills: 0 | Status: SHIPPED | OUTPATIENT
Start: 2022-03-21 | End: 2022-03-24

## 2022-03-21 NOTE — TELEPHONE ENCOUNTER
Returned pt call. Pt is c/o lower back pain. Pt currently has on a heating pad and took 2 Advils. Still experiencing mild pain. Pt is wanting to know if he can take anything else besides Advil.  appt on 03/24 @057

## 2022-03-21 NOTE — TELEPHONE ENCOUNTER
Care Due:                  Date            Visit Type   Department     Provider  --------------------------------------------------------------------------------                                EP -                              PRIMARY      [facility] INTERNAL  Last Visit: 03-      [facility]   MEDICINE       Peter Teixeira                              EP -                              PRIMARY      [facility] INTERNAL  Next Visit: 03-      [facility] ([facility])   [facility]       Peter Teixeira                                                            Last  Test          Frequency    Reason                     Performed    Due Date  --------------------------------------------------------------------------------    Lipid Panel.  12 months..  fenofibric, simvastatin..  Not Found    Overdue    Powered by [employer]. Reference number: [id number].   3/21/2022 2:48:47 PM CDT

## 2022-03-21 NOTE — TELEPHONE ENCOUNTER
----- Message from Eileen Ontiveros sent at 3/21/2022  9:24 AM CDT -----  Pt would like a call back in regards to him having severe back pains and stated he took 2 muscle relaxer's and the pain went away but he does not have anymore. Please call him at .[phone] or [phone]

## 2022-03-21 NOTE — PROGRESS NOTES
"Subjective:       Patient ID: Chai Murry is a 81 y.o. male.    Vitals:  height is 5' 10" (1.778 m) and weight is 90.7 kg (200 lb). His temperature is 97.5 °F (36.4 °C). His blood pressure is 137/64 and his pulse is 61. His respiration is 16 and oxygen saturation is 100%.     Chief Complaint: Shortness of Breath    Pt is an 82 yo with hx of COPD, lung CA in remission s/p pneumonectomy and chemo who c/o shortness of breath with walking or activity.  Increase in SOB recently over the past few weeks and increase in cough. No fever or CP. Seen by pulmonology last week and was started on Trelegy. Also has pro air rescue inhaler only using about 1x daily. On O2 3L continuous     Pt also c/o lower back pain starting 2-3 days ago.  Did ride his riding [place] all day the day before pain started but denies any heavy lifting, hx of trauma, hx of chronic back pain, radiation to legs, numbness/tingling, or urinary complaints. No relief with tylenol     Shortness of Breath  This is a new problem. The problem occurs constantly. The problem has been unchanged. Associated symptoms include headaches and wheezing. Pertinent negatives include no abdominal pain, chest pain, claudication, coryza, ear pain, fever, hemoptysis, leg pain, leg swelling, neck pain, orthopnea, PND, rash, rhinorrhea, sore throat, sputum production, swollen glands, syncope or vomiting. The symptoms are aggravated by any activity. Treatments tried: oxygen. The treatment provided no relief. His past medical history is significant for bronchiolitis and COPD. There is no history of allergies, aspirin allergies, asthma, CAD, chronic lung disease, DVT, a heart failure, PE, pneumonia or a recent surgery.   Back Pain  This is a new problem. The current episode started in the past 7 days. The problem occurs constantly. The problem is unchanged. The pain is present in the lumbar spine and sacro-iliac. The quality of the pain is described as aching. The pain does not " radiate. The pain is at a severity of 9/10. The pain is severe. The pain is the same all the time. The symptoms are aggravated by standing and sitting. Associated symptoms include headaches. Pertinent negatives include no abdominal pain, bladder incontinence, bowel incontinence, chest pain, dysuria, fever, leg pain, numbness, paresis, paresthesias, pelvic pain, perianal numbness, tingling, weakness or weight loss. Risk factors include history of cancer. He has tried ice, heat and muscle relaxant for the symptoms.       Constitution: Negative for fever.   HENT: Negative for ear pain and sore throat.    Neck: Negative for neck pain, neck stiffness, neck swelling and degenerative disc disease.   Cardiovascular: Negative for chest pain, leg swelling and passing out.   Respiratory: Positive for COPD, shortness of breath and wheezing. Negative for sputum production and bloody sputum.    Gastrointestinal: Negative for abdominal pain, vomiting and bowel incontinence.   Genitourinary: Negative for dysuria, bladder incontinence and pelvic pain.   Musculoskeletal: Positive for back pain. Negative for muscle ache and history of spine disorder.   Skin: Negative for rash.   Neurological: Positive for headaches. Negative for numbness.       Objective:      Physical Exam   Constitutional: He is oriented to person, place, and time. He appears well-developed. He is cooperative.  Non-toxic appearance. He does not appear ill. No distress.   HENT:   Head: Normocephalic and atraumatic.   Ears:   Right Ear: Hearing, external ear and ear canal normal.   Left Ear: Hearing, external ear and ear canal normal.   Nose: Nose normal. No mucosal edema, rhinorrhea or nasal deformity. No epistaxis. Right sinus exhibits no maxillary sinus tenderness and no frontal sinus tenderness. Left sinus exhibits no maxillary sinus tenderness and no frontal sinus tenderness.   Mouth/Throat: Uvula is midline, oropharynx is clear and moist and mucous membranes are  normal. No trismus in the jaw. Normal dentition. No uvula swelling. No posterior oropharyngeal erythema.   Eyes: Conjunctivae and lids are normal. Right eye exhibits no discharge. Left eye exhibits no discharge. No scleral icterus.   Neck: Trachea normal and phonation normal. Neck supple.   Cardiovascular: Normal rate, regular rhythm, normal heart sounds and normal pulses.   Pulmonary/Chest: Effort normal. No respiratory distress. He has decreased breath sounds in the right upper field, the right middle field, the right lower field, the left upper field, the left middle field and the left lower field. He has no wheezes. He has no rales.   On 3L O2. O2 100%    Lungs diminished moderately throughout. No wheeze or rhonchi. No respiratory distress.     Comments: On 3L O2. O2 100%    Lungs diminished moderately throughout. No wheeze or rhonchi. No respiratory distress.     Abdominal: Normal appearance and bowel sounds are normal. He exhibits no distension and no mass. Soft. There is no abdominal tenderness.   Musculoskeletal: Normal range of motion.         General: No deformity. Normal range of motion.      Comments: Right paraspinal MSK TTP with spasm. No midline or bony TTP, step offs or deformities. FROM. 5/5 strength hien LE   Neurological: He is alert and oriented to person, place, and time. He exhibits normal muscle tone. Coordination normal.   Skin: Skin is warm, dry, intact, not diaphoretic and not pale.   Psychiatric: His speech is normal and behavior is normal. Judgment and thought content normal.   Nursing note and vitals reviewed.        Assessment:       1. COPD exacerbation    2. Acute bilateral low back pain without sciatica          Plan:       Recommend continue trelegy, rescue inhaler as needed. Doxycycline and prednisone for COPD exacerbation.     recommmend tylenol. Prn robaxin for LBP. No indications for any imaging.    COPD exacerbation    Acute bilateral low back pain without sciatica    Other  orders  -     doxycycline (VIBRAMYCIN) 100 MG Cap; Take 1 capsule (100 mg total) by mouth every 12 (twelve) hours. for 7 days  Dispense: 14 capsule; Refill: 0  -     predniSONE (DELTASONE) 20 MG tablet; Take 1 tablet (20 mg total) by mouth 2 (two) times daily for 2 days, THEN 1 tablet (20 mg total) once daily for 3 days.  Dispense: 7 tablet; Refill: 0  -     methocarbamoL (ROBAXIN) 500 MG Tab; Take 1 tablet (500 mg total) by mouth 4 (four) times daily as needed (spasm).  Dispense: 20 tablet; Refill: 0    [facility]         Patient Instructions   COPD- use Trelegy daily and your rescue inhaler Pro-air 2-4 puffs every 4-6 hours as needed. Steroid (prednisone) but closely monitor your BP while taking. Discontinue if persistently > 160/90. Doxycycline antibiotics as prescribed. Regular MUCINEX or MUCINEX DM cough for and congestion.     You need to follow up with pulm for persistent or worsening problems.    For your back: take tylenol (acetaminophen) for pain 1000mg every 4-6 hours (max 4000mg/day).  Robaxin (muscle relaxant) up to 3-4x daily for spasm (may cause drowsiness). Ice often for the first 3 days then may alternate ice and heat. Avoid heavy lifting this week.    You need re-evaluation for any persistent or worsening back pain with failure to improve with conservative measures lasting > 3 weeks.     Urgent re-evaluation if severe pain, numbness or tingling in legs, trauma to back, bowel/bladder incontinence, saddle area numbness, associated abdominal or chest pain, urinary tract symptoms, or shortness of breath.                Additional MDM:     Heart Failure Score:   COPD = Yes

## 2022-03-21 NOTE — PATIENT INSTRUCTIONS
COPD- use Trelegy daily and your rescue inhaler Pro-air 2-4 puffs every 4-6 hours as needed. Steroid (prednisone) but closely monitor your BP while taking. Discontinue if persistently > 160/90. Doxycycline antibiotics as prescribed. Regular MUCINEX or MUCINEX DM cough for and congestion.     You need to follow up with pulm for persistent or worsening problems.    For your back: take tylenol (acetaminophen) for pain 1000mg every 4-6 hours (max 4000mg/day).  Robaxin (muscle relaxant) up to 3-4x daily for spasm (may cause drowsiness). Ice often for the first 3 days then may alternate ice and heat. Avoid heavy lifting this week.    You need re-evaluation for any persistent or worsening back pain with failure to improve with conservative measures lasting > 3 weeks.     Urgent re-evaluation if severe pain, numbness or tingling in legs, trauma to back, bowel/bladder incontinence, saddle area numbness, associated abdominal or chest pain, urinary tract symptoms, or shortness of breath.

## 2022-03-21 NOTE — TELEPHONE ENCOUNTER
"Relayed message to pt. To take Tylenol. Pt states " I know what I need and it's those muscle relaxer's" Med was prescribed 10/2020 by Dr. Fermin Vila. Please be advised.   "

## 2022-03-23 ENCOUNTER — TELEPHONE (OUTPATIENT)
Dept: URGENT CARE | Facility: CLINIC | Age: 82
End: 2022-03-23
Payer: MEDICARE

## 2022-03-24 ENCOUNTER — OFFICE VISIT (OUTPATIENT)
Dept: INTERNAL MEDICINE | Facility: CLINIC | Age: 82
End: 2022-03-24
Payer: MEDICARE

## 2022-03-24 ENCOUNTER — LAB VISIT (OUTPATIENT)
Dept: LAB | Facility: HOSPITAL | Age: 82
End: 2022-03-24
Attending: INTERNAL MEDICINE
Payer: MEDICARE

## 2022-03-24 VITALS
HEART RATE: 60 BPM | TEMPERATURE: 97 F | WEIGHT: 208.13 LBS | DIASTOLIC BLOOD PRESSURE: 68 MMHG | HEIGHT: 70 IN | SYSTOLIC BLOOD PRESSURE: 132 MMHG | OXYGEN SATURATION: 98 % | BODY MASS INDEX: 29.8 KG/M2

## 2022-03-24 DIAGNOSIS — R79.89 ABNORMAL TSH: ICD-10-CM

## 2022-03-24 DIAGNOSIS — I48.0 PAROXYSMAL ATRIAL FIBRILLATION: ICD-10-CM

## 2022-03-24 DIAGNOSIS — I25.118 CORONARY ARTERY DISEASE OF NATIVE ARTERY OF NATIVE HEART WITH STABLE ANGINA PECTORIS: ICD-10-CM

## 2022-03-24 DIAGNOSIS — J43.1 PANLOBULAR EMPHYSEMA: Primary | ICD-10-CM

## 2022-03-24 DIAGNOSIS — I73.9 PVD (PERIPHERAL VASCULAR DISEASE): ICD-10-CM

## 2022-03-24 DIAGNOSIS — N18.32 STAGE 3B CHRONIC KIDNEY DISEASE: ICD-10-CM

## 2022-03-24 PROBLEM — Z95.828 PORT-A-CATH IN PLACE: Status: RESOLVED | Noted: 2019-07-03 | Resolved: 2022-03-24

## 2022-03-24 PROBLEM — E03.9 HYPOTHYROID: Status: RESOLVED | Noted: 2020-03-10 | Resolved: 2022-03-24

## 2022-03-24 PROBLEM — I21.4 NSTEMI (NON-ST ELEVATED MYOCARDIAL INFARCTION): Status: RESOLVED | Noted: 2019-08-23 | Resolved: 2022-03-24

## 2022-03-24 LAB
T4 FREE SERPL-MCNC: 0.99 NG/DL (ref 0.71–1.51)
TSH SERPL DL<=0.005 MIU/L-ACNC: 6.23 UIU/ML (ref 0.4–4)

## 2022-03-24 PROCEDURE — 99999 PR PBB SHADOW E&M-EST. PATIENT-LVL IV: CPT | Mod: PBBFAC,,, | Performed by: INTERNAL MEDICINE

## 2022-03-24 PROCEDURE — 1126F PR PAIN SEVERITY QUANTIFIED, NO PAIN PRESENT: ICD-10-PCS | Mod: CPTII,S$GLB,, | Performed by: INTERNAL MEDICINE

## 2022-03-24 PROCEDURE — 1101F PT FALLS ASSESS-DOCD LE1/YR: CPT | Mod: CPTII,S$GLB,, | Performed by: INTERNAL MEDICINE

## 2022-03-24 PROCEDURE — 1159F PR MEDICATION LIST DOCUMENTED IN MEDICAL RECORD: ICD-10-PCS | Mod: CPTII,S$GLB,, | Performed by: INTERNAL MEDICINE

## 2022-03-24 PROCEDURE — 1126F AMNT PAIN NOTED NONE PRSNT: CPT | Mod: CPTII,S$GLB,, | Performed by: INTERNAL MEDICINE

## 2022-03-24 PROCEDURE — 3075F PR MOST RECENT SYSTOLIC BLOOD PRESS GE 130-139MM HG: ICD-10-PCS | Mod: CPTII,S$GLB,, | Performed by: INTERNAL MEDICINE

## 2022-03-24 PROCEDURE — 84439 ASSAY OF FREE THYROXINE: CPT | Performed by: INTERNAL MEDICINE

## 2022-03-24 PROCEDURE — 84443 ASSAY THYROID STIM HORMONE: CPT | Mod: GA | Performed by: INTERNAL MEDICINE

## 2022-03-24 PROCEDURE — 1101F PR PT FALLS ASSESS DOC 0-1 FALLS W/OUT INJ PAST YR: ICD-10-PCS | Mod: CPTII,S$GLB,, | Performed by: INTERNAL MEDICINE

## 2022-03-24 PROCEDURE — 99214 OFFICE O/P EST MOD 30 MIN: CPT | Mod: S$GLB,,, | Performed by: INTERNAL MEDICINE

## 2022-03-24 PROCEDURE — 3288F PR FALLS RISK ASSESSMENT DOCUMENTED: ICD-10-PCS | Mod: CPTII,S$GLB,, | Performed by: INTERNAL MEDICINE

## 2022-03-24 PROCEDURE — 1160F RVW MEDS BY RX/DR IN RCRD: CPT | Mod: CPTII,S$GLB,, | Performed by: INTERNAL MEDICINE

## 2022-03-24 PROCEDURE — 3078F DIAST BP <80 MM HG: CPT | Mod: CPTII,S$GLB,, | Performed by: INTERNAL MEDICINE

## 2022-03-24 PROCEDURE — 99214 PR OFFICE/OUTPT VISIT, EST, LEVL IV, 30-39 MIN: ICD-10-PCS | Mod: S$GLB,,, | Performed by: INTERNAL MEDICINE

## 2022-03-24 PROCEDURE — 1159F MED LIST DOCD IN RCRD: CPT | Mod: CPTII,S$GLB,, | Performed by: INTERNAL MEDICINE

## 2022-03-24 PROCEDURE — 3075F SYST BP GE 130 - 139MM HG: CPT | Mod: CPTII,S$GLB,, | Performed by: INTERNAL MEDICINE

## 2022-03-24 PROCEDURE — 99999 PR PBB SHADOW E&M-EST. PATIENT-LVL IV: ICD-10-PCS | Mod: PBBFAC,,, | Performed by: INTERNAL MEDICINE

## 2022-03-24 PROCEDURE — 36415 COLL VENOUS BLD VENIPUNCTURE: CPT | Mod: PO | Performed by: INTERNAL MEDICINE

## 2022-03-24 PROCEDURE — 3288F FALL RISK ASSESSMENT DOCD: CPT | Mod: CPTII,S$GLB,, | Performed by: INTERNAL MEDICINE

## 2022-03-24 PROCEDURE — 3078F PR MOST RECENT DIASTOLIC BLOOD PRESSURE < 80 MM HG: ICD-10-PCS | Mod: CPTII,S$GLB,, | Performed by: INTERNAL MEDICINE

## 2022-03-24 PROCEDURE — 1160F PR REVIEW ALL MEDS BY PRESCRIBER/CLIN PHARMACIST DOCUMENTED: ICD-10-PCS | Mod: CPTII,S$GLB,, | Performed by: INTERNAL MEDICINE

## 2022-03-24 NOTE — PROGRESS NOTES
Subjective:       Patient ID: Chai Murry is a 81 y.o. male.    Chief Complaint:  Follow-up    HPI Patient is a 59-year-old male presenting today following up on his emphysema, coronary artery disease, AFib, PVD, stage 3 kidney disease and a history of abnormal TSH.    Patient has a history of emphysema.  He also has history of lung cancer.  He is status post left pneumonectomy for his lung cancer and he is underlying COPD in the right lung.  He is indicated that his breathing has gradually worsened and he is at a point now where he really can not do a whole lot of anything without getting short of breath.  When he goes out in the outside to try to do some stuff is he gets short of breath relatively quickly and his pulse ox dropped to the low 80s.  He has an oxygen concentrator and wears oxygen while at home he is on 3 L. We discussed that this is likely distant the natural progression of his disease.  He saw Pulmonary in the recent past and they recommended continuation of his Trelegy in his ProAir.  He was told to stop his Combivent.    His CAD is stable.  He notes no chest pain or palpitations.  Has been awhile since he seen his cardiologist we will look at making a follow-up appointment the see him.  He has not had any issues with his atrial fibrillation there has been no episodes recently that he is aware of.    He has had some issues with peripheral vascular disease but there is no problem there at this point.  He denies any claudication.    Patient is noted to have stage III care chronic kidney disease.  He is aware the importance of drinking plenty of fluids and avoiding anti-inflammatories.  Reviewing his lab showed no significant progression of disease.    Patient was on Synthroid through his oncologist.  Looking at the chart it looks like he had a borderline TSH quite a few years ago and his oncologist started him on some thyroid replacement.  He states he never really continued it.  He said he took the  "prescription when he got it but he never refilled her stayed on it.  There are repeat thyroids over the years which indicate normal TSH is.  I am suspicious he has maybe some subacute hypothyroidism but he may not require treatment at this time.  We discussed repeating the labs to verify.      Review of Systems   Constitutional: Negative for fever and unexpected weight change.   HENT: Negative for hearing loss, postnasal drip and rhinorrhea.    Eyes: Negative for pain and visual disturbance.   Respiratory: Positive for shortness of breath. Negative for cough and wheezing.    Cardiovascular: Negative for chest pain and palpitations.   Gastrointestinal: Negative for constipation, diarrhea, nausea and vomiting.   Genitourinary: Negative for dysuria and hematuria.   Musculoskeletal: Negative for arthralgias, back pain, myalgias and neck stiffness.   Skin: Negative for pallor and rash.   Neurological: Negative for seizures, syncope and headaches.   Hematological: Negative for adenopathy.   Psychiatric/Behavioral: Negative for dysphoric mood. The patient is not nervous/anxious.        Objective:   /68   Pulse 60   Temp 97.3 °F (36.3 °C) (Temporal)   Ht 5' 10" (1.778 m)   Wt 94.4 kg (208 lb 1.8 oz)   SpO2 98%   BMI 29.86 kg/m²      Physical Exam  Vitals reviewed.   Constitutional:       Appearance: He is well-developed.   HENT:      Head: Normocephalic and atraumatic.      Right Ear: Tympanic membrane, ear canal and external ear normal.      Left Ear: Tympanic membrane, ear canal and external ear normal.   Eyes:      Pupils: Pupils are equal, round, and reactive to light.   Neck:      Thyroid: No thyromegaly.   Cardiovascular:      Rate and Rhythm: Normal rate and regular rhythm.      Heart sounds: Normal heart sounds. No murmur heard.    No friction rub. No gallop.   Pulmonary:      Effort: Pulmonary effort is normal.      Breath sounds: Examination of the left-upper field reveals decreased breath sounds. " Examination of the right-middle field reveals wheezing. Examination of the left-middle field reveals decreased breath sounds. Examination of the right-lower field reveals wheezing. Examination of the left-lower field reveals decreased breath sounds. Decreased breath sounds and wheezing (mild) present. No rales.   Abdominal:      General: Bowel sounds are normal. There is no distension.      Palpations: Abdomen is soft.      Tenderness: There is no abdominal tenderness.   Musculoskeletal:      Cervical back: Neck supple.   Psychiatric:         Mood and Affect: Mood normal.         No visits with results within 2 Week(s) from this visit.   Latest known visit with results is:   Lab Visit on 01/03/2022   Component Date Value    WBC 01/03/2022 6.33     RBC 01/03/2022 4.33 (A)    Hemoglobin 01/03/2022 13.2 (A)    Hematocrit 01/03/2022 43.3     MCV 01/03/2022 100 (A)    MCH 01/03/2022 30.5     MCHC 01/03/2022 30.5 (A)    RDW 01/03/2022 13.2     Platelets 01/03/2022 204     MPV 01/03/2022 10.6     Immature Granulocytes 01/03/2022 0.5     Gran # (ANC) 01/03/2022 3.6     Immature Grans (Abs) 01/03/2022 0.03     Lymph # 01/03/2022 1.0     Mono # 01/03/2022 0.5     Eos # 01/03/2022 1.1 (A)    Baso # 01/03/2022 0.06     nRBC 01/03/2022 0     Gran % 01/03/2022 57.5     Lymph % 01/03/2022 16.0 (A)    Mono % 01/03/2022 7.4     Eosinophil % 01/03/2022 17.7 (A)    Basophil % 01/03/2022 0.9     Differential Method 01/03/2022 Automated     Sodium 01/03/2022 141     Potassium 01/03/2022 4.3     Chloride 01/03/2022 101     CO2 01/03/2022 29     Glucose 01/03/2022 116 (A)    BUN 01/03/2022 23     Creatinine 01/03/2022 1.4     Calcium 01/03/2022 9.7     Total Protein 01/03/2022 7.9     Albumin 01/03/2022 4.0     Total Bilirubin 01/03/2022 0.3     Alkaline Phosphatase 01/03/2022 71     AST 01/03/2022 22     ALT 01/03/2022 30     Anion Gap 01/03/2022 11     eGFR if  01/03/2022 54 (A)     eGFR if non African Amer* 01/03/2022 47 (A)       Assessment:       1. Panlobular emphysema    2. Coronary artery disease of native artery of native heart with stable angina pectoris    3. Paroxysmal atrial fibrillation    4. PVD (peripheral vascular disease)    5. Stage 3b chronic kidney disease    6. Abnormal TSH        Plan:   No problem-specific Assessment & Plan notes found for this encounter.    Panlobular emphysema  Comments:  Continued sob with exertion.  Pulse ox drops to low 80s with exertion.  Continue oxygen     Coronary artery disease of native artery of native heart with stable angina pectoris  Comments:  stable, no chest pain.    Paroxysmal atrial fibrillation  Comments:  continue sotalol and asa    PVD (peripheral vascular disease)  Comments:  continue statin and asa and bp control    Stage 3b chronic kidney disease  Comments:  avoid nsaids, stay well hydrated    Abnormal TSH  Comments:  update labs  Orders:  -     TSH; Future; Expected date: 03/24/2022          Follow up in about 6 months (around 9/24/2022).

## 2022-04-04 ENCOUNTER — PATIENT MESSAGE (OUTPATIENT)
Dept: PHARMACY | Facility: CLINIC | Age: 82
End: 2022-04-04
Payer: MEDICARE

## 2022-04-28 ENCOUNTER — OFFICE VISIT (OUTPATIENT)
Dept: CARDIOLOGY | Facility: CLINIC | Age: 82
End: 2022-04-28
Payer: MEDICARE

## 2022-04-28 VITALS
WEIGHT: 209.69 LBS | BODY MASS INDEX: 30.02 KG/M2 | HEIGHT: 70 IN | HEART RATE: 67 BPM | SYSTOLIC BLOOD PRESSURE: 136 MMHG | OXYGEN SATURATION: 88 % | DIASTOLIC BLOOD PRESSURE: 62 MMHG | RESPIRATION RATE: 20 BRPM

## 2022-04-28 DIAGNOSIS — I10 ESSENTIAL HYPERTENSION: ICD-10-CM

## 2022-04-28 DIAGNOSIS — E78.00 PURE HYPERCHOLESTEROLEMIA: ICD-10-CM

## 2022-04-28 DIAGNOSIS — I25.118 CORONARY ARTERY DISEASE OF NATIVE ARTERY OF NATIVE HEART WITH STABLE ANGINA PECTORIS: Primary | ICD-10-CM

## 2022-04-28 DIAGNOSIS — I48.20 CHRONIC ATRIAL FIBRILLATION: ICD-10-CM

## 2022-04-28 DIAGNOSIS — I73.9 PVD (PERIPHERAL VASCULAR DISEASE): ICD-10-CM

## 2022-04-28 PROCEDURE — 3078F DIAST BP <80 MM HG: CPT | Mod: CPTII,S$GLB,, | Performed by: INTERNAL MEDICINE

## 2022-04-28 PROCEDURE — 99999 PR PBB SHADOW E&M-EST. PATIENT-LVL IV: CPT | Mod: PBBFAC,,, | Performed by: INTERNAL MEDICINE

## 2022-04-28 PROCEDURE — 99214 OFFICE O/P EST MOD 30 MIN: CPT | Mod: S$GLB,,, | Performed by: INTERNAL MEDICINE

## 2022-04-28 PROCEDURE — 3075F PR MOST RECENT SYSTOLIC BLOOD PRESS GE 130-139MM HG: ICD-10-PCS | Mod: CPTII,S$GLB,, | Performed by: INTERNAL MEDICINE

## 2022-04-28 PROCEDURE — 1159F PR MEDICATION LIST DOCUMENTED IN MEDICAL RECORD: ICD-10-PCS | Mod: CPTII,S$GLB,, | Performed by: INTERNAL MEDICINE

## 2022-04-28 PROCEDURE — 3075F SYST BP GE 130 - 139MM HG: CPT | Mod: CPTII,S$GLB,, | Performed by: INTERNAL MEDICINE

## 2022-04-28 PROCEDURE — 1126F AMNT PAIN NOTED NONE PRSNT: CPT | Mod: CPTII,S$GLB,, | Performed by: INTERNAL MEDICINE

## 2022-04-28 PROCEDURE — 99214 PR OFFICE/OUTPT VISIT, EST, LEVL IV, 30-39 MIN: ICD-10-PCS | Mod: S$GLB,,, | Performed by: INTERNAL MEDICINE

## 2022-04-28 PROCEDURE — 99999 PR PBB SHADOW E&M-EST. PATIENT-LVL IV: ICD-10-PCS | Mod: PBBFAC,,, | Performed by: INTERNAL MEDICINE

## 2022-04-28 PROCEDURE — 1160F RVW MEDS BY RX/DR IN RCRD: CPT | Mod: CPTII,S$GLB,, | Performed by: INTERNAL MEDICINE

## 2022-04-28 PROCEDURE — 1160F PR REVIEW ALL MEDS BY PRESCRIBER/CLIN PHARMACIST DOCUMENTED: ICD-10-PCS | Mod: CPTII,S$GLB,, | Performed by: INTERNAL MEDICINE

## 2022-04-28 PROCEDURE — 1159F MED LIST DOCD IN RCRD: CPT | Mod: CPTII,S$GLB,, | Performed by: INTERNAL MEDICINE

## 2022-04-28 PROCEDURE — 1126F PR PAIN SEVERITY QUANTIFIED, NO PAIN PRESENT: ICD-10-PCS | Mod: CPTII,S$GLB,, | Performed by: INTERNAL MEDICINE

## 2022-04-28 PROCEDURE — 3078F PR MOST RECENT DIASTOLIC BLOOD PRESSURE < 80 MM HG: ICD-10-PCS | Mod: CPTII,S$GLB,, | Performed by: INTERNAL MEDICINE

## 2022-04-28 NOTE — PROGRESS NOTES
Subjective:   Patient ID:  Chai Murry is a 82 y.o. male who presents for follow-up of No chief complaint on file.  Patient denies CP, angina or anginal equivalent.  Pt with SOB - positional.     Hypertension  This is a chronic problem. The current episode started more than 1 year ago. The problem has been gradually improving since onset. The problem is controlled. Pertinent negatives include no chest pain, palpitations or shortness of breath. Past treatments include ACE inhibitors and calcium channel blockers. The current treatment provides moderate improvement. There are no compliance problems.    Hyperlipidemia  This is a chronic problem. The current episode started more than 1 year ago. The problem is controlled. Recent lipid tests were reviewed and are variable. Pertinent negatives include no chest pain or shortness of breath. Current antihyperlipidemic treatment includes statins. The current treatment provides moderate improvement of lipids. There are no compliance problems.    Coronary Artery Disease  Presents for follow-up visit. Pertinent negatives include no chest pain, chest pressure, chest tightness, dizziness, leg swelling, muscle weakness, palpitations, shortness of breath or weight gain. Risk factors include hyperlipidemia. The symptoms have been stable. Compliance with diet is variable. Compliance with exercise is variable. Compliance with medications is good.       Review of Systems   Constitutional: Negative. Negative for weight gain.   HENT: Negative.    Eyes: Negative.    Cardiovascular: Negative.  Negative for chest pain, leg swelling and palpitations.   Respiratory: Negative.  Negative for chest tightness and shortness of breath.    Endocrine: Negative.    Hematologic/Lymphatic: Negative.    Skin: Negative.    Musculoskeletal: Negative for muscle weakness.   Gastrointestinal: Negative.    Genitourinary: Negative.    Neurological: Negative.  Negative for dizziness.   Psychiatric/Behavioral:  Negative.    Allergic/Immunologic: Negative.      Family History   Problem Relation Age of Onset    Esophageal cancer Sister     Cancer Sister     Lung cancer Mother     Cancer Mother     Cataracts Mother     Hypertension Mother     Pneumonia Father     Cataracts Father     Hypertension Father     Cancer Brother     Hypertension Brother     Blindness Neg Hx     Diabetes Neg Hx     Glaucoma Neg Hx     Macular degeneration Neg Hx     Retinal detachment Neg Hx     Strabismus Neg Hx     Stroke Neg Hx     Thyroid disease Neg Hx      Past Medical History:   Diagnosis Date    Anemia     Arthritis     Atrial fibrillation     CAD (coronary artery disease)     Cataract     COPD (chronic obstructive pulmonary disease)     Diverticulosis     ED (erectile dysfunction)     HTN (hypertension)     Hyperlipidemia     Lung cancer     left    NSTEMI (non-ST elevated myocardial infarction) 2019    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 Courtney dwyer bx     Social History     Socioeconomic History    Marital status:     Number of children: 3   Occupational History    Occupation: retired   Tobacco Use    Smoking status: Former Smoker     Packs/day: 1.00     Years: 50.00     Pack years: 50.00     Quit date: 2005     Years since quittin.7    Smokeless tobacco: Never Used   Substance and Sexual Activity    Alcohol use: No    Drug use: No    Sexual activity: Not Currently     Current Outpatient Medications on File Prior to Visit   Medication Sig Dispense Refill    albuterol (PROVENTIL) 2.5 mg /3 mL (0.083 %) nebulizer solution Take 3 mLs (2.5 mg total) by nebulization every 6 (six) hours while awake. 270 mL 11    amLODIPine (NORVASC) 5 MG tablet TAKE 1 TABLET BY MOUTH EVERYDAY AT BEDTIME 90 tablet 3    aspirin (ECOTRIN) 81 MG EC tablet Take 81 mg by mouth once daily.      doxepin (SINEQUAN) 10 MG capsule Take 1 capsule (10 mg total) by  mouth every evening. For insomnia 90 capsule 3    fenofibric acid (FIBRICOR) 135 mg CpDR TAKE 1 CAPSULE (135 MG TOTAL) BY MOUTH ONCE DAILY. 90 capsule 3    fluticasone-umeclidin-vilanter (TRELEGY ELLIPTA) 200-62.5-25 mcg inhaler Inhale 1 puff into the lungs once daily. 180 each 3    HYDROcodone-acetaminophen (NORCO) 5-325 mg per tablet Take 1 tablet by mouth every 6 (six) hours as needed for Pain. 60 tablet 0    levocetirizine (XYZAL) 5 MG tablet Take 5 mg by mouth as needed.       lisinopriL (PRINIVIL,ZESTRIL) 40 MG tablet TAKE 1 TABLET BY MOUTH EVERY DAY 90 tablet 3    ranolazine (RANEXA) 500 MG Tb12 TAKE 1 TABLET BY MOUTH TWICE A  tablet 3    simvastatin (ZOCOR) 80 MG tablet Take 1 tablet (80 mg total) by mouth once daily. 90 tablet 3    sotaloL (BETAPACE) 80 MG tablet TAKE 1 TABLET (80 MG TOTAL) BY MOUTH 2 (TWO) TIMES DAILY. 180 tablet 3     No current facility-administered medications on file prior to visit.     Review of patient's allergies indicates:   Allergen Reactions    Atorvastatin      Other reaction(s): Muscle pain    Bactrim [sulfamethoxazole-trimethoprim]      Lip swelling       Objective:     Physical Exam  Vitals and nursing note reviewed.   Constitutional:       Appearance: He is well-developed.   HENT:      Head: Normocephalic and atraumatic.   Eyes:      Conjunctiva/sclera: Conjunctivae normal.      Pupils: Pupils are equal, round, and reactive to light.   Cardiovascular:      Rate and Rhythm: Normal rate and regular rhythm.      Pulses: Intact distal pulses.      Heart sounds: Normal heart sounds.   Pulmonary:      Effort: Pulmonary effort is normal.      Breath sounds: Normal breath sounds.   Abdominal:      General: Bowel sounds are normal.      Palpations: Abdomen is soft.   Musculoskeletal:      Cervical back: Normal range of motion and neck supple.   Skin:     General: Skin is warm and dry.   Neurological:      Mental Status: He is alert and oriented to person, place, and  time.         Assessment:     1. Coronary artery disease of native artery of native heart with stable angina pectoris    2. Pure hypercholesterolemia    3. Essential hypertension    4. Chronic atrial fibrillation    5. PVD (peripheral vascular disease)        Plan:     Coronary artery disease of native artery of native heart with stable angina pectoris    Pure hypercholesterolemia    Essential hypertension    Chronic atrial fibrillation    PVD (peripheral vascular disease)      Continue lisinopril, lasix (prn), norvasc -htn  Continue asa, sotalol- PAF, much bruising on eliquis  Continue ranexa- cad  Continue statin, fenofibrate-hlp

## 2022-04-29 ENCOUNTER — LAB VISIT (OUTPATIENT)
Dept: LAB | Facility: HOSPITAL | Age: 82
End: 2022-04-29
Attending: INTERNAL MEDICINE
Payer: MEDICARE

## 2022-04-29 DIAGNOSIS — I25.118 CORONARY ARTERY DISEASE OF NATIVE ARTERY OF NATIVE HEART WITH STABLE ANGINA PECTORIS: ICD-10-CM

## 2022-04-29 DIAGNOSIS — E78.00 PURE HYPERCHOLESTEROLEMIA: ICD-10-CM

## 2022-04-29 LAB
ALBUMIN SERPL BCP-MCNC: 3.5 G/DL (ref 3.5–5.2)
ALP SERPL-CCNC: 63 U/L (ref 55–135)
ALT SERPL W/O P-5'-P-CCNC: 33 U/L (ref 10–44)
AST SERPL-CCNC: 36 U/L (ref 10–40)
BILIRUB DIRECT SERPL-MCNC: 0.1 MG/DL (ref 0.1–0.3)
BILIRUB SERPL-MCNC: 0.3 MG/DL (ref 0.1–1)
CHOLEST SERPL-MCNC: 181 MG/DL (ref 120–199)
CHOLEST/HDLC SERPL: 4.9 {RATIO} (ref 2–5)
HDLC SERPL-MCNC: 37 MG/DL (ref 40–75)
HDLC SERPL: 20.4 % (ref 20–50)
LDLC SERPL CALC-MCNC: 100.8 MG/DL (ref 63–159)
NONHDLC SERPL-MCNC: 144 MG/DL
PROT SERPL-MCNC: 6.8 G/DL (ref 6–8.4)
TRIGL SERPL-MCNC: 216 MG/DL (ref 30–150)

## 2022-04-29 PROCEDURE — 80076 HEPATIC FUNCTION PANEL: CPT | Performed by: INTERNAL MEDICINE

## 2022-04-29 PROCEDURE — 36415 COLL VENOUS BLD VENIPUNCTURE: CPT | Mod: PO | Performed by: INTERNAL MEDICINE

## 2022-04-29 PROCEDURE — 80061 LIPID PANEL: CPT | Performed by: INTERNAL MEDICINE

## 2022-05-02 ENCOUNTER — TELEPHONE (OUTPATIENT)
Dept: CARDIOLOGY | Facility: CLINIC | Age: 82
End: 2022-05-02
Payer: MEDICARE

## 2022-05-02 ENCOUNTER — OFFICE VISIT (OUTPATIENT)
Dept: DERMATOLOGY | Facility: CLINIC | Age: 82
End: 2022-05-02
Payer: MEDICARE

## 2022-05-02 DIAGNOSIS — L57.0 ACTINIC KERATOSES: ICD-10-CM

## 2022-05-02 DIAGNOSIS — D48.5 NEOPLASM OF UNCERTAIN BEHAVIOR OF SKIN: ICD-10-CM

## 2022-05-02 DIAGNOSIS — L85.3 XEROSIS CUTIS: ICD-10-CM

## 2022-05-02 DIAGNOSIS — L82.1 SEBORRHEIC KERATOSIS: Primary | ICD-10-CM

## 2022-05-02 PROCEDURE — 1159F MED LIST DOCD IN RCRD: CPT | Mod: CPTII,S$GLB,, | Performed by: DERMATOLOGY

## 2022-05-02 PROCEDURE — 11103 PR TANGENTIAL BIOPSY, SKIN, EA ADDTL LESION: ICD-10-PCS | Mod: S$GLB,,, | Performed by: DERMATOLOGY

## 2022-05-02 PROCEDURE — 11102 TANGNTL BX SKIN SINGLE LES: CPT | Mod: 59,S$GLB,, | Performed by: DERMATOLOGY

## 2022-05-02 PROCEDURE — 99203 PR OFFICE/OUTPT VISIT, NEW, LEVL III, 30-44 MIN: ICD-10-PCS | Mod: 25,S$GLB,, | Performed by: DERMATOLOGY

## 2022-05-02 PROCEDURE — 88305 TISSUE EXAM BY PATHOLOGIST: ICD-10-PCS | Mod: 26,,, | Performed by: PATHOLOGY

## 2022-05-02 PROCEDURE — 99999 PR PBB SHADOW E&M-EST. PATIENT-LVL III: ICD-10-PCS | Mod: PBBFAC,,, | Performed by: DERMATOLOGY

## 2022-05-02 PROCEDURE — 1159F PR MEDICATION LIST DOCUMENTED IN MEDICAL RECORD: ICD-10-PCS | Mod: CPTII,S$GLB,, | Performed by: DERMATOLOGY

## 2022-05-02 PROCEDURE — 1160F PR REVIEW ALL MEDS BY PRESCRIBER/CLIN PHARMACIST DOCUMENTED: ICD-10-PCS | Mod: CPTII,S$GLB,, | Performed by: DERMATOLOGY

## 2022-05-02 PROCEDURE — 17004 DESTROY PREMAL LESIONS 15/>: CPT | Mod: S$GLB,,, | Performed by: DERMATOLOGY

## 2022-05-02 PROCEDURE — 3288F FALL RISK ASSESSMENT DOCD: CPT | Mod: CPTII,S$GLB,, | Performed by: DERMATOLOGY

## 2022-05-02 PROCEDURE — 1126F AMNT PAIN NOTED NONE PRSNT: CPT | Mod: CPTII,S$GLB,, | Performed by: DERMATOLOGY

## 2022-05-02 PROCEDURE — 1126F PR PAIN SEVERITY QUANTIFIED, NO PAIN PRESENT: ICD-10-PCS | Mod: CPTII,S$GLB,, | Performed by: DERMATOLOGY

## 2022-05-02 PROCEDURE — 99999 PR PBB SHADOW E&M-EST. PATIENT-LVL III: CPT | Mod: PBBFAC,,, | Performed by: DERMATOLOGY

## 2022-05-02 PROCEDURE — 99203 OFFICE O/P NEW LOW 30 MIN: CPT | Mod: 25,S$GLB,, | Performed by: DERMATOLOGY

## 2022-05-02 PROCEDURE — 1101F PR PT FALLS ASSESS DOC 0-1 FALLS W/OUT INJ PAST YR: ICD-10-PCS | Mod: CPTII,S$GLB,, | Performed by: DERMATOLOGY

## 2022-05-02 PROCEDURE — 1101F PT FALLS ASSESS-DOCD LE1/YR: CPT | Mod: CPTII,S$GLB,, | Performed by: DERMATOLOGY

## 2022-05-02 PROCEDURE — 11103 TANGNTL BX SKIN EA SEP/ADDL: CPT | Mod: S$GLB,,, | Performed by: DERMATOLOGY

## 2022-05-02 PROCEDURE — 11102 PR TANGENTIAL BIOPSY, SKIN, SINGLE LESION: ICD-10-PCS | Mod: 59,S$GLB,, | Performed by: DERMATOLOGY

## 2022-05-02 PROCEDURE — 88305 TISSUE EXAM BY PATHOLOGIST: CPT | Mod: 26,,, | Performed by: PATHOLOGY

## 2022-05-02 PROCEDURE — 88305 TISSUE EXAM BY PATHOLOGIST: CPT | Performed by: PATHOLOGY

## 2022-05-02 PROCEDURE — 1160F RVW MEDS BY RX/DR IN RCRD: CPT | Mod: CPTII,S$GLB,, | Performed by: DERMATOLOGY

## 2022-05-02 PROCEDURE — 3288F PR FALLS RISK ASSESSMENT DOCUMENTED: ICD-10-PCS | Mod: CPTII,S$GLB,, | Performed by: DERMATOLOGY

## 2022-05-02 PROCEDURE — 17004 PR DESTRUCTION, PREMALIGNANT LESIONS; 15 OR MORE LESIONS: ICD-10-PCS | Mod: S$GLB,,, | Performed by: DERMATOLOGY

## 2022-05-02 RX ORDER — AMMONIUM LACTATE 12 G/100G
LOTION TOPICAL
Qty: 225 G | Refills: 11 | Status: SHIPPED | OUTPATIENT
Start: 2022-05-02

## 2022-05-02 NOTE — PATIENT INSTRUCTIONS
Shave Biopsy Wound Care    Your doctor has performed a shave biopsy today.  A band aid and vaseline ointment has been placed over the site.  This should remain in place for 24 hours.  It is recommended that you keep the area dry for the first 24 hours.  After 24 hours, you may remove the band aid and wash the area with warm soap and water and apply Vaseline jelly.  Many patients prefer to use Neosporin or Bacitracin ointment.  This is acceptable; however, know that you can develop an allergy to this medication even if you have used it safely for years.  It is important to keep the area moist.  Letting it dry out and get air slows healing time, and will worsen the scar.  Band aid is optional after first 24 hours.      If you notice increasing redness, tenderness, pain, or yellow drainage at the biopsy site, please notify your doctor.  These are signs of an infection.    If your biopsy site is bleeding, apply firm pressure for 15 minutes straight.  Repeat for another 15 minutes, if it is still bleeding.   If the surgical site continues to bleed, then please contact your doctor.      [facility] - [facility]   DERMATOLOGY  [address]   [place] [address]   Dept: [phone]   Dept Fax: [phone]

## 2022-05-02 NOTE — TELEPHONE ENCOUNTER
----- Message from Romain Palencia MD sent at 4/30/2022  4:59 AM CDT -----  Lipids reveiwed  Continue current meds, add fiber to diet

## 2022-05-02 NOTE — TELEPHONE ENCOUNTER
Attempted unsuccessfully times two to reach patient. Left voicemail for patient to call back to discuss test results.

## 2022-05-02 NOTE — PROGRESS NOTES
Subjective:       Patient ID:  Chai Murry is a 82 y.o. male who presents for   Chief Complaint   Patient presents with    Skin Check     UBS    Spot     Spots on feet      History of Present Illness: The patient presents with chief complaint of lesions.  Location: face, arms  Duration: several months/years  Signs/Symptoms: crusted    Prior treatments: none        Review of Systems   Constitutional: Negative for fever and chills.   Gastrointestinal: Negative for nausea and vomiting.   Skin: Positive for activity-related sunscreen use. Negative for daily sunscreen use and recent sunburn.   Hematologic/Lymphatic: Does not bruise/bleed easily.        Objective:    Physical Exam   Constitutional: He appears well-developed and well-nourished. No distress.   Neurological: He is alert and oriented to person, place, and time. He is not disoriented.   Psychiatric: He has a normal mood and affect.   Skin:   Areas Examined (abnormalities noted in diagram):   Head / Face Inspection Performed  Neck Inspection Performed  Chest / Axilla Inspection Performed  Abdomen Inspection Performed  Back Inspection Performed  RUE Inspected  LUE Inspection Performed  Nails and Digits Inspection Performed                           Diagram Legend     Erythematous scaling macule/papule c/w actinic keratosis       Vascular papule c/w angioma      Pigmented verrucoid papule/plaque c/w seborrheic keratosis      Yellow umbilicated papule c/w sebaceous hyperplasia      Irregularly shaped tan macule c/w lentigo     1-2 mm smooth white papules consistent with Milia      Movable subcutaneous cyst with punctum c/w epidermal inclusion cyst      Subcutaneous movable cyst c/w pilar cyst      Firm pink to brown papule c/w dermatofibroma      Pedunculated fleshy papule(s) c/w skin tag(s)      Evenly pigmented macule c/w junctional nevus     Mildly variegated pigmented, slightly irregular-bordered macule c/w mildly atypical nevus      Flesh colored to  evenly pigmented papule c/w intradermal nevus       Pink pearly papule/plaque c/w basal cell carcinoma      Erythematous hyperkeratotic cursted plaque c/w SCC      Surgical scar with no sign of skin cancer recurrence      Open and closed comedones      Inflammatory papules and pustules      Verrucoid papule consistent consistent with wart     Erythematous eczematous patches an                d plaques     Dystrophic onycholytic nail with subungual debris c/w onychomycosis     Umbilicated papule    Erythematous-base heme-crusted tan verrucoid plaque consistent with inflamed seborrheic keratosis     Erythematous Silvery Scaling Plaque c/w Psoriasis     See annotation        Assessment / Plan:      Pathology Orders:     Normal Orders This Visit    Specimen to Pathology, Dermatology     Comments:    Number of Specimens:->2  ------------------------->-------------------------  Spec 1 Procedure:->Biopsy  Spec 1 Clinical Impression:->r/o SCC  Spec 1 Source:->right hand  ------------------------->-------------------------  Spec 2 Procedure:->Biopsy  Spec 2 Clinical Impression:->r/o BCC  Spec 2 Source:->right cheek  Release to patient->Immediate    Questions:    Procedure Type: Dermatology and skin neoplasms    Number of Specimens: 2    ------------------------: -------------------------    Spec 1 Procedure: Biopsy    Spec 1 Clinical Impression: r/o SCC    Spec 1 Source: right hand    ------------------------: -------------------------    Spec 2 Procedure: Biopsy    Spec 2 Clinical Impression: r/o BCC    Spec 2 Source: right cheek    Clinical Information: see above    Release to patient: Immediate        Seborrheic keratosis  Reassurance given. Discussed diagnosis and that lesions are benign.  AAD handout given.     Xerosis cutis  -     ammonium lactate (LAC-HYDRIN) 12 % lotion; Apply to damp skin after bathing  Dispense: 225 g; Refill: 11  -     Discussed dry skin care, AVS given.  Will start ammonium lactate 12% lotion  daily to damp skin.     Actinic keratoses  Cryosurgery Procedure Note    The patient is informed of the precancerous quality and need for treatment of these lesions. After risks, benefits and alternatives explained, including blistering, pain, hyper- and hypopigmentation, patient verbally consents to cryotherapy to precancerous lesions. Liquid nitrogen cryosurgery is applied to the 15 actinic keratoses, as detailed in the physical exam, to produce a freeze injury. The patient is aware that blisters may form and is instructed on wound care with gentle cleansing and use of vaseline ointment to keep moist until healed. The patient is supplied a handout on cryosurgery and is instructed to call if lesions do not completely resolve.    Neoplasm of uncertain behavior of skin  -     Specimen to Pathology, Dermatology  -     Shave biopsy(-ies) done of 2 site(s).   Patient informed to call for results within 2 weeks if have not received notification via telephone call or [facility]         Follow up for call for results.     PROCEDURE NOTE - SHAVE BIOPSY   Location: see above    After risk, benefits, and alternatives were discussed with the patient, the patient agrees to the procedure by verbal informed consent.  The area(s) were cleansed with alcohol. 2 cc of lidocaine 1% with epinephrine was injected for local anesthesia into each lesion(s).  A sharp dermablade was used to remove part or all of the lesion(s).  The specimen(s) will be sent for tissue pathology.  Hemostasis was obtained with aluminum chloride and/or hyfrecation.  The area(s) were dressed with vaseline ointment and bandaged.  The patient tolerated the procedure well without adverse events.  Wound care instructions were given to the patient on the AVS.  The patient will be notified of pathology results once available. Results will also be available in Epic.     74

## 2022-05-09 LAB
FINAL PATHOLOGIC DIAGNOSIS: NORMAL
GROSS: NORMAL
Lab: NORMAL
MICROSCOPIC EXAM: NORMAL

## 2022-05-18 ENCOUNTER — TELEPHONE (OUTPATIENT)
Dept: DERMATOLOGY | Facility: CLINIC | Age: 82
End: 2022-05-18
Payer: MEDICARE

## 2022-05-18 NOTE — TELEPHONE ENCOUNTER
Attempted to call patient in regards to scheduling mohs surgery. No answer at this time. Message left to return call.

## 2022-05-28 NOTE — TELEPHONE ENCOUNTER
No new care gaps identified.  [facility] Embedded Care Gaps. Reference number: [id number]. 5/28/2022   12:17:46 AM TWILAT

## 2022-05-28 NOTE — TELEPHONE ENCOUNTER
Refill Routing Note   Medication(s) are not appropriate for processing by [facility] for the following reason(s):      - Required laboratory values are abnormal    [facility] action(s):  Defer       Medication Therapy Plan: Last Cr level above current ORC limit of 1.39  Medication reconciliation completed: No     Appointments  past 12m or future 3m with PCP    Date Provider   Last Visit   3/24/2022 Peter Teixeira MD   Next Visit   9/26/2022 Peter Teixeira MD   ED visits in past 90 days: 0        Note composed:11:55 AM 05/28/2022

## 2022-05-30 RX ORDER — FENOFIBRIC ACID 135 MG/1
1 CAPSULE, DELAYED RELEASE ORAL DAILY
Qty: 90 CAPSULE | Refills: 3 | Status: SHIPPED | OUTPATIENT
Start: 2022-05-30 | End: 2023-05-24

## 2022-05-31 ENCOUNTER — PROCEDURE VISIT (OUTPATIENT)
Dept: DERMATOLOGY | Facility: CLINIC | Age: 82
End: 2022-05-31
Payer: MEDICARE

## 2022-05-31 VITALS — HEART RATE: 106 BPM | DIASTOLIC BLOOD PRESSURE: 93 MMHG | SYSTOLIC BLOOD PRESSURE: 146 MMHG

## 2022-05-31 DIAGNOSIS — C44.319 BASAL CELL CARCINOMA (BCC) OF RIGHT CHEEK: ICD-10-CM

## 2022-05-31 DIAGNOSIS — C44.622 SQUAMOUS CELL CANCER OF SKIN OF HAND, RIGHT: Primary | ICD-10-CM

## 2022-05-31 PROCEDURE — 13121 CMPLX RPR S/A/L 2.6-7.5 CM: CPT | Mod: 51,S$GLB,, | Performed by: DERMATOLOGY

## 2022-05-31 PROCEDURE — 13132 PR RECMPL WND HEAD,FAC,HAND 2.6-7.5 CM: ICD-10-PCS | Mod: S$GLB,,, | Performed by: DERMATOLOGY

## 2022-05-31 PROCEDURE — 17312: ICD-10-PCS | Mod: S$GLB,,, | Performed by: DERMATOLOGY

## 2022-05-31 PROCEDURE — 17311: ICD-10-PCS | Mod: S$GLB,,, | Performed by: DERMATOLOGY

## 2022-05-31 PROCEDURE — 17311 MOHS 1 STAGE H/N/HF/G: CPT | Mod: S$GLB,,, | Performed by: DERMATOLOGY

## 2022-05-31 PROCEDURE — 99499 NO LOS: ICD-10-PCS | Mod: S$GLB,,, | Performed by: DERMATOLOGY

## 2022-05-31 PROCEDURE — 13132 CMPLX RPR F/C/C/M/N/AX/G/H/F: CPT | Mod: S$GLB,,, | Performed by: DERMATOLOGY

## 2022-05-31 PROCEDURE — 99499 UNLISTED E&M SERVICE: CPT | Mod: S$GLB,,, | Performed by: DERMATOLOGY

## 2022-05-31 PROCEDURE — 13121 PR RECMPL WND SCALP,EXTR 2.6-7.5 CM: ICD-10-PCS | Mod: 51,S$GLB,, | Performed by: DERMATOLOGY

## 2022-05-31 PROCEDURE — 17312 MOHS ADDL STAGE: CPT | Mod: S$GLB,,, | Performed by: DERMATOLOGY

## 2022-05-31 RX ORDER — DOXYCYCLINE 100 MG/1
100 CAPSULE ORAL 2 TIMES DAILY
Qty: 28 CAPSULE | Refills: 0 | Status: SHIPPED | OUTPATIENT
Start: 2022-05-31 | End: 2022-06-14

## 2022-05-31 NOTE — PROGRESS NOTES
Infiltrated the marked site with  2.5 ml of lidocaine 1% with epinephrine to the right cheek during the First stage of mohs surgery per order from Dr. JEIMY Morrow. Patient experienced no adverse reactions post administration.  Infiltrated the marked site with 6 ml of lidocaine 1% with epinephrine to the right cheek during the Reconstruction stage of mohs surgery per order from Dr. JEIMY Morrow. Patient experienced no adverse reactions post administration.    Lot #[id number]  NDC # 66397-574-40    Infiltrated the marked site with  2 ml of lidocaine 1% with epinephrine to the right hand during the First stage of mohs surgery per order from Dr. JEIMY Morrow. Patient experienced no adverse reactions post administration.  Infiltrated the marked site with 4 ml of lidocaine 1% with epinephrine to the right hand during the Reconstruction stage of mohs surgery per order from Dr. JEIMY Morrow. Patient experienced no adverse reactions post administration.    Lot #[id number]  NDC # 40303-682-55

## 2022-05-31 NOTE — PROCEDURES
Procedure #1:  Date of Service: 05/31/2022  Surgery: Mohs micrographic surgery  Tumor Type: SCC  Location: left hand  Mohs AUC: 8  Indication: tumor location  Repair Type: CLC  Repair Size: 5.1 cm  Suture Material: 4-0 PDS; 4-0 Prolene  Derm-Path Accession #: [id number]  PreOp Size: 1.3 x 1.1 cm  PostOp Size: 2.0 x 2.1 cm  Mohs Accession #: [id number]  Level of Defect: fat  Anesthesia volume: 2 cc (Mohs), 4 cc (Reconstruction)  Stages: 1     Surgeon and Pathologist: Dr. Giovani Morrow MD  Assistants: CHAZ Lamas RN     All components of Universal Protocol/PAUSE Rule completed. Dr. Giovani Morrow MD operated in two distinct and integrated capacities as the surgeon and pathologist.     Procedure Details:  Stage 1:  The patient was placed supine on the operating table. The cancer/biopsy site was identified, outlined with a marker, and verified by the patient. A rim of normal appearing skin was marked circumferentially around the  lesion. The area was prepped with antiseptic. The area was infiltrated with local anesthesia (1% lidocaine with epinephrine 1:100,000). The tumor was first sharply debulked to remove clinically apparent tumor. A beveled incision following the standard Mohs approach was done and the specimen was removed.The layer of tissue was then transferred onto a specimen sheet maintaining the orientation of the specimen. Hemostasis was achieved with electrocoagulation, pressure and suture ligature as needed. The wound site was then covered with a pressure dressing while the tissue samples were processed for examination. The excised tissue was transported to the Mohs histology laboratory maintaining the tissue orientation. The tissue specimen was relaxed so that the entire surgical margin was in a a single horizontal plane for sectioning and inked for precise mapping. A precise reference map was drawn to reflect the sectioning of the specimen, colored inking of the margins, and orientation on the  patient. The tissue was processed using horizontal sectioning of the base and continuous peripheral margins. The histopathologic sections were reviewed in conjunction with the reference map.  Total tissue blocks: 1  Total slides: 4      Frozen section histology: No residual tumor visualized.   Histology: There were no malignant cells seen in the sections examined. Normal histologic structures noted.      No additional tumor was identified on microscopic examination, therefore Mohs surgery was complete.     Reconstruction: Complex Linear Repair  Primary Surgeon: MD Cristhian  Assistant Surgeon: CHAZ Lamas RN     The patient was taken to the operative suite and placed supine on the operating room table. The defect was identified. Appropriate markings were made with a marking pen to plan the repair. The area was infiltrated with Lidocaine 1% with epinephrine 1:100,000 and prepped with hibiclens and draped with sterile towels. The wound was debeveled and undermined widely. Hemostasis was obtained using monopolar electrocoagulation. The wound was narrowed and the dermis and subcutaneous tissue were then approximated using buried vertical mattress sutures of 4-0 PDS. Care was taken to orient tension vectors of the closure to minimize distortion of free margins. Cones of redundant tissue were then excised within relaxed skin tension lines on both sides of the defect. Additional buried sutures were placed in a similar fashion where needed. Percutaneous interrupted 4-0 Prolene sutures were carefully placed for maximum eversion and meticulous approximation of the epidermis.  Repair Size: 5.1 cm     The wound was cleansed with saline and ointment was applied along the wound surface. A sterile pressure dressing was applied. Wound care instructions were given verbally and in writing. The patient left the operating suite in stable condition. Patient was informed that additional refinement of the resulting surgical scar may be used  as a second stage of this reconstruction.     RTC 2 weeks for suture removal       Procedure #2:  Date of Service: 05/31/2022  Surgery: Mohs micrographic surgery  Tumor Type: BCC  Location: left cheek  Mohs AUC: 7  Indication: tumor location  Repair Type: CLC  Repair Size: 4.2 cm  Suture Material: 4-0 monocryl; 6-0 Prolene  Derm-Path Accession #: [id number]  PreOp Size: 1.1 x 0.8 cm  PostOp Size: 2.0 x 2.1 cm  Mohs Accession #: [id number]  Level of Defect: fat  Anesthesia volume: 2.5 cc (Mohs), 6 cc (Reconstruction)  Stages: 2     Surgeon and Pathologist: Dr. Giovani Morrow MD  Assistants: CHAZ Lamas RN     All components of Universal Protocol/PAUSE Rule completed. Dr. Giovani Morrow MD operated in two distinct and integrated capacities as the surgeon and pathologist.     Procedure Details:  Stage 1:  The patient was placed supine on the operating table. The cancer/biopsy site was identified, outlined with a marker, and verified by the patient. A rim of normal appearing skin was marked circumferentially around the  lesion. The area was prepped with antiseptic. The area was infiltrated with local anesthesia (1% lidocaine with epinephrine 1:100,000). The tumor was first sharply debulked to remove clinically apparent tumor. A beveled incision following the standard Mohs approach was done and the specimen was removed.The layer of tissue was then transferred onto a specimen sheet maintaining the orientation of the specimen. Hemostasis was achieved with electrocoagulation, pressure and suture ligature as needed. The wound site was then covered with a pressure dressing while the tissue samples were processed for examination. The excised tissue was transported to the Mohs histology laboratory maintaining the tissue orientation. The tissue specimen was relaxed so that the entire surgical margin was in a a single horizontal plane for sectioning and inked for precise mapping. A precise reference map was drawn to reflect  the sectioning of the specimen, colored inking of the margins, and orientation on the patient. The tissue was processed using horizontal sectioning of the base and continuous peripheral margins. The histopathologic sections were reviewed in conjunction with the reference map.  Total tissue blocks: 1  Total slides: 3     Frozen section histology: Residual tumor identified on stage 1.   Histology: There were small irregular aggregates of  atypical basaloid cells with high nuclear cytoplasmic ratios and peripheral palisading of their nuclei in the dermis.   Depth of Invasion: dermis.   Perineural Invasion: none.   Scar Tissue: absent.     Stage 2:  Residual tumor was appreciated at the peripheral margin of the tissue section on histologic exam. Therefore, an additional stage of Mohs surgery was performed. The area of residual tumor was identified on the patient. The layer of tissue was then surgically excised using a #15 blade and was then transferred onto a specimen sheet maintaining the orientation of the specimen. Hemostasis was achieved with electrocoagulation, pressure and suture ligature as needed. The wound site was then covered with a dressing while the tissue samples were processed for examination.  The excised tissue was transported to the Mohs histology laboratory maintaining the tissue orientation. The tissue specimen was relaxed so that the entire surgical margin was in a a single horizontal plane for sectioning and inked for precise mapping. A precise reference map  was drawn to reflect the sectioning of the specimen, colored inking of the margins, and orientation on the patient. The tissue was processed using horizontal sectioning of the base and continuous peripheral margins. The histopathologic sections were reviewed in conjunction with the reference map.  Total tissue blocks: 1  Total slides: 2     Frozen section histology: No residual tumor visualized.   Histology: There were no malignant cells seen  in the sections examined. Normal histologic structures noted.      No additional tumor was identified on microscopic examination, therefore Mohs surgery was complete.     Reconstruction: Complex Linear Closure   Primary Surgeon : MD Cristhian  Assistant Surgeon: CHAZ Lamas RN  The patient was taken to the operative suite and placed supine on the operating room table. The defect was identified. Appropriate markings were made with a marking pen to plan the repair. The area was infiltrated with Lidocaine 1% with epinephrine 1:100,000 and prepped with iodine and draped with sterile towels. The wound was debeveled and undermined widely. Hemostasis was obtained using monopolar electrocoagulation. The wound was narrowed and the dermis and subcutaneous tissue were then approximated using buried vertical mattress sutures of 4-0 monocryl. Care was taken to orient tension vectors of the closure to minimize distortion of free margins. Cones of redundant tissue were then excised within relaxed skin tension lines on both sides of the defect. Additional buried sutures were placed in a similar fashion where needed. Percutaneous interrupted and running 6-0 prolene sutures were carefully placed for maximum eversion and meticulous approximation of the epidermis.  Repair Size: 4.2 cm     The wound was cleansed with saline and ointment was applied along the wound surface. A sterile pressure dressing was applied. Wound care instructions were given verbally and in writing. The patient left the operating suite in stable condition. Patient was informed that additional refinement of the resulting surgical scar may be used as a second stage of this reconstruction.        RTC 1 week for suture removal     Giovani Morrow MD  Department of Dermatology, Mohs Surgery  11:49 AM  5/31/2022

## 2022-05-31 NOTE — PROGRESS NOTES
REFERRING PROVIDER:  Dr. Smith    CHIEF COMPLAINT:  Established patient being consulted for Mohs' surgery evaluation.    HISTORY OF PRESENT ILLNESS:  82 y.o. male presents with a one year(s) history of growth on the 1) right hand and 2) right cheek.    Negative for scabbing.  Positive for crusting.  Negative for bleeding.  Negative for itching.    Biopsy consistent with 1) SCC and 2) BCC.     No prior treatment.    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 Courtney miller       PAST SURGICAL HISTORY:  Past Surgical History:   Procedure Laterality Date    APPENDECTOMY      BRONCHOSCOPY Bilateral 6/21/2019    Procedure: Bronchoscopy;  Surgeon: Paresh Goldman MD;  Location: [facility];  Service: Endoscopy;  Laterality: Bilateral;    CARDIAC CATHETERIZATION      cardiac stents      CATARACT EXTRACTION W/  INTRAOCULAR LENS IMPLANT Right 9-3-14    CHOLECYSTECTOMY      COLONOSCOPY N/A 4/17/2018    Procedure: COLONOSCOPY;  Surgeon: Dionne Doan MD;  Location: [facility];  Service: Endoscopy;  Laterality: N/A;    COLONOSCOPY N/A 10/25/2018    Procedure: COLONOSCOPY;  Surgeon: Michael Hameed MD;  Location: [facility];  Service: Endoscopy;  Laterality: N/A;    ENDOSCOPIC ULTRASOUND OF UPPER GASTROINTESTINAL TRACT N/A 6/11/2019    Procedure: ULTRASOUND, UPPER GI TRACT, ENDOSCOPIC;  Surgeon: Abhishek Knox MD;  Location: [facility];  Service: Endoscopy;  Laterality: N/A;     ESOPHAGOGASTRODUODENOSCOPY N/A 6/11/2019    Procedure: EGD (ESOPHAGOGASTRODUODENOSCOPY);  Surgeon: Abhishek Knox MD;  Location: [facility] ENDO;  Service: Endoscopy;  Laterality: N/A;    infected stomach gland excision      INSERTION OF TUNNELED CENTRAL VENOUS CATHETER (CVC) WITH SUBCUTANEOUS PORT Right 7/3/2019    Procedure: [id number];  Surgeon: Jean Carlos Constantino MD;  Location: [facility] OR;  Service: General;  Laterality: Right;    LUNG REMOVAL, TOTAL Left 04/2016    lung cancer left upper lobe    SKIN BIOPSY Left     arm        SOCIAL HISTORY:  Dependencies:  former smoker    PERTINENT MEDICATIONS:  See medications list.  aspirin    ALLERGIES:  Atorvastatin and Bactrim [sulfamethoxazole-trimethoprim]    ROS:  Skin: See HPI      PE:  Physical Exam    General: Mood and affect normal. Alert and orient X3. Normal appearance.    Head/Face: right cheek: pink atrophic plaque     RUE: right hand: scaly erythematous plaque       PATH:  Accession # [id number]  1. Skin, right hand, shave biopsy:  - INVASIVE SQUAMOUS CELL CARCINOMA, WELL-DIFFERENTIATED.  - THE TUMOR EXTENDS TO THE DEEP BIOPSY MARGIN.  - ADJACENT DERMAL SCAR.  This lesion is skin cancer. You will be contacted regarding treatment.  2. Skin, right cheek, shave biopsy:  - BASAL CELL CARCINOMA.  - THE TUMOR EXTENDS TO THE DEEP AND LATERAL BIOPSY MARGINS.      IMPRESSION:  1. 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 Biopsy proven basal cell carcinoma of the right cheek      PLAN:  1. Biopsy proven invasive squamous cell carcinoma of the right hand  The diagnosis and the pathology report were discussed in detail with the patient. Treatment options were reviewed, including Mohs Micrographic Surgery, radiation, topical therapy, and standard excision.  After careful review of patient's history and physical exam, and after discussion of treatment options, the decision was made to perform Mohs micrographic surgery.    Scheduled  patient for Mohs Micrographic Surgery. Procedure today. Risks, benefits, and alternatives of Mohs' surgery discussed with the patient. Discussed repair options including complex closure, skin flap, skin graft and second intention healing with the patient. Pre-operative instructions provided to the patient.    2. Biopsy proven basal cell carcinoma of the right cheek   The diagnosis and the pathology report were discussed in detail with the patient. Treatment options were reviewed, including Mohs Micrographic Surgery, radiation, topical therapy, and standard excision.  After careful review of patient's history and physical exam, and after discussion of treatment options, the decision was made to perform Mohs micrographic surgery.    Scheduled patient for Mohs Micrographic Surgery. Procedure today. Risks, benefits, and alternatives of Mohs' surgery discussed with the patient. Discussed repair options including complex closure, skin flap, skin graft and second intention healing with the patient. Pre-operative instructions provided to the patient.    Thank you for consulting with me in the care of this patient. The patient will be returning to you after the completion of the post-operative care.    Consulting report is sent to the consulting provider.

## 2022-05-31 NOTE — PATIENT INSTRUCTIONS
It was a pleasure to see you today in clinic. Here is some helpful information regarding instructions following your surgery.      Stitches: will not dissolve on their own    Follow up:   * If you have a problem or concern post-operatively, please call ahead to schedule an appointment. We may not be able to accommodate a walk-in appointment *     Scars may take 3 months or longer to mature. If you have concerns about your scar at that point, please call our office to schedule a follow up appointment. There are options available to help improve the appearance.     Please continue wound care below once a day for 2 weeks:    WOUND CARE INSTRUCTIONS   *Washing your hands before touching your bandage and surgical site is extremely important to prevent post-operative infection*    1. Leave your pressure bandage on for 48 hours. You will not need to perform any wound care until this bandage is removed. Do NOT get bandage wet.     2. When you initially begin wound care, you may let the water hit the pressure bandage to loosen it from your skin.     3. Wash your hands thoroughly before starting wound care.     4. After 48 hours, begin cleaning the surgery site once daily with gentle soap (such as Cetaphil, Cerave or Dove). Use a Q-tip with the soap and water on it. Roll the Q-tip along incision line.     5. Dry the area with a fresh cotton tipped applicator/Q-tip.     6. Apply a generous amount of vaseline where you see the sutures. Avoid using Neosporin, or any bacterial ointment as this is likely to cause an allergic reaction to the site.     7. Cut a non-stick bandage to fit then use paper tape to hold in place.     8. You will be using gentle soap and water, vaseline and a bandage once daily for 2 weeks.     9. After surgery, you may restart all of your medications that were stopped (if applicable).     *If your site is on your forehead, or near your eye, you will want to use ice packs. Please apply ice packs every hour  for 20 minutes while awake. Sleep elevated for the first two nights following surgery*     *For surgical areas on your arms/legs, try to keep the area elevated above the level of your heart as much as possible. Frequent gentle rubbing of your fingers or toes in that area will prevent numbness and stiffness*    *If located on your arm/hand, we ask that you do not lift anything heavier than a gallon of milk for two weeks*    *For surgical areas on your head/neck, do not bend over or stoop. Do not drop your head, as this increases blood to the surgical area and can induce bleeding*       BATHING: Begin bathing once pressure bandage comes off. Do not let direct water pressure hit the surgery site. It is okay if it gets wet, just let the water roll over.   PAIN: You may take Tylenol only for pain in the first 24 hours following surgery. Do not take any aspirin, Ibuprofen, Motrin or Aleve as this may increase your risk for bleeding for the first 24 hours. Significant pain/discomfort is unusual and should be reported to our office.   BLEEDING: A mild amount of blood on the bandage is expected. Blood soaking through the bandage is not normal. If this occurs, apply uninterrupted pressure for 20 minutes by the clock. If this does not stop the bleeding, hold pressure for 20 minutes with an ice pack. If it does not stop after ice, please call our office for additional assistance.       Normal office hour number: [phone]    After hours Dermatologist on call: [phone]

## 2022-06-07 ENCOUNTER — CLINICAL SUPPORT (OUTPATIENT)
Dept: DERMATOLOGY | Facility: CLINIC | Age: 82
End: 2022-06-07
Payer: MEDICARE

## 2022-06-07 DIAGNOSIS — Z48.02 VISIT FOR SUTURE REMOVAL: Primary | ICD-10-CM

## 2022-06-07 PROCEDURE — 99999 PR PBB SHADOW E&M-EST. PATIENT-LVL III: CPT | Mod: PBBFAC,,,

## 2022-06-07 PROCEDURE — 99999 PR PBB SHADOW E&M-EST. PATIENT-LVL III: ICD-10-PCS | Mod: PBBFAC,,,

## 2022-06-07 NOTE — PROGRESS NOTES
Patient presents today for a suture removal to the right hand and right cheek. He denies any pain. The right hand is HWNL but is red. He states that he did bump his hand and it bled. His right cheek is HWNL. All sutures were removed today with no complications.

## 2022-06-14 ENCOUNTER — CLINICAL SUPPORT (OUTPATIENT)
Dept: DERMATOLOGY | Facility: CLINIC | Age: 82
End: 2022-06-14
Payer: MEDICARE

## 2022-06-14 DIAGNOSIS — Z09 FOLLOW UP: Primary | ICD-10-CM

## 2022-06-14 PROCEDURE — 99024 POSTOP FOLLOW-UP VISIT: CPT | Mod: S$GLB,,, | Performed by: DERMATOLOGY

## 2022-06-14 PROCEDURE — 99999 PR PBB SHADOW E&M-EST. PATIENT-LVL III: ICD-10-PCS | Mod: PBBFAC,,,

## 2022-06-14 PROCEDURE — 99999 PR PBB SHADOW E&M-EST. PATIENT-LVL III: CPT | Mod: PBBFAC,,,

## 2022-06-14 PROCEDURE — 99024 PR POST-OP FOLLOW-UP VISIT: ICD-10-PCS | Mod: S$GLB,,, | Performed by: DERMATOLOGY

## 2022-06-14 NOTE — PROGRESS NOTES
Sutures had already been removed from patient's hand. Site appeared well healed without signs of infection. Patient denied any pain at site. No drainage noted.

## 2022-09-07 ENCOUNTER — TELEPHONE (OUTPATIENT)
Dept: HEMATOLOGY/ONCOLOGY | Facility: CLINIC | Age: 82
End: 2022-09-07
Payer: MEDICARE

## 2022-09-07 NOTE — TELEPHONE ENCOUNTER
Nurse called pt d/t pt requesting to r/s appt. Nurse called with appts time/date/location. Pt verbalized understanding and nurse will mail appt slips as well.

## 2022-09-11 DIAGNOSIS — D64.9 ANEMIA, UNSPECIFIED TYPE: ICD-10-CM

## 2022-09-11 DIAGNOSIS — C34.12 MALIGNANT NEOPLASM OF UPPER LOBE OF LEFT LUNG: Primary | ICD-10-CM

## 2022-09-14 ENCOUNTER — HOSPITAL ENCOUNTER (OUTPATIENT)
Dept: RADIOLOGY | Facility: HOSPITAL | Age: 82
Discharge: HOME | End: 2022-09-14
Attending: INTERNAL MEDICINE
Payer: MEDICARE

## 2022-09-14 ENCOUNTER — TELEPHONE (OUTPATIENT)
Dept: PULMONOLOGY | Facility: CLINIC | Age: 82
End: 2022-09-14
Payer: MEDICARE

## 2022-09-14 ENCOUNTER — CLINICAL SUPPORT (OUTPATIENT)
Dept: PULMONOLOGY | Facility: CLINIC | Age: 82
End: 2022-09-14
Payer: MEDICARE

## 2022-09-14 VITALS — WEIGHT: 217.81 LBS | BODY MASS INDEX: 31.18 KG/M2 | HEIGHT: 70 IN

## 2022-09-14 DIAGNOSIS — J39.8 TRACHEAL MASS: ICD-10-CM

## 2022-09-14 DIAGNOSIS — Z90.2 STATUS POST PNEUMONECTOMY: ICD-10-CM

## 2022-09-14 DIAGNOSIS — J44.89 ASTHMA WITH COPD: ICD-10-CM

## 2022-09-14 DIAGNOSIS — R09.02 EXERCISE HYPOXEMIA: ICD-10-CM

## 2022-09-14 DIAGNOSIS — J44.9 CHRONIC OBSTRUCTIVE PULMONARY DISEASE, UNSPECIFIED COPD TYPE: ICD-10-CM

## 2022-09-14 PROCEDURE — 94618 PULMONARY STRESS TESTING: CPT | Mod: S$GLB,,, | Performed by: INTERNAL MEDICINE

## 2022-09-14 PROCEDURE — 71046 X-RAY EXAM CHEST 2 VIEWS: CPT | Mod: 26,,, | Performed by: RADIOLOGY

## 2022-09-14 PROCEDURE — 94618 PULMONARY STRESS TESTING: ICD-10-PCS | Mod: S$GLB,,, | Performed by: INTERNAL MEDICINE

## 2022-09-14 PROCEDURE — 71046 X-RAY EXAM CHEST 2 VIEWS: CPT | Mod: TC

## 2022-09-14 PROCEDURE — 71046 XR CHEST PA AND LATERAL: ICD-10-PCS | Mod: 26,,, | Performed by: RADIOLOGY

## 2022-09-14 NOTE — PROCEDURES
"[facility] 3rdFl  Six Minute Walk   SUMMARY   Ordering Provider: Roel Ernandez MD   Interpreting Provider: Roel Ernandez MD  Performing nurse/tech/RT: VT, RT  Diagnosis:  (Asthma with COPD;  Exercise hypoxemia)  Height: 5' 10" (177.8 cm)  Weight: 98.8 kg (217 lb 13 oz)  BMI (Calculated): 31.3   Patient Race:     Phase Oxygen Assessment Supplemental O2 Heart   Rate Blood Pressure Jimenez Dyspnea Scale Rating   Resting 95 % Room Air 62 bpm 159/67 0   Exercise        Minute        1 88 % Room Air 79 bpm     2 86 % 2 L/M 85 bpm     3 87 % 3 L/M 93 bpm     4 93 % 4 L/M 93 bpm     5 91 % 4 L/M 99 bpm     6  91 % 4 L/M 96 bpm 158/71 4   Recovery        Minute        1 92 % 4 L/M 90 bpm     2 99 % 4 L/M 75 bpm     3 100 % 4 L/M 62 bpm     4 100 % 4 L/M 63 bpm 144/66 3     Six Minute Walk Summary  6MWT Status: completed without stopping  Patient Reported: Dyspnea     Interpretation:  Did the patient stop or pause?: No                                         Total Time Walked (Calculated): 360 seconds  Final Partial Lap Distance (feet): 100 feet  Total Distance Meters (Calculated): 335.28 meters  Predicted Distance Meters (Calculated): 456.44 meters  Percentage of Predicted (Calculated): 73.46  Peak VO2 (Calculated): 14.04  Mets: 4.01  Has The Patient Had a Previous Six Minute Walk Test?: Yes       Previous 6MWT Results  Has The Patient Had a Previous Six Minute Walk Test?: Yes  Date of Previous Test: 03/14/22  Total Time Walked: 360 seconds  Total Distance (meters): 335.28  Predicted Distance (meters): 440.26 meters  Percentage of Predicted: 76.15  Percent Change (Calculated): 0        Interpretation:  Total distance walked in six minutes is mildly reduced indicating an mild reduction in functional capacity.  There was significant severe oxygen desaturation to 86 % with exercise on room air (oxygen saturation less than 89%). Supplemental oxygen was administered for the remainder of the test as " noted above. Clinical correlation suggested.  Patient met criteria for oxygen prescription.  [] Mild exercise-induced hypoxemia described as an arterial oxygen saturation of 93-95% (with a fall of 3-4% with exercise),   [] Moderate exercise-induced hypoxemia as a fall in oxygen saturation to  89-93% (with a fall of 3-4 % with exercise)  [x] Severe exercise induced hypoxemia as < 89% O2 saturation (88% and below).  Medicare Criteria for Oxygen prescription comments: When arterial oxygen saturation is at or below 88% during exercise (severe exercise induced hypoxemia) then the patient meets criteria for oxygen prescription.  Details about Medicare Group Criteria coverage can be found at http://www.cms.[facility].gov/manuals/downloads/     Roel Ernandez MD

## 2022-09-15 ENCOUNTER — TELEPHONE (OUTPATIENT)
Dept: HEMATOLOGY/ONCOLOGY | Facility: CLINIC | Age: 82
End: 2022-09-15
Payer: MEDICARE

## 2022-09-15 NOTE — TELEPHONE ENCOUNTER
Nurse spoke with pt about r/s to [facility] schedule per [facility], nurse and pt made this appt together over the phone, nurse will mail appt slip. Pt verbalized understanding

## 2022-09-16 ENCOUNTER — OFFICE VISIT (OUTPATIENT)
Dept: PULMONOLOGY | Facility: CLINIC | Age: 82
End: 2022-09-16
Payer: MEDICARE

## 2022-09-16 VITALS
RESPIRATION RATE: 17 BRPM | WEIGHT: 216.25 LBS | OXYGEN SATURATION: 94 % | HEIGHT: 70 IN | BODY MASS INDEX: 30.96 KG/M2 | DIASTOLIC BLOOD PRESSURE: 78 MMHG | SYSTOLIC BLOOD PRESSURE: 118 MMHG | HEART RATE: 57 BPM

## 2022-09-16 DIAGNOSIS — J92.0 ASBESTOS-INDUCED PLEURAL PLAQUE: ICD-10-CM

## 2022-09-16 DIAGNOSIS — Z90.2 STATUS POST PNEUMONECTOMY: ICD-10-CM

## 2022-09-16 DIAGNOSIS — J39.8 TRACHEAL MASS: ICD-10-CM

## 2022-09-16 DIAGNOSIS — C34.12 MALIGNANT NEOPLASM OF UPPER LOBE OF LEFT LUNG: Primary | ICD-10-CM

## 2022-09-16 DIAGNOSIS — J43.1 PANLOBULAR EMPHYSEMA: ICD-10-CM

## 2022-09-16 PROCEDURE — 3078F DIAST BP <80 MM HG: CPT | Mod: CPTII,S$GLB,, | Performed by: NURSE PRACTITIONER

## 2022-09-16 PROCEDURE — 1160F RVW MEDS BY RX/DR IN RCRD: CPT | Mod: CPTII,S$GLB,, | Performed by: NURSE PRACTITIONER

## 2022-09-16 PROCEDURE — 1159F PR MEDICATION LIST DOCUMENTED IN MEDICAL RECORD: ICD-10-PCS | Mod: CPTII,S$GLB,, | Performed by: NURSE PRACTITIONER

## 2022-09-16 PROCEDURE — 3288F FALL RISK ASSESSMENT DOCD: CPT | Mod: CPTII,S$GLB,, | Performed by: NURSE PRACTITIONER

## 2022-09-16 PROCEDURE — 3078F PR MOST RECENT DIASTOLIC BLOOD PRESSURE < 80 MM HG: ICD-10-PCS | Mod: CPTII,S$GLB,, | Performed by: NURSE PRACTITIONER

## 2022-09-16 PROCEDURE — 1160F PR REVIEW ALL MEDS BY PRESCRIBER/CLIN PHARMACIST DOCUMENTED: ICD-10-PCS | Mod: CPTII,S$GLB,, | Performed by: NURSE PRACTITIONER

## 2022-09-16 PROCEDURE — 99999 PR PBB SHADOW E&M-EST. PATIENT-LVL IV: ICD-10-PCS | Mod: PBBFAC,,, | Performed by: NURSE PRACTITIONER

## 2022-09-16 PROCEDURE — 3074F PR MOST RECENT SYSTOLIC BLOOD PRESSURE < 130 MM HG: ICD-10-PCS | Mod: CPTII,S$GLB,, | Performed by: NURSE PRACTITIONER

## 2022-09-16 PROCEDURE — 99999 PR PBB SHADOW E&M-EST. PATIENT-LVL IV: CPT | Mod: PBBFAC,,, | Performed by: NURSE PRACTITIONER

## 2022-09-16 PROCEDURE — 1101F PR PT FALLS ASSESS DOC 0-1 FALLS W/OUT INJ PAST YR: ICD-10-PCS | Mod: CPTII,S$GLB,, | Performed by: NURSE PRACTITIONER

## 2022-09-16 PROCEDURE — 1159F MED LIST DOCD IN RCRD: CPT | Mod: CPTII,S$GLB,, | Performed by: NURSE PRACTITIONER

## 2022-09-16 PROCEDURE — 3288F PR FALLS RISK ASSESSMENT DOCUMENTED: ICD-10-PCS | Mod: CPTII,S$GLB,, | Performed by: NURSE PRACTITIONER

## 2022-09-16 PROCEDURE — 1101F PT FALLS ASSESS-DOCD LE1/YR: CPT | Mod: CPTII,S$GLB,, | Performed by: NURSE PRACTITIONER

## 2022-09-16 PROCEDURE — 3074F SYST BP LT 130 MM HG: CPT | Mod: CPTII,S$GLB,, | Performed by: NURSE PRACTITIONER

## 2022-09-16 PROCEDURE — 99214 OFFICE O/P EST MOD 30 MIN: CPT | Mod: S$GLB,,, | Performed by: NURSE PRACTITIONER

## 2022-09-16 PROCEDURE — 99214 PR OFFICE/OUTPT VISIT, EST, LEVL IV, 30-39 MIN: ICD-10-PCS | Mod: S$GLB,,, | Performed by: NURSE PRACTITIONER

## 2022-09-16 NOTE — PROGRESS NOTES
"Subjective:      Patient ID: Chai Murry is a 82 y.o. male.    Chief Complaint: Shortness of Breath and COPD    HPI  Follow up COPD.Squamous cell carcinoma of the lung status post left pneumonectomy and adjuvant chemoradiation completed 09/27/2016. Noted voice hoarseness in 2019 found to have PET avid mass left of trachea consistent with recurrent squamous cell carcinoma. Following the oncology.   Doing well with Trelegy.   Chronic CARVAJAL on oxygen therapy. Benefits from oxygen use.     Patient Active Problem List   Diagnosis    Coronary artery disease of native artery of native heart with stable angina pectoris    Hyperlipidemia    Essential hypertension    Anemia    ED (erectile dysfunction)    Cataract    Amblyopia    Chronic obstructive pulmonary disease    Malignant neoplasm of upper lobe of left lung    s/p left pnuemonectomy for KAYLI [facility]    Asbestos-induced pleural plaque    Reactive depression    Insomnia    Screening for colon cancer    Paralysis of vocal cords and larynx, unilateral    Tracheal mass: 3 cm BELOW VOCAL CORDS: SUBGLOTIC    Abnormal echocardiogram    Leg edema    Chemotherapy management, encounter for    Paroxysmal atrial fibrillation    PVD (peripheral vascular disease)    Stage 3b chronic kidney disease    Class 1 obesity due to excess calories with serious comorbidity and body mass index (BMI) of 31.0 to 31.9 in adult       /78   Pulse (!) 57   Resp 17   Ht 5' 10" (1.778 m)   Wt 98.1 kg (216 lb 4.3 oz)   SpO2 (!) 94%   BMI 31.03 kg/m²   Body mass index is 31.03 kg/m².    Review of Systems   Constitutional: Negative.    HENT: Negative.     Respiratory:  Positive for dyspnea on extertion.    Cardiovascular:  Positive for leg swelling.   Gastrointestinal: Negative.    Psychiatric/Behavioral: Negative.     All other systems reviewed and are negative.  Objective:      Physical Exam  Constitutional:       Appearance: Normal appearance.   HENT:      Head: Normocephalic and atraumatic. "      Nose: Nose normal.      Mouth/Throat:      Pharynx: Oropharynx is clear.   Cardiovascular:      Rate and Rhythm: Normal rate and regular rhythm.   Pulmonary:      Breath sounds: Normal breath sounds.      Comments: Absent BS Left  Abdominal:      Palpations: Abdomen is soft.   Musculoskeletal:      Right lower leg: Edema present.      Left lower leg: Edema present.   Neurological:      General: No focal deficit present.      Mental Status: He is alert and oriented to person, place, and time.   Psychiatric:         Mood and Affect: Mood normal.         Behavior: Behavior normal.     Personal Diagnostic test            Phase Oxygen Assessment Supplemental O2 Heart   Rate Blood Pressure Jimenez Dyspnea Scale Rating   Resting 95 % Room Air 62 bpm 159/67 0   Exercise             Minute             1 88 % Room Air 79 bpm       2 86 % 2 L/M 85 bpm       3 87 % 3 L/M 93 bpm       4 93 % 4 L/M 93 bpm       5 91 % 4 L/M 99 bpm       6  91 % 4 L/M 96 bpm 158/71 4   Recovery             Minute             1 92 % 4 L/M 90 bpm       2 99 % 4 L/M 75 bpm       3 100 % 4 L/M 62 bpm       4 100 % 4 L/M 63 bpm 144/66 3         Results for orders placed during the hospital encounter of 09/14/22    X-Ray Chest PA And Lateral    Narrative  EXAMINATION:  XR CHEST PA AND LATERAL    CLINICAL HISTORY:  SOB; Chronic obstructive pulmonary disease, unspecified    TECHNIQUE:  PA and lateral views of the chest were performed.    COMPARISON:  Prior radiographs    FINDINGS:  Right-sided MediPort removed.  There is persistent opacification of the left hemithorax consistent with prior pneumonectomy.  Expected leftward shift cardiomediastinal structures again noted.  Right lung demonstrates possible developing nodular opacity at the lateral base.  No large pleural effusion.  CT chest recommended.    Impression  As above.  This report was flagged in Epic as abnormal.      Electronically signed by: Giovani Nicole  MD  Date:    09/14/2022  Time:    10:31        Assessment:       1. Malignant neoplasm of upper lobe of left lung    2. Panlobular emphysema    3. Asbestos-induced pleural plaque    4. Tracheal mass: 3 cm BELOW VOCAL CORDS: SUBGLOTIC    5. s/p left pnuemonectomy for KAYLI [facility]        Outpatient Encounter Medications as of 9/16/2022   Medication Sig Dispense Refill    albuterol (PROVENTIL) 2.5 mg /3 mL (0.083 %) nebulizer solution Take 3 mLs (2.5 mg total) by nebulization every 6 (six) hours while awake. 270 mL 11    amLODIPine (NORVASC) 5 MG tablet TAKE 1 TABLET BY MOUTH EVERYDAY AT BEDTIME 90 tablet 3    ammonium lactate (LAC-HYDRIN) 12 % lotion Apply to damp skin after bathing 225 g 11    aspirin (ECOTRIN) 81 MG EC tablet Take 81 mg by mouth once daily.      doxepin (SINEQUAN) 10 MG capsule Take 1 capsule (10 mg total) by mouth every evening. For insomnia 90 capsule 3    fenofibric acid (FIBRICOR) 135 mg CpDR TAKE 1 CAPSULE (135 MG TOTAL) BY MOUTH ONCE DAILY. 90 capsule 3    fluticasone-umeclidin-vilanter (TRELEGY ELLIPTA) 200-62.5-25 mcg inhaler Inhale 1 puff into the lungs once daily. 180 each 3    HYDROcodone-acetaminophen (NORCO) 5-325 mg per tablet Take 1 tablet by mouth every 6 (six) hours as needed for Pain. 60 tablet 0    levocetirizine (XYZAL) 5 MG tablet Take 5 mg by mouth as needed.       lisinopriL (PRINIVIL,ZESTRIL) 40 MG tablet TAKE 1 TABLET BY MOUTH EVERY DAY 90 tablet 3    ranolazine (RANEXA) 500 MG Tb12 TAKE 1 TABLET BY MOUTH TWICE A  tablet 3    simvastatin (ZOCOR) 80 MG tablet Take 1 tablet (80 mg total) by mouth once daily. 90 tablet 3    sotaloL (BETAPACE) 80 MG tablet TAKE 1 TABLET (80 MG TOTAL) BY MOUTH 2 (TWO) TIMES DAILY. 180 tablet 3     No facility-administered encounter medications on file as of 9/16/2022.     Orders Placed This Encounter   Procedures    CT Chest Without Contrast     Standing Status:   Future     Standing Expiration Date:   9/16/2023     Order Specific Question:    May the Radiologist modify the order per protocol to meet the clinical needs of the patient?     Answer:   Yes     Plan:   Possible developing nodular opacity at the right lateral base.  CT chest     Problem List Items Addressed This Visit          Pulmonary    Chronic obstructive pulmonary disease    Current Assessment & Plan     Trelegy         Relevant Orders    CT Chest Without Contrast    s/p left pnuemonectomy for KAYLI [facility]    Relevant Orders    CT Chest Without Contrast    Asbestos-induced pleural plaque    Relevant Orders    CT Chest Without Contrast    Tracheal mass: 3 cm BELOW VOCAL CORDS: SUBGLOTIC    Relevant Orders    CT Chest Without Contrast       Oncology    Malignant neoplasm of upper lobe of left lung - Primary    Overview     He had   Resection of a left upper lobe mass in 4/2016. .It was a squamous cell carcinoma.   It measured 3.8 cm. The   pathology indicated that this was extending into the bronchial resection margin of the lobe. This lobe was later on removed to complete the pneumonectomy   There was one lymph node positive for metastatic squamous cell carcinoma at the   AP window.  He had post op   chemo/radiation. ( 4 cycles of carboTaxol).  Last chemo at the end of 9/2016         Relevant Orders    CT Chest Without Contrast     Continue oxygen therapy. Benefits from use.

## 2022-09-21 ENCOUNTER — HOSPITAL ENCOUNTER (OUTPATIENT)
Dept: RADIOLOGY | Facility: HOSPITAL | Age: 82
Discharge: HOME | End: 2022-09-21
Attending: NURSE PRACTITIONER
Payer: MEDICARE

## 2022-09-21 DIAGNOSIS — J92.0 ASBESTOS-INDUCED PLEURAL PLAQUE: ICD-10-CM

## 2022-09-21 DIAGNOSIS — J39.8 TRACHEAL MASS: ICD-10-CM

## 2022-09-21 DIAGNOSIS — C34.12 MALIGNANT NEOPLASM OF UPPER LOBE OF LEFT LUNG: ICD-10-CM

## 2022-09-21 DIAGNOSIS — Z90.2 STATUS POST PNEUMONECTOMY: ICD-10-CM

## 2022-09-21 DIAGNOSIS — J43.1 PANLOBULAR EMPHYSEMA: ICD-10-CM

## 2022-09-21 PROCEDURE — 71250 CT THORAX DX C-: CPT | Mod: TC,PN

## 2022-09-22 ENCOUNTER — TELEPHONE (OUTPATIENT)
Dept: PULMONOLOGY | Facility: CLINIC | Age: 82
End: 2022-09-22
Payer: MEDICARE

## 2022-09-22 DIAGNOSIS — R91.8 OPACITY OF LUNG ON IMAGING STUDY: Primary | ICD-10-CM

## 2022-09-26 ENCOUNTER — OFFICE VISIT (OUTPATIENT)
Dept: INTERNAL MEDICINE | Facility: CLINIC | Age: 82
End: 2022-09-26
Payer: MEDICARE

## 2022-09-26 VITALS
BODY MASS INDEX: 30.9 KG/M2 | HEART RATE: 60 BPM | OXYGEN SATURATION: 91 % | WEIGHT: 215.81 LBS | HEIGHT: 70 IN | SYSTOLIC BLOOD PRESSURE: 112 MMHG | TEMPERATURE: 97 F | DIASTOLIC BLOOD PRESSURE: 62 MMHG

## 2022-09-26 DIAGNOSIS — J43.1 PANLOBULAR EMPHYSEMA: Primary | ICD-10-CM

## 2022-09-26 DIAGNOSIS — I25.118 CORONARY ARTERY DISEASE OF NATIVE ARTERY OF NATIVE HEART WITH STABLE ANGINA PECTORIS: ICD-10-CM

## 2022-09-26 DIAGNOSIS — E78.2 MIXED HYPERLIPIDEMIA: ICD-10-CM

## 2022-09-26 DIAGNOSIS — I10 ESSENTIAL HYPERTENSION: ICD-10-CM

## 2022-09-26 PROBLEM — Z12.11 SCREENING FOR COLON CANCER: Status: RESOLVED | Noted: 2018-10-25 | Resolved: 2022-09-26

## 2022-09-26 PROCEDURE — 1101F PR PT FALLS ASSESS DOC 0-1 FALLS W/OUT INJ PAST YR: ICD-10-PCS | Mod: CPTII,S$GLB,, | Performed by: INTERNAL MEDICINE

## 2022-09-26 PROCEDURE — G0008 FLU VACCINE - QUADRIVALENT - ADJUVANTED: ICD-10-PCS | Mod: S$GLB,,, | Performed by: INTERNAL MEDICINE

## 2022-09-26 PROCEDURE — 1159F MED LIST DOCD IN RCRD: CPT | Mod: CPTII,S$GLB,, | Performed by: INTERNAL MEDICINE

## 2022-09-26 PROCEDURE — 1126F AMNT PAIN NOTED NONE PRSNT: CPT | Mod: CPTII,S$GLB,, | Performed by: INTERNAL MEDICINE

## 2022-09-26 PROCEDURE — 1126F PR PAIN SEVERITY QUANTIFIED, NO PAIN PRESENT: ICD-10-PCS | Mod: CPTII,S$GLB,, | Performed by: INTERNAL MEDICINE

## 2022-09-26 PROCEDURE — 3074F SYST BP LT 130 MM HG: CPT | Mod: CPTII,S$GLB,, | Performed by: INTERNAL MEDICINE

## 2022-09-26 PROCEDURE — G0008 ADMIN INFLUENZA VIRUS VAC: HCPCS | Mod: S$GLB,,, | Performed by: INTERNAL MEDICINE

## 2022-09-26 PROCEDURE — 1160F RVW MEDS BY RX/DR IN RCRD: CPT | Mod: CPTII,S$GLB,, | Performed by: INTERNAL MEDICINE

## 2022-09-26 PROCEDURE — 1101F PT FALLS ASSESS-DOCD LE1/YR: CPT | Mod: CPTII,S$GLB,, | Performed by: INTERNAL MEDICINE

## 2022-09-26 PROCEDURE — 1160F PR REVIEW ALL MEDS BY PRESCRIBER/CLIN PHARMACIST DOCUMENTED: ICD-10-PCS | Mod: CPTII,S$GLB,, | Performed by: INTERNAL MEDICINE

## 2022-09-26 PROCEDURE — 90694 VACC AIIV4 NO PRSRV 0.5ML IM: CPT | Mod: S$GLB,,, | Performed by: INTERNAL MEDICINE

## 2022-09-26 PROCEDURE — 3288F PR FALLS RISK ASSESSMENT DOCUMENTED: ICD-10-PCS | Mod: CPTII,S$GLB,, | Performed by: INTERNAL MEDICINE

## 2022-09-26 PROCEDURE — 3078F PR MOST RECENT DIASTOLIC BLOOD PRESSURE < 80 MM HG: ICD-10-PCS | Mod: CPTII,S$GLB,, | Performed by: INTERNAL MEDICINE

## 2022-09-26 PROCEDURE — 99214 PR OFFICE/OUTPT VISIT, EST, LEVL IV, 30-39 MIN: ICD-10-PCS | Mod: 25,S$GLB,, | Performed by: INTERNAL MEDICINE

## 2022-09-26 PROCEDURE — 90694 FLU VACCINE - QUADRIVALENT - ADJUVANTED: ICD-10-PCS | Mod: S$GLB,,, | Performed by: INTERNAL MEDICINE

## 2022-09-26 PROCEDURE — 3074F PR MOST RECENT SYSTOLIC BLOOD PRESSURE < 130 MM HG: ICD-10-PCS | Mod: CPTII,S$GLB,, | Performed by: INTERNAL MEDICINE

## 2022-09-26 PROCEDURE — 99214 OFFICE O/P EST MOD 30 MIN: CPT | Mod: 25,S$GLB,, | Performed by: INTERNAL MEDICINE

## 2022-09-26 PROCEDURE — 99999 PR PBB SHADOW E&M-EST. PATIENT-LVL IV: ICD-10-PCS | Mod: PBBFAC,,, | Performed by: INTERNAL MEDICINE

## 2022-09-26 PROCEDURE — 3288F FALL RISK ASSESSMENT DOCD: CPT | Mod: CPTII,S$GLB,, | Performed by: INTERNAL MEDICINE

## 2022-09-26 PROCEDURE — 1159F PR MEDICATION LIST DOCUMENTED IN MEDICAL RECORD: ICD-10-PCS | Mod: CPTII,S$GLB,, | Performed by: INTERNAL MEDICINE

## 2022-09-26 PROCEDURE — 99999 PR PBB SHADOW E&M-EST. PATIENT-LVL IV: CPT | Mod: PBBFAC,,, | Performed by: INTERNAL MEDICINE

## 2022-09-26 PROCEDURE — 3078F DIAST BP <80 MM HG: CPT | Mod: CPTII,S$GLB,, | Performed by: INTERNAL MEDICINE

## 2022-09-26 NOTE — PROGRESS NOTES
"Subjective:       Patient ID: Chai Murry is a 82 y.o. male.    Chief Complaint: Follow-up and Shortness of Breath (X 8 months)    Follow-up  Pertinent negatives include no chest pain, coughing, fever, nausea or vomiting.   Shortness of Breath  Pertinent negatives include no chest pain, fever, vomiting or wheezing.  The patient is an 82-year-old male presenting today following up on his COPD, CAD, hypertension, hyperlipidemia.  She continues to struggle with his breathing.  He notes that he has shortness of breath with exertion.  He has home oxygen at 3 L which is wearing basically overnight.  Whenever he does anything though he gets short of breath again.  He had a 6 minutes walk recently which showed that he desaturated around 87% with exertion.  He does not have a portable concentrator.  He has full size concentrator is in the house but nothing portable.    His coronary artery disease is stable.  He is taking his medication as prescribed.  He has had any issues.  There is no chest pain or palpitations.    Blood pressure and cholesterol have been stable over time.  He continues to take his meds without adverse effect.  Labs have shown good control of cholesterol and his blood pressure remained stable.    Review of Systems   Constitutional:  Negative for fever and unexpected weight change.   Respiratory:  Positive for shortness of breath. Negative for cough and wheezing.    Cardiovascular:  Negative for chest pain and palpitations.   Gastrointestinal:  Negative for constipation, diarrhea, nausea and vomiting.   Genitourinary:  Negative for dysuria and hematuria.     Objective:   /62 (BP Location: Right arm, Patient Position: Sitting)   Pulse 60   Temp 97.4 °F (36.3 °C) (Temporal)   Ht 5' 10" (1.778 m)   Wt 97.9 kg (215 lb 13.3 oz)   SpO2 (!) 91%   BMI 30.97 kg/m²      Physical Exam  Vitals reviewed.   Constitutional:       Appearance: He is well-developed.   HENT:      Head: Normocephalic and " atraumatic.      Right Ear: External ear normal.      Left Ear: External ear normal.   Eyes:      Pupils: Pupils are equal, round, and reactive to light.   Neck:      Thyroid: No thyromegaly.   Cardiovascular:      Rate and Rhythm: Normal rate and regular rhythm.      Heart sounds: Normal heart sounds. No murmur heard.    No friction rub. No gallop.   Pulmonary:      Effort: Pulmonary effort is normal.      Breath sounds: Normal breath sounds. No wheezing or rales.   Abdominal:      General: Bowel sounds are normal. There is no distension.      Palpations: Abdomen is soft.      Tenderness: There is no abdominal tenderness.   Musculoskeletal:      Cervical back: Neck supple.   Psychiatric:         Mood and Affect: Mood normal.           Assessment:       1. Panlobular emphysema    2. Coronary artery disease of native artery of native heart with stable angina pectoris    3. Essential hypertension    4. Mixed hyperlipidemia          Plan:   No problem-specific Assessment & Plan notes found for this encounter.    Panlobular emphysema  Comments:  On O2 at night, notes dropping sats with exercise.  Had visit with Pulm last week.  O2 3l    Coronary artery disease of native artery of native heart with stable angina pectoris  Comments:  stable, continue current medicatiosn    Essential hypertension  Comments:  Continue current meds.    Mixed hyperlipidemia  Comments:  Continue statin, stable.    Other orders  -     Influenza - Quadrivalent (Adjuvanted)    I have sent a message to the Pulmonary Department see if he qualifies for oxygen with activity.  Certainly he is symptomatic with minimal activity so hopefully can get him approved for oxygen with activity and hopefully get him a portable concentrator to assist that.      Follow up in about 6 months (around 3/26/2023).

## 2022-09-26 NOTE — Clinical Note
Radha,  Mr. Murry is requesting a portable concentrator.  I don't know what requirements he would need to meet.  Can you review your notes and if he qualifies place the order?  Thanks.

## 2022-10-13 ENCOUNTER — LAB VISIT (OUTPATIENT)
Dept: LAB | Facility: HOSPITAL | Age: 82
End: 2022-10-13
Attending: INTERNAL MEDICINE
Payer: MEDICARE

## 2022-10-13 ENCOUNTER — OFFICE VISIT (OUTPATIENT)
Dept: HEMATOLOGY/ONCOLOGY | Facility: CLINIC | Age: 82
End: 2022-10-13
Payer: MEDICARE

## 2022-10-13 VITALS
SYSTOLIC BLOOD PRESSURE: 114 MMHG | TEMPERATURE: 97 F | OXYGEN SATURATION: 90 % | HEART RATE: 64 BPM | BODY MASS INDEX: 29.91 KG/M2 | WEIGHT: 213.63 LBS | DIASTOLIC BLOOD PRESSURE: 63 MMHG | HEIGHT: 71 IN

## 2022-10-13 DIAGNOSIS — R06.09 DOE (DYSPNEA ON EXERTION): ICD-10-CM

## 2022-10-13 DIAGNOSIS — D64.9 ANEMIA, UNSPECIFIED TYPE: ICD-10-CM

## 2022-10-13 DIAGNOSIS — R74.01 TRANSAMINITIS: ICD-10-CM

## 2022-10-13 DIAGNOSIS — C34.12 MALIGNANT NEOPLASM OF UPPER LOBE OF LEFT LUNG: ICD-10-CM

## 2022-10-13 DIAGNOSIS — R91.1 NODULE OF RIGHT LUNG: Primary | ICD-10-CM

## 2022-10-13 DIAGNOSIS — J44.9 CHRONIC OBSTRUCTIVE PULMONARY DISEASE, UNSPECIFIED COPD TYPE: ICD-10-CM

## 2022-10-13 DIAGNOSIS — D53.9 NUTRITIONAL ANEMIA, UNSPECIFIED: ICD-10-CM

## 2022-10-13 LAB
ALBUMIN SERPL BCP-MCNC: 3.5 G/DL (ref 3.5–5.2)
ALP SERPL-CCNC: 65 U/L (ref 55–135)
ALT SERPL W/O P-5'-P-CCNC: 164 U/L (ref 10–44)
ANION GAP SERPL CALC-SCNC: 7 MMOL/L (ref 8–16)
AST SERPL-CCNC: 179 U/L (ref 10–40)
BASOPHILS # BLD AUTO: 0.08 K/UL (ref 0–0.2)
BASOPHILS NFR BLD: 1 % (ref 0–1.9)
BILIRUB SERPL-MCNC: 0.4 MG/DL (ref 0.1–1)
BUN SERPL-MCNC: 22 MG/DL (ref 8–23)
CALCIUM SERPL-MCNC: 9.3 MG/DL (ref 8.7–10.5)
CHLORIDE SERPL-SCNC: 104 MMOL/L (ref 95–110)
CO2 SERPL-SCNC: 33 MMOL/L (ref 23–29)
CREAT SERPL-MCNC: 1.2 MG/DL (ref 0.5–1.4)
DIFFERENTIAL METHOD: ABNORMAL
EOSINOPHIL # BLD AUTO: 2.1 K/UL (ref 0–0.5)
EOSINOPHIL NFR BLD: 24.6 % (ref 0–8)
ERYTHROCYTE [DISTWIDTH] IN BLOOD BY AUTOMATED COUNT: 13.9 % (ref 11.5–14.5)
EST. GFR  (NO RACE VARIABLE): >60 ML/MIN/1.73 M^2
GLUCOSE SERPL-MCNC: 109 MG/DL (ref 70–110)
HCT VFR BLD AUTO: 38.8 % (ref 40–54)
HGB BLD-MCNC: 11.7 G/DL (ref 14–18)
IMM GRANULOCYTES # BLD AUTO: 0.19 K/UL (ref 0–0.04)
IMM GRANULOCYTES NFR BLD AUTO: 2.3 % (ref 0–0.5)
LYMPHOCYTES # BLD AUTO: 1.1 K/UL (ref 1–4.8)
LYMPHOCYTES NFR BLD: 12.6 % (ref 18–48)
MCH RBC QN AUTO: 30.3 PG (ref 27–31)
MCHC RBC AUTO-ENTMCNC: 30.2 G/DL (ref 32–36)
MCV RBC AUTO: 101 FL (ref 82–98)
MONOCYTES # BLD AUTO: 0.5 K/UL (ref 0.3–1)
MONOCYTES NFR BLD: 6.1 % (ref 4–15)
NEUTROPHILS # BLD AUTO: 4.5 K/UL (ref 1.8–7.7)
NEUTROPHILS NFR BLD: 53.4 % (ref 38–73)
NRBC BLD-RTO: 0 /100 WBC
PLATELET # BLD AUTO: 201 K/UL (ref 150–450)
PLATELET BLD QL SMEAR: ABNORMAL
PMV BLD AUTO: 10.9 FL (ref 9.2–12.9)
POTASSIUM SERPL-SCNC: 5.1 MMOL/L (ref 3.5–5.1)
PROT SERPL-MCNC: 6.7 G/DL (ref 6–8.4)
RBC # BLD AUTO: 3.86 M/UL (ref 4.6–6.2)
SODIUM SERPL-SCNC: 144 MMOL/L (ref 136–145)
STOMATOCYTES BLD QL SMEAR: PRESENT
WBC # BLD AUTO: 8.35 K/UL (ref 3.9–12.7)

## 2022-10-13 PROCEDURE — 36415 COLL VENOUS BLD VENIPUNCTURE: CPT

## 2022-10-13 PROCEDURE — 3078F PR MOST RECENT DIASTOLIC BLOOD PRESSURE < 80 MM HG: ICD-10-PCS | Mod: CPTII,S$GLB,, | Performed by: INTERNAL MEDICINE

## 2022-10-13 PROCEDURE — 85025 COMPLETE CBC W/AUTO DIFF WBC: CPT

## 2022-10-13 PROCEDURE — 3074F PR MOST RECENT SYSTOLIC BLOOD PRESSURE < 130 MM HG: ICD-10-PCS | Mod: CPTII,S$GLB,, | Performed by: INTERNAL MEDICINE

## 2022-10-13 PROCEDURE — 1101F PR PT FALLS ASSESS DOC 0-1 FALLS W/OUT INJ PAST YR: ICD-10-PCS | Mod: CPTII,S$GLB,, | Performed by: INTERNAL MEDICINE

## 2022-10-13 PROCEDURE — 1126F PR PAIN SEVERITY QUANTIFIED, NO PAIN PRESENT: ICD-10-PCS | Mod: CPTII,S$GLB,, | Performed by: INTERNAL MEDICINE

## 2022-10-13 PROCEDURE — 99999 PR PBB SHADOW E&M-EST. PATIENT-LVL V: ICD-10-PCS | Mod: PBBFAC,,, | Performed by: INTERNAL MEDICINE

## 2022-10-13 PROCEDURE — 3074F SYST BP LT 130 MM HG: CPT | Mod: CPTII,S$GLB,, | Performed by: INTERNAL MEDICINE

## 2022-10-13 PROCEDURE — 80053 COMPREHEN METABOLIC PANEL: CPT

## 2022-10-13 PROCEDURE — 99215 PR OFFICE/OUTPT VISIT, EST, LEVL V, 40-54 MIN: ICD-10-PCS | Mod: S$GLB,,, | Performed by: INTERNAL MEDICINE

## 2022-10-13 PROCEDURE — 1159F PR MEDICATION LIST DOCUMENTED IN MEDICAL RECORD: ICD-10-PCS | Mod: CPTII,S$GLB,, | Performed by: INTERNAL MEDICINE

## 2022-10-13 PROCEDURE — 3288F PR FALLS RISK ASSESSMENT DOCUMENTED: ICD-10-PCS | Mod: CPTII,S$GLB,, | Performed by: INTERNAL MEDICINE

## 2022-10-13 PROCEDURE — 3288F FALL RISK ASSESSMENT DOCD: CPT | Mod: CPTII,S$GLB,, | Performed by: INTERNAL MEDICINE

## 2022-10-13 PROCEDURE — 99999 PR PBB SHADOW E&M-EST. PATIENT-LVL V: CPT | Mod: PBBFAC,,, | Performed by: INTERNAL MEDICINE

## 2022-10-13 PROCEDURE — 1126F AMNT PAIN NOTED NONE PRSNT: CPT | Mod: CPTII,S$GLB,, | Performed by: INTERNAL MEDICINE

## 2022-10-13 PROCEDURE — 1160F RVW MEDS BY RX/DR IN RCRD: CPT | Mod: CPTII,S$GLB,, | Performed by: INTERNAL MEDICINE

## 2022-10-13 PROCEDURE — 99215 OFFICE O/P EST HI 40 MIN: CPT | Mod: S$GLB,,, | Performed by: INTERNAL MEDICINE

## 2022-10-13 PROCEDURE — 3078F DIAST BP <80 MM HG: CPT | Mod: CPTII,S$GLB,, | Performed by: INTERNAL MEDICINE

## 2022-10-13 PROCEDURE — 1160F PR REVIEW ALL MEDS BY PRESCRIBER/CLIN PHARMACIST DOCUMENTED: ICD-10-PCS | Mod: CPTII,S$GLB,, | Performed by: INTERNAL MEDICINE

## 2022-10-13 PROCEDURE — 1159F MED LIST DOCD IN RCRD: CPT | Mod: CPTII,S$GLB,, | Performed by: INTERNAL MEDICINE

## 2022-10-13 PROCEDURE — 1101F PT FALLS ASSESS-DOCD LE1/YR: CPT | Mod: CPTII,S$GLB,, | Performed by: INTERNAL MEDICINE

## 2022-10-13 NOTE — PROGRESS NOTES
Subjective:      DATE OF VISIT: 10/13/2022   ?   ?   Patient ID:?Chai Murry is a 82 y.o. male.?? MR#: [id number]   ?   PRIMARY PROVIDER:  Dr. Vila -> Dr. Casper  ?   CHIEF COMPLAINT:  Follow-up?????   ?   ONCOLOGIC DIAGNOSIS:  Recurrent squamous cell carcinoma, with metastatic disease involving the trachea  ?   CURRENT TREATMENT:  Surveillance    PAST TREATMENT:  Chemoradiation 2016; then with recurrence chemoradiation and immunotherapy durvalumab, completed 9/2020    HPI  I had the pleasure seeing in follow-up Mr. Murry.     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  He developed hoarseness in 2019 found to have PET avid mass left of trachea consistent with recurrent squamous cell carcinoma.  He underwent chemoradiation with cisplatin and completed 6 weekly treatments  with good response then immunotherapy maintenance with durvalumab completed 09/08/2020.  Restaging scan 11/30/2020 with his primary doctor Cadence showed no FDG avidity  to suggest recurrent or metastatic disease.    INTERVAL EVENTS    He notes over last several months some increased dyspnea on exertion and desaturation in this setting to 84%.  He is followed with Pulmonary but not seen recently.  No hemoptysis/hematemesis or new areas of pain.  He returns after follow-up CT scan chest for surveillance of his lung cancer.      Review of Systems    ?   A comprehensive 14-point review of systems was reviewed with patient and was negative other than as specified above.   ?     Objective:      Physical Exam       ?   Vitals:    10/13/22 1257   BP: 114/63   Pulse: 64   Temp: 97.2 °F (36.2 °C)      ?   ECOG:?0   General appearance: Generally well appearing, in no acute distress.   Head, eyes, ears, nose, and throat: moist mucous membranes.   Respiratory:  Normal work of breathing  Abdomen: nontender, nondistended.   Extremities: Warm,  without edema.   Neurologic: Alert and oriented. Grossly normal strength, coordination, and gait.   Skin: No rashes, ecchymoses or petechial lesion.   Psychiatric:  Normal mood and affect.    Laboratory:  ?   Lab Visit on 10/13/2022   Component Date Value Ref Range Status    Sodium 10/13/2022 144  136 - 145 mmol/L Final    Potassium 10/13/2022 5.1  3.5 - 5.1 mmol/L Final    Chloride 10/13/2022 104  95 - 110 mmol/L Final    CO2 10/13/2022 33 (H)  23 - 29 mmol/L Final    Glucose 10/13/2022 109  70 - 110 mg/dL Final    BUN 10/13/2022 22  8 - 23 mg/dL Final    Creatinine 10/13/2022 1.2  0.5 - 1.4 mg/dL Final    Calcium 10/13/2022 9.3  8.7 - 10.5 mg/dL Final    Total Protein 10/13/2022 6.7  6.0 - 8.4 g/dL Final    Albumin 10/13/2022 3.5  3.5 - 5.2 g/dL Final    Total Bilirubin 10/13/2022 0.4  0.1 - 1.0 mg/dL Final    Alkaline Phosphatase 10/13/2022 65  55 - 135 U/L Final    AST 10/13/2022 179 (H)  10 - 40 U/L Final    ALT 10/13/2022 164 (H)  10 - 44 U/L Final    Anion Gap 10/13/2022 7 (L)  8 - 16 mmol/L Final    eGFR 10/13/2022 >60  >60 mL/min/1.73 m^2 Final    WBC 10/13/2022 8.35  3.90 - 12.70 K/uL Final    RBC 10/13/2022 3.86 (L)  4.60 - 6.20 M/uL Final    Hemoglobin 10/13/2022 11.7 (L)  14.0 - 18.0 g/dL Final    Hematocrit 10/13/2022 38.8 (L)  40.0 - 54.0 % Final    MCV 10/13/2022 101 (H)  82 - 98 fL Final    MCH 10/13/2022 30.3  27.0 - 31.0 pg Final    MCHC 10/13/2022 30.2 (L)  32.0 - 36.0 g/dL Final    RDW 10/13/2022 13.9  11.5 - 14.5 % Final    Platelets 10/13/2022 201  150 - 450 K/uL Final    MPV 10/13/2022 10.9  9.2 - 12.9 fL Final    Immature Granulocytes 10/13/2022 2.3 (H)  0.0 - 0.5 % Final    Gran # (ANC) 10/13/2022 4.5  1.8 - 7.7 K/uL Final    Immature Grans (Abs) 10/13/2022 0.19 (H)  0.00 - 0.04 K/uL Final    Lymph # 10/13/2022 1.1  1.0 - 4.8 K/uL Final    Mono # 10/13/2022 0.5  0.3 - 1.0 K/uL Final    Eos # 10/13/2022 2.1 (H)  0.0 - 0.5 K/uL Final    Baso # 10/13/2022 0.08  0.00 - 0.20 K/uL Final    nRBC  10/13/2022 0  0 /100 WBC Final    Gran % 10/13/2022 53.4  38.0 - 73.0 % Final    Lymph % 10/13/2022 12.6 (L)  18.0 - 48.0 % Final    Mono % 10/13/2022 6.1  4.0 - 15.0 % Final    Eosinophil % 10/13/2022 24.6 (H)  0.0 - 8.0 % Final    Basophil % 10/13/2022 1.0  0.0 - 1.9 % Final    Platelet Estimate 10/13/2022 Appears normal   Final    Stomatocytes 10/13/2022 Present   Final    Differential Method 10/13/2022 Automated   Final      ?   No results found. However, due to the size of the patient record, not all encounters were searched. Please check Results Review for a complete set of results.    IMAGING     Results for orders placed or performed during the hospital encounter of 09/21/22 (from the past 2160 hour(s))   CT Chest Without Contrast    Impression    Multiple right lung pulmonary nodules which are not currently amenable to percutaneous biopsy.  Recommend 3 month interval follow-up CT chest for further evaluation.    All CT scans at this facility use dose modulation, iterative reconstruction and/or weight based dosing when appropriate to reduce radiation dose to as low as reasonably achievable.      Electronically signed by: Chandra Zheng MD  Date:    09/21/2022  Time:    10:48     *Note: Due to a large number of results and/or encounters for the requested time period, some results have not been displayed. A complete set of results can be found in Results Review.       ?   Assessment/Plan:       1. Nodule of right lung    2. Transaminitis    3. Nutritional anemia, unspecified     4. Malignant neoplasm of upper lobe of left lung    5. Chronic obstructive pulmonary disease, unspecified COPD type    6. CARVAJAL (dyspnea on exertion)    7. Anemia, unspecified type            Plan:     # recurrent/metastatic squamous cell carcinoma:  Istatus post chemoradiation in November 2020 completed maintenance durvalumab.  November 2020 PET-CT with Dr. Vila without evidence of recurrent/metastatic disease resolution of prior avidity  in left trachea.  MRI brain with chronic microvascular changes/atrophy no evidence of metastatic disease.   He has been in surveillance with Walker staging CT chest reviewed today.  Several subcentimeter pulmonary nodules with right-sided fissure 1.4 cm lesion.  We discussed further evaluation with PET although limited sensitivity given small size of these lesions also noted to not be amenable to percutaneous biopsy and or short interval repeat scan in CT chest.  He is in agreement with above and will follow-up after PET.  Recommend follow-up with Pulmonary given worsened dyspnea on exertion.      Anemia: Relatively stable anemia.  No paraprotein.  Noted to have free light chain ratio abnormality relatively similar to prior values 2 years ago.  No liver function test abnormality.  No new constitutional symptoms.  In light of above concern for recurrent squamous cell carcinoma will recommend monitoring for this at this time.      Follow-Up:   Patient Instructions   PET  RV after above

## 2022-10-14 ENCOUNTER — TELEPHONE (OUTPATIENT)
Dept: HEMATOLOGY/ONCOLOGY | Facility: CLINIC | Age: 82
End: 2022-10-14
Payer: MEDICARE

## 2022-10-20 ENCOUNTER — HOSPITAL ENCOUNTER (OUTPATIENT)
Dept: RADIOLOGY | Facility: HOSPITAL | Age: 82
Discharge: HOME | End: 2022-10-20
Attending: INTERNAL MEDICINE
Payer: MEDICARE

## 2022-10-20 DIAGNOSIS — R91.1 NODULE OF RIGHT LUNG: ICD-10-CM

## 2022-10-20 PROCEDURE — 78815 PET IMAGE W/CT SKULL-THIGH: CPT | Mod: 26,PS,, | Performed by: RADIOLOGY

## 2022-10-20 PROCEDURE — 78815 PET IMAGE W/CT SKULL-THIGH: CPT | Mod: TC,PS

## 2022-10-20 PROCEDURE — 78815 NM PET CT ROUTINE: ICD-10-PCS | Mod: 26,PS,, | Performed by: RADIOLOGY

## 2022-10-21 DIAGNOSIS — R91.1 NODULE OF RIGHT LUNG: Primary | ICD-10-CM

## 2022-10-25 ENCOUNTER — TELEPHONE (OUTPATIENT)
Dept: HEMATOLOGY/ONCOLOGY | Facility: CLINIC | Age: 82
End: 2022-10-25
Payer: MEDICARE

## 2022-10-25 NOTE — TELEPHONE ENCOUNTER
Informed patient of PET scan results and 3 mo fu.  Patient verbalized understanding of all medical information given.

## 2022-11-03 ENCOUNTER — OFFICE VISIT (OUTPATIENT)
Dept: CARDIOLOGY | Facility: CLINIC | Age: 82
End: 2022-11-03
Payer: MEDICARE

## 2022-11-03 VITALS
SYSTOLIC BLOOD PRESSURE: 116 MMHG | WEIGHT: 214.94 LBS | BODY MASS INDEX: 30.77 KG/M2 | HEIGHT: 70 IN | HEART RATE: 94 BPM | DIASTOLIC BLOOD PRESSURE: 72 MMHG | OXYGEN SATURATION: 95 %

## 2022-11-03 DIAGNOSIS — I25.118 CORONARY ARTERY DISEASE OF NATIVE ARTERY OF NATIVE HEART WITH STABLE ANGINA PECTORIS: Primary | ICD-10-CM

## 2022-11-03 DIAGNOSIS — I10 ESSENTIAL HYPERTENSION: ICD-10-CM

## 2022-11-03 DIAGNOSIS — I73.9 PVD (PERIPHERAL VASCULAR DISEASE): ICD-10-CM

## 2022-11-03 DIAGNOSIS — N18.32 STAGE 3B CHRONIC KIDNEY DISEASE: ICD-10-CM

## 2022-11-03 DIAGNOSIS — E78.2 MIXED HYPERLIPIDEMIA: ICD-10-CM

## 2022-11-03 DIAGNOSIS — I48.0 PAROXYSMAL ATRIAL FIBRILLATION: ICD-10-CM

## 2022-11-03 DIAGNOSIS — J43.1 PANLOBULAR EMPHYSEMA: ICD-10-CM

## 2022-11-03 PROCEDURE — 99999 PR PBB SHADOW E&M-EST. PATIENT-LVL IV: ICD-10-PCS | Mod: PBBFAC,,, | Performed by: INTERNAL MEDICINE

## 2022-11-03 PROCEDURE — 3078F PR MOST RECENT DIASTOLIC BLOOD PRESSURE < 80 MM HG: ICD-10-PCS | Mod: CPTII,S$GLB,, | Performed by: INTERNAL MEDICINE

## 2022-11-03 PROCEDURE — 3078F DIAST BP <80 MM HG: CPT | Mod: CPTII,S$GLB,, | Performed by: INTERNAL MEDICINE

## 2022-11-03 PROCEDURE — 1160F PR REVIEW ALL MEDS BY PRESCRIBER/CLIN PHARMACIST DOCUMENTED: ICD-10-PCS | Mod: CPTII,S$GLB,, | Performed by: INTERNAL MEDICINE

## 2022-11-03 PROCEDURE — 1160F RVW MEDS BY RX/DR IN RCRD: CPT | Mod: CPTII,S$GLB,, | Performed by: INTERNAL MEDICINE

## 2022-11-03 PROCEDURE — 1101F PR PT FALLS ASSESS DOC 0-1 FALLS W/OUT INJ PAST YR: ICD-10-PCS | Mod: CPTII,S$GLB,, | Performed by: INTERNAL MEDICINE

## 2022-11-03 PROCEDURE — 99214 PR OFFICE/OUTPT VISIT, EST, LEVL IV, 30-39 MIN: ICD-10-PCS | Mod: S$GLB,,, | Performed by: INTERNAL MEDICINE

## 2022-11-03 PROCEDURE — 99999 PR PBB SHADOW E&M-EST. PATIENT-LVL IV: CPT | Mod: PBBFAC,,, | Performed by: INTERNAL MEDICINE

## 2022-11-03 PROCEDURE — 3288F FALL RISK ASSESSMENT DOCD: CPT | Mod: CPTII,S$GLB,, | Performed by: INTERNAL MEDICINE

## 2022-11-03 PROCEDURE — 1126F PR PAIN SEVERITY QUANTIFIED, NO PAIN PRESENT: ICD-10-PCS | Mod: CPTII,S$GLB,, | Performed by: INTERNAL MEDICINE

## 2022-11-03 PROCEDURE — 99214 OFFICE O/P EST MOD 30 MIN: CPT | Mod: S$GLB,,, | Performed by: INTERNAL MEDICINE

## 2022-11-03 PROCEDURE — 1101F PT FALLS ASSESS-DOCD LE1/YR: CPT | Mod: CPTII,S$GLB,, | Performed by: INTERNAL MEDICINE

## 2022-11-03 PROCEDURE — 1126F AMNT PAIN NOTED NONE PRSNT: CPT | Mod: CPTII,S$GLB,, | Performed by: INTERNAL MEDICINE

## 2022-11-03 PROCEDURE — 1159F MED LIST DOCD IN RCRD: CPT | Mod: CPTII,S$GLB,, | Performed by: INTERNAL MEDICINE

## 2022-11-03 PROCEDURE — 3074F PR MOST RECENT SYSTOLIC BLOOD PRESSURE < 130 MM HG: ICD-10-PCS | Mod: CPTII,S$GLB,, | Performed by: INTERNAL MEDICINE

## 2022-11-03 PROCEDURE — 3074F SYST BP LT 130 MM HG: CPT | Mod: CPTII,S$GLB,, | Performed by: INTERNAL MEDICINE

## 2022-11-03 PROCEDURE — 1159F PR MEDICATION LIST DOCUMENTED IN MEDICAL RECORD: ICD-10-PCS | Mod: CPTII,S$GLB,, | Performed by: INTERNAL MEDICINE

## 2022-11-03 PROCEDURE — 3288F PR FALLS RISK ASSESSMENT DOCUMENTED: ICD-10-PCS | Mod: CPTII,S$GLB,, | Performed by: INTERNAL MEDICINE

## 2022-11-03 NOTE — PROGRESS NOTES
Subjective:   Patient ID:  Chai Murry is a 82 y.o. male who presents for follow-up of Coronary Artery Disease      Presents for follow up  SOB related to s/p L pneumonectomy, no changes since last visit  Denies chest pain, palpitations, syncope, lightheadedness  No other complaints today    Coronary Artery Disease  Presents for follow-up visit. Pertinent negatives include no chest pain, chest pressure, chest tightness, dizziness, leg swelling, muscle weakness, palpitations, shortness of breath or weight gain. Risk factors include hyperlipidemia. Compliance with medications is good.   Hypertension  This is a chronic problem. The current episode started more than 1 year ago. The problem is unchanged. The problem is controlled. Pertinent negatives include no chest pain, malaise/fatigue, orthopnea, palpitations or shortness of breath. Risk factors for coronary artery disease include male gender, sedentary lifestyle and dyslipidemia. Past treatments include calcium channel blockers and angiotensin blockers. There are no compliance problems.  Hypertensive end-organ damage includes CAD/MI. There is no history of CVA.   Hyperlipidemia  This is a chronic problem. The problem is controlled. Recent lipid tests were reviewed and are normal. Pertinent negatives include no chest pain or shortness of breath. Current antihyperlipidemic treatment includes statins. Risk factors for coronary artery disease include hypertension, male sex, obesity, a sedentary lifestyle and dyslipidemia.     Review of Systems   Constitutional: Negative. Negative for malaise/fatigue and weight gain.   HENT: Negative.     Eyes: Negative.    Cardiovascular: Negative.  Negative for chest pain, leg swelling, orthopnea and palpitations.   Respiratory: Negative.  Negative for chest tightness and shortness of breath.    Endocrine: Negative.    Hematologic/Lymphatic: Negative.    Skin: Negative.    Musculoskeletal:  Negative for muscle weakness.    Gastrointestinal: Negative.    Genitourinary: Negative.    Neurological: Negative.  Negative for dizziness.   Psychiatric/Behavioral: Negative.     Allergic/Immunologic: Negative.    All other systems reviewed and are negative.  Family History   Problem Relation Age of Onset    Esophageal cancer Sister     Cancer Sister     Lung cancer Mother     Cancer Mother     Cataracts Mother     Hypertension Mother     Pneumonia Father     Cataracts Father     Hypertension Father     Cancer Brother     Hypertension Brother     Blindness Neg Hx     Diabetes Neg Hx     Glaucoma Neg Hx     Macular degeneration Neg Hx     Retinal detachment Neg Hx     Strabismus Neg Hx     Stroke Neg Hx     Thyroid disease Neg Hx      Past Medical History:   Diagnosis Date    Anemia     Arthritis     Atrial fibrillation     CAD (coronary artery disease)     Cataract     COPD (chronic obstructive pulmonary disease)     Diverticulosis     ED (erectile dysfunction)     HTN (hypertension)     Hyperlipidemia     Lung cancer     left    NSTEMI (non-ST elevated myocardial infarction) 2019    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 Courtney dwyer bx     Social History     Socioeconomic History    Marital status:     Number of children: 3   Occupational History    Occupation: retired   Tobacco Use    Smoking status: Former     Packs/day: 1.00     Years: 50.00     Pack years: 50.00     Types: Cigarettes     Quit date: 2005     Years since quittin.2    Smokeless tobacco: Never   Substance and Sexual Activity    Alcohol use: No    Drug use: No    Sexual activity: Not Currently     Current Outpatient Medications on File Prior to Visit   Medication Sig Dispense Refill    albuterol (PROVENTIL) 2.5 mg /3 mL (0.083 %) nebulizer solution Take 3 mLs (2.5 mg total) by nebulization every 6 (six) hours while awake. 270 mL 11    amLODIPine (NORVASC) 5 MG tablet TAKE 1 TABLET BY MOUTH EVERYDAY AT BEDTIME 90  tablet 3    ammonium lactate (LAC-HYDRIN) 12 % lotion Apply to damp skin after bathing 225 g 11    aspirin (ECOTRIN) 81 MG EC tablet Take 81 mg by mouth once daily.      doxepin (SINEQUAN) 10 MG capsule Take 1 capsule (10 mg total) by mouth every evening. For insomnia 90 capsule 3    fenofibric acid (FIBRICOR) 135 mg CpDR TAKE 1 CAPSULE (135 MG TOTAL) BY MOUTH ONCE DAILY. 90 capsule 3    fluticasone-umeclidin-vilanter (TRELEGY ELLIPTA) 200-62.5-25 mcg inhaler Inhale 1 puff into the lungs once daily. 180 each 3    levocetirizine (XYZAL) 5 MG tablet Take 5 mg by mouth as needed.       lisinopriL (PRINIVIL,ZESTRIL) 40 MG tablet TAKE 1 TABLET BY MOUTH EVERY DAY 90 tablet 3    ranolazine (RANEXA) 500 MG Tb12 TAKE 1 TABLET BY MOUTH TWICE A  tablet 3    simvastatin (ZOCOR) 80 MG tablet Take 1 tablet (80 mg total) by mouth once daily. 90 tablet 3    sotaloL (BETAPACE) 80 MG tablet TAKE 1 TABLET (80 MG TOTAL) BY MOUTH 2 (TWO) TIMES DAILY. 180 tablet 3     No current facility-administered medications on file prior to visit.     Review of patient's allergies indicates:   Allergen Reactions    Atorvastatin      Other reaction(s): Muscle pain    Bactrim [sulfamethoxazole-trimethoprim]      Lip swelling       Objective:     Physical Exam  Vitals and nursing note reviewed.   Constitutional:       General: He is not in acute distress.     Appearance: He is well-developed. He is not ill-appearing.   HENT:      Head: Normocephalic and atraumatic.   Eyes:      Conjunctiva/sclera: Conjunctivae normal.      Pupils: Pupils are equal, round, and reactive to light.   Cardiovascular:      Rate and Rhythm: Normal rate and regular rhythm.      Pulses: Normal pulses and intact distal pulses.      Heart sounds: Normal heart sounds. No murmur heard.  Pulmonary:      Effort: Pulmonary effort is normal. No accessory muscle usage, prolonged expiration or respiratory distress.      Breath sounds: Examination of the left-upper field reveals  decreased breath sounds. Examination of the left-middle field reveals decreased breath sounds. Examination of the left-lower field reveals decreased breath sounds. Decreased breath sounds (s/p L pneumonectomy) present.   Abdominal:      General: Bowel sounds are normal.      Palpations: Abdomen is soft.   Musculoskeletal:         General: Normal range of motion.      Cervical back: Normal range of motion and neck supple.   Skin:     General: Skin is warm and dry.   Neurological:      Mental Status: He is alert and oriented to person, place, and time.     Assessment:     1. Coronary artery disease of native artery of native heart with stable angina pectoris    2. Mixed hyperlipidemia    3. Essential hypertension    4. Paroxysmal atrial fibrillation    5. PVD (peripheral vascular disease)    6. Stage 3b chronic kidney disease    7. Panlobular emphysema        Plan:     Coronary artery disease of native artery of native heart with stable angina pectoris    Mixed hyperlipidemia    Essential hypertension    Paroxysmal atrial fibrillation    PVD (peripheral vascular disease)    Stage 3b chronic kidney disease    Panlobular emphysema    Continue lisinopril, lasix (prn), norvasc -htn  Continue asa, sotalol- PAF, much bruising on eliquis  Continue ranexa- cad  Continue statin, fenofibrate-hlp

## 2022-12-22 ENCOUNTER — HOSPITAL ENCOUNTER (OUTPATIENT)
Dept: RADIOLOGY | Facility: HOSPITAL | Age: 82
Discharge: HOME | End: 2022-12-22
Attending: NURSE PRACTITIONER
Payer: MEDICARE

## 2022-12-22 DIAGNOSIS — R91.8 OPACITY OF LUNG ON IMAGING STUDY: ICD-10-CM

## 2022-12-22 PROCEDURE — 71250 CT THORAX DX C-: CPT | Mod: TC,PN

## 2023-01-04 ENCOUNTER — TELEPHONE (OUTPATIENT)
Dept: HEMATOLOGY/ONCOLOGY | Facility: CLINIC | Age: 83
End: 2023-01-04

## 2023-01-04 ENCOUNTER — OFFICE VISIT (OUTPATIENT)
Dept: HEMATOLOGY/ONCOLOGY | Facility: CLINIC | Age: 83
End: 2023-01-04
Payer: MEDICARE

## 2023-01-04 ENCOUNTER — LAB VISIT (OUTPATIENT)
Dept: LAB | Facility: HOSPITAL | Age: 83
End: 2023-01-04
Attending: INTERNAL MEDICINE
Payer: MEDICARE

## 2023-01-04 VITALS
OXYGEN SATURATION: 90 % | HEIGHT: 70 IN | WEIGHT: 212.75 LBS | BODY MASS INDEX: 30.46 KG/M2 | DIASTOLIC BLOOD PRESSURE: 59 MMHG | HEART RATE: 62 BPM | TEMPERATURE: 98 F | SYSTOLIC BLOOD PRESSURE: 112 MMHG

## 2023-01-04 DIAGNOSIS — C34.12 MALIGNANT NEOPLASM OF UPPER LOBE OF LEFT LUNG: ICD-10-CM

## 2023-01-04 DIAGNOSIS — R91.1 NODULE OF RIGHT LUNG: ICD-10-CM

## 2023-01-04 DIAGNOSIS — R74.01 TRANSAMINITIS: Primary | ICD-10-CM

## 2023-01-04 DIAGNOSIS — J44.9 CHRONIC OBSTRUCTIVE PULMONARY DISEASE, UNSPECIFIED COPD TYPE: ICD-10-CM

## 2023-01-04 DIAGNOSIS — D53.9 NUTRITIONAL ANEMIA, UNSPECIFIED: ICD-10-CM

## 2023-01-04 LAB
ALBUMIN SERPL BCP-MCNC: 3.4 G/DL (ref 3.5–5.2)
ALP SERPL-CCNC: 61 U/L (ref 55–135)
ALT SERPL W/O P-5'-P-CCNC: 83 U/L (ref 10–44)
ANION GAP SERPL CALC-SCNC: 10 MMOL/L (ref 8–16)
AST SERPL-CCNC: 78 U/L (ref 10–40)
BASOPHILS # BLD AUTO: 0.05 K/UL (ref 0–0.2)
BASOPHILS NFR BLD: 0.7 % (ref 0–1.9)
BILIRUB SERPL-MCNC: 0.4 MG/DL (ref 0.1–1)
BUN SERPL-MCNC: 25 MG/DL (ref 8–23)
CALCIUM SERPL-MCNC: 9.2 MG/DL (ref 8.7–10.5)
CHLORIDE SERPL-SCNC: 105 MMOL/L (ref 95–110)
CO2 SERPL-SCNC: 29 MMOL/L (ref 23–29)
CREAT SERPL-MCNC: 1.5 MG/DL (ref 0.5–1.4)
DIFFERENTIAL METHOD: ABNORMAL
EOSINOPHIL # BLD AUTO: 1.6 K/UL (ref 0–0.5)
EOSINOPHIL NFR BLD: 24.6 % (ref 0–8)
ERYTHROCYTE [DISTWIDTH] IN BLOOD BY AUTOMATED COUNT: 13.9 % (ref 11.5–14.5)
EST. GFR  (NO RACE VARIABLE): 46 ML/MIN/1.73 M^2
GLUCOSE SERPL-MCNC: 127 MG/DL (ref 70–110)
HCT VFR BLD AUTO: 39.5 % (ref 40–54)
HGB BLD-MCNC: 12 G/DL (ref 14–18)
IMM GRANULOCYTES # BLD AUTO: 0.02 K/UL (ref 0–0.04)
IMM GRANULOCYTES NFR BLD AUTO: 0.3 % (ref 0–0.5)
LYMPHOCYTES # BLD AUTO: 0.8 K/UL (ref 1–4.8)
LYMPHOCYTES NFR BLD: 12.4 % (ref 18–48)
MCH RBC QN AUTO: 31.1 PG (ref 27–31)
MCHC RBC AUTO-ENTMCNC: 30.4 G/DL (ref 32–36)
MCV RBC AUTO: 102 FL (ref 82–98)
MONOCYTES # BLD AUTO: 0.4 K/UL (ref 0.3–1)
MONOCYTES NFR BLD: 6.6 % (ref 4–15)
NEUTROPHILS # BLD AUTO: 3.7 K/UL (ref 1.8–7.7)
NEUTROPHILS NFR BLD: 55.4 % (ref 38–73)
NRBC BLD-RTO: 0 /100 WBC
PLATELET # BLD AUTO: 163 K/UL (ref 150–450)
PMV BLD AUTO: 11 FL (ref 9.2–12.9)
POTASSIUM SERPL-SCNC: 5.2 MMOL/L (ref 3.5–5.1)
PROT SERPL-MCNC: 7.1 G/DL (ref 6–8.4)
RBC # BLD AUTO: 3.86 M/UL (ref 4.6–6.2)
SODIUM SERPL-SCNC: 144 MMOL/L (ref 136–145)
WBC # BLD AUTO: 6.68 K/UL (ref 3.9–12.7)

## 2023-01-04 PROCEDURE — 99999 PR PBB SHADOW E&M-EST. PATIENT-LVL III: CPT | Mod: PBBFAC,,, | Performed by: INTERNAL MEDICINE

## 2023-01-04 PROCEDURE — 3078F DIAST BP <80 MM HG: CPT | Mod: CPTII,S$GLB,, | Performed by: INTERNAL MEDICINE

## 2023-01-04 PROCEDURE — 1101F PR PT FALLS ASSESS DOC 0-1 FALLS W/OUT INJ PAST YR: ICD-10-PCS | Mod: CPTII,S$GLB,, | Performed by: INTERNAL MEDICINE

## 2023-01-04 PROCEDURE — 99214 OFFICE O/P EST MOD 30 MIN: CPT | Mod: S$GLB,,, | Performed by: INTERNAL MEDICINE

## 2023-01-04 PROCEDURE — 1126F PR PAIN SEVERITY QUANTIFIED, NO PAIN PRESENT: ICD-10-PCS | Mod: CPTII,S$GLB,, | Performed by: INTERNAL MEDICINE

## 2023-01-04 PROCEDURE — 1126F AMNT PAIN NOTED NONE PRSNT: CPT | Mod: CPTII,S$GLB,, | Performed by: INTERNAL MEDICINE

## 2023-01-04 PROCEDURE — 1159F PR MEDICATION LIST DOCUMENTED IN MEDICAL RECORD: ICD-10-PCS | Mod: CPTII,S$GLB,, | Performed by: INTERNAL MEDICINE

## 2023-01-04 PROCEDURE — 99999 PR PBB SHADOW E&M-EST. PATIENT-LVL III: ICD-10-PCS | Mod: PBBFAC,,, | Performed by: INTERNAL MEDICINE

## 2023-01-04 PROCEDURE — 36415 COLL VENOUS BLD VENIPUNCTURE: CPT | Performed by: INTERNAL MEDICINE

## 2023-01-04 PROCEDURE — 1101F PT FALLS ASSESS-DOCD LE1/YR: CPT | Mod: CPTII,S$GLB,, | Performed by: INTERNAL MEDICINE

## 2023-01-04 PROCEDURE — 3074F PR MOST RECENT SYSTOLIC BLOOD PRESSURE < 130 MM HG: ICD-10-PCS | Mod: CPTII,S$GLB,, | Performed by: INTERNAL MEDICINE

## 2023-01-04 PROCEDURE — 3288F FALL RISK ASSESSMENT DOCD: CPT | Mod: CPTII,S$GLB,, | Performed by: INTERNAL MEDICINE

## 2023-01-04 PROCEDURE — 99214 PR OFFICE/OUTPT VISIT, EST, LEVL IV, 30-39 MIN: ICD-10-PCS | Mod: S$GLB,,, | Performed by: INTERNAL MEDICINE

## 2023-01-04 PROCEDURE — 3074F SYST BP LT 130 MM HG: CPT | Mod: CPTII,S$GLB,, | Performed by: INTERNAL MEDICINE

## 2023-01-04 PROCEDURE — 85025 COMPLETE CBC W/AUTO DIFF WBC: CPT | Performed by: INTERNAL MEDICINE

## 2023-01-04 PROCEDURE — 3288F PR FALLS RISK ASSESSMENT DOCUMENTED: ICD-10-PCS | Mod: CPTII,S$GLB,, | Performed by: INTERNAL MEDICINE

## 2023-01-04 PROCEDURE — 3078F PR MOST RECENT DIASTOLIC BLOOD PRESSURE < 80 MM HG: ICD-10-PCS | Mod: CPTII,S$GLB,, | Performed by: INTERNAL MEDICINE

## 2023-01-04 PROCEDURE — 1159F MED LIST DOCD IN RCRD: CPT | Mod: CPTII,S$GLB,, | Performed by: INTERNAL MEDICINE

## 2023-01-04 PROCEDURE — 80053 COMPREHEN METABOLIC PANEL: CPT | Performed by: INTERNAL MEDICINE

## 2023-01-04 NOTE — PROGRESS NOTES
Subjective:      DATE OF VISIT: 1/4/2023   ?   ?   Patient ID:?Chai Murry is a 82 y.o. male.?? MR#: [id number]   ?   PRIMARY PROVIDER:  Dr. Vila -> Dr. Casper  ?   CHIEF COMPLAINT:  Follow-up?????   ?   ONCOLOGIC DIAGNOSIS:  Recurrent squamous cell carcinoma, with metastatic disease involving the trachea  ?   CURRENT TREATMENT:  Surveillance    PAST TREATMENT:  Chemoradiation 2016; then with recurrence chemoradiation and immunotherapy durvalumab, completed 9/2020    HPI  I had the pleasure seeing in follow-up Mr. Murry.     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  He developed hoarseness in 2019 found to have PET avid mass left of trachea consistent with recurrent squamous cell carcinoma.  He underwent chemoradiation with cisplatin and completed 6 weekly treatments  with good response then immunotherapy maintenance with durvalumab completed 09/08/2020.  Restaging scan 11/30/2020 with his primary doctor Cadence showed no FDG avidity  to suggest recurrent or metastatic disease.    INTERVAL EVENTS    He has stable dyspnea on exertion.  He is not had follow-up with Pulmonary since September 2022.  He notes some weight gain.  No new chest pain shortness of breath or progressive dyspnea hemoptysis hematemesis or other new concerns.      Review of Systems    ?   A comprehensive 14-point review of systems was reviewed with patient and was negative other than as specified above.   ?     Objective:      Physical Exam       ?   Vitals:    01/04/23 0928   BP: (!) 112/59   Pulse: 62   Temp: 97.8 °F (36.6 °C)      ?   ECOG:?0   General appearance: Generally well appearing, in no acute distress.   Head, eyes, ears, nose, and throat: moist mucous membranes.   Respiratory:  Normal work of breathing  Abdomen: nontender, nondistended.   Extremities: Warm, without edema.   Neurologic: Alert and oriented. Grossly  normal strength, coordination, and gait.   Skin: No rashes, ecchymoses or petechial lesion.   Psychiatric:  Normal mood and affect.    Laboratory:  ?   Lab Visit on 01/04/2023   Component Date Value Ref Range Status    WBC 01/04/2023 6.68  3.90 - 12.70 K/uL Final    RBC 01/04/2023 3.86 (L)  4.60 - 6.20 M/uL Final    Hemoglobin 01/04/2023 12.0 (L)  14.0 - 18.0 g/dL Final    Hematocrit 01/04/2023 39.5 (L)  40.0 - 54.0 % Final    MCV 01/04/2023 102 (H)  82 - 98 fL Final    MCH 01/04/2023 31.1 (H)  27.0 - 31.0 pg Final    MCHC 01/04/2023 30.4 (L)  32.0 - 36.0 g/dL Final    RDW 01/04/2023 13.9  11.5 - 14.5 % Final    Platelets 01/04/2023 163  150 - 450 K/uL Final    MPV 01/04/2023 11.0  9.2 - 12.9 fL Final    Immature Granulocytes 01/04/2023 0.3  0.0 - 0.5 % Final    Gran # (ANC) 01/04/2023 3.7  1.8 - 7.7 K/uL Final    Immature Grans (Abs) 01/04/2023 0.02  0.00 - 0.04 K/uL Final    Lymph # 01/04/2023 0.8 (L)  1.0 - 4.8 K/uL Final    Mono # 01/04/2023 0.4  0.3 - 1.0 K/uL Final    Eos # 01/04/2023 1.6 (H)  0.0 - 0.5 K/uL Final    Baso # 01/04/2023 0.05  0.00 - 0.20 K/uL Final    nRBC 01/04/2023 0  0 /100 WBC Final    Gran % 01/04/2023 55.4  38.0 - 73.0 % Final    Lymph % 01/04/2023 12.4 (L)  18.0 - 48.0 % Final    Mono % 01/04/2023 6.6  4.0 - 15.0 % Final    Eosinophil % 01/04/2023 24.6 (H)  0.0 - 8.0 % Final    Basophil % 01/04/2023 0.7  0.0 - 1.9 % Final    Differential Method 01/04/2023 Automated   Final    Sodium 01/04/2023 144  136 - 145 mmol/L Final    Potassium 01/04/2023 5.2 (H)  3.5 - 5.1 mmol/L Final    Chloride 01/04/2023 105  95 - 110 mmol/L Final    CO2 01/04/2023 29  23 - 29 mmol/L Final    Glucose 01/04/2023 127 (H)  70 - 110 mg/dL Final    BUN 01/04/2023 25 (H)  8 - 23 mg/dL Final    Creatinine 01/04/2023 1.5 (H)  0.5 - 1.4 mg/dL Final    Calcium 01/04/2023 9.2  8.7 - 10.5 mg/dL Final    Total Protein 01/04/2023 7.1  6.0 - 8.4 g/dL Final    Albumin 01/04/2023 3.4 (L)  3.5 - 5.2 g/dL Final    Total Bilirubin  01/04/2023 0.4  0.1 - 1.0 mg/dL Final    Alkaline Phosphatase 01/04/2023 61  55 - 135 U/L Final    AST 01/04/2023 78 (H)  10 - 40 U/L Final    ALT 01/04/2023 83 (H)  10 - 44 U/L Final    Anion Gap 01/04/2023 10  8 - 16 mmol/L Final    eGFR 01/04/2023 46 (A)  >60 mL/min/1.73 m^2 Final      ?   Results for orders placed or performed during the hospital encounter of 10/20/22 (from the past 2160 hour(s))   NM PET CT Routine FDG    Impression    1. No suspicious FDG avid lesions demonstrated.  2. No elevated FDG uptake seen associated with previously described pulmonary opacities, noting that some of these nodular densities may be below size threshold for PET.  Continued follow-up is recommended.  3. Interval healing of previously described right 11th rib fracture with resolution of associated FDG uptake.  4. Other stable findings as above.      Electronically signed by: Michael Degroot MD  Date:    10/20/2022  Time:    14:49     *Note: Due to a large number of results and/or encounters for the requested time period, some results have not been displayed. A complete set of results can be found in Results Review.       IMAGING     Results for orders placed or performed during the hospital encounter of 12/22/22 (from the past 2160 hour(s))   CT Chest Without Contrast    Impression    Nodular thickening along the right minor and major fissure could represent scaring, focal infiltrate or mass.  Continued surveillance recommended.    5.3 mm nodule right lower lobe seq 4 image 290.  Overall findings are relatively stable compared to prior exam.  Recommend 3 month follow up CT.    All CT scans at this facility use dose modulation, iterative reconstruction and/or weight based dosing when appropriate to reduce radiation dose to as low as reasonably achievable.      Electronically signed by: Chandra Zheng MD  Date:    12/22/2022  Time:    11:07     *Note: Due to a large number of results and/or encounters for the requested time  period, some results have not been displayed. A complete set of results can be found in Results Review.       ?   Assessment/Plan:       1. Transaminitis    2. Nutritional anemia, unspecified     3. Nodule of right lung    4. Malignant neoplasm of upper lobe of left lung    5. Chronic obstructive pulmonary disease, unspecified COPD type              Plan:     # recurrent/metastatic squamous cell carcinoma:  Istatus post chemoradiation in November 2020 completed maintenance durvalumab.  November 2020 PET-CT with Dr. Vila without evidence of recurrent/metastatic disease resolution of prior avidity in left trachea.  MRI brain with chronic microvascular changes/atrophy no evidence of metastatic disease.   He has been in surveillance with staging PET scan October 2022 and short interval CT scan January 2023 reviewed with him today with some thickening along right major minor fissure and subcentimeter pulmonary nodule overall stability over last several months.  No new concerning symptoms but does have baseline dyspnea on exertion recommend follow-up with Pulmonary.  Recommend continued surveillance of these CT abnormalities albeit stable recently.  Next scan in 6 months.      Transaminitis:  On review of labs incidental finding of transaminitis repeated labs today showing improvement.  Recommend follow-up with primary care.      Anemia: Relatively stable anemia.  No paraprotein.  Noted to have free light chain ratio abnormality relatively similar to prior values 2 years ago.  No liver function test abnormality.  No new constitutional symptoms.  In light of above concern for recurrent squamous cell carcinoma will recommend monitoring for this at this time.      Follow-Up:   Route Chart for Scheduling    Med Onc Chart Routing      Follow up with physician 6 months. ct chest and labs prior   Follow up with [facility]    Infusion scheduling note    Injection scheduling note    Labs CBC and CMP   Lab interval:  today and in 6 mo    Imaging   Ct chest w/ contrast   Pharmacy appointment    Other referrals

## 2023-01-04 NOTE — TELEPHONE ENCOUNTER
----- Message from Sierra Casper MD sent at 1/4/2023 11:30 AM CST -----  Please call patient to let him know results of lab work shows liver tests are still abnormal although improved from October.  I recommend he follow-up with his primary care about this.  ----- Message -----  From: [facility]  Sent: 1/4/2023  10:32 AM CST  To: Sierra Casper MD

## 2023-01-05 ENCOUNTER — TELEPHONE (OUTPATIENT)
Dept: HEMATOLOGY/ONCOLOGY | Facility: CLINIC | Age: 83
End: 2023-01-05
Payer: MEDICARE

## 2023-01-05 NOTE — TELEPHONE ENCOUNTER
----- Message from Sierra Casper MD sent at 1/4/2023 11:30 AM CST -----  Please call patient to let him know results of lab work shows liver tests are still abnormal although improved from October.  I recommend he follow-up with his primary care about this.  ----- Message -----  From: [facility]  Sent: 1/4/2023  10:32 AM CST  To: Sierra Casper MD

## 2023-01-15 ENCOUNTER — OFFICE VISIT (OUTPATIENT)
Dept: URGENT CARE | Facility: CLINIC | Age: 83
End: 2023-01-15
Payer: MEDICARE

## 2023-01-15 ENCOUNTER — HOSPITAL ENCOUNTER (OUTPATIENT)
Dept: RADIOLOGY | Facility: CLINIC | Age: 83
Discharge: HOME | End: 2023-01-15
Payer: MEDICARE

## 2023-01-15 VITALS
RESPIRATION RATE: 20 BRPM | HEART RATE: 67 BPM | HEIGHT: 70 IN | SYSTOLIC BLOOD PRESSURE: 160 MMHG | WEIGHT: 212 LBS | TEMPERATURE: 98 F | DIASTOLIC BLOOD PRESSURE: 70 MMHG | OXYGEN SATURATION: 95 % | BODY MASS INDEX: 30.35 KG/M2

## 2023-01-15 DIAGNOSIS — Z90.2 STATUS POST PNEUMONECTOMY: ICD-10-CM

## 2023-01-15 DIAGNOSIS — R06.02 SHORTNESS OF BREATH: ICD-10-CM

## 2023-01-15 DIAGNOSIS — J43.1 PANLOBULAR EMPHYSEMA: ICD-10-CM

## 2023-01-15 DIAGNOSIS — R91.1 NODULE OF RIGHT LUNG: ICD-10-CM

## 2023-01-15 DIAGNOSIS — C34.12 MALIGNANT NEOPLASM OF UPPER LOBE OF LEFT LUNG: ICD-10-CM

## 2023-01-15 DIAGNOSIS — J44.9 CHRONIC OBSTRUCTIVE PULMONARY DISEASE, UNSPECIFIED COPD TYPE: Primary | ICD-10-CM

## 2023-01-15 LAB
CTP QC/QA: YES
SARS-COV-2 AG RESP QL IA.RAPID: NEGATIVE

## 2023-01-15 PROCEDURE — 1126F AMNT PAIN NOTED NONE PRSNT: CPT | Mod: CPTII,S$GLB,,

## 2023-01-15 PROCEDURE — 3078F PR MOST RECENT DIASTOLIC BLOOD PRESSURE < 80 MM HG: ICD-10-PCS | Mod: CPTII,S$GLB,,

## 2023-01-15 PROCEDURE — 1126F PR PAIN SEVERITY QUANTIFIED, NO PAIN PRESENT: ICD-10-PCS | Mod: CPTII,S$GLB,,

## 2023-01-15 PROCEDURE — 1159F MED LIST DOCD IN RCRD: CPT | Mod: CPTII,S$GLB,,

## 2023-01-15 PROCEDURE — 99214 PR OFFICE/OUTPT VISIT, EST, LEVL IV, 30-39 MIN: ICD-10-PCS | Mod: 25,S$GLB,,

## 2023-01-15 PROCEDURE — 87811 SARS CORONAVIRUS 2 ANTIGEN POCT, MANUAL READ: ICD-10-PCS | Mod: QW,S$GLB,,

## 2023-01-15 PROCEDURE — 99214 OFFICE O/P EST MOD 30 MIN: CPT | Mod: 25,S$GLB,,

## 2023-01-15 PROCEDURE — 3077F SYST BP >= 140 MM HG: CPT | Mod: CPTII,S$GLB,,

## 2023-01-15 PROCEDURE — 87811 SARS-COV-2 COVID19 W/OPTIC: CPT | Mod: QW,S$GLB,,

## 2023-01-15 PROCEDURE — 71046 X-RAY EXAM CHEST 2 VIEWS: CPT | Mod: S$GLB,,, | Performed by: RADIOLOGY

## 2023-01-15 PROCEDURE — 1160F PR REVIEW ALL MEDS BY PRESCRIBER/CLIN PHARMACIST DOCUMENTED: ICD-10-PCS | Mod: CPTII,S$GLB,,

## 2023-01-15 PROCEDURE — 71046 XR CHEST PA AND LATERAL: ICD-10-PCS | Mod: S$GLB,,, | Performed by: RADIOLOGY

## 2023-01-15 PROCEDURE — 94640 PR INHAL RX, AIRWAY OBST/DX SPUTUM INDUCT: ICD-10-PCS | Mod: S$GLB,,,

## 2023-01-15 PROCEDURE — 1160F RVW MEDS BY RX/DR IN RCRD: CPT | Mod: CPTII,S$GLB,,

## 2023-01-15 PROCEDURE — 3077F PR MOST RECENT SYSTOLIC BLOOD PRESSURE >= 140 MM HG: ICD-10-PCS | Mod: CPTII,S$GLB,,

## 2023-01-15 PROCEDURE — 94640 AIRWAY INHALATION TREATMENT: CPT | Mod: S$GLB,,,

## 2023-01-15 PROCEDURE — 1159F PR MEDICATION LIST DOCUMENTED IN MEDICAL RECORD: ICD-10-PCS | Mod: CPTII,S$GLB,,

## 2023-01-15 PROCEDURE — 3078F DIAST BP <80 MM HG: CPT | Mod: CPTII,S$GLB,,

## 2023-01-15 RX ORDER — ALBUTEROL SULFATE 0.83 MG/ML
2.5 SOLUTION RESPIRATORY (INHALATION)
Status: COMPLETED | OUTPATIENT
Start: 2023-01-15 | End: 2023-01-15

## 2023-01-15 RX ORDER — PREDNISONE 20 MG/1
40 TABLET ORAL DAILY
Qty: 14 TABLET | Refills: 0 | Status: SHIPPED | OUTPATIENT
Start: 2023-01-15 | End: 2023-01-22

## 2023-01-15 RX ORDER — IPRATROPIUM BROMIDE 0.5 MG/2.5ML
0.5 SOLUTION RESPIRATORY (INHALATION)
Status: COMPLETED | OUTPATIENT
Start: 2023-01-15 | End: 2023-01-15

## 2023-01-15 RX ADMIN — ALBUTEROL SULFATE 2.5 MG: 0.83 SOLUTION RESPIRATORY (INHALATION) at 03:01

## 2023-01-15 RX ADMIN — IPRATROPIUM BROMIDE 0.5 MG: 0.5 SOLUTION RESPIRATORY (INHALATION) at 03:01

## 2023-01-15 NOTE — PROGRESS NOTES
"Subjective:       Patient ID: Chai Murry is a 82 y.o. male.    Vitals:  height is 5' 10" (1.778 m) and weight is 96.2 kg (212 lb). His tympanic temperature is 97.8 °F (36.6 °C). His blood pressure is 160/70 (abnormal) and his pulse is 67. His respiration is 20 and oxygen saturation is 95%.     Chief Complaint: Shortness of Breath    82-year-old male with past medical history of COPD, malignant neoplasm of upper left lobe, panlobular emphysema, nodule of the right lung, status post pneumonectomy, hypertension, AFib, PVD, stage III B chronic kidney disease presents with complaints of worsening shortness of breath.  Patient states that he suffers from shortness of breath chronically.  He states that he has been short of breath for the last few months and has seen all of his doctors.  Last COPD exacerbation was March of 2022.  Patient states that he gets short of breath with exertion, short distances like walking to the mailbox or picking something up.  He denies any worsening shortness of breath or cough.  Not on any ventilator.  Patient is supposed to be on home oxygen but does not use it except for at night or when he feels like he needs it.  Patient states that he recently had COVID about a few months ago and states that he saw something on the TV today that stated that he could still have COVID and wants to make sure that he does not have COVID still.  No runny nose, congestion, sore throat, chest pain, altered mental status, fever, chills or cough      Shortness of Breath  This is a new problem. The problem has been waxing and waning. Associated symptoms include sputum production. Pertinent negatives include no abdominal pain, chest pain, claudication, coryza, ear pain, fever, headaches, hemoptysis, leg pain, leg swelling, neck pain, orthopnea, PND, rash, rhinorrhea, sore throat, swollen glands, syncope, vomiting or wheezing. The symptoms are aggravated by any activity. Treatments tried: Trelegy. The treatment " provided mild relief. His past medical history is significant for CAD and COPD. There is no history of allergies, aspirin allergies, asthma, bronchiolitis, chronic lung disease, DVT, a heart failure, PE, pneumonia or a recent surgery.     Constitution: Negative for fever.   HENT:  Negative for ear pain and sore throat.    Neck: Negative for neck pain.   Cardiovascular:  Negative for chest pain, leg swelling and passing out.   Respiratory:  Positive for sputum production and shortness of breath. Negative for bloody sputum and wheezing.    Gastrointestinal:  Negative for abdominal pain and vomiting.   Skin:  Negative for rash.   Neurological:  Negative for headaches.     Objective:      Physical Exam   Constitutional: He is oriented to person, place, and time. He appears well-developed. He is cooperative.  Non-toxic appearance. He does not appear ill. No distress.   HENT:   Head: Normocephalic and atraumatic.   Ears:   Right Ear: Hearing, tympanic membrane, external ear and ear canal normal.   Left Ear: Hearing, tympanic membrane, external ear and ear canal normal.   Nose: Nose normal. No mucosal edema, rhinorrhea or nasal deformity. No epistaxis. Right sinus exhibits no maxillary sinus tenderness and no frontal sinus tenderness. Left sinus exhibits no maxillary sinus tenderness and no frontal sinus tenderness.   Mouth/Throat: Uvula is midline, oropharynx is clear and moist and mucous membranes are normal. No trismus in the jaw. Normal dentition. No uvula swelling. No posterior oropharyngeal erythema.   Eyes: Conjunctivae, EOM and lids are normal. Pupils are equal, round, and reactive to light. Right eye exhibits no discharge. Left eye exhibits no discharge. No scleral icterus.   Neck: Trachea normal and phonation normal. Neck supple.   Cardiovascular: Normal rate, regular rhythm, normal heart sounds and normal pulses.   Pulmonary/Chest: Effort normal and breath sounds normal. No respiratory distress. He has no  wheezes. He has no rhonchi. He has no rales.         Comments: Right lung clear to auscultation. Left lung decreased breath sounds throughout without crackles, wheezing or rhonchi which correlates with pneumonectomy.     Abdominal: Normal appearance and bowel sounds are normal. He exhibits no distension and no mass. Soft. There is no abdominal tenderness.   Musculoskeletal: Normal range of motion.         General: No deformity. Normal range of motion.   Neurological: He is alert and oriented to person, place, and time. He exhibits normal muscle tone. Coordination normal.   Skin: Skin is warm, dry, intact, not diaphoretic and not pale.   Psychiatric: His speech is normal and behavior is normal. Judgment and thought content normal.   Nursing note and vitals reviewed.      Assessment:       1. Chronic obstructive pulmonary disease, unspecified COPD type    2. Shortness of breath    3. Malignant neoplasm of upper lobe of left lung    4. Nodule of right lung    5. Panlobular emphysema    6. Status post pneumonectomy          Plan:       Chest xray: pneumonectomy of left lung. Right lower lobe opacity. Compared prior xrays which did show the right lower lobe opacification, patient has had repeat CT every 3 months which note pulmonary nodules of the right lung. 9/21, lower lobe nodule measuring 4mm, 12/22: 6mm nodule.       Chronic obstructive pulmonary disease, unspecified COPD type  -     predniSONE (DELTASONE) 20 MG tablet; Take 2 tablets (40 mg total) by mouth once daily. for 7 days  Dispense: 14 tablet; Refill: 0    Shortness of breath  -     XR CHEST PA AND LATERAL  -     SARS Coronavirus 2 Antigen, POCT Manual Read  -     albuterol nebulizer solution 2.5 mg  -     ipratropium 0.02 % nebulizer solution 0.5 mg    Malignant neoplasm of upper lobe of left lung    Nodule of right lung    Panlobular emphysema    Status post pneumonectomy           Medical Decision Making:   History:   Old Medical Records: I decided to  obtain old medical records.  Initial Assessment:   82-year-old male with past medical history of COPD, malignant neoplasm of upper left lobe, panlobular emphysema, nodule of the right lung, status post pneumonectomy, hypertension, AFib, PVD, stage III B chronic kidney disease presents with complaints of worsening shortness of breath.  Patient states that he suffers from shortness of breath chronically.  He states that he has been short of breath for the last few months and has seen all of his doctors.  Last COPD exacerbation was March of 2022.  Patient states that he gets short of breath with exertion, short distances like walking to the mailbox or picking something up.  He denies any worsening shortness of breath or cough.  Not on any ventilator.  Patient is supposed to be on home oxygen but does not use it except for at night or when he feels like he needs it.  Patient states that he recently had COVID about a few months ago and states that he saw something on the TV today that stated that he could still have COVID and wants to make sure that he does not have COVID still.  No runny nose, congestion, sore throat, chest pain, altered mental status, fever, chills or cough.      Patient short of breath with walking to the exam room. Initial SpO2 86% then improved to 90% with sitting immediately. After sitting and catching his breath, his O2 improved to 95% on room air.   Differential Diagnosis:   COPD exacerbation, asthma, Covid, Pneumonia, bronchitis  Clinical Tests:   Radiological Study: Ordered  Urgent Care Management:  I have ordered a COVID test and chest x-ray for the patient today.  His COVID test was negative.  Personally read and reviewed chest x-ray which did show pulmonary consolidation to the right lower lobe.  Reviewed previous CTs and chest x-rays from last year which did show a right nodule/opacity of the right lower lobe on chest x-ray from March 2022.  Patient had serial three-month chest CTs which  did show the pulmonary nodule has increased in size from originally 4 mm to 6 mm.  No new opacities or nodules, no pleural effusion or pulmonary consolidation.  Personally read the finalized x-ray report which did agree with my findings today.  I discussed the chest x-ray results with the patient.  Patient was given a DuoNeb treatment in clinic which did help his shortness of breath per the patient.  He had no wheezing or crackles of the right lung.    I personally reviewed multiple old medical record visits from his primary care which did show that if patient is not on his home oxygen he sats around 92% at rest and with exertion in the low 80s.  Patient does agree that this is normal for him.  His oxygen saturation did not drop below 80% with exertion in the clinic.  He did have a pulse ox walk test which did have SpO2 around 83% and improved with sitting to 93-95%.  Patient in no acute respiratory distress.  Patient denies any significant change shortness of breath as compared to the last few months.  No new cough, fever, chills or other URI symptoms.  I do not believe that this is a COPD exacerbation.  Patient does state that he just wanted to have repeat COVID testing to make sure that he did not have COVID any longer.  Last positive COVID test was 3 months ago per patient.  No productive cough.  I do not believe that patient needs antibiotic treatment at this time.  Will give oral prednisone 40 mg for a week for patient to take and instructed use nebulizer treatment as needed.  Follow-up encouraged with PCP and pulmonology.  Patient instructed to use his home oxygen more frequently.  ER precautions discussed.  Patient and wife verbalized understanding and agreed to treatment plan.

## 2023-01-18 ENCOUNTER — TELEPHONE (OUTPATIENT)
Dept: URGENT CARE | Facility: CLINIC | Age: 83
End: 2023-01-18
Payer: MEDICARE

## 2023-03-16 ENCOUNTER — OFFICE VISIT (OUTPATIENT)
Dept: PULMONOLOGY | Facility: CLINIC | Age: 83
End: 2023-03-16
Payer: MEDICARE

## 2023-03-16 VITALS
BODY MASS INDEX: 29.95 KG/M2 | OXYGEN SATURATION: 91 % | DIASTOLIC BLOOD PRESSURE: 64 MMHG | HEIGHT: 70 IN | RESPIRATION RATE: 18 BRPM | WEIGHT: 209.19 LBS | HEART RATE: 66 BPM | SYSTOLIC BLOOD PRESSURE: 110 MMHG

## 2023-03-16 DIAGNOSIS — G47.01 INSOMNIA DUE TO MEDICAL CONDITION: ICD-10-CM

## 2023-03-16 DIAGNOSIS — J92.0 ASBESTOS-INDUCED PLEURAL PLAQUE: ICD-10-CM

## 2023-03-16 DIAGNOSIS — J44.89 ASTHMA WITH COPD: ICD-10-CM

## 2023-03-16 DIAGNOSIS — R91.8 OPACITY OF LUNG ON IMAGING STUDY: ICD-10-CM

## 2023-03-16 DIAGNOSIS — R09.02 EXERCISE HYPOXEMIA: ICD-10-CM

## 2023-03-16 DIAGNOSIS — R91.1 SOLITARY PULMONARY NODULE: Primary | ICD-10-CM

## 2023-03-16 PROCEDURE — 3288F PR FALLS RISK ASSESSMENT DOCUMENTED: ICD-10-PCS | Mod: CPTII,S$GLB,, | Performed by: INTERNAL MEDICINE

## 2023-03-16 PROCEDURE — 3288F FALL RISK ASSESSMENT DOCD: CPT | Mod: CPTII,S$GLB,, | Performed by: INTERNAL MEDICINE

## 2023-03-16 PROCEDURE — 99215 OFFICE O/P EST HI 40 MIN: CPT | Mod: S$GLB,,, | Performed by: INTERNAL MEDICINE

## 2023-03-16 PROCEDURE — 3078F DIAST BP <80 MM HG: CPT | Mod: CPTII,S$GLB,, | Performed by: INTERNAL MEDICINE

## 2023-03-16 PROCEDURE — 1160F RVW MEDS BY RX/DR IN RCRD: CPT | Mod: CPTII,S$GLB,, | Performed by: INTERNAL MEDICINE

## 2023-03-16 PROCEDURE — 3078F PR MOST RECENT DIASTOLIC BLOOD PRESSURE < 80 MM HG: ICD-10-PCS | Mod: CPTII,S$GLB,, | Performed by: INTERNAL MEDICINE

## 2023-03-16 PROCEDURE — 1101F PR PT FALLS ASSESS DOC 0-1 FALLS W/OUT INJ PAST YR: ICD-10-PCS | Mod: CPTII,S$GLB,, | Performed by: INTERNAL MEDICINE

## 2023-03-16 PROCEDURE — 99999 PR PBB SHADOW E&M-EST. PATIENT-LVL III: ICD-10-PCS | Mod: PBBFAC,,, | Performed by: INTERNAL MEDICINE

## 2023-03-16 PROCEDURE — 3074F SYST BP LT 130 MM HG: CPT | Mod: CPTII,S$GLB,, | Performed by: INTERNAL MEDICINE

## 2023-03-16 PROCEDURE — 3074F PR MOST RECENT SYSTOLIC BLOOD PRESSURE < 130 MM HG: ICD-10-PCS | Mod: CPTII,S$GLB,, | Performed by: INTERNAL MEDICINE

## 2023-03-16 PROCEDURE — 99999 PR PBB SHADOW E&M-EST. PATIENT-LVL III: CPT | Mod: PBBFAC,,, | Performed by: INTERNAL MEDICINE

## 2023-03-16 PROCEDURE — 1159F PR MEDICATION LIST DOCUMENTED IN MEDICAL RECORD: ICD-10-PCS | Mod: CPTII,S$GLB,, | Performed by: INTERNAL MEDICINE

## 2023-03-16 PROCEDURE — 1159F MED LIST DOCD IN RCRD: CPT | Mod: CPTII,S$GLB,, | Performed by: INTERNAL MEDICINE

## 2023-03-16 PROCEDURE — 99215 PR OFFICE/OUTPT VISIT, EST, LEVL V, 40-54 MIN: ICD-10-PCS | Mod: S$GLB,,, | Performed by: INTERNAL MEDICINE

## 2023-03-16 PROCEDURE — 1101F PT FALLS ASSESS-DOCD LE1/YR: CPT | Mod: CPTII,S$GLB,, | Performed by: INTERNAL MEDICINE

## 2023-03-16 PROCEDURE — 1160F PR REVIEW ALL MEDS BY PRESCRIBER/CLIN PHARMACIST DOCUMENTED: ICD-10-PCS | Mod: CPTII,S$GLB,, | Performed by: INTERNAL MEDICINE

## 2023-03-16 RX ORDER — FLUTICASONE FUROATE, UMECLIDINIUM BROMIDE AND VILANTEROL TRIFENATATE 200; 62.5; 25 UG/1; UG/1; UG/1
1 POWDER RESPIRATORY (INHALATION) DAILY
Qty: 180 EACH | Refills: 3 | Status: SHIPPED | OUTPATIENT
Start: 2023-03-16 | End: 2023-08-17 | Stop reason: SDUPTHER

## 2023-03-16 RX ORDER — DOXEPIN HYDROCHLORIDE 10 MG/1
10 CAPSULE ORAL NIGHTLY
Qty: 90 CAPSULE | Refills: 3 | Status: SHIPPED | OUTPATIENT
Start: 2023-03-16 | End: 2024-02-27

## 2023-03-16 NOTE — PROGRESS NOTES
Subjective:      Patient ID: Chai Murry is a 82 y.o. male.    Chief Complaint: COPD      HPI         Lung Nodule  He presents for evaluation and treatment of a lung nodule. The patient had an abnormal imaging study but reports experiencing no current or past pulmonary symptoms. Other symptoms include dyspnea on exertion and non productive cough. He has a history of 50 pack years. The patient has no known exposure to tuberculosis. The patient does have a history of  lung cancer.    Follow up COPD.Squamous cell carcinoma of the lung status post left pneumonectomy and adjuvant chemoradiation completed 09/27/2016. Noted voice hoarseness in 2019 found to have PET avid mass left of trachea consistent with recurrent squamous cell carcinoma. Following the oncology.   Doing well with Trelegy.   Chronic CARVAJAL on oxygen therapy. Benefits from oxygen use.       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  Toxic Fume Exposure: chlorine a few times        Patient Active Problem List   Diagnosis    Coronary artery disease of native artery of native heart with stable angina pectoris    Hyperlipidemia    Essential hypertension    Anemia    ED (erectile dysfunction)    Cataract    Amblyopia    Chronic obstructive pulmonary disease    Malignant neoplasm of upper lobe of left lung    s/p left pnuemonectomy for [facility]    Asbestos-induced pleural plaque    Reactive depression    Insomnia    Paralysis of vocal cords and larynx, unilateral    Tracheal mass: 3 cm BELOW VOCAL CORDS: SUBGLOTIC    Abnormal echocardiogram    Leg edema    Chemotherapy management, encounter for    Paroxysmal atrial fibrillation    PVD (peripheral vascular disease)    Stage 3b chronic kidney disease    Class 1 obesity due to excess calories with serious  "comorbidity and body mass index (BMI) of 31.0 to 31.9 in adult       /64   Pulse 66   Resp 18   Ht 5' 10" (1.778 m)   Wt 94.9 kg (209 lb 3.5 oz)   SpO2 (!) 91%   BMI 30.02 kg/m²   Body mass index is 30.02 kg/m².    Review of Systems   Constitutional: Negative.    HENT: Negative.     Respiratory:  Positive for dyspnea on extertion.    Cardiovascular:  Positive for leg swelling.   Gastrointestinal: Negative.    Psychiatric/Behavioral: Negative.     All other systems reviewed and are negative.  Objective:      Physical Exam  Constitutional:       Appearance: Normal appearance.   HENT:      Head: Normocephalic and atraumatic.      Nose: Nose normal.      Mouth/Throat:      Pharynx: Oropharynx is clear.   Cardiovascular:      Rate and Rhythm: Normal rate and regular rhythm.   Pulmonary:      Breath sounds: Normal breath sounds.      Comments: Absent BS Left  Abdominal:      Palpations: Abdomen is soft.   Musculoskeletal:      Right lower leg: Edema present.      Left lower leg: Edema present.   Neurological:      General: No focal deficit present.      Mental Status: He is alert and oriented to person, place, and time.   Psychiatric:         Mood and Affect: Mood normal.         Behavior: Behavior normal.     Personal Diagnostic test            Phase Oxygen Assessment Supplemental O2 Heart   Rate Blood Pressure Jimenez Dyspnea Scale Rating   Resting 95 % Room Air 62 bpm 159/67 0   Exercise             Minute             1 88 % Room Air 79 bpm       2 86 % 2 L/M 85 bpm       3 87 % 3 L/M 93 bpm       4 93 % 4 L/M 93 bpm       5 91 % 4 L/M 99 bpm       6  91 % 4 L/M 96 bpm 158/71 4   Recovery             Minute             1 92 % 4 L/M 90 bpm       2 99 % 4 L/M 75 bpm       3 100 % 4 L/M 62 bpm       4 100 % 4 L/M 63 bpm 144/66 3         Results for orders placed during the hospital encounter of 09/14/22    X-Ray Chest PA And Lateral    Narrative  EXAMINATION:  XR CHEST PA AND LATERAL    CLINICAL HISTORY:  SOB; " Chronic obstructive pulmonary disease, unspecified    TECHNIQUE:  PA and lateral views of the chest were performed.    COMPARISON:  Prior radiographs    FINDINGS:  Right-sided MediPort removed.  There is persistent opacification of the left hemithorax consistent with prior pneumonectomy.  Expected leftward shift cardiomediastinal structures again noted.  Right lung demonstrates possible developing nodular opacity at the lateral base.  No large pleural effusion.  CT chest recommended.    Impression  As above.  This report was flagged in Epic as abnormal.      Electronically signed by: Giovani Nicole MD  Date:    09/14/2022  Time:    10:31        Assessment:       1. Solitary pulmonary nodule    2. Opacity of lung on imaging study    3. Asbestos-induced pleural plaque    4. Asthma with COPD    5. Insomnia due to medical condition    6. Exercise hypoxemia        Outpatient Encounter Medications as of 3/16/2023   Medication Sig Dispense Refill    amLODIPine (NORVASC) 5 MG tablet TAKE 1 TABLET BY MOUTH EVERYDAY AT BEDTIME 90 tablet 3    ammonium lactate (LAC-HYDRIN) 12 % lotion Apply to damp skin after bathing 225 g 11    aspirin (ECOTRIN) 81 MG EC tablet Take 81 mg by mouth once daily.      fenofibric acid (FIBRICOR) 135 mg CpDR TAKE 1 CAPSULE (135 MG TOTAL) BY MOUTH ONCE DAILY. 90 capsule 3    levocetirizine (XYZAL) 5 MG tablet Take 5 mg by mouth as needed.       lisinopriL (PRINIVIL,ZESTRIL) 40 MG tablet TAKE 1 TABLET BY MOUTH EVERY DAY 90 tablet 3    ranolazine (RANEXA) 500 MG Tb12 TAKE 1 TABLET BY MOUTH TWICE A  tablet 3    simvastatin (ZOCOR) 80 MG tablet Take 1 tablet (80 mg total) by mouth once daily. 90 tablet 3    sotaloL (BETAPACE) 80 MG tablet TAKE 1 TABLET (80 MG TOTAL) BY MOUTH 2 (TWO) TIMES DAILY. 180 tablet 3    [DISCONTINUED] doxepin (SINEQUAN) 10 MG capsule Take 1 capsule (10 mg total) by mouth every evening. For insomnia 90 capsule 3    [DISCONTINUED] fluticasone-umeclidin-vilanter (TRELEGY  "ELLIPTA) 200-62.5-25 mcg inhaler Inhale 1 puff into the lungs once daily. 180 each 3    albuterol (PROVENTIL) 2.5 mg /3 mL (0.083 %) nebulizer solution Take 3 mLs (2.5 mg total) by nebulization every 6 (six) hours while awake. 270 mL 11    doxepin (SINEQUAN) 10 MG capsule Take 1 capsule (10 mg total) by mouth every evening. For insomnia 90 capsule 3    fluticasone-umeclidin-vilanter (TRELEGY ELLIPTA) 200-62.5-25 mcg inhaler Inhale 1 puff into the lungs once daily. 180 each 3     No facility-administered encounter medications on file as of 3/16/2023.     Orders Placed This Encounter   Procedures    [facility] - OTHER     [facility] for CPAP/Oxygen/Nebulizer supplies.  Customer Service: [phone]  Call: [phone]  Fax: [phone]  Billing Inquiries: [phone] or [phone]     Order Specific Question:   Type of Equipment:     Answer:   nasal cannula     Order Specific Question:   Height:     Answer:   5' 10" (1.778 m)     Order Specific Question:   Weight:     Answer:   94.9 kg (209 lb 3.5 oz)    CT Chest Without Contrast     Standing Status:   Future     Standing Expiration Date:   3/16/2024     Order Specific Question:   May the Radiologist modify the order per protocol to meet the clinical needs of the patient?     Answer:   Yes    Six Minute Walk Test to qualify for Home Oxygen     Standing Status:   Future     Standing Expiration Date:   3/16/2024     Order Specific Question:   Release to patient     Answer:   Immediate     Plan:   Possible developing nodular opacity at the right lateral base.  CT chest     Problem List Items Addressed This Visit       Asbestos-induced pleural plaque    Relevant Orders    CT Chest Without Contrast    Six Minute Walk Test to qualify for Home Oxygen    Insomnia    Relevant Medications    doxepin (SINEQUAN) 10 MG capsule     Other Visit Diagnoses       Solitary pulmonary nodule    -  Primary    Relevant Orders    CT Chest Without Contrast    Six Minute Walk Test to " qualify for Home Oxygen    Opacity of lung on imaging study        Relevant Orders    CT Chest Without Contrast    Six Minute Walk Test to qualify for Home Oxygen    Asthma with COPD        Relevant Medications    fluticasone-umeclidin-vilanter (TRELEGY ELLIPTA) 200-62.5-25 mcg inhaler    Exercise hypoxemia        Relevant Orders    [facility] - OTHER        Continue oxygen therapy. Benefits from use.    Time spent 40 minutes

## 2023-03-27 ENCOUNTER — LAB VISIT (OUTPATIENT)
Dept: LAB | Facility: HOSPITAL | Age: 83
End: 2023-03-27
Attending: INTERNAL MEDICINE
Payer: MEDICARE

## 2023-03-27 ENCOUNTER — OFFICE VISIT (OUTPATIENT)
Dept: INTERNAL MEDICINE | Facility: CLINIC | Age: 83
End: 2023-03-27
Payer: MEDICARE

## 2023-03-27 VITALS
HEIGHT: 70 IN | DIASTOLIC BLOOD PRESSURE: 60 MMHG | OXYGEN SATURATION: 93 % | TEMPERATURE: 96 F | HEART RATE: 66 BPM | BODY MASS INDEX: 29.38 KG/M2 | SYSTOLIC BLOOD PRESSURE: 128 MMHG | WEIGHT: 205.25 LBS

## 2023-03-27 DIAGNOSIS — E78.2 MIXED HYPERLIPIDEMIA: ICD-10-CM

## 2023-03-27 DIAGNOSIS — J43.1 PANLOBULAR EMPHYSEMA: ICD-10-CM

## 2023-03-27 DIAGNOSIS — R74.01 TRANSAMINITIS: ICD-10-CM

## 2023-03-27 DIAGNOSIS — I10 ESSENTIAL HYPERTENSION: ICD-10-CM

## 2023-03-27 DIAGNOSIS — I73.9 PVD (PERIPHERAL VASCULAR DISEASE): ICD-10-CM

## 2023-03-27 DIAGNOSIS — I25.118 CORONARY ARTERY DISEASE OF NATIVE ARTERY OF NATIVE HEART WITH STABLE ANGINA PECTORIS: ICD-10-CM

## 2023-03-27 DIAGNOSIS — I25.118 CORONARY ARTERY DISEASE OF NATIVE ARTERY OF NATIVE HEART WITH STABLE ANGINA PECTORIS: Primary | ICD-10-CM

## 2023-03-27 DIAGNOSIS — I48.0 PAROXYSMAL ATRIAL FIBRILLATION: ICD-10-CM

## 2023-03-27 PROBLEM — N18.32 STAGE 3B CHRONIC KIDNEY DISEASE: Status: RESOLVED | Noted: 2021-03-18 | Resolved: 2023-03-27

## 2023-03-27 LAB
ALBUMIN SERPL BCP-MCNC: 3.6 G/DL (ref 3.5–5.2)
ALP SERPL-CCNC: 62 U/L (ref 55–135)
ALT SERPL W/O P-5'-P-CCNC: 39 U/L (ref 10–44)
ANION GAP SERPL CALC-SCNC: 6 MMOL/L (ref 8–16)
ANISOCYTOSIS BLD QL SMEAR: SLIGHT
AST SERPL-CCNC: 36 U/L (ref 10–40)
BASOPHILS # BLD AUTO: 0.09 K/UL (ref 0–0.2)
BASOPHILS NFR BLD: 1 % (ref 0–1.9)
BILIRUB SERPL-MCNC: 0.4 MG/DL (ref 0.1–1)
BUN SERPL-MCNC: 19 MG/DL (ref 8–23)
CALCIUM SERPL-MCNC: 9.7 MG/DL (ref 8.7–10.5)
CHLORIDE SERPL-SCNC: 104 MMOL/L (ref 95–110)
CHOLEST SERPL-MCNC: 258 MG/DL (ref 120–199)
CHOLEST/HDLC SERPL: 7 {RATIO} (ref 2–5)
CO2 SERPL-SCNC: 33 MMOL/L (ref 23–29)
CREAT SERPL-MCNC: 1.1 MG/DL (ref 0.5–1.4)
DIFFERENTIAL METHOD: ABNORMAL
EOSINOPHIL # BLD AUTO: 3 K/UL (ref 0–0.5)
EOSINOPHIL NFR BLD: 32.4 % (ref 0–8)
ERYTHROCYTE [DISTWIDTH] IN BLOOD BY AUTOMATED COUNT: 13.7 % (ref 11.5–14.5)
EST. GFR  (NO RACE VARIABLE): >60 ML/MIN/1.73 M^2
GLUCOSE SERPL-MCNC: 102 MG/DL (ref 70–110)
HCT VFR BLD AUTO: 40.1 % (ref 40–54)
HDLC SERPL-MCNC: 37 MG/DL (ref 40–75)
HDLC SERPL: 14.3 % (ref 20–50)
HGB BLD-MCNC: 12.1 G/DL (ref 14–18)
IMM GRANULOCYTES # BLD AUTO: 0.03 K/UL (ref 0–0.04)
IMM GRANULOCYTES NFR BLD AUTO: 0.3 % (ref 0–0.5)
LDLC SERPL CALC-MCNC: 174.4 MG/DL (ref 63–159)
LYMPHOCYTES # BLD AUTO: 1 K/UL (ref 1–4.8)
LYMPHOCYTES NFR BLD: 11.2 % (ref 18–48)
MCH RBC QN AUTO: 31.7 PG (ref 27–31)
MCHC RBC AUTO-ENTMCNC: 30.2 G/DL (ref 32–36)
MCV RBC AUTO: 105 FL (ref 82–98)
MONOCYTES # BLD AUTO: 0.6 K/UL (ref 0.3–1)
MONOCYTES NFR BLD: 6 % (ref 4–15)
NEUTROPHILS # BLD AUTO: 4.5 K/UL (ref 1.8–7.7)
NEUTROPHILS NFR BLD: 49.1 % (ref 38–73)
NONHDLC SERPL-MCNC: 221 MG/DL
NRBC BLD-RTO: 0 /100 WBC
PLATELET # BLD AUTO: 196 K/UL (ref 150–450)
PLATELET BLD QL SMEAR: ABNORMAL
PMV BLD AUTO: 11.3 FL (ref 9.2–12.9)
POLYCHROMASIA BLD QL SMEAR: ABNORMAL
POTASSIUM SERPL-SCNC: 5 MMOL/L (ref 3.5–5.1)
PROT SERPL-MCNC: 7.1 G/DL (ref 6–8.4)
RBC # BLD AUTO: 3.82 M/UL (ref 4.6–6.2)
SODIUM SERPL-SCNC: 143 MMOL/L (ref 136–145)
TRIGL SERPL-MCNC: 233 MG/DL (ref 30–150)
WBC # BLD AUTO: 9.23 K/UL (ref 3.9–12.7)

## 2023-03-27 PROCEDURE — 3288F PR FALLS RISK ASSESSMENT DOCUMENTED: ICD-10-PCS | Mod: CPTII,S$GLB,, | Performed by: INTERNAL MEDICINE

## 2023-03-27 PROCEDURE — 3288F FALL RISK ASSESSMENT DOCD: CPT | Mod: CPTII,S$GLB,, | Performed by: INTERNAL MEDICINE

## 2023-03-27 PROCEDURE — 1159F MED LIST DOCD IN RCRD: CPT | Mod: CPTII,S$GLB,, | Performed by: INTERNAL MEDICINE

## 2023-03-27 PROCEDURE — 3078F DIAST BP <80 MM HG: CPT | Mod: CPTII,S$GLB,, | Performed by: INTERNAL MEDICINE

## 2023-03-27 PROCEDURE — 3078F PR MOST RECENT DIASTOLIC BLOOD PRESSURE < 80 MM HG: ICD-10-PCS | Mod: CPTII,S$GLB,, | Performed by: INTERNAL MEDICINE

## 2023-03-27 PROCEDURE — 36415 COLL VENOUS BLD VENIPUNCTURE: CPT | Mod: PO | Performed by: INTERNAL MEDICINE

## 2023-03-27 PROCEDURE — 3074F PR MOST RECENT SYSTOLIC BLOOD PRESSURE < 130 MM HG: ICD-10-PCS | Mod: CPTII,S$GLB,, | Performed by: INTERNAL MEDICINE

## 2023-03-27 PROCEDURE — 1159F PR MEDICATION LIST DOCUMENTED IN MEDICAL RECORD: ICD-10-PCS | Mod: CPTII,S$GLB,, | Performed by: INTERNAL MEDICINE

## 2023-03-27 PROCEDURE — 80053 COMPREHEN METABOLIC PANEL: CPT | Performed by: INTERNAL MEDICINE

## 2023-03-27 PROCEDURE — 1160F RVW MEDS BY RX/DR IN RCRD: CPT | Mod: CPTII,S$GLB,, | Performed by: INTERNAL MEDICINE

## 2023-03-27 PROCEDURE — 99214 PR OFFICE/OUTPT VISIT, EST, LEVL IV, 30-39 MIN: ICD-10-PCS | Mod: S$GLB,,, | Performed by: INTERNAL MEDICINE

## 2023-03-27 PROCEDURE — 1101F PR PT FALLS ASSESS DOC 0-1 FALLS W/OUT INJ PAST YR: ICD-10-PCS | Mod: CPTII,S$GLB,, | Performed by: INTERNAL MEDICINE

## 2023-03-27 PROCEDURE — 99214 OFFICE O/P EST MOD 30 MIN: CPT | Mod: S$GLB,,, | Performed by: INTERNAL MEDICINE

## 2023-03-27 PROCEDURE — 3074F SYST BP LT 130 MM HG: CPT | Mod: CPTII,S$GLB,, | Performed by: INTERNAL MEDICINE

## 2023-03-27 PROCEDURE — 1126F AMNT PAIN NOTED NONE PRSNT: CPT | Mod: CPTII,S$GLB,, | Performed by: INTERNAL MEDICINE

## 2023-03-27 PROCEDURE — 80074 ACUTE HEPATITIS PANEL: CPT | Performed by: INTERNAL MEDICINE

## 2023-03-27 PROCEDURE — 99999 PR PBB SHADOW E&M-EST. PATIENT-LVL IV: ICD-10-PCS | Mod: PBBFAC,,, | Performed by: INTERNAL MEDICINE

## 2023-03-27 PROCEDURE — 1126F PR PAIN SEVERITY QUANTIFIED, NO PAIN PRESENT: ICD-10-PCS | Mod: CPTII,S$GLB,, | Performed by: INTERNAL MEDICINE

## 2023-03-27 PROCEDURE — 85025 COMPLETE CBC W/AUTO DIFF WBC: CPT | Performed by: INTERNAL MEDICINE

## 2023-03-27 PROCEDURE — 80061 LIPID PANEL: CPT | Performed by: INTERNAL MEDICINE

## 2023-03-27 PROCEDURE — 99999 PR PBB SHADOW E&M-EST. PATIENT-LVL IV: CPT | Mod: PBBFAC,,, | Performed by: INTERNAL MEDICINE

## 2023-03-27 PROCEDURE — 1160F PR REVIEW ALL MEDS BY PRESCRIBER/CLIN PHARMACIST DOCUMENTED: ICD-10-PCS | Mod: CPTII,S$GLB,, | Performed by: INTERNAL MEDICINE

## 2023-03-27 PROCEDURE — 1101F PT FALLS ASSESS-DOCD LE1/YR: CPT | Mod: CPTII,S$GLB,, | Performed by: INTERNAL MEDICINE

## 2023-03-27 NOTE — PROGRESS NOTES
Subjective:       Patient ID: Chai Murry is a 82 y.o. male.    Chief Complaint: Follow-up      HPI:  Patient is 82-year-old male presenting today following up on chronic health issues.  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  In regards to his CAD he is doing well.  He notes no chest pain or palpitations.  He states that his heart seems to be doing fine at this point.  Continues take his medications and follow-up with his cardiologist.      He is history of emphysema.  He is followed by Pulmonary for this issue.  He wears oxygen at night and uses his inhalers as prescribed.  His breathing has not gotten any worse nor has not gotten any better recently.      In regards to his blood pressure and cholesterol have been stable over time.  He continues take his medications without adverse effects.  Notes no evidence of cardiac decompensation.      Recent lab work has shown some issues with transaminitis.  This more likely than not represents some fatty liver.  We will move forward with an ultrasound at this time potentially FibroScan depending on the results.      He is a history of paroxysmal AFib.  No recent problems with atrial fibrillation.  He is on sotalol.      He is got a history of peripheral vascular disease.  He remains on aspirin therapy as well as statin therapy and blood pressure control.      Regards to his lung cancer he is being monitored by heme Onc at this time as well as Pulmonary.  He would a scan in January which showed no significant changes.  He continues to follow with them.      Review of Systems   Constitutional:  Negative for fever and unexpected weight change.   HENT:  Negative for hearing loss, postnasal drip and rhinorrhea.    Eyes:  Negative for pain and visual disturbance.   Respiratory:  Negative for cough,  "shortness of breath and wheezing.    Cardiovascular:  Negative for chest pain and palpitations.   Gastrointestinal:  Negative for constipation, diarrhea, nausea and vomiting.   Genitourinary:  Negative for dysuria and hematuria.   Musculoskeletal:  Negative for arthralgias, back pain, myalgias and neck stiffness.   Skin:  Negative for pallor and rash.   Neurological:  Negative for seizures, syncope and headaches.   Hematological:  Negative for adenopathy.   Psychiatric/Behavioral:  Negative for dysphoric mood. The patient is not nervous/anxious.      Objective:   /60   Pulse 66   Temp 96.1 °F (35.6 °C)   Ht 5' 10" (1.778 m)   Wt 93.1 kg (205 lb 4 oz)   SpO2 (!) 93%   BMI 29.45 kg/m²      Physical Exam  Vitals reviewed.   Constitutional:       General: He is not in acute distress.     Appearance: He is well-developed.   HENT:      Head: Normocephalic and atraumatic.      Right Ear: Tympanic membrane and ear canal normal.      Left Ear: Tympanic membrane and ear canal normal.   Eyes:      Pupils: Pupils are equal, round, and reactive to light.   Neck:      Thyroid: No thyromegaly.      Vascular: No JVD.   Cardiovascular:      Rate and Rhythm: Normal rate and regular rhythm.      Heart sounds: Normal heart sounds. No murmur heard.    No friction rub. No gallop.   Pulmonary:      Effort: Pulmonary effort is normal.      Breath sounds: Normal breath sounds. No wheezing or rales.   Abdominal:      General: Bowel sounds are normal. There is no distension.      Palpations: Abdomen is soft.      Tenderness: There is no abdominal tenderness. There is no guarding or rebound.   Musculoskeletal:         General: Normal range of motion.      Cervical back: Normal range of motion and neck supple.   Lymphadenopathy:      Cervical: No cervical adenopathy.   Skin:     General: Skin is warm and dry.      Findings: No rash.   Neurological:      General: No focal deficit present.      Mental Status: He is alert and oriented " to person, place, and time.      Cranial Nerves: No cranial nerve deficit.      Deep Tendon Reflexes: Reflexes are normal and symmetric.   Psychiatric:         Mood and Affect: Mood normal.         Judgment: Judgment normal.           Assessment:       1. Coronary artery disease of native artery of native heart with stable angina pectoris    2. Panlobular emphysema    3. Essential hypertension    4. Mixed hyperlipidemia    5. Transaminitis    6. Paroxysmal atrial fibrillation    7. PVD (peripheral vascular disease)          Plan:   No problem-specific Assessment & Plan notes found for this encounter.    Coronary artery disease of native artery of native heart with stable angina pectoris  Comments:  stable, continue meds, follow up with cardiology as planned  Orders:  -     CBC Auto Differential; Future; Expected date: 03/27/2023  -     Comprehensive Metabolic Panel; Future; Expected date: 03/27/2023  -     Lipid Panel; Future; Expected date: 03/27/2023    Panlobular emphysema  Comments:  continue oxygen at night.  continue inhalers. follow up with dr. Ernandez as planned.    Essential hypertension  Comments:  stable, continue meds    Mixed hyperlipidemia  Comments:  Numbers are good, no changes.    Transaminitis  Comments:  likely fatty liver.  check ultrasound, hep panel and likely fibroscan after ultrasound.  Orders:  -     US Abdomen Limited; Future; Expected date: 03/27/2023  -     Hepatitis Panel, Acute; Future; Expected date: 03/27/2023    Paroxysmal atrial fibrillation  Comments:  continue sotalol and asa.  No recent issues. conitnue monitoring    PVD (peripheral vascular disease)  Comments:  No recent issues.  continus statin, bp control and asa          Follow up in about 6 months (around 9/27/2023) for CAD, HTN, HLP, with Aiden Parry

## 2023-03-28 ENCOUNTER — HOSPITAL ENCOUNTER (OUTPATIENT)
Dept: RADIOLOGY | Facility: HOSPITAL | Age: 83
Discharge: HOME | End: 2023-03-28
Attending: INTERNAL MEDICINE
Payer: MEDICARE

## 2023-03-28 DIAGNOSIS — R91.1 SOLITARY PULMONARY NODULE: ICD-10-CM

## 2023-03-28 DIAGNOSIS — J92.0 ASBESTOS-INDUCED PLEURAL PLAQUE: ICD-10-CM

## 2023-03-28 DIAGNOSIS — R91.8 OPACITY OF LUNG ON IMAGING STUDY: ICD-10-CM

## 2023-03-28 LAB
HAV IGM SERPL QL IA: NORMAL
HBV CORE IGM SERPL QL IA: NORMAL
HBV SURFACE AG SERPL QL IA: NORMAL
HCV AB SERPL QL IA: NORMAL

## 2023-03-28 PROCEDURE — 71250 CT THORAX DX C-: CPT | Mod: TC

## 2023-03-28 PROCEDURE — 71250 CT THORAX DX C-: CPT | Mod: 26,,, | Performed by: RADIOLOGY

## 2023-03-28 PROCEDURE — 71250 CT CHEST WITHOUT CONTRAST: ICD-10-PCS | Mod: 26,,, | Performed by: RADIOLOGY

## 2023-03-29 ENCOUNTER — NURSE TRIAGE (OUTPATIENT)
Dept: ADMINISTRATIVE | Facility: CLINIC | Age: 83
End: 2023-03-29
Payer: MEDICARE

## 2023-03-29 ENCOUNTER — TELEPHONE (OUTPATIENT)
Dept: CARDIOLOGY | Facility: CLINIC | Age: 83
End: 2023-03-29
Payer: MEDICARE

## 2023-03-29 RX ORDER — ROSUVASTATIN CALCIUM 20 MG/1
20 TABLET, COATED ORAL DAILY
Qty: 30 TABLET | Refills: 11 | Status: SHIPPED | OUTPATIENT
Start: 2023-03-29 | End: 2023-03-30

## 2023-03-29 RX ORDER — ROSUVASTATIN CALCIUM 20 MG/1
20 TABLET, COATED ORAL DAILY
Qty: 90 TABLET | Refills: 3 | Status: CANCELLED | OUTPATIENT
Start: 2023-03-29 | End: 2024-03-28

## 2023-03-29 NOTE — TELEPHONE ENCOUNTER
Spoke with patient states he accidentally cancelled his ultrasound appointment tomorrow at 3:30 pm and he would like to reschedule.  Patient was going to do ultrasound at [facility].  Spoke with Johanna in scheduling ultrasound appointment was rebooked for 3:30 pm at the [place] location.   Patient has no further needs at this time.  Advised patient to call back if further assistance is needed.  Patient verbalized understanding.   Reason for Disposition   Question about upcoming scheduled test, no triage required and triager able to answer question    Protocols used: Information Only Call - No Triage-A-

## 2023-03-29 NOTE — TELEPHONE ENCOUNTER
----- Message from Romain Palencia MD sent at 3/29/2023  1:58 PM CDT -----  Retract last message, replace simvistatin with crestor

## 2023-03-30 ENCOUNTER — HOSPITAL ENCOUNTER (OUTPATIENT)
Dept: RADIOLOGY | Facility: HOSPITAL | Age: 83
Discharge: HOME | End: 2023-03-30
Attending: INTERNAL MEDICINE
Payer: MEDICARE

## 2023-03-30 DIAGNOSIS — K76.0 NAFLD (NONALCOHOLIC FATTY LIVER DISEASE): Primary | ICD-10-CM

## 2023-03-30 DIAGNOSIS — R74.01 TRANSAMINITIS: ICD-10-CM

## 2023-03-30 PROCEDURE — 76705 ECHO EXAM OF ABDOMEN: CPT | Mod: TC,PO

## 2023-03-30 PROCEDURE — 76705 US ABDOMEN LIMITED: ICD-10-PCS | Mod: 26,,, | Performed by: RADIOLOGY

## 2023-03-30 PROCEDURE — 76705 ECHO EXAM OF ABDOMEN: CPT | Mod: 26,,, | Performed by: RADIOLOGY

## 2023-03-31 ENCOUNTER — TELEPHONE (OUTPATIENT)
Dept: INTERNAL MEDICINE | Facility: CLINIC | Age: 83
End: 2023-03-31
Payer: MEDICARE

## 2023-03-31 NOTE — TELEPHONE ENCOUNTER
Late entry.    Spoke w/ pt and informed him of results. Fibroscan was also scheduled.    Pt verbalized understanding.

## 2023-03-31 NOTE — TELEPHONE ENCOUNTER
----- Message from Radha Bryan sent at 3/31/2023  1:09 PM CDT -----  Type:  Patient Returning Call    Who Called:pt  Who Left Message for Patient:nurse  Does the patient know what this is regarding?:call  Would the patient rather a call back or a response via [facility]? call  Best Call Back Number:[phone]  Additional Information:

## 2023-04-26 ENCOUNTER — PATIENT MESSAGE (OUTPATIENT)
Dept: INTERNAL MEDICINE | Facility: CLINIC | Age: 83
End: 2023-04-26
Payer: MEDICARE

## 2023-05-04 ENCOUNTER — TELEPHONE (OUTPATIENT)
Dept: CARDIOLOGY | Facility: HOSPITAL | Age: 83
End: 2023-05-04
Payer: MEDICARE

## 2023-05-04 ENCOUNTER — OFFICE VISIT (OUTPATIENT)
Dept: CARDIOLOGY | Facility: CLINIC | Age: 83
End: 2023-05-04
Payer: MEDICARE

## 2023-05-04 VITALS
OXYGEN SATURATION: 94 % | WEIGHT: 206.56 LBS | BODY MASS INDEX: 29.64 KG/M2 | HEART RATE: 64 BPM | DIASTOLIC BLOOD PRESSURE: 80 MMHG | SYSTOLIC BLOOD PRESSURE: 136 MMHG

## 2023-05-04 DIAGNOSIS — I73.9 PVD (PERIPHERAL VASCULAR DISEASE): ICD-10-CM

## 2023-05-04 DIAGNOSIS — I48.0 PAROXYSMAL ATRIAL FIBRILLATION: ICD-10-CM

## 2023-05-04 DIAGNOSIS — I25.118 CORONARY ARTERY DISEASE OF NATIVE ARTERY OF NATIVE HEART WITH STABLE ANGINA PECTORIS: Primary | ICD-10-CM

## 2023-05-04 DIAGNOSIS — I10 ESSENTIAL HYPERTENSION: ICD-10-CM

## 2023-05-04 DIAGNOSIS — E78.2 MIXED HYPERLIPIDEMIA: ICD-10-CM

## 2023-05-04 PROCEDURE — 99999 PR PBB SHADOW E&M-EST. PATIENT-LVL III: CPT | Mod: PBBFAC,,, | Performed by: INTERNAL MEDICINE

## 2023-05-04 PROCEDURE — 1159F PR MEDICATION LIST DOCUMENTED IN MEDICAL RECORD: ICD-10-PCS | Mod: CPTII,S$GLB,, | Performed by: INTERNAL MEDICINE

## 2023-05-04 PROCEDURE — 1101F PR PT FALLS ASSESS DOC 0-1 FALLS W/OUT INJ PAST YR: ICD-10-PCS | Mod: CPTII,S$GLB,, | Performed by: INTERNAL MEDICINE

## 2023-05-04 PROCEDURE — 1159F MED LIST DOCD IN RCRD: CPT | Mod: CPTII,S$GLB,, | Performed by: INTERNAL MEDICINE

## 2023-05-04 PROCEDURE — 1101F PT FALLS ASSESS-DOCD LE1/YR: CPT | Mod: CPTII,S$GLB,, | Performed by: INTERNAL MEDICINE

## 2023-05-04 PROCEDURE — 1160F PR REVIEW ALL MEDS BY PRESCRIBER/CLIN PHARMACIST DOCUMENTED: ICD-10-PCS | Mod: CPTII,S$GLB,, | Performed by: INTERNAL MEDICINE

## 2023-05-04 PROCEDURE — 99215 PR OFFICE/OUTPT VISIT, EST, LEVL V, 40-54 MIN: ICD-10-PCS | Mod: S$GLB,,, | Performed by: INTERNAL MEDICINE

## 2023-05-04 PROCEDURE — 99215 OFFICE O/P EST HI 40 MIN: CPT | Mod: S$GLB,,, | Performed by: INTERNAL MEDICINE

## 2023-05-04 PROCEDURE — 3075F PR MOST RECENT SYSTOLIC BLOOD PRESS GE 130-139MM HG: ICD-10-PCS | Mod: CPTII,S$GLB,, | Performed by: INTERNAL MEDICINE

## 2023-05-04 PROCEDURE — 1126F AMNT PAIN NOTED NONE PRSNT: CPT | Mod: CPTII,S$GLB,, | Performed by: INTERNAL MEDICINE

## 2023-05-04 PROCEDURE — 1126F PR PAIN SEVERITY QUANTIFIED, NO PAIN PRESENT: ICD-10-PCS | Mod: CPTII,S$GLB,, | Performed by: INTERNAL MEDICINE

## 2023-05-04 PROCEDURE — 1160F RVW MEDS BY RX/DR IN RCRD: CPT | Mod: CPTII,S$GLB,, | Performed by: INTERNAL MEDICINE

## 2023-05-04 PROCEDURE — 3288F PR FALLS RISK ASSESSMENT DOCUMENTED: ICD-10-PCS | Mod: CPTII,S$GLB,, | Performed by: INTERNAL MEDICINE

## 2023-05-04 PROCEDURE — 3075F SYST BP GE 130 - 139MM HG: CPT | Mod: CPTII,S$GLB,, | Performed by: INTERNAL MEDICINE

## 2023-05-04 PROCEDURE — 3079F DIAST BP 80-89 MM HG: CPT | Mod: CPTII,S$GLB,, | Performed by: INTERNAL MEDICINE

## 2023-05-04 PROCEDURE — 3288F FALL RISK ASSESSMENT DOCD: CPT | Mod: CPTII,S$GLB,, | Performed by: INTERNAL MEDICINE

## 2023-05-04 PROCEDURE — 3079F PR MOST RECENT DIASTOLIC BLOOD PRESSURE 80-89 MM HG: ICD-10-PCS | Mod: CPTII,S$GLB,, | Performed by: INTERNAL MEDICINE

## 2023-05-04 PROCEDURE — 99999 PR PBB SHADOW E&M-EST. PATIENT-LVL III: ICD-10-PCS | Mod: PBBFAC,,, | Performed by: INTERNAL MEDICINE

## 2023-05-04 NOTE — PROGRESS NOTES
Subjective:   Patient ID:  Chai Murry is a 83 y.o. male who presents for follow-up of No chief complaint on file.  Pt with chronic SOB from COPD, hx of lobectomy.  Pt denies CP. BP stable at home  Echo 2020 nml lv function  SOB/CARVAJAL  Presents for follow up  SOB related to s/p L pneumonectomy, no changes since last visit  Denies chest pain, palpitations, syncope, lightheadedness  No other complaints today    Coronary Artery Disease  Presents for follow-up visit. Symptoms include shortness of breath. Pertinent negatives include no chest pain, chest pressure, chest tightness, dizziness, leg swelling, muscle weakness, palpitations or weight gain. Compliance with medications is good.   Hypertension  This is a chronic problem. The current episode started more than 1 year ago. The problem is unchanged. The problem is controlled. Associated symptoms include shortness of breath. Pertinent negatives include no chest pain, malaise/fatigue, orthopnea or palpitations. Risk factors for coronary artery disease include male gender, sedentary lifestyle and dyslipidemia. Past treatments include calcium channel blockers and angiotensin blockers. There are no compliance problems.  Hypertensive end-organ damage includes CAD/MI. There is no history of CVA.   Hyperlipidemia  This is a chronic problem. The problem is controlled. Recent lipid tests were reviewed and are normal. Associated symptoms include shortness of breath. Pertinent negatives include no chest pain. Current antihyperlipidemic treatment includes statins. Risk factors for coronary artery disease include hypertension, male sex, obesity, a sedentary lifestyle and dyslipidemia.     Review of Systems   Constitutional: Negative. Negative for malaise/fatigue and weight gain.   HENT: Negative.     Eyes: Negative.    Cardiovascular: Negative.  Negative for chest pain, leg swelling, orthopnea and palpitations.   Respiratory:  Positive for shortness of breath. Negative for chest  tightness.    Endocrine: Negative.    Hematologic/Lymphatic: Negative.    Skin: Negative.    Musculoskeletal:  Negative for muscle weakness.   Gastrointestinal: Negative.    Genitourinary: Negative.    Neurological: Negative.  Negative for dizziness.   Psychiatric/Behavioral: Negative.     Allergic/Immunologic: Negative.    All other systems reviewed and are negative.  Family History   Problem Relation Age of Onset    Esophageal cancer Sister     Cancer Sister     Lung cancer Mother     Cancer Mother     Cataracts Mother     Hypertension Mother     Pneumonia Father     Cataracts Father     Hypertension Father     Cancer Brother     Hypertension Brother     Blindness Neg Hx     Diabetes Neg Hx     Glaucoma Neg Hx     Macular degeneration Neg Hx     Retinal detachment Neg Hx     Strabismus Neg Hx     Stroke Neg Hx     Thyroid disease Neg Hx      Past Medical History:   Diagnosis Date    Anemia     Arthritis     Atrial fibrillation     CAD (coronary artery disease)     Cataract     COPD (chronic obstructive pulmonary disease)     Diverticulosis     ED (erectile dysfunction)     HTN (hypertension)     Hyperlipidemia     Lung cancer     left    NSTEMI (non-ST elevated myocardial infarction) 2019    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 Courtney dwyer bx     Social History     Socioeconomic History    Marital status:     Number of children: 3   Occupational History    Occupation: retired   Tobacco Use    Smoking status: Former     Packs/day: 1.00     Years: 50.00     Pack years: 50.00     Types: Cigarettes     Quit date: 2005     Years since quittin.7    Smokeless tobacco: Never   Substance and Sexual Activity    Alcohol use: No    Drug use: No    Sexual activity: Not Currently     Current Outpatient Medications on File Prior to Visit   Medication Sig Dispense Refill    amLODIPine (NORVASC) 5 MG tablet TAKE 1 TABLET BY MOUTH EVERYDAY AT BEDTIME 90 tablet 3     ammonium lactate (LAC-HYDRIN) 12 % lotion Apply to damp skin after bathing 225 g 11    aspirin (ECOTRIN) 81 MG EC tablet Take 81 mg by mouth once daily.      doxepin (SINEQUAN) 10 MG capsule Take 1 capsule (10 mg total) by mouth every evening. For insomnia 90 capsule 3    fenofibric acid (FIBRICOR) 135 mg CpDR TAKE 1 CAPSULE (135 MG TOTAL) BY MOUTH ONCE DAILY. 90 capsule 3    fluticasone-umeclidin-vilanter (TRELEGY ELLIPTA) 200-62.5-25 mcg inhaler Inhale 1 puff into the lungs once daily. 180 each 3    levocetirizine (XYZAL) 5 MG tablet Take 5 mg by mouth as needed.       lisinopriL (PRINIVIL,ZESTRIL) 40 MG tablet TAKE 1 TABLET BY MOUTH EVERY DAY 90 tablet 3    ranolazine (RANEXA) 500 MG Tb12 TAKE 1 TABLET BY MOUTH TWICE A  tablet 3    simvastatin (ZOCOR) 80 MG tablet TAKE 1 TABLET BY MOUTH EVERY DAY 90 tablet 3    sotaloL (BETAPACE) 80 MG tablet TAKE 1 TABLET (80 MG TOTAL) BY MOUTH 2 (TWO) TIMES DAILY. 180 tablet 3    albuterol (PROVENTIL) 2.5 mg /3 mL (0.083 %) nebulizer solution Take 3 mLs (2.5 mg total) by nebulization every 6 (six) hours while awake. 270 mL 11     No current facility-administered medications on file prior to visit.     Review of patient's allergies indicates:   Allergen Reactions    Atorvastatin      Other reaction(s): Muscle pain    Bactrim [sulfamethoxazole-trimethoprim]      Lip swelling       Objective:     Physical Exam  Vitals and nursing note reviewed.   Constitutional:       Appearance: He is well-developed.   HENT:      Head: Normocephalic and atraumatic.   Eyes:      Conjunctiva/sclera: Conjunctivae normal.      Pupils: Pupils are equal, round, and reactive to light.   Cardiovascular:      Rate and Rhythm: Normal rate and regular rhythm.      Pulses: Intact distal pulses.      Heart sounds: Normal heart sounds.   Pulmonary:      Effort: Pulmonary effort is normal.      Breath sounds: Normal breath sounds.   Abdominal:      General: Bowel sounds are normal.      Palpations:  Abdomen is soft.   Musculoskeletal:      Cervical back: Normal range of motion and neck supple.   Skin:     General: Skin is warm and dry.   Neurological:      Mental Status: He is alert and oriented to person, place, and time.       Assessment:     1. Coronary artery disease of native artery of native heart with stable angina pectoris    2. Mixed hyperlipidemia    3. Essential hypertension    4. Paroxysmal atrial fibrillation    5. PVD (peripheral vascular disease)        Plan:     Coronary artery disease of native artery of native heart with stable angina pectoris    Mixed hyperlipidemia    Essential hypertension    Paroxysmal atrial fibrillation    PVD (peripheral vascular disease)    Continue lisinopril, lasix (prn), norvasc -htn  Continue asa, sotalol- PAF, much bruising on eliquis  Continue ranexa- cad  Continue statin, fenofibrate-hlp    Echo, stress test

## 2023-05-11 DIAGNOSIS — R74.01 TRANSAMINITIS: Primary | ICD-10-CM

## 2023-05-11 DIAGNOSIS — D53.9 NUTRITIONAL ANEMIA, UNSPECIFIED: ICD-10-CM

## 2023-05-24 RX ORDER — FENOFIBRIC ACID 135 MG/1
1 CAPSULE, DELAYED RELEASE ORAL DAILY
Qty: 90 CAPSULE | Refills: 3 | Status: SHIPPED | OUTPATIENT
Start: 2023-05-24

## 2023-05-24 NOTE — TELEPHONE ENCOUNTER
Refill Routing Note   Medication(s) are not appropriate for processing by [facility] for the following reason(s):      Drug-disease interaction    [facility] action(s):  Defer None identified   Medication Therapy Plan: Drug-Disease: fenofibric acid and Anemia; Nutritional anemia, unspecified    Pharmacist review requested: Yes     Appointments  past 12m or future 3m with PCP    Date Provider   Last Visit   3/27/2023 Peter Teixeira MD   Next Visit   Visit date not found Peter Teixeira MD   ED visits in past 90 days: 0        Note composed:9:50 AM 05/24/2023            Detail Level: Generalized Include Location In Plan?: No

## 2023-05-24 NOTE — TELEPHONE ENCOUNTER
Refill Decision Note   Chai Murry  is requesting a refill authorization.  Brief Assessment and Rationale for Refill:  Approve     Medication Therapy Plan:         Alert overridden per protocol: Yes   Pharmacist review requested: Yes   Comments:     No Care Gaps recommended.     Note composed:10:22 AM 05/24/2023

## 2023-05-24 NOTE — TELEPHONE ENCOUNTER
No care due was identified.  [facility] Embedded Care Due Messages. Reference number: [id number].   5/24/2023 12:16:42 AM CDT

## 2023-05-25 ENCOUNTER — PATIENT MESSAGE (OUTPATIENT)
Dept: CARDIOLOGY | Facility: HOSPITAL | Age: 83
End: 2023-05-25
Payer: MEDICARE

## 2023-07-03 ENCOUNTER — LAB VISIT (OUTPATIENT)
Dept: LAB | Facility: HOSPITAL | Age: 83
End: 2023-07-03
Attending: INTERNAL MEDICINE
Payer: MEDICARE

## 2023-07-03 ENCOUNTER — OFFICE VISIT (OUTPATIENT)
Dept: HEMATOLOGY/ONCOLOGY | Facility: CLINIC | Age: 83
End: 2023-07-03
Payer: MEDICARE

## 2023-07-03 VITALS
SYSTOLIC BLOOD PRESSURE: 121 MMHG | HEART RATE: 56 BPM | DIASTOLIC BLOOD PRESSURE: 64 MMHG | WEIGHT: 206.13 LBS | TEMPERATURE: 97 F | BODY MASS INDEX: 29.51 KG/M2 | OXYGEN SATURATION: 96 % | HEIGHT: 70 IN

## 2023-07-03 DIAGNOSIS — R74.01 TRANSAMINITIS: ICD-10-CM

## 2023-07-03 DIAGNOSIS — C34.12 MALIGNANT NEOPLASM OF UPPER LOBE OF LEFT LUNG: Primary | ICD-10-CM

## 2023-07-03 DIAGNOSIS — D53.9 NUTRITIONAL ANEMIA, UNSPECIFIED: ICD-10-CM

## 2023-07-03 LAB
ALBUMIN SERPL BCP-MCNC: 3.6 G/DL (ref 3.5–5.2)
ALP SERPL-CCNC: 76 U/L (ref 55–135)
ALT SERPL W/O P-5'-P-CCNC: 72 U/L (ref 10–44)
ANION GAP SERPL CALC-SCNC: 9 MMOL/L (ref 8–16)
ANISOCYTOSIS BLD QL SMEAR: SLIGHT
AST SERPL-CCNC: 67 U/L (ref 10–40)
BASOPHILS # BLD AUTO: 0.06 K/UL (ref 0–0.2)
BASOPHILS NFR BLD: 0.7 % (ref 0–1.9)
BILIRUB SERPL-MCNC: 0.4 MG/DL (ref 0.1–1)
BUN SERPL-MCNC: 18 MG/DL (ref 8–23)
CALCIUM SERPL-MCNC: 9.5 MG/DL (ref 8.7–10.5)
CHLORIDE SERPL-SCNC: 107 MMOL/L (ref 95–110)
CO2 SERPL-SCNC: 29 MMOL/L (ref 23–29)
CREAT SERPL-MCNC: 1.3 MG/DL (ref 0.5–1.4)
DIFFERENTIAL METHOD: ABNORMAL
EOSINOPHIL # BLD AUTO: 2.7 K/UL (ref 0–0.5)
EOSINOPHIL NFR BLD: 29.6 % (ref 0–8)
ERYTHROCYTE [DISTWIDTH] IN BLOOD BY AUTOMATED COUNT: 13.3 % (ref 11.5–14.5)
EST. GFR  (NO RACE VARIABLE): 55 ML/MIN/1.73 M^2
GIANT PLATELETS BLD QL SMEAR: PRESENT
GLUCOSE SERPL-MCNC: 113 MG/DL (ref 70–110)
HCT VFR BLD AUTO: 38.9 % (ref 40–54)
HGB BLD-MCNC: 12 G/DL (ref 14–18)
IMM GRANULOCYTES # BLD AUTO: 0.03 K/UL (ref 0–0.04)
IMM GRANULOCYTES NFR BLD AUTO: 0.3 % (ref 0–0.5)
LYMPHOCYTES # BLD AUTO: 1 K/UL (ref 1–4.8)
LYMPHOCYTES NFR BLD: 11.3 % (ref 18–48)
MCH RBC QN AUTO: 30.9 PG (ref 27–31)
MCHC RBC AUTO-ENTMCNC: 30.8 G/DL (ref 32–36)
MCV RBC AUTO: 100 FL (ref 82–98)
MONOCYTES # BLD AUTO: 0.5 K/UL (ref 0.3–1)
MONOCYTES NFR BLD: 6 % (ref 4–15)
NEUTROPHILS # BLD AUTO: 4.7 K/UL (ref 1.8–7.7)
NEUTROPHILS NFR BLD: 52.1 % (ref 38–73)
NRBC BLD-RTO: 0 /100 WBC
PLATELET # BLD AUTO: 188 K/UL (ref 150–450)
PMV BLD AUTO: 10.7 FL (ref 9.2–12.9)
POTASSIUM SERPL-SCNC: 4.8 MMOL/L (ref 3.5–5.1)
PROT SERPL-MCNC: 7.2 G/DL (ref 6–8.4)
RBC # BLD AUTO: 3.88 M/UL (ref 4.6–6.2)
SODIUM SERPL-SCNC: 145 MMOL/L (ref 136–145)
STOMATOCYTES BLD QL SMEAR: PRESENT
WBC # BLD AUTO: 9.05 K/UL (ref 3.9–12.7)

## 2023-07-03 PROCEDURE — 1126F PR PAIN SEVERITY QUANTIFIED, NO PAIN PRESENT: ICD-10-PCS | Mod: CPTII,S$GLB,, | Performed by: INTERNAL MEDICINE

## 2023-07-03 PROCEDURE — 80053 COMPREHEN METABOLIC PANEL: CPT | Performed by: INTERNAL MEDICINE

## 2023-07-03 PROCEDURE — 1159F MED LIST DOCD IN RCRD: CPT | Mod: CPTII,S$GLB,, | Performed by: INTERNAL MEDICINE

## 2023-07-03 PROCEDURE — 99999 PR PBB SHADOW E&M-EST. PATIENT-LVL III: ICD-10-PCS | Mod: PBBFAC,,, | Performed by: INTERNAL MEDICINE

## 2023-07-03 PROCEDURE — 1159F PR MEDICATION LIST DOCUMENTED IN MEDICAL RECORD: ICD-10-PCS | Mod: CPTII,S$GLB,, | Performed by: INTERNAL MEDICINE

## 2023-07-03 PROCEDURE — 3288F FALL RISK ASSESSMENT DOCD: CPT | Mod: CPTII,S$GLB,, | Performed by: INTERNAL MEDICINE

## 2023-07-03 PROCEDURE — 3074F PR MOST RECENT SYSTOLIC BLOOD PRESSURE < 130 MM HG: ICD-10-PCS | Mod: CPTII,S$GLB,, | Performed by: INTERNAL MEDICINE

## 2023-07-03 PROCEDURE — 3078F DIAST BP <80 MM HG: CPT | Mod: CPTII,S$GLB,, | Performed by: INTERNAL MEDICINE

## 2023-07-03 PROCEDURE — 1101F PR PT FALLS ASSESS DOC 0-1 FALLS W/OUT INJ PAST YR: ICD-10-PCS | Mod: CPTII,S$GLB,, | Performed by: INTERNAL MEDICINE

## 2023-07-03 PROCEDURE — 3288F PR FALLS RISK ASSESSMENT DOCUMENTED: ICD-10-PCS | Mod: CPTII,S$GLB,, | Performed by: INTERNAL MEDICINE

## 2023-07-03 PROCEDURE — 3074F SYST BP LT 130 MM HG: CPT | Mod: CPTII,S$GLB,, | Performed by: INTERNAL MEDICINE

## 2023-07-03 PROCEDURE — 1126F AMNT PAIN NOTED NONE PRSNT: CPT | Mod: CPTII,S$GLB,, | Performed by: INTERNAL MEDICINE

## 2023-07-03 PROCEDURE — 3078F PR MOST RECENT DIASTOLIC BLOOD PRESSURE < 80 MM HG: ICD-10-PCS | Mod: CPTII,S$GLB,, | Performed by: INTERNAL MEDICINE

## 2023-07-03 PROCEDURE — 99213 PR OFFICE/OUTPT VISIT, EST, LEVL III, 20-29 MIN: ICD-10-PCS | Mod: S$GLB,,, | Performed by: INTERNAL MEDICINE

## 2023-07-03 PROCEDURE — 99213 OFFICE O/P EST LOW 20 MIN: CPT | Mod: S$GLB,,, | Performed by: INTERNAL MEDICINE

## 2023-07-03 PROCEDURE — 99999 PR PBB SHADOW E&M-EST. PATIENT-LVL III: CPT | Mod: PBBFAC,,, | Performed by: INTERNAL MEDICINE

## 2023-07-03 PROCEDURE — 36415 COLL VENOUS BLD VENIPUNCTURE: CPT | Performed by: INTERNAL MEDICINE

## 2023-07-03 PROCEDURE — 1101F PT FALLS ASSESS-DOCD LE1/YR: CPT | Mod: CPTII,S$GLB,, | Performed by: INTERNAL MEDICINE

## 2023-07-03 PROCEDURE — 85025 COMPLETE CBC W/AUTO DIFF WBC: CPT | Performed by: INTERNAL MEDICINE

## 2023-07-03 NOTE — PROGRESS NOTES
Subjective:      DATE OF VISIT: 7/3/2023   ?   ?   Patient ID:?Chai Murry is a 83 y.o. male.?? MR#: [id number]   ?   PRIMARY PROVIDER:  Dr. Vila -> Dr. Casper  ?   CHIEF COMPLAINT:  Follow-up?????   ?   ONCOLOGIC DIAGNOSIS:  Recurrent squamous cell carcinoma, with metastatic disease involving the trachea  ?   CURRENT TREATMENT:  Surveillance    PAST TREATMENT:  Chemoradiation ; then with recurrence chemoradiation and immunotherapy durvalumab, completed 2020    HPI  I had the pleasure seeing in follow-up Mr. Murry.     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  He developed hoarseness in 2019 found to have PET avid mass left of trachea consistent with recurrent squamous cell carcinoma.  He underwent chemoradiation with cisplatin and completed 6 weekly treatments  with good response then immunotherapy maintenance with durvalumab completed 2020.  Restaging scan 2020 with his primary doctor Cadence showed no FDG avidity  to suggest recurrent or metastatic disease.    INTERVAL EVENTS     He has had some successful intentional weight loss.  No new cough chest pain shortness of breath, stable dyspnea on exertion noted.      Review of Systems    ?   A comprehensive 14-point review of systems was reviewed with patient and was negative other than as specified above.   ?     Objective:      Physical Exam       ?   Vitals:    23 0958   BP: 121/64   Pulse: (!) 56   Temp: 96.9 °F (36.1 °C)      ECO  General appearance: Generally well appearing, in no acute distress.   Head, eyes, ears, nose, and throat: Oropharynx clear with moist mucous membranes.   Cardiovascular: Regular rate and rhythm, systolic ejection murmur  Respiratory: Lungs clear to auscultation bilaterally.   Abdomen: nontender, nondistended.   Extremities: Warm, without edema.   Neurologic: Alert and oriented.  Skin: No  rashes, ecchymoses or petechial lesion.   Psychiatric: normal mood and affect, conversant and appropriate      Laboratory:  ?   Lab Visit on 07/03/2023   Component Date Value Ref Range Status    WBC 07/03/2023 9.05  3.90 - 12.70 K/uL Final    RBC 07/03/2023 3.88 (L)  4.60 - 6.20 M/uL Final    Hemoglobin 07/03/2023 12.0 (L)  14.0 - 18.0 g/dL Final    Hematocrit 07/03/2023 38.9 (L)  40.0 - 54.0 % Final    MCV 07/03/2023 100 (H)  82 - 98 fL Final    MCH 07/03/2023 30.9  27.0 - 31.0 pg Final    MCHC 07/03/2023 30.8 (L)  32.0 - 36.0 g/dL Final    RDW 07/03/2023 13.3  11.5 - 14.5 % Final    Platelets 07/03/2023 188  150 - 450 K/uL Final    MPV 07/03/2023 10.7  9.2 - 12.9 fL Final    Immature Granulocytes 07/03/2023 0.3  0.0 - 0.5 % Final    Gran # (ANC) 07/03/2023 4.7  1.8 - 7.7 K/uL Final    Immature Grans (Abs) 07/03/2023 0.03  0.00 - 0.04 K/uL Final    Lymph # 07/03/2023 1.0  1.0 - 4.8 K/uL Final    Mono # 07/03/2023 0.5  0.3 - 1.0 K/uL Final    Eos # 07/03/2023 2.7 (H)  0.0 - 0.5 K/uL Final    Baso # 07/03/2023 0.06  0.00 - 0.20 K/uL Final    nRBC 07/03/2023 0  0 /100 WBC Final    Gran % 07/03/2023 52.1  38.0 - 73.0 % Final    Lymph % 07/03/2023 11.3 (L)  18.0 - 48.0 % Final    Mono % 07/03/2023 6.0  4.0 - 15.0 % Final    Eosinophil % 07/03/2023 29.6 (H)  0.0 - 8.0 % Final    Basophil % 07/03/2023 0.7  0.0 - 1.9 % Final    Aniso 07/03/2023 Slight   Final    Stomatocytes 07/03/2023 Present   Final    Large/Giant Platelets 07/03/2023 Present   Final    Differential Method 07/03/2023 Automated   Final    Sodium 07/03/2023 145  136 - 145 mmol/L Final    Potassium 07/03/2023 4.8  3.5 - 5.1 mmol/L Final    Chloride 07/03/2023 107  95 - 110 mmol/L Final    CO2 07/03/2023 29  23 - 29 mmol/L Final    Glucose 07/03/2023 113 (H)  70 - 110 mg/dL Final    BUN 07/03/2023 18  8 - 23 mg/dL Final    Creatinine 07/03/2023 1.3  0.5 - 1.4 mg/dL Final    Calcium 07/03/2023 9.5  8.7 - 10.5 mg/dL Final     Total Protein 07/03/2023 7.2  6.0 - 8.4 g/dL Final    Albumin 07/03/2023 3.6  3.5 - 5.2 g/dL Final    Total Bilirubin 07/03/2023 0.4  0.1 - 1.0 mg/dL Final    Alkaline Phosphatase 07/03/2023 76  55 - 135 U/L Final    AST 07/03/2023 67 (H)  10 - 40 U/L Final    ALT 07/03/2023 72 (H)  10 - 44 U/L Final    eGFR 07/03/2023 55 (A)  >60 mL/min/1.73 m^2 Final    Anion Gap 07/03/2023 9  8 - 16 mmol/L Final      ?   No results found. However, due to the size of the patient record, not all encounters were searched. Please check Results Review for a complete set of results.      IMAGING     No results found. However, due to the size of the patient record, not all encounters were searched. Please check Results Review for a complete set of results.      ?   Assessment/Plan:       1. Malignant neoplasm of upper lobe of left lung    2. Nutritional anemia, unspecified    3. Transaminitis                Plan:     # recurrent/metastatic squamous cell carcinoma:  Istatus post chemoradiation in November 2020 completed maintenance durvalumab.  November 2020 PET-CT with Dr. Vila without evidence of recurrent/metastatic disease resolution of prior avidity in left trachea.  MRI brain with chronic microvascular changes/atrophy no evidence of metastatic disease.   He has been in surveillance with staging PET scan October 2022 and short interval CT scan January 2023 reviewed with him today with some thickening along right major minor fissure and subcentimeter pulmonary nodule overall stability over last several months.  Last scan March 2023 with Pulmonary follow-up additional scan planned in August and follow-up after that recommended.  No concerning clinical new symptoms.      Transaminitis:  On review of labs incidental finding of transaminitis repeated last visit with improvement now recurrence mild similar to prior.  Continue to follow monitor with primary care recommended.      Anemia: Relatively stable anemia.  No paraprotein.   Continue to monitor.    Follow-Up:   Route Chart for Scheduling    Med Onc Chart Routing      Follow up with physician 2 months. after repeat Ct chest in 8/2023   Follow up with [facility]    Infusion scheduling note    Injection scheduling note    Labs    Imaging Other   Ct chest w/ contrast 8/2023   Pharmacy appointment    Other referrals

## 2023-08-15 DIAGNOSIS — C34.12 MALIGNANT NEOPLASM OF UPPER LOBE OF LEFT LUNG: ICD-10-CM

## 2023-08-15 DIAGNOSIS — D53.9 NUTRITIONAL ANEMIA, UNSPECIFIED: Primary | ICD-10-CM

## 2023-08-17 ENCOUNTER — OFFICE VISIT (OUTPATIENT)
Dept: PULMONOLOGY | Facility: CLINIC | Age: 83
End: 2023-08-17
Payer: MEDICARE

## 2023-08-17 ENCOUNTER — HOSPITAL ENCOUNTER (OUTPATIENT)
Dept: RADIOLOGY | Facility: HOSPITAL | Age: 83
Discharge: HOME | End: 2023-08-17
Attending: INTERNAL MEDICINE
Payer: MEDICARE

## 2023-08-17 VITALS
BODY MASS INDEX: 29.38 KG/M2 | WEIGHT: 205.25 LBS | OXYGEN SATURATION: 96 % | SYSTOLIC BLOOD PRESSURE: 126 MMHG | HEIGHT: 70 IN | DIASTOLIC BLOOD PRESSURE: 62 MMHG | HEART RATE: 58 BPM | RESPIRATION RATE: 17 BRPM

## 2023-08-17 DIAGNOSIS — R91.1 SOLITARY PULMONARY NODULE: Primary | ICD-10-CM

## 2023-08-17 DIAGNOSIS — J44.89 ASTHMA WITH COPD: ICD-10-CM

## 2023-08-17 DIAGNOSIS — C34.12 MALIGNANT NEOPLASM OF UPPER LOBE OF LEFT LUNG: ICD-10-CM

## 2023-08-17 DIAGNOSIS — R09.02 EXERCISE HYPOXEMIA: ICD-10-CM

## 2023-08-17 DIAGNOSIS — J44.9 CHRONIC OBSTRUCTIVE PULMONARY DISEASE, UNSPECIFIED COPD TYPE: ICD-10-CM

## 2023-08-17 DIAGNOSIS — J92.0 ASBESTOS-INDUCED PLEURAL PLAQUE: ICD-10-CM

## 2023-08-17 PROCEDURE — 3074F SYST BP LT 130 MM HG: CPT | Mod: CPTII,S$GLB,, | Performed by: INTERNAL MEDICINE

## 2023-08-17 PROCEDURE — 3288F FALL RISK ASSESSMENT DOCD: CPT | Mod: CPTII,S$GLB,, | Performed by: INTERNAL MEDICINE

## 2023-08-17 PROCEDURE — 99215 OFFICE O/P EST HI 40 MIN: CPT | Mod: 25,S$GLB,, | Performed by: INTERNAL MEDICINE

## 2023-08-17 PROCEDURE — 99215 PR OFFICE/OUTPT VISIT, EST, LEVL V, 40-54 MIN: ICD-10-PCS | Mod: 25,S$GLB,, | Performed by: INTERNAL MEDICINE

## 2023-08-17 PROCEDURE — 71260 CT THORAX DX C+: CPT | Mod: TC

## 2023-08-17 PROCEDURE — 71260 CT CHEST WITH CONTRAST: ICD-10-PCS | Mod: 26,,, | Performed by: RADIOLOGY

## 2023-08-17 PROCEDURE — 3288F PR FALLS RISK ASSESSMENT DOCUMENTED: ICD-10-PCS | Mod: CPTII,S$GLB,, | Performed by: INTERNAL MEDICINE

## 2023-08-17 PROCEDURE — 3078F PR MOST RECENT DIASTOLIC BLOOD PRESSURE < 80 MM HG: ICD-10-PCS | Mod: CPTII,S$GLB,, | Performed by: INTERNAL MEDICINE

## 2023-08-17 PROCEDURE — 1101F PT FALLS ASSESS-DOCD LE1/YR: CPT | Mod: CPTII,S$GLB,, | Performed by: INTERNAL MEDICINE

## 2023-08-17 PROCEDURE — 99999 PR PBB SHADOW E&M-EST. PATIENT-LVL IV: ICD-10-PCS | Mod: PBBFAC,,, | Performed by: INTERNAL MEDICINE

## 2023-08-17 PROCEDURE — 3074F PR MOST RECENT SYSTOLIC BLOOD PRESSURE < 130 MM HG: ICD-10-PCS | Mod: CPTII,S$GLB,, | Performed by: INTERNAL MEDICINE

## 2023-08-17 PROCEDURE — 71260 CT THORAX DX C+: CPT | Mod: 26,,, | Performed by: RADIOLOGY

## 2023-08-17 PROCEDURE — 25500020 PHARM REV CODE 255: Performed by: INTERNAL MEDICINE

## 2023-08-17 PROCEDURE — 1101F PR PT FALLS ASSESS DOC 0-1 FALLS W/OUT INJ PAST YR: ICD-10-PCS | Mod: CPTII,S$GLB,, | Performed by: INTERNAL MEDICINE

## 2023-08-17 PROCEDURE — 3078F DIAST BP <80 MM HG: CPT | Mod: CPTII,S$GLB,, | Performed by: INTERNAL MEDICINE

## 2023-08-17 PROCEDURE — 99999 PR PBB SHADOW E&M-EST. PATIENT-LVL IV: CPT | Mod: PBBFAC,,, | Performed by: INTERNAL MEDICINE

## 2023-08-17 RX ORDER — FLUTICASONE FUROATE, UMECLIDINIUM BROMIDE AND VILANTEROL TRIFENATATE 200; 62.5; 25 UG/1; UG/1; UG/1
1 POWDER RESPIRATORY (INHALATION) DAILY
Qty: 180 EACH | Refills: 3 | Status: SHIPPED | OUTPATIENT
Start: 2023-08-17 | End: 2023-09-18 | Stop reason: SDUPTHER

## 2023-08-17 RX ADMIN — IOHEXOL 75 ML: 350 INJECTION, SOLUTION INTRAVENOUS at 10:08

## 2023-08-17 NOTE — PROGRESS NOTES
"Subjective:      Patient ID: Chai Murry is a 83 y.o. male.    Chief Complaint: COPD      HPI         Lung Nodule  He presents for evaluation and treatment of a lung nodule. The patient had an abnormal imaging study but reports experiencing no current or past pulmonary symptoms. Other symptoms include dyspnea on exertion and non productive cough. He has a history of 50 pack years. The patient has no known exposure to tuberculosis. The patient does have a history of  lung cancer.    Follow up COPD.Squamous cell carcinoma of the lung status post left pneumonectomy and adjuvant chemoradiation completed 09/27/2016. Noted voice hoarseness in 2019 found to have PET avid mass left of trachea consistent with recurrent squamous cell carcinoma. Following the oncology.   Doing well with Trelegy.   Chronic CARVAJAL on oxygen therapy. Benefits from oxygen use.       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  Toxic Fume Exposure: chlorine a few times        Patient Active Problem List   Diagnosis    Coronary artery disease of native artery of native heart with stable angina pectoris    Hyperlipidemia    Essential hypertension    Anemia    ED (erectile dysfunction)    Cataract    Amblyopia    Chronic obstructive pulmonary disease    Malignant neoplasm of upper lobe of left lung    s/p left pnuemonectomy for [facility]    Asbestos-induced pleural plaque    Reactive depression    Insomnia    Paralysis of vocal cords and larynx, unilateral    Tracheal mass: 3 cm BELOW VOCAL CORDS: SUBGLOTIC    Abnormal echocardiogram    Leg edema    Chemotherapy management, encounter for    Paroxysmal atrial fibrillation    PVD (peripheral vascular disease)       /62   Pulse (!) 58   Resp 17   Ht 5' 10" (1.778 m)   Wt 93.1 kg (205 lb 4 oz)   " SpO2 96%   BMI 29.45 kg/m²   Body mass index is 29.45 kg/m².    Review of Systems   Constitutional: Negative.    HENT: Negative.     Respiratory:  Positive for dyspnea on extertion.    Cardiovascular:  Positive for leg swelling.   Gastrointestinal: Negative.    Psychiatric/Behavioral: Negative.     All other systems reviewed and are negative.    Objective:      Physical Exam  Constitutional:       Appearance: Normal appearance.   HENT:      Head: Normocephalic and atraumatic.      Nose: Nose normal.      Mouth/Throat:      Pharynx: Oropharynx is clear.   Cardiovascular:      Rate and Rhythm: Normal rate and regular rhythm.   Pulmonary:      Breath sounds: Normal breath sounds.      Comments: Absent BS Left  Abdominal:      Palpations: Abdomen is soft.   Musculoskeletal:      Right lower leg: Edema present.      Left lower leg: Edema present.   Neurological:      General: No focal deficit present.      Mental Status: He is alert and oriented to person, place, and time.   Psychiatric:         Mood and Affect: Mood normal.         Behavior: Behavior normal.       Personal Diagnostic test            Phase Oxygen Assessment Supplemental O2 Heart   Rate Blood Pressure Jimenez Dyspnea Scale Rating   Resting 95 % Room Air 62 bpm 159/67 0   Exercise             Minute             1 88 % Room Air 79 bpm       2 86 % 2 L/M 85 bpm       3 87 % 3 L/M 93 bpm       4 93 % 4 L/M 93 bpm       5 91 % 4 L/M 99 bpm       6  91 % 4 L/M 96 bpm 158/71 4   Recovery             Minute             1 92 % 4 L/M 90 bpm       2 99 % 4 L/M 75 bpm       3 100 % 4 L/M 62 bpm       4 100 % 4 L/M 63 bpm 144/66 3         Results for orders placed during the hospital encounter of 09/14/22    X-Ray Chest PA And Lateral    Narrative  EXAMINATION:  XR CHEST PA AND LATERAL    CLINICAL HISTORY:  SOB; Chronic obstructive pulmonary disease, unspecified    TECHNIQUE:  PA and lateral views of the chest were performed.    COMPARISON:  Prior  radiographs    FINDINGS:  Right-sided MediPort removed.  There is persistent opacification of the left hemithorax consistent with prior pneumonectomy.  Expected leftward shift cardiomediastinal structures again noted.  Right lung demonstrates possible developing nodular opacity at the lateral base.  No large pleural effusion.  CT chest recommended.    Impression  As above.  This report was flagged in Epic as abnormal.      Electronically signed by: Giovani Nicole MD  Date:    09/14/2022  Time:    10:31        Assessment:       1. Solitary pulmonary nodule    2. Asthma with COPD    3. Chronic obstructive pulmonary disease, unspecified COPD type    4. Exercise hypoxemia    5. Asbestos-induced pleural plaque        Outpatient Encounter Medications as of 8/17/2023   Medication Sig Dispense Refill    albuterol (PROVENTIL) 2.5 mg /3 mL (0.083 %) nebulizer solution Take 3 mLs (2.5 mg total) by nebulization every 6 (six) hours while awake. 270 mL 11    ammonium lactate (LAC-HYDRIN) 12 % lotion Apply to damp skin after bathing 225 g 11    aspirin (ECOTRIN) 81 MG EC tablet Take 81 mg by mouth once daily.      doxepin (SINEQUAN) 10 MG capsule Take 1 capsule (10 mg total) by mouth every evening. For insomnia 90 capsule 3    fenofibric acid (FIBRICOR) 135 mg CpDR TAKE 1 CAPSULE (135 MG TOTAL) BY MOUTH ONCE DAILY. 90 capsule 3    fluticasone-umeclidin-vilanter (TRELEGY ELLIPTA) 200-62.5-25 mcg inhaler Inhale 1 puff into the lungs once daily. 180 each 3    levocetirizine (XYZAL) 5 MG tablet Take 5 mg by mouth as needed.       lisinopriL (PRINIVIL,ZESTRIL) 40 MG tablet TAKE 1 TABLET BY MOUTH EVERY DAY 90 tablet 3    ranolazine (RANEXA) 500 MG Tb12 TAKE 1 TABLET BY MOUTH TWICE A  tablet 3    simvastatin (ZOCOR) 80 MG tablet TAKE 1 TABLET BY MOUTH EVERY DAY 90 tablet 3    sotaloL (BETAPACE) 80 MG tablet TAKE 1 TABLET (80 MG TOTAL) BY MOUTH 2 (TWO) TIMES DAILY. 180 tablet 3    [DISCONTINUED] amLODIPine (NORVASC) 5 MG tablet  TAKE 1 TABLET BY MOUTH EVERYDAY AT BEDTIME 90 tablet 3    [DISCONTINUED] fluticasone-umeclidin-vilanter (TRELEGY ELLIPTA) 200-62.5-25 mcg inhaler Inhale 1 puff into the lungs once daily. 180 each 3    [] iohexoL (OMNIPAQUE 350) injection 75 mL        No facility-administered encounter medications on file as of 2023.     Orders Placed This Encounter   Procedures    CT Chest Without Contrast     Standing Status:   Future     Standing Expiration Date:   2024     Order Specific Question:   May the Radiologist modify the order per protocol to meet the clinical needs of the patient?     Answer:   Yes    Six Minute Walk Test to qualify for Home Oxygen     Standing Status:   Future     Standing Expiration Date:   2024     Order Specific Question:   Release to patient     Answer:   Immediate     Plan:   Possible developing nodular opacity at the right lateral base.  CT chest     Problem List Items Addressed This Visit       Chronic obstructive pulmonary disease    Relevant Orders    Six Minute Walk Test to qualify for Home Oxygen    Asbestos-induced pleural plaque     Other Visit Diagnoses       Solitary pulmonary nodule    -  Primary    Relevant Orders    CT Chest Without Contrast    Asthma with COPD        Relevant Medications    fluticasone-umeclidin-vilanter (TRELEGY ELLIPTA) 200-62.5-25 mcg inhaler    Exercise hypoxemia        Relevant Orders    Six Minute Walk Test to qualify for Home Oxygen        Continue oxygen therapy. Benefits from use.    Time spent 40 minutes

## 2023-08-18 ENCOUNTER — OFFICE VISIT (OUTPATIENT)
Dept: HEMATOLOGY/ONCOLOGY | Facility: CLINIC | Age: 83
End: 2023-08-18
Payer: MEDICARE

## 2023-08-18 VITALS
HEART RATE: 53 BPM | BODY MASS INDEX: 29.54 KG/M2 | TEMPERATURE: 98 F | RESPIRATION RATE: 18 BRPM | SYSTOLIC BLOOD PRESSURE: 130 MMHG | HEIGHT: 70 IN | WEIGHT: 206.38 LBS | DIASTOLIC BLOOD PRESSURE: 60 MMHG | OXYGEN SATURATION: 96 %

## 2023-08-18 DIAGNOSIS — R91.1 NODULE OF RIGHT LUNG: ICD-10-CM

## 2023-08-18 DIAGNOSIS — C34.12 MALIGNANT NEOPLASM OF UPPER LOBE OF LEFT LUNG: ICD-10-CM

## 2023-08-18 DIAGNOSIS — J44.9 CHRONIC OBSTRUCTIVE PULMONARY DISEASE, UNSPECIFIED COPD TYPE: ICD-10-CM

## 2023-08-18 DIAGNOSIS — D53.9 NUTRITIONAL ANEMIA, UNSPECIFIED: Primary | ICD-10-CM

## 2023-08-18 PROCEDURE — 99213 PR OFFICE/OUTPT VISIT, EST, LEVL III, 20-29 MIN: ICD-10-PCS | Mod: S$GLB,,, | Performed by: INTERNAL MEDICINE

## 2023-08-18 PROCEDURE — 99999 PR PBB SHADOW E&M-EST. PATIENT-LVL III: CPT | Mod: PBBFAC,,, | Performed by: INTERNAL MEDICINE

## 2023-08-18 PROCEDURE — 99213 OFFICE O/P EST LOW 20 MIN: CPT | Mod: S$GLB,,, | Performed by: INTERNAL MEDICINE

## 2023-08-18 PROCEDURE — 1101F PT FALLS ASSESS-DOCD LE1/YR: CPT | Mod: CPTII,S$GLB,, | Performed by: INTERNAL MEDICINE

## 2023-08-18 PROCEDURE — 3288F FALL RISK ASSESSMENT DOCD: CPT | Mod: CPTII,S$GLB,, | Performed by: INTERNAL MEDICINE

## 2023-08-18 PROCEDURE — 1101F PR PT FALLS ASSESS DOC 0-1 FALLS W/OUT INJ PAST YR: ICD-10-PCS | Mod: CPTII,S$GLB,, | Performed by: INTERNAL MEDICINE

## 2023-08-18 PROCEDURE — 1126F PR PAIN SEVERITY QUANTIFIED, NO PAIN PRESENT: ICD-10-PCS | Mod: CPTII,S$GLB,, | Performed by: INTERNAL MEDICINE

## 2023-08-18 PROCEDURE — 1159F MED LIST DOCD IN RCRD: CPT | Mod: CPTII,S$GLB,, | Performed by: INTERNAL MEDICINE

## 2023-08-18 PROCEDURE — 3078F PR MOST RECENT DIASTOLIC BLOOD PRESSURE < 80 MM HG: ICD-10-PCS | Mod: CPTII,S$GLB,, | Performed by: INTERNAL MEDICINE

## 2023-08-18 PROCEDURE — 3288F PR FALLS RISK ASSESSMENT DOCUMENTED: ICD-10-PCS | Mod: CPTII,S$GLB,, | Performed by: INTERNAL MEDICINE

## 2023-08-18 PROCEDURE — 1126F AMNT PAIN NOTED NONE PRSNT: CPT | Mod: CPTII,S$GLB,, | Performed by: INTERNAL MEDICINE

## 2023-08-18 PROCEDURE — 3078F DIAST BP <80 MM HG: CPT | Mod: CPTII,S$GLB,, | Performed by: INTERNAL MEDICINE

## 2023-08-18 PROCEDURE — 3075F PR MOST RECENT SYSTOLIC BLOOD PRESS GE 130-139MM HG: ICD-10-PCS | Mod: CPTII,S$GLB,, | Performed by: INTERNAL MEDICINE

## 2023-08-18 PROCEDURE — 3075F SYST BP GE 130 - 139MM HG: CPT | Mod: CPTII,S$GLB,, | Performed by: INTERNAL MEDICINE

## 2023-08-18 PROCEDURE — 1159F PR MEDICATION LIST DOCUMENTED IN MEDICAL RECORD: ICD-10-PCS | Mod: CPTII,S$GLB,, | Performed by: INTERNAL MEDICINE

## 2023-08-18 PROCEDURE — 99999 PR PBB SHADOW E&M-EST. PATIENT-LVL III: ICD-10-PCS | Mod: PBBFAC,,, | Performed by: INTERNAL MEDICINE

## 2023-08-18 NOTE — PROGRESS NOTES
Subjective:      DATE OF VISIT: 2023   ?   ?   Patient ID:?Chai Murry is a 83 y.o. male.?? MR#: [id number]   ?   PRIMARY PROVIDER:  Dr. Vila -> Dr. Casper  ?   CHIEF COMPLAINT:  Follow-up?????   ?   ONCOLOGIC DIAGNOSIS:  Recurrent squamous cell carcinoma, with metastatic disease involving the trachea  ?   CURRENT TREATMENT:  Surveillance    PAST TREATMENT:  Chemoradiation ; then with recurrence chemoradiation and immunotherapy durvalumab, completed 2020    HPI  I had the pleasure seeing in follow-up Mr. Murry.     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  He developed hoarseness in 2019 found to have PET avid mass left of trachea consistent with recurrent squamous cell carcinoma.  He underwent chemoradiation with cisplatin and completed 6 weekly treatments  with good response then immunotherapy maintenance with durvalumab completed 2020.  Restaging scan 2020 with his primary doctor Cadence showed no FDG avidity  to suggest recurrent or metastatic disease.    INTERVAL EVENTS     Repeat CT scan chest follow-up yesterday and Pulmonary follow-up.  No new chest pain shortness of breath dyspnea dysphagia odynophagia or unintentional weight loss or new pain throughout.  Review of Systems    ?   A comprehensive 14-point review of systems was reviewed with patient and was negative other than as specified above.   ?     Objective:      Physical Exam       ?   Vitals:    23 1119   BP: 130/60   Pulse: (!) 53   Resp: 18   Temp: 97.6 °F (36.4 °C)      ECO  General appearance: Generally well appearing, in no acute distress.   Head, eyes, ears, nose, and throat: Oropharynx clear with moist mucous membranes.   Cardiovascular: Regular rate and rhythm, systolic ejection murmur  Respiratory: Lungs clear to auscultation bilaterally.   Abdomen: nontender, nondistended.   Extremities:  Warm, without edema.   Neurologic: Alert and oriented.  Skin: No rashes, ecchymoses or petechial lesion.   Psychiatric: normal mood and affect, conversant and appropriate      Laboratory:  ?   No visits with results within 1 Day(s) from this visit.   Latest known visit with results is:   Lab Visit on 08/17/2023   Component Date Value Ref Range Status    Creatinine 08/17/2023 1.1  0.5 - 1.4 mg/dL Final    eGFR 08/17/2023 >60  >60 mL/min/1.73 m^2 Final      ?   No results found. However, due to the size of the patient record, not all encounters were searched. Please check Results Review for a complete set of results.      IMAGING     Results for orders placed or performed during the hospital encounter of 08/17/23 (from the past 2160 hour(s))   CT Chest With Contrast    Narrative    EXAM:  CT CHEST WITH CONTRAST    CLINICAL HISTORY: Small cell lung cancer    TECHNIQUE: Routine contrast-enhanced chest CT protocol was performed. after the intravenous administration of 100 mL of Omni 350.    COMPARISON: 12/22/2022    FINDINGS: Base of Neck: No significant abnormality.    Airways: Patent.    Lungs: Left pneumonectomy with postoperative changes left hilum and fluid in left pleural space. Mild thickening but without definite evidence for infection. Findings are similar to prior. Nodular thickening along the right minor and major fissure similar to prior measuring up to 14 mm again noted and appears stable..  6 and 8 mm nodules within the right lower lobe on images 251 and 257 are minimally increased from prior.  Findings are still too small to characterize and not readily amenable for biopsy.  Continued close interval surveillance is recommended.    Moderate emphysematous changes throughout the right lung with some mild interstitial thickening which could reflect mild underlying chronic interstitial lung disease.    Pleura: No pleural fluid.No pleural calcification.    Mary/Mediastinum: No pathologic karen  enlargement.    Esophagus: Normal.    Heart/pericardium: Normal size.  Similar calcification seen along the lateral margin of the mitral valve.  Trace pericardial effusion. No calcification.    Pulmonary vasculature: Pulmonary arteries distribute normally. There are four pulmonary veins.    Aorta: Left-sided aortic arch with 3 arterial branches. The aorta maintains normal caliber, contour and course. There is with moderate calcification of the thoracic aorta.  Severe coronary artery calcification. Tortuosity of the  descending aorta can be seen with chronic hypertension.    Thoracic soft tissues: Normal. Both breasts are present.    Bones: No acute fracture. Mild spondylosis and degenerative changes with mild scoliotic curvature of the thoracic spine. No suspicious lytic or sclerotic lesion. Remote left-sided rib fractures.    Upper Abdomen: Diffuse hepatic steatosis. Large bowel demonstrates scattered diverticulosis and mild fatty infiltration of the wall which can be seen with chronic inflammation.  Postcholecystectomy.  Complex cyst are seen within the left kidney measuring up to 3.3 cm.        Impression    6 and 8 mm nodules within the right lower lobe on images 251 and 257 are minimally increased from prior.  Findings are still too small to characterize and not readily amenable for biopsy.  Continued close interval surveillance is recommended.    All CT scans at [this location] are performed using dose modulation techniques as appropriate to a performed exam including the following: automated exposure control; adjustment of the mA and/or kV according to patient size (this includes techniques or standardized protocols for targeted exams where dose is matched to indication / reason for exam; i.e. extremities or head); use of iterative reconstruction technique.    Finalized on: 8/17/2023 11:36 AM By:  Chandra Zheng MD  BRRG# [id number]      2023-08-17 11:38:48.477    BRPIEDADG     *Note: Due to a large number of results  and/or encounters for the requested time period, some results have not been displayed. A complete set of results can be found in Results Review.         ?   Assessment/Plan:       1. Nutritional anemia, unspecified    2. Malignant neoplasm of upper lobe of left lung    3. Chronic obstructive pulmonary disease, unspecified COPD type    4. Nodule of right lung                  Plan:     # recurrent/metastatic squamous cell carcinoma:  Istatus post chemoradiation in November 2020 completed maintenance durvalumab.  November 2020 PET-CT with Dr. Vila without evidence of recurrent/metastatic disease resolution of prior avidity in left trachea.  MRI brain with chronic microvascular changes/atrophy no evidence of metastatic disease.   He has been in surveillance with staging PET scan October 2022 and short interval CT scan January 2023 follow-up in March and now in August 2023 with relatively stable subcentimeter pulmonary nodules right lower lung.  Surveillance recommended.  Recommend continued follow-up with Pulmonary.  Next set schedule scans in 6 months which he notes already ordered by Pulmonary see in the system will see him after this or sooner if clinical concern.        Anemia: Relatively stable anemia.  No paraprotein.  Continue to monitor.    Follow-Up:   Route Chart for Scheduling    Med Onc Chart Routing      Follow up with physician    Follow up with [facility] 6 months.   Infusion scheduling note    Injection scheduling note    Labs CBC and CMP   Scheduling:  Preferred lab:  Lab interval:     Imaging    Pharmacy appointment    Other referrals                        Otezla Counseling: The side effects of Otezla were discussed with the patient, including but not limited to worsening or new depression, weight loss, diarrhea, nausea, upper respiratory tract infection, and headache. Patient instructed to call the office should any adverse effect occur.  The patient verbalized understanding of the proper use and possible adverse effects of Otezla.  All the patient's questions and concerns were addressed.

## 2023-08-19 NOTE — TELEPHONE ENCOUNTER
No care due was identified.  [facility] Embedded Care Due Messages. Reference number: [id number].   8/19/2023 1:07:54 AM CDT

## 2023-08-20 RX ORDER — AMLODIPINE BESYLATE 5 MG/1
TABLET ORAL
Qty: 90 TABLET | Refills: 2 | Status: SHIPPED | OUTPATIENT
Start: 2023-08-20

## 2023-08-20 NOTE — TELEPHONE ENCOUNTER
Refill Decision Note   Chai Jeanmarie  is requesting a refill authorization.  Brief Assessment and Rationale for Refill:  Approve     Medication Therapy Plan:         Comments:     Note composed:3:48 AM 08/20/2023

## 2023-08-22 PROBLEM — E66.09 CLASS 1 OBESITY DUE TO EXCESS CALORIES WITH SERIOUS COMORBIDITY AND BODY MASS INDEX (BMI) OF 31.0 TO 31.9 IN ADULT: Status: RESOLVED | Noted: 2022-03-14 | Resolved: 2023-08-22

## 2023-08-22 PROBLEM — E66.811 CLASS 1 OBESITY DUE TO EXCESS CALORIES WITH SERIOUS COMORBIDITY AND BODY MASS INDEX (BMI) OF 31.0 TO 31.9 IN ADULT: Status: RESOLVED | Noted: 2022-03-14 | Resolved: 2023-08-22

## 2023-09-11 ENCOUNTER — TELEPHONE (OUTPATIENT)
Dept: INTERNAL MEDICINE | Facility: CLINIC | Age: 83
End: 2023-09-11
Payer: MEDICARE

## 2023-09-11 NOTE — TELEPHONE ENCOUNTER
----- Message from Radha Bryan sent at 9/11/2023  9:39 AM CDT -----  Pt asked for  to write a letter to [facility] stating he is disabled ,that he cant do much without having to be on oxygen,Please call back at [phone].Thanks Can someone call back to let him know if it can and when to .

## 2023-09-18 ENCOUNTER — OFFICE VISIT (OUTPATIENT)
Dept: PULMONOLOGY | Facility: CLINIC | Age: 83
End: 2023-09-18
Payer: MEDICARE

## 2023-09-18 ENCOUNTER — CLINICAL SUPPORT (OUTPATIENT)
Dept: PULMONOLOGY | Facility: CLINIC | Age: 83
End: 2023-09-18
Payer: MEDICARE

## 2023-09-18 VITALS
BODY MASS INDEX: 29.06 KG/M2 | OXYGEN SATURATION: 94 % | SYSTOLIC BLOOD PRESSURE: 144 MMHG | HEART RATE: 58 BPM | DIASTOLIC BLOOD PRESSURE: 64 MMHG | WEIGHT: 203 LBS | RESPIRATION RATE: 18 BRPM | HEIGHT: 70 IN

## 2023-09-18 VITALS — HEIGHT: 70 IN | BODY MASS INDEX: 29.03 KG/M2 | WEIGHT: 202.81 LBS

## 2023-09-18 DIAGNOSIS — J44.89 ASTHMA WITH COPD: ICD-10-CM

## 2023-09-18 DIAGNOSIS — R09.02 EXERCISE HYPOXEMIA: Primary | ICD-10-CM

## 2023-09-18 DIAGNOSIS — J44.9 CHRONIC OBSTRUCTIVE PULMONARY DISEASE, UNSPECIFIED COPD TYPE: ICD-10-CM

## 2023-09-18 DIAGNOSIS — R91.8 OPACITY OF LUNG ON IMAGING STUDY: ICD-10-CM

## 2023-09-18 DIAGNOSIS — J92.0 ASBESTOS-INDUCED PLEURAL PLAQUE: ICD-10-CM

## 2023-09-18 DIAGNOSIS — R91.1 SOLITARY PULMONARY NODULE: ICD-10-CM

## 2023-09-18 DIAGNOSIS — J41.0 SIMPLE CHRONIC BRONCHITIS: ICD-10-CM

## 2023-09-18 PROCEDURE — 1126F PR PAIN SEVERITY QUANTIFIED, NO PAIN PRESENT: ICD-10-PCS | Mod: CPTII,S$GLB,, | Performed by: INTERNAL MEDICINE

## 2023-09-18 PROCEDURE — 3288F PR FALLS RISK ASSESSMENT DOCUMENTED: ICD-10-PCS | Mod: CPTII,S$GLB,, | Performed by: INTERNAL MEDICINE

## 2023-09-18 PROCEDURE — 94618 PULMONARY STRESS TESTING: ICD-10-PCS | Mod: S$GLB,,, | Performed by: INTERNAL MEDICINE

## 2023-09-18 PROCEDURE — 94618 PULMONARY STRESS TESTING: CPT | Mod: S$GLB,,, | Performed by: INTERNAL MEDICINE

## 2023-09-18 PROCEDURE — 3078F PR MOST RECENT DIASTOLIC BLOOD PRESSURE < 80 MM HG: ICD-10-PCS | Mod: CPTII,S$GLB,, | Performed by: INTERNAL MEDICINE

## 2023-09-18 PROCEDURE — 1126F AMNT PAIN NOTED NONE PRSNT: CPT | Mod: CPTII,S$GLB,, | Performed by: INTERNAL MEDICINE

## 2023-09-18 PROCEDURE — 1101F PR PT FALLS ASSESS DOC 0-1 FALLS W/OUT INJ PAST YR: ICD-10-PCS | Mod: CPTII,S$GLB,, | Performed by: INTERNAL MEDICINE

## 2023-09-18 PROCEDURE — 3077F SYST BP >= 140 MM HG: CPT | Mod: CPTII,S$GLB,, | Performed by: INTERNAL MEDICINE

## 2023-09-18 PROCEDURE — 99999 PR PBB SHADOW E&M-EST. PATIENT-LVL III: ICD-10-PCS | Mod: PBBFAC,,, | Performed by: INTERNAL MEDICINE

## 2023-09-18 PROCEDURE — 99215 PR OFFICE/OUTPT VISIT, EST, LEVL V, 40-54 MIN: ICD-10-PCS | Mod: 25,S$GLB,, | Performed by: INTERNAL MEDICINE

## 2023-09-18 PROCEDURE — 99999 PR PBB SHADOW E&M-EST. PATIENT-LVL I: CPT | Mod: PBBFAC,,,

## 2023-09-18 PROCEDURE — 99999 PR PBB SHADOW E&M-EST. PATIENT-LVL III: CPT | Mod: PBBFAC,,, | Performed by: INTERNAL MEDICINE

## 2023-09-18 PROCEDURE — 1101F PT FALLS ASSESS-DOCD LE1/YR: CPT | Mod: CPTII,S$GLB,, | Performed by: INTERNAL MEDICINE

## 2023-09-18 PROCEDURE — 3288F FALL RISK ASSESSMENT DOCD: CPT | Mod: CPTII,S$GLB,, | Performed by: INTERNAL MEDICINE

## 2023-09-18 PROCEDURE — 3077F PR MOST RECENT SYSTOLIC BLOOD PRESSURE >= 140 MM HG: ICD-10-PCS | Mod: CPTII,S$GLB,, | Performed by: INTERNAL MEDICINE

## 2023-09-18 PROCEDURE — 99999 PR PBB SHADOW E&M-EST. PATIENT-LVL I: ICD-10-PCS | Mod: PBBFAC,,,

## 2023-09-18 PROCEDURE — 1159F MED LIST DOCD IN RCRD: CPT | Mod: CPTII,S$GLB,, | Performed by: INTERNAL MEDICINE

## 2023-09-18 PROCEDURE — 99215 OFFICE O/P EST HI 40 MIN: CPT | Mod: 25,S$GLB,, | Performed by: INTERNAL MEDICINE

## 2023-09-18 PROCEDURE — 1159F PR MEDICATION LIST DOCUMENTED IN MEDICAL RECORD: ICD-10-PCS | Mod: CPTII,S$GLB,, | Performed by: INTERNAL MEDICINE

## 2023-09-18 PROCEDURE — 3078F DIAST BP <80 MM HG: CPT | Mod: CPTII,S$GLB,, | Performed by: INTERNAL MEDICINE

## 2023-09-18 RX ORDER — FLUTICASONE FUROATE, UMECLIDINIUM BROMIDE AND VILANTEROL TRIFENATATE 200; 62.5; 25 UG/1; UG/1; UG/1
1 POWDER RESPIRATORY (INHALATION) DAILY
Qty: 180 EACH | Refills: 3 | Status: SHIPPED | OUTPATIENT
Start: 2023-09-18 | End: 2024-02-19 | Stop reason: SDUPTHER

## 2023-09-18 RX ORDER — ALBUTEROL SULFATE 90 UG/1
2 AEROSOL, METERED RESPIRATORY (INHALATION) EVERY 4 HOURS PRN
Qty: 18 G | Refills: 11 | Status: SHIPPED | OUTPATIENT
Start: 2023-09-18 | End: 2024-02-19 | Stop reason: SDUPTHER

## 2023-09-18 RX ORDER — ALBUTEROL SULFATE 0.83 MG/ML
2.5 SOLUTION RESPIRATORY (INHALATION)
Qty: 270 ML | Refills: 11 | Status: SHIPPED | OUTPATIENT
Start: 2023-09-18 | End: 2024-02-19 | Stop reason: SDUPTHER

## 2023-09-18 NOTE — PROCEDURES
"[facility] [place] 3rdFl  Six Minute Walk   SUMMARY   Ordering Provider: Roel Ernandez MD   Interpreting Provider: Roel Ernandez MD  Performing nurse/tech/RT: VT, RT  Diagnosis:  (Solitary pulmonary nodule; Opacity of lung on imaging study; Asbestos-induced pleural plaque)  Height: 5' 10" (177.8 cm)  Weight: 92 kg (202 lb 13.2 oz)  BMI (Calculated): 29.1   Patient Race:     Phase Oxygen Assessment Supplemental O2 Heart   Rate Blood Pressure Jimenez Dyspnea Scale Rating   Resting 94 % Room Air 58 bpm 144/64 3   Exercise        Minute        1 93 % Room Air 75 bpm     2 88 % (placed on NC 3L to maintain spo2 >=88%) Room Air 86 bpm     3 94 % 3 L/M 92 bpm     4 95 % 3 L/M 86 bpm     5 93 % 3 L/M 91 bpm     6  90 % 3 L/M 97 bpm 193/78 7-8   Recovery        Minute        1 96 % 3 L/M 70 bpm     2 100 % 3 L/M 62 bpm     3 99 % 3 L/M 60 bpm     4 100 % 3 L/M 59 bpm 144/66 4     Six Minute Walk Summary  6MWT Status: completed without stopping  Patient Reported: Dyspnea     Interpretation:  Did the patient stop or pause?: No         Total Time Walked (Calculated): 360 seconds  Final Partial Lap Distance (feet): 100 feet  Total Distance Meters (Calculated): 335.28 meters  Predicted Distance Meters (Calculated): 463.39 meters  Percentage of Predicted (Calculated): 72.35  Peak VO2 (Calculated): 14.04  Mets: 4.01  Has The Patient Had a Previous Six Minute Walk Test?: Yes       Previous 6MWT Results  Has The Patient Had a Previous Six Minute Walk Test?: Yes  Date of Previous Test: 09/14/22  Total Time Walked: 360 seconds  Total Distance (meters): 335.28  Predicted Distance (meters): 456.44 meters  Percentage of Predicted: 73.46  Percent Change (Calculated): 0    Interpretation:  Total distance walked in six minutes is mildly reduced indicating an mild reduction in functional capacity.  There was significant severe oxygen desaturation to 88 % with exercise on room air (oxygen saturation less than 89%). " Supplemental oxygen was administered for the remainder of the test as noted above. Clinical correlation suggested.  Patient met criteria for oxygen prescription.  [] Mild exercise-induced hypoxemia described as an arterial oxygen saturation of 93-95% (with a fall of 3-4% with exercise),   [] Moderate exercise-induced hypoxemia as a fall in oxygen saturation to  89-93% (with a fall of 3-4 % with exercise)  [x] Severe exercise induced hypoxemia as < 89% O2 saturation (88% and below).  Medicare Criteria for Oxygen prescription comments: When arterial oxygen saturation is at or below 88% during exercise (severe exercise induced hypoxemia) then the patient meets criteria for oxygen prescription.  Details about Medicare Group Criteria coverage can be found at http://www.cms.[facility].gov/manuals/downloads/     Roel Ernandez MD

## 2023-09-18 NOTE — PROGRESS NOTES
"Subjective:      Patient ID: Chai Murry is a 83 y.o. male.    Chief Complaint: Shortness of Breath      HPI       Follow up COPD.Squamous cell carcinoma of the lung status post left pneumonectomy and adjuvant chemoradiation completed 09/27/2016. Noted voice hoarseness in 2019 found to have PET avid mass left of trachea consistent with recurrent squamous cell carcinoma. Following the oncology.   Doing well with Trelegy.   Chronic CARVAJAL on oxygen therapy. Benefits from oxygen use.       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  Toxic Fume Exposure: chlorine a few times        Patient Active Problem List   Diagnosis    Coronary artery disease of native artery of native heart with stable angina pectoris    Hyperlipidemia    Essential hypertension    Anemia    ED (erectile dysfunction)    Cataract    Amblyopia    Chronic obstructive pulmonary disease    Malignant neoplasm of upper lobe of left lung    s/p left pnuemonectomy for [facility]    Asbestos-induced pleural plaque    Reactive depression    Insomnia    Paralysis of vocal cords and larynx, unilateral    Tracheal mass: 3 cm BELOW VOCAL CORDS: SUBGLOTIC    Abnormal echocardiogram    Leg edema    Chemotherapy management, encounter for    Paroxysmal atrial fibrillation    PVD (peripheral vascular disease)       BP (!) 144/64 (BP Location: Left arm, Patient Position: Sitting, BP Method: Medium (Manual))   Pulse (!) 58   Resp 18   Ht 5' 10" (1.778 m)   Wt 92.1 kg (203 lb)   SpO2 (!) 94%   BMI 29.13 kg/m²   Body mass index is 29.13 kg/m².    Review of Systems   Constitutional: Negative.    HENT: Negative.     Respiratory:  Positive for dyspnea on extertion.    Cardiovascular:  Positive for leg swelling.   Gastrointestinal: Negative.    Psychiatric/Behavioral: " Negative.     All other systems reviewed and are negative.    Objective:      Physical Exam  Constitutional:       Appearance: Normal appearance.   HENT:      Head: Normocephalic and atraumatic.      Nose: Nose normal.      Mouth/Throat:      Pharynx: Oropharynx is clear.   Cardiovascular:      Rate and Rhythm: Normal rate and regular rhythm.   Pulmonary:      Breath sounds: Normal breath sounds.      Comments: Absent BS Left  Abdominal:      Palpations: Abdomen is soft.   Musculoskeletal:      Right lower leg: Edema present.      Left lower leg: Edema present.   Neurological:      General: No focal deficit present.      Mental Status: He is alert and oriented to person, place, and time.   Psychiatric:         Mood and Affect: Mood normal.         Behavior: Behavior normal.       Personal Diagnostic test            Phase Oxygen Assessment Supplemental O2 Heart   Rate Blood Pressure Jimenez Dyspnea Scale Rating   Resting 95 % Room Air 62 bpm 159/67 0   Exercise             Minute             1 88 % Room Air 79 bpm       2 86 % 2 L/M 85 bpm       3 87 % 3 L/M 93 bpm       4 93 % 4 L/M 93 bpm       5 91 % 4 L/M 99 bpm       6  91 % 4 L/M 96 bpm 158/71 4   Recovery             Minute             1 92 % 4 L/M 90 bpm       2 99 % 4 L/M 75 bpm       3 100 % 4 L/M 62 bpm       4 100 % 4 L/M 63 bpm 144/66 3         Results for orders placed during the hospital encounter of 09/14/22    X-Ray Chest PA And Lateral    Narrative  EXAMINATION:  XR CHEST PA AND LATERAL    CLINICAL HISTORY:  SOB; Chronic obstructive pulmonary disease, unspecified    TECHNIQUE:  PA and lateral views of the chest were performed.    COMPARISON:  Prior radiographs    FINDINGS:  Right-sided MediPort removed.  There is persistent opacification of the left hemithorax consistent with prior pneumonectomy.  Expected leftward shift cardiomediastinal structures again noted.  Right lung demonstrates possible developing nodular opacity at the lateral base.  No large  pleural effusion.  CT chest recommended.    Impression  As above.  This report was flagged in Epic as abnormal.      Electronically signed by: Giovani Nicole MD  Date:    09/14/2022  Time:    10:31        Assessment:       1. Exercise hypoxemia    2. Simple chronic bronchitis    3. Asthma with COPD    4. Chronic obstructive pulmonary disease, unspecified COPD type        Outpatient Encounter Medications as of 9/18/2023   Medication Sig Dispense Refill    amLODIPine (NORVASC) 5 MG tablet TAKE 1 TABLET BY MOUTH EVERYDAY AT BEDTIME 90 tablet 2    ammonium lactate (LAC-HYDRIN) 12 % lotion Apply to damp skin after bathing 225 g 11    aspirin (ECOTRIN) 81 MG EC tablet Take 81 mg by mouth once daily.      doxepin (SINEQUAN) 10 MG capsule Take 1 capsule (10 mg total) by mouth every evening. For insomnia 90 capsule 3    fenofibric acid (FIBRICOR) 135 mg CpDR TAKE 1 CAPSULE (135 MG TOTAL) BY MOUTH ONCE DAILY. 90 capsule 3    levocetirizine (XYZAL) 5 MG tablet Take 5 mg by mouth as needed.       lisinopriL (PRINIVIL,ZESTRIL) 40 MG tablet TAKE 1 TABLET BY MOUTH EVERY DAY 90 tablet 3    ranolazine (RANEXA) 500 MG Tb12 TAKE 1 TABLET BY MOUTH TWICE A  tablet 3    simvastatin (ZOCOR) 80 MG tablet TAKE 1 TABLET BY MOUTH EVERY DAY 90 tablet 3    sotaloL (BETAPACE) 80 MG tablet TAKE 1 TABLET (80 MG TOTAL) BY MOUTH 2 (TWO) TIMES DAILY. 180 tablet 3    [DISCONTINUED] fluticasone-umeclidin-vilanter (TRELEGY ELLIPTA) 200-62.5-25 mcg inhaler Inhale 1 puff into the lungs once daily. 180 each 3    albuterol (PROVENTIL) 2.5 mg /3 mL (0.083 %) nebulizer solution Take 3 mLs (2.5 mg total) by nebulization every 6 (six) hours while awake. 270 mL 11    albuterol (PROVENTIL/VENTOLIN HFA) 90 mcg/actuation inhaler Inhale 2 puffs into the lungs every 4 (four) hours as needed for Wheezing or Shortness of Breath. 18 g 11    fluticasone-umeclidin-vilanter (TRELEGY ELLIPTA) 200-62.5-25 mcg inhaler Inhale 1 puff into the lungs once daily. Hold  "for refill 180 each 3    [DISCONTINUED] albuterol (PROVENTIL) 2.5 mg /3 mL (0.083 %) nebulizer solution Take 3 mLs (2.5 mg total) by nebulization every 6 (six) hours while awake. 270 mL 11     No facility-administered encounter medications on file as of 9/18/2023.     Orders Placed This Encounter   Procedures    OXYGEN FOR HOME USE     Needs battery operated oxygen concentrator - [employer] or  [employer] or [employer] Mini portable oxygen concentrators or similar device.  [facility] for [facility]AP/Oxygen/Nebulizer supplies.  Customer Service: [phone]  Call: [phone]  Fax: [phone]  Billing Inquiries: [phone] or [phone]     Order Specific Question:   Liter Flow     Answer:   2     Order Specific Question:   Duration     Answer:   With activity     Order Specific Question:   Qualifying Test Performed at:     Answer:   Activity     Order Specific Question:   Oxygen saturation at rest     Answer:   94     Order Specific Question:   Oxygen saturation with activity     Answer:   88     Order Specific Question:   Oxygen saturation with activity on oxygen     Answer:   94     Comments:   3 liters     Order Specific Question:   Portable mode:     Answer:   pulse dose acceptable     Order Specific Question:   Mode:     Answer:   Portable concentrator     Order Specific Question:   Route     Answer:   nasal cannula     Order Specific Question:   Device:     Answer:   home concentrator with portable concentrator     Order Specific Question:   Length of need (in months):     Answer:   99 mos     Order Specific Question:   Patient condition with qualifying saturation     Answer:   COPD     Order Specific Question:   Height:     Answer:   5' 10" (1.778 m)     Order Specific Question:   Weight:     Answer:   92.1 kg (203 lb)     Order Specific Question:   Alternative treatment measures have been tried or considered and deemed clinically ineffective.     Answer:   Yes    X-Ray Chest PA And Lateral     " Standing Status:   Future     Standing Expiration Date:   3/18/2025     Order Specific Question:   Reason for Exam:     Answer:   SOB    Spirometry with/without bronchodilator     Standing Status:   Future     Standing Expiration Date:   9/17/2024     Order Specific Question:   Release to patient     Answer:   Immediate     Plan:   Possible developing nodular opacity at the right lateral base.  CT chest     Problem List Items Addressed This Visit       Chronic obstructive pulmonary disease    Relevant Medications    albuterol (PROVENTIL) 2.5 mg /3 mL (0.083 %) nebulizer solution    Other Relevant Orders    OXYGEN FOR HOME USE    X-Ray Chest PA And Lateral    Spirometry with/without bronchodilator    X-Ray Chest PA And Lateral    Spirometry with/without bronchodilator     Other Visit Diagnoses       Exercise hypoxemia    -  Primary    Relevant Orders    OXYGEN FOR HOME USE    Asthma with COPD        Relevant Medications    fluticasone-umeclidin-vilanter (TRELEGY ELLIPTA) 200-62.5-25 mcg inhaler    albuterol (PROVENTIL/VENTOLIN HFA) 90 mcg/actuation inhaler        Continue oxygen therapy. Benefits from use.    Time spent 40 minutes

## 2023-09-18 NOTE — LETTER
September 18, 2023    's Administration   NIYAH Gallardo     [address]  [address]  [place] [address]  Phone: [phone]  Fax: [phone] September 18, 2023     Patient: Chai Murry   YOB: 1940   Date of Visit: 9/18/2023       To Whom It May Concern:    It is my medical opinion that Chai Murry is completely and permanently disabled for the following reasons:  He cannot walk 200 feet without stopping to rest.  He is restricted by lung disease to the extent that forced expiratory volume for one second when measured by spirometry is less than one liter.  He uses portable oxygen.  Duration of need: permanent    If you have any questions or concerns, please don't hesitate to call.    Sincerely,          Roel Ernandez MD

## 2023-10-13 RX ORDER — LISINOPRIL 40 MG/1
TABLET ORAL
Qty: 90 TABLET | Refills: 3 | Status: SHIPPED | OUTPATIENT
Start: 2023-10-13

## 2023-10-13 NOTE — TELEPHONE ENCOUNTER
----- Message from Yelena Allen sent at 10/13/2023 11:37 AM CDT -----  Contact: [phone]  Type:  RX Refill Request    Who Called: Chai   Refill or New Rx:refill   RX Name and Strength:lisinopriL (PRINIVIL,ZESTRIL) 40 MG tablet  How is the patient currently taking it? (ex. 1XDay):1 a day   Is this a 30 day or 90 day RX:90  Preferred Pharmacy with phone number:  [id number] [place] [address] [place] [address]  Phone: [phone] Fax: [phone]  Local or Mail Order:local   Ordering Provider:Dr Palencia   Would the patient rather a call back or a response via [facility]? Call back   Best Call Back Number:[phone]   Additional Information:

## 2023-11-14 RX ORDER — RANOLAZINE 500 MG/1
TABLET, EXTENDED RELEASE ORAL
Qty: 180 TABLET | Refills: 1 | Status: SHIPPED | OUTPATIENT
Start: 2023-11-14

## 2023-11-14 NOTE — TELEPHONE ENCOUNTER
Refill Routing Note   Medication(s) are not appropriate for processing by [facility] for the following reason(s):        Outside of protocol    [facility] action(s):  Route               Appointments  past 12m or future 3m with PCP    Date Provider   Last Visit   3/27/2023 Peter Teixeira MD   Next Visit   Visit date not found Peter Teixeira MD   ED visits in past 90 days: 0        Note composed:11:46 AM 11/14/2023

## 2023-12-19 DIAGNOSIS — D53.9 NUTRITIONAL ANEMIA, UNSPECIFIED: Primary | ICD-10-CM

## 2023-12-28 NOTE — PROGRESS NOTES
Subjective:       Patient ID: Chai Murry is a 80 y.o. male.    Chief Complaint: Malignant neoplasm of upper lobe of left lung [C34.12]  HPI: We have an opportunity to see Mr. Chai Murry in Hematology Oncology clinic at [facility] on 08/10/2020.  Mr. Chai Murry is a 80 y.o. 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   He is s/p left pneumonectomy for a squamous cell carcinoma of the left lung.0n 4/12/2016   Prior to the surgery, the PET scan showed an FDG-avid mass in the left upper   lobe abutting the left hilum with an SUV of 11.6. There seemed to be a left   hilar adenopathy as well.   Radiologist felt that he was unable to discriminate between the mass and the   lymphadenopathy. The patient had had a bronchoscopy by Dr. Roel Ernandez on   03/04/2006 that showed a partially obstructing airway in the anterior segment of   the left upper lobe with an endobronchial lesion in the anterior segment of the   left upper lobe. The specimen from the biopsy at the time of bronchoscopy was   reported as being a squamous cell carcinoma.   At the time of the surgery after the left lung was removed, the pathologist   indicated that this was a squamous cell carcinoma. It measured 3.8 cm. The   pathology indicated that this is extending into the bronchial resection margin of the lobe. This lobe was later on removed to complete the pneumonectomy   There was one lymph node positive for metastatic squamous cell carcinoma at the   AP window.   The patient was told that we would prefer to treat him with post-op simultaneous chemo/radiation.  He had Medi-port. He started chemo/radiation therapy andreceived 6 weekly cycles of carbo/Taxol along with radiatioon.   After a month, he had a Ct scan and it showed stability   He then had 2 additional cyles of carbo/Taxol finishing in 9/27/2016.   He comes for follow up.   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 CT of the chest was non contributory, shows changes from surgery   PET showed a PET avid mass to the left of the trachea.   The case was discussed with dr annika Goldman and GI.   We discussed the possibility of doing a EUS or EBUs, and finally, because of the localization, it was decided to do a EUS with biopsy if possible.   Cytology shows recurrent squamous cell cancer.   I have asked Pathology to run a PD-L1 from his original pathology from 2016.   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   He has been discussed extensively with radiation oncology. We have decided to treat him with radiation therapy and Cisplatin as a chemo-sensitizer.   Dr Castro has reviewed the therapy ports from the previous radiation treatment and the area in question seems to be above the previously radiated fields.   Completed chemoradiation s/p 6 weekly cisplatin treatments with response. Currently on maintenance Imfinzi. Reports had dizziness when gets up at night, attributes to restoril.    Oncology History   Malignant neoplasm of upper lobe of left lung   4/12/2016 Initial Diagnosis    Malignant neoplasm of upper lobe of left lung     6/19/2019 Cancer Staged    Staging form: Lung, AJCC 8th Edition  - Clinical stage from 6/19/2019: Stage IIIB (rcT4, cN2, cM0) - Signed by Alejandro Castro II, MD on 6/19/2019 6/21/2019 Tumor Conference    Presenting Hospital / Clinic: [facility]  Virtual Tumor Board Conference: In person  Presenter: Dr. Chance Castro  Date Presented to Tumor Board: 06/21/19  Specialties Present: Medical Oncology;Radiation Oncology;Surgical Oncology;Pathology;Navigation;Plastic Surgery;Radiology;Gastrointestinal;Pulmonology  Presentation at Cancer Conference: Prospective  Cancer Type: Lung cancer  Recommended Plan: Additional screening  Recommended Plan Note: If PDL-1 positive, treat with immunotherapy  Send tissue for next  generation sequencing.     6/28/2019 Tumor Conference    His case was discussed at the Multidisciplinary Head and Neck Team Planning Meeting.    Representatives from Medical Oncology, Radiation Oncology, Head and Neck Surgical Oncology, Psychosocial Oncology, and Speech and Language Pathology discussed the case with the following recommendations:    1) treat lung cancer            7/5/2019 - 8/14/2019 Radiation Therapy    Treatment Site Ref. ID Energy Dose/Fx (Gy) #Fx Dose Correction (Gy) Total Dose (Gy) Start Date End Date Elapsed Days   MhthyEEUZ83.4:1 PTV LNs 6X 1.8 28 / 28 0 50.4 7/5/2019 8/14/2019 40          3/10/2020 -  Chemotherapy    Treatment Summary   Plan Name: OP DURVALUMAB Q2W  Treatment Goal: Maintenance  Status: Active  Start Date: 3/10/2020  End Date: 9/8/2020 (Planned)  Provider: Fermin Vila MD  Chemotherapy: durvalumab (IMFINZI) 885 mg in sodium chloride 0.9% 267.7 mL chemo infusion, 10 mg/kg = 885 mg, Intravenous, Clinic/HOD 1 time, 11 of 14 cycles  Administration: 885 mg (3/10/2020), 860 mg (3/24/2020), 860 mg (4/7/2020), 860 mg (4/21/2020), 885 mg (5/5/2020), 860 mg (5/19/2020), 865 mg (6/2/2020), 860 mg (6/16/2020), 860 mg (7/1/2020), 860 mg (7/14/2020), 860 mg (7/28/2020)       Past Medical History:   Diagnosis Date    Anemia     Arthritis     Atrial fibrillation     CAD (coronary artery disease)     Cataract     COPD (chronic obstructive pulmonary disease)     Diverticulosis     ED (erectile dysfunction)     HTN (hypertension)     Hyperlipidemia     Lung cancer     left    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 Courtney dwyer bx     Family History   Problem Relation Age of Onset    Esophageal cancer Sister     Cancer Sister     Lung cancer Mother     Cancer Mother     Cataracts Mother     Hypertension Mother     Pneumonia Father     Cataracts Father     Hypertension Father     Cancer Brother     Hypertension Brother      Blindness Neg Hx     Diabetes Neg Hx     Glaucoma Neg Hx     Macular degeneration Neg Hx     Retinal detachment Neg Hx     Strabismus Neg Hx     Stroke Neg Hx     Thyroid disease Neg Hx      Social History     Socioeconomic History    Marital status:      Spouse name: Not on file    Number of children: 3    Years of education: Not on file    Highest education level: Not on file   Occupational History    Occupation: retired   Social Needs    Financial resource strain: Not on file    Food insecurity     Worry: Not on file     Inability: Not on file    Transportation needs     Medical: Not on file     Non-medical: Not on file   Tobacco Use    Smoking status: Former Smoker     Packs/day: 1.00     Years: 50.00     Pack years: 50.00     Quit date: 8/12/2005     Years since quitting: 15.0    Smokeless tobacco: Never Used   Substance and Sexual Activity    Alcohol use: No    Drug use: No    Sexual activity: Not Currently   Lifestyle    Physical activity     Days per week: Not on file     Minutes per session: Not on file    Stress: Not on file   Relationships    Social connections     Talks on phone: Not on file     Gets together: Not on file     Attends Anabaptism service: Not on file     Active member of club or organization: Not on file     Attends meetings of clubs or organizations: Not on file     Relationship status: Not on file   Other Topics Concern    Not on file   Social History Narrative    Not on file     Past Surgical History:   Procedure Laterality Date    APPENDECTOMY      BRONCHOSCOPY Bilateral 6/21/2019    Procedure: Bronchoscopy;  Surgeon: Paresh Goldman MD;  Location[facility];  Service: Endoscopy;  Laterality: Bilateral;    CARDIAC CATHETERIZATION      cardiac stents      CATARACT EXTRACTION W/  INTRAOCULAR LENS IMPLANT Right 9-3-14    CHOLECYSTECTOMY      COLONOSCOPY N/A 4/17/2018    Procedure: COLONOSCOPY;  Surgeon: Dionne Doan MD;  Location: [facility]  ENDO;  Service: Endoscopy;  Laterality: N/A;    COLONOSCOPY N/A 10/25/2018    Procedure: COLONOSCOPY;  Surgeon: Michael Hameed MD;  Location: [facility] ENDO;  Service: Endoscopy;  Laterality: N/A;    ENDOSCOPIC ULTRASOUND OF UPPER GASTROINTESTINAL TRACT N/A 6/11/2019    Procedure: ULTRASOUND, UPPER GI TRACT, ENDOSCOPIC;  Surgeon: Abhishek Knox MD;  Location: [facility] ENDO;  Service: Endoscopy;  Laterality: N/A;    ESOPHAGOGASTRODUODENOSCOPY N/A 6/11/2019    Procedure: EGD (ESOPHAGOGASTRODUODENOSCOPY);  Surgeon: Abhishek Knox MD;  Location: [facility] ENDO;  Service: Endoscopy;  Laterality: N/A;    infected stomach gland excision      INSERTION OF TUNNELED CENTRAL VENOUS CATHETER (CVC) WITH SUBCUTANEOUS PORT Right 7/3/2019    Procedure: [id number];  Surgeon: Jean Carlos Constantino MD;  Location: [facility] OR;  Service: General;  Laterality: Right;    LUNG REMOVAL, TOTAL Left 04/2016    lung cancer left upper lobe    SKIN BIOPSY Left     arm     Current Outpatient Medications   Medication Sig Dispense Refill    albuterol (PROVENTIL) 2.5 mg /3 mL (0.083 %) nebulizer solution Take 3 mLs (2.5 mg total) by nebulization every 6 (six) hours while awake. 270 mL 11    amLODIPine (NORVASC) 5 MG tablet Take 1 tablet (5 mg total) by mouth once daily. 30 tablet 11    apixaban (ELIQUIS) 5 mg Tab Take 1 tablet (5 mg total) by mouth 2 (two) times daily. 60 tablet 5    apixaban (ELIQUIS) 5 mg Tab Take 1 tablet (5 mg total) by mouth 2 (two) times daily. 60 tablet 5    apixaban (ELIQUIS) 5 mg Tab Take 1 tablet (5 mg total) by mouth 2 (two) times daily. 60 tablet 5    aspirin (ECOTRIN) 81 MG EC tablet Take 81 mg by mouth once daily.      benzonatate (TESSALON) 200 MG capsule Take 1 capsule (200 mg total) by mouth 3 (three) times daily as needed for Cough. 21 capsule 0    carbamide peroxide (DEBROX) 6.5 % otic solution Place 5 drops into the left ear 2 (two) times daily. 15 mL 2    fenofibric acid (FIBRICOR) 135 mg CpDR Take 1  capsule (135 mg total) by mouth once daily. 90 capsule 3    fluticasone (FLONASE) 50 mcg/actuation nasal spray 2 sprays (100 mcg total) by Each Nare route once daily. (Patient taking differently: 2 sprays by Each Nare route as needed. ) 3 Bottle 3    furosemide (LASIX) 20 MG tablet TAKE 1 TABLET BY MOUTH EVERY DAY AS NEEDED 30 tablet 1    glycopyrrolate-formoterol (BEVESPI AEROSPHERE) 9-4.8 mcg HFAA Inhale 2 puffs into the lungs 2 (two) times daily. Controller 10.9 g 11    HYDROcodone-acetaminophen (NORCO) 5-325 mg per tablet Take 1 tablet by mouth every 6 (six) hours as needed for Pain. 60 tablet 0    ipratropium-albuteroL (COMBIVENT RESPIMAT)  mcg/actuation inhaler Inhale 1 puff into the lungs every 6 (six) hours as needed for Shortness of Breath. 4 g 11    levocetirizine (XYZAL) 5 MG tablet Take 5 mg by mouth as needed.       levothyroxine (SYNTHROID) 25 MCG tablet Take 1 tablet (25 mcg total) by mouth before breakfast. 30 tablet 11    lisinopriL (PRINIVIL,ZESTRIL) 40 MG tablet Take 1 tablet (40 mg total) by mouth once daily. 90 tablet 3    promethazine (PHENERGAN) 6.25 mg/5 mL syrup Take 20 mLs (25 mg total) by mouth nightly as needed. 240 mL 0    ranolazine (RANEXA) 500 MG Tb12 Take 1 tablet (500 mg total) by mouth 2 (two) times daily. 60 tablet 11    simvastatin (ZOCOR) 80 MG tablet Take 1 tablet (80 mg total) by mouth once daily. 90 tablet 3    sotaloL (SOTALOL AF) 80 MG tablet Take 1 tablet (80 mg total) by mouth 2 (two) times daily. 180 tablet 3    zolpidem (AMBIEN) 5 MG Tab Take 1 tablet (5 mg total) by mouth nightly as needed. 30 tablet 1     No current facility-administered medications for this visit.      Facility-Administered Medications Ordered in Other Visits   Medication Dose Route Frequency Provider Last Rate Last Dose    lactated ringers infusion   Intravenous Continuous Michael Hameed MD   Stopped at 07/03/19 0810       Labs:  Lab Results   Component Value Date    WBC 6.19  07/27/2020    HGB 12.1 (L) 07/27/2020    HCT 39.8 (L) 07/27/2020     (H) 07/27/2020     07/27/2020     BMP  Lab Results   Component Value Date     07/27/2020    K 4.5 07/27/2020     07/27/2020    CO2 28 07/27/2020    BUN 25 (H) 07/27/2020    CREATININE 1.5 (H) 07/27/2020    CALCIUM 9.5 07/27/2020    ANIONGAP 9 07/27/2020    ESTGFRAFRICA 50 (A) 07/27/2020    EGFRNONAA 44 (A) 07/27/2020     Lab Results   Component Value Date    ALT 24 07/27/2020    AST 19 07/27/2020    ALKPHOS 49 (L) 07/27/2020    BILITOT 0.3 07/27/2020       No results found for: IRON, TIBC, FERRITIN, SATURATEDIRO  No results found for: [id number]  No results found for: FOLATE  Lab Results   Component Value Date    TSH 6.438 (H) 07/27/2020       I have reviewed the radiology reports and examined the scan/xray images.    Review of Systems   Constitutional: Negative.    HENT: Negative.    Eyes: Negative.    Respiratory: Negative.    Cardiovascular: Negative.    Gastrointestinal: Negative.    Endocrine: Negative.    Genitourinary: Negative.    Musculoskeletal: Negative.    Skin: Negative.    Allergic/Immunologic: Negative.    Neurological: Negative.    Hematological: Negative.    Psychiatric/Behavioral: Negative.      ECOG SCORE    0 - Fully active-able to carry on all pre-disease performance without restriction            Objective:     Vitals:    08/11/20 1017   BP: (!) 96/58   Pulse: (!) 54   Resp: 18   Temp: 97.6 °F (36.4 °C)   Body mass index is 27.71 kg/m².  Physical Exam  Vitals signs and nursing note reviewed.   Constitutional:       Appearance: He is well-developed.   HENT:      Head: Normocephalic and atraumatic.   Eyes:      Conjunctiva/sclera: Conjunctivae normal.   Neck:      Musculoskeletal: Normal range of motion and neck supple.   Cardiovascular:      Rate and Rhythm: Normal rate and regular rhythm.   Pulmonary:      Effort: Pulmonary effort is normal.      Breath sounds: Normal breath sounds.   Abdominal:       General: Bowel sounds are normal.      Palpations: Abdomen is soft.   Musculoskeletal: Normal range of motion.   Skin:     General: Skin is warm and dry.   Neurological:      Mental Status: He is alert and oriented to person, place, and time.   Psychiatric:         Behavior: Behavior normal.         Thought Content: Thought content normal.         Judgment: Judgment normal.           Assessment:      1. Malignant neoplasm of upper lobe of left lung           Plan:     Malignant neoplasm of upper lobe of left lung  Continue on maintenance Imfinzi, has 5 more treatments including today.  -     CBC auto differential; Future; Expected date: 08/11/2020  -     Comprehensive metabolic panel; Future; Expected date: 08/11/2020  -     TSH; Future; Expected date: 08/11/2020  -     temazepam (RESTORIL) 15 mg Cap; Take 1 capsule (15 mg total) by mouth nightly as needed (insomnia).  Dispense: 90 capsule; Refill: 1             30-Nov-2023

## 2024-01-03 ENCOUNTER — TELEPHONE (OUTPATIENT)
Dept: INTERNAL MEDICINE | Facility: CLINIC | Age: 84
End: 2024-01-03
Payer: MEDICARE

## 2024-01-03 NOTE — TELEPHONE ENCOUNTER
----- Message from Kacey Myers sent at 1/3/2024  8:51 AM CST -----  Contact: Jonah/[employer]  Jonah is calling in regard to the fax sent on yesterday and need to confirm if rec'd. And need to know when this pt will get the braces and need the last office notes along w/return faxed.   Please call back at [phone]  thanks/mpd

## 2024-01-04 ENCOUNTER — TELEPHONE (OUTPATIENT)
Dept: INTERNAL MEDICINE | Facility: CLINIC | Age: 84
End: 2024-01-04
Payer: MEDICARE

## 2024-01-04 NOTE — TELEPHONE ENCOUNTER
----- Message from William Morris sent at 1/4/2024  2:07 PM CST -----  Contact: Pt  Type:  Needs Medical Advice    Who Called:  pt   Symptoms (please be specific): swollen feet- when pressing down it stays indented for a while   How long has patient had these symptoms:    Pharmacy name and phone #:   [facility]  Would the patient rather a call back or a response via [facility]? phone  Best Call Back Number:  [phone]  Additional Information: wants to have something called in for the fluid      [id number] [place] [facility] [address] [place] [address] [place] [address]  Phone: [phone] Fax: [phone]

## 2024-01-04 NOTE — TELEPHONE ENCOUNTER
Spoke with pt  is experiencing some swelling in his feet says he had  the swelling for a while requesting Furosemide (Lasix) to be   [id number] [place], [place] - [address] AT [facility]

## 2024-01-04 NOTE — TELEPHONE ENCOUNTER
"Spoke to pt.  Pt is very upset.  Pt wants to know why pcp refuses to send in medication.  Explained that we needs to see extent of swelling, potentially diagnose why this is happening and facilitate evidence based care.  Offered pt next available appointment with pcp's NP Ms. Evans.  Pt said "switch me to her.  I don't want to see him again."  Advised NP Nathan cannot have her own population.  Advised that Dr. Goldsmith is accepting new patients but it will likely be May before she can have an available opening based on scheduling other pts throughout the day.  Pt stated "find me a doctor.  I want to be seen tomorrow"  Found opening with Dr. Roberts at the [facility] for tomorrow.  Pt verbalized understanding and was scheduled per his request.  "

## 2024-01-04 NOTE — TELEPHONE ENCOUNTER
----- Message from Anamaria Pina sent at 1/4/2024  2:54 PM CST -----  Contact: self  ..Type:  Patient Returning Call    Who Called:.Chai Murry  Who Left Message for Patient:  Does the patient know what this is regarding?:medication   Would the patient rather a call back or a response via [facility]? Call back   Best Call Back Number:.[phone] (home)   Additional Information: pt states he missed a call and is needing fluid medication sent over to pharmacy for him

## 2024-01-05 ENCOUNTER — LAB VISIT (OUTPATIENT)
Dept: LAB | Facility: HOSPITAL | Age: 84
End: 2024-01-05
Attending: PEDIATRICS
Payer: MEDICARE

## 2024-01-05 ENCOUNTER — OFFICE VISIT (OUTPATIENT)
Dept: INTERNAL MEDICINE | Facility: CLINIC | Age: 84
End: 2024-01-05
Payer: MEDICARE

## 2024-01-05 VITALS
RESPIRATION RATE: 16 BRPM | HEIGHT: 70 IN | HEART RATE: 65 BPM | OXYGEN SATURATION: 94 % | DIASTOLIC BLOOD PRESSURE: 64 MMHG | BODY MASS INDEX: 28.75 KG/M2 | WEIGHT: 200.81 LBS | SYSTOLIC BLOOD PRESSURE: 118 MMHG | TEMPERATURE: 96 F

## 2024-01-05 DIAGNOSIS — R60.0 LEG EDEMA: ICD-10-CM

## 2024-01-05 DIAGNOSIS — I35.9 AORTIC VALVE DISEASE: Primary | ICD-10-CM

## 2024-01-05 LAB
ALBUMIN SERPL BCP-MCNC: 3.7 G/DL (ref 3.5–5.2)
ALP SERPL-CCNC: 76 U/L (ref 55–135)
ALT SERPL W/O P-5'-P-CCNC: 45 U/L (ref 10–44)
ANION GAP SERPL CALC-SCNC: 10 MMOL/L (ref 8–16)
AST SERPL-CCNC: 47 U/L (ref 10–40)
BILIRUB SERPL-MCNC: 0.4 MG/DL (ref 0.1–1)
BNP SERPL-MCNC: 109 PG/ML (ref 0–99)
BUN SERPL-MCNC: 12 MG/DL (ref 8–23)
CALCIUM SERPL-MCNC: 9.2 MG/DL (ref 8.7–10.5)
CHLORIDE SERPL-SCNC: 107 MMOL/L (ref 95–110)
CO2 SERPL-SCNC: 28 MMOL/L (ref 23–29)
CREAT SERPL-MCNC: 1 MG/DL (ref 0.5–1.4)
EST. GFR  (NO RACE VARIABLE): >60 ML/MIN/1.73 M^2
GLUCOSE SERPL-MCNC: 92 MG/DL (ref 70–110)
POTASSIUM SERPL-SCNC: 4.6 MMOL/L (ref 3.5–5.1)
PROT SERPL-MCNC: 7.1 G/DL (ref 6–8.4)
SODIUM SERPL-SCNC: 145 MMOL/L (ref 136–145)

## 2024-01-05 PROCEDURE — 99999 PR PBB SHADOW E&M-EST. PATIENT-LVL IV: CPT | Mod: PBBFAC,,, | Performed by: PEDIATRICS

## 2024-01-05 PROCEDURE — 1159F MED LIST DOCD IN RCRD: CPT | Mod: CPTII,S$GLB,, | Performed by: PEDIATRICS

## 2024-01-05 PROCEDURE — 3078F DIAST BP <80 MM HG: CPT | Mod: CPTII,S$GLB,, | Performed by: PEDIATRICS

## 2024-01-05 PROCEDURE — 3288F FALL RISK ASSESSMENT DOCD: CPT | Mod: CPTII,S$GLB,, | Performed by: PEDIATRICS

## 2024-01-05 PROCEDURE — 3074F SYST BP LT 130 MM HG: CPT | Mod: CPTII,S$GLB,, | Performed by: PEDIATRICS

## 2024-01-05 PROCEDURE — 80053 COMPREHEN METABOLIC PANEL: CPT | Performed by: PEDIATRICS

## 2024-01-05 PROCEDURE — 36415 COLL VENOUS BLD VENIPUNCTURE: CPT | Performed by: PEDIATRICS

## 2024-01-05 PROCEDURE — 99214 OFFICE O/P EST MOD 30 MIN: CPT | Mod: S$GLB,,, | Performed by: PEDIATRICS

## 2024-01-05 PROCEDURE — 1160F RVW MEDS BY RX/DR IN RCRD: CPT | Mod: CPTII,S$GLB,, | Performed by: PEDIATRICS

## 2024-01-05 PROCEDURE — 1126F AMNT PAIN NOTED NONE PRSNT: CPT | Mod: CPTII,S$GLB,, | Performed by: PEDIATRICS

## 2024-01-05 PROCEDURE — 1101F PT FALLS ASSESS-DOCD LE1/YR: CPT | Mod: CPTII,S$GLB,, | Performed by: PEDIATRICS

## 2024-01-05 PROCEDURE — 83880 ASSAY OF NATRIURETIC PEPTIDE: CPT | Performed by: PEDIATRICS

## 2024-01-05 RX ORDER — FUROSEMIDE 20 MG/1
TABLET ORAL
Qty: 90 TABLET | Refills: 1 | Status: SHIPPED | OUTPATIENT
Start: 2024-01-05

## 2024-01-05 NOTE — PROGRESS NOTES
Subjective     Patient ID: Chai Murry is a 83 y.o. male.    Chief Complaint: Foot Swelling    Chai Murry is a 83 y.o. male who is here for swelling in his lower extremities bilaterally. Pt has been having these sxs chronically but ran out of his prescriptions. He is seen by Dr. Teixeira who could not fill his furosemide prescription via telemedicine without a visit taking place. No cp, sob, palpitations reported. Pt has multiple cardiac and pulmonary co-morbidities and has upcoming appt. With cardiology.       Review of Systems   Constitutional:  Negative for chills and fever.   HENT:  Negative for nasal congestion and rhinorrhea.    Respiratory:  Negative for cough and shortness of breath.         Pt reports no change in baseline breathing patterns   Cardiovascular:  Positive for leg swelling. Negative for chest pain and palpitations.   Gastrointestinal:  Negative for abdominal pain, blood in stool, change in bowel habit, constipation, diarrhea and nausea.   Endocrine: Negative for cold intolerance, heat intolerance, polydipsia, polyphagia and polyuria.   Genitourinary:  Negative for dysuria.   Neurological:  Negative for weakness.          Objective     Physical Exam  Constitutional:       General: He is not in acute distress.     Appearance: Normal appearance. He is normal weight. He is not ill-appearing, toxic-appearing or diaphoretic.   HENT:      Head: Atraumatic.   Cardiovascular:      Rate and Rhythm: Normal rate and regular rhythm.      Pulses: Normal pulses.      Heart sounds: Murmur heard.      Comments: Grade 2 mid systolic murmur at mid sternal border.   Pulmonary:      Effort: Pulmonary effort is normal. No respiratory distress.      Breath sounds: Normal breath sounds. No stridor. No wheezing, rhonchi or rales.   Chest:      Chest wall: No tenderness.   Abdominal:      General: Abdomen is flat. Bowel sounds are normal. There is no distension.      Palpations: Abdomen is soft. There is no mass.       Tenderness: There is no abdominal tenderness. There is no guarding.   Musculoskeletal:      Right lower leg: Edema (1+ to above the ankles) present.      Left lower leg: Edema present.   Skin:     General: Skin is dry.      Comments: Dry skin and non inflamed varicosities.    Neurological:      General: No focal deficit present.      Mental Status: He is alert and oriented to person, place, and time. Mental status is at baseline.      Cranial Nerves: No cranial nerve deficit.      Sensory: No sensory deficit.      Motor: No weakness.      Gait: Gait normal.   Psychiatric:         Mood and Affect: Mood normal.         Behavior: Behavior normal.            Assessment and Plan     1. Aortic valve disease  -     Echo; Future    2. Leg edema  -     B-TYPE NATRIURETIC PEPTIDE; Future; Expected date: 2024  -     Comprehensive Metabolic Panel; Future; Expected date: 2024  -     furosemide (LASIX) 20 MG tablet; TAKE 1 TABLET BY MOUTH EVERY DAY AS NEEDED  Dispense: 90 tablet; Refill: 1        Restart furosemide. Try dove soap and daily moisturizers for xerosis. Does not seem to be in active CHF. Will have cardiac labs done today to look for baseline CHF. Will order echocardiogram to take place before next cardiology visit. Note to Dr. Casas, I explain to pt why dr casas did not fill an  prescription without being seen in some time. I recommended he schedule follow up with Dr casas. Vaccines reviewed and discussed.          No follow-ups on file.

## 2024-01-08 ENCOUNTER — TELEPHONE (OUTPATIENT)
Dept: INTERNAL MEDICINE | Facility: CLINIC | Age: 84
End: 2024-01-08
Payer: MEDICARE

## 2024-01-08 NOTE — TELEPHONE ENCOUNTER
----- Message from Diamone Speed sent at 1/8/2024  8:55 AM CST -----  Regarding: gretta moody from [facility]  Type: Patient Call Back       Who called: gretta moody         What is the request in detail: stated they never receive a fax and needs it sent over        Can the clinic reply by [facility]? Yes         Would the patient rather a call back or a response via [facility]? Call back         Best call back number: [phone]

## 2024-01-09 ENCOUNTER — TELEPHONE (OUTPATIENT)
Dept: INTERNAL MEDICINE | Facility: CLINIC | Age: 84
End: 2024-01-09
Payer: MEDICARE

## 2024-01-09 NOTE — TELEPHONE ENCOUNTER
----- Message from Roberto Martin sent at 1/9/2024  9:12 AM CST -----  Contact: eliel/ [employer]  Eliel is calling in regards to a respond to a fax about patient braces for the back of his knees.  Please give her a call back at [phone]

## 2024-01-11 ENCOUNTER — TELEPHONE (OUTPATIENT)
Dept: CARDIOLOGY | Facility: HOSPITAL | Age: 84
End: 2024-01-11
Payer: MEDICARE

## 2024-01-17 ENCOUNTER — TELEPHONE (OUTPATIENT)
Dept: INTERNAL MEDICINE | Facility: CLINIC | Age: 84
End: 2024-01-17
Payer: MEDICARE

## 2024-01-17 NOTE — TELEPHONE ENCOUNTER
----- Message from Bina Washington LPN sent at 1/16/2024  4:25 PM CST -----  Contact: Vida with [employer]  [phone]  I see we were waiting on a fax.  Did it ever come through?  ----- Message -----  From: Emma Ochoa  Sent: 1/16/2024   4:05 PM CST  To: Lluvia Mcpherson with [employer] called requesting a call back from Dr. Teixeira's office, regarding braces for patient's back,knees, wrist and elbows, please fill out the rx form and return, please fax to [phone]

## 2024-01-17 NOTE — TELEPHONE ENCOUNTER
----- Message from Rossana Alvarez sent at 1/17/2024 12:13 PM CST -----  [employer] is requesting a call back concerning the status of the fax sent over for the patients brace. Call back at [phone]

## 2024-01-30 ENCOUNTER — TELEPHONE (OUTPATIENT)
Dept: CARDIOLOGY | Facility: HOSPITAL | Age: 84
End: 2024-01-30
Payer: MEDICARE

## 2024-02-02 RX ORDER — SOTALOL HYDROCHLORIDE 80 MG/1
80 TABLET ORAL 2 TIMES DAILY
Qty: 180 TABLET | Refills: 3 | Status: SHIPPED | OUTPATIENT
Start: 2024-02-02

## 2024-02-02 NOTE — TELEPHONE ENCOUNTER
Care Due:                  Date            Visit Type   Department     Provider  --------------------------------------------------------------------------------                                EP -                              PRIMARY      [facility] INTERNAL  Last Visit: 03-      CARE ([facility])   MEDICINE       Peter Teixeira  Next Visit: None Scheduled  None         None Found                                                            Last  Test          Frequency    Reason                     Performed    Due Date  --------------------------------------------------------------------------------    Lipid Panel.  12 months..  fenofibric, simvastatin..  03- 03-    [facility] Embedded Care Due Messages. Reference number: [id number].   2/02/2024 12:15:02 AM CST

## 2024-02-19 ENCOUNTER — OFFICE VISIT (OUTPATIENT)
Dept: PULMONOLOGY | Facility: CLINIC | Age: 84
End: 2024-02-19
Attending: INTERNAL MEDICINE
Payer: MEDICARE

## 2024-02-19 ENCOUNTER — HOSPITAL ENCOUNTER (OUTPATIENT)
Dept: RADIOLOGY | Facility: HOSPITAL | Age: 84
Discharge: HOME | End: 2024-02-19
Attending: INTERNAL MEDICINE
Payer: MEDICARE

## 2024-02-19 VITALS — HEIGHT: 70 IN | WEIGHT: 200.38 LBS | BODY MASS INDEX: 28.69 KG/M2

## 2024-02-19 VITALS
HEIGHT: 70 IN | DIASTOLIC BLOOD PRESSURE: 74 MMHG | OXYGEN SATURATION: 92 % | SYSTOLIC BLOOD PRESSURE: 170 MMHG | WEIGHT: 200.38 LBS | BODY MASS INDEX: 28.69 KG/M2 | HEART RATE: 75 BPM | RESPIRATION RATE: 18 BRPM

## 2024-02-19 DIAGNOSIS — R09.02 EXERCISE HYPOXEMIA: ICD-10-CM

## 2024-02-19 DIAGNOSIS — J44.89 ASTHMA WITH COPD: ICD-10-CM

## 2024-02-19 DIAGNOSIS — L02.32 BOIL OF BUTTOCK: ICD-10-CM

## 2024-02-19 DIAGNOSIS — F17.211 NICOTINE DEPENDENCE, CIGARETTES, IN REMISSION: ICD-10-CM

## 2024-02-19 DIAGNOSIS — R91.1 SOLITARY PULMONARY NODULE: ICD-10-CM

## 2024-02-19 DIAGNOSIS — J44.9 CHRONIC OBSTRUCTIVE PULMONARY DISEASE, UNSPECIFIED COPD TYPE: ICD-10-CM

## 2024-02-19 DIAGNOSIS — Z90.2 S/P PNEUMONECTOMY: ICD-10-CM

## 2024-02-19 DIAGNOSIS — C34.12 MALIGNANT NEOPLASM OF UPPER LOBE OF LEFT LUNG: Primary | ICD-10-CM

## 2024-02-19 PROCEDURE — 3077F SYST BP >= 140 MM HG: CPT | Mod: CPTII,S$GLB,, | Performed by: INTERNAL MEDICINE

## 2024-02-19 PROCEDURE — 3288F FALL RISK ASSESSMENT DOCD: CPT | Mod: CPTII,S$GLB,, | Performed by: INTERNAL MEDICINE

## 2024-02-19 PROCEDURE — 71250 CT THORAX DX C-: CPT | Mod: TC

## 2024-02-19 PROCEDURE — 94618 PULMONARY STRESS TESTING: CPT | Mod: S$GLB,,, | Performed by: INTERNAL MEDICINE

## 2024-02-19 PROCEDURE — 99215 OFFICE O/P EST HI 40 MIN: CPT | Mod: 25,S$GLB,, | Performed by: INTERNAL MEDICINE

## 2024-02-19 PROCEDURE — 71250 CT THORAX DX C-: CPT | Mod: 26,,, | Performed by: RADIOLOGY

## 2024-02-19 PROCEDURE — 3078F DIAST BP <80 MM HG: CPT | Mod: CPTII,S$GLB,, | Performed by: INTERNAL MEDICINE

## 2024-02-19 PROCEDURE — 1101F PT FALLS ASSESS-DOCD LE1/YR: CPT | Mod: CPTII,S$GLB,, | Performed by: INTERNAL MEDICINE

## 2024-02-19 PROCEDURE — 99999 PR PBB SHADOW E&M-EST. PATIENT-LVL IV: CPT | Mod: PBBFAC,,, | Performed by: INTERNAL MEDICINE

## 2024-02-19 PROCEDURE — 1159F MED LIST DOCD IN RCRD: CPT | Mod: CPTII,S$GLB,, | Performed by: INTERNAL MEDICINE

## 2024-02-19 RX ORDER — FLUTICASONE FUROATE, UMECLIDINIUM BROMIDE AND VILANTEROL TRIFENATATE 200; 62.5; 25 UG/1; UG/1; UG/1
1 POWDER RESPIRATORY (INHALATION) DAILY
Qty: 180 EACH | Refills: 3 | Status: SHIPPED | OUTPATIENT
Start: 2024-02-19

## 2024-02-19 RX ORDER — ALBUTEROL SULFATE 0.83 MG/ML
2.5 SOLUTION RESPIRATORY (INHALATION)
Qty: 270 ML | Refills: 11 | Status: SHIPPED | OUTPATIENT
Start: 2024-02-19 | End: 2025-02-18

## 2024-02-19 RX ORDER — ALBUTEROL SULFATE 90 UG/1
2 AEROSOL, METERED RESPIRATORY (INHALATION) EVERY 4 HOURS PRN
Qty: 18 G | Refills: 11 | Status: SHIPPED | OUTPATIENT
Start: 2024-02-19

## 2024-02-19 NOTE — PROGRESS NOTES
"Subjective:      Patient ID: Chai Murry is a 83 y.o. male.    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 Noted voice hoarseness in 2019 found to have PET avid mass left of trachea consistent with recurrent squamous cell carcinoma. Following the oncology.   Doing well with Trelegy.   Chronic CARVAJAL on oxygen therapy. Benefits from oxygen use.       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  Toxic Fume Exposure: chlorine a few times    Wife still smokes at home          Patient Active Problem List   Diagnosis    Coronary artery disease of native artery of native heart with stable angina pectoris    Hyperlipidemia    Essential hypertension    Anemia    ED (erectile dysfunction)    Cataract    Amblyopia    Chronic obstructive pulmonary disease    Malignant neoplasm of upper lobe of left lung    s/p left pnuemonectomy for [facility]    Asbestos-induced pleural plaque    Reactive depression    Insomnia    Paralysis of vocal cords and larynx, unilateral    Tracheal mass: 3 cm BELOW VOCAL CORDS: SUBGLOTIC    Abnormal echocardiogram    Leg edema    Chemotherapy management, encounter for    Paroxysmal atrial fibrillation    PVD (peripheral vascular disease)       BP (!) 170/74   Pulse 75   Resp 18   Ht 5' 10" (1.778 m)   Wt 90.9 kg (200 lb 6.4 oz)   SpO2 (!) 92% Comment: 3 LPM  BMI 28.75 kg/m²   Body mass index is 28.75 kg/m².    Review of Systems   Constitutional: Negative.    HENT: Negative.     Respiratory:  Positive for dyspnea on extertion.    Cardiovascular:  Positive for leg swelling.   Gastrointestinal: " Negative.    Psychiatric/Behavioral: Negative.     All other systems reviewed and are negative.    Objective:      Physical Exam  Constitutional:       Appearance: Normal appearance.   HENT:      Head: Normocephalic and atraumatic.      Nose: Nose normal.      Mouth/Throat:      Pharynx: Oropharynx is clear.   Cardiovascular:      Rate and Rhythm: Normal rate and regular rhythm.   Pulmonary:      Breath sounds: Normal breath sounds.      Comments: Absent BS Left  Abdominal:      Palpations: Abdomen is soft.   Musculoskeletal:      Right lower leg: Edema present.      Left lower leg: Edema present.   Neurological:      General: No focal deficit present.      Mental Status: He is alert and oriented to person, place, and time.   Psychiatric:         Mood and Affect: Mood normal.         Behavior: Behavior normal.       Personal Diagnostic test            Phase Oxygen Assessment Supplemental O2 Heart   Rate Blood Pressure Jimenez Dyspnea Scale Rating   Resting 95 % Room Air 62 bpm 159/67 0   Exercise             Minute             1 88 % Room Air 79 bpm       2 86 % 2 L/M 85 bpm       3 87 % 3 L/M 93 bpm       4 93 % 4 L/M 93 bpm       5 91 % 4 L/M 99 bpm       6  91 % 4 L/M 96 bpm 158/71 4   Recovery             Minute             1 92 % 4 L/M 90 bpm       2 99 % 4 L/M 75 bpm       3 100 % 4 L/M 62 bpm       4 100 % 4 L/M 63 bpm 144/66 3         Results for orders placed during the hospital encounter of 09/14/22    X-Ray Chest PA And Lateral    Narrative  EXAMINATION:  XR CHEST PA AND LATERAL    CLINICAL HISTORY:  SOB; Chronic obstructive pulmonary disease, unspecified    TECHNIQUE:  PA and lateral views of the chest were performed.    COMPARISON:  Prior radiographs    FINDINGS:  Right-sided MediPort removed.  There is persistent opacification of the left hemithorax consistent with prior pneumonectomy.  Expected leftward shift cardiomediastinal structures again noted.  Right lung demonstrates possible developing nodular  opacity at the lateral base.  No large pleural effusion.  CT chest recommended.    Impression  As above.  This report was flagged in Epic as abnormal.      Electronically signed by: Giovani Nicole MD  Date:    09/14/2022  Time:    10:31        Assessment:       1. Malignant neoplasm of upper lobe of left lung    2. Solitary pulmonary nodule    3. Exercise hypoxemia    4. S/P pneumonectomy    5. Asthma with COPD    6. Nicotine dependence, cigarettes, in remission    7. Chronic obstructive pulmonary disease, unspecified COPD type    8. Boil of buttock        Outpatient Encounter Medications as of 2/19/2024   Medication Sig Dispense Refill    amLODIPine (NORVASC) 5 MG tablet TAKE 1 TABLET BY MOUTH EVERYDAY AT BEDTIME 90 tablet 2    ammonium lactate (LAC-HYDRIN) 12 % lotion Apply to damp skin after bathing 225 g 11    aspirin (ECOTRIN) 81 MG EC tablet Take 81 mg by mouth once daily.      doxepin (SINEQUAN) 10 MG capsule Take 1 capsule (10 mg total) by mouth every evening. For insomnia 90 capsule 3    fenofibric acid (FIBRICOR) 135 mg CpDR TAKE 1 CAPSULE (135 MG TOTAL) BY MOUTH ONCE DAILY. 90 capsule 3    furosemide (LASIX) 20 MG tablet TAKE 1 TABLET BY MOUTH EVERY DAY AS NEEDED 90 tablet 1    levocetirizine (XYZAL) 5 MG tablet Take 5 mg by mouth as needed.       lisinopriL (PRINIVIL,ZESTRIL) 40 MG tablet TAKE 1 TABLET BY MOUTH EVERY DAY 90 tablet 3    ranolazine (RANEXA) 500 MG Tb12 TAKE 1 TABLET BY MOUTH TWICE A  tablet 1    simvastatin (ZOCOR) 80 MG tablet TAKE 1 TABLET BY MOUTH EVERY DAY 90 tablet 3    sotaloL (BETAPACE) 80 MG tablet TAKE 1 TABLET BY MOUTH 2 TIMES DAILY. 180 tablet 3    [DISCONTINUED] albuterol (PROVENTIL) 2.5 mg /3 mL (0.083 %) nebulizer solution Take 3 mLs (2.5 mg total) by nebulization every 6 (six) hours while awake. 270 mL 11    [DISCONTINUED] albuterol (PROVENTIL/VENTOLIN HFA) 90 mcg/actuation inhaler Inhale 2 puffs into the lungs every 4 (four) hours as needed for Wheezing or  Shortness of Breath. 18 g 11    [DISCONTINUED] fluticasone-umeclidin-vilanter (TRELEGY ELLIPTA) 200-62.5-25 mcg inhaler Inhale 1 puff into the lungs once daily. Hold for refill 180 each 3    albuterol (PROVENTIL) 2.5 mg /3 mL (0.083 %) nebulizer solution Take 3 mLs (2.5 mg total) by nebulization every 6 (six) hours while awake. 270 mL 11    albuterol (PROVENTIL/VENTOLIN HFA) 90 mcg/actuation inhaler Inhale 2 puffs into the lungs every 4 (four) hours as needed for Wheezing or Shortness of Breath. 18 g 11    bacitracin-neomycin-polymyxin b-hydrocortisone 1 % ointment Apply topically 2 (two) times daily. 15 g 1    fluticasone-umeclidin-vilanter (TRELEGY ELLIPTA) 200-62.5-25 mcg inhaler Inhale 1 puff into the lungs once daily. Hold for refill 180 each 3    [DISCONTINUED] sotaloL (BETAPACE) 80 MG tablet TAKE 1 TABLET (80 MG TOTAL) BY MOUTH 2 (TWO) TIMES DAILY. 180 tablet 3     No facility-administered encounter medications on file as of 2/19/2024.     Orders Placed This Encounter   Procedures    OXYGEN FOR HOME USE     Needs battery operated oxygen concentrator - TiqIQ or  Borqs or tadoÂ° Mini portable oxygen concentrators or similar device.  [facility]Nebul[facility].  Customer Service: [phone]  Call: [phone]  Fax: [phone]  Billing Inquiries: [phone] or [phone]     Order Specific Question:   Liter Flow     Answer:   2     Order Specific Question:   Duration     Answer:   With activity     Order Specific Question:   Qualifying Test Performed at:     Answer:   Activity     Order Specific Question:   Oxygen saturation at rest     Answer:   92     Order Specific Question:   Oxygen saturation with activity     Answer:   88     Order Specific Question:   Oxygen saturation with activity on oxygen     Answer:   92     Comments:   2 liters     Order Specific Question:   Portable mode:     Answer:   pulse dose acceptable     Order Specific Question:   Mode:      "Answer:   Portable concentrator     Order Specific Question:   Route     Answer:   nasal cannula     Order Specific Question:   Device:     Answer:   home concentrator with portable concentrator     Order Specific Question:   Length of need (in months):     Answer:   99 mos     Order Specific Question:   Patient condition with qualifying saturation     Answer:   COPD     Order Specific Question:   Height:     Answer:   5' 10" (1.778 m)     Order Specific Question:   Weight:     Answer:   90.9 kg (200 lb 6.4 oz)     Order Specific Question:   Alternative treatment measures have been tried or considered and deemed clinically ineffective.     Answer:   Yes    CT Chest Without Contrast     Standing Status:   Future     Standing Expiration Date:   2/19/2025     Order Specific Question:   May the Radiologist modify the order per protocol to meet the clinical needs of the patient?     Answer:   Yes     Order Specific Question:   Does the patient wear a continuous glucose monitor?     Answer:   No    Six Minute Walk Test to qualify for Home Oxygen     Standing Status:   Future     Standing Expiration Date:   2/19/2025     Order Specific Question:   Release to patient     Answer:   Immediate     Plan:   Possible developing nodular opacity at the right lateral base.  CT chest     Problem List Items Addressed This Visit       Chronic obstructive pulmonary disease    Relevant Medications    albuterol (PROVENTIL) 2.5 mg /3 mL (0.083 %) nebulizer solution    Malignant neoplasm of upper lobe of left lung - Primary    Overview     He had   Resection of a left upper lobe mass in 4/2016. .It was a squamous cell carcinoma.   It measured 3.8 cm. The   pathology indicated that this was extending into the bronchial resection margin of the lobe. This lobe was later on removed to complete the pneumonectomy   There was one lymph node positive for metastatic squamous cell carcinoma at the   AP window.  He had post op   chemo/radiation. ( 4 " cycles of carboTaxol).  Last chemo at the end of 9/2016         Relevant Orders    OXYGEN FOR HOME USE     Other Visit Diagnoses       Solitary pulmonary nodule        Relevant Orders    CT Chest Without Contrast    Exercise hypoxemia        Relevant Orders    OXYGEN FOR HOME USE    Six Minute Walk Test to qualify for Home Oxygen    S/P pneumonectomy        Relevant Orders    CT Chest Without Contrast    OXYGEN FOR HOME USE    Asthma with COPD        Relevant Medications    albuterol (PROVENTIL/VENTOLIN HFA) 90 mcg/actuation inhaler    fluticasone-umeclidin-vilanter (TRELEGY ELLIPTA) 200-62.5-25 mcg inhaler    Other Relevant Orders    OXYGEN FOR HOME USE    Six Minute Walk Test to qualify for Home Oxygen    Nicotine dependence, cigarettes, in remission        Relevant Orders    CT Chest Without Contrast    Boil of buttock        Relevant Medications    bacitracin-neomycin-polymyxin b-hydrocortisone 1 % ointment        Continue oxygen therapy. Benefits from use.    Time spent 40 minutes

## 2024-02-19 NOTE — PROCEDURES
"[facility]rd[facility]  Six Minute Walk     SUMMARY     Ordering Provider: Roel Ernandez MD   Interpreting Provider: Roel Ernandez MD  Performing nurse/tech/RT: VT, RT  Diagnosis: COPD (exercise hypoxemia)  Height: 5' 10" (177.8 cm)  Weight: 90.9 kg (200 lb 6.4 oz)  BMI (Calculated): 28.8   Patient Race:             Phase Oxygen Assessment Supplemental O2 Heart   Rate Blood Pressure Jimenez Dyspnea Scale Rating   Resting 92 % Room Air 60 bpm 131/60 1   Exercise        Minute        1 88 % Room Air 80 bpm     2 90 % 2 L/M 85 bpm     3 92 % 2 L/M 82 bpm     4 92 % 2 L/M 88 bpm     5 90 % 2 L/M 90 bpm     6  88 % 2 L/M 94 bpm 170/74 3   Recovery        Minute        1 92 % 3 L/M 75 bpm     2 97 % 3 L/M 68 bpm     3 100 % 3 L/M 60 bpm     4 98 % 3 L/M 60 bpm 143/63 3     Six Minute Walk Summary  6MWT Status: completed without stopping  Patient Reported: Dyspnea     Interpretation:  Did the patient stop or pause?: No                                         Total Time Walked (Calculated): 360 seconds  Final Partial Lap Distance (feet): 50 feet  Total Distance Meters (Calculated): 320.04 meters  Predicted Distance Meters (Calculated): 465.32 meters  Percentage of Predicted (Calculated): 68.78  Peak VO2 (Calculated): 13.58  Mets: 3.88  Has The Patient Had a Previous Six Minute Walk Test?: Yes       Previous 6MWT Results  Has The Patient Had a Previous Six Minute Walk Test?: Yes  Date of Previous Test: 08/17/23  Total Time Walked: 360 seconds  Total Distance (meters): 335.28  Predicted Distance (meters): 463.39 meters  Percentage of Predicted: 72.35  Percent Change (Calculated): 0.05    Interpretation:  Total distance walked in six minutes is mildly reduced indicating an mild reduction in functional capacity.  There was significant severe oxygen desaturation to 88 % with exercise on room air (oxygen saturation less than 89%). Supplemental oxygen was administered for the remainder of the test as noted " above. Clinical correlation suggested.  Patient met criteria for oxygen prescription.  [] Mild exercise-induced hypoxemia described as an arterial oxygen saturation of 93-95% (with a fall of 3-4% with exercise),   [] Moderate exercise-induced hypoxemia as a fall in oxygen saturation to  89-93% (with a fall of 3-4 % with exercise)  [x] Severe exercise induced hypoxemia as < 89% O2 saturation (88% and below).  Medicare Criteria for Oxygen prescription comments: When arterial oxygen saturation is at or below 88% during exercise (severe exercise induced hypoxemia) then the patient meets criteria for oxygen prescription.  Details about Medicare Group Criteria coverage can be found at http://[email or web address].[facility].gov/manuals/downloads/     Roel Ernandez MD

## 2024-02-25 DIAGNOSIS — G47.01 INSOMNIA DUE TO MEDICAL CONDITION: ICD-10-CM

## 2024-02-26 ENCOUNTER — TELEPHONE (OUTPATIENT)
Dept: INTERNAL MEDICINE | Facility: CLINIC | Age: 84
End: 2024-02-26
Payer: MEDICARE

## 2024-02-26 NOTE — TELEPHONE ENCOUNTER
----- Message from Bina Washington LPN sent at 2/26/2024  9:16 AM CST -----  Contact: [employer]/ [employer] [employer] [facility]    ----- Message -----  From: Dora Ulloa  Sent: 2/26/2024   9:10 AM CST  To: Lluvia GONZALEZ Staff    [employer] is calling calling in regards to the medical equipment order from [employer] for a knee brace and would like to get clarification as to whether or not if Dr. Teixeira or someone in the staff place the order.  Please call [employer] back at [phone]        Thanks

## 2024-02-26 NOTE — TELEPHONE ENCOUNTER
Genetics: We discussed that we may be able to submit paperwork for a buccal swab (the inside of the mouth along the cheek) to a specific program ([facility]) to get genetic testing  A mouth (buccal) swab was completed today and will be sent to [facility] for Fragile X Syndrome and Exome sequencing    We reviewed the following issues regarding potential findings from genetic testing:  * We may find a genetic change/abnormal chromosome(s) that explains your child's developmental delays  * We may not find anything that explains your child’s symptoms  This does not rule out a genetic cause for the symptoms, as some genetic changes may not be detected by the testing  * We may find a genetic change that we have never seen before or don’t know much about  This happens because we are still learning about genetic differences All of us carry thousands of genetic changes, some that can affect health and some that do not  These types of changes are often called “variants of uncertain significance,” because we are not sure if they may explain your child’s symptoms (or will affect future health) or not  * We may find a genetic change associated with a health problem that is unrelated to the reason for testing (incidental finding)  Incidental findings may include information about a risk for conditions that your child currently does not have symptoms of, such as cancer  In some cases, these findings may help guide future medical management  * We may gain unexpected information about biological relationships within the family  Spoke with israel vu

## 2024-02-27 DIAGNOSIS — D53.9 NUTRITIONAL ANEMIA, UNSPECIFIED: Primary | ICD-10-CM

## 2024-02-27 RX ORDER — DOXEPIN HYDROCHLORIDE 10 MG/1
10 CAPSULE ORAL NIGHTLY
Qty: 90 CAPSULE | Refills: 3 | Status: SHIPPED | OUTPATIENT
Start: 2024-02-27

## 2024-02-28 ENCOUNTER — LAB VISIT (OUTPATIENT)
Dept: LAB | Facility: HOSPITAL | Age: 84
End: 2024-02-28
Payer: MEDICARE

## 2024-02-28 DIAGNOSIS — D53.9 NUTRITIONAL ANEMIA, UNSPECIFIED: ICD-10-CM

## 2024-02-28 LAB
ALBUMIN SERPL BCP-MCNC: 3.6 G/DL (ref 3.5–5.2)
ALP SERPL-CCNC: 73 U/L (ref 55–135)
ALT SERPL W/O P-5'-P-CCNC: 28 U/L (ref 10–44)
ANION GAP SERPL CALC-SCNC: 6 MMOL/L (ref 8–16)
AST SERPL-CCNC: 24 U/L (ref 10–40)
BASOPHILS # BLD AUTO: 0.06 K/UL (ref 0–0.2)
BASOPHILS NFR BLD: 0.9 % (ref 0–1.9)
BILIRUB SERPL-MCNC: 0.4 MG/DL (ref 0.1–1)
BUN SERPL-MCNC: 10 MG/DL (ref 8–23)
CALCIUM SERPL-MCNC: 9.2 MG/DL (ref 8.7–10.5)
CHLORIDE SERPL-SCNC: 106 MMOL/L (ref 95–110)
CO2 SERPL-SCNC: 32 MMOL/L (ref 23–29)
CREAT SERPL-MCNC: 0.9 MG/DL (ref 0.5–1.4)
DIFFERENTIAL METHOD BLD: ABNORMAL
EOSINOPHIL # BLD AUTO: 1.4 K/UL (ref 0–0.5)
EOSINOPHIL NFR BLD: 20.6 % (ref 0–8)
ERYTHROCYTE [DISTWIDTH] IN BLOOD BY AUTOMATED COUNT: 13.4 % (ref 11.5–14.5)
EST. GFR  (NO RACE VARIABLE): >60 ML/MIN/1.73 M^2
FERRITIN SERPL-MCNC: 106 NG/ML (ref 20–300)
FOLATE SERPL-MCNC: 6.6 NG/ML (ref 4–24)
GLUCOSE SERPL-MCNC: 105 MG/DL (ref 70–110)
HCT VFR BLD AUTO: 37.6 % (ref 40–54)
HGB BLD-MCNC: 12 G/DL (ref 14–18)
IMM GRANULOCYTES # BLD AUTO: 0.02 K/UL (ref 0–0.04)
IMM GRANULOCYTES NFR BLD AUTO: 0.3 % (ref 0–0.5)
IRON SERPL-MCNC: 70 UG/DL (ref 45–160)
LYMPHOCYTES # BLD AUTO: 0.9 K/UL (ref 1–4.8)
LYMPHOCYTES NFR BLD: 13.5 % (ref 18–48)
MCH RBC QN AUTO: 31.8 PG (ref 27–31)
MCHC RBC AUTO-ENTMCNC: 31.9 G/DL (ref 32–36)
MCV RBC AUTO: 100 FL (ref 82–98)
MONOCYTES # BLD AUTO: 0.4 K/UL (ref 0.3–1)
MONOCYTES NFR BLD: 5.8 % (ref 4–15)
NEUTROPHILS # BLD AUTO: 3.9 K/UL (ref 1.8–7.7)
NEUTROPHILS NFR BLD: 58.9 % (ref 38–73)
NRBC BLD-RTO: 0 /100 WBC
PLATELET # BLD AUTO: 150 K/UL (ref 150–450)
PMV BLD AUTO: 10.6 FL (ref 9.2–12.9)
POTASSIUM SERPL-SCNC: 4.3 MMOL/L (ref 3.5–5.1)
PROT SERPL-MCNC: 7 G/DL (ref 6–8.4)
RBC # BLD AUTO: 3.77 M/UL (ref 4.6–6.2)
SATURATED IRON: 18 % (ref 20–50)
SODIUM SERPL-SCNC: 144 MMOL/L (ref 136–145)
TOTAL IRON BINDING CAPACITY: 379 UG/DL (ref 250–450)
TRANSFERRIN SERPL-MCNC: 256 MG/DL (ref 200–375)
VIT B12 SERPL-MCNC: 201 PG/ML (ref 210–950)
WBC # BLD AUTO: 6.69 K/UL (ref 3.9–12.7)

## 2024-02-28 PROCEDURE — 82746 ASSAY OF FOLIC ACID SERUM: CPT

## 2024-02-28 PROCEDURE — 36415 COLL VENOUS BLD VENIPUNCTURE: CPT

## 2024-02-28 PROCEDURE — 82607 VITAMIN B-12: CPT

## 2024-02-28 PROCEDURE — 82728 ASSAY OF FERRITIN: CPT

## 2024-02-28 PROCEDURE — 80053 COMPREHEN METABOLIC PANEL: CPT

## 2024-02-28 PROCEDURE — 83540 ASSAY OF IRON: CPT

## 2024-02-28 PROCEDURE — 85025 COMPLETE CBC W/AUTO DIFF WBC: CPT

## 2024-03-04 NOTE — PROGRESS NOTES
Subjective:       Patient ID: Chai Murry is a 83 y.o. male.    Chief Complaint: Lung Cancer and Anemia    HPI: Mr. Murry is an 83 year old male who is following up for his nutritional anemia, and history of recurrent squamous cell carcinoma of lung, with metastatic disease involving the trachea.    Cancer Hx: stage IIA squamous cell carcinoma of the lung status post left pneumonectomy and adjuvant chemoradiation completed 2016. He developed hoarseness in 2019 found to have PET avid mass left of trachea consistent with recurrent squamous cell carcinoma. He underwent chemoradiation with cisplatin and completed 6 weekly treatments with good response then immunotherapy maintenance with durvalumab completed 2020. Restaging scan 2020 with his primary doctor Cadence showed no FDG avidity to suggest recurrent or metastatic disease.     Today:  Patient denies any issues.  He denies any throat pain, fevers, illnesses, weight loss, night sweats, new sob. He has stable sob, using o2 at home 3L o2 NC when needed. He takes his time when doing things around the house. He denies ever taking oral iron or vitamin b12.     Social History     Socioeconomic History    Marital status:     Number of children: 3   Occupational History    Occupation: retired   Tobacco Use    Smoking status: Former     Current packs/day: 0.00     Average packs/day: 1 pack/day for 50.0 years (50.0 ttl pk-yrs)     Types: Cigarettes     Start date: 1955     Quit date: 2005     Years since quittin.5    Smokeless tobacco: Never   Substance and Sexual Activity    Alcohol use: No    Drug use: No    Sexual activity: Not Currently       Past Medical History:   Diagnosis Date    Anemia     Arthritis     Atrial fibrillation     CAD (coronary artery disease)     Cataract     Class 1 obesity due to excess calories with serious comorbidity and body mass index (BMI) of 31.0 to 31.9 in adult 3/14/2022    COPD (chronic obstructive  pulmonary disease)     Diverticulosis     ED (erectile dysfunction)     HTN (hypertension)     Hyperlipidemia     Lung cancer     left    NSTEMI (non-ST elevated myocardial infarction) 8/23/2019    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 Courtney dwyer bx       Family History   Problem Relation Age of Onset    Esophageal cancer Sister     Cancer Sister     Lung cancer Mother     Cancer Mother     Cataracts Mother     Hypertension Mother     Pneumonia Father     Cataracts Father     Hypertension Father     Cancer Brother     Hypertension Brother     Blindness Neg Hx     Diabetes Neg Hx     Glaucoma Neg Hx     Macular degeneration Neg Hx     Retinal detachment Neg Hx     Strabismus Neg Hx     Stroke Neg Hx     Thyroid disease Neg Hx        Past Surgical History:   Procedure Laterality Date    APPENDECTOMY      BRONCHOSCOPY Bilateral 6/21/2019    Procedure: Bronchoscopy;  Surgeon: Paresh Goldman MD;  Location: [facility];  Service: Endoscopy;  Laterality: Bilateral;    CARDIAC CATHETERIZATION      cardiac stents      CATARACT EXTRACTION W/  INTRAOCULAR LENS IMPLANT Right 9-3-14    CHOLECYSTECTOMY      COLONOSCOPY N/A 4/17/2018    Procedure: COLONOSCOPY;  Surgeon: Dionne Doan MD;  Location: [facility];  Service: Endoscopy;  Laterality: N/A;    COLONOSCOPY N/A 10/25/2018    Procedure: COLONOSCOPY;  Surgeon: Michael Hameed MD;  Location: [facility];  Service: Endoscopy;  Laterality: N/A;    ENDOSCOPIC ULTRASOUND OF UPPER GASTROINTESTINAL TRACT N/A 6/11/2019    Procedure: ULTRASOUND, UPPER GI TRACT, ENDOSCOPIC;  Surgeon: Abhishek Knox MD;  Location: [facility];  Service: Endoscopy;  Laterality: N/A;    ESOPHAGOGASTRODUODENOSCOPY N/A 6/11/2019    Procedure: EGD (ESOPHAGOGASTRODUODENOSCOPY);  Surgeon: Abhishek Knox MD;  Location: [facility];  Service: Endoscopy;  Laterality: N/A;    infected stomach gland excision      INSERTION OF TUNNELED CENTRAL VENOUS CATHETER (CVC)  WITH SUBCUTANEOUS PORT Right 7/3/2019    Procedure: [id number];  Surgeon: Jean Carlos Constantino MD;  Location: [facility];  Service: General;  Laterality: Right;    LUNG REMOVAL, TOTAL Left 04/2016    lung cancer left upper lobe    SKIN BIOPSY Left     arm       Review of Systems   Constitutional:  Negative for activity change, appetite change, chills, diaphoresis, fatigue, fever and unexpected weight change.   HENT:  Negative for congestion.    Respiratory:  Positive for shortness of breath. Negative for cough.    Cardiovascular:  Negative for chest pain and leg swelling.   Gastrointestinal:  Negative for abdominal pain, blood in stool, constipation, diarrhea, nausea and vomiting.   Genitourinary:  Negative for hematuria.   Skin:  Negative for pallor.   Neurological:  Negative for dizziness, light-headedness, numbness and headaches.         Medication List with Changes/Refills   Current Medications    ALBUTEROL (PROVENTIL) 2.5 MG /3 ML (0.083 %) NEBULIZER SOLUTION    Take 3 mLs (2.5 mg total) by nebulization every 6 (six) hours while awake.    ALBUTEROL (PROVENTIL/VENTOLIN HFA) 90 MCG/ACTUATION INHALER    Inhale 2 puffs into the lungs every 4 (four) hours as needed for Wheezing or Shortness of Breath.    AMLODIPINE (NORVASC) 5 MG TABLET    TAKE 1 TABLET BY MOUTH EVERYDAY AT BEDTIME    AMMONIUM LACTATE (LAC-HYDRIN) 12 % LOTION    Apply to damp skin after bathing    ASPIRIN (ECOTRIN) 81 MG EC TABLET    Take 81 mg by mouth once daily.    BACITRACIN-NEOMYCIN-POLYMYXIN B-HYDROCORTISONE 1 % OINTMENT    Apply topically 2 (two) times daily.    DOXEPIN (SINEQUAN) 10 MG CAPSULE    TAKE 1 CAPSULE (10 MG TOTAL) BY MOUTH EVERY EVENING. FOR INSOMNIA    FENOFIBRIC ACID (FIBRICOR) 135 MG CPDR    TAKE 1 CAPSULE (135 MG TOTAL) BY MOUTH ONCE DAILY.    FLUTICASONE-UMECLIDIN-VILANTER (TRELEGY ELLIPTA) 200-62.5-25 MCG INHALER    Inhale 1 puff into the lungs once daily. Hold for refill    FUROSEMIDE (LASIX) 20 MG TABLET    TAKE 1  TABLET BY MOUTH EVERY DAY AS NEEDED    LEVOCETIRIZINE (XYZAL) 5 MG TABLET    Take 5 mg by mouth as needed.     LISINOPRIL (PRINIVIL,ZESTRIL) 40 MG TABLET    TAKE 1 TABLET BY MOUTH EVERY DAY    RANOLAZINE (RANEXA) 500 MG TB12    TAKE 1 TABLET BY MOUTH TWICE A DAY    SIMVASTATIN (ZOCOR) 80 MG TABLET    TAKE 1 TABLET BY MOUTH EVERY DAY    SOTALOL (BETAPACE) 80 MG TABLET    TAKE 1 TABLET BY MOUTH 2 TIMES DAILY.     Objective:     Vitals:    03/06/24 1140   BP: 121/61   Pulse: 60   Temp: 97.6 °F (36.4 °C)       Physical Exam  Vitals reviewed.   Constitutional:       General: He is not in acute distress.     Appearance: He is not ill-appearing, toxic-appearing or diaphoretic.   HENT:      Head: Normocephalic and atraumatic.   Cardiovascular:      Rate and Rhythm: Normal rate.   Pulmonary:      Effort: Pulmonary effort is normal.   Skin:     General: Skin is warm.      Coloration: Skin is not jaundiced or pale.      Findings: No bruising, erythema, lesion or rash.      Comments: Generalized hyperpigmented lesions   Neurological:      Mental Status: He is alert.      Motor: No weakness.      Gait: Gait normal.   Psychiatric:         Mood and Affect: Mood normal.         Behavior: Behavior normal.         Thought Content: Thought content normal.          Labs/Results:  Lab Results   Component Value Date    WBC 6.69 02/28/2024    RBC 3.77 (L) 02/28/2024    HGB 12.0 (L) 02/28/2024    HCT 37.6 (L) 02/28/2024     (H) 02/28/2024    MCH 31.8 (H) 02/28/2024    MCHC 31.9 (L) 02/28/2024    RDW 13.4 02/28/2024     02/28/2024    MPV 10.6 02/28/2024    GRAN 3.9 02/28/2024    GRAN 58.9 02/28/2024    LYMPH 0.9 (L) 02/28/2024    LYMPH 13.5 (L) 02/28/2024    MONO 0.4 02/28/2024    MONO 5.8 02/28/2024    EOS 1.4 (H) 02/28/2024    BASO 0.06 02/28/2024    EOSINOPHIL 20.6 (H) 02/28/2024    BASOPHIL 0.9 02/28/2024      Latest Reference Range & Units 02/28/24 09:37   Iron 45 - 160 ug/dL 70   TIBC 250 - 450 ug/dL 379   Saturated Iron  20 - 50 % 18 (L)   Transferrin 200 - 375 mg/dL 256   Ferritin 20.0 - 300.0 ng/mL 106   Folate 4.0 - 24.0 ng/mL 6.6   Vitamin B12 210 - 950 pg/mL 201 (L)     CMP  Sodium   Date Value Ref Range Status   02/28/2024 144 136 - 145 mmol/L Final     Potassium   Date Value Ref Range Status   02/28/2024 4.3 3.5 - 5.1 mmol/L Final     Chloride   Date Value Ref Range Status   02/28/2024 106 95 - 110 mmol/L Final     CO2   Date Value Ref Range Status   02/28/2024 32 (H) 23 - 29 mmol/L Final     Glucose   Date Value Ref Range Status   02/28/2024 105 70 - 110 mg/dL Final     BUN   Date Value Ref Range Status   02/28/2024 10 8 - 23 mg/dL Final     Creatinine   Date Value Ref Range Status   02/28/2024 0.9 0.5 - 1.4 mg/dL Final     Calcium   Date Value Ref Range Status   02/28/2024 9.2 8.7 - 10.5 mg/dL Final     Total Protein   Date Value Ref Range Status   02/28/2024 7.0 6.0 - 8.4 g/dL Final     Albumin   Date Value Ref Range Status   02/28/2024 3.6 3.5 - 5.2 g/dL Final     Total Bilirubin   Date Value Ref Range Status   02/28/2024 0.4 0.1 - 1.0 mg/dL Final     Comment:     For infants and newborns, interpretation of results should be based  on gestational age, weight and in agreement with clinical  observations.    Premature Infant recommended reference ranges:  Up to 24 hours.............<8.0 mg/dL  Up to 48 hours............<12.0 mg/dL  3-5 days..................<15.0 mg/dL  6-29 days.................<15.0 mg/dL       Alkaline Phosphatase   Date Value Ref Range Status   02/28/2024 73 55 - 135 U/L Final     AST   Date Value Ref Range Status   02/28/2024 24 10 - 40 U/L Final     ALT   Date Value Ref Range Status   02/28/2024 28 10 - 44 U/L Final     Anion Gap   Date Value Ref Range Status   02/28/2024 6 (L) 8 - 16 mmol/L Final     eGFR   Date Value Ref Range Status   02/28/2024 >60 >60 mL/min/1.73 m^2 Final     Ct chest 2/19/24  FINDINGS: Postoperative changes consistent with left pneumonectomy with small residual left pleural  effusion.  Stable 6 and 8mm right lower lobe pulmonary nodules.  Centrilobular emphysema.  No new pulmonary nodule or mass.  No right effusion or pneumothorax.  Thickening along the right minor and major fissures unchanged.  No mediastinal or hilar adenopathy.  Heart is upper limits of normal in size with trace pericardial effusion.  Severely calcified coronary arteries.  No suspicious osseous lesions.  Impression:   Stable exam.  No new pulmonary nodules.    Assessment:     Problem List Items Addressed This Visit          Oncology    Anemia    Relevant Orders    CBC Auto Differential    Comprehensive Metabolic Panel    Ferritin    Iron and TIBC    Vitamin B12    Malignant neoplasm of upper lobe of left lung - Primary    Relevant Orders    CBC Auto Differential    Comprehensive Metabolic Panel     Other Visit Diagnoses       Nutritional anemia, unspecified        Relevant Orders    Vitamin B12          Plan:     Malignant neoplasm of upper lobe of left lung  --Chemoradiation 2016; then with recurrence chemoradiation and immunotherapy durvalumab, completed 9/2020   --continue to follow with pulmonology  ---has been in surveillance with staging PET scan October 2022 and short interval CT scan January 2023 follow-up in March and in August 2023 with relatively stable subcentimeter pulmonary nodules right lower lung.   --PET scan not warranted for survelliance unless clinical progression  --continue to follow pulmonology   --last ct chest 2/19/24 stable, no new pulm nodules  --next Ct chest 2/19/25-ordered by pulm    Guidelines  --H&P and chest CT ± contrast every 3-6 mo for 3 y, then H&P and chest CT ± contrast every 6 mo for 2 y, then H&P and a low-dose non-contrast-enhanced chest CT annually   --Residual or new radiographic abnormalities may require more frequent imaging   --Smoking cessation advice, counseling, and pharmacotherapy   -- FDG-PET/CT or brain MRI is not routinely indicated    Anemia, unspecified  type  --hgb: 12g/dL  --iron: 70, sat: 18%, ferritin: 106-slight def  --encouraged to incorporate iron rich foods into diet  --start oral iron every other day, as tolerated  --start vitamin b12 supplement daily     Follow-Up: 6 months with cbc cmp iron/tibc ferritin vitamin b12    Deirdre Moore PA-C  Hematology Oncology    Route Chart for Scheduling    Med Onc Chart Routing      Follow up with physician    Follow up with OSMIN . 6 months with cbc cmp iron/tibc ferritin vit b12 prior   Infusion scheduling note    Injection scheduling note    Labs CMP, ferritin, CBC, iron and TIBC and vitamin B12   Scheduling:  Preferred lab:  Lab interval:     Imaging    Pharmacy appointment    Other referrals

## 2024-03-06 ENCOUNTER — OFFICE VISIT (OUTPATIENT)
Dept: HEMATOLOGY/ONCOLOGY | Facility: CLINIC | Age: 84
End: 2024-03-06
Payer: MEDICARE

## 2024-03-06 VITALS
HEART RATE: 60 BPM | HEIGHT: 70 IN | SYSTOLIC BLOOD PRESSURE: 121 MMHG | TEMPERATURE: 98 F | WEIGHT: 197.56 LBS | BODY MASS INDEX: 28.28 KG/M2 | OXYGEN SATURATION: 93 % | DIASTOLIC BLOOD PRESSURE: 61 MMHG

## 2024-03-06 DIAGNOSIS — D53.9 NUTRITIONAL ANEMIA, UNSPECIFIED: ICD-10-CM

## 2024-03-06 DIAGNOSIS — C77.1 SECONDARY AND UNSPECIFIED MALIGNANT NEOPLASM OF INTRATHORACIC LYMPH NODES: ICD-10-CM

## 2024-03-06 DIAGNOSIS — D70.1 AGRANULOCYTOSIS SECONDARY TO CANCER CHEMOTHERAPY: ICD-10-CM

## 2024-03-06 DIAGNOSIS — D64.9 ANEMIA, UNSPECIFIED TYPE: ICD-10-CM

## 2024-03-06 DIAGNOSIS — C34.12 MALIGNANT NEOPLASM OF UPPER LOBE OF LEFT LUNG: Primary | ICD-10-CM

## 2024-03-06 DIAGNOSIS — T45.1X5A AGRANULOCYTOSIS SECONDARY TO CANCER CHEMOTHERAPY: ICD-10-CM

## 2024-03-06 PROCEDURE — 3078F DIAST BP <80 MM HG: CPT | Mod: CPTII,S$GLB,,

## 2024-03-06 PROCEDURE — 1126F AMNT PAIN NOTED NONE PRSNT: CPT | Mod: CPTII,S$GLB,,

## 2024-03-06 PROCEDURE — 3074F SYST BP LT 130 MM HG: CPT | Mod: CPTII,S$GLB,,

## 2024-03-06 PROCEDURE — 99215 OFFICE O/P EST HI 40 MIN: CPT | Mod: S$GLB,,,

## 2024-03-06 PROCEDURE — 1159F MED LIST DOCD IN RCRD: CPT | Mod: CPTII,S$GLB,,

## 2024-03-06 PROCEDURE — 3288F FALL RISK ASSESSMENT DOCD: CPT | Mod: CPTII,S$GLB,,

## 2024-03-06 PROCEDURE — 99999 PR PBB SHADOW E&M-EST. PATIENT-LVL IV: CPT | Mod: PBBFAC,,,

## 2024-03-06 PROCEDURE — 1101F PT FALLS ASSESS-DOCD LE1/YR: CPT | Mod: CPTII,S$GLB,,

## 2024-03-11 ENCOUNTER — TELEPHONE (OUTPATIENT)
Dept: INTERNAL MEDICINE | Facility: CLINIC | Age: 84
End: 2024-03-11
Payer: MEDICARE

## 2024-03-11 NOTE — TELEPHONE ENCOUNTER
Spoke with pt, notified pt wife norberto huang needed another specimen container notified pt we already received her stool specimens, requesting supplies for another doctor no other stool orders were in pt chart notified pt  to contact there office.

## 2024-03-11 NOTE — TELEPHONE ENCOUNTER
----- Message from Vesta Ambriz MA sent at 3/11/2024 10:15 AM CDT -----  Name of Who is Calling:YAMILE LUTZ [[id number]]                What is the request in detail: Pt is requesting a call back to discuss getting something for bowels. Please assist.                Can the clinic reply by [facility]: No                [facility] [phone]

## 2024-05-13 ENCOUNTER — TELEPHONE (OUTPATIENT)
Dept: INTERNAL MEDICINE | Facility: CLINIC | Age: 84
End: 2024-05-13
Payer: MEDICARE

## 2024-05-13 NOTE — TELEPHONE ENCOUNTER
----- Message from Diamone Speed sent at 5/13/2024  3:14 PM CDT -----  Regarding: self  Type: Patient Call Back       Who called: self        What is the request in detail: pt is requesting a call back     Can the clinic reply by [facility]? Yes       Would the patient rather a call back or a response via [facility]? Call back       Best call back number:[phone]      Additional Information:

## 2024-05-13 NOTE — TELEPHONE ENCOUNTER
"Called pt. Pt states that he needs nose spray. Informed pt he is overdue for a visit. Offered to schedule pt a visit, pt states "Dr. Teixeira is a greedy man and to hell with all of us". Pt hung up.  "

## 2024-06-01 NOTE — TELEPHONE ENCOUNTER
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

## 2024-06-01 NOTE — TELEPHONE ENCOUNTER
Care Due:                  Date            Visit Type   Department     Provider  --------------------------------------------------------------------------------                                EP -                              PRIMARY      [facility] INTERNAL  Last Visit: 03-      CARE ([facility])   MEDICINE       Peter Teixeira  Next Visit: None Scheduled  None         None Found                                                            Last  Test          Frequency    Reason                     Performed    Due Date  --------------------------------------------------------------------------------    Office Visit  15 months..  fenofibric, simvastatin..  03- 06-    Lipid Panel.  12 months..  fenofibric, simvastatin..  05- 03-    [facility] Embedded Care Due Messages. Reference number: [id number].   6/01/2024 12:50:53 AM CDT

## 2024-06-02 RX ORDER — FENOFIBRIC ACID 135 MG/1
1 CAPSULE, DELAYED RELEASE ORAL DAILY
Qty: 90 CAPSULE | Refills: 0 | Status: SHIPPED | OUTPATIENT
Start: 2024-06-02

## 2024-06-04 ENCOUNTER — TELEPHONE (OUTPATIENT)
Dept: INTERNAL MEDICINE | Facility: CLINIC | Age: 84
End: 2024-06-04
Payer: MEDICARE

## 2024-06-04 DIAGNOSIS — I25.118 CORONARY ARTERY DISEASE OF NATIVE ARTERY OF NATIVE HEART WITH STABLE ANGINA PECTORIS: ICD-10-CM

## 2024-06-04 DIAGNOSIS — C34.12 MALIGNANT NEOPLASM OF UPPER LOBE OF LEFT LUNG: Primary | ICD-10-CM

## 2024-06-04 DIAGNOSIS — I63.9 CEREBROVASCULAR ACCIDENT (CVA), UNSPECIFIED MECHANISM: ICD-10-CM

## 2024-06-04 NOTE — TELEPHONE ENCOUNTER
----- Message from Karen Ayala sent at 6/4/2024 10:03 AM CDT -----  Name of Who is Calling:pt           What is the request in detail:patient had stroke and was advised that he would need home health           Can the clinic reply by [facility]:no           [facility] [phone] or [phone]

## 2024-06-04 NOTE — TELEPHONE ENCOUNTER
Pt daughter called ( Sharmaine ) [phone] her cell or pt's home  [phone]  Pt went in on 5/22/2024 at  [facility]   Right Side shut down and 911 was called     He had a blood clot on the left side of the  brain. Went in through the groin and sucked the clot out was kept in ICU.   Needs a patient referral for out patient rehab   [facility]   Speaking with Ginny   Ph# [phone]   Wants to continue this HH from her mom due to nurse knowing him already   Juan khan also wants Dr IZQUIERDO to know her mom passed on 5/31/2024 from her previous fall

## 2024-06-04 NOTE — TELEPHONE ENCOUNTER
----- Message from Becca Tai sent at 6/4/2024  9:48 AM CDT -----  Patient daughter stated he had a stroke 05/22/2024 and she wanted to advise Dr. Teixeira. Also she wanted to let him know her mother passed, Felicity Murry. Call back number is [phone]. [email or web address]

## 2024-06-04 NOTE — TELEPHONE ENCOUNTER
I'm not understanding the message.  Did they discharge him with home health orders and they need me to add PT or does he not have any home health orders at this time?

## 2024-06-05 NOTE — TELEPHONE ENCOUNTER
Eloisa I just typed as she spoke it to me.     She is wanting a Referral for home Health she was told she has to have a hospital follow for orders for home health.     I sent the message over because family wanted Dr. Teixeira to know what all was going on  With Patients    Pt Chai is scheduled for Friday 6/7/2024 with Aiden

## 2024-06-07 ENCOUNTER — OFFICE VISIT (OUTPATIENT)
Dept: INTERNAL MEDICINE | Facility: CLINIC | Age: 84
End: 2024-06-07
Payer: MEDICARE

## 2024-06-07 VITALS
HEART RATE: 61 BPM | DIASTOLIC BLOOD PRESSURE: 64 MMHG | TEMPERATURE: 96 F | OXYGEN SATURATION: 95 % | WEIGHT: 186.31 LBS | BODY MASS INDEX: 26.73 KG/M2 | SYSTOLIC BLOOD PRESSURE: 120 MMHG

## 2024-06-07 DIAGNOSIS — Z86.73 HISTORY OF CVA (CEREBROVASCULAR ACCIDENT): ICD-10-CM

## 2024-06-07 DIAGNOSIS — Z09 HOSPITAL DISCHARGE FOLLOW-UP: Primary | ICD-10-CM

## 2024-06-07 DIAGNOSIS — I10 ESSENTIAL HYPERTENSION: ICD-10-CM

## 2024-06-07 DIAGNOSIS — I73.9 PVD (PERIPHERAL VASCULAR DISEASE): ICD-10-CM

## 2024-06-07 DIAGNOSIS — I48.0 PAROXYSMAL ATRIAL FIBRILLATION: ICD-10-CM

## 2024-06-07 PROCEDURE — 1160F RVW MEDS BY RX/DR IN RCRD: CPT | Mod: CPTII,S$GLB,, | Performed by: NURSE PRACTITIONER

## 2024-06-07 PROCEDURE — 3078F DIAST BP <80 MM HG: CPT | Mod: CPTII,S$GLB,, | Performed by: NURSE PRACTITIONER

## 2024-06-07 PROCEDURE — 3074F SYST BP LT 130 MM HG: CPT | Mod: CPTII,S$GLB,, | Performed by: NURSE PRACTITIONER

## 2024-06-07 PROCEDURE — 1159F MED LIST DOCD IN RCRD: CPT | Mod: CPTII,S$GLB,, | Performed by: NURSE PRACTITIONER

## 2024-06-07 PROCEDURE — 99214 OFFICE O/P EST MOD 30 MIN: CPT | Mod: S$GLB,,, | Performed by: NURSE PRACTITIONER

## 2024-06-07 PROCEDURE — 3288F FALL RISK ASSESSMENT DOCD: CPT | Mod: CPTII,S$GLB,, | Performed by: NURSE PRACTITIONER

## 2024-06-07 PROCEDURE — 1126F AMNT PAIN NOTED NONE PRSNT: CPT | Mod: CPTII,S$GLB,, | Performed by: NURSE PRACTITIONER

## 2024-06-07 PROCEDURE — 1101F PT FALLS ASSESS-DOCD LE1/YR: CPT | Mod: CPTII,S$GLB,, | Performed by: NURSE PRACTITIONER

## 2024-06-07 PROCEDURE — 99999 PR PBB SHADOW E&M-EST. PATIENT-LVL IV: CPT | Mod: PBBFAC,,, | Performed by: NURSE PRACTITIONER

## 2024-06-07 NOTE — PROGRESS NOTES
Subjective:       Patient ID: Chai Murry is a 84 y.o. male.    Chief Complaint: Hospital Follow Up    Pt presents to clinic today for hospital follow up  Pt reports he came [place] from the car wash and was unable to speak then was unable to move  Wife called 911  Pt admitted for CVA 5/22-5/25  Had thrombectomy on 5/22  Overall doing well since discharge   Taking meds as prescribed  Discharged with outpt PT/OT- he doesn't feel like he needs it at this time  Of note he recently lost his wife- having some depression related to this  Not interested in meds at times   Has daughter and family helping with his care  Currently living alone- family ask about life alert    Hospital course:  84-year-old male PMH HTN, CAD, HLD, A-fib, COPD on 2 L NC at baseline, previous history of lung cancer s/p left pneumonectomy who presented 5/22 as a level 1 stroke alert (new onset right-sided weakness and dysarthria), tPA deferred because of Eliquis. 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     * Acute ischemic left MCA stroke (HCC)  Mild residual right sided weakness improved with therapy. Discharged with outpatient physical therapy/Occupational Therapy/speech therapy referral. Regular thin liquid diet without straws per SLP after pharyngogram. Follow-up in neurology clinic within 2-3 months. Continue home statin and fibrate. A1c less than 6%. BP goal less than 130 systolic, antihypertensives as below. Okay to resume anticoagulation 3-5 days poststroke per neurology.    Paroxysmal atrial fibrillation (HCC)  Had this is a prior diagnosis and prescribed sotalol and Eliquis. Family/patient unsure if he was taking Eliquis prior to presentation. Continue home sotalol dose. Resume Eliquis after 5/27      Transitional Care Note    Family and/or Caretaker present at visit?  Yes.  Diagnostic tests reviewed/disposition: No diagnosic tests pending after this hospitalization.  Disease/illness  education: CVA  Home health/community services discussion/referrals: Patient does not have home health established from hospital visit.  They do not need home health.  If needed, we will set up home health for the patient.   Establishment or re-establishment of referral orders for community resources: No other necessary community resources.   Discussion with other health care providers: No discussion with other health care providers necessary.                    /64   Pulse 61   Temp 96.3 °F (35.7 °C) (Tympanic)   Wt 84.5 kg (186 lb 4.6 oz)   SpO2 95%   BMI 26.73 kg/m²     Review of Systems   Constitutional:  Negative for activity change, appetite change, chills, diaphoresis, fatigue, fever and unexpected weight change.   HENT: Negative.     Eyes: Negative.    Respiratory:  Negative for apnea, chest tightness, shortness of breath and stridor.    Cardiovascular:  Negative for chest pain, palpitations and leg swelling.   Gastrointestinal: Negative.    Endocrine: Negative.    Genitourinary: Negative.    Musculoskeletal:  Negative for arthralgias and myalgias.   Skin:  Negative for color change, pallor, rash and wound.   Allergic/Immunologic: Negative.    Neurological:  Negative for dizziness, facial asymmetry, light-headedness and headaches.   Hematological:  Negative for adenopathy.   Psychiatric/Behavioral:  Negative for agitation and behavioral problems.        Objective:      Physical Exam  Vitals and nursing note reviewed.   Constitutional:       General: He is not in acute distress.     Appearance: He is well-developed. He is ill-appearing. He is not diaphoretic.   HENT:      Head: Normocephalic and atraumatic.   Cardiovascular:      Rate and Rhythm: Normal rate and regular rhythm.      Heart sounds: Murmur heard.   Pulmonary:      Effort: Pulmonary effort is normal. No respiratory distress.      Breath sounds: Normal breath sounds.   Skin:     General: Skin is warm and dry.      Findings: No rash.    Neurological:      Mental Status: He is alert and oriented to person, place, and time.   Psychiatric:         Behavior: Behavior normal.         Thought Content: Thought content normal.         Judgment: Judgment normal.         Assessment:       1. Hospital discharge follow-up    2. History of CVA (cerebrovascular accident)    3. Paroxysmal atrial fibrillation    4. PVD (peripheral vascular disease)    5. Essential hypertension        Plan:       Chai was seen today for hospital follow up.    Diagnoses and all orders for this visit:    Hospital discharge follow-up    History of CVA (cerebrovascular accident)  -     Ambulatory referral/consult to Neurology; Future    Paroxysmal atrial fibrillation    PVD (peripheral vascular disease)    Essential hypertension      Continue meds as prescribed  Refer to neurology   Stroke symptoms discussed with pt and family   Follow up with Dr. Teixeira in 2 months and PRN   Discussed with family to contact [employer] and pt's [facility] insurance for help with the assistance

## 2024-06-13 ENCOUNTER — TELEPHONE (OUTPATIENT)
Dept: INTERNAL MEDICINE | Facility: CLINIC | Age: 84
End: 2024-06-13
Payer: MEDICARE

## 2024-06-13 RX ORDER — ROSUVASTATIN CALCIUM 40 MG/1
40 TABLET, COATED ORAL DAILY
Qty: 90 TABLET | Refills: 3 | Status: SHIPPED | OUTPATIENT
Start: 2024-06-13

## 2024-06-13 NOTE — TELEPHONE ENCOUNTER
Spoke with patient he is not going to take the Simvastatin anymore. He will go  the Crestor 40 mg tomorrow

## 2024-06-13 NOTE — TELEPHONE ENCOUNTER
Patient needs to be taken off his simvastatin and changed to another statin due to a possible interaction with his other medications.  It is highly unlikely to present a problem but out of an utmost of caution we will change the medication.    Stop simvastatin.    Start rosuvastatin 40 mg daily

## 2024-07-09 ENCOUNTER — PATIENT MESSAGE (OUTPATIENT)
Dept: INTERNAL MEDICINE | Facility: CLINIC | Age: 84
End: 2024-07-09
Payer: MEDICARE

## 2024-07-23 ENCOUNTER — TELEPHONE (OUTPATIENT)
Dept: INTERNAL MEDICINE | Facility: CLINIC | Age: 84
End: 2024-07-23
Payer: MEDICARE

## 2024-07-23 NOTE — TELEPHONE ENCOUNTER
----- Message from Phil Villalta sent at 7/23/2024 10:24 AM CDT -----  Regarding: daughter  Type: Patient Call Back    Who called:daughter    What is the request in detail:pt had a stroke and was trying to get an appt with a neurologist, have tried 3 others but not in the network    Can the clinic reply by [facility]?no    Would the patient rather a call back or a response via [facility]? callback    Best call back number:[phone]    Additional Information:

## 2024-07-23 NOTE — TELEPHONE ENCOUNTER
Tried calling daughter at [phone]. Called pt and he gave me number [phone]. Called and spoke to daughter Sharmaine. She is having trouble finding a neurologist that is in network. Advised her to call pt insurance and get a list of providers in network. She states she will try that.

## 2024-09-13 ENCOUNTER — OFFICE VISIT (OUTPATIENT)
Dept: INTERNAL MEDICINE | Facility: CLINIC | Age: 84
End: 2024-09-13
Payer: MEDICARE

## 2024-09-13 VITALS
BODY MASS INDEX: 26.22 KG/M2 | SYSTOLIC BLOOD PRESSURE: 102 MMHG | OXYGEN SATURATION: 95 % | TEMPERATURE: 97 F | HEART RATE: 64 BPM | WEIGHT: 182.75 LBS | DIASTOLIC BLOOD PRESSURE: 60 MMHG

## 2024-09-13 DIAGNOSIS — R63.4 WEIGHT LOSS: ICD-10-CM

## 2024-09-13 DIAGNOSIS — I48.0 PAROXYSMAL ATRIAL FIBRILLATION: ICD-10-CM

## 2024-09-13 DIAGNOSIS — R60.0 LEG EDEMA: Primary | ICD-10-CM

## 2024-09-13 DIAGNOSIS — I73.9 PVD (PERIPHERAL VASCULAR DISEASE): ICD-10-CM

## 2024-09-13 DIAGNOSIS — Z86.73 HISTORY OF CVA (CEREBROVASCULAR ACCIDENT): ICD-10-CM

## 2024-09-13 DIAGNOSIS — I10 ESSENTIAL HYPERTENSION: ICD-10-CM

## 2024-09-13 PROCEDURE — 99999 PR PBB SHADOW E&M-EST. PATIENT-LVL IV: CPT | Mod: PBBFAC,,, | Performed by: NURSE PRACTITIONER

## 2024-09-13 RX ORDER — FUROSEMIDE 20 MG/1
TABLET ORAL
Qty: 90 TABLET | Refills: 1 | Status: SHIPPED | OUTPATIENT
Start: 2024-09-13

## 2024-09-13 NOTE — PROGRESS NOTES
Subjective:       Patient ID: Chai Murry is a 84 y.o. male.    Chief Complaint: follow up  (Feet swelling )    Pt presents to clinic today for lower leg swelling  This is a chronic condition  Reports he responds well to lasix but he is needing a refill  Denies high salt diet  Tries to elevate his feet as much as possible     Feels like his breathing has been okay  Follows closely with pulm  Appears he is due for his checkup    He has lost some weight over the past few months since losing his wife  Decreased appetite  Daughter cooks for him and makes sure he has some things in the house  He feels his mood is okay but just doesn't have an appetite   Wt Readings from Last 3 Encounters:  09/13/24 : 82.9 kg (182 lb 12.2 oz)  06/07/24 : 84.5 kg (186 lb 4.6 oz)  03/06/24 : 89.6 kg (197 lb 8.5 oz)               /60   Pulse 64   Temp 97 °F (36.1 °C)   Wt 82.9 kg (182 lb 12.2 oz)   SpO2 95%   BMI 26.22 kg/m²     Review of Systems   Constitutional:  Positive for appetite change, fatigue and unexpected weight change. Negative for activity change, chills, diaphoresis and fever.   HENT: Negative.     Eyes: Negative.    Respiratory:  Negative for apnea, chest tightness, shortness of breath and stridor.    Cardiovascular:  Positive for leg swelling. Negative for chest pain and palpitations.   Gastrointestinal: Negative.    Endocrine: Negative.    Genitourinary: Negative.    Musculoskeletal:  Negative for arthralgias and myalgias.   Skin:  Negative for color change, pallor, rash and wound.   Allergic/Immunologic: Negative.    Neurological:  Negative for dizziness, facial asymmetry, light-headedness and headaches.   Hematological:  Negative for adenopathy.   Psychiatric/Behavioral:  Negative for agitation and behavioral problems.        Objective:      Physical Exam  Vitals and nursing note reviewed.   Constitutional:       General: He is not in acute distress.     Appearance: He is well-developed. He is not  ill-appearing or diaphoretic.   HENT:      Head: Normocephalic and atraumatic.   Cardiovascular:      Rate and Rhythm: Normal rate and regular rhythm.      Heart sounds: Murmur heard.   Pulmonary:      Effort: Pulmonary effort is normal. No respiratory distress.      Breath sounds: Normal breath sounds.   Skin:     General: Skin is warm and dry.      Findings: No rash.   Neurological:      Mental Status: He is alert and oriented to person, place, and time.   Psychiatric:         Behavior: Behavior normal.         Thought Content: Thought content normal.         Judgment: Judgment normal.         Assessment:       1. Leg edema    2. Weight loss    3. History of CVA (cerebrovascular accident)    4. Paroxysmal atrial fibrillation    5. PVD (peripheral vascular disease)    6. Essential hypertension    7. BMI 26.0-26.9,adult        Plan:       Chai was seen today for follow up .    Diagnoses and all orders for this visit:    Leg edema  -     furosemide (LASIX) 20 MG tablet; TAKE 1 TABLET BY MOUTH EVERY DAY AS NEEDED    Weight loss    History of CVA (cerebrovascular accident)    Paroxysmal atrial fibrillation    PVD (peripheral vascular disease)    Essential hypertension    BMI 26.0-26.9,adult      Due for cardiac follow up- will help pt schedule   We discussed labs for weight loss- but he declined at this time  Keep follow up with Dr. Teixeira as scheduled

## 2024-09-20 RX ORDER — RANOLAZINE 500 MG/1
TABLET, EXTENDED RELEASE ORAL
Qty: 180 TABLET | Refills: 1 | Status: SHIPPED | OUTPATIENT
Start: 2024-09-20

## 2024-09-20 NOTE — TELEPHONE ENCOUNTER
Refill Routing Note   Medication(s) are not appropriate for processing by [facility] for the following reason(s):        Outside of protocol    [facility] action(s):  Route             Appointments  past 12m or future 3m with PCP    Date Provider   Last Visit   Visit date not found Peter Teixeira MD   Next Visit   Visit date not found Peter Teixeira MD   ED visits in past 90 days: 0        Note composed:8:41 AM 09/20/2024

## 2024-09-26 RX ORDER — AMLODIPINE BESYLATE 5 MG/1
TABLET ORAL
Qty: 90 TABLET | Refills: 2 | Status: SHIPPED | OUTPATIENT
Start: 2024-09-26

## 2024-09-26 NOTE — PROGRESS NOTES
Subjective:       Patient ID: Chai Murry is a 84 y.o. male.    Chief Complaint: Anemia    HPI: Mr. Murry is an 84 year old male who is following up for his nutritional anemia, and history of recurrent squamous cell carcinoma of lung, with metastatic disease involving the trachea.    Cancer Hx: stage IIA squamous cell carcinoma of the lung status post left pneumonectomy and adjuvant chemoradiation completed 2016. He developed hoarseness in 2019 found to have PET avid mass left of trachea consistent with recurrent squamous cell carcinoma. He underwent chemoradiation with cisplatin and completed 6 weekly treatments with good response then immunotherapy maintenance with durvalumab completed 2020. Restaging scan 2020 with his primary doctor Cadence showed no FDG avidity to suggest recurrent or metastatic disease.     Today:  Patient denies any issues.  He denies any throat pain, fevers, illnesses, weight loss, night sweats, new sob. He has stable sob, using o2 at home 3L o2 NC when needed. He takes his time when doing things around the house. He has not been taking his vitamin b12. He has been taking iron q other day and potassium.     Social History     Socioeconomic History    Marital status:     Number of children: 3   Occupational History    Occupation: retired   Tobacco Use    Smoking status: Former     Current packs/day: 0.00     Average packs/day: 1 pack/day for 50.0 years (50.0 ttl pk-yrs)     Types: Cigarettes     Start date: 1955     Quit date: 2005     Years since quittin.1    Smokeless tobacco: Never   Substance and Sexual Activity    Alcohol use: No    Drug use: No    Sexual activity: Not Currently     Social Drivers of Health     Food Insecurity: Patient Declined (2024)    Received from [facility] and Its Subsidiaries and Affiliates    Hunger Vital Sign     Worried About Running Out of Food in the Last Year: Patient declined      Ran Out of Food in the Last Year: Patient declined   Transportation Needs: Patient Declined (5/22/2024)    Received from [facility] and [facility]ili[facility]    [facility]     Lack of Transportation (Medical): Patient declined     Lack of Transportation (Non-Medical): Patient declined       Past Medical History:   Diagnosis Date    Anemia     Arthritis     Atrial fibrillation     CAD (coronary artery disease)     Cataract     Class 1 obesity due to excess calories with serious comorbidity and body mass index (BMI) of 31.0 to 31.9 in adult 3/14/2022    COPD (chronic obstructive pulmonary disease)     Diverticulosis     ED (erectile dysfunction)     HTN (hypertension)     Hyperlipidemia     Lung cancer     left    NSTEMI (non-ST elevated myocardial infarction) 8/23/2019    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 Courtney dwyer bx       Family History   Problem Relation Name Age of Onset    Esophageal cancer Sister      Cancer Sister      Lung cancer Mother      Cancer Mother      Cataracts Mother      Hypertension Mother      Pneumonia Father      Cataracts Father      Hypertension Father      Cancer Brother      Hypertension Brother      Blindness Neg Hx      Diabetes Neg Hx      Glaucoma Neg Hx      Macular degeneration Neg Hx      Retinal detachment Neg Hx      Strabismus Neg Hx      Stroke Neg Hx      Thyroid disease Neg Hx         Past Surgical History:   Procedure Laterality Date    APPENDECTOMY      BRONCHOSCOPY Bilateral 6/21/2019    Procedure: Bronchoscopy;  Surgeon: Paresh Goldman MD;  Location: [facility];  Service: Endoscopy;  Laterality: Bilateral;    CARDIAC CATHETERIZATION      cardiac stents      CATARACT EXTRACTION W/  INTRAOCULAR LENS IMPLANT Right 9-3-14    CHOLECYSTECTOMY      COLONOSCOPY N/A 4/17/2018    Procedure: COLONOSCOPY;  Surgeon: Dionne Doan MD;  Location: [facility];   Service: Endoscopy;  Laterality: N/A;    COLONOSCOPY N/A 10/25/2018    Procedure: COLONOSCOPY;  Surgeon: Michael Hameed MD;  Location: [facility] ENDO;  Service: Endoscopy;  Laterality: N/A;    ENDOSCOPIC ULTRASOUND OF UPPER GASTROINTESTINAL TRACT N/A 6/11/2019    Procedure: ULTRASOUND, UPPER GI TRACT, ENDOSCOPIC;  Surgeon: Abhishek Knox MD;  Location: [facility] ENDO;  Service: Endoscopy;  Laterality: N/A;    ESOPHAGOGASTRODUODENOSCOPY N/A 6/11/2019    Procedure: EGD (ESOPHAGOGASTRODUODENOSCOPY);  Surgeon: Abhishek Knox MD;  Location: [facility] ENDO;  Service: Endoscopy;  Laterality: N/A;    infected stomach gland excision      INSERTION OF TUNNELED CENTRAL VENOUS CATHETER (CVC) WITH SUBCUTANEOUS PORT Right 7/3/2019    Procedure: IIXAGSITP-FULL-I-CATH;  Surgeon: Jean Carlos Constantino MD;  Location: [facility] OR;  Service: General;  Laterality: Right;    LUNG REMOVAL, TOTAL Left 04/2016    lung cancer left upper lobe    SKIN BIOPSY Left     arm       Review of Systems   Constitutional:  Negative for activity change, appetite change, chills, diaphoresis, fatigue, fever and unexpected weight change.   HENT:  Negative for congestion.    Respiratory:  Positive for shortness of breath. Negative for cough.    Cardiovascular:  Negative for chest pain and leg swelling.   Gastrointestinal:  Negative for abdominal pain, blood in stool, constipation, diarrhea, nausea and vomiting.   Genitourinary:  Negative for hematuria.   Skin:  Negative for pallor.   Neurological:  Negative for dizziness, light-headedness, numbness and headaches.         Medication List with Changes/Refills   Current Medications    ALBUTEROL (PROVENTIL) 2.5 MG /3 ML (0.083 %) NEBULIZER SOLUTION    Take 3 mLs (2.5 mg total) by nebulization every 6 (six) hours while awake.    ALBUTEROL (PROVENTIL/VENTOLIN HFA) 90 MCG/ACTUATION INHALER    Inhale 2 puffs into the lungs every 4 (four) hours as needed for Wheezing or Shortness of Breath.    AMLODIPINE (NORVASC) 5 MG TABLET    TAKE 1  TABLET BY MOUTH EVERYDAY AT BEDTIME    AMMONIUM LACTATE (LAC-HYDRIN) 12 % LOTION    Apply to damp skin after bathing    APIXABAN (ELIQUIS) 5 MG TAB    Take 1 tablet (5 mg total) by mouth 2 (two) times daily.    ASPIRIN (ECOTRIN) 81 MG EC TABLET    Take 81 mg by mouth once daily.    BACITRACIN-NEOMYCIN-POLYMYXIN B-HYDROCORTISONE 1 % OINTMENT    Apply topically 2 (two) times daily.    DOXEPIN (SINEQUAN) 10 MG CAPSULE    TAKE 1 CAPSULE (10 MG TOTAL) BY MOUTH EVERY EVENING. FOR INSOMNIA    FENOFIBRIC ACID (FIBRICOR) 135 MG CPDR    TAKE 1 CAPSULE (135 MG TOTAL) BY MOUTH ONCE DAILY    FLUTICASONE-UMECLIDIN-VILANTER (TRELEGY ELLIPTA) 200-62.5-25 MCG INHALER    Inhale 1 puff into the lungs once daily. Hold for refill    FUROSEMIDE (LASIX) 20 MG TABLET    TAKE 1 TABLET BY MOUTH EVERY DAY AS NEEDED    LEVOCETIRIZINE (XYZAL) 5 MG TABLET    Take 5 mg by mouth as needed.     LISINOPRIL (PRINIVIL,ZESTRIL) 40 MG TABLET    TAKE 1 TABLET BY MOUTH EVERY DAY    RANOLAZINE (RANEXA) 500 MG TB12    TAKE 1 TABLET BY MOUTH TWICE A DAY    ROSUVASTATIN (CRESTOR) 40 MG TAB    Take 1 tablet (40 mg total) by mouth once daily.    SOTALOL (BETAPACE) 80 MG TABLET    TAKE 1 TABLET BY MOUTH 2 TIMES DAILY.     Objective:     Vitals:    10/01/24 1237   BP: 137/62   Pulse: (!) 55   Resp: 20   Temp: 97.3 °F (36.3 °C)         Physical Exam  Vitals reviewed.   Constitutional:       General: He is not in acute distress.     Appearance: He is not ill-appearing, toxic-appearing or diaphoretic.   HENT:      Head: Normocephalic and atraumatic.   Cardiovascular:      Rate and Rhythm: Normal rate.   Pulmonary:      Effort: Pulmonary effort is normal.   Skin:     General: Skin is warm.      Coloration: Skin is not jaundiced or pale.      Findings: Bruising present. No erythema, lesion or rash.      Comments: Generalized hyperpigmented lesions   Neurological:      Mental Status: He is alert.      Motor: No weakness.      Gait: Gait normal.   Psychiatric:          Mood and Affect: Mood normal.         Behavior: Behavior normal.         Thought Content: Thought content normal.          Labs/Results:  Lab Results   Component Value Date    WBC 5.87 09/27/2024    RBC 3.82 (L) 09/27/2024    HGB 11.7 (L) 09/27/2024    HCT 38.8 (L) 09/27/2024     (H) 09/27/2024    MCH 30.6 09/27/2024    MCHC 30.2 (L) 09/27/2024    RDW 14.5 09/27/2024     09/27/2024    MPV 11.8 09/27/2024    GRAN 3.7 09/27/2024    GRAN 62.4 09/27/2024    LYMPH 0.9 (L) 09/27/2024    LYMPH 15.8 (L) 09/27/2024    MONO 0.3 09/27/2024    MONO 5.1 09/27/2024    EOS 0.9 (H) 09/27/2024    BASO 0.08 09/27/2024    EOSINOPHIL 15.0 (H) 09/27/2024    BASOPHIL 1.4 09/27/2024      Latest Reference Range & Units 09/27/24 11:08   Iron 45 - 160 ug/dL 105   TIBC 250 - 450 ug/dL 468 (H)   Saturated Iron 20 - 50 % 22   Transferrin 200 - 375 mg/dL 316   Ferritin 20.0 - 300.0 ng/mL 130   Vitamin B12 210 - 950 pg/mL <148 (L)     CMP  Sodium   Date Value Ref Range Status   09/27/2024 142 136 - 145 mmol/L Final     Potassium   Date Value Ref Range Status   09/27/2024 4.7 3.5 - 5.1 mmol/L Final     Chloride   Date Value Ref Range Status   09/27/2024 104 95 - 110 mmol/L Final     CO2   Date Value Ref Range Status   09/27/2024 27 23 - 29 mmol/L Final     Glucose   Date Value Ref Range Status   09/27/2024 94 70 - 110 mg/dL Final     BUN   Date Value Ref Range Status   09/27/2024 17 8 - 23 mg/dL Final     Creatinine   Date Value Ref Range Status   09/27/2024 0.9 0.5 - 1.4 mg/dL Final     Calcium   Date Value Ref Range Status   09/27/2024 9.8 8.7 - 10.5 mg/dL Final     Total Protein   Date Value Ref Range Status   09/27/2024 6.9 6.0 - 8.4 g/dL Final     Albumin   Date Value Ref Range Status   09/27/2024 3.7 3.5 - 5.2 g/dL Final     Total Bilirubin   Date Value Ref Range Status   09/27/2024 0.4 0.1 - 1.0 mg/dL Final     Comment:     For infants and newborns, interpretation of results should be based  on gestational age, weight and in  agreement with clinical  observations.    Premature Infant recommended reference ranges:  Up to 24 hours.............<8.0 mg/dL  Up to 48 hours............<12.0 mg/dL  3-5 days..................<15.0 mg/dL  6-29 days.................<15.0 mg/dL       Alkaline Phosphatase   Date Value Ref Range Status   09/27/2024 53 (L) 55 - 135 U/L Final     AST   Date Value Ref Range Status   09/27/2024 31 10 - 40 U/L Final     ALT   Date Value Ref Range Status   09/27/2024 23 10 - 44 U/L Final     Anion Gap   Date Value Ref Range Status   09/27/2024 11 8 - 16 mmol/L Final     eGFR   Date Value Ref Range Status   09/27/2024 >60 >60 mL/min/1.73 m^2 Final       Ct chest 2/19/24  FINDINGS: Postoperative changes consistent with left pneumonectomy with small residual left pleural effusion.  Stable 6 and 8mm right lower lobe pulmonary nodules.  Centrilobular emphysema.  No new pulmonary nodule or mass.  No right effusion or pneumothorax.  Thickening along the right minor and major fissures unchanged.  No mediastinal or hilar adenopathy.  Heart is upper limits of normal in size with trace pericardial effusion.  Severely calcified coronary arteries.  No suspicious osseous lesions.  Impression:   Stable exam.  No new pulmonary nodules.    Assessment:     Problem List Items Addressed This Visit          Oncology    Anemia - Primary    Relevant Orders    CBC Auto Differential    Comprehensive Metabolic Panel    Ferritin    Iron and TIBC    Vitamin B12    Malignant neoplasm of upper lobe of left lung     Other Visit Diagnoses       Nutritional anemia, unspecified        Relevant Orders    Vitamin B12          Plan:     Malignant neoplasm of upper lobe of left lung  --Chemoradiation 2016; then with recurrence chemoradiation and immunotherapy durvalumab, completed 9/2020   --continue to follow with pulmonology  ---has been in surveillance with staging PET scan October 2022 and short interval CT scan January 2023 follow-up in March and in August  2023 with relatively stable subcentimeter pulmonary nodules right lower lung.   --PET scan not warranted for survelliance unless clinical progression  --continue to follow pulmonology   --last ct chest 2/19/24 stable, no new pulm nodules  --next Ct chest 2/19/25-ordered by pulm    Guidelines  --H&P and chest CT ± contrast every 3-6 mo for 3 y, then H&P and chest CT ± contrast every 6 mo for 2 y, then H&P and a low-dose non-contrast-enhanced chest CT annually   --Residual or new radiographic abnormalities may require more frequent imaging   --Smoking cessation advice, counseling, and pharmacotherapy   -- FDG-PET/CT or brain MRI is not routinely indicated    Anemia, unspecified type  --hgb: 11.7g/dL  --iron: 105 sat: 22%, ferritin: 130-iron repleted  --encouraged to incorporate iron rich foods into diet  --continue with oral iron every other day, as tolerated  --continue vitamin b12 supplement daily. Vitmain b12: <148, low    Follow-Up: 12 months with cbc cmp iron/tibc ferritin vitamin b12    Deirdre Moore PA-C  Hematology Oncology    Route Chart for Scheduling    Med Onc Chart Routing      Follow up with physician    Follow up with [facility] . 12 months with cbc cmp iron/tibc ferritin vitamin b12 prior   Infusion scheduling note    Injection scheduling note    Labs CMP, ferritin, CBC, iron and TIBC and vitamin B12   Scheduling:  Preferred lab:  Lab interval:     Imaging    Pharmacy appointment    Other referrals

## 2024-09-26 NOTE — TELEPHONE ENCOUNTER
Refill Routing Note   Medication(s) are not appropriate for processing by [facility] for the following reason(s):        Patient not seen by provider within 15 months    [facility] action(s):  Defer   Requires appointment : Yes     Requires labs : Yes             Appointments  past 12m or future 3m with PCP    Date Provider   Last Visit   Visit date not found Peter Teixeira MD   Next Visit   Visit date not found Peter Teixeira MD   ED visits in past 90 days: 0        Note composed:9:37 AM 09/26/2024

## 2024-09-26 NOTE — TELEPHONE ENCOUNTER
Care Due:                  Date            Visit Type   Department     Provider  --------------------------------------------------------------------------------                                EP -                              PRIMARY      [facility] INTERNAL  Last Visit: 03-      CARE ([facility])   MEDICINE       Peter Teixeira  Next Visit: None Scheduled  None         None Found                                                            Last  Test          Frequency    Reason                     Performed    Due Date  --------------------------------------------------------------------------------    Office Visit  15 months..  fenofibric, rosuvastatin.  03- 06-    Lipid Panel.  12 months..  fenofibric, rosuvastatin.  05- 03-    [facility] Embedded Care Due Messages. Reference number: [id number].   9/26/2024 12:13:12 AM CDT

## 2024-09-27 ENCOUNTER — LAB VISIT (OUTPATIENT)
Dept: LAB | Facility: HOSPITAL | Age: 84
End: 2024-09-27
Payer: MEDICARE

## 2024-09-27 DIAGNOSIS — C34.12 MALIGNANT NEOPLASM OF UPPER LOBE OF LEFT LUNG: ICD-10-CM

## 2024-09-27 DIAGNOSIS — D64.9 ANEMIA, UNSPECIFIED TYPE: ICD-10-CM

## 2024-09-27 DIAGNOSIS — D53.9 NUTRITIONAL ANEMIA, UNSPECIFIED: ICD-10-CM

## 2024-09-27 LAB
ALBUMIN SERPL BCP-MCNC: 3.7 G/DL (ref 3.5–5.2)
ALP SERPL-CCNC: 53 U/L (ref 55–135)
ALT SERPL W/O P-5'-P-CCNC: 23 U/L (ref 10–44)
ANION GAP SERPL CALC-SCNC: 11 MMOL/L (ref 8–16)
AST SERPL-CCNC: 31 U/L (ref 10–40)
BASOPHILS # BLD AUTO: 0.08 K/UL (ref 0–0.2)
BASOPHILS NFR BLD: 1.4 % (ref 0–1.9)
BILIRUB SERPL-MCNC: 0.4 MG/DL (ref 0.1–1)
BUN SERPL-MCNC: 17 MG/DL (ref 8–23)
CALCIUM SERPL-MCNC: 9.8 MG/DL (ref 8.7–10.5)
CHLORIDE SERPL-SCNC: 104 MMOL/L (ref 95–110)
CO2 SERPL-SCNC: 27 MMOL/L (ref 23–29)
CREAT SERPL-MCNC: 0.9 MG/DL (ref 0.5–1.4)
DIFFERENTIAL METHOD BLD: ABNORMAL
EOSINOPHIL # BLD AUTO: 0.9 K/UL (ref 0–0.5)
EOSINOPHIL NFR BLD: 15 % (ref 0–8)
ERYTHROCYTE [DISTWIDTH] IN BLOOD BY AUTOMATED COUNT: 14.5 % (ref 11.5–14.5)
EST. GFR  (NO RACE VARIABLE): >60 ML/MIN/1.73 M^2
FERRITIN SERPL-MCNC: 130 NG/ML (ref 20–300)
GLUCOSE SERPL-MCNC: 94 MG/DL (ref 70–110)
HCT VFR BLD AUTO: 38.8 % (ref 40–54)
HGB BLD-MCNC: 11.7 G/DL (ref 14–18)
IMM GRANULOCYTES # BLD AUTO: 0.02 K/UL (ref 0–0.04)
IMM GRANULOCYTES NFR BLD AUTO: 0.3 % (ref 0–0.5)
IRON SERPL-MCNC: 105 UG/DL (ref 45–160)
LYMPHOCYTES # BLD AUTO: 0.9 K/UL (ref 1–4.8)
LYMPHOCYTES NFR BLD: 15.8 % (ref 18–48)
MCH RBC QN AUTO: 30.6 PG (ref 27–31)
MCHC RBC AUTO-ENTMCNC: 30.2 G/DL (ref 32–36)
MCV RBC AUTO: 102 FL (ref 82–98)
MONOCYTES # BLD AUTO: 0.3 K/UL (ref 0.3–1)
MONOCYTES NFR BLD: 5.1 % (ref 4–15)
NEUTROPHILS # BLD AUTO: 3.7 K/UL (ref 1.8–7.7)
NEUTROPHILS NFR BLD: 62.4 % (ref 38–73)
NRBC BLD-RTO: 0 /100 WBC
PLATELET # BLD AUTO: 208 K/UL (ref 150–450)
PMV BLD AUTO: 11.8 FL (ref 9.2–12.9)
POTASSIUM SERPL-SCNC: 4.7 MMOL/L (ref 3.5–5.1)
PROT SERPL-MCNC: 6.9 G/DL (ref 6–8.4)
RBC # BLD AUTO: 3.82 M/UL (ref 4.6–6.2)
SATURATED IRON: 22 % (ref 20–50)
SODIUM SERPL-SCNC: 142 MMOL/L (ref 136–145)
TOTAL IRON BINDING CAPACITY: 468 UG/DL (ref 250–450)
TRANSFERRIN SERPL-MCNC: 316 MG/DL (ref 200–375)
VIT B12 SERPL-MCNC: <148 PG/ML (ref 210–950)
WBC # BLD AUTO: 5.87 K/UL (ref 3.9–12.7)

## 2024-09-27 PROCEDURE — 36415 COLL VENOUS BLD VENIPUNCTURE: CPT | Mod: PO

## 2024-09-27 PROCEDURE — 85025 COMPLETE CBC W/AUTO DIFF WBC: CPT

## 2024-09-27 PROCEDURE — 82728 ASSAY OF FERRITIN: CPT

## 2024-09-27 PROCEDURE — 82607 VITAMIN B-12: CPT

## 2024-09-27 PROCEDURE — 83540 ASSAY OF IRON: CPT

## 2024-09-27 PROCEDURE — 80053 COMPREHEN METABOLIC PANEL: CPT

## 2024-10-01 ENCOUNTER — OFFICE VISIT (OUTPATIENT)
Dept: HEMATOLOGY/ONCOLOGY | Facility: CLINIC | Age: 84
End: 2024-10-01
Payer: MEDICARE

## 2024-10-01 VITALS
TEMPERATURE: 97 F | HEIGHT: 70 IN | RESPIRATION RATE: 20 BRPM | SYSTOLIC BLOOD PRESSURE: 137 MMHG | DIASTOLIC BLOOD PRESSURE: 62 MMHG | BODY MASS INDEX: 25.91 KG/M2 | OXYGEN SATURATION: 98 % | WEIGHT: 181 LBS | HEART RATE: 55 BPM

## 2024-10-01 DIAGNOSIS — D53.9 NUTRITIONAL ANEMIA, UNSPECIFIED: ICD-10-CM

## 2024-10-01 DIAGNOSIS — C34.12 MALIGNANT NEOPLASM OF UPPER LOBE OF LEFT LUNG: ICD-10-CM

## 2024-10-01 DIAGNOSIS — D64.9 ANEMIA, UNSPECIFIED TYPE: Primary | ICD-10-CM

## 2024-10-01 PROCEDURE — 3288F FALL RISK ASSESSMENT DOCD: CPT | Mod: CPTII,S$GLB,,

## 2024-10-01 PROCEDURE — 1101F PT FALLS ASSESS-DOCD LE1/YR: CPT | Mod: CPTII,S$GLB,,

## 2024-10-01 PROCEDURE — 3075F SYST BP GE 130 - 139MM HG: CPT | Mod: CPTII,S$GLB,,

## 2024-10-01 PROCEDURE — 99214 OFFICE O/P EST MOD 30 MIN: CPT | Mod: S$GLB,,,

## 2024-10-01 PROCEDURE — 1126F AMNT PAIN NOTED NONE PRSNT: CPT | Mod: CPTII,S$GLB,,

## 2024-10-01 PROCEDURE — 99999 PR PBB SHADOW E&M-EST. PATIENT-LVL IV: CPT | Mod: PBBFAC,,,

## 2024-10-01 PROCEDURE — 3078F DIAST BP <80 MM HG: CPT | Mod: CPTII,S$GLB,,

## 2024-10-01 PROCEDURE — 1159F MED LIST DOCD IN RCRD: CPT | Mod: CPTII,S$GLB,,

## 2024-11-11 ENCOUNTER — PATIENT MESSAGE (OUTPATIENT)
Dept: CARDIOLOGY | Facility: CLINIC | Age: 84
End: 2024-11-11
Payer: MEDICARE

## 2024-11-13 ENCOUNTER — TELEPHONE (OUTPATIENT)
Dept: CARDIOLOGY | Facility: CLINIC | Age: 84
End: 2024-11-13

## 2024-11-13 ENCOUNTER — OFFICE VISIT (OUTPATIENT)
Dept: CARDIOLOGY | Facility: CLINIC | Age: 84
End: 2024-11-13
Payer: MEDICARE

## 2024-11-13 VITALS
DIASTOLIC BLOOD PRESSURE: 70 MMHG | BODY MASS INDEX: 26.67 KG/M2 | WEIGHT: 186.31 LBS | HEART RATE: 69 BPM | OXYGEN SATURATION: 92 % | HEIGHT: 70 IN | SYSTOLIC BLOOD PRESSURE: 130 MMHG

## 2024-11-13 DIAGNOSIS — I10 ESSENTIAL HYPERTENSION: ICD-10-CM

## 2024-11-13 DIAGNOSIS — I48.0 PAROXYSMAL ATRIAL FIBRILLATION: ICD-10-CM

## 2024-11-13 DIAGNOSIS — I73.9 PVD (PERIPHERAL VASCULAR DISEASE): Primary | ICD-10-CM

## 2024-11-13 DIAGNOSIS — R60.0 LEG EDEMA: ICD-10-CM

## 2024-11-13 DIAGNOSIS — E78.00 PURE HYPERCHOLESTEROLEMIA: ICD-10-CM

## 2024-11-13 PROCEDURE — 99999 PR PBB SHADOW E&M-EST. PATIENT-LVL IV: CPT | Mod: PBBFAC,,, | Performed by: INTERNAL MEDICINE

## 2024-11-13 PROCEDURE — 99214 OFFICE O/P EST MOD 30 MIN: CPT | Mod: S$GLB,,, | Performed by: INTERNAL MEDICINE

## 2024-11-13 PROCEDURE — 3078F DIAST BP <80 MM HG: CPT | Mod: CPTII,S$GLB,, | Performed by: INTERNAL MEDICINE

## 2024-11-13 PROCEDURE — 1159F MED LIST DOCD IN RCRD: CPT | Mod: CPTII,S$GLB,, | Performed by: INTERNAL MEDICINE

## 2024-11-13 PROCEDURE — 1160F RVW MEDS BY RX/DR IN RCRD: CPT | Mod: CPTII,S$GLB,, | Performed by: INTERNAL MEDICINE

## 2024-11-13 PROCEDURE — 3075F SYST BP GE 130 - 139MM HG: CPT | Mod: CPTII,S$GLB,, | Performed by: INTERNAL MEDICINE

## 2024-11-13 NOTE — PROGRESS NOTES
Subjective:   Patient ID:  Chai Murry is a 84 y.o. male who presents for follow-up of Coronary Artery Disease (edema) and edema  Last clinic note 5-23:  Pt with chronic SOB from COPD, hx of lobectomy.  Pt denies CP. BP stable at home  Echo 2020 nml lv function  SOB/CARVAJAL  Presents for follow up  SOB related to s/p L pneumonectomy, no changes since last visit  Denies chest pain, palpitations, syncope, lightheadedness  No other complaints today    Today:  Echo at [facility] 5-24 nml EF, mild AS  SOB ok, on O2 at night  CVA at OL:  Reason for Hospitalization   84-year-old male PMH HTN, CAD, HLD, A-fib, COPD on 2 L NC at baseline, previous history of lung cancer s/p left pneumonectomy who presented 5/22 as a level 1 stroke alert (new onset right-sided weakness and dysarthria), tPA deferred because of Eliquis.  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      No apparent neurologic deficit       Presents for follow up  SOB related to s/p L pneumonectomy, no changes since last visit  Denies chest pain, palpitations, syncope, lightheadedness  No other complaints today    Coronary Artery Disease  Presents for follow-up visit. Symptoms include shortness of breath. Pertinent negatives include no chest pain, chest pressure, chest tightness, dizziness, leg swelling, muscle weakness, palpitations or weight gain. Compliance with medications is good.   Hypertension  This is a chronic problem. The current episode started more than 1 year ago. The problem is unchanged. The problem is controlled. Associated symptoms include shortness of breath. Pertinent negatives include no chest pain, malaise/fatigue, orthopnea or palpitations. Risk factors for coronary artery disease include male gender, sedentary lifestyle and dyslipidemia. Past treatments include calcium channel blockers and angiotensin blockers. There are no compliance problems.  Hypertensive end-organ damage includes CAD/MI. There is  no history of CVA.   Hyperlipidemia  This is a chronic problem. The problem is controlled. Recent lipid tests were reviewed and are normal. Associated symptoms include shortness of breath. Pertinent negatives include no chest pain. Current antihyperlipidemic treatment includes statins. Risk factors for coronary artery disease include hypertension, male sex, obesity, a sedentary lifestyle and dyslipidemia.       Review of Systems   Constitutional: Negative. Negative for malaise/fatigue and weight gain.   HENT: Negative.     Eyes: Negative.    Cardiovascular: Negative.  Negative for chest pain, leg swelling, orthopnea and palpitations.   Respiratory:  Positive for shortness of breath. Negative for chest tightness.    Endocrine: Negative.    Hematologic/Lymphatic: Negative.    Skin: Negative.    Musculoskeletal:  Negative for muscle weakness.   Gastrointestinal: Negative.    Genitourinary: Negative.    Neurological: Negative.  Negative for dizziness.   Psychiatric/Behavioral: Negative.     Allergic/Immunologic: Negative.    All other systems reviewed and are negative.    Family History   Problem Relation Name Age of Onset    Esophageal cancer Sister      Cancer Sister      Lung cancer Mother      Cancer Mother      Cataracts Mother      Hypertension Mother      Pneumonia Father      Cataracts Father      Hypertension Father      Cancer Brother      Hypertension Brother      Blindness Neg Hx      Diabetes Neg Hx      Glaucoma Neg Hx      Macular degeneration Neg Hx      Retinal detachment Neg Hx      Strabismus Neg Hx      Stroke Neg Hx      Thyroid disease Neg Hx       Past Medical History:   Diagnosis Date    Anemia     Arthritis     Atrial fibrillation     CAD (coronary artery disease)     Cataract     Class 1 obesity due to excess calories with serious comorbidity and body mass index (BMI) of 31.0 to 31.9 in adult 3/14/2022    COPD (chronic obstructive pulmonary disease)     Diverticulosis     ED (erectile  dysfunction)     HTN (hypertension)     Hyperlipidemia     Lung cancer     left    NSTEMI (non-ST elevated myocardial infarction) 2019    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 Courtney miller     Social History     Socioeconomic History    Marital status:     Number of children: 3   Occupational History    Occupation: retired   Tobacco Use    Smoking status: Former     Current packs/day: 0.00     Average packs/day: 1 pack/day for 50.0 years (50.0 ttl pk-yrs)     Types: Cigarettes     Start date: 1955     Quit date: 2005     Years since quittin.2    Smokeless tobacco: Never   Substance and Sexual Activity    Alcohol use: No    Drug use: No    Sexual activity: Not Currently     Social Drivers of Health     Food Insecurity: Patient Declined (2024)    Received from [facility] Subsidi[facility]    Hunger Vital Sign     Worried About Running Out of Food in the Last Year: Patient declined     Ran Out of Food in the Last Year: Patient declined   Transportation Needs: Patient Declined (2024)    Received from [facility]    PRAPARE - Transportation     Lack of Transportation (Medical): Patient declined     Lack of Transportation (Non-Medical): Patient declined     Current Outpatient Medications on File Prior to Visit   Medication Sig Dispense Refill    albuterol (PROVENTIL) 2.5 mg /3 mL (0.083 %) nebulizer solution Take 3 mLs (2.5 mg total) by nebulization every 6 (six) hours while awake. 270 mL 11    albuterol (PROVENTIL/VENTOLIN HFA) 90 mcg/actuation inhaler Inhale 2 puffs into the lungs every 4 (four) hours as needed for Wheezing or Shortness of Breath. 18 g 11    amLODIPine (NORVASC) 5 MG tablet TAKE 1 TABLET BY MOUTH EVERYDAY AT BEDTIME 90 tablet 2    ammonium lactate (LAC-HYDRIN) 12 % lotion Apply to damp skin  after bathing 225 g 11    apixaban (ELIQUIS) 5 mg Tab Take 1 tablet (5 mg total) by mouth 2 (two) times daily. 180 tablet 3    aspirin (ECOTRIN) 81 MG EC tablet Take 81 mg by mouth once daily.      bacitracin-neomycin-polymyxin b-hydrocortisone 1 % ointment Apply topically 2 (two) times daily. 15 g 1    doxepin (SINEQUAN) 10 MG capsule TAKE 1 CAPSULE (10 MG TOTAL) BY MOUTH EVERY EVENING. FOR INSOMNIA 90 capsule 3    fenofibric acid (FIBRICOR) 135 mg CpDR TAKE 1 CAPSULE (135 MG TOTAL) BY MOUTH ONCE DAILY 90 capsule 0    fluticasone-umeclidin-vilanter (TRELEGY ELLIPTA) 200-62.5-25 mcg inhaler Inhale 1 puff into the lungs once daily. Hold for refill 180 each 3    furosemide (LASIX) 20 MG tablet TAKE 1 TABLET BY MOUTH EVERY DAY AS NEEDED 90 tablet 1    levocetirizine (XYZAL) 5 MG tablet Take 5 mg by mouth as needed.       lisinopriL (PRINIVIL,ZESTRIL) 40 MG tablet TAKE 1 TABLET BY MOUTH EVERY DAY 90 tablet 3    ranolazine (RANEXA) 500 MG Tb12 TAKE 1 TABLET BY MOUTH TWICE A  tablet 1    rosuvastatin (CRESTOR) 40 MG Tab Take 1 tablet (40 mg total) by mouth once daily. 90 tablet 3    sotaloL (BETAPACE) 80 MG tablet TAKE 1 TABLET BY MOUTH 2 TIMES DAILY. 180 tablet 3     No current facility-administered medications on file prior to visit.     Review of patient's allergies indicates:   Allergen Reactions    Atorvastatin      Other reaction(s): Muscle pain    Bactrim [sulfamethoxazole-trimethoprim]      Lip swelling       Objective:     Physical Exam  Vitals and nursing note reviewed.   Constitutional:       Appearance: He is well-developed.   HENT:      Head: Normocephalic and atraumatic.   Eyes:      Conjunctiva/sclera: Conjunctivae normal.      Pupils: Pupils are equal, round, and reactive to light.   Cardiovascular:      Rate and Rhythm: Normal rate and regular rhythm.      Pulses: Intact distal pulses.      Heart sounds: Normal heart sounds.   Pulmonary:      Effort: Pulmonary effort is normal.      Breath sounds:  Normal breath sounds.   Abdominal:      General: Bowel sounds are normal.      Palpations: Abdomen is soft.   Musculoskeletal:      Cervical back: Normal range of motion and neck supple.   Skin:     General: Skin is warm and dry.   Neurological:      Mental Status: He is alert and oriented to person, place, and time.         Assessment:     1. Coronary artery disease of native artery of native heart with stable angina pectoris    2. Mixed hyperlipidemia    3. Essential hypertension    4. Paroxysmal atrial fibrillation    5. PVD (peripheral vascular disease)            Plan:     Continue lisinopril, lasix (and prn), norvasc -htn  Continue asa, sotalol- PAF,  Continue ranexa- cad  Continue statin, fenofibrate-hlp  Continue eliquis- CVA

## 2024-11-26 RX ORDER — LISINOPRIL 40 MG/1
TABLET ORAL
Qty: 90 TABLET | Refills: 3 | Status: SHIPPED | OUTPATIENT
Start: 2024-11-26

## 2025-01-30 ENCOUNTER — HOSPITAL ENCOUNTER (OUTPATIENT)
Dept: RADIOLOGY | Facility: HOSPITAL | Age: 85
Discharge: HOME | End: 2025-01-30
Attending: INTERNAL MEDICINE
Payer: MEDICARE

## 2025-01-30 ENCOUNTER — CLINICAL SUPPORT (OUTPATIENT)
Dept: PULMONOLOGY | Facility: CLINIC | Age: 85
End: 2025-01-30
Attending: INTERNAL MEDICINE
Payer: MEDICARE

## 2025-01-30 VITALS
WEIGHT: 195.75 LBS | BODY MASS INDEX: 28.02 KG/M2 | HEART RATE: 74 BPM | RESPIRATION RATE: 19 BRPM | HEIGHT: 70 IN | OXYGEN SATURATION: 95 % | HEIGHT: 70 IN | WEIGHT: 195.75 LBS | BODY MASS INDEX: 28.02 KG/M2 | DIASTOLIC BLOOD PRESSURE: 54 MMHG | SYSTOLIC BLOOD PRESSURE: 106 MMHG

## 2025-01-30 DIAGNOSIS — Z90.2 S/P PNEUMONECTOMY: ICD-10-CM

## 2025-01-30 DIAGNOSIS — R91.1 SOLITARY PULMONARY NODULE: ICD-10-CM

## 2025-01-30 DIAGNOSIS — J92.0 ASBESTOS-INDUCED PLEURAL PLAQUE: ICD-10-CM

## 2025-01-30 DIAGNOSIS — J44.89 ASTHMA WITH COPD: ICD-10-CM

## 2025-01-30 DIAGNOSIS — Z85.118 HX OF CANCER OF LUNG: ICD-10-CM

## 2025-01-30 DIAGNOSIS — G47.34 NOCTURNAL HYPOXEMIA: ICD-10-CM

## 2025-01-30 DIAGNOSIS — G47.01 INSOMNIA DUE TO MEDICAL CONDITION: ICD-10-CM

## 2025-01-30 DIAGNOSIS — R09.02 EXERCISE HYPOXEMIA: ICD-10-CM

## 2025-01-30 DIAGNOSIS — F17.211 NICOTINE DEPENDENCE, CIGARETTES, IN REMISSION: ICD-10-CM

## 2025-01-30 DIAGNOSIS — J44.9 CHRONIC OBSTRUCTIVE PULMONARY DISEASE, UNSPECIFIED COPD TYPE: ICD-10-CM

## 2025-01-30 DIAGNOSIS — J43.1 PANLOBULAR EMPHYSEMA: Primary | ICD-10-CM

## 2025-01-30 PROCEDURE — 99999 PR PBB SHADOW E&M-EST. PATIENT-LVL IV: CPT | Mod: PBBFAC,,, | Performed by: INTERNAL MEDICINE

## 2025-01-30 PROCEDURE — 71250 CT THORAX DX C-: CPT | Mod: 26,,, | Performed by: RADIOLOGY

## 2025-01-30 PROCEDURE — 71250 CT THORAX DX C-: CPT | Mod: TC

## 2025-01-30 RX ORDER — FLUTICASONE FUROATE, UMECLIDINIUM BROMIDE AND VILANTEROL TRIFENATATE 200; 62.5; 25 UG/1; UG/1; UG/1
1 POWDER RESPIRATORY (INHALATION) DAILY
Qty: 180 EACH | Refills: 3 | Status: SHIPPED | OUTPATIENT
Start: 2025-01-30

## 2025-01-30 RX ORDER — ALBUTEROL SULFATE 90 UG/1
2 INHALANT RESPIRATORY (INHALATION) EVERY 4 HOURS PRN
Qty: 18 G | Refills: 11 | Status: SHIPPED | OUTPATIENT
Start: 2025-01-30

## 2025-01-30 RX ORDER — DOXEPIN HYDROCHLORIDE 10 MG/1
10 CAPSULE ORAL NIGHTLY
Qty: 90 CAPSULE | Refills: 3 | Status: SHIPPED | OUTPATIENT
Start: 2025-01-30

## 2025-01-30 NOTE — PROGRESS NOTES
"Subjective:      Patient ID: Chai Murry is a 84 y.o. male.    Chief Complaint: Malignant neoplasm of upper lobe of left lung  S/p resection    HPI     84 y.o. with lung cancer and Chronic Obstructive Pulmonary Disease  C/o CARVAJAL , carvajal snot use oxygen regularlly  Unable to work with upper body - has oxygen desaturation and fatigue  Follow up COPD.Squamous cell carcinoma of the lung status post left pneumonectomy and adjuvant chemoradiation completed 09/27/2016. Noted voice hoarseness in 2019 found to have PET avid mass left of trachea consistent with recurrent squamous cell carcinoma. Following the oncology.   Doing well with Trelegy.   Chronic CARVAJAL on oxygen therapy. Benefits from oxygen use.     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  Toxic Fume Exposure: chlorine a few times    Wife still smokes at home          Patient Active Problem List   Diagnosis    Coronary artery disease of native artery of native heart with stable angina pectoris    Hyperlipidemia    Essential hypertension    Anemia    ED (erectile dysfunction)    Cataract    Amblyopia    Chronic obstructive pulmonary disease    Malignant neoplasm of upper lobe of left lung    s/p left pnuemonectomy for [facility]    Asbestos-induced pleural plaque    Reactive depression    Insomnia    Paralysis of vocal cords and larynx, unilateral    Tracheal mass: 3 cm BELOW VOCAL CORDS: SUBGLOTIC    Abnormal echocardiogram    Leg edema    Chemotherapy management, encounter for    Paroxysmal atrial fibrillation    PVD (peripheral vascular disease)    Secondary and unspecified malignant neoplasm of intrathoracic lymph nodes    Agranulocytosis secondary to cancer chemotherapy       BP (!) 106/54   Pulse 74   Resp 19   Ht 5' 10" (1.778 m)   Wt 88.8 kg (195 lb 12.3 " oz)   SpO2 95%   BMI 28.09 kg/m²   Body mass index is 28.09 kg/m².    Review of Systems   Constitutional: Negative.    HENT: Negative.     Respiratory:  Positive for dyspnea on extertion.    Cardiovascular:  Positive for leg swelling.   Gastrointestinal: Negative.    Psychiatric/Behavioral: Negative.     All other systems reviewed and are negative.    Objective:      Physical Exam  Constitutional:       Appearance: Normal appearance.   HENT:      Head: Normocephalic and atraumatic.      Nose: Nose normal.      Mouth/Throat:      Pharynx: Oropharynx is clear.   Cardiovascular:      Rate and Rhythm: Normal rate and regular rhythm.   Pulmonary:      Breath sounds: Normal breath sounds.      Comments: Absent BS Left  Abdominal:      Palpations: Abdomen is soft.   Musculoskeletal:      Right lower leg: Edema present.      Left lower leg: Edema present.   Neurological:      General: No focal deficit present.      Mental Status: He is alert and oriented to person, place, and time.   Psychiatric:         Mood and Affect: Mood normal.         Behavior: Behavior normal.       Personal Diagnostic test            Phase Oxygen Assessment Supplemental O2 Heart   Rate Blood Pressure Jimenez Dyspnea Scale Rating   Resting 95 % Room Air 62 bpm 159/67 0   Exercise             Minute             1 88 % Room Air 79 bpm       2 86 % 2 L/M 85 bpm       3 87 % 3 L/M 93 bpm       4 93 % 4 L/M 93 bpm       5 91 % 4 L/M 99 bpm       6  91 % 4 L/M 96 bpm 158/71 4   Recovery             Minute             1 92 % 4 L/M 90 bpm       2 99 % 4 L/M 75 bpm       3 100 % 4 L/M 62 bpm       4 100 % 4 L/M 63 bpm 144/66 3         Results for orders placed during the hospital encounter of 09/14/22    X-Ray Chest PA And Lateral    Narrative  EXAMINATION:  XR CHEST PA AND LATERAL    CLINICAL HISTORY:  SOB; Chronic obstructive pulmonary disease, unspecified    TECHNIQUE:  PA and lateral views of the chest were performed.    COMPARISON:  Prior  radiographs    FINDINGS:  Right-sided MediPort removed.  There is persistent opacification of the left hemithorax consistent with prior pneumonectomy.  Expected leftward shift cardiomediastinal structures again noted.  Right lung demonstrates possible developing nodular opacity at the lateral base.  No large pleural effusion.  CT chest recommended.    Impression  As above.  This report was flagged in Epic as abnormal.      Electronically signed by: Giovani Nicole MD  Date:    09/14/2022  Time:    10:31        Assessment:       1. Panlobular emphysema    2. Asbestos-induced pleural plaque    3. Asthma with COPD    4. Insomnia due to medical condition    5. Chronic obstructive pulmonary disease, unspecified COPD type    6. Nocturnal hypoxemia    7. Hx of cancer of lung          Outpatient Encounter Medications as of 1/30/2025   Medication Sig Dispense Refill    albuterol (PROVENTIL) 2.5 mg /3 mL (0.083 %) nebulizer solution Take 3 mLs (2.5 mg total) by nebulization every 6 (six) hours while awake. 270 mL 11    albuterol (PROVENTIL/VENTOLIN HFA) 90 mcg/actuation inhaler Inhale 2 puffs into the lungs every 4 (four) hours as needed for Wheezing or Shortness of Breath. 18 g 11    amLODIPine (NORVASC) 5 MG tablet TAKE 1 TABLET BY MOUTH EVERYDAY AT BEDTIME 90 tablet 2    ammonium lactate (LAC-HYDRIN) 12 % lotion Apply to damp skin after bathing 225 g 11    apixaban (ELIQUIS) 5 mg Tab Take 1 tablet (5 mg total) by mouth 2 (two) times daily. 180 tablet 3    aspirin (ECOTRIN) 81 MG EC tablet Take 81 mg by mouth once daily.      bacitracin-neomycin-polymyxin b-hydrocortisone 1 % ointment Apply topically 2 (two) times daily. 15 g 1    doxepin (SINEQUAN) 10 MG capsule Take 1 capsule (10 mg total) by mouth every evening. For insomnia 90 capsule 3    fenofibric acid (FIBRICOR) 135 mg CpDR TAKE 1 CAPSULE (135 MG TOTAL) BY MOUTH ONCE DAILY 90 capsule 0    fluticasone-umeclidin-vilanter (TRELEGY ELLIPTA) 200-62.5-25 mcg inhaler  "Inhale 1 puff into the lungs once daily. Hold for refill 180 each 3    furosemide (LASIX) 20 MG tablet TAKE 1 TABLET BY MOUTH EVERY DAY AS NEEDED 90 tablet 1    levocetirizine (XYZAL) 5 MG tablet Take 5 mg by mouth as needed.       lisinopriL (PRINIVIL,ZESTRIL) 40 MG tablet TAKE 1 TABLET BY MOUTH EVERY DAY 90 tablet 3    ranolazine (RANEXA) 500 MG Tb12 TAKE 1 TABLET BY MOUTH TWICE A  tablet 1    rosuvastatin (CRESTOR) 40 MG Tab Take 1 tablet (40 mg total) by mouth once daily. 90 tablet 3    sotaloL (BETAPACE) 80 MG tablet TAKE 1 TABLET BY MOUTH 2 TIMES DAILY. 180 tablet 3    [DISCONTINUED] albuterol (PROVENTIL/VENTOLIN HFA) 90 mcg/actuation inhaler Inhale 2 puffs into the lungs every 4 (four) hours as needed for Wheezing or Shortness of Breath. 18 g 11    [DISCONTINUED] doxepin (SINEQUAN) 10 MG capsule TAKE 1 CAPSULE (10 MG TOTAL) BY MOUTH EVERY EVENING. FOR INSOMNIA 90 capsule 3    [DISCONTINUED] fluticasone-umeclidin-vilanter (TRELEGY ELLIPTA) 200-62.5-25 mcg inhaler Inhale 1 puff into the lungs once daily. Hold for refill 180 each 3     No facility-administered encounter medications on file as of 1/30/2025.     Orders Placed This Encounter   Procedures    [facility] - OTHER     [facility] for CPAP/Oxygen/Nebulizer supplies.  Customer Service: [phone]  Call: [phone]  Fax: [phone]  Billing Inquiries: [phone] or [phone]     Order Specific Question:   Type of Equipment:     Answer:   nasal cannula     Order Specific Question:   Height:     Answer:   5' 10" (1.778 m)     Order Specific Question:   Weight:     Answer:   88.8 kg (195 lb 12.3 oz)     Order Specific Question:   Does patient have medical equipment at home?     Answer:   nasal cannula    X-Ray Chest 4 Or More View     Standing Status:   Future     Standing Expiration Date:   1/31/2026     Order Specific Question:   Reason for Exam:     Answer:   asbestosis     Plan:   Possible developing nodular opacity at the right lateral " base.  CT chest     Problem List Items Addressed This Visit       Chronic obstructive pulmonary disease - Primary    Relevant Orders    X-Ray Chest 4 Or More View    Asbestos-induced pleural plaque    Relevant Orders    X-Ray Chest 4 Or More View    Insomnia    Relevant Medications    doxepin (SINEQUAN) 10 MG capsule     Other Visit Diagnoses       Asthma with COPD        Relevant Medications    albuterol (PROVENTIL/VENTOLIN HFA) 90 mcg/actuation inhaler    fluticasone-umeclidin-vilanter (TRELEGY ELLIPTA) 200-62.5-25 mcg inhaler    Nocturnal hypoxemia        Relevant Orders    HME - OTHER    Hx of cancer of lung        Relevant Orders    X-Ray Chest 4 Or More View          Continue oxygen therapy. Benefits from use.    Time spent 32 minutes

## 2025-01-30 NOTE — PROCEDURES
"[facility] Function 3rdFl  Six Minute Walk     SUMMARY     Ordering Provider: Roel Ernandez MD   Interpreting Provider: Roel Ernandez MD  Performing nurse/tech/RT: VT, RT  Diagnosis:  (Exercise hypoxemia;  Asthma with COPD; rx higher liter flow rate)  Height: 5' 10" (177.8 cm)  Weight: 88.8 kg (195 lb 12.3 oz)  BMI (Calculated): 28.1                Phase Oxygen Assessment Supplemental O2 Heart   Rate Blood Pressure Jimenez Dyspnea Scale Rating   Resting 95 % Room Air 74 bpm 106/54 2   Exercise        Minute        1 92 % Room Air 83 bpm     2 90 % Room Air 103 bpm     3 88 % Room Air 95 bpm     4 87 % (increased to 4L to ks >=88%) 2 L/M 92 bpm     5 96 % 4 L/M 105 bpm     6  96 % 4 L/M 110 bpm 121/61 3   Recovery        Minute        1 100 % 4 L/M 88 bpm     2 100 % 4 L/M 86 bpm     3 100 % 4 L/M 79 bpm     4 100 % 4 L/M 77 bpm 102/55 2     Six Minute Walk Summary  6MWT Status: completed without stopping  Patient Reported: Dyspnea     Interpretation:  Did the patient stop or pause?: No                                         Total Time Walked (Calculated): 360 seconds  Final Partial Lap Distance (feet): 25 feet  Total Distance Meters (Calculated): 312.42 meters  Predicted Distance Meters (Calculated): 464 meters  Percentage of Predicted (Calculated): 67.33  Peak VO2 (Calculated): 13.35  Mets: 3.81  Has The Patient Had a Previous Six Minute Walk Test?: Yes       Previous 6MWT Results  Has The Patient Had a Previous Six Minute Walk Test?: Yes  Date of Previous Test: 02/19/24  Total Time Walked: 360 seconds  Total Distance (meters): 320.04  Predicted Distance (meters): 465.32 meters  Percentage of Predicted: 68.78  Percent Change (Calculated): 0.02    Interpretation:  Total distance walked in six minutes is mildly reduced indicating an mild reduction in functional capacity.  There was significant severe oxygen desaturation to 87 % with exercise on room air (oxygen saturation less than 89%). Supplemental " oxygen was administered for the remainder of the test as noted above. Clinical correlation suggested.  Patient met criteria for oxygen prescription.  [] Mild exercise-induced hypoxemia described as an arterial oxygen saturation of 93-95% (with a fall of 3-4% with exercise),   [] Moderate exercise-induced hypoxemia as a fall in oxygen saturation to  89-93% (with a fall of 3-4 % with exercise)  [x] Severe exercise induced hypoxemia as < 89% O2 saturation (88% and below).  Medicare Criteria for Oxygen prescription comments: When arterial oxygen saturation is at or below 88% during exercise (severe exercise induced hypoxemia) then the patient meets criteria for oxygen prescription.  Details about Medicare Group Criteria coverage can be found at http://www.cms.[facility].gov/manuals/downloads/     Roel Ernandez MD

## 2025-03-09 NOTE — TELEPHONE ENCOUNTER
Care Due:                  Date            Visit Type   Department     Provider  --------------------------------------------------------------------------------                                EP -                              PRIMARY      [facility] INTERNAL  Last Visit: 03-      CARE ([facility])   MEDICINE       Peter Teixeira  Next Visit: None Scheduled  None         None Found                                                            Last  Test          Frequency    Reason                     Performed    Due Date  --------------------------------------------------------------------------------    Office Visit  15 months..  fenofibric, rosuvastatin.  03- 06-    Lipid Panel.  12 months..  fenofibric, rosuvastatin.  03- 03-    [facility] Embedded Care Due Messages. Reference number: [id number].   3/09/2025 12:18:36 AM CST

## 2025-03-10 RX ORDER — SOTALOL HYDROCHLORIDE 80 MG/1
80 TABLET ORAL 2 TIMES DAILY
Qty: 180 TABLET | Refills: 0 | Status: SHIPPED | OUTPATIENT
Start: 2025-03-10

## 2025-03-10 NOTE — TELEPHONE ENCOUNTER
Contacted the patient regarding an appointment. Chai agreed to be scheduled with Dr. Teixeira on 3/13 for Annual Wellness Visit.

## 2025-03-10 NOTE — TELEPHONE ENCOUNTER
Patient overdue for follow up.  Refill is given but the patient needs an appointment with Dr. Teixeira or Aiden Evans NP, for follow up.

## 2025-03-21 ENCOUNTER — PATIENT MESSAGE (OUTPATIENT)
Dept: CARDIOLOGY | Facility: CLINIC | Age: 85
End: 2025-03-21
Payer: MEDICARE

## 2025-04-10 ENCOUNTER — TELEPHONE (OUTPATIENT)
Dept: INTERNAL MEDICINE | Facility: CLINIC | Age: 85
End: 2025-04-10
Payer: MEDICARE

## 2025-04-10 NOTE — TELEPHONE ENCOUNTER
----- Message from Giselle sent at 4/10/2025  4:06 PM CDT -----  Contact: Sharmaine Salamanca  Type:  Patient Requesting a call back Who Called:Sharmaine Salamanca What is the call back request regarding?:caller would like to speak to MD or nurse about a prescription he was taking, to know if he should still take it and if he could possibly get  a refillWould the patient rather a call back or a response via [facility] Call Back Number:[phone] Information:

## 2025-04-15 RX ORDER — ROSUVASTATIN CALCIUM 40 MG/1
40 TABLET, COATED ORAL
Qty: 90 TABLET | Refills: 3 | Status: SHIPPED | OUTPATIENT
Start: 2025-04-15

## 2025-04-15 NOTE — TELEPHONE ENCOUNTER
No care due was identified.  [facility] Embedded Care Due Messages. Reference number: [id number].   4/15/2025 3:15:01 PM CDT

## 2025-04-15 NOTE — TELEPHONE ENCOUNTER
Pt daughter said the mediation  ranolazine (RANEXA) 500 MG Tb12.  Was prescribed in June 26, 2024 but was never picked up due to pt wife dying wants to know if he should still be on it, Please Advise

## 2025-04-15 NOTE — TELEPHONE ENCOUNTER
----- Message from Hayley sent at 4/15/2025  2:10 PM CDT -----  Who called:Pt daughter Francisco is the request in detail: Pt daughter would like a call in regards to ranolazine (RANEXA) 500 MG Tb12. The pt haven't took it since September.Can the clinic reply by [facility]? No Would the patient rather a call back or a response via [facility]? Call back Best call back number: Telephone Information:Mobile     [phone] Information: Thank you.

## 2025-04-24 ENCOUNTER — TELEPHONE (OUTPATIENT)
Dept: INTERNAL MEDICINE | Facility: CLINIC | Age: 85
End: 2025-04-24
Payer: MEDICARE

## 2025-04-24 NOTE — TELEPHONE ENCOUNTER
Spoke with pt daughter, requesting the letter to Mr Jeanmarie, says the have a appointment with the neurologist in July and was trying to get pt to get a CT scan.

## 2025-04-24 NOTE — TELEPHONE ENCOUNTER
----- Message from Alison sent at 4/24/2025  9:30 AM CDT -----  Contact: daughter  Type:  Needs Medical AdviceWho Called: Yun Guyould the patient rather a call back or a response via [facility]? [facility]: [phone] Information: yun requesting a call back regarding some concerns she has about pt

## 2025-04-24 NOTE — TELEPHONE ENCOUNTER
Who are they wanting me to send a letter to?  Mr. Murry?     This is not a diagnosis which is typically made by an ER doctor based upon a single CT scan.  He will need more formal work up to determine what is going on with him.  DO they have an appointment with a neurologist?

## 2025-04-24 NOTE — TELEPHONE ENCOUNTER
----- Message from Sierra sent at 4/24/2025 10:39 AM CDT -----  Regarding: Letter  Pt was diagnosed with vascular dementia by Dr Carline MANNING at ER. He did have a ct scan done. The daughter is needing a letter from Dr Teixeira stating that he can't drive or have guns. He's wanting to drive and a relative gave him some guns which the family is worried about with his diagnosis since he's volatile. If the nurse could please call Sharmaine guillen back to get more information on this or make sure I understood what they are needing. She can be reached back at [phone]. They mentioned something about if Dr Teixeira needed to call Chance Magdaleno assistant , his number is [phone].

## 2025-05-29 ENCOUNTER — TELEPHONE (OUTPATIENT)
Dept: CARDIOLOGY | Facility: CLINIC | Age: 85
End: 2025-05-29
Payer: MEDICARE

## 2025-05-29 NOTE — TELEPHONE ENCOUNTER
----- Message from Eileen sent at 5/29/2025 10:31 AM CDT -----  Regarding: Sooner Appt  Contact: Patient Daughter Sharmaine  Type:  Sooner Apoointment RequestCaller is requesting a sooner appointment.  Caller declined first available appointment listed below.  Caller will not accept being placed on the waitlist and is requesting a message be sent to doctor.Name of Caller: Sharmaine (Daughter)When is the first available appointment?08/27/2025 Symptoms: follow up Would the patient rather a call back or a response via [facility]? Call back Best Call Back Number:[phone] Additional Information:  Patient is due for a follow up appt with physician Sharmaine would like patient to be seen as soon as possible Patient was last seen 11/2024 Please Assist

## 2025-06-04 RX ORDER — AMLODIPINE BESYLATE 5 MG/1
5 TABLET ORAL
Qty: 90 TABLET | Refills: 0 | Status: SHIPPED | OUTPATIENT
Start: 2025-06-04

## 2025-06-05 ENCOUNTER — TELEPHONE (OUTPATIENT)
Dept: CARDIOLOGY | Facility: CLINIC | Age: 85
End: 2025-06-05
Payer: MEDICARE

## 2025-06-11 ENCOUNTER — OFFICE VISIT (OUTPATIENT)
Dept: CARDIOLOGY | Facility: CLINIC | Age: 85
End: 2025-06-11
Payer: MEDICARE

## 2025-06-11 VITALS
SYSTOLIC BLOOD PRESSURE: 104 MMHG | DIASTOLIC BLOOD PRESSURE: 60 MMHG | HEART RATE: 95 BPM | WEIGHT: 194 LBS | BODY MASS INDEX: 27.77 KG/M2 | OXYGEN SATURATION: 88 % | HEIGHT: 70 IN

## 2025-06-11 DIAGNOSIS — I73.9 PVD (PERIPHERAL VASCULAR DISEASE): ICD-10-CM

## 2025-06-11 DIAGNOSIS — E78.2 MIXED HYPERLIPIDEMIA: Primary | ICD-10-CM

## 2025-06-11 DIAGNOSIS — I10 ESSENTIAL HYPERTENSION: ICD-10-CM

## 2025-06-11 DIAGNOSIS — I48.0 PAROXYSMAL ATRIAL FIBRILLATION: ICD-10-CM

## 2025-06-11 DIAGNOSIS — I25.118 CORONARY ARTERY DISEASE OF NATIVE ARTERY OF NATIVE HEART WITH STABLE ANGINA PECTORIS: ICD-10-CM

## 2025-06-11 PROCEDURE — 99999 PR PBB SHADOW E&M-EST. PATIENT-LVL III: CPT | Mod: PBBFAC,,, | Performed by: INTERNAL MEDICINE

## 2025-06-11 RX ORDER — CHOLECALCIFEROL (VITAMIN D3) 1250 MCG
1 TABLET ORAL WEEKLY
COMMUNITY
Start: 2025-05-14

## 2025-06-11 RX ORDER — CEFDINIR 300 MG/1
300 CAPSULE ORAL 2 TIMES DAILY
COMMUNITY
Start: 2025-06-06

## 2025-06-11 RX ORDER — LANOLIN ALCOHOL/MO/W.PET/CERES
1000 CREAM (GRAM) TOPICAL
COMMUNITY
Start: 2025-06-06 | End: 2025-07-06

## 2025-06-11 RX ORDER — PREDNISONE 20 MG/1
TABLET ORAL
COMMUNITY
Start: 2025-06-07 | End: 2025-06-15

## 2025-06-11 RX ORDER — DOXYCYCLINE 100 MG/1
100 CAPSULE ORAL 2 TIMES DAILY
COMMUNITY
Start: 2025-06-06

## 2025-06-11 NOTE — PROGRESS NOTES
Subjective:   Patient ID:  Chai Murry is a 85 y.o. male who presents for follow-up of No chief complaint on file.  11-24  Echo at [facility] 5-24 nml EF, mild AS  SOB ok, on O2 at night  CVA at [facility]:  Reason for Hospitalization   84-year-old male PMH HTN, CAD, HLD, A-fib, COPD on 2 L NC at baseline, previous history of lung cancer s/p left pneumonectomy who presented 5/22 as a level 1 stroke alert (new onset right-sided weakness and dysarthria), tPA deferred because of Eliquis.  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       No apparent neurologic deficit        Presents for follow up  SOB related to s/p L pneumonectomy, no changes since last visit  Denies chest pain, palpitations, syncope, lightheadedness  No other complaints today    Today  Pt with recent admission for Pneumonia [facility]  Coronary Artery Disease  Presents for follow-up visit. Symptoms include shortness of breath. Pertinent negatives include no chest pain, chest pressure, chest tightness, dizziness, leg swelling, muscle weakness, palpitations or weight gain. Compliance with medications is good.   Hypertension  This is a chronic problem. The current episode started more than 1 year ago. The problem is unchanged. The problem is controlled. Associated symptoms include shortness of breath. Pertinent negatives include no chest pain, malaise/fatigue, orthopnea or palpitations. Risk factors for coronary artery disease include male gender, sedentary lifestyle and dyslipidemia. Past treatments include calcium channel blockers and angiotensin blockers. There are no compliance problems.  Hypertensive end-organ damage includes CAD/MI. There is no history of CVA.   Hyperlipidemia  This is a chronic problem. The problem is controlled. Recent lipid tests were reviewed and are normal. Associated symptoms include shortness of breath. Pertinent negatives include no chest pain. Current antihyperlipidemic treatment includes  statins. Risk factors for coronary artery disease include hypertension, male sex, obesity, a sedentary lifestyle and dyslipidemia.       Review of Systems   Constitutional: Negative. Negative for malaise/fatigue and weight gain.   HENT: Negative.     Eyes: Negative.    Cardiovascular: Negative.  Negative for chest pain, leg swelling, orthopnea and palpitations.   Respiratory:  Positive for shortness of breath. Negative for chest tightness.    Endocrine: Negative.    Hematologic/Lymphatic: Negative.    Skin: Negative.    Musculoskeletal:  Negative for muscle weakness.   Gastrointestinal: Negative.    Genitourinary: Negative.    Neurological: Negative.  Negative for dizziness.   Psychiatric/Behavioral: Negative.     Allergic/Immunologic: Negative.    All other systems reviewed and are negative.    Family History   Problem Relation Name Age of Onset    Esophageal cancer Sister      Cancer Sister      Lung cancer Mother      Cancer Mother      Cataracts Mother      Hypertension Mother      Pneumonia Father      Cataracts Father      Hypertension Father      Cancer Brother      Hypertension Brother      Blindness Neg Hx      Diabetes Neg Hx      Glaucoma Neg Hx      Macular degeneration Neg Hx      Retinal detachment Neg Hx      Strabismus Neg Hx      Stroke Neg Hx      Thyroid disease Neg Hx       Past Medical History:   Diagnosis Date    Anemia     Arthritis     Atrial fibrillation     CAD (coronary artery disease)     Cataract     Class 1 obesity due to excess calories with serious comorbidity and body mass index (BMI) of 31.0 to 31.9 in adult 3/14/2022    COPD (chronic obstructive pulmonary disease)     Diverticulosis     ED (erectile dysfunction)     HTN (hypertension)     Hyperlipidemia     Lung cancer     left    NSTEMI (non-ST elevated myocardial infarction) 8/23/2019    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 Courtney dwyer bx     Social History[1]  Medications Ordered Prior  to Encounter[2]  Review of patient's allergies indicates:   Allergen Reactions    Atorvastatin      Other reaction(s): Muscle pain    Bactrim [sulfamethoxazole-trimethoprim]      Lip swelling       Objective:     Physical Exam  Vitals and nursing note reviewed.   Constitutional:       Appearance: He is well-developed.   HENT:      Head: Normocephalic and atraumatic.   Eyes:      Conjunctiva/sclera: Conjunctivae normal.      Pupils: Pupils are equal, round, and reactive to light.   Cardiovascular:      Rate and Rhythm: Normal rate and regular rhythm.      Pulses: Intact distal pulses.      Heart sounds: Normal heart sounds.   Pulmonary:      Effort: Pulmonary effort is normal.      Breath sounds: Normal breath sounds.   Abdominal:      General: Bowel sounds are normal.      Palpations: Abdomen is soft.   Musculoskeletal:      Cervical back: Normal range of motion and neck supple.   Skin:     General: Skin is warm and dry.   Neurological:      Mental Status: He is alert and oriented to person, place, and time.         Assessment:     1. Mixed hyperlipidemia    2. Paroxysmal atrial fibrillation    3. Essential hypertension    4. Coronary artery disease of native artery of native heart with stable angina pectoris    5. PVD (peripheral vascular disease)        Plan:     Mixed hyperlipidemia    Paroxysmal atrial fibrillation    Essential hypertension    Coronary artery disease of native artery of native heart with stable angina pectoris    PVD (peripheral vascular disease)      Continue lisinopril, lasix (and prn), norvasc -htn  Continue asa, sotalol- PAF,  Continue ranexa- cad  Continue statin, fenofibrate-hlp  Continue eliquis- CVA            [1]   Social History  Socioeconomic History    Marital status:     Number of children: 3   Occupational History    Occupation: retired   Tobacco Use    Smoking status: Former     Current packs/day: 0.00     Average packs/day: 1 pack/day for 50.0 years (50.0 ttl pk-yrs)      Types: Cigarettes     Start date: 1955     Quit date: 2005     Years since quittin.8    Smokeless tobacco: Never   Substance and Sexual Activity    Alcohol use: No    Drug use: No    Sexual activity: Not Currently     Social Drivers of Health     Food Insecurity: No Food Insecurity (2025)    [facility]    Hunger Vital Sign     Worried About Running Out of Food in the Last Year: Never true     Ran Out of Food in the Last Year: Never true   Transportation Needs: No Transportation Needs (2025)    [facility]    PRAPARE - Transportation     Lack of Transportation (Medical): No     Lack of Transportation (Non-Medical): No   Housing Stability: Low Risk  (2025)    [facility]    Housing Stability Vital Sign     Unable to Pay for Housing in the Last Year: No     Number of Times Moved in the Last Year: 0     Homeless in the Last Year: No   [2]   Current Outpatient Medications on File Prior to Visit   Medication Sig Dispense Refill    albuterol (PROVENTIL/VENTOLIN HFA) 90 mcg/actuation inhaler Inhale 2 puffs into the lungs every 4 (four) hours as needed for Wheezing or Shortness of Breath. 18 g 11    amLODIPine (NORVASC) 5 MG tablet TAKE 1 TABLET BY MOUTH EVERYDAY AT BEDTIME 90 tablet 0    ammonium lactate (LAC-HYDRIN) 12 % lotion Apply to damp skin after bathing 225 g 11    apixaban (ELIQUIS) 5 mg Tab Take 1 tablet (5 mg total) by mouth 2 (two) times daily. 180 tablet 3    aspirin (ECOTRIN) 81 MG EC tablet Take 81 mg by mouth once daily.      bacitracin-neomycin-polymyxin b-hydrocortisone 1 % ointment Apply topically 2 (two) times daily. 15 g 1    cefdinir (OMNICEF) 300 MG capsule Take 300 mg by mouth 2 (two) times daily.       cholecalciferol, vitamin D3, 1,250 mcg (50,000 unit) Tab Take 1 tablet by mouth once a week.      cyanocobalamin (VITAMIN B-12) 1000 MCG tablet Take 1,000 mcg by mouth.      doxepin (SINEQUAN) 10 MG capsule Take 1 capsule (10 mg total) by mouth every evening. For insomnia 90 capsule 3    doxycycline (VIBRAMYCIN) 100 MG Cap Take 100 mg by mouth 2 (two) times daily.      fenofibric acid (FIBRICOR) 135 mg CpDR TAKE 1 CAPSULE (135 MG TOTAL) BY MOUTH ONCE DAILY 90 capsule 0    fluticasone-umeclidin-vilanter (TRELEGY ELLIPTA) 200-62.5-25 mcg inhaler Inhale 1 puff into the lungs once daily. Hold for refill 180 each 3    furosemide (LASIX) 20 MG tablet TAKE 1 TABLET BY MOUTH EVERY DAY AS NEEDED 90 tablet 1    levocetirizine (XYZAL) 5 MG tablet Take 5 mg by mouth as needed.       lisinopriL (PRINIVIL,ZESTRIL) 40 MG tablet TAKE 1 TABLET BY MOUTH EVERY DAY 90 tablet 3    predniSONE (DELTASONE) 20 MG tablet Take by mouth.      ranolazine (RANEXA) 500 MG Tb12 TAKE 1 TABLET BY MOUTH TWICE A  tablet 1    rosuvastatin (CRESTOR) 40 MG Tab TAKE 1 TABLET BY MOUTH EVERY DAY 90 tablet 3    sotaloL (BETAPACE) 80 MG tablet TAKE 1 TABLET BY MOUTH TWICE A  tablet 0     No current facility-administered medications on file prior to visit.

## (undated) DEVICE — SUT VICRYL 0 SH

## (undated) DEVICE — SUT VICRYL 4-0 ANTIBACT

## (undated) DEVICE — SUT VICRYL 3-0 27 SH

## (undated) DEVICE — DECANTER VIAL ASEPTIC TRANSFER

## (undated) DEVICE — SUT MONOCRYL 4.0 PS2 CP496G

## (undated) DEVICE — SYR 10CC LUER LOCK

## (undated) DEVICE — CLOSURE SKIN STERI STRIP 1/2X4

## (undated) DEVICE — DRAPE MOBILE C-ARM

## (undated) DEVICE — SEE MEDLINE ITEM 152622

## (undated) DEVICE — SEE MEDLINE ITEM 157027

## (undated) DEVICE — NDL SAFETY 25G X 1.5 ECLIPSE

## (undated) DEVICE — SEE MEDLINE ITEM 146420

## (undated) DEVICE — MANIFOLD 4 PORT

## (undated) DEVICE — SEE MEDLINE ITEM 152739

## (undated) DEVICE — GAUZE SPONGE 4X4 12PLY

## (undated) DEVICE — ELECTRODE REM PLYHSV RETURN 9

## (undated) DEVICE — COVER OVERHEAD SURG LT BLUE

## (undated) DEVICE — ADHESIVE MASTISOL VIAL 48/BX

## (undated) DEVICE — SEE MEDLINE ITEM 157117

## (undated) DEVICE — APPLICATOR CHLORAPREP ORN 26ML

## (undated) DEVICE — DRESSING TRANS 4X4 TEGADERM

## (undated) DEVICE — SHEET THYROID W/ISO-BAC

## (undated) DEVICE — GLOVE SURG BIOGEL LATEX SZ 7.5